# Patient Record
Sex: MALE | Race: WHITE | NOT HISPANIC OR LATINO | Employment: OTHER | URBAN - NONMETROPOLITAN AREA
[De-identification: names, ages, dates, MRNs, and addresses within clinical notes are randomized per-mention and may not be internally consistent; named-entity substitution may affect disease eponyms.]

---

## 2017-01-04 ENCOUNTER — TRANSCRIBE ORDERS (OUTPATIENT)
Dept: ADMINISTRATIVE | Facility: HOSPITAL | Age: 72
End: 2017-01-04

## 2017-01-04 DIAGNOSIS — R74.8 ELEVATED LIVER ENZYMES: Primary | ICD-10-CM

## 2017-01-06 ENCOUNTER — HOSPITAL ENCOUNTER (OUTPATIENT)
Dept: ULTRASOUND IMAGING | Facility: HOSPITAL | Age: 72
Discharge: HOME | End: 2017-01-06
Attending: INTERNAL MEDICINE | Admitting: INTERNAL MEDICINE

## 2017-01-06 DIAGNOSIS — R74.8 ELEVATED LIVER ENZYMES: ICD-10-CM

## 2017-01-06 PROCEDURE — 76705 ECHO EXAM OF ABDOMEN: CPT

## 2017-01-23 RX ORDER — ATORVASTATIN CALCIUM 80 MG/1
TABLET, FILM COATED ORAL
Qty: 30 TABLET | Refills: 5 | Status: SHIPPED | OUTPATIENT
Start: 2017-01-23 | End: 2017-08-11 | Stop reason: SDUPTHER

## 2017-01-30 ENCOUNTER — HOSPITAL ENCOUNTER (OUTPATIENT)
Dept: HOSPITAL 58 - REHAB | Age: 72
LOS: 1 days | End: 2017-01-31
Attending: PHYSICIAN ASSISTANT

## 2017-01-30 VITALS — BODY MASS INDEX: 38 KG/M2

## 2017-01-30 DIAGNOSIS — M54.5: Primary | ICD-10-CM

## 2017-02-02 NOTE — RS.OPPTDN
Subjective


Date of Note: 02/02/17


Date of Evaluation: 01/30/17


Payer Source: MEDICARE


Treatment Diagnosis: Low back pain


*Precautions: PACEMAKER





Pain Assessment





- Pain Description


Pain Location: LBP and right lateral thigh.


Pain Description: Radiating


Pain Description: Increased LBP over the past couple days associated with 

increased demands





Interventions





- Exercise/Activities/Manual Therapy


Exercises/Activities: Patient performed alternating KTC and LTR X 5-10 reps.  

He was instructed in posterior pelvic tilts and hamstring stretches bilaterally 

to be performed with the HEP he has already began.  He demonstrated good 

understanding but further education will be needed.


Total minutes of Exercise: 15


Manual Therapy: Myofasical stretching in the area of the right piriformis and 

medial to the trochanteric area as well as the right mid-lower lumbar 

paraspinals.  Tender point releases also performed in these areas and deep 

tissue mobilization.


Total minutes of Manual Therapy: 30


HOME EXERCISE PROGRAM: Alt KTC, LTR, posterior pelvic tilts and hamstring 

stretches.





- Charges


Total Direct Minutes: 45


Total Treatment Time: 45


Procedures billed for this date of service:: ther ex & manual therapy X 2


Assessment: Patient tolerated tx well and had relief of pain and tightness 

following today's intervention.


Patient Education: Body/Joint mechanics, Home Safety, Activity Modification


Patient demonstrates compliance with HEP?: Yes





Plan


PLAN OF CARE EXPIRES ON:: 03/19/17


ORDER # VISITS AND/OR THROUGH DATE: 03/19/2017


PLAN: Continue Plan of Care

## 2017-02-02 NOTE — RS.OPPTEV2
Date of Note: 01/30/17


Visit #: 1


Date of Evaluation: 01/30/17


Payer Source: MEDICARE


Treatment Diagnosis: Low back pain


History of Condition/Mechanism of Injury:: Patient reports increased low back 

and LE pain since approximately October 2016.  Reports no known injury.  

Reports a history of problems with the low back.  He had surgery to the low 

back in 2011 which involved hardware at L2-3 .


Prior Level of Function.....Patient was independent with: ADL's, Self Care, 

Caregiving, Ambulation/Mobility, Community Integration/Access


Functional Limitations: Sleep, Self Care, ADL's, Reaching, Pushing, Pulling, 

Lifting, Carrying, Sitting, Standing, Bending, Ambulation, Community Access/

Integration


Current Subjective/complaints:: Patient reports low back pain and burning pain 

into the hips. States right side is worse than the left.  States his pain is 

worse at night. Reports waking up many times due to pain and has to frequently 

change positions.  Riding in the car is also difficult. States he also has 

times that his legs feel like "rubber".  States this varies from day to day, 

and he can usually tell when he gets up in the morning how his legs are going 

to feel the rest of the day.


*Precautions: PACEMAKER





Medical History


Medical History: Hypertension, Arthritis


Surgical History Comments:: Lumbar surgery 2011, PACEMAKER, Cervical spine 

surgery


Smoking Status: Former smoker


Hx Home Medications: Omeprazole,Valsartan, Aricept, Metoprolol, Xanax, Lexapro


Patient's Goals: His goal is to get relief of back and leg pain, and put off or 

avoid back surgery.





Pain Assessment





- Pain Description


Pain Location: back and hips


Current Pain Intensity: 4/10


Worst Pain Intensity: 7/10





Functional Outcome Measure


Oswestry LBP: 64





- G Codes & Severity Modifier


G Codes & Modifier: Mobility current CL.  Mobility goal CJ


Source of G Code score: Oswestry LBP scale





Observation





- Observation


Posture: Forward Head, Rounded Shoulders, Decreased Lumbar Lordosis, Posterior 

Pelvic Tilt





Gait





- Gait Pattern


General Gait Pattern Observation: No Deviations/Normal





- ROM


Comments: Lumbar AROM is approximately 50-75% of normal range.  LE AROM is WFL'

s. Right hip is tighter into  IR and ER rotation and the right SLR is also less.





- Strength


Trunk Lateral Flexion: 4    Good


Trunk Rotation: 4    Good


Comments: Bilateral LE strength is 4+ to 5/5 throughout.





- Special Tests


DEBBIE Test: Negative Left, Positive Right


SLR Test: Negative Left, Negative Right


Seated Dural Stretch Test: Negative Left, Negative Right


SI Joint Compression: Negative


SI Joint Distraction: Negative





Palpation


Comments:: Patient reports tenderness at right superior gluteal musculature.  

Patient reports no significant tenderness with palpation throughout the lumbar 

spine and paraspinals, or either SI joint.  Minimal muscle guarding noted along 

lumbar paraspinals.





Sensation





- Sensation


Comments: 


Reports hyposensitivity along bilateral upper lateral thighs, worse on the 

right than the left.





Additional Comments:


Additional Comments: Right SLR to 30-35 degrees. Left SLR to 45 degrees.





- Treatment


Modality: Electrical Stim Unattended


Parameters/Method Applied: 4 large pads, uncrossed to lumbar paraspinals X 15 

mins @ 120 peak volts.


Patient Position: Supine





- Heat/Cryotherapy


Treatment: Hot Pack (with Estim)





Interventions





- Exercise/Activities/Manual Therapy


Exercises/Activities: Patient instructed in stretching SKTC and lower trunk 

rotation.


Manual Therapy: NA


HOME EXERCISE PROGRAM: stretching SKTC and lower trunk rotation.





- Charges


Total Direct Minutes: 55 mins


Total Treatment Time: 55 mins


Procedures billed for this date of service:: EVAL, Estim, HP





Assessment


Assessment: Patient presents to therapy with a diagnosis of low back pain.  He 

reports difficulty sleeping, difficulty riding in a car, and hyposensitivity 

and weakness in the legs with prolonged standing and walking.  He exhibits less 

flexibilty in the hip compared to the left.  He exhibits good potential to 

benefit from modalities and exercises to reduce his pain and address muscle 

imbalances in the hips.


Patient Education: Education of diagnosis, Body/Joint mechanics, Home Exercise 

Program, Home Safety, Activity Modification, Education of Plan of Care


Rehab Potential: Good





Short Term Goals


Goal #1: Patient independent and compliant with basic HEP.


Goal to be met by: 02/16/17


Goal #2: Right SLR to 40 degrees.


Goal to be met by: 02/16/17


Goal #3: Radiating symptoms localized to the low back.


Goal to be met by: 02/16/17





Long Term Goals


Goal #1: Pt knows HEP and to continue exercises to maintain level at discharge.


Goal to be met by: 03/19/17


Goal #2: Score on Oswestry LBP scale improved to 39% or better.


Goal to be met by: 03/19/17


Goal #3: Pt able to tolerate riding in car with minimal low back pain.


Goal to be met by: 03/19/17


Goal #4: Patient able to sleep 6 hours w/o interruption from back pain.


Goal to be met by: 03/19/17





Plan





- Treatment to be Provided


Procedures: Therapeutic Exercises, Therapeutic Activity, Patient Education


Modalities: Electrical Stimulation, Cryotherapy, Hot Packs





- Treatment Plan


Frequency: 2 X week


Duration: 6 weeks


ORDER # VISITS AND/OR THROUGH DATE: 3/19/17





- Treatment Code


(1) Low back pain


Qualifiers: 


     Chronicity: unspecified     Back pain laterality: unspecified     Sciatica 

presence: unspecified whether sciatica present     Qualified Description: Low 

back pain, unspecified back pain laterality, unspecified chronicity, with 

sciatica presence unspecified       Qualifier Code(s): (M54.5) Low back pain

## 2017-02-06 RX ORDER — ESCITALOPRAM OXALATE 20 MG/1
TABLET ORAL
Qty: 30 TABLET | Refills: 0 | Status: SHIPPED | OUTPATIENT
Start: 2017-02-06 | End: 2017-03-08 | Stop reason: SDUPTHER

## 2017-02-07 NOTE — RS.CXNS
Date of scheduled appointment: 02/07/17


Type: Cancel (Wife calls to cancel appointment, patient is sick.)

## 2017-02-09 RX ORDER — FUROSEMIDE 40 MG/1
40 TABLET ORAL DAILY
Qty: 90 TABLET | Refills: 0 | Status: SHIPPED | OUTPATIENT
Start: 2017-02-09 | End: 2017-05-07 | Stop reason: SDUPTHER

## 2017-02-09 NOTE — RS.OPPTDN
Subjective


Date of Note: 02/09/17


Visit #: 3


Date of Evaluation: 01/30/17


Payer Source: MEDICARE


Treatment Diagnosis: Low back pain


Current Subjective/complaints:: Patient reports feeling better following 

treatment today than he has been in several days. States he is working on HEP 

and will increase hold time with stretching.


*Precautions: PACEMAKER





Pain Assessment





- Pain Description


Pain Location: LBP and right lateral thigh.


Pain Description: Radiating


Pain Description: Increased LBP over the past couple days associated with 

increased demands





- Treatment


Modality: Electrical Stim Unattended


Parameters/Method Applied: [id number] HVGC to 180-190p.v. with 4 large pads to the 

lower lumbar paraspinals and right upper gluteal region with CP. Patient in 

supine.


Patient Position: Supine





- Heat/Cryotherapy


Treatment: Cryotherapy





Interventions





- Exercise/Activities/Manual Therapy


Exercises/Activities: [id number]. Patient assisted with HS, piriformis, SKTC, 

trunk rotation, and ITB stretching. Long stretching and progressive hold times.


Total minutes of Exercise: 14mins 


Manual Therapy: NA


HOME EXERCISE PROGRAM: Alt KTC, LTR, posterior pelvic tilts and hamstring 

stretches. Piriformis stretching.





- Charges


Total Direct Minutes: 14mins 


Total Treatment Time: 40mins 


Procedures billed for this date of service:: HP, Estim unattended, EX


Assessment: Patient responded well to treatment today and reports significant 

reduction in pain.


Patient Education: Body/Joint mechanics, Home Exercise Program, Activity 

Modification


Patient demonstrates compliance with HEP?: Yes





Short Term Goals


Goal #1: Patient independent and compliant with basic HEP.


Goal to be met by: 02/16/17


Progress towards Goal:: Progressing


Goal #2: Right SLR to 40 degrees.


Goal to be met by: 02/16/17


Progress towards Goal:: Progressing


Goal #3: Radiating symptoms localized to the low back.


Goal to be met by: 02/16/17





Long Term Goals


Goal #1: Pt knows HEP and to continue exercises to maintain level at discharge.


Goal to be met by: 03/19/17


Goal #2: Score on Oswestry LBP scale improved to 39% or better.


Goal to be met by: 03/19/17


Goal #3: Pt able to tolerate riding in car with minimal low back pain.


Goal to be met by: 03/19/17


Goal #4: Patient able to sleep 6 hours w/o interruption from back pain.


Goal to be met by: 03/19/17





Plan


PLAN OF CARE EXPIRES ON:: 03/19/17


ORDER # VISITS AND/OR THROUGH DATE: 03/19/2017


PLAN: Continue Plan of Care (Continue with modalities and exercise to reduce 

pain and increase functional activity level.)

## 2017-02-14 NOTE — RS.OPPTDN
Subjective


Date of Note: 02/14/17


Visit #: 4


Date of Evaluation: 01/30/17


Payer Source: MEDICARE


Treatment Diagnosis: Low back pain


Current Subjective/complaints:: Patient reports having a fall yesterday. States 

he missed a step and fell on concrete. Reports being very sore but does not 

feel he has any injuries that need to be seen by physician. Reports feeling 

better after treatment and exercise.


*Precautions: PACEMAKER





Pain Assessment





- Pain Description


Pain Location: LBP and right lateral thigh.


Pain Description: Radiating


Pain Description: Increased LBP over the past couple days associated with 

increased demands





- Treatment


Modality: Electrical Stim Unattended


Parameters/Method Applied: [id number] HVGC to 210p.v. with 4 large pads, cross 

current, to bilateral lumbar paraspinals with HP.


Patient Position: Supine





- Heat/Cryotherapy


Treatment: Hot Pack ([id number] with Estim )





Interventions





- Exercise/Activities/Manual Therapy


Exercises/Activities: [id number]. Patient assisted with HS, piriformis, SKTC, 

trunk rotation, and ITB stretching. Long stretching and progressive hold times 

again today. Discussed continued stretching at home.


Total minutes of Exercise: 15mins 


Manual Therapy: NA


HOME EXERCISE PROGRAM: Alt KTC, LTR, posterior pelvic tilts and hamstring 

stretches. Piriformis stretching.





- Charges


Total Direct Minutes: 15mins 


Total Treatment Time: 40mins 


Procedures billed for this date of service:: HP, Estim unattended, EX


Assessment: Continued with long stretches again today due to patients report of 

increased soreness after a fall yesterday. Will attempt to progress trunk 

stability next session if tolerated.


Patient Education: Body/Joint mechanics, Home Exercise Program, Activity 

Modification


Patient demonstrates compliance with HEP?: Yes





Short Term Goals


Goal #1: Patient independent and compliant with basic HEP.


Goal to be met by: 02/16/17


Progress towards Goal:: Progressing


Goal #2: Right SLR to 40 degrees.


Goal to be met by: 02/16/17


Progress towards Goal:: Progressing


Goal #3: Radiating symptoms localized to the low back.


Goal to be met by: 02/16/17





Long Term Goals


Goal #1: Pt knows HEP and to continue exercises to maintain level at discharge.


Goal to be met by: 03/19/17


Goal #2: Score on Oswestry LBP scale improved to 39% or better.


Goal to be met by: 03/19/17


Goal #3: Pt able to tolerate riding in car with minimal low back pain.


Goal to be met by: 03/19/17


Goal #4: Patient able to sleep 6 hours w/o interruption from back pain.


Goal to be met by: 03/19/17





Plan


PLAN OF CARE EXPIRES ON:: 03/19/17


ORDER # VISITS AND/OR THROUGH DATE: 03/19/2017


PLAN: Continue Plan of Care

## 2017-02-16 NOTE — RS.OPPTDN
Subjective


Date of Note: 02/16/17


Visit #: 5


Date of Evaluation: 01/30/17


Payer Source: MEDICARE


Treatment Diagnosis: Low back pain


Current Subjective/complaints:: Patient reports his back is about the same, but 

the burning in the hips is going away. States left LE symptoms are just about 

gone.  Right hip continues to burn.


*Precautions: PACEMAKER





Pain Assessment





- Pain Description


Pain Location: LBP and right lateral thigh.


Pain Description: Radiating


Pain Description: Increased LBP over the past couple days associated with 

increased demands





- Treatment


Modality: Electrical Stim Unattended


Parameters/Method Applied: 4 pads running horizontally to lumb/sacral region 

and upper lumbar X 20 mins to HVGS up to 155 peak volts.


Patient Position: Supine





- Heat/Cryotherapy


Treatment: Hot Pack





Interventions





- Exercise/Activities/Manual Therapy


Exercises/Activities: x18 mins. Patient assisted with HS, piriformis, SKTC, 

trunk rotation, and ITB stretching.


Manual Therapy: NA


HOME EXERCISE PROGRAM: Alt KTC, LTR, posterior pelvic tilts and hamstring 

stretches. Piriformis stretching.





- Charges


Total Direct Minutes: 18 mins


Total Treatment Time: 38 mins


Procedures billed for this date of service:: hp, Estim, Ex


Assessment: Patient performing his exercises at home, states he is not always 

consistent.  States burning symptoms in LE's are getting better.  Low back pain 

is unchanged. He feels the Estim and stretching helps quite a bit.


Patient demonstrates compliance with HEP?: Yes





Short Term Goals


Goal #1: Patient independent and compliant with basic HEP.


Goal to be met by: 02/16/17


Progress towards Goal:: Progressing


Goal #2: Right SLR to 40 degrees.


Goal to be met by: 02/16/17


Progress towards Goal:: Progressing


Goal #3: Radiating symptoms localized to the low back.


Goal to be met by: 02/16/17


Progress towards Goal:: Progressing


Comments:: Left LE symptoms nearly gone per patient.





Long Term Goals


Goal #1: Pt knows HEP and to continue exercises to maintain level at discharge.


Goal to be met by: 03/19/17


Goal #2: Score on Oswestry LBP scale improved to 39% or better.


Goal to be met by: 03/19/17


Goal #3: Pt able to tolerate riding in car with minimal low back pain.


Goal to be met by: 03/19/17


Goal #4: Patient able to sleep 6 hours w/o interruption from back pain.


Goal to be met by: 03/19/17





Plan


PLAN OF CARE EXPIRES ON:: 03/19/17


ORDER # VISITS AND/OR THROUGH DATE: 03/19/2017


PLAN: Continue Plan of Care

## 2017-02-21 ENCOUNTER — OFFICE VISIT (OUTPATIENT)
Dept: GASTROENTEROLOGY | Facility: CLINIC | Age: 72
End: 2017-02-21

## 2017-02-21 VITALS
HEART RATE: 81 BPM | TEMPERATURE: 95.5 F | WEIGHT: 248 LBS | OXYGEN SATURATION: 97 % | BODY MASS INDEX: 37.59 KG/M2 | SYSTOLIC BLOOD PRESSURE: 110 MMHG | HEIGHT: 68 IN | DIASTOLIC BLOOD PRESSURE: 78 MMHG

## 2017-02-21 DIAGNOSIS — R13.19 ESOPHAGEAL DYSPHAGIA: ICD-10-CM

## 2017-02-21 DIAGNOSIS — I10 ESSENTIAL HYPERTENSION: ICD-10-CM

## 2017-02-21 DIAGNOSIS — K62.5 BRBPR (BRIGHT RED BLOOD PER RECTUM): Primary | ICD-10-CM

## 2017-02-21 DIAGNOSIS — Z86.010 HX OF COLONIC POLYP: ICD-10-CM

## 2017-02-21 DIAGNOSIS — D68.9 COAGULOPATHY (HCC): ICD-10-CM

## 2017-02-21 PROBLEM — Z86.0100 HX OF COLONIC POLYP: Status: ACTIVE | Noted: 2017-02-21

## 2017-02-21 PROCEDURE — 99214 OFFICE O/P EST MOD 30 MIN: CPT | Performed by: NURSE PRACTITIONER

## 2017-02-21 RX ORDER — CLOPIDOGREL BISULFATE 75 MG/1
TABLET ORAL
COMMUNITY
End: 2017-02-21 | Stop reason: ALTCHOICE

## 2017-02-21 RX ORDER — SIMVASTATIN 40 MG
TABLET ORAL
COMMUNITY
End: 2017-02-21 | Stop reason: ALTCHOICE

## 2017-02-21 RX ORDER — OXYCODONE HYDROCHLORIDE AND ACETAMINOPHEN 5; 325 MG/1; MG/1
TABLET ORAL EVERY 4 HOURS
COMMUNITY
End: 2017-02-21 | Stop reason: ALTCHOICE

## 2017-02-21 RX ORDER — VITAMIN B COMPLEX
CAPSULE ORAL
COMMUNITY
End: 2017-02-21

## 2017-02-21 RX ORDER — GABAPENTIN 300 MG/1
CAPSULE ORAL
Refills: 5 | COMMUNITY
Start: 2017-01-27 | End: 2019-04-11

## 2017-02-21 NOTE — PROGRESS NOTES
[facility] GASTROENTEROLOGY - OFFICE NOTE    2/21/2017    Carlo Velasco   1945    Chief Complaint   Patient presents with   • Colonoscopy         HISTORY OF PRESENT ILLNESS    Carlo Velasco is a 71 y.o. male presents  with brbpr.  He has had intermittent bright blood per rectum the last 3-4 years.  Last episode was about 3 days ago.  He states that the blood was a small amount.  He has BM daily.  He does have history of hemorrhoids as well.  Last labs were maybe a couple of months ago.  I did review a CBC from December 2016 and hemoglobin was 12.9 at that time.  Does have occasional dizziness which is chronic.  He denies any lightheadedness .  Denies shortness of breath.  He denies abdominal pain.  Denies nausea or vomiting.  Denies fevers or chills.  Denies unintentional weight loss.  Denies rectal pain.  Last colonoscopy was September 2007 for bright red blood per rectum and the impression was small internal hemorrhoids.  Recall colonoscopy recommended 5 years.  He does have history of colon polyps.  No family history of colon cancer or colon polyps.  He is on Brilinta, last cardiac stent was October 2016.  He did have some dark stool recently but was on iron for at least a year.  His stools have been formed.  He has been off iron for the last 3 weeks.  His stools are no longer dark.  He does take Prilosec on a daily basis.  He has taken Prilosec daily for the last year.    He does have complaints of dysphagia.  This is to solids such as meat or bread.  This is been going on for the last year.  This is intermittent.  He points to the upper chest area where he feels like food is hanging.  He denies heartburn or regurgitation.  Denies hematemesis.  Denies fevers or chills.  Denies unintentional weight loss.  Denies abdominal pain.  Upper endoscopy November 2010 for intermittent dysphasia impression was 1 cm hiatal hernia and an incomplete Schatzki's ring dilated.    Past Medical History   Diagnosis Date    • Anxiety    • Arthritis    • Cancer      melanoma   • CHF (congestive heart failure)    • Claustrophobia    • Coronary artery disease 2012, 2015     2 vessel CABG (2012), SHAHRZAD LAD (2015).    • Depression    • Disease of thyroid gland    • Dysphagia    • GERD (gastroesophageal reflux disease)    • Hearing loss    • Heart attack    • Heart attack    • Hyperlipidemia    • Hypertension    • Kidney stone      history of    • Tobacco abuse, in remission    • Uses hearing aid      BILATERAL       Past Surgical History   Procedure Laterality Date   • Thyroid surgery     • Hemorrhoidectomy     • Thyroidectomy     • Coronary angioplasty with stent placement  07/2015     SHAHRZAD mid Dr. Malu LO    • Coronary artery bypass graft  2012 AND 2014     2 vessel    • Cardiac catheterization  07/2015     SHAHRZAD mid LADDr. Toribio   • Cardiac catheterization N/A 10/21/2016     Procedure: Left Heart Cath;  Surgeon: Darian Toribio MD;  Location:  [facility] CATH INVASIVE LOCATION;  Service:    • Back surgery       CERVICAL FUSION AND LUMBAR DISC REPLACEMENT   • Head/neck lesion/cyst excision Left 12/20/2016     Procedure: EXCISION MALIGNANT MELANOMA WITH SENTINEL NODE BIOPSY, INJECTION AND SCAN PRE-OP, LEFT EAR;  Surgeon: Guanaco Zepeda MD;  Location:  [facility] OR;  Service:    • [place] node biopsy Left 12/20/2016     Procedure: SENTINEL NODE BIOPSY;  Surgeon: Guanaco Zepeda MD;  Location:  [facility] OR;  Service:    • Endoscopy  11/08/2010   • Colonoscopy  09/05/2007     colon polyps       Outpatient Prescriptions Marked as Taking for the 2/21/17 encounter (Office Visit) with ANALI Eaton   Medication Sig Dispense Refill   • ALPRAZolam (XANAX) 0.25 MG tablet Take 1 tablet by mouth 3 (Three) Times a Day As Needed for anxiety. 90 tablet 0   • aspirin 81 MG EC tablet Take 81 mg by mouth daily.       • atorvastatin (LIPITOR) 80 MG tablet TAKE 1 TABLET BY MOUTH EVERY NIGHT 30 tablet 5   • donepezil (ARICEPT) 5 MG tablet TAKE 1 TABLET BY MOUTH EVERY  DAY 30 tablet 5   • escitalopram (LEXAPRO) 20 MG tablet TAKE 1 TABLET BY MOUTH EVERY DAY 30 tablet 0   • furosemide (LASIX) 40 MG tablet Take 1 tablet by mouth Daily. 90 tablet 0   • gabapentin (NEURONTIN) 300 MG capsule TK 1 C PO QHS  5   • HYDROcodone-acetaminophen (NORCO) 7.5-325 MG per tablet Take 1-2 tablets by mouth Every 4 (Four) Hours As Needed for moderate pain (4-6) (Pain). (Patient taking differently: Take 1-2 tablets by mouth As Needed for moderate pain (4-6) (Pain).) 40 tablet 0   • metoprolol tartrate (LOPRESSOR) 50 MG tablet Take 1 tablet by mouth Daily. 90 tablet 1   • NITROSTAT 0.4 MG SL tablet PLACE1 TABLET UNDER THE TONGUE AND ALLOW TO DISSOLVE AS NEEDED FOR CHEST PAIN  2   • omega-3 acid ethyl esters (LOVAZA) 1 G capsule Take 1 capsule by mouth Daily. 90 capsule 4   • omeprazole (PriLOSEC) 20 MG capsule TAKE ONE CAPSULE BY MOUTH EVERY DAY  3   • potassium gluconate 595 MG tablet tablet Take 595 mg by mouth daily.     • Probiotic Product (PROBIOTIC DAILY PO) Take  by mouth.     • ticagrelor (BRILINTA) 90 MG tablet tablet Take 1 tablet by mouth Every 12 (Twelve) Hours. 60 tablet 11   • valsartan (DIOVAN) 80 MG tablet TAKE 1 TABLET BY MOUTH EVERY DAY 90 tablet 3       Allergies   Allergen Reactions   • Meperidine Other (See Comments)     HEART STOPS         Social History     Social History   • Marital status:      Spouse name: N/A   • Number of children: N/A   • Years of education: N/A     Occupational History   • Not on file.     Social History Main Topics   • Smoking status: Former Smoker     Types: Cigarettes     Quit date: 1977   • Smokeless tobacco: Former User     Quit date: 1977   • Alcohol use No   • Drug use: No   • Sexual activity: Yes     Partners: Female     Other Topics Concern   • Not on file     Social History Narrative       Family History   Problem Relation Age of Onset   • Heart failure Mother    • Stroke Mother    • Dementia Mother    • Coronary artery disease Father    •  "COPD Brother        Review of Systems   Constitutional: Negative for appetite change, chills, fatigue, fever and unexpected weight change.   HENT: Positive for trouble swallowing. Negative for sore throat.    Eyes: Negative for pain and visual disturbance.   Respiratory: Positive for wheezing (occasional , chronic ). Negative for cough, chest tightness and shortness of breath.    Cardiovascular: Negative for chest pain and palpitations.   Gastrointestinal: Positive for anal bleeding and blood in stool. Negative for abdominal distention, abdominal pain, constipation, diarrhea, nausea, rectal pain and vomiting.        As mentioned in hpi   Endocrine: Negative for cold intolerance and heat intolerance.   Genitourinary: Negative for difficulty urinating, dysuria and hematuria.   Musculoskeletal: Negative for arthralgias and back pain.   Skin: Negative for color change and rash.   Neurological: Positive for dizziness (occasional , chronic ). Negative for tremors, seizures, syncope, light-headedness and headaches.   Hematological: Negative for adenopathy. Does not bruise/bleed easily.   Psychiatric/Behavioral: Negative for confusion. The patient is not nervous/anxious.        Vitals:    02/21/17 1003   BP: 110/78   BP Location: Left arm   Patient Position: Sitting   Cuff Size: Adult   Pulse: 81   Temp: 95.5 °F (35.3 °C)   SpO2: 97%   Weight: 248 lb (112 kg)   Height: 68\" (172.7 cm)      Body mass index is 37.71 kg/(m^2).    Physical Exam   Constitutional: He is oriented to person, place, and time. He appears well-developed and well-nourished. No distress.   HENT:   Head: Normocephalic and atraumatic.   Mouth/Throat: Oropharynx is clear and moist.   Eyes: Pupils are equal, round, and reactive to light. No scleral icterus.   Neck: Normal range of motion. Neck supple. No JVD present. No tracheal deviation present.   Cardiovascular: Normal rate, regular rhythm, normal heart sounds and intact distal pulses.  Exam reveals no " gallop and no friction rub.    No murmur heard.  Pulmonary/Chest: Effort normal and breath sounds normal. No stridor. No respiratory distress. He has no wheezes. He has no rales.   Abdominal: Soft. Bowel sounds are normal. He exhibits no distension and no mass. There is no tenderness. There is no rebound and no guarding.   Musculoskeletal: Normal range of motion. He exhibits no edema or deformity.   Neurological: He is alert and oriented to person, place, and time. No cranial nerve deficit.   Skin: Skin is warm and dry. No rash noted.   Psychiatric: He has a normal mood and affect. His behavior is normal.   Vitals reviewed.              ASSESSMENT AND PLAN    Carlo was seen today for colonoscopy.    Diagnoses and all orders for this visit:    BRBPR (bright red blood per rectum)    Hx of colonic polyp    Esophageal dysphagia    Coagulopathy    Essential hypertension      Bright red blood per rectum is chronic.  He does have history of hemorrhoids.  Recommend if increased bleeding recommend emergency room.  He is on Brilinta for h/o coronary stent placed 10/2016 by dr burks.  Will send coag letter to Dr. Burks to see if patient can hold Brilinta 5 days prior to a colonoscopy and upper endoscopy.  If not able to hold Brilinta can consider esophagram as well as barium enema.  This was discussed with the patient.  Will contact him after received coag sheet from Dr. Burks.    The patient was advised on the risks of stopping blood thinners for a procedure.  The risks discussed included the risk of developing myocardial infarction, CVA, embolus, clogging of the arteries or stents, etc.  We discussed the potential consequences of the risks discussed.  The benefits of stopping as well as the alternatives were discussed as well.   Patient is to hold their anticoagulation medication per the direction of their prescribing physician, Dr Burks .    A letter will be sent to prescribing This is to prevent any risk or complication from  bleeding intra and post procedure. If they develop bleeding post procedure they are to go the emergency department for further evaluation and treatment immediately.         * Surgery not found *    Body mass index is 37.71 kg/(m^2).       ANALI Welch Dragon/transcription disclaimer:  Much of this encounter note is electronic transcription/translation of spoken language to printed text.  The electronic translation of spoken language may be erroneous, or at times, nonsensical words or phrases may be inadvertently transcribed.  Although I have reviewed the note for such errors, some may still exist.

## 2017-02-21 NOTE — RS.OPPTDN
Subjective


Date of Note: 02/21/17


Visit #: 6


Date of Evaluation: 01/30/17


Payer Source: MEDICARE


Treatment Diagnosis: Low back pain


Current Subjective/complaints:: Reports the pain is moderate today,radiates 

into the posterior thigh,but not below the knee today.


*Precautions: PACEMAKER





Pain Assessment





- Pain Description


Pain Location: LBP and right lateral thigh.


Pain Description: Radiating


Pain Description: Increased LBP over the past couple days associated with 

increased demands


Current Pain Intensity: 4/10





- Treatment


Modality: Electrical Stim Unattended


Parameters/Method Applied: 20 mins. to lumbar area,channel 1 @ 175pv,channel 2 

@ 155pv.





- Heat/Cryotherapy


Treatment: Hot Pack (concurrent with e-stim)





Interventions





- Exercise/Activities/Manual Therapy


Exercises/Activities: x20 mins. Patient assisted with 90/90 hamstring stretches 

,using contract-relax, piriformis, SKTC, trunk rotation, and isometric hip abd /

add.


Total minutes of Exercise: 20


Manual Therapy: NA


Total minutes of Manual Therapy: 0


HOME EXERCISE PROGRAM: Alt KTC, LTR, posterior pelvic tilts and hamstring 

stretches. Piriformis stretching.





- Charges


Total Direct Minutes: 20


Total Treatment Time: 40


Procedures billed for this date of service:: hp,e-stim,ex 1


Assessment: Patient has moderate hamstring tightness present,greater on the R,

as opposed to the L today.He does have improved hamstring extensibility 

bilaterally after contract-relax today.His lower trunk rotation is WFL,with 

stretch dsicomfort only,no increase in sciatica.


Patient Education: Education of diagnosis, Body/Joint mechanics, Home Exercise 

Program, Home Safety, Activity Modification, Education of Plan of Care


Patient demonstrates compliance with HEP?: Yes





Short Term Goals


Goal #1: Patient independent and compliant with basic HEP.


Goal to be met by: 02/16/17


Progress towards Goal:: Progressing


Goal #2: Right SLR to 40 degrees.


Goal to be met by: 02/16/17


Progress towards Goal:: Progressing


Goal #3: Radiating symptoms localized to the low back.


Goal to be met by: 02/16/17


Progress towards Goal:: Progressing





Long Term Goals


Goal #1: Pt knows HEP and to continue exercises to maintain level at discharge.


Goal to be met by: 03/19/17


Progress towards goal: Progressing


Goal #2: Score on Oswestry LBP scale improved to 39% or better.


Goal to be met by: 03/19/17


Goal #3: Pt able to tolerate riding in car with minimal low back pain.


Goal to be met by: 03/19/17


Goal #4: Patient able to sleep 6 hours w/o interruption from back pain.


Goal to be met by: 03/19/17





Plan


PLAN OF CARE EXPIRES ON:: 03/19/17


ORDER # VISITS AND/OR THROUGH DATE: 03/19/2017


PLAN: Continue Plan of Care

## 2017-02-24 ENCOUNTER — HOSPITAL ENCOUNTER (OUTPATIENT)
Dept: HOSPITAL 58 - REHAB | Age: 72
LOS: 4 days | End: 2017-02-28
Attending: PHYSICIAN ASSISTANT

## 2017-02-24 VITALS — BODY MASS INDEX: 38 KG/M2

## 2017-02-24 DIAGNOSIS — M54.5: Primary | ICD-10-CM

## 2017-02-24 NOTE — RS.OPPTDN
Subjective


Date of Note: 02/07/17


Date of Evaluation: 01/30/17


Payer Source: MEDICARE


Treatment Diagnosis: Low back pain


Current Subjective/complaints:: Reports the back feels better today,but the R 

leg into the back of his thigh hurts today.


*Precautions: PACEMAKER





Pain Assessment





- Pain Description


Pain Location: LBP and right lateral thigh.


Pain Description: Radiating, Dull, Aching


Current Pain Intensity: 5





- Treatment


Modality: Electrical Stim Unattended


Parameters/Method Applied: 20 mins. high volt,channel 1 @ 185 pv,channel 2 @ 

240 pv to lumbar.





- Heat/Cryotherapy


Treatment: Hot Pack (concurrent with e-stim)





Interventions





- Exercise/Activities/Manual Therapy


Exercises/Activities: x20 mins. Patient assisted with 90/90 hamstring stretches 

,using contract-relax, piriformis, SKTC, trunk rotation, and isometric hip abd /

add.


Total minutes of Exercise: 20


Manual Therapy: NA


Total minutes of Manual Therapy: 0


HOME EXERCISE PROGRAM: Alt KTC, LTR, posterior pelvic tilts and hamstring 

stretches. Piriformis stretching.





- Charges


Total Direct Minutes: 20


Total Treatment Time: 40


Procedures billed for this date of service:: hp,e-stim,ex 1


Assessment: Patient has decreased tightness in the lumbar area,improved 

hamstring extensibility.He reports stretch discomfort,but no increase in LBP or 

radiculopathy.





Short Term Goals


Goal #1: Patient independent and compliant with basic HEP.


Goal to be met by: 02/16/17


Progress towards Goal:: Progressing


Goal #2: Right SLR to 40 degrees.


Goal to be met by: 02/16/17


Progress towards Goal:: Progressing


Goal #3: Radiating symptoms localized to the low back.


Goal to be met by: 02/16/17


Progress towards Goal:: Progressing





Long Term Goals


Goal #1: Pt knows HEP and to continue exercises to maintain level at discharge.


Goal to be met by: 03/19/17


Progress towards goal: Progressing


Goal #2: Score on Oswestry LBP scale improved to 39% or better.


Goal to be met by: 03/19/17


Goal #3: Pt able to tolerate riding in car with minimal low back pain.


Goal to be met by: 03/19/17


Goal #4: Patient able to sleep 6 hours w/o interruption from back pain.


Goal to be met by: 03/19/17





Plan


PLAN OF CARE EXPIRES ON:: 03/19/17


ORDER # VISITS AND/OR THROUGH DATE: 03/19/2017


PLAN: Continue Plan of Care

## 2017-02-25 ENCOUNTER — HOSPITAL ENCOUNTER (EMERGENCY)
Dept: HOSPITAL 58 - ED | Age: 72
Discharge: HOME | End: 2017-02-25

## 2017-02-25 VITALS — BODY MASS INDEX: 35.7 KG/M2

## 2017-02-25 VITALS — SYSTOLIC BLOOD PRESSURE: 140 MMHG | DIASTOLIC BLOOD PRESSURE: 84 MMHG | TEMPERATURE: 99.7 F

## 2017-02-25 DIAGNOSIS — N41.0: ICD-10-CM

## 2017-02-25 DIAGNOSIS — Z79.899: ICD-10-CM

## 2017-02-25 DIAGNOSIS — N30.90: Primary | ICD-10-CM

## 2017-02-25 LAB
ADD URINE MICROSCOPIC: YES
ALBUMIN SERPL-MCNC: 3.6 G/DL (ref 3.4–5)
ALBUMIN/GLOB SERPL: 1.13 {RATIO}
ALP SERPL-CCNC: 61 U/L (ref 56–119)
ALT SERPL-CCNC: 26 U/L (ref 12–78)
ANION GAP SERPL CALC-SCNC: 14.7 MMOL/L
AST SERPL-CCNC: 22 U/L (ref 15–37)
BASOPHILS # BLD AUTO: 0.1 K/UL (ref 0–0.2)
BASOPHILS NFR BLD AUTO: 0.3 % (ref 0–3)
BILIRUB SERPL-MCNC: 1.15 MG/DL (ref 0–1.2)
BILIRUB UR QL STRIP.AUTO: (no result)
BUN SERPL-MCNC: 19 MG/DL (ref 7–18)
BUN/CREAT SERPL: 21.83
CALCIUM SERPL-MCNC: 9.3 MG/DL (ref 8.2–10.2)
CHLORIDE SERPL-SCNC: 106 MMOL/L (ref 98–107)
CO2 BLD-SCNC: 25 MMOL/L (ref 23–31)
CREAT SERPL-MCNC: 0.87 MG/DL (ref 0.6–1.1)
EOSINOPHIL # BLD AUTO: 0.1 K/UL (ref 0–0.7)
EOSINOPHIL NFR BLD AUTO: 0.5 % (ref 0–7)
GFR SERPLBLD BASED ON 1.73 SQ M-ARVRAT: 87 ML/MIN
GLOBULIN SER CALC-MCNC: 3.2 G/L
GLUCOSE SERPL-MCNC: 102 MG/DL (ref 82–115)
HCT VFR BLD AUTO: 38.9 % (ref 42–52)
HGB BLD-MCNC: 12.9 G/DL (ref 14–18)
IMM GRANULOCYTES NFR BLD AUTO: 0.5 % (ref 0–5)
KETONES UR QL: (no result)
LEUKOCYTE ESTERASE UR QL STRIP.AUTO: (no result)
LYMPHOCYTES # SPEC AUTO: 1.4 K/UL (ref 0.6–3.4)
LYMPHOCYTES NFR BLD AUTO: 9.2 % (ref 10–50)
MCH RBC QN: 28.9 PG (ref 27–31)
MCHC RBC AUTO-ENTMCNC: 33.2 G/DL (ref 31.8–35.4)
MCV RBC: 87.2 FL (ref 80–94)
MONOCYTES # BLD AUTO: 1.1 K/UL (ref 0.4–2)
MONOCYTES NFR BLD AUTO: 7.1 % (ref 0–10)
NEUTROPHILS # BLD AUTO: 12.6 K/UL (ref 2–6.9)
NEUTROPHILS NFR BLD AUTO: 82.4 %
PH UR: 8.5 [PH] (ref 5–9)
PLATELET # BLD AUTO: 153 10^3/UL (ref 140–440)
POTASSIUM SERPL-SCNC: 3.7 MMOL/L (ref 3.5–5.1)
PROT ?TM UR QN: (no result) G
PROT SERPL-MCNC: 6.8 G/DL (ref 5.8–8.1)
RBC # BLD AUTO: 4.46 10^6/UL (ref 4.7–6.1)
RBC UR QL AUTO: (no result)
SODIUM SERPL-SCNC: 142 MMOL/L (ref 136–145)
SP GR UR: 1.01 (ref 1–1.03)
WBC # BLD AUTO: 15.28 K/UL (ref 4.2–10.2)

## 2017-02-25 PROCEDURE — 80053 COMPREHEN METABOLIC PANEL: CPT

## 2017-02-25 PROCEDURE — 74176 CT ABD & PELVIS W/O CONTRAST: CPT

## 2017-02-25 PROCEDURE — 87186 SC STD MICRODIL/AGAR DIL: CPT

## 2017-02-25 PROCEDURE — 36415 COLL VENOUS BLD VENIPUNCTURE: CPT

## 2017-02-25 PROCEDURE — 96365 THER/PROPH/DIAG IV INF INIT: CPT

## 2017-02-25 PROCEDURE — 99283 EMERGENCY DEPT VISIT LOW MDM: CPT

## 2017-02-25 PROCEDURE — 96368 THER/DIAG CONCURRENT INF: CPT

## 2017-02-25 PROCEDURE — 87086 URINE CULTURE/COLONY COUNT: CPT

## 2017-02-25 PROCEDURE — 81001 URINALYSIS AUTO W/SCOPE: CPT

## 2017-02-25 PROCEDURE — 85025 COMPLETE CBC W/AUTO DIFF WBC: CPT

## 2017-02-25 NOTE — ED.PDOC
General


ED Provider: 


Dr. RENETTA LÓPEZ





Chief Complaint: Urinary Problem


Stated Complaint: Patient complains of one day history of painful urination.


Time Seen by Physician: 13:42


Mode of Arrival: Walk-In


Information Source: Patient


Primary Care Provider: 


DEBORA HAQUE





Nursing and Triage Documentation Reviewed and Agree: Yes





Review of Systems





- Review Of Systems


Constitutional: Reports: No symptoms


Eyes: Reports: No symptoms


Ears, Nose, Mouth, Throat: Reports: No symptoms


Respiratory: Reports: No symptoms


Cardiac: Reports: No symptoms


GI: Reports: No symptoms


: Reports: No symptoms


Musculoskeletal: Reports: No symptoms


Skin: Reports: No symptoms


Neurological: Reports: No symptoms


Endocrine: Reports: No symptoms


Hematologic/Lymphatic: Reports: No symptoms


All Other Systems: Reviewed and Negative





Past Medical History





- Past Medical History


Previously Healthy: Yes


Endocrine: Reports: Hypothyroid, Hyperthyroid


Cardiovascular: Reports: CAD, MI, CHF


Respiratory: Reports: None


Hematological: Reports: None


Gastrointestinal: Reports: None


Genitourinary: Reports: Kidney stones


Neuro/Psych: Reports: None


Musculoskeletal: Reports: None


Cancer: Reports: Unknown (thyroid )





- Surgical History


General Surgical History: Reports: None, CABG (204, 2012), Pacemaker, Other (

Thyroid surgery )





- Family History


Family History: Reports: None





- Social History


Smoking Status: Former smoker


Hx Substance Use: No


Alcohol Screening: None





- Immunizations


Tetanus Shot up to Date: Yes





Physical Exam





- Physical Exam


Appearance: Ill-appearing


Ill-appearing: Mild


Pain Distress: Mild


Eyes: AMPARO, EOMI, Conjunctiva clear


Respiratory: Airway patent, Breath sounds clear, Breath sounds equal, 

Respirations nonlabored


Cardiovascular: RRR, Pulses normal, No rub, No murmur


GI/: Tender (suprapubit carea )


Musculoskeletal: Limited ROM


Skin: Warm, Dry, Normal color


Neurological: Sensation intact, Motor intact, Reflexes intact, Cranial nerves 

intact, Alert, Oriented


Psychiatric: Anxious





Critical Care Note





- Critical Care Note


Total Time (mins): 0





Course





- Course


Hematology/Chemistry: 


 02/25/17 13:50





 02/25/17 13:50


Orders, Labs, Meds: 


Lab Review











  02/25/17 02/25/17





  13:50 14:06


 


WBC  15.28 H 


 


RBC  4.46 L 


 


Hgb  12.9 L 


 


Hct  38.9 L 


 


MCV  87.2 


 


MCH  28.9 


 


MCHC  33.2 


 


RDW Coeff of Megan  14.3 


 


Plt Count  153 


 


Immature Gran % (Auto)  0.5 


 


Neut % (Auto)  82.4 


 


Lymph % (Auto)  9.2 L 


 


Mono % (Auto)  7.1 


 


Eos % (Auto)  0.5 


 


Baso % (Auto)  0.3 


 


Immature Gran # (Auto)  0.1 


 


Neut #  12.6 H 


 


Lymph #  1.4 


 


Mono #  1.1 


 


Eos #  0.1 


 


Baso #  0.1 


 


Sodium  142 


 


Potassium  3.7 


 


Chloride  106 


 


Carbon Dioxide  25 


 


Anion Gap  14.7 


 


BUN  19 H 


 


Creatinine  0.87 


 


Estimated GFR (MDRD)  87.00 


 


BUN/Creatinine Ratio  21.83 


 


Glucose  102 


 


Calcium  9.3 


 


Total Bilirubin  1.15 


 


AST  22 


 


ALT  26 


 


Alkaline Phosphatase  61 


 


Total Protein  6.8 


 


Albumin  3.6 


 


Globulin  3.2 


 


Albumin/Globulin Ratio  1.13 


 


Urine Color   Dark


 


Urine Clarity   Cloudy


 


Urine pH   8.5


 


Ur Specific Gravity   1.015


 


Urine Protein   2+


 


Urine Glucose (UA)   Negative


 


Urine Ketones   1+


 


Urine Blood   3+


 


Urine Nitrite   Negative


 


Urine Bilirubin   1+


 


Urine Urobilinogen   1.0


 


Ur Leukocyte Esterase   2+


 


Urine Microscopic RBC   Tntc


 


Urine Microscopic WBC   Tntc


 


Ur Squamous Epith Cells   Not present








Orders











 Category Date Time Status


 


 ED IV/MEDIPORT/POWERPORT .ONCE EMERGENCY  02/25/17 13:43 Active


 


 CBC W/ AUTO DIFF Stat LAB  02/25/17 13:50 Completed


 


 COMPREHENSIVE METABOLIC PANEL Stat LAB  02/25/17 13:50 Completed


 


 URINALYSIS C & S IF INDICATED Stat LAB  02/25/17 14:06 Completed


 


 URINE CULTURE Stat LAB  02/25/17 14:20 Received


 


 0.9 % Sodium Chloride [Saline Flush] MEDS  02/25/17 13:43 Ordered





 1 syr IVF PRN PRN   


 


 Levofloxacin/D5w [Levaquin] 500 mg MEDS  02/25/17 14:21 Ordered





 Premix 100 ml D5w 1 bag   





 IV ONCE   


 


 Phenazopyridine HCl [Pyridium] MEDS  02/25/17 13:43 Discontinued





 100 mg PO ONCE STA   


 


 Sodium Chloride 0.9% [Sodium Chloride] 1,000 ml MEDS  02/25/17 13:43 Active





 IV BOLUS   


 


 CT ABD/PEL WO RENAL STONE PROT Stat RADS  02/25/17 13:42 Taken








Medications











Generic Name Dose Route Start Last Admin





  Trade Name Freq  PRN Reason Stop Dose Admin


 


Sodium Chloride  1,000 mls @ 1,000 mls/hr  02/25/17 13:43  02/25/17 14:13





  Sodium Chloride  IV  02/25/17 14:42  1,000 mls/hr





  BOLUS STA   Administration


 


Levofloxacin/Dextrose 500 mg/  100 mls @ 100 mls/hr  02/25/17 14:21  





  Dextrose  IV  02/25/17 15:20  





  ONCE STA   


 


Sodium Chloride  1 syr  02/25/17 13:43  02/25/17 14:13





  Saline Flush  IVF   1 syr





  PRN PRN   Administration





  To flush IV   














Discontinued Medications














Generic Name Dose Route Start Last Admin





  Trade Name Freq  PRN Reason Stop Dose Admin


 


Phenazopyridine HCl  100 mg  02/25/17 13:43  02/25/17 14:06





  Pyridium  PO  02/25/17 13:44  100 mg





  ONCE STA   Administration











Vital Signs: 


 











  Temp Pulse Resp BP Pulse Ox


 


 02/25/17 13:21  99.7 F H  110 H  16  140/84  94 L














Departure





- Departure


Time of Disposition: 14:22


Disposition: HOME SELF-CARE


Discharge Problem: 


 Cystitis





Prostatitis


Qualifiers:


 Prostatitis type: acute Qualifier Code: (N41.0) Acute prostatitis





Instructions:  Prostatitis (ED), Dysuria (ED), Urinary Tract Infection in Men (

ED)


Condition: Fair


Pt referred to PMD for follow-up: Yes


Additional Instructions: 


push fluids


Take medications as prescribed 


Follow up with PCP in 3 days 


Prescriptions: 


Hydrocodone/Acetaminophen [Norco 5-325 Tablet] 1 tab PO Q6HR PRN #12 tablet


 PRN Reason: PAIN


Levofloxacin [Levaquin] 500 mg PO DAILY #14 tablet


Phenazopyridine HCl [Pyridium] 100 mg PO TID PRN #10 tablet


 PRN Reason: Urinary Burning. 


Allergies/Adverse Reactions: 


Allergies





meperidine HCl [From Demerol] Adverse Reaction (Verified 10/22/15 14:48)


 








Home Medications: 


Ambulatory Orders





Alprazolam 0.25 mg PO TID PRN 10/22/15 


Aspirin [Aspirin EC] 81 mg PO DAILYWM 10/22/15 


Escitalopram Oxalate [Lexapro] 20 mg PO DAILY 10/22/15 


Ferrous Sulfate [Iron] 325 mg PO DAILY 10/22/15 


Metoprolol Tartrate [Lopressor] 50 mg PO DAILY 10/22/15 


Nitroglycerin [Nitrostat] 0.4 mg SL AS DIRECTED PRN 10/22/15 


Omeprazole [Prilosec] 20 mg PO QDAC 10/22/15 


Valsartan [Diovan] 80 mg PO DAILY 10/22/15 


Atorvastatin Calcium [Lipitor] 80 mg PO DAILY 02/25/17 


Calcium Carbonate/Vitamin D3 [Calcium 600 + Vit D 400 Tablet] 1 each PO DAILY 02 /25/17 


Donepezil HCl [Aricept] 5 mg PO DAILY 02/25/17 


Furosemide [Lasix Tab] 40 mg PO DAILY 02/25/17 


Hydrocodone/Acetaminophen [Norco 5-325 Tablet] 1 tab PO Q6HR PRN #12 tablet 02/ 25/17 


Levofloxacin [Levaquin] 500 mg PO DAILY #14 tablet 02/25/17 


Omega-3 Fatty Acids [Fish Oil Concentrate] 1,000 mg PO DAILY 02/25/17 


Phenazopyridine HCl [Pyridium] 100 mg PO TID PRN #10 tablet 02/25/17 


Potassium Chloride 20 meq PO DAILY 02/25/17 


Ticagrelor [Brilinta] 90 mg PO BID 02/25/17 








Disposition Discussed With: Family

## 2017-02-25 NOTE — CT
EXAM:  CT of the abdomen and pelvis without contrast. 

  

HISTORY: Suprapubic pain 

  

COMPARISON: None. 

  

TECHNIQUE:  Contiguous axial images at 3 mm intervals were obtained from lung bases through the pelv
is without contrast. Coronal and sagittal reformats were performed. 

  

FINDINGS: 

  

CHEST:  The lung bases show no lobar consolidation or effusion.  Pacer wires are identified. The hea
rt size is within normal limits.There is no hiatal hernia. 

  

ABDOMEN:  No acute abnormality. There is a posterior lateral abdominal wall hernia on the left conta
ining fat. 

LIVER:  There is no solid mass lesion or intrahepatic ductal dilatation. 

BILIARY:  The gallbladder is normally distended.  No gallstones are noted.  No pericholecystic fluid
 or inflammation.  The common bile duct is normal. 

SPLEEN:  The spleen is unremarkable.  There is old granulomatous disease. 

PANCREAS:  The pancreas shows no mass lesion or peripancreatic inflammation. 

ADRENAL GLANDS:  There may be a small adenoma in the right adrenal. 

RENAL:  The kidneys show no hydronephrosis.  There are some tiny punctate calyceal stones seen on th
e right. There are no obstructing ureteral stones.  No solid mass lesions are identified. There are 
no large cysts. 

  

RETROPERITONEUM:  No aortic aneurysm is identified.  Atherosclerotic calcifications are seen.  There
 is no retroperitoneal or mesenteric adenopathy. 

  

BOWEL: There is no obstruction or ileus. There is no bowel wall thickening, edema or mesenteric fat 
stranding. There is no free fluid or free air.    The appendix is identified and is normal. 

  

PELVIS: There is no free fluid. 

BLADDER: There is extensive inflammation and thickening of the bladder wall. 

GENITOURINARY STRUCTURES:  The prostate is enlarged and edematous with fat stranding along the pelvi
c side wall.  The prostate measures 6.3 cm across. 

  

OSSEOUS STRUCTURES:  No acute osseous abnormalities.  There has been prior lumbar surgery. 

  

  

  

IMPRESSION: 

1.  Extensive inflammation and edema involving the prostate extending to the bladder.  This probably
 represents an acute prostatitis and cystitis. 

2.  Other miscellaneous findings as detailed above. 

  

Report called to

## 2017-03-01 NOTE — RS.OPPTDN
Subjective


Date of Note: 03/01/17


Visit #: 8


Date of Evaluation: 01/30/17


Payer Source: MEDICARE


Treatment Diagnosis: Low back pain


Current Subjective/complaints:: Patient reports intense pain with attempted 

urination this past Saturday,2-25-17,came to ER for treatment.He requests e-stim

,only today,does not feel he can tolerate the exercises or heat.


*Precautions: PACEMAKER





Pain Assessment





- Pain Description


Pain Location: LBP and right lateral thigh.


Current Pain Intensity: 5


Other Comments regarding Pain:: extreme over the weekend ,but feels this was 

more due to urological issues





- Treatment


Modality: Electrical Stim Unattended


Parameters/Method Applied: 20 mins. high volt,channel 1 @ 190 pv,channel 2  @

140 pv to lumbar.


Patient Position: Supine





Interventions





- Exercise/Activities/Manual Therapy


Exercises/Activities: x20 mins. Patient assisted with 90/90 hamstring stretches 

,using contract-relax, piriformis, SKTC, trunk rotation, and isometric hip abd /

add.


Manual Therapy: NA


HOME EXERCISE PROGRAM: Alt KTC, LTR, posterior pelvic tilts and hamstring 

stretches. Piriformis stretching.





- Charges


Total Direct Minutes: 0


Total Treatment Time: 20


Procedures billed for this date of service:: e-stim


Assessment: Abbreviated treatment today per patient's request due to having the 

urological issues and pain over the weekend,feels the exercises would cause 

pressure in the groin region.We will resume usual treatment josé miguel improves.


Patient Education: Education of Plan of Care


Patient demonstrates compliance with HEP?: Yes





Short Term Goals


Goal #1: Patient independent and compliant with basic HEP.


Goal to be met by: 02/16/17


Progress towards Goal:: Progressing


Goal #2: Right SLR to 40 degrees.


Goal to be met by: 02/16/17 (N/A)


Goal #3: Radiating symptoms localized to the low back.


Goal to be met by: 02/16/17


Progress towards Goal:: No Change





Long Term Goals


Goal #1: Pt knows HEP and to continue exercises to maintain level at discharge.


Goal to be met by: 03/19/17


Progress towards goal: Progressing


Goal #2: Score on Oswestry LBP scale improved to 39% or better.


Goal to be met by: 03/19/17


Goal #3: Pt able to tolerate riding in car with minimal low back pain.


Goal to be met by: 03/19/17


Goal #4: Patient able to sleep 6 hours w/o interruption from back pain.


Goal to be met by: 03/19/17





Plan


PLAN OF CARE EXPIRES ON:: 03/19/17


ORDER # VISITS AND/OR THROUGH DATE: 03/19/2017


PLAN: Continue Plan of Care

## 2017-03-02 ENCOUNTER — TELEPHONE (OUTPATIENT)
Dept: CARDIOLOGY | Facility: CLINIC | Age: 72
End: 2017-03-02

## 2017-03-02 NOTE — TELEPHONE ENCOUNTER
[facility] REQUESTING CLEARANCE FOR COLONOSCOPY. PT ON BRILLINTA / ASA    THEY WANT TO HOLD 5 DAYS PRIOR    PLEASE ADVISE.

## 2017-03-03 ENCOUNTER — OFFICE VISIT (OUTPATIENT)
Dept: FAMILY MEDICINE CLINIC | Facility: CLINIC | Age: 72
End: 2017-03-03

## 2017-03-03 VITALS
RESPIRATION RATE: 16 BRPM | BODY MASS INDEX: 37.25 KG/M2 | SYSTOLIC BLOOD PRESSURE: 108 MMHG | WEIGHT: 245 LBS | OXYGEN SATURATION: 98 % | DIASTOLIC BLOOD PRESSURE: 78 MMHG | HEART RATE: 89 BPM

## 2017-03-03 DIAGNOSIS — N40.0 PROSTATISM: Primary | ICD-10-CM

## 2017-03-03 DIAGNOSIS — R06.09 DOE (DYSPNEA ON EXERTION): Primary | ICD-10-CM

## 2017-03-03 PROCEDURE — 99213 OFFICE O/P EST LOW 20 MIN: CPT | Performed by: FAMILY MEDICINE

## 2017-03-03 NOTE — PROGRESS NOTES
"Subjective   Carlo Velasco is a 71 y.o. male.     Chief Complaint   Patient presents with   • Follow-up     hosp    \" i had a prostate infection\"    History of Present Illness     was recently seen in the er for bacterial prostatis...doing much better ..no fever or chills..voiding well..no hematuria or flank pain      Current Outpatient Prescriptions:   •  ALPRAZolam (XANAX) 0.25 MG tablet, Take 1 tablet by mouth 3 (Three) Times a Day As Needed for anxiety., Disp: 90 tablet, Rfl: 0  •  aspirin 81 MG EC tablet, Take 81 mg by mouth daily.  , Disp: , Rfl:   •  atorvastatin (LIPITOR) 80 MG tablet, TAKE 1 TABLET BY MOUTH EVERY NIGHT, Disp: 30 tablet, Rfl: 5  •  donepezil (ARICEPT) 5 MG tablet, TAKE 1 TABLET BY MOUTH EVERY DAY, Disp: 30 tablet, Rfl: 5  •  escitalopram (LEXAPRO) 20 MG tablet, TAKE 1 TABLET BY MOUTH EVERY DAY, Disp: 30 tablet, Rfl: 0  •  furosemide (LASIX) 40 MG tablet, Take 1 tablet by mouth Daily., Disp: 90 tablet, Rfl: 0  •  gabapentin (NEURONTIN) 300 MG capsule, TK 1 C PO QHS, Disp: , Rfl: 5  •  HYDROcodone-acetaminophen (NORCO) 7.5-325 MG per tablet, Take 1-2 tablets by mouth Every 4 (Four) Hours As Needed for moderate pain (4-6) (Pain). (Patient taking differently: Take 1-2 tablets by mouth As Needed for moderate pain (4-6) (Pain).), Disp: 40 tablet, Rfl: 0  •  metoprolol tartrate (LOPRESSOR) 50 MG tablet, Take 1 tablet by mouth Daily., Disp: 90 tablet, Rfl: 1  •  NITROSTAT 0.4 MG SL tablet, PLACE1 TABLET UNDER THE TONGUE AND ALLOW TO DISSOLVE AS NEEDED FOR CHEST PAIN, Disp: , Rfl: 2  •  omega-3 acid ethyl esters (LOVAZA) 1 G capsule, Take 1 capsule by mouth Daily., Disp: 90 capsule, Rfl: 4  •  omeprazole (PriLOSEC) 20 MG capsule, TAKE ONE CAPSULE BY MOUTH EVERY DAY, Disp: , Rfl: 3  •  potassium gluconate 595 MG tablet tablet, Take 595 mg by mouth daily., Disp: , Rfl:   •  Probiotic Product (PROBIOTIC DAILY PO), Take  by mouth., Disp: , Rfl:   •  ticagrelor (BRILINTA) 90 MG tablet tablet, Take 1 " tablet by mouth Every 12 (Twelve) Hours., Disp: 60 tablet, Rfl: 11  •  valsartan (DIOVAN) 80 MG tablet, TAKE 1 TABLET BY MOUTH EVERY DAY, Disp: 90 tablet, Rfl: 3  Allergies   Allergen Reactions   • Meperidine Other (See Comments)     HEART STOPS         Past Medical History   Diagnosis Date   • Anxiety    • Arthritis    • Cancer      melanoma   • CHF (congestive heart failure)    • Claustrophobia    • Coronary artery disease 2012, 2015     2 vessel CABG (2012), SHAHRZAD LAD (2015).    • Depression    • Disease of thyroid gland    • Dysphagia    • GERD (gastroesophageal reflux disease)    • Hearing loss    • Heart attack    • Heart attack    • Hyperlipidemia    • Hypertension    • Kidney stone      history of    • Tobacco abuse, in remission    • Uses hearing aid      BILATERAL     Past Surgical History   Procedure Laterality Date   • Thyroid surgery     • Hemorrhoidectomy     • Thyroidectomy     • Coronary angioplasty with stent placement  07/2015     SHAHRZAD mid LADDr. Toribio    • Coronary artery bypass graft  2012 AND 2014     2 vessel    • Cardiac catheterization  07/2015     SHAHRZAD mid LADDr. Toribio   • Cardiac catheterization N/A 10/21/2016     Procedure: Left Heart Cath;  Surgeon: Darian Toribio MD;  Location:  [facility] CATH INVASIVE LOCATION;  Service:    • Back surgery       CERVICAL FUSION AND LUMBAR DISC REPLACEMENT   • Head/neck lesion/cyst excision Left 12/20/2016     Procedure: EXCISION MALIGNANT MELANOMA WITH SENTINEL NODE BIOPSY, INJECTION AND SCAN PRE-OP, LEFT EAR;  Surgeon: Guanaco Zepeda MD;  Location: [facility] OR;  Service:    • [place] node biopsy Left 12/20/2016     Procedure: SENTINEL NODE BIOPSY;  Surgeon: Guanaco Zepeda MD;  Location: [facility] OR;  Service:    • Endoscopy  11/08/2010   • Colonoscopy  09/05/2007     colon polyps       Review of Systems   Constitutional: Negative.    HENT: Negative.    Eyes: Negative.    Respiratory: Negative.    Gastrointestinal: Negative.    Endocrine: Negative.     Genitourinary: Positive for decreased urine volume and difficulty urinating.   Musculoskeletal: Negative.    Skin: Negative.    Allergic/Immunologic: Negative.    Neurological: Negative.    Hematological: Negative.    Psychiatric/Behavioral: Negative.        Objective    Visit Vitals   • /78   • Pulse 89   • Resp 16   • Wt 245 lb (111 kg)   • SpO2 98%   • BMI 37.25 kg/m2     Physical Exam   Constitutional: He is oriented to person, place, and time. He appears well-developed and well-nourished.   HENT:   Head: Normocephalic and atraumatic.   Right Ear: External ear normal.   Left Ear: External ear normal.   Nose: Nose normal.   Mouth/Throat: Oropharynx is clear and moist.   Eyes: Conjunctivae and EOM are normal. Pupils are equal, round, and reactive to light.   Neck: Normal range of motion. Neck supple.   Cardiovascular: Normal rate, regular rhythm, normal heart sounds and intact distal pulses.    Pulmonary/Chest: Effort normal and breath sounds normal.   Abdominal: Soft. Bowel sounds are normal.   Musculoskeletal: Normal range of motion.   Neurological: He is alert and oriented to person, place, and time. He has normal reflexes.   Skin: Skin is warm and dry.   Psychiatric: He has a normal mood and affect. His behavior is normal. Judgment and thought content normal.   Nursing note and vitals reviewed.      Assessment/Plan   Carlo was seen today for follow-up.    Diagnoses and all orders for this visit:    Prostatism        im gl;ad he is feeling better...keep me informed         No orders of the defined types were placed in this encounter.      Follow up: 3 month(s)

## 2017-03-03 NOTE — RS.OPPTDN
Subjective


Date of Note: 03/03/17


Visit #: 9


Date of Evaluation: 01/30/17


Payer Source: MEDICARE


Treatment Diagnosis: Low back pain


Current Subjective/complaints:: Patient feels the therapy is helping.


*Precautions: PACEMAKER





Pain Assessment





- Pain Description


Pain Location: LBP and right lateral thigh.


Pain Description: Dull, Aching


Current Pain Intensity: not rated





- Treatment


Modality: Electrical Stim Unattended


Parameters/Method Applied: 20 mins. high volt to lumbar,channel 1 @ 180 pv,

channel 2 @ 225 pv. in supine.


Patient Position: Supine





Interventions





- Exercise/Activities/Manual Therapy


Exercises/Activities: x20 mins. Patient assisted with 90/90 hamstring stretches 

,using contract-relax, piriformis, SKTC, trunk rotation, and isometric hip abd /

add.


Total minutes of Exercise: 20


Manual Therapy: NA


Total minutes of Manual Therapy: 0


HOME EXERCISE PROGRAM: Alt KTC, LTR, posterior pelvic tilts and hamstring 

stretches. Piriformis stretching.





- Charges


Total Direct Minutes: 20


Total Treatment Time: 40


Procedures billed for this date of service:: e-stim,ex 


Assessment: Patient reports today the pain is mostly in the back,as opposed to 

the legs.He tolerates riding in a vehicle better,slight improvement in sleeping 

with less pain.He has good knowledge of HEP.


Patient Education: Education of diagnosis, Body/Joint mechanics, Home Exercise 

Program, Home Safety, Activity Modification, Education of Plan of Care


Patient demonstrates compliance with HEP?: Yes





Short Term Goals


Goal #1: Patient independent and compliant with basic HEP.


Goal to be met by: 02/16/17


Progress towards Goal:: Partially Met


Goal #2: Right SLR to 40 degrees.


Goal to be met by: 02/16/17 (occasionally causes increasd back pain)


Progress towards Goal:: Partially Met


Goal #3: Radiating symptoms localized to the low back.


Goal to be met by: 02/16/17


Progress towards Goal:: Progressing





Long Term Goals


Goal #1: Pt knows HEP and to continue exercises to maintain level at discharge.


Goal to be met by: 03/19/17


Progress towards goal: Met


Goal #2: Score on Oswestry LBP scale improved to 39% or better.


Goal to be met by: 03/19/17


Progress towards goal: Progressing


Goal #3: Pt able to tolerate riding in car with minimal low back pain.


Goal to be met by: 03/19/17


Progress towards goal: Progressing


Goal #4: Patient able to sleep 6 hours w/o interruption from back pain.


Goal to be met by: 03/19/17


Progress towards goal: Progressing





Plan


PLAN OF CARE EXPIRES ON:: 03/19/17


ORDER # VISITS AND/OR THROUGH DATE: 03/19/2017


PLAN: Continue Plan of Care

## 2017-03-08 RX ORDER — ESCITALOPRAM OXALATE 20 MG/1
TABLET ORAL
Qty: 30 TABLET | Refills: 0 | Status: SHIPPED | OUTPATIENT
Start: 2017-03-08 | End: 2017-04-19 | Stop reason: SDUPTHER

## 2017-03-09 NOTE — TELEPHONE ENCOUNTER
PER DR PINA - PATIENT CAN NEVER STOP ASA. HE HAS A HISTORY OF STENT THROMBOSIS. HE IS TO NEVER STOP BRILLINTA AS WELL. IF A COLONOSCOPY IS A MUST, PT MUST HAVE LOVENOX BRIDGING.    FOR STATING THIS HAS BEEN FAXED

## 2017-03-10 ENCOUNTER — TELEPHONE (OUTPATIENT)
Dept: FAMILY MEDICINE CLINIC | Facility: CLINIC | Age: 72
End: 2017-03-10

## 2017-03-10 NOTE — TELEPHONE ENCOUNTER
Says he has been coughing a lot.Running a low grade temp. Wants to know what he can take that won't interfere with his heart meds? He is taking Levofloxin right now for a Prostrate infection from the ER 1 week ago. Still has a few of those to take. Jose Polo

## 2017-03-13 ENCOUNTER — HOSPITAL ENCOUNTER (OUTPATIENT)
Dept: HOSPITAL 58 - REHAB | Age: 72
LOS: 18 days | End: 2017-03-31
Attending: PHYSICIAN ASSISTANT

## 2017-03-13 VITALS — BODY MASS INDEX: 35.7 KG/M2

## 2017-03-13 DIAGNOSIS — M54.5: Primary | ICD-10-CM

## 2017-03-16 ENCOUNTER — TELEPHONE (OUTPATIENT)
Dept: GASTROENTEROLOGY | Facility: CLINIC | Age: 72
End: 2017-03-16

## 2017-03-16 ENCOUNTER — TELEPHONE (OUTPATIENT)
Dept: CARDIOLOGY | Facility: CLINIC | Age: 72
End: 2017-03-16

## 2017-03-16 NOTE — TELEPHONE ENCOUNTER
[facility] RECENTLY REQUESTED CLEARANCE FOR COLONOSCOPY - YOU STATED THAT IF IT WAS A MUST THAT HE HAVE A COLONOSCOPY, THAT HE WOULD NEED TO BE BRIDGED.  PT HAS A HISTORY OF STENT THROMBOSIS & MAY NEVER STOP BRILLINTA OR ASA. RECENTLY CATHED 10/2016 ALSO.    JUAN MANUEL BERNARDO NP AT [facility] STATES THAT PATIENT NEEDS THIS PROCEDURE. THEY WANT US TO BRIDGE HIM    PLEASE ADVISE

## 2017-03-16 NOTE — TELEPHONE ENCOUNTER
i spoke with marina at dr burks office , she will discuss with dr burks and call me back ( whether he can be off brilinta and bridge with lovenox). She will call me in the next few days , dr burks is out of the office.

## 2017-03-20 ENCOUNTER — TELEPHONE (OUTPATIENT)
Dept: CARDIOLOGY | Facility: CLINIC | Age: 72
End: 2017-03-20

## 2017-03-21 ENCOUNTER — HOSPITAL ENCOUNTER (OUTPATIENT)
Dept: CARDIOLOGY | Facility: HOSPITAL | Age: 72
Discharge: HOME | End: 2017-03-21
Attending: INTERNAL MEDICINE | Admitting: INTERNAL MEDICINE

## 2017-03-21 ENCOUNTER — HOSPITAL ENCOUNTER (OUTPATIENT)
Dept: CARDIOLOGY | Facility: HOSPITAL | Age: 72
Discharge: HOME | End: 2017-03-21
Attending: INTERNAL MEDICINE

## 2017-03-21 VITALS — DIASTOLIC BLOOD PRESSURE: 72 MMHG | SYSTOLIC BLOOD PRESSURE: 100 MMHG | HEART RATE: 56 BPM

## 2017-03-21 DIAGNOSIS — R06.09 DOE (DYSPNEA ON EXERTION): ICD-10-CM

## 2017-03-21 LAB
BH CV STRESS COMMENTS STAGE 1: NORMAL
BH CV STRESS DOSE REGADENOSON STAGE 1: 0.4
BH CV STRESS DURATION MIN STAGE 1: 0
BH CV STRESS DURATION SEC STAGE 1: 10
BH CV STRESS HR STAGE 1: 57
BH CV STRESS PROTOCOL 1: NORMAL
BH CV STRESS RECOVERY BP: NORMAL MMHG
BH CV STRESS RECOVERY HR: 77 BPM
BH CV STRESS STAGE 1: 1
LV EF NUC BP: 62 %
MAXIMAL PREDICTED HEART RATE: 151 BPM
PERCENT MAX PREDICTED HR: 58.94 %
STRESS BASELINE BP: NORMAL MMHG
STRESS BASELINE HR: 56 BPM
STRESS PERCENT HR: 69 %
STRESS POST EXERCISE DUR SEC: 10 SEC
STRESS POST PEAK BP: NORMAL MMHG
STRESS POST PEAK HR: 89 BPM
STRESS TARGET HR: 128 BPM

## 2017-03-21 PROCEDURE — 93018 CV STRESS TEST I&R ONLY: CPT | Performed by: INTERNAL MEDICINE

## 2017-03-21 PROCEDURE — 78452 HT MUSCLE IMAGE SPECT MULT: CPT

## 2017-03-21 PROCEDURE — 25010000002 REGADENOSON 0.4 MG/5ML SOLUTION: Performed by: INTERNAL MEDICINE

## 2017-03-21 PROCEDURE — 93017 CV STRESS TEST TRACING ONLY: CPT

## 2017-03-21 PROCEDURE — 0 TECHNETIUM SESTAMIBI: Performed by: INTERNAL MEDICINE

## 2017-03-21 PROCEDURE — A9500 TC99M SESTAMIBI: HCPCS | Performed by: INTERNAL MEDICINE

## 2017-03-21 PROCEDURE — 78452 HT MUSCLE IMAGE SPECT MULT: CPT | Performed by: INTERNAL MEDICINE

## 2017-03-21 RX ADMIN — Medication 1 DOSE: at 08:40

## 2017-03-21 RX ADMIN — REGADENOSON 0.4 MG: 0.08 INJECTION, SOLUTION INTRAVENOUS at 09:35

## 2017-03-21 RX ADMIN — Medication 1 DOSE: at 10:00

## 2017-03-22 ENCOUNTER — OFFICE VISIT (OUTPATIENT)
Dept: FAMILY MEDICINE CLINIC | Facility: CLINIC | Age: 72
End: 2017-03-22

## 2017-03-22 VITALS
HEART RATE: 58 BPM | SYSTOLIC BLOOD PRESSURE: 98 MMHG | RESPIRATION RATE: 16 BRPM | BODY MASS INDEX: 35.16 KG/M2 | DIASTOLIC BLOOD PRESSURE: 64 MMHG | HEIGHT: 68 IN | WEIGHT: 232 LBS | OXYGEN SATURATION: 97 %

## 2017-03-22 DIAGNOSIS — K63.5 COLON POLYPS: ICD-10-CM

## 2017-03-22 DIAGNOSIS — R73.09 OTHER ABNORMAL GLUCOSE: ICD-10-CM

## 2017-03-22 DIAGNOSIS — E78.2 MIXED HYPERLIPIDEMIA: ICD-10-CM

## 2017-03-22 DIAGNOSIS — R35.1 NOCTURIA: ICD-10-CM

## 2017-03-22 DIAGNOSIS — I10 ESSENTIAL HYPERTENSION: ICD-10-CM

## 2017-03-22 DIAGNOSIS — I25.729 CORONARY ARTERY DISEASE INVOLVING AUTOLOGOUS ARTERY CORONARY BYPASS GRAFT WITH ANGINA PECTORIS (HCC): Primary | ICD-10-CM

## 2017-03-22 PROCEDURE — 99213 OFFICE O/P EST LOW 20 MIN: CPT | Performed by: FAMILY MEDICINE

## 2017-03-22 NOTE — TELEPHONE ENCOUNTER
Rhoda,    If you can get me the patient's procedure date then I will contact him to f/u r/t his Lovenox bridge.    HV

## 2017-03-22 NOTE — PROGRESS NOTES
Subjective   Carlo Velasco is a 71 y.o. male.     Chief Complaint   Patient presents with   • Follow-up        History of Present Illness     here today with his wife--notes having recentl flu symptoms but his symtoms have rsolved...he said his stress test yesterday was ok..he notes bp has been stable..      Current Outpatient Prescriptions:   •  ALPRAZolam (XANAX) 0.25 MG tablet, Take 1 tablet by mouth 3 (Three) Times a Day As Needed for anxiety., Disp: 90 tablet, Rfl: 0  •  aspirin 81 MG EC tablet, Take 81 mg by mouth daily.  , Disp: , Rfl:   •  atorvastatin (LIPITOR) 80 MG tablet, TAKE 1 TABLET BY MOUTH EVERY NIGHT, Disp: 30 tablet, Rfl: 5  •  donepezil (ARICEPT) 5 MG tablet, TAKE 1 TABLET BY MOUTH EVERY DAY, Disp: 30 tablet, Rfl: 5  •  escitalopram (LEXAPRO) 20 MG tablet, TAKE 1 TABLET BY MOUTH EVERY DAY, Disp: 30 tablet, Rfl: 0  •  furosemide (LASIX) 40 MG tablet, Take 1 tablet by mouth Daily., Disp: 90 tablet, Rfl: 0  •  gabapentin (NEURONTIN) 300 MG capsule, TK 1 C PO QHS, Disp: , Rfl: 5  •  HYDROcodone-acetaminophen (NORCO) 7.5-325 MG per tablet, Take 1-2 tablets by mouth Every 4 (Four) Hours As Needed for moderate pain (4-6) (Pain). (Patient taking differently: Take 1-2 tablets by mouth As Needed for moderate pain (4-6) (Pain).), Disp: 40 tablet, Rfl: 0  •  metoprolol tartrate (LOPRESSOR) 50 MG tablet, Take 1 tablet by mouth Daily., Disp: 90 tablet, Rfl: 1  •  NITROSTAT 0.4 MG SL tablet, PLACE1 TABLET UNDER THE TONGUE AND ALLOW TO DISSOLVE AS NEEDED FOR CHEST PAIN, Disp: , Rfl: 2  •  omega-3 acid ethyl esters (LOVAZA) 1 G capsule, Take 1 capsule by mouth Daily., Disp: 90 capsule, Rfl: 4  •  omeprazole (PriLOSEC) 20 MG capsule, TAKE ONE CAPSULE BY MOUTH EVERY DAY, Disp: , Rfl: 3  •  potassium gluconate 595 MG tablet tablet, Take 595 mg by mouth daily., Disp: , Rfl:   •  Probiotic Product (PROBIOTIC DAILY PO), Take  by mouth., Disp: , Rfl:   •  ticagrelor (BRILINTA) 90 MG tablet tablet, Take 1 tablet by  mouth Every 12 (Twelve) Hours., Disp: 60 tablet, Rfl: 11  •  valsartan (DIOVAN) 80 MG tablet, TAKE 1 TABLET BY MOUTH EVERY DAY, Disp: 90 tablet, Rfl: 3  Allergies   Allergen Reactions   • Meperidine Other (See Comments)     HEART STOPS         Past Medical History:   Diagnosis Date   • Anxiety    • Arthritis    • Cancer     melanoma   • CHF (congestive heart failure)    • Claustrophobia    • Coronary artery disease 2012, 2015    2 vessel CABG (2012), SHAHRZAD LAD (2015).    • Depression    • Disease of thyroid gland    • Dysphagia    • GERD (gastroesophageal reflux disease)    • Hearing loss    • Heart attack    • Heart attack    • Hyperlipidemia    • Hypertension    • Kidney stone     history of    • Tobacco abuse, in remission    • Uses hearing aid     BILATERAL     Past Surgical History:   Procedure Laterality Date   • BACK SURGERY      CERVICAL FUSION AND LUMBAR DISC REPLACEMENT   • CARDIAC CATHETERIZATION  07/2015    SHAHRZAD mid Dr. Malu LO   • CARDIAC CATHETERIZATION N/A 10/21/2016    Procedure: Left Heart Cath;  Surgeon: Darian Toribio MD;  Location: [facility] CATH INVASIVE LOCATION;  Service:    • COLONOSCOPY  09/05/2007    colon polyps   • CORONARY ANGIOPLASTY WITH STENT PLACEMENT  07/2015    SHAHRZAD mid Dr. Malu LO    • CORONARY ARTERY BYPASS GRAFT  2012 AND 2014    2 vessel    • ENDOSCOPY  11/08/2010   • HEAD/NECK LESION/CYST EXCISION Left 12/20/2016    Procedure: EXCISION MALIGNANT MELANOMA WITH SENTINEL NODE BIOPSY, INJECTION AND SCAN PRE-OP, LEFT EAR;  Surgeon: Guanaco Zepeda MD;  Location: [facility] OR;  Service:    • HEMORRHOIDECTOMY     • SENTINEL NODE BIOPSY Left 12/20/2016    Procedure: SENTINEL NODE BIOPSY;  Surgeon: Guanaco Zepeda MD;  Location: [facility] OR;  Service:    • THYROID SURGERY     • THYROIDECTOMY         Review of Systems   Constitutional: Negative.    HENT: Negative.    Eyes: Negative.    Respiratory: Negative.    Cardiovascular: Negative.    Gastrointestinal: Negative.    Endocrine: Negative.   "  Genitourinary: Negative.    Musculoskeletal: Negative.    Skin: Negative.    Allergic/Immunologic: Negative.    Neurological: Negative.    Hematological: Negative.    Psychiatric/Behavioral: Negative.        Objective  BP 98/64  Pulse 58  Resp 16  Ht 68\" (172.7 cm)  Wt 232 lb (105 kg)  SpO2 97%  BMI 35.28 kg/m2  Physical Exam   Constitutional: He is oriented to person, place, and time. He appears well-developed and well-nourished.   HENT:   Head: Normocephalic and atraumatic.   Right Ear: External ear normal.   Left Ear: External ear normal.   Nose: Nose normal.   Mouth/Throat: Oropharynx is clear and moist.   Eyes: Conjunctivae and EOM are normal. Pupils are equal, round, and reactive to light.   Neck: Normal range of motion. Neck supple.   Cardiovascular: Normal rate, regular rhythm, normal heart sounds and intact distal pulses.    Pulmonary/Chest: Effort normal and breath sounds normal.   Abdominal: Soft. Bowel sounds are normal.   Musculoskeletal: Normal range of motion.   Neurological: He is alert and oriented to person, place, and time. He has normal reflexes.   Skin: Skin is warm and dry.   Psychiatric: He has a normal mood and affect. His behavior is normal. Judgment and thought content normal.   Nursing note and vitals reviewed.      Assessment/Plan   Carlo was seen today for follow-up.    Diagnoses and all orders for this visit:    Coronary artery disease involving autologous artery coronary bypass graft with angina pectoris  -     CBC w AUTO Differential  -     Comprehensive Metabolic Panel  -     Lipid Panel    Essential hypertension    Mixed hyperlipidemia    Colon polyps    Nocturia  -     Hemoglobin A1c    Other abnormal glucose   -     Hemoglobin A1c                 Orders Placed This Encounter   Procedures   • Comprehensive Metabolic Panel   • Lipid Panel   • Hemoglobin A1c       Follow up: 6 month(s)  "

## 2017-03-22 NOTE — TELEPHONE ENCOUNTER
Yes Lovenox 1mg/kg q 12 hourly  Stop Brilinta 5 days prior to procedure  Do not Stop Aspirin  Stop lovenox 24 hours prior to procedure  Resume Brilinta ASAP after procedure

## 2017-03-23 ENCOUNTER — TELEPHONE (OUTPATIENT)
Dept: GASTROENTEROLOGY | Facility: CLINIC | Age: 72
End: 2017-03-23

## 2017-03-23 LAB
ALBUMIN SERPL-MCNC: 4 G/DL (ref 3.5–5)
ALBUMIN/GLOB SERPL: 1.4 G/DL (ref 1.1–2.5)
ALP SERPL-CCNC: 67 U/L (ref 24–120)
ALT SERPL-CCNC: 46 U/L (ref 0–54)
AST SERPL-CCNC: 31 U/L (ref 7–45)
BASOPHILS # BLD AUTO: 0.02 10*3/MM3 (ref 0–0.2)
BASOPHILS NFR BLD AUTO: 0.2 % (ref 0–2)
BILIRUB SERPL-MCNC: 0.7 MG/DL (ref 0.1–1)
BUN SERPL-MCNC: 17 MG/DL (ref 5–21)
BUN/CREAT SERPL: 20 (ref 7–25)
CALCIUM SERPL-MCNC: 9.3 MG/DL (ref 8.4–10.4)
CHLORIDE SERPL-SCNC: 100 MMOL/L (ref 98–110)
CHOLEST SERPL-MCNC: 164 MG/DL (ref 130–200)
CO2 SERPL-SCNC: 31 MMOL/L (ref 24–31)
CREAT SERPL-MCNC: 0.85 MG/DL (ref 0.5–1.4)
EOSINOPHIL # BLD AUTO: 0.31 10*3/MM3 (ref 0–0.7)
EOSINOPHIL NFR BLD AUTO: 3.5 % (ref 0–4)
ERYTHROCYTE [DISTWIDTH] IN BLOOD BY AUTOMATED COUNT: 14.9 % (ref 12–15)
GLOBULIN SER CALC-MCNC: 2.9 GM/DL
GLUCOSE SERPL-MCNC: 80 MG/DL (ref 70–100)
HBA1C MFR BLD: 5.9 %
HCT VFR BLD AUTO: 44.6 % (ref 40–52)
HDLC SERPL-MCNC: 42 MG/DL
HGB BLD-MCNC: 14 G/DL (ref 14–18)
IMM GRANULOCYTES # BLD: 0.18 10*3/MM3 (ref 0–0.03)
IMM GRANULOCYTES NFR BLD: 2 % (ref 0–5)
LDLC SERPL CALC-MCNC: 84 MG/DL (ref 0–99)
LYMPHOCYTES # BLD AUTO: 2.36 10*3/MM3 (ref 0.72–4.86)
LYMPHOCYTES NFR BLD AUTO: 26.6 % (ref 15–45)
MCH RBC QN AUTO: 28.2 PG (ref 28–32)
MCHC RBC AUTO-ENTMCNC: 31.4 G/DL (ref 33–36)
MCV RBC AUTO: 89.7 FL (ref 82–95)
MONOCYTES # BLD AUTO: 0.78 10*3/MM3 (ref 0.19–1.3)
MONOCYTES NFR BLD AUTO: 8.8 % (ref 4–12)
NEUTROPHILS # BLD AUTO: 5.23 10*3/MM3 (ref 1.87–8.4)
NEUTROPHILS NFR BLD AUTO: 58.9 % (ref 39–78)
PLATELET # BLD AUTO: 254 10*3/MM3 (ref 130–400)
POTASSIUM SERPL-SCNC: 4.3 MMOL/L (ref 3.5–5.3)
PROT SERPL-MCNC: 6.9 G/DL (ref 6.3–8.7)
RBC # BLD AUTO: 4.97 10*6/MM3 (ref 4.8–5.9)
SODIUM SERPL-SCNC: 142 MMOL/L (ref 135–145)
TRIGL SERPL-MCNC: 191 MG/DL (ref 0–149)
VLDLC SERPL CALC-MCNC: 38.2 MG/DL
WBC # BLD AUTO: 8.88 10*3/MM3 (ref 4.8–10.8)

## 2017-03-23 NOTE — TELEPHONE ENCOUNTER
Ok, go ahead and schedule patient for egd as well as cscope. , then please call dr burks office, the last dose of lovenox would be the am the day prior to the procedure,  willhold brilinta 5 d prior to procedure. Dr burks office is to contact patient with instructions  regarding lovenox, thanks .  i have spoke with the patient as well.

## 2017-03-23 NOTE — TELEPHONE ENCOUNTER
Cecily phoned regarding PT.  Wanted to know when procedure was scheduled. Told her that it hadn't been scheduled yet.  Flora is to be contacted when the procedure is scheduled so they tell him when to stop his Brilinta/Lovenox.

## 2017-03-24 DIAGNOSIS — R13.19 ESOPHAGEAL DYSPHAGIA: Primary | ICD-10-CM

## 2017-03-24 DIAGNOSIS — Z86.010 HX OF COLONIC POLYP: ICD-10-CM

## 2017-03-25 RX ORDER — POLYETHYLENE GLYCOL 3350, SODIUM CHLORIDE, SODIUM BICARBONATE, POTASSIUM CHLORIDE 420; 11.2; 5.72; 1.48 G/4L; G/4L; G/4L; G/4L
4000 POWDER, FOR SOLUTION ORAL SEE ADMIN INSTRUCTIONS
Qty: 4000 ML | Refills: 0 | Status: SHIPPED | OUTPATIENT
Start: 2017-03-25 | End: 2017-03-31

## 2017-03-27 NOTE — RS.OPPTDC
Date of Discharge: 03/15/17


Date of Evaluation: 01/30/17


Number of Visits: 9


Treatment Diagnosis: Low back pain


Current Complaints/Gains: Patient feels theray has helped. He performs HEP 

consistently.  He is confident he can continue exercises on his own.  Reports 

he is able to tolerate riding or driving with minimal pain for short distances.

  He is sleeping for approximately 4 hours at a time.





Functional Outcome Measure


Oswestry LBP: 48





- G Codes & Severity Modifier


G Codes & Modifier: Mobility goal CJ.  Mobility d/C CK


Source of G Code score: Oswestry LBP score





Interventions





- Exercise/Activities/Manual Therapy


Exercises/Activities: NA


Manual Therapy: NA


HOME EXERCISE PROGRAM: Alt KTC, LTR, posterior pelvic tilts and hamstring 

stretches. Piriformis stretching.





- Objective Findings


Observations,measurements,etc.: Independent HEP demo.  Right SLR to 45 degrees.





- Charges


Total Direct Minutes: NA


Total Treatment Time: NA


Procedures billed for this date of service:: NA





Assessment


Assessment: Patient reports improved flexibility and less pain with driving/

riding in car short distances.  He feels he can continue exercises on his own.





Short Term Goals


Goal #1: Patient independent and compliant with basic HEP.


Goal to be met by: 02/16/17


Progress towards Goal:: Partially Met


Goal #2: Right SLR to 40 degrees.


Goal to be met by: 02/16/17 (occasionally causes increasd back pain)


Progress towards Goal:: Partially Met


Goal #3: Radiating symptoms localized to the low back.


Goal to be met by: 02/16/17


Progress towards Goal:: Progressing





Long Term Goals


Goal #1: Pt knows HEP and to continue exercises to maintain level at discharge.


Goal to be met by: 03/19/17


Progress towards goal: Met


Goal #2: Score on Oswestry LBP scale improved to 39% or better.


Goal to be met by: 03/19/17


Progress towards goal: Not Met


Goal #3: Pt able to tolerate riding in car with minimal low back pain.


Goal to be met by: 03/19/17


Progress towards goal: Partially Met


Goal #4: Patient able to sleep 6 hours w/o interruption from back pain.


Goal to be met by: 03/19/17


Progress towards goal: Not Met





Plan


Reason for Discharge:: No Further Skilled Therapy Indicated

## 2017-03-30 ENCOUNTER — DOCUMENTATION (OUTPATIENT)
Dept: CARDIOLOGY | Facility: CLINIC | Age: 72
End: 2017-03-30

## 2017-03-30 NOTE — PROGRESS NOTES
PT CAME INTO OFFICE COMPLAINING OF EPISODES OF CP/TIGHTNESS SOB & JAW PAIN SINCE HIS CHEMICAL STRESS. SAYS HIS LAST EPISODE WAS YESTERDAY.  I ADVISED PT TO GO TO THE ER - HE DECLINED. I MADE HIM AN APPT FOR TOMORROW WITH SILVANA & YET AGAIN ADVISED PT TO GO TO ER.

## 2017-03-31 ENCOUNTER — OFFICE VISIT (OUTPATIENT)
Dept: CARDIOLOGY | Facility: CLINIC | Age: 72
End: 2017-03-31

## 2017-03-31 ENCOUNTER — TELEPHONE (OUTPATIENT)
Dept: GASTROENTEROLOGY | Facility: CLINIC | Age: 72
End: 2017-03-31

## 2017-03-31 VITALS
SYSTOLIC BLOOD PRESSURE: 110 MMHG | DIASTOLIC BLOOD PRESSURE: 64 MMHG | HEART RATE: 71 BPM | HEIGHT: 68 IN | BODY MASS INDEX: 38.8 KG/M2 | WEIGHT: 256 LBS

## 2017-03-31 DIAGNOSIS — I25.118 CORONARY ARTERY DISEASE INVOLVING NATIVE CORONARY ARTERY OF NATIVE HEART WITH OTHER FORM OF ANGINA PECTORIS (HCC): Primary | ICD-10-CM

## 2017-03-31 PROCEDURE — 99214 OFFICE O/P EST MOD 30 MIN: CPT | Performed by: INTERNAL MEDICINE

## 2017-03-31 PROCEDURE — 93000 ELECTROCARDIOGRAM COMPLETE: CPT | Performed by: INTERNAL MEDICINE

## 2017-03-31 RX ORDER — SODIUM CHLORIDE 9 MG/ML
3 INJECTION, SOLUTION INTRAVENOUS ONCE
Status: CANCELLED | OUTPATIENT
Start: 2017-03-31 | End: 2017-03-31

## 2017-03-31 RX ORDER — SODIUM CHLORIDE 0.9 % (FLUSH) 0.9 %
1-10 SYRINGE (ML) INJECTION AS NEEDED
Status: CANCELLED | OUTPATIENT
Start: 2017-03-31

## 2017-03-31 NOTE — TELEPHONE ENCOUNTER
Wife phoned and cancelled his procedures. Pt was having chest pain and went to the Dr. Toribio.   PT is having a heart cath on Wednesday and will most likely have a stent.

## 2017-03-31 NOTE — PROGRESS NOTES
Carlo Velasco  [id number]  1945  71 y.o.  male    Referring Provider: Rhys Rosen MD    Reason for Follow-up Visit: CAD    Subjective .   Chest pain with exertion  No diaphoresis  No nausea  No radiation  Precipitated with exertion relieved with rest  Abnormal stress test with lateral ischemia    History of present illness:  Carlo Velasco is a 71 y.o. yo male with history of CAD who presents today for   Chief Complaint   Patient presents with   • Coronary Artery Disease     6 mo f/u   • Chest Pain     tightness across chest / jaw pain   • Shortness of Breath     increasing   .    History  Past Medical History:   Diagnosis Date   • Anxiety    • Arthritis    • Cancer     melanoma   • Chest pain    • CHF (congestive heart failure)    • Claustrophobia    • Coronary artery disease 2012, 2015    2 vessel CABG (2012), SHAHRZAD LAD (2015).    • Depression    • Disease of thyroid gland    • Dysphagia    • GERD (gastroesophageal reflux disease)    • Hearing loss    • Heart attack    • Heart attack    • Hyperlipidemia    • Hypertension    • Kidney stone     history of    • Tobacco abuse, in remission    • Uses hearing aid     BILATERAL   ,   Past Surgical History:   Procedure Laterality Date   • BACK SURGERY      CERVICAL FUSION AND LUMBAR DISC REPLACEMENT   • CARDIAC CATHETERIZATION  07/2015    Dr. Malu Koch   • CARDIAC CATHETERIZATION N/A 10/21/2016    Procedure: Left Heart Cath;  Surgeon: Darian Toribio MD;  Location:  [facility] CATH INVASIVE LOCATION;  Service:    • COLONOSCOPY  09/05/2007    colon polyps   • CORONARY ANGIOPLASTY WITH STENT PLACEMENT  07/2015    Dr. Malu Koch    • CORONARY ARTERY BYPASS GRAFT  2012 AND 2014    2 vessel    • ENDOSCOPY  11/08/2010   • HEAD/NECK LESION/CYST EXCISION Left 12/20/2016    Procedure: EXCISION MALIGNANT MELANOMA WITH SENTINEL NODE BIOPSY, INJECTION AND SCAN PRE-OP, LEFT EAR;  Surgeon: Guanaco Zepeda MD;  Location: [facility] OR;  Service:    • HEMORRHOIDECTOMY      • INSERT / REPLACE / REMOVE PACEMAKER     • SENTINEL NODE BIOPSY Left 12/20/2016    Procedure: SENTINEL NODE BIOPSY;  Surgeon: Guanaco Zepeda MD;  Location: [facility] OR;  Service:    • THYROID SURGERY     • THYROIDECTOMY     ,   Family History   Problem Relation Age of Onset   • Heart failure Mother    • Stroke Mother    • Dementia Mother    • Coronary artery disease Father    • COPD Brother    ,   Social History   Substance Use Topics   • Smoking status: Former Smoker     Types: Cigarettes     Quit date: 1977   • Smokeless tobacco: Former User     Quit date: 1977   • Alcohol use No   ,     Medications  Current Outpatient Prescriptions   Medication Sig Dispense Refill   • ALPRAZolam (XANAX) 0.25 MG tablet Take 1 tablet by mouth 3 (Three) Times a Day As Needed for anxiety. 90 tablet 0   • aspirin 81 MG EC tablet Take 81 mg by mouth daily.       • atorvastatin (LIPITOR) 80 MG tablet TAKE 1 TABLET BY MOUTH EVERY NIGHT 30 tablet 5   • donepezil (ARICEPT) 5 MG tablet TAKE 1 TABLET BY MOUTH EVERY DAY 30 tablet 5   • escitalopram (LEXAPRO) 20 MG tablet TAKE 1 TABLET BY MOUTH EVERY DAY 30 tablet 0   • furosemide (LASIX) 40 MG tablet Take 1 tablet by mouth Daily. 90 tablet 0   • gabapentin (NEURONTIN) 300 MG capsule TK 1 C PO QHS  5   • HYDROcodone-acetaminophen (NORCO) 7.5-325 MG per tablet Take 1-2 tablets by mouth Every 4 (Four) Hours As Needed for moderate pain (4-6) (Pain). (Patient taking differently: Take 1-2 tablets by mouth As Needed for moderate pain (4-6) (Pain).) 40 tablet 0   • metoprolol tartrate (LOPRESSOR) 50 MG tablet Take 1 tablet by mouth Daily. 90 tablet 1   • NITROSTAT 0.4 MG SL tablet PLACE1 TABLET UNDER THE TONGUE AND ALLOW TO DISSOLVE AS NEEDED FOR CHEST PAIN  2   • omega-3 acid ethyl esters (LOVAZA) 1 G capsule Take 1 capsule by mouth Daily. 90 capsule 4   • omeprazole (PriLOSEC) 20 MG capsule TAKE ONE CAPSULE BY MOUTH EVERY DAY  3   • potassium gluconate 595 MG tablet tablet Take 595 mg by mouth  "daily.     • Probiotic Product (PROBIOTIC DAILY PO) Take  by mouth.     • ticagrelor (BRILINTA) 90 MG tablet tablet Take 1 tablet by mouth Every 12 (Twelve) Hours. 60 tablet 11   • valsartan (DIOVAN) 80 MG tablet TAKE 1 TABLET BY MOUTH EVERY DAY 90 tablet 3     No current facility-administered medications for this visit.        Allergies:  Meperidine    Review of Systems  Review of Systems   Constitution: Negative.   HENT: Negative.    Eyes: Negative.    Cardiovascular: Positive for chest pain and dyspnea on exertion. Negative for claudication, cyanosis, irregular heartbeat, leg swelling, near-syncope, orthopnea, palpitations, paroxysmal nocturnal dyspnea and syncope.   Respiratory: Negative.    Endocrine: Negative.    Hematologic/Lymphatic: Negative.    Skin: Negative.    Gastrointestinal: Negative for anorexia.   Genitourinary: Negative.    Neurological: Negative.    Psychiatric/Behavioral: Negative.        Objective     Physical Exam:  /64  Pulse 71  Ht 68\" (172.7 cm)  Wt 256 lb (116 kg)  BMI 38.92 kg/m2  Physical Exam   Constitutional: He appears well-developed.   HENT:   Head: Normocephalic.   Neck: Normal carotid pulses and no JVD present. No tracheal tenderness present. Carotid bruit is not present. No tracheal deviation and no edema present.   Cardiovascular: Regular rhythm, normal heart sounds and normal pulses.    Pulmonary/Chest: Effort normal. No stridor.   Abdominal: Soft.   Neurological: He is alert. He has normal strength. No cranial nerve deficit or sensory deficit.   Skin: Skin is warm.   Psychiatric: He has a normal mood and affect. His speech is normal and behavior is normal.       Results Review:       ECG 12 Lead  Date/Time: 3/31/2017 10:27 AM  Performed by: ANTONIO PINA  Authorized by: ANTONIO PINA   Comparison: compared with previous ECG from 11/28/2016  Comparison to previous ECG: A paced replaced sinus rhytm  Rhythm: paced  Rate: normal  QRS axis: normal  Clinical impression: " abnormal ECG            Assessment/Plan   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   • Essential hypertension   • Anxiety   • Colon polyps   • Mixed hyperlipidemia   • Incisional hernia, without obstruction or gangrene   • Precordial pain   • Coronary artery disease involving native coronary artery of native heart without angina pectoris   • Generalized abdominal pain   • BRBPR (bright red blood per rectum)   • Esophageal dysphagia   • Hx of colonic polyp   • Prostatism   • Nocturia   • Coronary artery disease   • Hypertension   • Chest pain       Stable doing well. No chest pain now or excessive dyspnea. No palpitations. No significant pedal edema. Compliant with medications and diet. Latest labs and medications reviewed.    Plan:    Cardiac cath as possible restenosis of the OM stent  Consider SHAHRZAD   Risks, benefits and alternatives explained. The patient understands and wishes to proceed.  Will have to defer his colonoscopy  Cannot stop DAP lifelong as stent thrombosis in past  Keep follow up with me in 3 months  Close follow up with you as scheduled.  Intensive factor modifications.  See order list.   Monitor pacemaker.    Return in about 3 months (around 6/30/2017).

## 2017-03-31 NOTE — TELEPHONE ENCOUNTER
Ok, can you call cardiology and let them now to contact us after cath  to let us know if can proceed with gi workup at some point  ( let them know he had been having symptoms and that is why the scopes were scheduled). Thanks

## 2017-04-04 ENCOUNTER — TELEPHONE (OUTPATIENT)
Dept: CARDIOLOGY | Facility: CLINIC | Age: 72
End: 2017-04-04

## 2017-04-04 NOTE — TELEPHONE ENCOUNTER
TAMAR DAMICO -     LINDA IS TO BE CATHED TOMORROW. I WILL LET YOU KNOW THE CARDIAC STATUS OF MR BERNARDO AS SOON AS I GET THE REPORT FROM DR PINA.    THANKS,     YADIRA DAWSON MA

## 2017-04-05 ENCOUNTER — HOSPITAL ENCOUNTER (OUTPATIENT)
Facility: HOSPITAL | Age: 72
Setting detail: HOSPITAL OUTPATIENT SURGERY
Discharge: HOME | End: 2017-04-05
Attending: INTERNAL MEDICINE | Admitting: INTERNAL MEDICINE

## 2017-04-05 VITALS
HEART RATE: 55 BPM | OXYGEN SATURATION: 97 % | WEIGHT: 248 LBS | HEIGHT: 68 IN | RESPIRATION RATE: 12 BRPM | TEMPERATURE: 98.4 F | DIASTOLIC BLOOD PRESSURE: 66 MMHG | SYSTOLIC BLOOD PRESSURE: 104 MMHG | BODY MASS INDEX: 37.59 KG/M2

## 2017-04-05 DIAGNOSIS — I25.118 CORONARY ARTERY DISEASE INVOLVING NATIVE CORONARY ARTERY OF NATIVE HEART WITH OTHER FORM OF ANGINA PECTORIS (HCC): ICD-10-CM

## 2017-04-05 PROCEDURE — 93459 L HRT ART/GRFT ANGIO: CPT | Performed by: INTERNAL MEDICINE

## 2017-04-05 PROCEDURE — 25010000002 DIPHENHYDRAMINE PER 50 MG: Performed by: INTERNAL MEDICINE

## 2017-04-05 PROCEDURE — C1769 GUIDE WIRE: HCPCS | Performed by: INTERNAL MEDICINE

## 2017-04-05 PROCEDURE — 25010000002 FENTANYL CITRATE (PF) 100 MCG/2ML SOLUTION: Performed by: INTERNAL MEDICINE

## 2017-04-05 PROCEDURE — C1760 CLOSURE DEV, VASC: HCPCS | Performed by: INTERNAL MEDICINE

## 2017-04-05 PROCEDURE — 25010000002 MIDAZOLAM PER 1 MG: Performed by: INTERNAL MEDICINE

## 2017-04-05 PROCEDURE — 0 IOPAMIDOL PER 1 ML: Performed by: INTERNAL MEDICINE

## 2017-04-05 PROCEDURE — C1894 INTRO/SHEATH, NON-LASER: HCPCS | Performed by: INTERNAL MEDICINE

## 2017-04-05 PROCEDURE — S0260 H&P FOR SURGERY: HCPCS | Performed by: INTERNAL MEDICINE

## 2017-04-05 RX ORDER — MIDAZOLAM HYDROCHLORIDE 1 MG/ML
INJECTION INTRAMUSCULAR; INTRAVENOUS AS NEEDED
Status: DISCONTINUED | OUTPATIENT
Start: 2017-04-05 | End: 2017-04-05 | Stop reason: HOSPADM

## 2017-04-05 RX ORDER — SODIUM CHLORIDE 9 MG/ML
3 INJECTION, SOLUTION INTRAVENOUS ONCE
Status: COMPLETED | OUTPATIENT
Start: 2017-04-05 | End: 2017-04-05

## 2017-04-05 RX ORDER — SODIUM CHLORIDE 9 MG/ML
100 INJECTION, SOLUTION INTRAVENOUS CONTINUOUS
Status: DISCONTINUED | OUTPATIENT
Start: 2017-04-05 | End: 2017-04-05 | Stop reason: HOSPADM

## 2017-04-05 RX ORDER — FENTANYL CITRATE 50 UG/ML
INJECTION, SOLUTION INTRAMUSCULAR; INTRAVENOUS AS NEEDED
Status: DISCONTINUED | OUTPATIENT
Start: 2017-04-05 | End: 2017-04-05 | Stop reason: HOSPADM

## 2017-04-05 RX ORDER — SODIUM CHLORIDE 0.9 % (FLUSH) 0.9 %
1-10 SYRINGE (ML) INJECTION AS NEEDED
Status: DISCONTINUED | OUTPATIENT
Start: 2017-04-05 | End: 2017-04-05 | Stop reason: HOSPADM

## 2017-04-05 RX ORDER — DIPHENHYDRAMINE HYDROCHLORIDE 50 MG/ML
INJECTION INTRAMUSCULAR; INTRAVENOUS AS NEEDED
Status: DISCONTINUED | OUTPATIENT
Start: 2017-04-05 | End: 2017-04-05 | Stop reason: HOSPADM

## 2017-04-05 RX ORDER — LIDOCAINE HYDROCHLORIDE 20 MG/ML
INJECTION, SOLUTION INFILTRATION; PERINEURAL AS NEEDED
Status: DISCONTINUED | OUTPATIENT
Start: 2017-04-05 | End: 2017-04-05 | Stop reason: HOSPADM

## 2017-04-05 RX ADMIN — SODIUM CHLORIDE 3 ML/KG/HR: 9 INJECTION, SOLUTION INTRAVENOUS at 12:01

## 2017-04-05 NOTE — TELEPHONE ENCOUNTER
MR TOVA HAS COMPLETED HIS CATH:    Plan  Intensify medical therapy  No targets for intervention  Can be discharged later today if stable  Hydration   Observation    FULL REPORT IS IN HIS CHART

## 2017-04-05 NOTE — H&P
Carlo Velasco  [id number]  1945  71 y.o.  male     Referring Provider: Rhys Rosen MD     Reason for Follow-up Visit: CAD        Subjective   .   Chest pain with exertion  No diaphoresis  No nausea  No radiation  Precipitated with exertion relieved with rest  Abnormal stress test with lateral ischemia     History of present illness: Carlo Velasco is a 71 y.o. yo male with history of CAD who presents today for        Chief Complaint   Patient presents with   • Coronary Artery Disease       6 mo f/u   • Chest Pain       tightness across chest / jaw pain   • Shortness of Breath       increasing   .     History   Medical History         Past Medical History:   Diagnosis Date   • Anxiety     • Arthritis     • Cancer       melanoma   • Chest pain     • CHF (congestive heart failure)     • Claustrophobia     • Coronary artery disease 2012, 2015     2 vessel CABG (2012), SHAHRZAD LAD (2015).    • Depression     • Disease of thyroid gland     • Dysphagia     • GERD (gastroesophageal reflux disease)     • Hearing loss     • Heart attack     • Heart attack     • Hyperlipidemia     • Hypertension     • Kidney stone       history of    • Tobacco abuse, in remission     • Uses hearing aid       BILATERAL      ,    Surgical History          Past Surgical History:   Procedure Laterality Date   • BACK SURGERY         CERVICAL FUSION AND LUMBAR DISC REPLACEMENT   • CARDIAC CATHETERIZATION   07/2015     Dr. Malu Koch   • CARDIAC CATHETERIZATION N/A 10/21/2016     Procedure: Left Heart Cath; Surgeon: Darian Toribio MD; Location: [facility] CATH INVASIVE LOCATION; Service:    • COLONOSCOPY   09/05/2007     colon polyps   • CORONARY ANGIOPLASTY WITH STENT PLACEMENT   07/2015     Dr. Malu Koch    • CORONARY ARTERY BYPASS GRAFT   2012 AND 2014     2 vessel    • ENDOSCOPY   11/08/2010   • HEAD/NECK LESION/CYST EXCISION Left 12/20/2016     Procedure: EXCISION MALIGNANT MELANOMA WITH SENTINEL NODE BIOPSY, INJECTION AND  SCAN PRE-OP, LEFT EAR; Surgeon: Guanaco Zepeda MD; Location: [facility] OR; Service:    • HEMORRHOIDECTOMY       • INSERT / REPLACE / REMOVE PACEMAKER       • SENTINEL NODE BIOPSY Left 12/20/2016     Procedure: SENTINEL NODE BIOPSY; Surgeon: Guanaco Zepeda MD; Location:  [facility] OR; Service:    • THYROID SURGERY       • THYROIDECTOMY          ,         Family History   Problem Relation Age of Onset   • Heart failure Mother     • Stroke Mother     • Dementia Mother     • Coronary artery disease Father     • COPD Brother     ,         Social History   Substance Use Topics   • Smoking status: Former Smoker       Types: Cigarettes       Quit date: 1977   • Smokeless tobacco: Former User       Quit date: 1977   • Alcohol use No   ,      Medications   Current Medications           Current Outpatient Prescriptions   Medication Sig Dispense Refill   • ALPRAZolam (XANAX) 0.25 MG tablet Take 1 tablet by mouth 3 (Three) Times a Day As Needed for anxiety. 90 tablet 0   • aspirin 81 MG EC tablet Take 81 mg by mouth daily.        • atorvastatin (LIPITOR) 80 MG tablet TAKE 1 TABLET BY MOUTH EVERY NIGHT 30 tablet 5   • donepezil (ARICEPT) 5 MG tablet TAKE 1 TABLET BY MOUTH EVERY DAY 30 tablet 5   • escitalopram (LEXAPRO) 20 MG tablet TAKE 1 TABLET BY MOUTH EVERY DAY 30 tablet 0   • furosemide (LASIX) 40 MG tablet Take 1 tablet by mouth Daily. 90 tablet 0   • gabapentin (NEURONTIN) 300 MG capsule TK 1 C PO QHS   5   • HYDROcodone-acetaminophen (NORCO) 7.5-325 MG per tablet Take 1-2 tablets by mouth Every 4 (Four) Hours As Needed for moderate pain (4-6) (Pain). (Patient taking differently: Take 1-2 tablets by mouth As Needed for moderate pain (4-6) (Pain).) 40 tablet 0   • metoprolol tartrate (LOPRESSOR) 50 MG tablet Take 1 tablet by mouth Daily. 90 tablet 1   • NITROSTAT 0.4 MG SL tablet PLACE1 TABLET UNDER THE TONGUE AND ALLOW TO DISSOLVE AS NEEDED FOR CHEST PAIN   2   • omega-3 acid ethyl esters (LOVAZA) 1 G capsule Take 1 capsule by  mouth Daily. 90 capsule 4   • omeprazole (PriLOSEC) 20 MG capsule TAKE ONE CAPSULE BY MOUTH EVERY DAY   3   • potassium gluconate 595 MG tablet tablet Take 595 mg by mouth daily.       • Probiotic Product (PROBIOTIC DAILY PO) Take by mouth.       • ticagrelor (BRILINTA) 90 MG tablet tablet Take 1 tablet by mouth Every 12 (Twelve) Hours. 60 tablet 11   • valsartan (DIOVAN) 80 MG tablet TAKE 1 TABLET BY MOUTH EVERY DAY 90 tablet 3      No current facility-administered medications for this visit.             Allergies: Meperidine     Review of Systems  Review of Systems   Constitution: Negative.   HENT: Negative.   Eyes: Negative.   Cardiovascular: Positive for chest pain and dyspnea on exertion. Negative for claudication, cyanosis, irregular heartbeat, leg swelling, near-syncope, orthopnea, palpitations, paroxysmal nocturnal dyspnea and syncope.   Respiratory: Negative.   Endocrine: Negative.   Hematologic/Lymphatic: Negative.   Skin: Negative.   Gastrointestinal: Negative for anorexia.   Genitourinary: Negative.   Neurological: Negative.   Psychiatric/Behavioral: Negative.            Objective       Physical Exam:  See vitals in log  Physical Exam   Constitutional: He appears well-developed.   HENT:   Head: Normocephalic.   Neck: Normal carotid pulses and no JVD present. No tracheal tenderness present. Carotid bruit is not present. No tracheal deviation and no edema present.   Cardiovascular: Regular rhythm, normal heart sounds and normal pulses.   Pulmonary/Chest: Effort normal. No stridor.   Abdominal: Soft.   Neurological: He is alert. He has normal strength. No cranial nerve deficit or sensory deficit.   Skin: Skin is warm.   Psychiatric: He has a normal mood and affect. His speech is normal and behavior is normal.         Results Review:        ECG 12 Lead  Date/Time: 3/31/2017 10:27 AM  Performed by: ANTONIO PINA  Authorized by: ANTONIO PINA   Comparison: compared with previous ECG from 11/28/2016  Comparison  to previous ECG: A paced replaced sinus rhytm  Rhythm: paced  Rate: normal  QRS axis: normal  Clinical impression: abnormal ECG               Assessment/Plan         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   • Essential hypertension   • Anxiety   • Colon polyps   • Mixed hyperlipidemia   • Incisional hernia, without obstruction or gangrene   • Precordial pain   • Coronary artery disease involving native coronary artery of native heart without angina pectoris   • Generalized abdominal pain   • BRBPR (bright red blood per rectum)   • Esophageal dysphagia   • Hx of colonic polyp   • Prostatism   • Nocturia   • Coronary artery disease   • Hypertension   • Chest pain         Stable doing well. No chest pain now or excessive dyspnea. No palpitations. No significant pedal edema. Compliant with medications and diet. Latest labs and medications reviewed.     Plan:     Cardiac cath as possible restenosis of the OM stent  Consider SHAHRZAD   Risks, benefits and alternatives explained. The patient understands and wishes to proceed.  Will have to defer his colonoscopy  Cannot stop DAP lifelong as stent thrombosis in past

## 2017-04-11 RX ORDER — ALPRAZOLAM 0.25 MG/1
TABLET ORAL
Qty: 90 TABLET | Refills: 0 | Status: SHIPPED | OUTPATIENT
Start: 2017-04-11 | End: 2017-08-11 | Stop reason: SDUPTHER

## 2017-04-13 ENCOUNTER — TELEPHONE (OUTPATIENT)
Dept: GASTROENTEROLOGY | Facility: CLINIC | Age: 72
End: 2017-04-13

## 2017-04-13 NOTE — TELEPHONE ENCOUNTER
Wife phoned wanting to make sure his scope was cancelled.  PT didn't have a stent put in.   Wife said if he needed to have colon he would.  She would like to wait awhile if possible.

## 2017-04-13 NOTE — TELEPHONE ENCOUNTER
Ok,  Just let us know when ready , but will have to go through the same process again of contacting dr burks office to see if can come off blood thinner again ( and ? If need to use lovenox ) .  Just let usknow , thanks

## 2017-04-19 RX ORDER — ESCITALOPRAM OXALATE 20 MG/1
20 TABLET ORAL DAILY
Qty: 30 TABLET | Refills: 2 | Status: SHIPPED | OUTPATIENT
Start: 2017-04-19 | End: 2017-05-21 | Stop reason: SDUPTHER

## 2017-05-08 RX ORDER — FUROSEMIDE 40 MG/1
TABLET ORAL
Qty: 90 TABLET | Refills: 0 | Status: SHIPPED | OUTPATIENT
Start: 2017-05-08 | End: 2017-08-05 | Stop reason: SDUPTHER

## 2017-05-16 RX ORDER — METOPROLOL TARTRATE 50 MG/1
TABLET, FILM COATED ORAL
Qty: 90 TABLET | Refills: 0 | Status: SHIPPED | OUTPATIENT
Start: 2017-05-16 | End: 2017-09-20

## 2017-05-17 RX ORDER — DONEPEZIL HYDROCHLORIDE 5 MG/1
TABLET, FILM COATED ORAL
Qty: 90 TABLET | Refills: 0 | Status: SHIPPED | OUTPATIENT
Start: 2017-05-17 | End: 2017-08-23 | Stop reason: SDUPTHER

## 2017-05-22 ENCOUNTER — OFFICE VISIT (OUTPATIENT)
Dept: FAMILY MEDICINE CLINIC | Facility: CLINIC | Age: 72
End: 2017-05-22

## 2017-05-22 VITALS
HEART RATE: 58 BPM | RESPIRATION RATE: 18 BRPM | OXYGEN SATURATION: 97 % | SYSTOLIC BLOOD PRESSURE: 100 MMHG | HEIGHT: 68 IN | WEIGHT: 252 LBS | DIASTOLIC BLOOD PRESSURE: 64 MMHG | BODY MASS INDEX: 38.19 KG/M2

## 2017-05-22 DIAGNOSIS — I95.1 ORTHOSTASIS: Primary | ICD-10-CM

## 2017-05-22 PROCEDURE — 99213 OFFICE O/P EST LOW 20 MIN: CPT | Performed by: FAMILY MEDICINE

## 2017-05-22 RX ORDER — ESCITALOPRAM OXALATE 20 MG/1
TABLET ORAL
Qty: 90 TABLET | Refills: 2 | Status: SHIPPED | OUTPATIENT
Start: 2017-05-22 | End: 2018-10-11

## 2017-05-24 ENCOUNTER — OFFICE VISIT (OUTPATIENT)
Dept: FAMILY MEDICINE CLINIC | Facility: CLINIC | Age: 72
End: 2017-05-24

## 2017-05-24 VITALS
HEART RATE: 65 BPM | RESPIRATION RATE: 16 BRPM | SYSTOLIC BLOOD PRESSURE: 122 MMHG | WEIGHT: 252 LBS | HEIGHT: 68 IN | BODY MASS INDEX: 38.19 KG/M2 | OXYGEN SATURATION: 98 % | DIASTOLIC BLOOD PRESSURE: 70 MMHG

## 2017-05-24 DIAGNOSIS — I95.2 HYPOTENSION DUE TO DRUGS: Primary | ICD-10-CM

## 2017-05-24 PROCEDURE — 99213 OFFICE O/P EST LOW 20 MIN: CPT | Performed by: FAMILY MEDICINE

## 2017-06-21 ENCOUNTER — TELEPHONE (OUTPATIENT)
Dept: FAMILY MEDICINE CLINIC | Facility: CLINIC | Age: 72
End: 2017-06-21

## 2017-06-21 ENCOUNTER — OFFICE VISIT (OUTPATIENT)
Dept: FAMILY MEDICINE CLINIC | Facility: CLINIC | Age: 72
End: 2017-06-21

## 2017-06-21 ENCOUNTER — HOSPITAL ENCOUNTER (OUTPATIENT)
Dept: HOSPITAL 58 - RAD | Age: 72
Discharge: HOME | End: 2017-06-21
Attending: FAMILY MEDICINE

## 2017-06-21 VITALS
SYSTOLIC BLOOD PRESSURE: 110 MMHG | OXYGEN SATURATION: 97 % | RESPIRATION RATE: 16 BRPM | HEIGHT: 68 IN | DIASTOLIC BLOOD PRESSURE: 72 MMHG | HEART RATE: 62 BPM | BODY MASS INDEX: 38.19 KG/M2 | WEIGHT: 252 LBS

## 2017-06-21 VITALS — BODY MASS INDEX: 35.7 KG/M2

## 2017-06-21 DIAGNOSIS — M25.551: ICD-10-CM

## 2017-06-21 DIAGNOSIS — I10 ESSENTIAL HYPERTENSION: Primary | ICD-10-CM

## 2017-06-21 DIAGNOSIS — M25.529 ELBOW PAIN, UNSPECIFIED LATERALITY: ICD-10-CM

## 2017-06-21 DIAGNOSIS — M25.551 HIP PAIN, ACUTE, RIGHT: ICD-10-CM

## 2017-06-21 DIAGNOSIS — L98.9 FACE LESION: ICD-10-CM

## 2017-06-21 DIAGNOSIS — M25.521: Primary | ICD-10-CM

## 2017-06-21 PROBLEM — M25.559 HIP PAIN, ACUTE: Status: ACTIVE | Noted: 2017-06-21

## 2017-06-21 PROCEDURE — 99213 OFFICE O/P EST LOW 20 MIN: CPT | Performed by: FAMILY MEDICINE

## 2017-06-21 NOTE — DI
EXAM:  Two views of the right hip 

  

HISTORY: Right hip pain. 

  

COMPARISON:  CT abdomen pelvis 02/25/2017 

  

FINDINGS: There is no displaced fracture or dislocation of the right hip.  Joint spaces maintained. 
 The adjacent osseous structures of the pelvis are normal.  The soft tissues are unremarkable. 

  

IMPRESSION:  No acute abnormality or displaced fracture of the of the right hip.

## 2017-06-21 NOTE — DI
EXAM:  Three views of the right elbow 

  

HISTORY: Right elbow injury with pain. 

  

COMPARISON:  None 

  

FINDINGS: There is no lytic or blastic lesion of the right elbow.  There is soft tissue swelling ove
r the olecranon with a small osteophyte present.  Radial head articulates normally with the capitell
um.  There is no displaced fracture or dislocation identified.  There are no displaced fat pads. 

  

IMPRESSION:  Soft tissue swelling over the olecranon may represent traumatic swelling versus bursiti
s.  There is no displaced fracture identified.

## 2017-06-21 NOTE — PROGRESS NOTES
Subjective   Carlo Velasco is a 71 y.o. male.     Chief Complaint   Patient presents with   • Follow-up     4 wk   hypotension        History of Present Illness     since cutting the dose of his bp meds he feels better--no syncope or dizziness..about 3-4 weeks ago he fell and his right elbow has been painful and swollen too. and also noting right hip pain.      Current Outpatient Prescriptions:   •  ALPRAZolam (XANAX) 0.25 MG tablet, TAKE 1 TABLET BY MOUTH THREE TIMES DAILY AS NEEDED FOR ANXIETY, Disp: 90 tablet, Rfl: 0  •  aspirin 81 MG EC tablet, Take 81 mg by mouth daily.  , Disp: , Rfl:   •  atorvastatin (LIPITOR) 80 MG tablet, TAKE 1 TABLET BY MOUTH EVERY NIGHT, Disp: 30 tablet, Rfl: 5  •  donepezil (ARICEPT) 5 MG tablet, TAKE 1 TABLET BY MOUTH AT BEDTIME, Disp: 90 tablet, Rfl: 0  •  escitalopram (LEXAPRO) 20 MG tablet, TAKE 1 TABLET BY MOUTH DAILY, Disp: 90 tablet, Rfl: 2  •  furosemide (LASIX) 40 MG tablet, TAKE 1 TABLET BY MOUTH DAILY, Disp: 90 tablet, Rfl: 0  •  gabapentin (NEURONTIN) 300 MG capsule, TK 1 C PO QHS, Disp: , Rfl: 5  •  HYDROcodone-acetaminophen (NORCO) 7.5-325 MG per tablet, Take 1-2 tablets by mouth Every 4 (Four) Hours As Needed for moderate pain (4-6) (Pain). (Patient taking differently: Take 1-2 tablets by mouth As Needed for moderate pain (4-6) (Pain).), Disp: 40 tablet, Rfl: 0  •  metoprolol tartrate (LOPRESSOR) 50 MG tablet, TAKE 1 TABLET BY MOUTH DAILY, Disp: 90 tablet, Rfl: 0  •  NITROSTAT 0.4 MG SL tablet, PLACE1 TABLET UNDER THE TONGUE AND ALLOW TO DISSOLVE AS NEEDED FOR CHEST PAIN, Disp: , Rfl: 2  •  omega-3 acid ethyl esters (LOVAZA) 1 G capsule, Take 1 capsule by mouth Daily., Disp: 90 capsule, Rfl: 4  •  omeprazole (PriLOSEC) 20 MG capsule, TAKE ONE CAPSULE BY MOUTH EVERY DAY, Disp: , Rfl: 3  •  potassium gluconate 595 MG tablet tablet, Take 595 mg by mouth daily., Disp: , Rfl:   •  Probiotic Product (PROBIOTIC DAILY PO), Take  by mouth., Disp: , Rfl:   •  ticagrelor  (BRILINTA) 90 MG tablet tablet, Take 1 tablet by mouth Every 12 (Twelve) Hours., Disp: 60 tablet, Rfl: 11  Allergies   Allergen Reactions   • Meperidine Other (See Comments)     HEART STOPS         Past Medical History:   Diagnosis Date   • Anxiety    • Arthritis    • Cancer     melanoma   • Chest pain    • CHF (congestive heart failure)    • Claustrophobia    • Coronary artery disease 2012, 2015    2 vessel CABG (2012), SHAHRZAD LAD (2015).    • Depression    • Disease of thyroid gland     partial thyroidectomy on right   • Dysphagia    • GERD (gastroesophageal reflux disease)    • Hearing loss    • Heart attack    • Heart attack    • Hyperlipidemia    • Hypertension    • Kidney stone     history of    • Tobacco abuse, in remission    • Uses hearing aid     BILATERAL     Past Surgical History:   Procedure Laterality Date   • BACK SURGERY      CERVICAL FUSION AND LUMBAR DISC REPLACEMENT   • CARDIAC CATHETERIZATION  07/2015    SHAHRZAD mid LAD, Dr. Toribio   • CARDIAC CATHETERIZATION N/A 10/21/2016    Procedure: Left Heart Cath;  Surgeon: Darian Toribio MD;  Location:  [facility] CATH INVASIVE LOCATION;  Service:    • CARDIAC CATHETERIZATION Left 4/5/2017    Procedure: Cardiac Catheterization/Vascular Study;  Surgeon: Darian Toribio MD;  Location:  [facility] CATH INVASIVE LOCATION;  Service:    • CARDIAC CATHETERIZATION N/A 4/5/2017    Procedure: Left Heart Cath;  Surgeon: Darian Toribio MD;  Location:  [facility] CATH INVASIVE LOCATION;  Service:    • CARDIAC CATHETERIZATION N/A 4/5/2017    Procedure: Coronary angiography;  Surgeon: Darian Toribio MD;  Location:  [facility] CATH INVASIVE LOCATION;  Service:    • CARDIAC CATHETERIZATION N/A 4/5/2017    Procedure: Left ventriculography;  Surgeon: Darian Toribio MD;  Location:  [facility] CATH INVASIVE LOCATION;  Service:    • CARDIAC CATHETERIZATION  4/5/2017    Procedure: Saphenous Vein Graft;  Surgeon: Darian Toribio MD;  Location:  [facility] CATH INVASIVE LOCATION;  Service:    • COLONOSCOPY  09/05/2007    colon  "polyps   • CORONARY ANGIOPLASTY WITH STENT PLACEMENT  07/2015    SHAHRZAD mid LAD, Dr. Toribio    • CORONARY ARTERY BYPASS GRAFT  2012 AND 2014    2 vessel    • ENDOSCOPY  11/08/2010   • HEAD/NECK LESION/CYST EXCISION Left 12/20/2016    Procedure: EXCISION MALIGNANT MELANOMA WITH SENTINEL NODE BIOPSY, INJECTION AND SCAN PRE-OP, LEFT EAR;  Surgeon: Guanaco Zepeda MD;  Location: [facility] OR;  Service:    • HEMORRHOIDECTOMY     • INSERT / REPLACE / REMOVE PACEMAKER     • SENTINEL NODE BIOPSY Left 12/20/2016    Procedure: SENTINEL NODE BIOPSY;  Surgeon: Guanaco Zepeda MD;  Location:  [facility] OR;  Service:    • THYROID SURGERY     • THYROIDECTOMY         Review of Systems   Constitutional: Negative.    HENT: Negative.    Eyes: Negative.    Respiratory: Negative.    Cardiovascular: Negative.    Gastrointestinal: Negative.    Endocrine: Negative.    Genitourinary: Negative.    Musculoskeletal: Positive for arthralgias.   Skin: Negative.    Allergic/Immunologic: Negative.    Neurological: Negative.    Hematological: Negative.    Psychiatric/Behavioral: Negative.        Objective  /72  Pulse 62  Resp 16  Ht 68\" (172.7 cm)  Wt 252 lb (114 kg)  SpO2 97%  BMI 38.32 kg/m2  Physical Exam   Constitutional: He is oriented to person, place, and time. He appears well-developed and well-nourished.   HENT:   Head: Normocephalic and atraumatic.   Right Ear: External ear normal.   Left Ear: External ear normal.   Nose: Nose normal.   Mouth/Throat: Oropharynx is clear and moist.   Eyes: Conjunctivae and EOM are normal. Pupils are equal, round, and reactive to light.   Neck: Normal range of motion. Neck supple.   Cardiovascular: Normal rate, regular rhythm, normal heart sounds and intact distal pulses.    Pulmonary/Chest: Effort normal.   Abdominal: Soft. Bowel sounds are normal.   Musculoskeletal: He exhibits edema, tenderness and deformity.   Neurological: He is alert and oriented to person, place, and time. He has normal reflexes. "   Skin: Skin is warm and dry.   Psychiatric: He has a normal mood and affect. His behavior is normal. Judgment and thought content normal.   Nursing note and vitals reviewed.      Assessment/Plan   Carlo was seen today for follow-up.    Diagnoses and all orders for this visit:    Essential hypertension    Elbow pain, unspecified laterality  -     XR elbow 3+ vw right    Hip pain, acute, right  -     XR Hip With or Without Pelvis 2 - 3 View Right    Face lesion  -     Ambulatory Referral to Dermatology                 Orders Placed This Encounter   Procedures   • XR elbow 3+ vw right     Order Specific Question:   Reason for Exam:     Answer:   fall   • XR Hip With or Without Pelvis 2 - 3 View Right     Order Specific Question:   Reason for Exam:     Answer:   fall   • Ambulatory Referral to Dermatology     Referral Priority:   Routine     Referral Type:   Consultation     Referral Reason:   Specialty Services Required     Referred to Provider:   Cruz Mishra MD     Requested Specialty:   Dermatology     Number of Visits Requested:   1     Follow up:next appt

## 2017-06-30 ENCOUNTER — TELEPHONE (OUTPATIENT)
Dept: FAMILY MEDICINE CLINIC | Facility: CLINIC | Age: 72
End: 2017-06-30

## 2017-06-30 ENCOUNTER — OFFICE VISIT (OUTPATIENT)
Dept: CARDIOLOGY | Facility: CLINIC | Age: 72
End: 2017-06-30

## 2017-06-30 ENCOUNTER — CLINICAL SUPPORT (OUTPATIENT)
Dept: CARDIOLOGY | Facility: CLINIC | Age: 72
End: 2017-06-30

## 2017-06-30 VITALS
BODY MASS INDEX: 39.04 KG/M2 | HEART RATE: 60 BPM | HEIGHT: 68 IN | DIASTOLIC BLOOD PRESSURE: 62 MMHG | WEIGHT: 257.6 LBS | SYSTOLIC BLOOD PRESSURE: 120 MMHG

## 2017-06-30 DIAGNOSIS — Z95.0 CARDIAC PACEMAKER IN SITU: Primary | ICD-10-CM

## 2017-06-30 DIAGNOSIS — I25.10 CORONARY ARTERY DISEASE INVOLVING NATIVE CORONARY ARTERY OF NATIVE HEART WITHOUT ANGINA PECTORIS: ICD-10-CM

## 2017-06-30 DIAGNOSIS — R06.09 DOE (DYSPNEA ON EXERTION): ICD-10-CM

## 2017-06-30 DIAGNOSIS — I25.729 CORONARY ARTERY DISEASE INVOLVING AUTOLOGOUS ARTERY CORONARY BYPASS GRAFT WITH ANGINA PECTORIS (HCC): Primary | ICD-10-CM

## 2017-06-30 DIAGNOSIS — I49.5 SICK SINUS SYNDROME (HCC): ICD-10-CM

## 2017-06-30 DIAGNOSIS — F41.9 ANXIETY: ICD-10-CM

## 2017-06-30 DIAGNOSIS — K63.5 COLON POLYPS: ICD-10-CM

## 2017-06-30 DIAGNOSIS — E78.2 MIXED HYPERLIPIDEMIA: ICD-10-CM

## 2017-06-30 DIAGNOSIS — I10 ESSENTIAL HYPERTENSION: ICD-10-CM

## 2017-06-30 PROCEDURE — 93288 INTERROG EVL PM/LDLS PM IP: CPT | Performed by: INTERNAL MEDICINE

## 2017-06-30 PROCEDURE — 99214 OFFICE O/P EST MOD 30 MIN: CPT | Performed by: INTERNAL MEDICINE

## 2017-06-30 PROCEDURE — 93000 ELECTROCARDIOGRAM COMPLETE: CPT | Performed by: INTERNAL MEDICINE

## 2017-06-30 RX ORDER — ISOSORBIDE MONONITRATE 30 MG/1
30 TABLET, EXTENDED RELEASE ORAL EVERY MORNING
Qty: 90 TABLET | Refills: 3 | Status: SHIPPED | OUTPATIENT
Start: 2017-06-30 | End: 2018-02-05 | Stop reason: ALTCHOICE

## 2017-06-30 NOTE — PROGRESS NOTES
Carlo Velasco  [id number]  1945  71 y.o.  male    Referring Provider: Rhys Rosen MD    Reason for Follow-up Visit: CAD    Subjective .   Chest pain with exertion  No diaphoresis  No nausea  No radiation  Precipitated with exertion relieved with rest  Abnormal stress test with lateral ischemia    History of present illness:  Carlo Velasco is a 71 y.o. yo male with history of CAD who presents today for   Chief Complaint   Patient presents with   • Coronary Artery Disease     3 month f/u    .    History  Past Medical History:   Diagnosis Date   • Anxiety    • Arthritis    • Cancer     melanoma   • Chest pain    • CHF (congestive heart failure)    • Claustrophobia    • Coronary artery disease 2012, 2015    2 vessel CABG (2012), SHAHRZAD LAD (2015).    • Depression    • Disease of thyroid gland     partial thyroidectomy on right   • Dysphagia    • GERD (gastroesophageal reflux disease)    • Hearing loss    • Heart attack    • Heart attack    • Hyperlipidemia    • Hypertension    • Kidney stone     history of    • Tobacco abuse, in remission    • Uses hearing aid     BILATERAL   ,   Past Surgical History:   Procedure Laterality Date   • BACK SURGERY      CERVICAL FUSION AND LUMBAR DISC REPLACEMENT   • CARDIAC CATHETERIZATION  07/2015    SHAHRZAD mid LADDr. Toribio   • CARDIAC CATHETERIZATION N/A 10/21/2016    Procedure: Left Heart Cath;  Surgeon: Darian Toribio MD;  Location:  [facility];  Service:    • CARDIAC CATHETERIZATION Left 4/5/2017    Procedure: Cardiac Catheterization/Vascular Study;  Surgeon: Darian Toribio MD;  Location:  [facility];  Service:    • CARDIAC CATHETERIZATION N/A 4/5/2017    Procedure: Left Heart Cath;  Surgeon: Darian Toribio MD;  Location:  [facility];  Service:    • CARDIAC CATHETERIZATION N/A 4/5/2017    Procedure: Coronary angiography;  Surgeon: Darian Toribio MD;  Location:  [facility];  Service:    • CARDIAC  CATHETERIZATION N/A 4/5/2017    Procedure: Left ventriculography;  Surgeon: Darian Toribio MD;  Location:  [facility];  Service:    • CARDIAC CATHETERIZATION  4/5/2017    Procedure: Saphenous Vein Graft;  Surgeon: Darian Toribio MD;  Location:  [facility];  Service:    • COLONOSCOPY  09/05/2007    colon polyps   • CORONARY ANGIOPLASTY WITH STENT PLACEMENT  07/2015    SHAHRZAD mid LAD, Dr. Toribio    • CORONARY ARTERY BYPASS GRAFT  2012 AND 2014    2 vessel    • ENDOSCOPY  11/08/2010   • HEAD/NECK LESION/CYST EXCISION Left 12/20/2016    Procedure: EXCISION MALIGNANT MELANOMA WITH SENTINEL NODE BIOPSY, INJECTION AND SCAN PRE-OP, LEFT EAR;  Surgeon: Guanaco Zepeda MD;  Location:  [facility];  Service:    • HEMORRHOIDECTOMY     • INSERT / REPLACE / REMOVE PACEMAKER     • SENTINEL NODE BIOPSY Left 12/20/2016    Procedure: SENTINEL NODE BIOPSY;  Surgeon: Guanaco Zepeda MD;  Location:  [facility];  Service:    • THYROID SURGERY     • THYROIDECTOMY     ,   Family History   Problem Relation Age of Onset   • Heart failure Mother    • Stroke Mother    • Dementia Mother    • Coronary artery disease Father    • COPD Brother    ,   Social History   Substance Use Topics   • Smoking status: Former Smoker     Types: Cigarettes     Quit date: 1977   • Smokeless tobacco: Former User     Quit date: 1977   • Alcohol use No   ,     Medications  Current Outpatient Prescriptions   Medication Sig Dispense Refill   • ALPRAZolam (XANAX) 0.25 MG tablet TAKE 1 TABLET BY MOUTH THREE TIMES DAILY AS NEEDED FOR ANXIETY 90 tablet 0   • aspirin 81 MG EC tablet Take 81 mg by mouth daily.       • atorvastatin (LIPITOR) 80 MG tablet TAKE 1 TABLET BY MOUTH EVERY NIGHT 30 tablet 5   • donepezil (ARICEPT) 5 MG tablet TAKE 1 TABLET BY MOUTH AT BEDTIME 90 tablet 0   • escitalopram (LEXAPRO) 20 MG tablet TAKE 1 TABLET BY MOUTH DAILY 90 tablet 2   • furosemide (LASIX) 40 MG tablet TAKE 1 TABLET BY MOUTH DAILY 90 tablet 0   • gabapentin  "(NEURONTIN) 300 MG capsule TK 1 C PO QHS  5   • HYDROcodone-acetaminophen (NORCO) 7.5-325 MG per tablet Take 1-2 tablets by mouth Every 4 (Four) Hours As Needed for moderate pain (4-6) (Pain). (Patient taking differently: Take 1-2 tablets by mouth As Needed for moderate pain (4-6) (Pain).) 40 tablet 0   • metoprolol tartrate (LOPRESSOR) 50 MG tablet TAKE 1 TABLET BY MOUTH DAILY (Patient taking differently: TAKE 1 TABLET BY MOUTH DAILY 25mg) 90 tablet 0   • NITROSTAT 0.4 MG SL tablet PLACE1 TABLET UNDER THE TONGUE AND ALLOW TO DISSOLVE AS NEEDED FOR CHEST PAIN  2   • omega-3 acid ethyl esters (LOVAZA) 1 G capsule Take 1 capsule by mouth Daily. 90 capsule 4   • omeprazole (PriLOSEC) 20 MG capsule TAKE ONE CAPSULE BY MOUTH EVERY DAY  3   • potassium gluconate 595 MG tablet tablet Take 595 mg by mouth daily.     • Probiotic Product (PROBIOTIC DAILY PO) Take  by mouth.     • ticagrelor (BRILINTA) 90 MG tablet tablet Take 1 tablet by mouth Every 12 (Twelve) Hours. 60 tablet 11   • isosorbide mononitrate (IMDUR) 30 MG 24 hr tablet Take 1 tablet by mouth Every Morning. 90 tablet 3     No current facility-administered medications for this visit.        Allergies:  Meperidine    Review of Systems  Review of Systems   Constitution: Negative.   HENT: Negative.    Eyes: Negative.    Cardiovascular: Positive for chest pain and dyspnea on exertion. Negative for claudication, cyanosis, irregular heartbeat, leg swelling, near-syncope, orthopnea, palpitations, paroxysmal nocturnal dyspnea and syncope.   Respiratory: Negative.    Endocrine: Negative.    Hematologic/Lymphatic: Negative.    Skin: Negative.    Gastrointestinal: Negative for anorexia.   Genitourinary: Negative.    Neurological: Negative.    Psychiatric/Behavioral: Negative.        Objective     Physical Exam:  /62 (BP Location: Left arm, Patient Position: Sitting, Cuff Size: Adult)  Pulse 60  Ht 68\" (172.7 cm)  Wt 257 lb 9.6 oz (117 kg)  BMI 39.17 " kg/m2  Physical Exam   Constitutional: He appears well-developed.   HENT:   Head: Normocephalic.   Neck: Normal carotid pulses and no JVD present. No tracheal tenderness present. Carotid bruit is not present. No tracheal deviation and no edema present.   Cardiovascular: Regular rhythm, normal heart sounds and normal pulses.    Pulmonary/Chest: Effort normal. No stridor.   Abdominal: Soft.   Neurological: He is alert. He has normal strength. No cranial nerve deficit or sensory deficit.   Skin: Skin is warm.   Psychiatric: He has a normal mood and affect. His speech is normal and behavior is normal.       Results Review:       ECG 12 Lead  Date/Time: 6/30/2017 10:35 AM  Performed by: ANTONIO PINA  Authorized by: ANTONIO PINA   Comparison: compared with previous ECG from 3/31/2017  Rhythm: paced  Rate: normal  Conduction: incomplete RBBB  QRS axis: normal  Other findings: LVH  Clinical impression: abnormal ECG            Assessment/Plan   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   • Essential hypertension   • Anxiety   • Colon polyps   • Mixed hyperlipidemia   • Incisional hernia, without obstruction or gangrene   • Precordial pain   • Coronary artery disease involving native coronary artery of native heart without angina pectoris   • Generalized abdominal pain   • BRBPR (bright red blood per rectum)   • Esophageal dysphagia   • Hx of colonic polyp   • Prostatism   • Nocturia   • Coronary artery disease   • Hypertension   • Chest pain   • Orthostasis   • Hypotension due to drugs   • Elbow pain   • Hip pain, acute       Stable doing well. No chest pain now or excessive dyspnea. No palpitations. No significant pedal edema. Compliant with medications and diet. Latest labs and medications reviewed.    Plan:    No targets for intervention  Treat for small vessel disease  Add Imdur 30 mg daily  Flexible diuretic dosing    PFT  Monitor for any signs of bleeding including red or dark stools. Fall precautions.   Patient is asked to monitor BP at home or work, several times per month and return with written values at next office visit.   Cannot stop DAP lifelong as stent thrombosis in past  Keep follow up with me in 6 months  Keep LDL below 70 mg/dl. Monitor liver and renal functions.    Close follow up with you as scheduled.  Intensive factor modifications.  See order list.   Monitor pacemaker.    Return in about 6 months (around 12/30/2017).

## 2017-06-30 NOTE — TELEPHONE ENCOUNTER
Wife informed no fractures. Will talk to him and see if he needs a referral to Ortho. Will check back later if he does.

## 2017-06-30 NOTE — TELEPHONE ENCOUNTER
----- Message from Rhys Rosen MD sent at 6/30/2017 10:26 AM CDT -----  See resutl  note  ----- Message -----     From: Jessica Sánchez Rep     Sent: 6/30/2017  10:04 AM       To: Rhys Rosen MD

## 2017-06-30 NOTE — TELEPHONE ENCOUNTER
Wife is calling wanting results of his elbow xrays      Pat--let her know no frcture--consider ortho referral for withdraw of the fluid if he is still having problems but the blood thinner might be a problem

## 2017-07-04 NOTE — PROGRESS NOTES
I have reviewed the notes, assessments, and/or procedures performed by Aggie Paulson , I concur with her  documentation of  Carlo Velasco.

## 2017-07-13 ENCOUNTER — HOSPITAL ENCOUNTER (OUTPATIENT)
Dept: PULMONOLOGY | Facility: HOSPITAL | Age: 72
Discharge: HOME | End: 2017-07-13
Attending: INTERNAL MEDICINE | Admitting: INTERNAL MEDICINE

## 2017-07-13 VITALS — BODY MASS INDEX: 38.8 KG/M2 | WEIGHT: 256 LBS | HEIGHT: 68 IN

## 2017-07-13 DIAGNOSIS — R06.09 DOE (DYSPNEA ON EXERTION): ICD-10-CM

## 2017-07-13 PROCEDURE — 94729 DIFFUSING CAPACITY: CPT

## 2017-07-13 PROCEDURE — 94726 PLETHYSMOGRAPHY LUNG VOLUMES: CPT

## 2017-07-13 PROCEDURE — 63710000001 ALBUTEROL PER 1 MG: Performed by: INTERNAL MEDICINE

## 2017-07-13 PROCEDURE — 94060 EVALUATION OF WHEEZING: CPT

## 2017-07-13 PROCEDURE — A9270 NON-COVERED ITEM OR SERVICE: HCPCS | Performed by: INTERNAL MEDICINE

## 2017-07-13 RX ORDER — ALBUTEROL SULFATE 2.5 MG/3ML
2.5 SOLUTION RESPIRATORY (INHALATION) ONCE
Status: COMPLETED | OUTPATIENT
Start: 2017-07-13 | End: 2017-07-13

## 2017-07-13 RX ADMIN — ALBUTEROL SULFATE 2.5 MG: 2.5 SOLUTION RESPIRATORY (INHALATION) at 14:00

## 2017-08-07 RX ORDER — FUROSEMIDE 40 MG/1
TABLET ORAL
Qty: 90 TABLET | Refills: 3 | Status: SHIPPED | OUTPATIENT
Start: 2017-08-07 | End: 2018-08-29 | Stop reason: SDUPTHER

## 2017-08-11 RX ORDER — ATORVASTATIN CALCIUM 80 MG/1
TABLET, FILM COATED ORAL
Qty: 30 TABLET | Refills: 5 | Status: SHIPPED | OUTPATIENT
Start: 2017-08-11 | End: 2018-03-05 | Stop reason: SDUPTHER

## 2017-08-14 RX ORDER — ALPRAZOLAM 0.25 MG/1
TABLET ORAL
Qty: 90 TABLET | Refills: 0 | OUTPATIENT
Start: 2017-08-14 | End: 2018-02-21 | Stop reason: SDUPTHER

## 2017-08-23 RX ORDER — DONEPEZIL HYDROCHLORIDE 5 MG/1
TABLET, FILM COATED ORAL
Qty: 90 TABLET | Refills: 0 | Status: SHIPPED | OUTPATIENT
Start: 2017-08-23 | End: 2017-12-04 | Stop reason: SDUPTHER

## 2017-09-13 RX ORDER — ESCITALOPRAM OXALATE 20 MG/1
TABLET ORAL
Qty: 30 TABLET | Refills: 5 | Status: SHIPPED | OUTPATIENT
Start: 2017-09-13 | End: 2017-09-20 | Stop reason: SDUPTHER

## 2017-09-20 ENCOUNTER — OFFICE VISIT (OUTPATIENT)
Dept: FAMILY MEDICINE CLINIC | Facility: CLINIC | Age: 72
End: 2017-09-20

## 2017-09-20 VITALS
DIASTOLIC BLOOD PRESSURE: 90 MMHG | RESPIRATION RATE: 16 BRPM | HEIGHT: 68 IN | BODY MASS INDEX: 39.1 KG/M2 | SYSTOLIC BLOOD PRESSURE: 132 MMHG | OXYGEN SATURATION: 97 % | HEART RATE: 73 BPM | WEIGHT: 258 LBS

## 2017-09-20 DIAGNOSIS — F41.9 ANXIETY: ICD-10-CM

## 2017-09-20 DIAGNOSIS — E78.2 MIXED HYPERLIPIDEMIA: ICD-10-CM

## 2017-09-20 DIAGNOSIS — I10 ESSENTIAL HYPERTENSION: ICD-10-CM

## 2017-09-20 DIAGNOSIS — I25.729 CORONARY ARTERY DISEASE INVOLVING AUTOLOGOUS ARTERY CORONARY BYPASS GRAFT WITH ANGINA PECTORIS (HCC): Primary | ICD-10-CM

## 2017-09-20 DIAGNOSIS — R35.1 NOCTURIA: ICD-10-CM

## 2017-09-20 LAB
ALBUMIN SERPL-MCNC: 3.6 G/DL (ref 3.5–5)
ALBUMIN/GLOB SERPL: 1.6 G/DL (ref 1.1–2.5)
ALP SERPL-CCNC: 74 U/L (ref 24–120)
ALT SERPL-CCNC: 38 U/L (ref 0–54)
AST SERPL-CCNC: 28 U/L (ref 7–45)
BASOPHILS # BLD AUTO: 0.05 10*3/MM3 (ref 0–0.2)
BASOPHILS NFR BLD AUTO: 0.6 % (ref 0–2)
BILIRUB SERPL-MCNC: 0.4 MG/DL (ref 0.1–1)
BUN SERPL-MCNC: 14 MG/DL (ref 5–21)
BUN/CREAT SERPL: 21.9 (ref 7–25)
CALCIUM SERPL-MCNC: 8.7 MG/DL (ref 8.4–10.4)
CHLORIDE SERPL-SCNC: 102 MMOL/L (ref 98–110)
CHOLEST SERPL-MCNC: 136 MG/DL (ref 130–200)
CO2 SERPL-SCNC: 29 MMOL/L (ref 24–31)
CREAT SERPL-MCNC: 0.64 MG/DL (ref 0.5–1.4)
EOSINOPHIL # BLD AUTO: 0.36 10*3/MM3 (ref 0–0.7)
EOSINOPHIL NFR BLD AUTO: 4 % (ref 0–4)
ERYTHROCYTE [DISTWIDTH] IN BLOOD BY AUTOMATED COUNT: 15.5 % (ref 12–15)
GLOBULIN SER CALC-MCNC: 2.3 GM/DL
GLUCOSE SERPL-MCNC: 77 MG/DL (ref 70–100)
HCT VFR BLD AUTO: 41.2 % (ref 40–52)
HDLC SERPL-MCNC: 38 MG/DL
HGB BLD-MCNC: 13 G/DL (ref 14–18)
IMM GRANULOCYTES # BLD: 0.33 10*3/MM3 (ref 0–0.03)
IMM GRANULOCYTES NFR BLD: 3.7 % (ref 0–5)
LDLC SERPL CALC-MCNC: 53 MG/DL (ref 0–99)
LYMPHOCYTES # BLD AUTO: 2.44 10*3/MM3 (ref 0.72–4.86)
LYMPHOCYTES NFR BLD AUTO: 27 % (ref 15–45)
MCH RBC QN AUTO: 28.4 PG (ref 28–32)
MCHC RBC AUTO-ENTMCNC: 31.6 G/DL (ref 33–36)
MCV RBC AUTO: 90.2 FL (ref 82–95)
MONOCYTES # BLD AUTO: 0.88 10*3/MM3 (ref 0.19–1.3)
MONOCYTES NFR BLD AUTO: 9.7 % (ref 4–12)
NEUTROPHILS # BLD AUTO: 4.98 10*3/MM3 (ref 1.87–8.4)
NEUTROPHILS NFR BLD AUTO: 55 % (ref 39–78)
NRBC BLD AUTO-RTO: 0 /100 WBC (ref 0–0)
PLATELET # BLD AUTO: 202 10*3/MM3 (ref 130–400)
POTASSIUM SERPL-SCNC: 3.5 MMOL/L (ref 3.5–5.3)
PROT SERPL-MCNC: 5.9 G/DL (ref 6.3–8.7)
PSA SERPL-MCNC: 1.04 NG/ML (ref 0–4)
RBC # BLD AUTO: 4.57 10*6/MM3 (ref 4.8–5.9)
SODIUM SERPL-SCNC: 139 MMOL/L (ref 135–145)
TRIGL SERPL-MCNC: 226 MG/DL (ref 0–149)
VLDLC SERPL CALC-MCNC: 45.2 MG/DL
WBC # BLD AUTO: 9.04 10*3/MM3 (ref 4.8–10.8)

## 2017-09-20 PROCEDURE — 99214 OFFICE O/P EST MOD 30 MIN: CPT | Performed by: FAMILY MEDICINE

## 2017-09-20 RX ORDER — NITROGLYCERIN 0.4 MG/1
0.4 TABLET SUBLINGUAL
Qty: 30 TABLET | Refills: 2 | Status: SHIPPED | OUTPATIENT
Start: 2017-09-20 | End: 2019-07-08 | Stop reason: SDUPTHER

## 2017-09-20 NOTE — PROGRESS NOTES
Subjective   Carlo Velasco is a 72 y.o. male.     Chief Complaint   Patient presents with   • Follow-up     6 mo        History of Present Illness     The following portions of the patient's history were reviewed and updated as appropriate: Carlo is here today with his wife--his angina is stable using occ ntg for pain reilife---his bp is controlled withiotui cp or ha--also he is tolerating lipitor withotu myalgais his anixety is stable with meds....      Current Outpatient Prescriptions:   •  ALPRAZolam (XANAX) 0.25 MG tablet, TAKE 1 TABLET BY MOUTH THREE TIMES DAILY AS NEEDED FOR ANXIETY, Disp: 90 tablet, Rfl: 0  •  aspirin 81 MG EC tablet, Take 81 mg by mouth daily.  , Disp: , Rfl:   •  atorvastatin (LIPITOR) 80 MG tablet, TAKE 1 TABLET BY MOUTH EVERY NIGHT, Disp: 30 tablet, Rfl: 5  •  donepezil (ARICEPT) 5 MG tablet, TAKE 1 TABLET BY MOUTH AT BEDTIME, Disp: 90 tablet, Rfl: 0  •  escitalopram (LEXAPRO) 20 MG tablet, TAKE 1 TABLET BY MOUTH DAILY, Disp: 90 tablet, Rfl: 2  •  furosemide (LASIX) 40 MG tablet, TAKE 1 TABLET BY MOUTH DAILY, Disp: 90 tablet, Rfl: 3  •  gabapentin (NEURONTIN) 300 MG capsule, TK 1 C PO QHS, Disp: , Rfl: 5  •  isosorbide mononitrate (IMDUR) 30 MG 24 hr tablet, Take 1 tablet by mouth Every Morning., Disp: 90 tablet, Rfl: 3  •  NITROSTAT 0.4 MG SL tablet, PLACE1 TABLET UNDER THE TONGUE AND ALLOW TO DISSOLVE AS NEEDED FOR CHEST PAIN, Disp: , Rfl: 2  •  omega-3 acid ethyl esters (LOVAZA) 1 G capsule, Take 1 capsule by mouth Daily., Disp: 90 capsule, Rfl: 4  •  omeprazole (PriLOSEC) 20 MG capsule, TAKE ONE CAPSULE BY MOUTH EVERY DAY, Disp: , Rfl: 3  •  potassium gluconate 595 MG tablet tablet, Take 595 mg by mouth daily., Disp: , Rfl:   •  Probiotic Product (PROBIOTIC DAILY PO), Take  by mouth., Disp: , Rfl:   •  ticagrelor (BRILINTA) 90 MG tablet tablet, Take 1 tablet by mouth Every 12 (Twelve) Hours., Disp: 60 tablet, Rfl: 11  Allergies   Allergen Reactions   • Meperidine Other (See  Comments)     HEART STOPS         Past Medical History:   Diagnosis Date   • Anxiety    • Arthritis    • Cancer     melanoma   • Chest pain    • CHF (congestive heart failure)    • Claustrophobia    • Coronary artery disease 2012, 2015    2 vessel CABG (2012), SHAHRZAD LAD (2015).    • Depression    • Disease of thyroid gland     partial thyroidectomy on right   • Dysphagia    • GERD (gastroesophageal reflux disease)    • Hearing loss    • Heart attack    • Heart attack    • Hyperlipidemia    • Hypertension    • Kidney stone     history of    • Tobacco abuse, in remission    • Uses hearing aid     BILATERAL     Past Surgical History:   Procedure Laterality Date   • BACK SURGERY      CERVICAL FUSION AND LUMBAR DISC REPLACEMENT   • CARDIAC CATHETERIZATION  07/2015    Dr. Malu Koch   • CARDIAC CATHETERIZATION N/A 10/21/2016    Procedure: Left Heart Cath;  Surgeon: Darian Toribio MD;  Location:  [facility];  Service:    • CARDIAC CATHETERIZATION Left 4/5/2017    Procedure: Cardiac Catheterization/Vascular Study;  Surgeon: Darian Toribio MD;  Location:  [facility];  Service:    • CARDIAC CATHETERIZATION N/A 4/5/2017    Procedure: Left Heart Cath;  Surgeon: Darian Toribio MD;  Location:  [facility];  Service:    • CARDIAC CATHETERIZATION N/A 4/5/2017    Procedure: Coronary angiography;  Surgeon: Darian Toribio MD;  Location:  [facility];  Service:    • CARDIAC CATHETERIZATION N/A 4/5/2017    Procedure: Left ventriculography;  Surgeon: Darian Toribio MD;  Location:  [facility];  Service:    • CARDIAC CATHETERIZATION  4/5/2017    Procedure: Saphenous Vein Graft;  Surgeon: Darian Toribio MD;  Location:  [facility];  Service:    • COLONOSCOPY  09/05/2007    colon polyps   • CORONARY ANGIOPLASTY WITH STENT PLACEMENT  07/2015    Dr. Malu Koch    • CORONARY ARTERY BYPASS GRAFT  2012 AND 2014    2 vessel    • ENDOSCOPY   "11/08/2010   • HEAD/NECK LESION/CYST EXCISION Left 12/20/2016    Procedure: EXCISION MALIGNANT MELANOMA WITH SENTINEL NODE BIOPSY, INJECTION AND SCAN PRE-OP, LEFT EAR;  Surgeon: Guanaco Zepeda MD;  Location:  [facility];  Service:    • HEMORRHOIDECTOMY     • INSERT / REPLACE / REMOVE PACEMAKER     • SENTINEL NODE BIOPSY Left 12/20/2016    Procedure: SENTINEL NODE BIOPSY;  Surgeon: Guanaco Zepeda MD;  Location:  [facility];  Service:    • THYROID SURGERY     • THYROIDECTOMY         Review of Systems   Constitutional: Negative.    HENT: Negative.    Eyes: Negative.    Respiratory: Negative.    Cardiovascular: Positive for chest pain.   Gastrointestinal: Negative.    Endocrine: Negative.    Genitourinary: Negative.         Noted occ nocturia   Musculoskeletal: Negative.    Skin: Negative.    Allergic/Immunologic: Negative.    Neurological: Negative.    Hematological: Negative.    Psychiatric/Behavioral: Negative for self-injury, sleep disturbance and suicidal ideas. The patient is nervous/anxious.        Objective  /90  Pulse 73  Resp 16  Ht 68\" (172.7 cm)  Wt 258 lb (117 kg)  SpO2 97%  BMI 39.23 kg/m23  Physical Exam   Constitutional: He is oriented to person, place, and time. He appears well-developed and well-nourished.   HENT:   Head: Normocephalic and atraumatic.   Right Ear: External ear normal.   Left Ear: External ear normal.   Nose: Nose normal.   Mouth/Throat: Oropharynx is clear and moist.   Eyes: Conjunctivae and EOM are normal. Pupils are equal, round, and reactive to light.   Neck: Normal range of motion. Neck supple.   Cardiovascular: Normal rate, regular rhythm, normal heart sounds and intact distal pulses.    Pulmonary/Chest: Effort normal and breath sounds normal.   Abdominal: Soft. Bowel sounds are normal.   Musculoskeletal: Normal range of motion.   Neurological: He is alert and oriented to person, place, and time. He has normal reflexes.   Skin: Skin is warm and dry.   Psychiatric: He has " a normal mood and affect. His behavior is normal. Judgment and thought content normal.   Nursing note and vitals reviewed.      Assessment/Plan   Carlo was seen today for follow-up.    Diagnoses and all orders for this visit:    Coronary artery disease involving autologous artery coronary bypass graft with angina pectoris  -     PSA  -     CBC w AUTO Differential  -     Comprehensive Metabolic Panel    Essential hypertension    Mixed hyperlipidemia  -     Lipid panel    Anxiety    Nocturia   -     PSA                 Orders Placed This Encounter   Procedures   • PSA   • Comprehensive Metabolic Panel   • Lipid panel   • CBC w AUTO Differential     Order Specific Question:   Manual Differential     Answer:   No       Follow up: 6 month(s)

## 2017-11-09 RX ORDER — OMEGA-3-ACID ETHYL ESTERS 1 G/1
CAPSULE, LIQUID FILLED ORAL
Qty: 90 CAPSULE | Refills: 0 | Status: SHIPPED | OUTPATIENT
Start: 2017-11-09 | End: 2018-03-14 | Stop reason: SDUPTHER

## 2017-12-05 RX ORDER — DONEPEZIL HYDROCHLORIDE 5 MG/1
TABLET, FILM COATED ORAL
Qty: 90 TABLET | Refills: 0 | Status: SHIPPED | OUTPATIENT
Start: 2017-12-05 | End: 2017-12-06 | Stop reason: SDUPTHER

## 2017-12-05 RX ORDER — FUROSEMIDE 80 MG
TABLET ORAL
Qty: 45 TABLET | Refills: 0 | Status: SHIPPED | OUTPATIENT
Start: 2017-12-05 | End: 2018-02-05 | Stop reason: SDUPTHER

## 2017-12-06 RX ORDER — OMEPRAZOLE 20 MG/1
20 CAPSULE, DELAYED RELEASE ORAL DAILY
Qty: 30 CAPSULE | Refills: 5 | Status: SHIPPED | OUTPATIENT
Start: 2017-12-06 | End: 2018-07-24 | Stop reason: SDUPTHER

## 2017-12-06 RX ORDER — DONEPEZIL HYDROCHLORIDE 5 MG/1
TABLET, FILM COATED ORAL
Qty: 90 TABLET | Refills: 0 | Status: SHIPPED | OUTPATIENT
Start: 2017-12-06 | End: 2018-10-01 | Stop reason: SDUPTHER

## 2017-12-27 ENCOUNTER — CLINICAL SUPPORT (OUTPATIENT)
Dept: CARDIOLOGY | Facility: CLINIC | Age: 72
End: 2017-12-27

## 2017-12-27 DIAGNOSIS — Z95.0 PACEMAKER: ICD-10-CM

## 2017-12-27 PROCEDURE — 93288 INTERROG EVL PM/LDLS PM IP: CPT | Performed by: PHYSICIAN ASSISTANT

## 2017-12-28 NOTE — PROGRESS NOTES
Single Chamber Pacemaker Evaluation Report  Clinic Interrogation    December 27, 2017    Primary Cardiologist: Malu  : Medtronic Model: Adapta  Implant date: 4/16/08    Reason for evaluation: routine  Indication for pacemaker: sick sinus syndrome    Measurements  Ventricular sensing - R wave: n/r  Ventricular threshold: n/r  Ventricular lead impedance:   1144 ohms     Diagnostic Data  Ventricular paced: <0.1 %  Other: 1 high V rate lasting 6 sec  Battery status: GENEVIEVE      Final Parameters  Mode:  VVI  Lower rate: 65 bpm     Ventricular - Amplitude: 2.0 V  Pulse width: 0.4 ms  Sensitivity: 5.6 mV    Changes made: none  Conclusions: normal pacemaker function    Follow up: gen change

## 2017-12-28 NOTE — PROGRESS NOTES
I have reviewed the notes, assessments, and/or procedures performed by Estrellita Holcomb , I concur with her  documentation of  Carlo Velasco.

## 2017-12-28 NOTE — PROGRESS NOTES
I have reviewed the notes, assessments, and/or procedures performed by  Lauren Rendon PA-C  , I concur with her  documentation of Carlo Velasco.

## 2017-12-29 ENCOUNTER — TELEPHONE (OUTPATIENT)
Dept: CARDIOLOGY | Facility: CLINIC | Age: 72
End: 2017-12-29

## 2018-01-03 DIAGNOSIS — Z45.010 PACEMAKER BATTERY DEPLETION: Primary | ICD-10-CM

## 2018-01-05 ENCOUNTER — HOSPITAL ENCOUNTER (OUTPATIENT)
Dept: CARDIOLOGY | Facility: HOSPITAL | Age: 73
Discharge: HOME | End: 2018-01-05
Admitting: INTERNAL MEDICINE

## 2018-01-05 DIAGNOSIS — Z45.010 PACEMAKER BATTERY DEPLETION: Primary | ICD-10-CM

## 2018-01-05 DIAGNOSIS — I25.729 CORONARY ARTERY DISEASE INVOLVING AUTOLOGOUS ARTERY CORONARY BYPASS GRAFT WITH ANGINA PECTORIS (HCC): ICD-10-CM

## 2018-01-05 LAB
ANION GAP SERPL CALCULATED.3IONS-SCNC: 10 MMOL/L (ref 4–13)
BUN BLD-MCNC: 18 MG/DL (ref 5–21)
BUN/CREAT SERPL: 21.2 (ref 7–25)
CALCIUM SPEC-SCNC: 9.1 MG/DL (ref 8.4–10.4)
CHLORIDE SERPL-SCNC: 104 MMOL/L (ref 98–110)
CO2 SERPL-SCNC: 30 MMOL/L (ref 24–31)
CREAT BLD-MCNC: 0.85 MG/DL (ref 0.5–1.4)
DEPRECATED RDW RBC AUTO: 46.6 FL (ref 40–54)
ERYTHROCYTE [DISTWIDTH] IN BLOOD BY AUTOMATED COUNT: 14.5 % (ref 12–15)
GFR SERPL CREATININE-BSD FRML MDRD: 89 ML/MIN/1.73
GLUCOSE BLD-MCNC: 76 MG/DL (ref 70–100)
HCT VFR BLD AUTO: 43.4 % (ref 40–52)
HGB BLD-MCNC: 13.9 G/DL (ref 14–18)
INR PPP: 0.93 (ref 0.91–1.09)
MCH RBC QN AUTO: 28.1 PG (ref 28–32)
MCHC RBC AUTO-ENTMCNC: 32 G/DL (ref 33–36)
MCV RBC AUTO: 87.7 FL (ref 82–95)
PLATELET # BLD AUTO: 198 10*3/MM3 (ref 130–400)
PMV BLD AUTO: 9.5 FL (ref 6–12)
POTASSIUM BLD-SCNC: 3.6 MMOL/L (ref 3.5–5.3)
PROTHROMBIN TIME: 12.7 SECONDS (ref 11.9–14.6)
RBC # BLD AUTO: 4.95 10*6/MM3 (ref 4.8–5.9)
SODIUM BLD-SCNC: 144 MMOL/L (ref 135–145)
WBC NRBC COR # BLD: 7.81 10*3/MM3 (ref 4.8–10.8)

## 2018-01-05 PROCEDURE — 36415 COLL VENOUS BLD VENIPUNCTURE: CPT

## 2018-01-05 PROCEDURE — 80048 BASIC METABOLIC PNL TOTAL CA: CPT | Performed by: INTERNAL MEDICINE

## 2018-01-05 PROCEDURE — 85027 COMPLETE CBC AUTOMATED: CPT | Performed by: INTERNAL MEDICINE

## 2018-01-05 PROCEDURE — 85610 PROTHROMBIN TIME: CPT | Performed by: INTERNAL MEDICINE

## 2018-01-11 ENCOUNTER — HOSPITAL ENCOUNTER (OUTPATIENT)
Facility: HOSPITAL | Age: 73
Discharge: HOME | End: 2018-01-11
Attending: INTERNAL MEDICINE | Admitting: INTERNAL MEDICINE

## 2018-01-11 VITALS
HEART RATE: 61 BPM | WEIGHT: 253.4 LBS | DIASTOLIC BLOOD PRESSURE: 66 MMHG | OXYGEN SATURATION: 95 % | TEMPERATURE: 98.3 F | SYSTOLIC BLOOD PRESSURE: 103 MMHG | HEIGHT: 68 IN | RESPIRATION RATE: 8 BRPM | BODY MASS INDEX: 38.4 KG/M2

## 2018-01-11 DIAGNOSIS — Z45.010 PACEMAKER BATTERY DEPLETION: ICD-10-CM

## 2018-01-11 PROCEDURE — 25010000002 FENTANYL CITRATE (PF) 100 MCG/2ML SOLUTION: Performed by: INTERNAL MEDICINE

## 2018-01-11 PROCEDURE — 25010000002 MIDAZOLAM PER 1 MG: Performed by: INTERNAL MEDICINE

## 2018-01-11 PROCEDURE — S0260 H&P FOR SURGERY: HCPCS | Performed by: INTERNAL MEDICINE

## 2018-01-11 PROCEDURE — 33228 REMV&REPLC PM GEN DUAL LEAD: CPT | Performed by: INTERNAL MEDICINE

## 2018-01-11 PROCEDURE — C1785 PMKR, DUAL, RATE-RESP: HCPCS | Performed by: INTERNAL MEDICINE

## 2018-01-11 PROCEDURE — 33208 INSRT HEART PM ATRIAL & VENT: CPT | Performed by: INTERNAL MEDICINE

## 2018-01-11 DEVICE — GEN PM ASSURITY DR RF PM2240 MERLIN: Type: IMPLANTABLE DEVICE | Status: FUNCTIONAL

## 2018-01-11 RX ORDER — SODIUM CHLORIDE 0.9 % (FLUSH) 0.9 %
1-10 SYRINGE (ML) INJECTION AS NEEDED
Status: DISCONTINUED | OUTPATIENT
Start: 2018-01-11 | End: 2018-01-11 | Stop reason: HOSPADM

## 2018-01-11 RX ORDER — SODIUM CHLORIDE 9 MG/ML
60 INJECTION, SOLUTION INTRAVENOUS CONTINUOUS
Status: DISCONTINUED | OUTPATIENT
Start: 2018-01-11 | End: 2018-01-11 | Stop reason: HOSPADM

## 2018-01-11 RX ORDER — FENTANYL CITRATE 50 UG/ML
INJECTION, SOLUTION INTRAMUSCULAR; INTRAVENOUS AS NEEDED
Status: DISCONTINUED | OUTPATIENT
Start: 2018-01-11 | End: 2018-01-11 | Stop reason: HOSPADM

## 2018-01-11 RX ORDER — SODIUM CHLORIDE 9 MG/ML
50 INJECTION, SOLUTION INTRAVENOUS CONTINUOUS
Status: DISCONTINUED | OUTPATIENT
Start: 2018-01-11 | End: 2018-01-11 | Stop reason: HOSPADM

## 2018-01-11 RX ORDER — MIDAZOLAM HYDROCHLORIDE 1 MG/ML
INJECTION INTRAMUSCULAR; INTRAVENOUS AS NEEDED
Status: DISCONTINUED | OUTPATIENT
Start: 2018-01-11 | End: 2018-01-11 | Stop reason: HOSPADM

## 2018-01-11 RX ORDER — LIDOCAINE HYDROCHLORIDE 20 MG/ML
INJECTION, SOLUTION INFILTRATION; PERINEURAL AS NEEDED
Status: DISCONTINUED | OUTPATIENT
Start: 2018-01-11 | End: 2018-01-11 | Stop reason: HOSPADM

## 2018-01-11 RX ADMIN — SODIUM CHLORIDE 50 ML/HR: 9 INJECTION, SOLUTION INTRAVENOUS at 12:24

## 2018-01-11 RX ADMIN — Medication 1 G: at 13:45

## 2018-01-12 NOTE — H&P
LOS: 0 days   Patient Care Team:  Rhys Rosen MD as PCP - General (Family Medicine)  Rhys Rosen MD as PCP - Claims Attributed  Darian Toribio MD as Cardiologist (Cardiology)    Chief Complaint: Principal Problem:    Pacemaker battery depletion    Shortness of breath  Here for generator replacement  Mild exertional shortness of breath on exertion relieved with rest  No significant cough or wheezing  Going on for several months  No palpitations  No associated chest pain  No significant pedal edema  No fever or chills  No significant expectoration  No hemoptysis  No presyncope or syncope   Denies abdominal pain, nausea vomiting or diarrhoea.    Telemetry: no malignant arrhythmia. No significant pauses.    Review of Systems   Constitutional: Negative for chills, fatigue and fever.   HENT: Negative.    Eyes: Negative.    Respiratory: Negative for cough,   No chest wall soreness,   Mild shortness of breath,   no wheezing, no stridor.    Cardiovascular: Negative for chest pain,   No palpitations   No significant  leg swelling.   Gastrointestinal: Negative for abdominal distention,  No abdominal pain, No blood in stool, constipation, diarrhea, nausea and vomiting.   Endocrine: Negative.    Genitourinary: Negative for difficulty urinating, dysuria, flank pain and hematuria.   Musculoskeletal: Negative.    Skin: Negative for rash and wound.   Allergic/Immunologic: Negative.    Neurological: Negative for dizziness, syncope, weakness, light-headedness and headaches.   Hematological: Does not bruise/bleed easily.   Psychiatric/Behavioral: Negative for agitation, behavioral problems, confusion, the patient shepherd not appear to be nervous/anxious.       History:   Past Medical History:   Diagnosis Date   • Anxiety    • Arthritis    • Cancer     melanoma   • Chest pain    • CHF (congestive heart failure)    • Claustrophobia    • Coronary artery disease 2012, 2015    2 vessel CABG (2012), SHAHRZAD LAD (2015).    •  Depression    • Disease of thyroid gland     partial thyroidectomy on right   • Dysphagia    • GERD (gastroesophageal reflux disease)    • Hearing loss    • Heart attack    • Heart attack    • Hyperlipidemia    • Hypertension    • Kidney stone     history of    • Tobacco abuse, in remission    • Uses hearing aid     BILATERAL     Past Surgical History:   Procedure Laterality Date   • BACK SURGERY      CERVICAL FUSION AND LUMBAR DISC REPLACEMENT   • CARDIAC CATHETERIZATION  07/2015    SHAHRZAD mid LADDr. Toribio   • CARDIAC CATHETERIZATION N/A 10/21/2016    Procedure: Left Heart Cath;  Surgeon: Darian Toribio MD;  Location:  [facility];  Service:    • CARDIAC CATHETERIZATION Left 4/5/2017    Procedure: Cardiac Catheterization/Vascular Study;  Surgeon: Darian Toribio MD;  Location:  [facility];  Service:    • CARDIAC CATHETERIZATION N/A 4/5/2017    Procedure: Left Heart Cath;  Surgeon: Darian Toribio MD;  Location:  [facility];  Service:    • CARDIAC CATHETERIZATION N/A 4/5/2017    Procedure: Coronary angiography;  Surgeon: Darian Toribio MD;  Location:  [facility];  Service:    • CARDIAC CATHETERIZATION N/A 4/5/2017    Procedure: Left ventriculography;  Surgeon: Darian Toribio MD;  Location:  [facility];  Service:    • CARDIAC CATHETERIZATION  4/5/2017    Procedure: Saphenous Vein Graft;  Surgeon: Darian Toribio MD;  Location:  [facility];  Service:    • COLONOSCOPY  09/05/2007    colon polyps   • CORONARY ANGIOPLASTY WITH STENT PLACEMENT  07/2015    SHAHRZAD mid Dr. Malu LO    • CORONARY ARTERY BYPASS GRAFT  2012 AND 2014    2 vessel    • ENDOSCOPY  11/08/2010   • HEAD/NECK LESION/CYST EXCISION Left 12/20/2016    Procedure: EXCISION MALIGNANT MELANOMA WITH SENTINEL NODE BIOPSY, INJECTION AND SCAN PRE-OP, LEFT EAR;  Surgeon: Guanaco Zepeda MD;  Location: [facility] OR;  Service:    • HEMORRHOIDECTOMY     • INSERT / REPLACE / REMOVE  PACEMAKER     • SENTINEL NODE BIOPSY Left 12/20/2016    Procedure: SENTINEL NODE BIOPSY;  Surgeon: Guanaco Zepeda MD;  Location: [facility] OR;  Service:    • THYROID SURGERY     • THYROIDECTOMY       Social History     Social History   • Marital status:      Spouse name: N/A   • Number of children: N/A   • Years of education: N/A     Occupational History   • Not on file.     Social History Main Topics   • Smoking status: Former Smoker     Types: Cigarettes     Quit date: 1977   • Smokeless tobacco: Former User     Quit date: 1977   • Alcohol use No   • Drug use: No   • Sexual activity: Defer     Other Topics Concern   • Not on file     Social History Narrative     Family History   Problem Relation Age of Onset   • Heart failure Mother    • Stroke Mother    • Dementia Mother    • Coronary artery disease Father    • COPD Brother        Labs:  WBC No results found for: WBC   HGB No results found for: HGB   HCT No results found for: HCT   Platlets No results found for: PLT   MCV No results found for: MCV     Results from last 7 days  Lab Units 01/05/18  0925   SODIUM mmol/L 144   POTASSIUM mmol/L 3.6   CHLORIDE mmol/L 104   CO2 mmol/L 30.0   BUN mg/dL 18   CREATININE mg/dL 0.85   CALCIUM mg/dL 9.1   GLUCOSE mg/dL 76     Lab Results   Component Value Date    CKTOTAL 44 10/21/2016    CKMB 1.16 10/21/2016    TROPONINI 15.900 (C) 10/22/2016     PT/INR:  No results found for: PROTIME/No results found for: INR    Imaging Results (last 72 hours)     ** No results found for the last 72 hours. **          Objective     Allergies   Allergen Reactions   • Meperidine Other (See Comments)     HEART STOPS         Medication Review: Performed  No current facility-administered medications for this encounter.      Current Outpatient Prescriptions   Medication Sig Dispense Refill   • aspirin 81 MG EC tablet Take 81 mg by mouth daily.       • atorvastatin (LIPITOR) 80 MG tablet TAKE 1 TABLET BY MOUTH EVERY NIGHT 30 tablet 5   •  "donepezil (ARICEPT) 5 MG tablet TAKE 1 TABLET BY MOUTH AT BEDTIME 90 tablet 0   • escitalopram (LEXAPRO) 20 MG tablet TAKE 1 TABLET BY MOUTH DAILY 90 tablet 2   • furosemide (LASIX) 40 MG tablet TAKE 1 TABLET BY MOUTH DAILY 90 tablet 3   • furosemide (LASIX) 80 MG tablet TAKE 1/2 TABLET BY MOUTH EVERY DAY 45 tablet 0   • gabapentin (NEURONTIN) 300 MG capsule TK 1 C PO QHS  5   • isosorbide mononitrate (IMDUR) 30 MG 24 hr tablet Take 1 tablet by mouth Every Morning. 90 tablet 3   • omega-3 acid ethyl esters (LOVAZA) 1 g capsule TAKE 1 CAPSULE BY MOUTH DAILY 90 capsule 0   • omeprazole (priLOSEC) 20 MG capsule Take 1 capsule by mouth Daily. 30 capsule 5   • potassium gluconate 595 MG tablet tablet Take 595 mg by mouth daily.     • Probiotic Product (PROBIOTIC DAILY PO) Take 1 tablet by mouth Daily.     • ALPRAZolam (XANAX) 0.25 MG tablet TAKE 1 TABLET BY MOUTH THREE TIMES DAILY AS NEEDED FOR ANXIETY 90 tablet 0   • NITROSTAT 0.4 MG SL tablet Place 1 tablet under the tongue Every 5 (Five) Minutes As Needed for Chest Pain. Take no more than 3 doses in 15 minutes. 30 tablet 2   • ticagrelor (BRILINTA) 90 MG tablet tablet Take 1 tablet by mouth Every 12 (Twelve) Hours. 60 tablet 11       Vital Sign Min/Max for last 24 hours  Temp  Min: 98.3 °F (36.8 °C)  Max: 98.3 °F (36.8 °C)   BP  Min: 99/81  Max: 116/73   Pulse  Min: 61  Max: 72   Resp  Min: 8  Max: 8   SpO2  Min: 91 %  Max: 95 %   Flow (L/min)  Min: 3  Max: 3   Weight  Min: 115 kg (253 lb 6.4 oz)  Max: 115 kg (253 lb 6.4 oz)     Flowsheet Rows         First Filed Value    Admission Height  172.7 cm (68\") Documented at 01/11/2018 1100    Admission Weight  115 kg (253 lb 6.4 oz) Documented at 01/11/2018 1100          Results for orders placed during the hospital encounter of 04/20/16   SCANNED - ECHOCARDIOGRAM       Physical Exam:  Eyes: Pupils equal and reactive    Ears: Appear intact with no abnormalities noted  Nose: Nares normal, no drainage  Neck: supple, trachea " midline, no carotid bruit and no JVD  Back: no kyphosis present,    Lungs: respirations regular, respirations even and respirations unlabored  Heart: normal S1, S2,  2/6 pansystolic murmur in the left sternal border,  no rub and no click  Abdomen: soft  Skin: no bleeding, bruising or rash  Extremities no cyanosis     Results Review:   I reviewed the patient's new clinical results.  I reviewed the patient's new imaging results and agree with the interpretation.  I reviewed the patient's other test results and agree with the interpretation  I personally viewed and interpreted the patient's EKG/Telemetry data  Discussed with patient, and present family member(s)     Assessment/Plan     Principal Problem:    Pacemaker battery depletion  coronary artery disease    stented coronary artery   Hyperlipidemia   gastroesophageal reflux disease     Plan    Replace generator  Risks, benefits and alternatives explained. The patient understands and wishes to proceed.   Continue same medications   Brilinta on hold x 5 days    Darian Toribio MD  01/12/18  12:04 AM

## 2018-01-31 RX ORDER — METOPROLOL TARTRATE 50 MG/1
TABLET, FILM COATED ORAL
Qty: 90 TABLET | Refills: 0 | Status: ON HOLD | OUTPATIENT
Start: 2018-01-31 | End: 2018-02-27

## 2018-02-05 ENCOUNTER — CLINICAL SUPPORT NO REQUIREMENTS (OUTPATIENT)
Dept: CARDIOLOGY | Facility: CLINIC | Age: 73
End: 2018-02-05

## 2018-02-05 ENCOUNTER — OFFICE VISIT (OUTPATIENT)
Dept: CARDIOLOGY | Facility: CLINIC | Age: 73
End: 2018-02-05

## 2018-02-05 VITALS
BODY MASS INDEX: 39.56 KG/M2 | DIASTOLIC BLOOD PRESSURE: 84 MMHG | WEIGHT: 261 LBS | SYSTOLIC BLOOD PRESSURE: 118 MMHG | HEART RATE: 69 BPM | HEIGHT: 68 IN

## 2018-02-05 DIAGNOSIS — E78.2 MIXED HYPERLIPIDEMIA: ICD-10-CM

## 2018-02-05 DIAGNOSIS — G89.29 CHRONIC MIDLINE LOW BACK PAIN WITHOUT SCIATICA: ICD-10-CM

## 2018-02-05 DIAGNOSIS — Z95.0 PACEMAKER: Primary | ICD-10-CM

## 2018-02-05 DIAGNOSIS — R07.2 PRECORDIAL CHEST PAIN: ICD-10-CM

## 2018-02-05 DIAGNOSIS — I10 ESSENTIAL HYPERTENSION: ICD-10-CM

## 2018-02-05 DIAGNOSIS — R13.19 ESOPHAGEAL DYSPHAGIA: ICD-10-CM

## 2018-02-05 DIAGNOSIS — I25.729 CORONARY ARTERY DISEASE INVOLVING AUTOLOGOUS ARTERY CORONARY BYPASS GRAFT WITH ANGINA PECTORIS (HCC): Primary | ICD-10-CM

## 2018-02-05 DIAGNOSIS — K62.5 BRBPR (BRIGHT RED BLOOD PER RECTUM): ICD-10-CM

## 2018-02-05 DIAGNOSIS — I49.5 SSS (SICK SINUS SYNDROME) (HCC): ICD-10-CM

## 2018-02-05 DIAGNOSIS — M54.50 CHRONIC MIDLINE LOW BACK PAIN WITHOUT SCIATICA: ICD-10-CM

## 2018-02-05 PROBLEM — Z45.010 PACEMAKER BATTERY DEPLETION: Status: RESOLVED | Noted: 2018-01-03 | Resolved: 2018-02-05

## 2018-02-05 PROBLEM — M25.559 HIP PAIN, ACUTE: Status: RESOLVED | Noted: 2017-06-21 | Resolved: 2018-02-05

## 2018-02-05 PROBLEM — M25.529 ELBOW PAIN: Status: RESOLVED | Noted: 2017-06-21 | Resolved: 2018-02-05

## 2018-02-05 PROCEDURE — 93280 PM DEVICE PROGR EVAL DUAL: CPT | Performed by: INTERNAL MEDICINE

## 2018-02-05 PROCEDURE — 93000 ELECTROCARDIOGRAM COMPLETE: CPT | Performed by: INTERNAL MEDICINE

## 2018-02-05 PROCEDURE — 99024 POSTOP FOLLOW-UP VISIT: CPT | Performed by: INTERNAL MEDICINE

## 2018-02-05 NOTE — PROGRESS NOTES
Dual Chamber Pacemaker Evaluation Report  IN OFFICE    February 5, 2018    Primary Cardiologist: Malu  : [employer] Model: Shantel DR [id number]  Implant date: 01/11/2018    Reason for evaluation: provider requested; check post gen change  Indication for pacemaker: sick sinus syndrome    Measurements  Atrial sensing - P wave: 5.0 mV  Atrial threshold: 1.25V@ 0.5ms  Atrial lead impedance: 1375 ohms  Ventricular sensing - R wave: >12 mV  Ventricular threshold: 0.5 V @ 0.5 ms  Ventricular lead impedance:   1100 ohms     Diagnostic Data  Atrial paced: 31 %  Ventricular paced: 4.2 %  Other: No episodes recorded since gen change.  No reported issues with incision, reports that Dr. Toribio checked it in clinic.  Battery status: satisfactory   3.04V -- est 10.7-11.7 years      Final Parameters  Mode:  DDDR  Lower rate: 60 bpm   Upper rate: 130 bpm  AV Delay: paced- 225 ms  Sensed-200 ms  Atrial - Amplitude: 2.5 V   Pulse width: 0.5 ms   Sensitivity: 0.5 mV     Ventricular - Amplitude: 2.5 V  Pulse width: 0.5 ms  Sensitivity: 2.0 mV    Changes made: Atrial and ventricular lead impedence upper limits increased from 2000 ohms to 2500 ohms.  Per [employer] rep, impedence will be higher because patient has [employer] can with [employer] leads.  Conclusions: normal pacemaker function    Follow up: 6 months     Interrogation performed in office by [employer] rep

## 2018-02-05 NOTE — PROGRESS NOTES
Carlo Velasco  [id number]  1945  72 y.o.  male    Referring Provider: Rhys Rosen MD    Reason for Follow-up Visit:  Routine follow up.  coronary artery disease Coronary artery bypass grafting , stented coronary artery     Subjective .   Chest pain at rest, moderate substernal, pressure like. Lasts less than 5 minutes  No diaphoresis  No nausea  No radiation  Precipitated with exertion and also at rest  Relieved with S/L NTG  Mild associated dyspnea    Mild exertional shortness of breath on exertion relieved with rest  No significant cough or wheezing  Going on for several months  No palpitations  No significant pedal edema  No fever or chills  No significant expectoration  No hemoptysis  No presyncope or syncope     History of present illness:  Carlo Velasco is a 72 y.o. yo male with history of coronary artery disease Coronary artery bypass grafting , stented coronary artery sick sinus syndrome s/p pacemaker who presents today for   Chief Complaint   Patient presents with   • Coronary Artery Disease     1 mo f/u - s/p gen change   • Chest Pain   .    History  Past Medical History:   Diagnosis Date   • Anxiety    • Arthritis    • Cancer     melanoma   • Chest pain    • CHF (congestive heart failure)    • Claustrophobia    • Coronary artery disease 2012, 2015    2 vessel CABG (2012), SHAHRZAD LAD (2015).    • Depression    • Disease of thyroid gland     partial thyroidectomy on right   • Dysphagia    • GERD (gastroesophageal reflux disease)    • Hearing loss    • Heart attack    • Heart attack    • Hyperlipidemia    • Hypertension    • Kidney stone     history of    • Tobacco abuse, in remission    • Uses hearing aid     BILATERAL   ,   Past Surgical History:   Procedure Laterality Date   • BACK SURGERY      CERVICAL FUSION AND LUMBAR DISC REPLACEMENT   • CARDIAC CATHETERIZATION  07/2015    SHAHRZAD mid TERESITA, Dr. Toribio   • CARDIAC CATHETERIZATION N/A 10/21/2016    Procedure: Left Heart Cath;  Surgeon: Darian  MD Malu;  Location:  [facility];  Service:    • CARDIAC CATHETERIZATION Left 4/5/2017    Procedure: Cardiac Catheterization/Vascular Study;  Surgeon: Darian Toribio MD;  Location:  [facility];  Service:    • CARDIAC CATHETERIZATION N/A 4/5/2017    Procedure: Left Heart Cath;  Surgeon: Darian Toribio MD;  Location:  [facility];  Service:    • CARDIAC CATHETERIZATION N/A 4/5/2017    Procedure: Coronary angiography;  Surgeon: Darian Toribio MD;  Location:  [facility];  Service:    • CARDIAC CATHETERIZATION N/A 4/5/2017    Procedure: Left ventriculography;  Surgeon: Darian Toribio MD;  Location:  [facility];  Service:    • CARDIAC CATHETERIZATION  4/5/2017    Procedure: Saphenous Vein Graft;  Surgeon: Darian Toribio MD;  Location:  [facility];  Service:    • CARDIAC ELECTROPHYSIOLOGY PROCEDURE N/A 1/11/2018    Procedure: PPM generator change - dual;  Surgeon: Darian Toribio MD;  Location: [facility] CATH INVASIVE LOCATION;  Service:    • COLONOSCOPY  09/05/2007    colon polyps   • CORONARY ANGIOPLASTY WITH STENT PLACEMENT  07/2015    SHAHRZAD mid LAD, Dr. Toribio    • CORONARY ARTERY BYPASS GRAFT  2012 AND 2014    2 vessel    • ENDOSCOPY  11/08/2010   • HEAD/NECK LESION/CYST EXCISION Left 12/20/2016    Procedure: EXCISION MALIGNANT MELANOMA WITH SENTINEL NODE BIOPSY, INJECTION AND SCAN PRE-OP, LEFT EAR;  Surgeon: Guanaco Zepeda MD;  Location: [facility] OR;  Service:    • HEMORRHOIDECTOMY     • INSERT / REPLACE / REMOVE PACEMAKER     • SENTINEL NODE BIOPSY Left 12/20/2016    Procedure: SENTINEL NODE BIOPSY;  Surgeon: Guanaco Zepeda MD;  Location: [facility] OR;  Service:    • THYROID SURGERY     • THYROIDECTOMY     ,   Family History   Problem Relation Age of Onset   • Heart failure Mother    • Stroke Mother    • Dementia Mother    • Coronary artery disease Father    • COPD Brother    ,   Social History   Substance Use Topics   • Smoking  status: Former Smoker     Types: Cigarettes     Quit date: 1977   • Smokeless tobacco: Former User     Quit date: 1977   • Alcohol use No   ,     Medications  Current Outpatient Prescriptions   Medication Sig Dispense Refill   • ALPRAZolam (XANAX) 0.25 MG tablet TAKE 1 TABLET BY MOUTH THREE TIMES DAILY AS NEEDED FOR ANXIETY 90 tablet 0   • aspirin 81 MG EC tablet Take 81 mg by mouth daily.       • atorvastatin (LIPITOR) 80 MG tablet TAKE 1 TABLET BY MOUTH EVERY NIGHT 30 tablet 5   • BIOTIN PO Take  by mouth.     • donepezil (ARICEPT) 5 MG tablet TAKE 1 TABLET BY MOUTH AT BEDTIME 90 tablet 0   • escitalopram (LEXAPRO) 20 MG tablet TAKE 1 TABLET BY MOUTH DAILY 90 tablet 2   • furosemide (LASIX) 40 MG tablet TAKE 1 TABLET BY MOUTH DAILY 90 tablet 3   • gabapentin (NEURONTIN) 300 MG capsule TK 1 C PO QHS  5   • metoprolol tartrate (LOPRESSOR) 50 MG tablet TAKE 1 TABLET BY MOUTH DAILY (Patient taking differently: 25mg daily) 90 tablet 0   • NITROSTAT 0.4 MG SL tablet Place 1 tablet under the tongue Every 5 (Five) Minutes As Needed for Chest Pain. Take no more than 3 doses in 15 minutes. 30 tablet 2   • omega-3 acid ethyl esters (LOVAZA) 1 g capsule TAKE 1 CAPSULE BY MOUTH DAILY 90 capsule 0   • omeprazole (priLOSEC) 20 MG capsule Take 1 capsule by mouth Daily. 30 capsule 5   • potassium gluconate 595 MG tablet tablet Take 595 mg by mouth daily.     • Probiotic Product (PROBIOTIC DAILY PO) Take 1 tablet by mouth Daily.     • ticagrelor (BRILINTA) 90 MG tablet tablet Take 1 tablet by mouth Every 12 (Twelve) Hours. 60 tablet 11     No current facility-administered medications for this visit.        Allergies:  Meperidine    Review of Systems  Review of Systems   Constitution: Negative.   HENT: Negative.    Eyes: Negative.    Cardiovascular: Positive for chest pain and dyspnea on exertion. Negative for claudication, cyanosis, irregular heartbeat, leg swelling, near-syncope, orthopnea, palpitations, paroxysmal nocturnal  "dyspnea and syncope.   Respiratory: Negative.    Endocrine: Negative.    Hematologic/Lymphatic: Negative.    Skin: Negative.    Gastrointestinal: Negative for anorexia.   Genitourinary: Negative.    Neurological: Negative.    Psychiatric/Behavioral: Negative.        Objective     Physical Exam:  /84  Pulse 69  Ht 172.7 cm (68\")  Wt 118 kg (261 lb)  BMI 39.68 kg/m2  Physical Exam   Constitutional: He appears well-developed.   HENT:   Head: Normocephalic.   Neck: Normal carotid pulses and no JVD present. No tracheal tenderness present. Carotid bruit is not present. No tracheal deviation and no edema present.   Cardiovascular: Regular rhythm, normal heart sounds and normal pulses.    Pulmonary/Chest: Effort normal. No stridor.   Abdominal: Soft.   Neurological: He is alert. He has normal strength. No cranial nerve deficit or sensory deficit.   Skin: Skin is warm.   Psychiatric: He has a normal mood and affect. His speech is normal and behavior is normal.       Results Review:  Results for orders placed during the hospital encounter of 04/20/16   SCANNED - ECHOCARDIOGRAM          ECG 12 Lead  Date/Time: 2/5/2018 8:57 AM  Performed by: ANTONIO PINA  Authorized by: ANTONIO PINA   Comparison: compared with previous ECG from 1/30/2017  Comparison to previous ECG: Incomplete right  bundle branch block not seen  Rhythm: sinus rhythm  Rate: normal  ST Segments: ST segments normal  T Waves: T waves normal  QRS axis: normal  Other: no other findings  Clinical impression: normal ECG          Cardiac cath  4/17  LVEF of 50%.   occluded the mid right coronary artery  Patent saphenous venous graft to the distal right coronary artery  Atretic left intramammary artery graft.  Patent stent in the mid left anterior descending coronary artery  Patent stent to the obtuse marginal branch  Patent stent in diagonal branch.       Assessment/Plan   Patient Active Problem List   Diagnosis   • Coronary artery disease involving " "autologous artery coronary bypass graft with angina pectoris   • Essential hypertension   • Anxiety   • Colon polyps   • Mixed hyperlipidemia   • Incisional hernia, without obstruction or gangrene   • Precordial chest pain   • BRBPR (bright red blood per rectum)   • Esophageal dysphagia   • Hx of colonic polyp   • Prostatism   • Nocturia   • Coronary artery disease   • Hypertension   • Orthostasis   • Hypotension due to drugs   • Anxiety   • Chronic midline low back pain without sciatica       Stable doing well. No chest pain now or excessive dyspnea. No palpitations. No significant pedal edema. Compliant with medications and diet. Latest labs and medications reviewed.  Intolerant to Imdur    Plan:    Low salt/ HTN/ Heart healthy carbohydrate restricted cardiac diet as applicable to this patient's current diagnoses.   This handout has relevant information regarding shopping for food, preparing meals, what to eat at restaurants, tracking of food intake, information regarding sodium intake and salt content, how to read food labels, knowing what to eat, tips reagarding physical activity, calorie count and calorie expenditure. What foods to avoid. Information regarding alcoholic drinks along with \"good\" and \"bad\" fats.  Relevant printed educational materials given pertinent to above diagnoses    Keep LDL below 70 mg/dl. Monitor liver and renal functions.   Monitor CBC, CMP and Lipid Panel by primary  Monitor for any signs of bleeding including red or dark stools. Fall precautions.   Patient is asked to monitor BP at home or work, several times per month and return with written values at next office visit.   Cannot stop Continue dual antiplatelet therapy  lifelong as stent thrombosis in past  Close follow up with you as scheduled.  Intensive factor modifications.  See order list.   Monitor pacemaker.  Orders Placed This Encounter   Procedures   • ECG 12 Lead     This order was created via procedure documentation   • " Adult Stress Echo W/ Cont or Stress Agent if Necessary Per Protocol     Standing Status:   Future     Standing Expiration Date:   2/5/2019     Order Specific Question:   What stress agent will be used?     Answer:   Dobutamine     Order Specific Question:   Reason for Dobutamine?     Answer:   Unable to Exercise     Order Specific Question:   Reason for exam?     Answer:   Chest Pain   Gave a copy of my notes and relevant tests/ prior ECG etc for the patient to review and follow specific advise and relevant findings if any, prognosis, along with my current and future plans.       Return in about 2 weeks (around 2/19/2018).

## 2018-02-09 ENCOUNTER — HOSPITAL ENCOUNTER (OUTPATIENT)
Dept: CARDIOLOGY | Facility: HOSPITAL | Age: 73
Discharge: HOME | End: 2018-02-09
Attending: INTERNAL MEDICINE | Admitting: INTERNAL MEDICINE

## 2018-02-09 VITALS — SYSTOLIC BLOOD PRESSURE: 120 MMHG | HEART RATE: 69 BPM | DIASTOLIC BLOOD PRESSURE: 78 MMHG

## 2018-02-09 DIAGNOSIS — R07.2 PRECORDIAL CHEST PAIN: ICD-10-CM

## 2018-02-09 PROCEDURE — 93350 STRESS TTE ONLY: CPT | Performed by: INTERNAL MEDICINE

## 2018-02-09 PROCEDURE — 93018 CV STRESS TEST I&R ONLY: CPT | Performed by: INTERNAL MEDICINE

## 2018-02-09 PROCEDURE — 25010000002 DOBUTAMINE PER 250 MG: Performed by: INTERNAL MEDICINE

## 2018-02-09 PROCEDURE — 93017 CV STRESS TEST TRACING ONLY: CPT

## 2018-02-09 PROCEDURE — 93352 ADMIN ECG CONTRAST AGENT: CPT | Performed by: INTERNAL MEDICINE

## 2018-02-09 PROCEDURE — 93350 STRESS TTE ONLY: CPT

## 2018-02-09 PROCEDURE — 25010000002 PERFLUTREN 6.52 MG/ML SUSPENSION: Performed by: INTERNAL MEDICINE

## 2018-02-09 RX ORDER — DOBUTAMINE HYDROCHLORIDE 100 MG/100ML
10-50 INJECTION INTRAVENOUS
Status: DISCONTINUED | OUTPATIENT
Start: 2018-02-09 | End: 2018-02-10 | Stop reason: HOSPADM

## 2018-02-09 RX ADMIN — ATROPINE SULFATE 0.3 MG: 0.1 INJECTION, SOLUTION ENDOTRACHEAL; INTRAMUSCULAR; INTRAVENOUS; SUBCUTANEOUS at 08:51

## 2018-02-09 RX ADMIN — DOBUTAMINE HYDROCHLORIDE 10 MCG/KG/MIN: 100 INJECTION INTRAVENOUS at 08:22

## 2018-02-09 RX ADMIN — PERFLUTREN 8.48 MG: 6.52 INJECTION, SUSPENSION INTRAVENOUS at 08:21

## 2018-02-10 LAB
BH CV STRESS BP STAGE 1: NORMAL
BH CV STRESS BP STAGE 2: NORMAL
BH CV STRESS BP STAGE 3: NORMAL
BH CV STRESS BP STAGE 4: NORMAL
BH CV STRESS DOSE DOBUTAMINE STAGE 1: 10
BH CV STRESS DOSE DOBUTAMINE STAGE 2: 20
BH CV STRESS DOSE DOBUTAMINE STAGE 3: 30
BH CV STRESS DOSE DOBUTAMINE STAGE 4: 40
BH CV STRESS DURATION MIN STAGE 1: 2
BH CV STRESS DURATION MIN STAGE 2: 3
BH CV STRESS DURATION MIN STAGE 3: 3
BH CV STRESS DURATION MIN STAGE 4: 4
BH CV STRESS DURATION SEC STAGE 1: 0
BH CV STRESS DURATION SEC STAGE 2: 0
BH CV STRESS DURATION SEC STAGE 3: 0
BH CV STRESS DURATION SEC STAGE 4: 5
BH CV STRESS ECHO POST STRESS EJECTION FRACTION EF: 65 %
BH CV STRESS HR STAGE 1: 74
BH CV STRESS HR STAGE 2: 97
BH CV STRESS HR STAGE 3: 120
BH CV STRESS HR STAGE 4: 136
BH CV STRESS PROTOCOL 1: NORMAL
BH CV STRESS RECOVERY BP: NORMAL MMHG
BH CV STRESS RECOVERY HR: 92 BPM
BH CV STRESS STAGE 1: 1
BH CV STRESS STAGE 2: 2
BH CV STRESS STAGE 3: 3
BH CV STRESS STAGE 4: 4
LV EF 2D ECHO EST: 55 %
MAXIMAL PREDICTED HEART RATE: 150 BPM
PERCENT MAX PREDICTED HR: 90.67 %
STRESS BASELINE BP: NORMAL MMHG
STRESS BASELINE HR: 63 BPM
STRESS PERCENT HR: 107 %
STRESS POST PEAK BP: NORMAL MMHG
STRESS POST PEAK HR: 136 BPM
STRESS TARGET HR: 128 BPM

## 2018-02-12 NOTE — PROGRESS NOTES
I have reviewed the notes, assessments, and/or procedures performed by  Alondra Vicente RN, I concur with her  documentation of     Carlo Velasco.

## 2018-02-19 ENCOUNTER — OFFICE VISIT (OUTPATIENT)
Dept: CARDIOLOGY | Facility: CLINIC | Age: 73
End: 2018-02-19

## 2018-02-19 VITALS
HEART RATE: 80 BPM | WEIGHT: 258 LBS | OXYGEN SATURATION: 99 % | HEIGHT: 68 IN | DIASTOLIC BLOOD PRESSURE: 82 MMHG | SYSTOLIC BLOOD PRESSURE: 110 MMHG | BODY MASS INDEX: 39.1 KG/M2

## 2018-02-19 DIAGNOSIS — I95.2 HYPOTENSION DUE TO DRUGS: ICD-10-CM

## 2018-02-19 DIAGNOSIS — F41.9 ANXIETY: ICD-10-CM

## 2018-02-19 DIAGNOSIS — E78.2 MIXED HYPERLIPIDEMIA: ICD-10-CM

## 2018-02-19 DIAGNOSIS — I25.729 CORONARY ARTERY DISEASE INVOLVING AUTOLOGOUS ARTERY CORONARY BYPASS GRAFT WITH ANGINA PECTORIS (HCC): Primary | ICD-10-CM

## 2018-02-19 DIAGNOSIS — M54.50 CHRONIC MIDLINE LOW BACK PAIN WITHOUT SCIATICA: ICD-10-CM

## 2018-02-19 DIAGNOSIS — I25.10 CORONARY ARTERY DISEASE INVOLVING NATIVE CORONARY ARTERY, ANGINA PRESENCE UNSPECIFIED, UNSPECIFIED WHETHER NATIVE OR TRANSPLANTED HEART: ICD-10-CM

## 2018-02-19 DIAGNOSIS — R13.19 ESOPHAGEAL DYSPHAGIA: ICD-10-CM

## 2018-02-19 DIAGNOSIS — I10 ESSENTIAL HYPERTENSION: ICD-10-CM

## 2018-02-19 DIAGNOSIS — R07.2 PRECORDIAL CHEST PAIN: ICD-10-CM

## 2018-02-19 DIAGNOSIS — I95.1 ORTHOSTASIS: ICD-10-CM

## 2018-02-19 DIAGNOSIS — G89.29 CHRONIC MIDLINE LOW BACK PAIN WITHOUT SCIATICA: ICD-10-CM

## 2018-02-19 PROBLEM — Z86.0100 HX OF COLONIC POLYP: Status: RESOLVED | Noted: 2017-02-21 | Resolved: 2018-02-19

## 2018-02-19 PROBLEM — Z86.010 HX OF COLONIC POLYP: Status: RESOLVED | Noted: 2017-02-21 | Resolved: 2018-02-19

## 2018-02-19 PROCEDURE — 99024 POSTOP FOLLOW-UP VISIT: CPT | Performed by: INTERNAL MEDICINE

## 2018-02-19 RX ORDER — AMLODIPINE BESYLATE 10 MG/1
10 TABLET ORAL DAILY
Qty: 90 TABLET | Refills: 3 | Status: SHIPPED | OUTPATIENT
Start: 2018-02-19 | End: 2018-02-19 | Stop reason: SDUPTHER

## 2018-02-19 RX ORDER — AMLODIPINE BESYLATE 5 MG/1
5 TABLET ORAL DAILY
Qty: 90 TABLET | Refills: 3 | Status: SHIPPED | OUTPATIENT
Start: 2018-02-19 | End: 2019-04-11

## 2018-02-19 RX ORDER — SODIUM CHLORIDE 9 MG/ML
3 INJECTION, SOLUTION INTRAVENOUS ONCE
Status: CANCELLED | OUTPATIENT
Start: 2018-02-19 | End: 2018-02-19

## 2018-02-19 NOTE — PROGRESS NOTES
Carlo Velasco  [id number]  1945  72 y.o.  male    Referring Provider: Rhys Rosen MD    Reason for Follow-up Visit:  Here for follow up after cardiac testing.   coronary artery disease Coronary artery bypass grafting , stented coronary artery     Subjective .   Chest pain at rest, moderate substernal, pressure like. Lasts less than 5 minutes  Recurrent chest pain and taking S/L NTG repeatedly  Increasing severity and frequency of sub sternal chest pain  Radiation to left arm   No diaphoresis  No nausea  Precipitated with exertion    Worsening exertional shortness of breath on exertion relieved with rest  No significant cough or wheezing  Going on for several months  No palpitations  No significant pedal edema  No fever or chills  No significant expectoration  No hemoptysis  No presyncope or syncope     History of present illness:  Carlo Velasco is a 72 y.o. yo male with history of coronary artery disease Coronary artery bypass grafting , stented coronary artery sick sinus syndrome s/p pacemaker who presents today for   Chief Complaint   Patient presents with   • Coronary Artery Disease     2 WK FU- TEST RESUTLS   • Fatigue   • Shortness of Breath   .    History  Past Medical History:   Diagnosis Date   • Anxiety    • Arthritis    • Cancer     melanoma   • Chest pain    • CHF (congestive heart failure)    • Claustrophobia    • Coronary artery disease 2012, 2015    2 vessel CABG (2012), SHAHRZAD LAD (2015).    • Depression    • Disease of thyroid gland     partial thyroidectomy on right   • Dysphagia    • GERD (gastroesophageal reflux disease)    • Hearing loss    • Heart attack    • Heart attack    • Hyperlipidemia    • Hypertension    • Kidney stone     history of    • Tobacco abuse, in remission    • Uses hearing aid     BILATERAL   ,   Past Surgical History:   Procedure Laterality Date   • BACK SURGERY      CERVICAL FUSION AND LUMBAR DISC REPLACEMENT   • CARDIAC CATHETERIZATION  07/2015    SHAHRZAD mid LAD,  Dr. Toribio   • CARDIAC CATHETERIZATION N/A 10/21/2016    Procedure: Left Heart Cath;  Surgeon: Darian Toribio MD;  Location:  [facility];  Service:    • CARDIAC CATHETERIZATION Left 4/5/2017    Procedure: Cardiac Catheterization/Vascular Study;  Surgeon: Darian Toribio MD;  Location:  [facility];  Service:    • CARDIAC CATHETERIZATION N/A 4/5/2017    Procedure: Left Heart Cath;  Surgeon: Darian Toribio MD;  Location:  [facility];  Service:    • CARDIAC CATHETERIZATION N/A 4/5/2017    Procedure: Coronary angiography;  Surgeon: Darian Toribio MD;  Location:  [facility];  Service:    • CARDIAC CATHETERIZATION N/A 4/5/2017    Procedure: Left ventriculography;  Surgeon: Darian Toribio MD;  Location:  [facility];  Service:    • CARDIAC CATHETERIZATION  4/5/2017    Procedure: Saphenous Vein Graft;  Surgeon: Darian Toribio MD;  Location:  [facility];  Service:    • CARDIAC ELECTROPHYSIOLOGY PROCEDURE N/A 1/11/2018    Procedure: PPM generator change - dual;  Surgeon: Darian Toribio MD;  Location:  [facility];  Service:    • COLONOSCOPY  09/05/2007    colon polyps   • CORONARY ANGIOPLASTY WITH STENT PLACEMENT  07/2015    SHAHRZAD mid LAD, Dr. Toribio    • CORONARY ARTERY BYPASS GRAFT  2012 AND 2014    2 vessel    • ENDOSCOPY  11/08/2010   • HEAD/NECK LESION/CYST EXCISION Left 12/20/2016    Procedure: EXCISION MALIGNANT MELANOMA WITH SENTINEL NODE BIOPSY, INJECTION AND SCAN PRE-OP, LEFT EAR;  Surgeon: Guanaco Zepeda MD;  Location: [facility] OR;  Service:    • HEMORRHOIDECTOMY     • INSERT / REPLACE / REMOVE PACEMAKER     • SENTINEL NODE BIOPSY Left 12/20/2016    Procedure: SENTINEL NODE BIOPSY;  Surgeon: Guanaco Zepeda MD;  Location: [facility] OR;  Service:    • THYROID SURGERY     • THYROIDECTOMY     ,   Family History   Problem Relation Age of Onset   • Heart failure Mother    • Stroke Mother    • Dementia Mother    • Coronary  artery disease Father    • COPD Brother    ,   Social History   Substance Use Topics   • Smoking status: Former Smoker     Years: 10.00     Types: Cigarettes     Quit date: 1977   • Smokeless tobacco: Former User     Quit date: 1977   • Alcohol use No   ,     Medications  Current Outpatient Prescriptions   Medication Sig Dispense Refill   • ALPRAZolam (XANAX) 0.25 MG tablet TAKE 1 TABLET BY MOUTH THREE TIMES DAILY AS NEEDED FOR ANXIETY 90 tablet 0   • aspirin 81 MG EC tablet Take 81 mg by mouth daily.       • atorvastatin (LIPITOR) 80 MG tablet TAKE 1 TABLET BY MOUTH EVERY NIGHT 30 tablet 5   • BIOTIN PO Take  by mouth.     • donepezil (ARICEPT) 5 MG tablet TAKE 1 TABLET BY MOUTH AT BEDTIME 90 tablet 0   • escitalopram (LEXAPRO) 20 MG tablet TAKE 1 TABLET BY MOUTH DAILY 90 tablet 2   • furosemide (LASIX) 40 MG tablet TAKE 1 TABLET BY MOUTH DAILY 90 tablet 3   • gabapentin (NEURONTIN) 300 MG capsule TK 1 C PO QHS  5   • metoprolol tartrate (LOPRESSOR) 50 MG tablet TAKE 1 TABLET BY MOUTH DAILY (Patient taking differently: 25mg daily) 90 tablet 0   • NITROSTAT 0.4 MG SL tablet Place 1 tablet under the tongue Every 5 (Five) Minutes As Needed for Chest Pain. Take no more than 3 doses in 15 minutes. 30 tablet 2   • omega-3 acid ethyl esters (LOVAZA) 1 g capsule TAKE 1 CAPSULE BY MOUTH DAILY 90 capsule 0   • omeprazole (priLOSEC) 20 MG capsule Take 1 capsule by mouth Daily. 30 capsule 5   • potassium gluconate 595 MG tablet tablet Take 595 mg by mouth daily.     • Probiotic Product (PROBIOTIC DAILY PO) Take 1 tablet by mouth Daily.     • ticagrelor (BRILINTA) 90 MG tablet tablet Take 1 tablet by mouth Every 12 (Twelve) Hours. 60 tablet 11   • amLODIPine (NORVASC) 5 MG tablet Take 1 tablet by mouth Daily. 90 tablet 3     No current facility-administered medications for this visit.        Allergies:  Meperidine    Review of Systems  Review of Systems   Constitution: Negative.   HENT: Negative.    Eyes: Negative.   "  Cardiovascular: Positive for chest pain and dyspnea on exertion. Negative for claudication, cyanosis, irregular heartbeat, leg swelling, near-syncope, orthopnea, palpitations, paroxysmal nocturnal dyspnea and syncope.   Respiratory: Negative.    Endocrine: Negative.    Hematologic/Lymphatic: Negative.    Skin: Negative.    Gastrointestinal: Negative for anorexia.   Genitourinary: Negative.    Neurological: Negative.    Psychiatric/Behavioral: Negative.        Objective     Physical Exam:  /82  Pulse 80  Ht 172.7 cm (67.99\")  Wt 117 kg (258 lb)  SpO2 99%  BMI 39.24 kg/m2  Physical Exam   Constitutional: He appears well-developed.   HENT:   Head: Normocephalic.   Neck: Normal carotid pulses and no JVD present. No tracheal tenderness present. Carotid bruit is not present. No tracheal deviation and no edema present.   Cardiovascular: Regular rhythm, normal heart sounds and normal pulses.    Pulmonary/Chest: Effort normal. No stridor.   Abdominal: Soft.   Neurological: He is alert. He has normal strength. No cranial nerve deficit or sensory deficit.   Skin: Skin is warm.   Psychiatric: He has a normal mood and affect. His speech is normal and behavior is normal.       Results Review:  Results for orders placed during the hospital encounter of 02/09/18   Adult Stress Echo W/ Cont or Stress Agent if Necessary Per Protocol    Narrative · Left ventricular systolic function is normal. Estimated EF = 55%.  · Normal stress echo with no significant echocardiographic evidence for   myocardial ischemia           Procedures  Cardiac cath  4/17  LVEF of 50%.   occluded the mid right coronary artery  Patent saphenous venous graft to the distal right coronary artery  Atretic left intramammary artery graft.  Patent stent in the mid left anterior descending coronary artery  Patent stent to the obtuse marginal branch  Patent stent in diagonal branch.       Assessment/Plan   Patient Active Problem List   Diagnosis   • " "Coronary artery disease involving autologous artery coronary bypass graft with angina pectoris   • Essential hypertension   • Anxiety   • Colon polyps   • Mixed hyperlipidemia   • Incisional hernia, without obstruction or gangrene   • Precordial chest pain   • BRBPR (bright red blood per rectum)   • Esophageal dysphagia   • Prostatism   • Nocturia   • Coronary artery disease   • Orthostasis   • Hypotension due to drugs   • Anxiety   • Chronic midline low back pain without sciatica   Intolerant to Imdur    Plan:      Despite low risk stress test getting recurrent chest pain reminiscent of prior angina.   Increasing chest pain   Using S/L NTG repeatedly  Low salt/ HTN/ Heart healthy carbohydrate restricted cardiac diet as applicable to this patient's current diagnoses.   This handout has relevant information regarding shopping for food, preparing meals, what to eat at restaurants, tracking of food intake, information regarding sodium intake and salt content, how to read food labels, knowing what to eat, tips reagarding physical activity, calorie count and calorie expenditure. What foods to avoid. Information regarding alcoholic drinks along with \"good\" and \"bad\" fats.  Reiterated prior recommendations   Weigh yourself frequently, at least weekly, preferably daily, call me if more than 2 pounds a day or 5 pounds a week weight gain  Flexible diuretic dosing   Keep LDL below 70 mg/dl. Monitor liver and renal functions.   Monitor CBC, CMP and Lipid Panel by primary  Monitor for any signs of bleeding including red or dark stools. Fall precautions.   Patient is asked to monitor BP at home or work, several times per month and return with written values at next office visit.   Cannot stop Continue dual antiplatelet therapy  lifelong as stent thrombosis in past  Close follow up with you as scheduled.  Intensive factor modifications.  See order list.   Monitor pacemaker.  Gave a copy of my notes and relevant tests/ prior ECG " etc for the patient to review and follow specific advise and relevant findings if any, prognosis, along with my current and future plans.   Recommend cardiac catheterization, selective coronary angiography, left ventriculography and percutaneous coronary intervention with application of arteriotomy hemostatic closure device.  I discussed cardiac catheterization, the procedure, risks (including bleeding, infection, vascular damage [including minor oozing, bruising, bleeding, and up to and including but not limited to the need for vascular surgery, emergency cardiothoracic surgery, contrast reaction, renal failure, respiratory failure, heart attack, stroke, arrhythmia and even death), benefits, and alternatives and the patient has voiced understanding and is willing to proceed.  No contraindication to drug eluting stent placement if required  Further recommendations pending results of cardiac catheterization    Orders Placed This Encounter   Procedures   • XR chest pa and lateral     Standing Status:   Future     Standing Expiration Date:   2/19/2019     Order Specific Question:   Reason for Exam:     Answer:   cp   • Comprehensive metabolic panel     Standing Status:   Future     Standing Expiration Date:   2/19/2019   • CBC and Differential     Standing Status:   Future     Standing Expiration Date:   2/19/2019     Order Specific Question:   Manual Differential     Answer:   No     Requested Prescriptions     Signed Prescriptions Disp Refills   • amLODIPine (NORVASC) 5 MG tablet 90 tablet 3     Sig: Take 1 tablet by mouth Daily.    Gave a copy of my notes and relevant tests/ prior ECG etc for the patient to review and follow specific advise and relevant findings if any, prognosis, along with my current and future plans.     Return in about 3 months (around 5/19/2018).

## 2018-02-21 RX ORDER — ALPRAZOLAM 0.25 MG/1
TABLET ORAL
Qty: 90 TABLET | Refills: 0 | OUTPATIENT
Start: 2018-02-21 | End: 2018-05-24 | Stop reason: SDUPTHER

## 2018-02-27 ENCOUNTER — HOSPITAL ENCOUNTER (OUTPATIENT)
Facility: HOSPITAL | Age: 73
Setting detail: HOSPITAL OUTPATIENT SURGERY
Discharge: HOME | End: 2018-02-27
Attending: INTERNAL MEDICINE | Admitting: INTERNAL MEDICINE

## 2018-02-27 VITALS
TEMPERATURE: 98 F | HEIGHT: 68 IN | OXYGEN SATURATION: 98 % | DIASTOLIC BLOOD PRESSURE: 71 MMHG | BODY MASS INDEX: 39.46 KG/M2 | WEIGHT: 260.38 LBS | RESPIRATION RATE: 20 BRPM | SYSTOLIC BLOOD PRESSURE: 130 MMHG | HEART RATE: 64 BPM

## 2018-02-27 DIAGNOSIS — I25.729 CORONARY ARTERY DISEASE INVOLVING AUTOLOGOUS ARTERY CORONARY BYPASS GRAFT WITH ANGINA PECTORIS (HCC): ICD-10-CM

## 2018-02-27 LAB
ALBUMIN SERPL-MCNC: 3.7 G/DL (ref 3.5–5)
ALBUMIN/GLOB SERPL: 1.4 G/DL (ref 1.1–2.5)
ALP SERPL-CCNC: 66 U/L (ref 24–120)
ALT SERPL W P-5'-P-CCNC: 37 U/L (ref 0–54)
ANION GAP SERPL CALCULATED.3IONS-SCNC: 10 MMOL/L (ref 4–13)
AST SERPL-CCNC: 32 U/L (ref 7–45)
BASOPHILS # BLD AUTO: 0.06 10*3/MM3 (ref 0–0.2)
BASOPHILS NFR BLD AUTO: 0.8 % (ref 0–2)
BILIRUB SERPL-MCNC: 0.7 MG/DL (ref 0.1–1)
BUN BLD-MCNC: 17 MG/DL (ref 5–21)
BUN/CREAT SERPL: 22.1 (ref 7–25)
CALCIUM SPEC-SCNC: 8.8 MG/DL (ref 8.4–10.4)
CHLORIDE SERPL-SCNC: 103 MMOL/L (ref 98–110)
CO2 SERPL-SCNC: 30 MMOL/L (ref 24–31)
CREAT BLD-MCNC: 0.77 MG/DL (ref 0.5–1.4)
DEPRECATED RDW RBC AUTO: 44.8 FL (ref 40–54)
EOSINOPHIL # BLD AUTO: 0.28 10*3/MM3 (ref 0–0.7)
EOSINOPHIL NFR BLD AUTO: 3.9 % (ref 0–4)
ERYTHROCYTE [DISTWIDTH] IN BLOOD BY AUTOMATED COUNT: 14.6 % (ref 12–15)
GFR SERPL CREATININE-BSD FRML MDRD: 100 ML/MIN/1.73
GLOBULIN UR ELPH-MCNC: 2.7 GM/DL
GLUCOSE BLD-MCNC: 90 MG/DL (ref 70–100)
HCT VFR BLD AUTO: 39.3 % (ref 40–52)
HGB BLD-MCNC: 12.9 G/DL (ref 14–18)
IMM GRANULOCYTES # BLD: 0.07 10*3/MM3 (ref 0–0.03)
IMM GRANULOCYTES NFR BLD: 1 % (ref 0–5)
LYMPHOCYTES # BLD AUTO: 1.8 10*3/MM3 (ref 0.72–4.86)
LYMPHOCYTES NFR BLD AUTO: 24.9 % (ref 15–45)
MCH RBC QN AUTO: 27.7 PG (ref 28–32)
MCHC RBC AUTO-ENTMCNC: 32.8 G/DL (ref 33–36)
MCV RBC AUTO: 84.3 FL (ref 82–95)
MONOCYTES # BLD AUTO: 0.57 10*3/MM3 (ref 0.19–1.3)
MONOCYTES NFR BLD AUTO: 7.9 % (ref 4–12)
NEUTROPHILS # BLD AUTO: 4.45 10*3/MM3 (ref 1.87–8.4)
NEUTROPHILS NFR BLD AUTO: 61.5 % (ref 39–78)
NRBC BLD MANUAL-RTO: 0 /100 WBC (ref 0–0)
PLATELET # BLD AUTO: 187 10*3/MM3 (ref 130–400)
PMV BLD AUTO: 9.1 FL (ref 6–12)
POTASSIUM BLD-SCNC: 3.6 MMOL/L (ref 3.5–5.3)
PROT SERPL-MCNC: 6.4 G/DL (ref 6.3–8.7)
RBC # BLD AUTO: 4.66 10*6/MM3 (ref 4.8–5.9)
SODIUM BLD-SCNC: 143 MMOL/L (ref 135–145)
WBC NRBC COR # BLD: 7.23 10*3/MM3 (ref 4.8–10.8)

## 2018-02-27 PROCEDURE — 99152 MOD SED SAME PHYS/QHP 5/>YRS: CPT | Performed by: INTERNAL MEDICINE

## 2018-02-27 PROCEDURE — 93571 IV DOP VEL&/PRESS C FLO 1ST: CPT | Performed by: INTERNAL MEDICINE

## 2018-02-27 PROCEDURE — 25010000002 ADENOSINE (DIAGNOSTIC) PER 30 MG: Performed by: INTERNAL MEDICINE

## 2018-02-27 PROCEDURE — C1894 INTRO/SHEATH, NON-LASER: HCPCS | Performed by: INTERNAL MEDICINE

## 2018-02-27 PROCEDURE — C1769 GUIDE WIRE: HCPCS | Performed by: INTERNAL MEDICINE

## 2018-02-27 PROCEDURE — C1887 CATHETER, GUIDING: HCPCS | Performed by: INTERNAL MEDICINE

## 2018-02-27 PROCEDURE — 0 IOPAMIDOL PER 1 ML: Performed by: INTERNAL MEDICINE

## 2018-02-27 PROCEDURE — 25010000002 DIPHENHYDRAMINE PER 50 MG: Performed by: INTERNAL MEDICINE

## 2018-02-27 PROCEDURE — 85025 COMPLETE CBC W/AUTO DIFF WBC: CPT | Performed by: INTERNAL MEDICINE

## 2018-02-27 PROCEDURE — C1760 CLOSURE DEV, VASC: HCPCS | Performed by: INTERNAL MEDICINE

## 2018-02-27 PROCEDURE — 25010000002 MIDAZOLAM PER 1 MG: Performed by: INTERNAL MEDICINE

## 2018-02-27 PROCEDURE — 93459 L HRT ART/GRFT ANGIO: CPT | Performed by: INTERNAL MEDICINE

## 2018-02-27 PROCEDURE — 80053 COMPREHEN METABOLIC PANEL: CPT | Performed by: INTERNAL MEDICINE

## 2018-02-27 RX ORDER — DIPHENHYDRAMINE HYDROCHLORIDE 50 MG/ML
INJECTION INTRAMUSCULAR; INTRAVENOUS AS NEEDED
Status: DISCONTINUED | OUTPATIENT
Start: 2018-02-27 | End: 2018-02-27 | Stop reason: HOSPADM

## 2018-02-27 RX ORDER — SODIUM CHLORIDE 9 MG/ML
3 INJECTION, SOLUTION INTRAVENOUS ONCE
Status: COMPLETED | OUTPATIENT
Start: 2018-02-27 | End: 2018-02-27

## 2018-02-27 RX ORDER — SODIUM CHLORIDE 0.9 % (FLUSH) 0.9 %
1-10 SYRINGE (ML) INJECTION AS NEEDED
Status: DISCONTINUED | OUTPATIENT
Start: 2018-02-27 | End: 2018-02-27 | Stop reason: HOSPADM

## 2018-02-27 RX ORDER — MIDAZOLAM HYDROCHLORIDE 1 MG/ML
INJECTION INTRAMUSCULAR; INTRAVENOUS AS NEEDED
Status: DISCONTINUED | OUTPATIENT
Start: 2018-02-27 | End: 2018-02-27 | Stop reason: HOSPADM

## 2018-02-27 RX ORDER — SODIUM CHLORIDE 9 MG/ML
100 INJECTION, SOLUTION INTRAVENOUS CONTINUOUS
Status: DISCONTINUED | OUTPATIENT
Start: 2018-02-27 | End: 2018-02-27 | Stop reason: HOSPADM

## 2018-02-27 RX ORDER — LIDOCAINE HYDROCHLORIDE 20 MG/ML
INJECTION, SOLUTION INFILTRATION; PERINEURAL AS NEEDED
Status: DISCONTINUED | OUTPATIENT
Start: 2018-02-27 | End: 2018-02-27 | Stop reason: HOSPADM

## 2018-02-27 RX ADMIN — SODIUM CHLORIDE 3 ML/KG/HR: 9 INJECTION, SOLUTION INTRAVENOUS at 12:29

## 2018-03-06 RX ORDER — ATORVASTATIN CALCIUM 80 MG/1
TABLET, FILM COATED ORAL
Qty: 30 TABLET | Refills: 0 | Status: SHIPPED | OUTPATIENT
Start: 2018-03-06 | End: 2018-04-07 | Stop reason: SDUPTHER

## 2018-03-13 RX ORDER — DONEPEZIL HYDROCHLORIDE 5 MG/1
TABLET, FILM COATED ORAL
Qty: 90 TABLET | Refills: 0 | Status: SHIPPED | OUTPATIENT
Start: 2018-03-13 | End: 2018-03-19 | Stop reason: SDUPTHER

## 2018-03-14 RX ORDER — OMEGA-3-ACID ETHYL ESTERS 1 G/1
CAPSULE, LIQUID FILLED ORAL
Qty: 90 CAPSULE | Refills: 0 | Status: SHIPPED | OUTPATIENT
Start: 2018-03-14 | End: 2018-06-23 | Stop reason: SDUPTHER

## 2018-03-19 ENCOUNTER — OFFICE VISIT (OUTPATIENT)
Dept: FAMILY MEDICINE CLINIC | Facility: CLINIC | Age: 73
End: 2018-03-19

## 2018-03-19 VITALS
WEIGHT: 260 LBS | DIASTOLIC BLOOD PRESSURE: 86 MMHG | RESPIRATION RATE: 16 BRPM | OXYGEN SATURATION: 96 % | BODY MASS INDEX: 39.4 KG/M2 | HEIGHT: 68 IN | HEART RATE: 63 BPM | SYSTOLIC BLOOD PRESSURE: 136 MMHG

## 2018-03-19 DIAGNOSIS — K62.5 RECTAL BLEEDING: ICD-10-CM

## 2018-03-19 DIAGNOSIS — I25.729 CORONARY ARTERY DISEASE INVOLVING AUTOLOGOUS ARTERY CORONARY BYPASS GRAFT WITH ANGINA PECTORIS (HCC): ICD-10-CM

## 2018-03-19 DIAGNOSIS — E78.2 MIXED HYPERLIPIDEMIA: Primary | ICD-10-CM

## 2018-03-19 DIAGNOSIS — I10 ESSENTIAL HYPERTENSION: ICD-10-CM

## 2018-03-19 PROCEDURE — 99214 OFFICE O/P EST MOD 30 MIN: CPT | Performed by: FAMILY MEDICINE

## 2018-03-19 NOTE — PROGRESS NOTES
Subjective   Carlo Velasco is a 72 y.o. male.     Chief Complaint   Patient presents with   • Follow-up     6 mo        History of Present Illness     here today with his wife--he says he had cardiac cath 2 weeks ago and told ok--he is having blood in his stool off and on for 2mos--he thinks his bp is stable witout cp or ha--he is tolerating lipitor without myalgias..      Current Outpatient Prescriptions:   •  ALPRAZolam (XANAX) 0.25 MG tablet, TAKE 1 TABLET BY MOUTH THREE TIMES DAILY AS NEEDED FOR ANXIETY, Disp: 90 tablet, Rfl: 0  •  amLODIPine (NORVASC) 5 MG tablet, Take 1 tablet by mouth Daily., Disp: 90 tablet, Rfl: 3  •  aspirin 81 MG EC tablet, Take 81 mg by mouth daily.  , Disp: , Rfl:   •  atorvastatin (LIPITOR) 80 MG tablet, TAKE 1 TABLET BY MOUTH EVERY NIGHT, Disp: 30 tablet, Rfl: 0  •  escitalopram (LEXAPRO) 20 MG tablet, TAKE 1 TABLET BY MOUTH DAILY, Disp: 90 tablet, Rfl: 2  •  furosemide (LASIX) 40 MG tablet, TAKE 1 TABLET BY MOUTH DAILY, Disp: 90 tablet, Rfl: 3  •  gabapentin (NEURONTIN) 300 MG capsule, TK 1 C PO QHS, Disp: , Rfl: 5  •  metoprolol tartrate (LOPRESSOR) 25 MG tablet, Take one tablet by mouth twice a day., Disp: 180 tablet, Rfl: 4  •  NITROSTAT 0.4 MG SL tablet, Place 1 tablet under the tongue Every 5 (Five) Minutes As Needed for Chest Pain. Take no more than 3 doses in 15 minutes., Disp: 30 tablet, Rfl: 2  •  omega-3 acid ethyl esters (LOVAZA) 1 g capsule, TAKE 1 CAPSULE BY MOUTH DAILY, Disp: 90 capsule, Rfl: 0  •  omeprazole (priLOSEC) 20 MG capsule, Take 1 capsule by mouth Daily., Disp: 30 capsule, Rfl: 5  •  Potassium 99 MG tablet, Take 1 tablet by mouth Daily., Disp: , Rfl:   •  Probiotic Product (PROBIOTIC DAILY PO), Take 1 tablet by mouth Daily., Disp: , Rfl:   •  ticagrelor (BRILINTA) 90 MG tablet tablet, Take 1 tablet by mouth Every 12 (Twelve) Hours., Disp: 60 tablet, Rfl: 11  •  donepezil (ARICEPT) 5 MG tablet, TAKE 1 TABLET BY MOUTH AT BEDTIME, Disp: 90 tablet, Rfl:  0  Allergies   Allergen Reactions   • Meperidine Other (See Comments)     HEART STOPS         Past Medical History:   Diagnosis Date   • Anxiety    • Arthritis    • Cancer     melanoma   • Chest pain    • CHF (congestive heart failure)    • Claustrophobia    • Coronary artery disease 2012, 2015    2 vessel CABG (2012), SHAHRZAD LAD (2015).    • Depression    • Disease of thyroid gland     partial thyroidectomy on right   • Dysphagia    • GERD (gastroesophageal reflux disease)    • Hearing loss    • Heart attack    • Heart attack    • Hyperlipidemia    • Hypertension    • Kidney stone     history of    • Tobacco abuse, in remission    • Uses hearing aid     BILATERAL     Past Surgical History:   Procedure Laterality Date   • BACK SURGERY      CERVICAL FUSION AND LUMBAR DISC REPLACEMENT   • CARDIAC CATHETERIZATION  07/2015    SHAHRZAD mid LAD, Dr. Toribio   • CARDIAC CATHETERIZATION N/A 10/21/2016    Procedure: Left Heart Cath;  Surgeon: Darian Toribio MD;  Location:  [facility];  Service:    • CARDIAC CATHETERIZATION Left 4/5/2017    Procedure: Cardiac Catheterization/Vascular Study;  Surgeon: Darian Toribio MD;  Location:  [facility];  Service:    • CARDIAC CATHETERIZATION N/A 4/5/2017    Procedure: Left Heart Cath;  Surgeon: Darian Toribio MD;  Location:  [facility];  Service:    • CARDIAC CATHETERIZATION N/A 4/5/2017    Procedure: Coronary angiography;  Surgeon: Darian Toribio MD;  Location:  [facility];  Service:    • CARDIAC CATHETERIZATION N/A 4/5/2017    Procedure: Left ventriculography;  Surgeon: Darian Toribio MD;  Location:  [facility];  Service:    • CARDIAC CATHETERIZATION  4/5/2017    Procedure: Saphenous Vein Graft;  Surgeon: Darian Toribio MD;  Location:  [facility];  Service:    • CARDIAC CATHETERIZATION Left 2/27/2018    Procedure: Cardiac Catheterization/Vascular Study SHAHRZAD OK PRN;  Surgeon: Darian Toribio MD;  Location:  " [facility];  Service:    • CARDIAC CATHETERIZATION  2/27/2018    Procedure: Functional Flow Faxon;  Surgeon: Darian Toribio MD;  Location:  [facility];  Service:    • CARDIAC CATHETERIZATION N/A 2/27/2018    Procedure: Left ventriculography;  Surgeon: Darian Toribio MD;  Location: [facility] CATH INVASIVE LOCATION;  Service:    • CARDIAC ELECTROPHYSIOLOGY PROCEDURE N/A 1/11/2018    Procedure: PPM generator change - dual;  Surgeon: Darian Toribio MD;  Location: [facility] CATH INVASIVE LOCATION;  Service:    • COLONOSCOPY  09/05/2007    colon polyps   • CORONARY ANGIOPLASTY WITH STENT PLACEMENT  07/2015    SHAHRZAD mid LAD, Dr. Toribio    • CORONARY ARTERY BYPASS GRAFT  2012 AND 2014    2 vessel    • ENDOSCOPY  11/08/2010   • HEAD/NECK LESION/CYST EXCISION Left 12/20/2016    Procedure: EXCISION MALIGNANT MELANOMA WITH SENTINEL NODE BIOPSY, INJECTION AND SCAN PRE-OP, LEFT EAR;  Surgeon: Guanaco Zepeda MD;  Location: [facility] OR;  Service:    • HEMORRHOIDECTOMY     • INSERT / REPLACE / REMOVE PACEMAKER     • SENTINEL NODE BIOPSY Left 12/20/2016    Procedure: SENTINEL NODE BIOPSY;  Surgeon: Guanaco Zepeda MD;  Location: [facility] OR;  Service:    • THYROID SURGERY     • THYROIDECTOMY         Review of Systems   Constitutional: Negative.    HENT: Negative.    Eyes: Negative.    Respiratory: Positive for shortness of breath.    Cardiovascular: Negative.    Gastrointestinal: Positive for blood in stool.   Endocrine: Negative.    Genitourinary: Negative.    Musculoskeletal: Negative.    Skin: Negative.    Allergic/Immunologic: Negative.    Neurological: Negative.    Hematological: Negative.    Psychiatric/Behavioral: The patient is nervous/anxious.        Objective  /86   Pulse 63   Resp 16   Ht 172.7 cm (68\")   Wt 118 kg (260 lb)   SpO2 96%   BMI 39.53 kg/m²   Physical Exam   Constitutional: He is oriented to person, place, and time. He appears well-developed and well-nourished.   HENT:   Head: " Normocephalic and atraumatic.   Right Ear: External ear normal.   Left Ear: External ear normal.   Nose: Nose normal.   Mouth/Throat: Oropharynx is clear and moist.   Eyes: Conjunctivae and EOM are normal. Pupils are equal, round, and reactive to light.   Neck: Normal range of motion. Neck supple.   Cardiovascular: Normal rate, regular rhythm, normal heart sounds and intact distal pulses.    Pulmonary/Chest: Effort normal and breath sounds normal.   Abdominal: Soft. Bowel sounds are normal.   Musculoskeletal: Normal range of motion.   Neurological: He is alert and oriented to person, place, and time. He has normal reflexes.   Skin: Skin is warm and dry.   Psychiatric: He has a normal mood and affect. His behavior is normal. Judgment and thought content normal.   Nursing note and vitals reviewed.      Assessment/Plan   Carlo was seen today for follow-up.    Diagnoses and all orders for this visit:    Mixed hyperlipidemia    Essential hypertension    Coronary artery disease involving autologous artery coronary bypass graft with angina pectoris    Rectal bleeding  -     Ambulatory Referral to Gastroenterology    Discussed the patient's BMI with him. BMI is above normal parameters. Follow-up plan includes:  no follow-up required.             Orders Placed This Encounter   Procedures   • Ambulatory Referral to Gastroenterology     Referral Priority:   Routine     Referral Type:   Consultation     Referral Reason:   Specialty Services Required     Referred to Provider:   Juan F Tapia MD     Requested Specialty:   Gastroenterology     Number of Visits Requested:   1   Current outpatient and discharge medications have been reconciled for the patient.  Rhys Rosen MD    Follow up: 6 month(s)

## 2018-04-06 ENCOUNTER — DOCUMENTATION (OUTPATIENT)
Dept: CARDIOLOGY | Facility: CLINIC | Age: 73
End: 2018-04-06

## 2018-04-06 ENCOUNTER — HOSPITAL ENCOUNTER (OUTPATIENT)
Dept: GENERAL RADIOLOGY | Age: 73
Discharge: HOME | End: 2018-04-06
Payer: MEDICARE

## 2018-04-06 RX ORDER — ESCITALOPRAM OXALATE 10 MG/1
TABLET ORAL DAILY
COMMUNITY

## 2018-04-06 RX ORDER — ATORVASTATIN CALCIUM 10 MG/1
TABLET, FILM COATED ORAL
COMMUNITY

## 2018-04-06 RX ORDER — OMEPRAZOLE 10 MG/1
CAPSULE, DELAYED RELEASE ORAL
COMMUNITY

## 2018-04-06 RX ORDER — DONEPEZIL HYDROCHLORIDE 10 MG/1
TABLET, FILM COATED ORAL NIGHTLY
COMMUNITY

## 2018-04-06 RX ORDER — GABAPENTIN 100 MG/1
CAPSULE ORAL
COMMUNITY

## 2018-04-06 NOTE — PROGRESS NOTES
RN received remote pacemaker transmission that indicated atrial capture threshold of 2.875V @ 0.5ms, elevated compared to in office threshold obtained on 2/5/2018 of 1.25V @ 0.5ms.  Patient's device is set with atrial output of 2.5V @ 0.5ms, less than the measured capture threshold.  RN called Xavi Darby, [employer] device rep to discuss if patient should be brought into clinic for adjustment.  Xavi stated that most likely, because patient has old leads, we will need to turn the monitor ACap Confirm off, as it has shown artificial elevated thresholds in other systems with older leads.  Next pacemaker appointment is 8/7/2018 and [employer] rep believes it is safe to wait until then to turn AutoCapture off at that time as long as patient is not symptomatic.  RN called patient to see how he has been feeling.  Patient denies shortness of breath, palpitations, lightheadedness, edema.  Patient instructed to call clinic if he becomes symptomatic; patient verbalized understanding.

## 2018-04-09 RX ORDER — ATORVASTATIN CALCIUM 80 MG/1
TABLET, FILM COATED ORAL
Qty: 30 TABLET | Refills: 5 | Status: SHIPPED | OUTPATIENT
Start: 2018-04-09 | End: 2018-05-08 | Stop reason: SINTOL

## 2018-04-24 ENCOUNTER — TELEPHONE (OUTPATIENT)
Dept: CARDIOLOGY | Facility: CLINIC | Age: 73
End: 2018-04-24

## 2018-04-24 NOTE — TELEPHONE ENCOUNTER
is seeking medical clearance for the patient to stop his  blood thinner for 7 days to have a CT MYELOGRAM ( LUMBAR ).    PLEASE ADVISE CATH DONE 02-

## 2018-04-26 NOTE — TELEPHONE ENCOUNTER
OK to hold Brilinta x 5 days prior to procedure and resume when possible when no bleeding risks after procedure. Do not  stop Aspirin.

## 2018-04-27 ENCOUNTER — TRANSCRIBE ORDERS (OUTPATIENT)
Dept: ADMINISTRATIVE | Facility: HOSPITAL | Age: 73
End: 2018-04-27

## 2018-04-27 DIAGNOSIS — M48.061 NARROWING OF LUMBAR SPINE: Primary | ICD-10-CM

## 2018-05-08 ENCOUNTER — OFFICE VISIT (OUTPATIENT)
Dept: GASTROENTEROLOGY | Facility: CLINIC | Age: 73
End: 2018-05-08

## 2018-05-08 VITALS
DIASTOLIC BLOOD PRESSURE: 72 MMHG | HEART RATE: 58 BPM | SYSTOLIC BLOOD PRESSURE: 104 MMHG | BODY MASS INDEX: 40.01 KG/M2 | WEIGHT: 264 LBS | TEMPERATURE: 96 F | OXYGEN SATURATION: 100 % | HEIGHT: 68 IN

## 2018-05-08 DIAGNOSIS — Z86.010 HX OF COLONIC POLYP: ICD-10-CM

## 2018-05-08 DIAGNOSIS — R13.19 ESOPHAGEAL DYSPHAGIA: ICD-10-CM

## 2018-05-08 DIAGNOSIS — D68.9 COAGULOPATHY (HCC): ICD-10-CM

## 2018-05-08 DIAGNOSIS — I10 ESSENTIAL HYPERTENSION: ICD-10-CM

## 2018-05-08 DIAGNOSIS — Z83.71 FAMILY HX COLONIC POLYPS: ICD-10-CM

## 2018-05-08 DIAGNOSIS — K92.1 MELENA: ICD-10-CM

## 2018-05-08 DIAGNOSIS — R10.13 EPIGASTRIC PAIN: ICD-10-CM

## 2018-05-08 DIAGNOSIS — K62.5 BRBPR (BRIGHT RED BLOOD PER RECTUM): Primary | ICD-10-CM

## 2018-05-08 PROCEDURE — 99214 OFFICE O/P EST MOD 30 MIN: CPT | Performed by: NURSE PRACTITIONER

## 2018-05-08 RX ORDER — ATORVASTATIN CALCIUM 80 MG/1
80 TABLET, FILM COATED ORAL DAILY
COMMUNITY
End: 2018-11-30 | Stop reason: SDUPTHER

## 2018-05-08 RX ORDER — POLYETHYLENE GLYCOL 3350, SODIUM CHLORIDE, SODIUM BICARBONATE, POTASSIUM CHLORIDE 420; 11.2; 5.72; 1.48 G/4L; G/4L; G/4L; G/4L
4000 POWDER, FOR SOLUTION ORAL SEE ADMIN INSTRUCTIONS
Qty: 4000 ML | Refills: 0 | Status: SHIPPED | OUTPATIENT
Start: 2018-05-08 | End: 2018-05-11

## 2018-05-08 NOTE — PROGRESS NOTES
"[facility] GASTROENTEROLOGY - OFFICE NOTE    5/8/2018    Carlo Velasco   1945    Primary Physician: Rhys Rosen MD    Chief Complaint   Patient presents with   • GI Bleeding   brbpr  Difficulty swallowing  Abdominal pain     HISTORY OF PRESENT ILLNESS    Carlo Velasco is a 72 y.o. male presents  with rectal bleeding intermittent x  2 years.  Occurs about once or twice per month.   The blood is never more than 1/2 cup.  Has bm daily. He has h/o hemorrhoids.  No straining with bm.  No rectal pain.  No weight loss. No fever. No n/v.         Has also noted black stool x 2 years.  Last was 2 weeks ago.  Has h/o pud several years ago. On asa daily.  No nsaids.   Uses pepto bismol occasionally.   On prilosec daily x 5 yrs.         Dysphagia x 2 -3 yrs ago.  Occur with solid foods,  Points to upper chest area.  Occasional problems with liquids.     Has occasional abdominal pain,  x 1 yr,   mostly abrahan pain, described as a sharp pain. No triggers. Not associated with eating. pepto helps. \"i have had belly trouble all my life\".        Last colonoscopy was September 2007 noting internal hemorrhoid, recommended recall was 5 years based on personal history of colon polyps.  Last EGD November 2010 noting a hiatal hernia and an incomplete Schatzki's ring.    He is on brilinta for h/o cardiac stent. brilinta is currently on hold for lumbar  myelogram.     Past Medical History:   Diagnosis Date   • Anxiety    • Arthritis    • Cancer     melanoma   • CHF (congestive heart failure)    • Claustrophobia    • Colon polyp    • Coronary artery disease 2012, 2015    2 vessel CABG (2012), SHAHRZAD LAD (2015).    • Depression    • Disease of thyroid gland     partial thyroidectomy on right   • GERD (gastroesophageal reflux disease)    • Hearing loss    • Heart attack    • Heart attack    • Hyperlipidemia    • Hypertension    • Kidney stone     history of    • Tobacco abuse, in remission    • Uses hearing aid     BILATERAL "       Past Surgical History:   Procedure Laterality Date   • BACK SURGERY      CERVICAL FUSION AND LUMBAR DISC REPLACEMENT   • CARDIAC CATHETERIZATION  07/2015    SHAHRZAD mid LAD, Dr. Toribio   • CARDIAC CATHETERIZATION N/A 10/21/2016    Procedure: Left Heart Cath;  Surgeon: Darian Toribio MD;  Location:  [facility] CATH [facility]IVE [facility];  Service:    • CARDIAC CATHETERIZATION Left 4/5/2017    Procedure: Cardiac Catheterization/Vascular Study;  Surgeon: Darian Toribio MD;  Location:  [facility] CATH INV[facility];  Service:    • CARDIAC CATHETERIZATION N/A 4/5/2017    Procedure: Left Heart Cath;  Surgeon: Darian Toribio MD;  Location:  [facility] CATH INVASIVE [facility];  Service:    • CARDIAC CATHETERIZATION N/A 4/5/2017    Procedure: Coronary angiography;  Surgeon: Darian Toribio MD;  Location:  [facility] CATH INV[facility];  Service:    • CARDIAC CATHETERIZATION N/A 4/5/2017    Procedure: Left ventriculography;  Surgeon: Darian Toribio MD;  Location:  [facility] CATH INVASIVE [facility];  Service:    • CARDIAC CATHETERIZATION  4/5/2017    Procedure: Saphenous Vein Graft;  Surgeon: Darian Toribio MD;  Location:  [facility] CATH INVASIVE [facility];  Service:    • CARDIAC CATHETERIZATION Left 2/27/2018    Procedure: Cardiac Catheterization/Vascular Study SHAHRZAD [place] PRN;  Surgeon: Darian Toribio MD;  Location:  [facility] CATH INVASIVE [facility];  Service:    • CARDIAC CATHETERIZATION  2/27/2018    Procedure: Functional Flow [place];  Surgeon: Darian Toribio MD;  Location:  [facility] CATH INVAS[facility];  Service:    • CARDIAC CATHETERIZATION N/A 2/27/2018    Procedure: Left ventriculography;  Surgeon: Darian Toribio MD;  Location:  [facility] CATH INVASIVE [facility];  Service:    • CARDIAC ELECTROPHYSIOLOGY PROCEDURE N/A 1/11/2018    Procedure: PPM generator change - dual;  Surgeon: Darian Toribio MD;  Location:  [facility] CATH INVASIVE [facility];  Service:    • COLONOSCOPY  09/05/2007    colon polyps   • CORONARY ANGIOPLASTY WITH STENT PLACEMENT  07/2015    SHAHRZAD mid LADDr. Toribio    •  CORONARY ARTERY BYPASS GRAFT  2012 AND 2014    2 vessel    • ENDOSCOPY  11/08/2010   • HEAD/NECK LESION/CYST EXCISION Left 12/20/2016    Procedure: EXCISION MALIGNANT MELANOMA WITH SENTINEL NODE BIOPSY, INJECTION AND SCAN PRE-OP, LEFT EAR;  Surgeon: Guanaco Zepeda MD;  Location:  [facility] OR;  Service:    • HEMORRHOIDECTOMY     • INSERT / REPLACE / REMOVE PACEMAKER     • SENTINEL NODE BIOPSY Left 12/20/2016    Procedure: SENTINEL NODE BIOPSY;  Surgeon: Guanaco Zepeda MD;  Location:  [facility] OR;  Service:    • SKIN CANCER EXCISION     • THYROID SURGERY     • THYROIDECTOMY         Outpatient Prescriptions Marked as Taking for the 5/8/18 encounter (Office Visit) with ANALI Eaton   Medication Sig Dispense Refill   • ALPRAZolam (XANAX) 0.25 MG tablet TAKE 1 TABLET BY MOUTH THREE TIMES DAILY AS NEEDED FOR ANXIETY 90 tablet 0   • amLODIPine (NORVASC) 5 MG tablet Take 1 tablet by mouth Daily. 90 tablet 3   • aspirin 81 MG EC tablet Take 81 mg by mouth daily.       • atorvastatin (LIPITOR) 80 MG tablet Take 80 mg by mouth Daily.     • donepezil (ARICEPT) 5 MG tablet TAKE 1 TABLET BY MOUTH AT BEDTIME 90 tablet 0   • escitalopram (LEXAPRO) 20 MG tablet TAKE 1 TABLET BY MOUTH DAILY 90 tablet 2   • furosemide (LASIX) 40 MG tablet TAKE 1 TABLET BY MOUTH DAILY 90 tablet 3   • gabapentin (NEURONTIN) 300 MG capsule TK 1 C PO QHS  5   • metoprolol tartrate (LOPRESSOR) 25 MG tablet Take one tablet by mouth twice a day. 180 tablet 4   • NITROSTAT 0.4 MG SL tablet Place 1 tablet under the tongue Every 5 (Five) Minutes As Needed for Chest Pain. Take no more than 3 doses in 15 minutes. 30 tablet 2   • omega-3 acid ethyl esters (LOVAZA) 1 g capsule TAKE 1 CAPSULE BY MOUTH DAILY 90 capsule 0   • omeprazole (priLOSEC) 20 MG capsule Take 1 capsule by mouth Daily. 30 capsule 5   • Potassium 99 MG tablet Take 1 tablet by mouth Daily.     • ticagrelor (BRILINTA) 90 MG tablet tablet Take 1 tablet by mouth Every 12 (Twelve) Hours. 60 tablet  11   • [DISCONTINUED] atorvastatin (LIPITOR) 80 MG tablet TAKE 1 TABLET BY MOUTH EVERY NIGHT 30 tablet 5       Allergies   Allergen Reactions   • Meperidine Other (See Comments)     HEART STOPS         Social History     Social History   • Marital status:      Spouse name: N/A   • Number of children: N/A   • Years of education: N/A     Occupational History   • Not on file.     Social History Main Topics   • Smoking status: Former Smoker     Years: 10.00     Types: Cigarettes     Quit date: 1977   • Smokeless tobacco: Former User     Quit date: 1977   • Alcohol use No   • Drug use: No   • Sexual activity: Defer     Other Topics Concern   • Not on file     Social History Narrative   • No narrative on file       Family History   Problem Relation Age of Onset   • Heart failure Mother    • Stroke Mother    • Dementia Mother    • Coronary artery disease Father    • Colon polyps Father    • COPD Brother    • Colon cancer Neg Hx        Review of Systems   Constitutional: Negative for appetite change, chills, fatigue, fever and unexpected weight change.   HENT: Positive for trouble swallowing. Negative for sore throat.    Eyes: Negative for visual disturbance.   Respiratory: Negative for cough, chest tightness, shortness of breath and wheezing.    Cardiovascular: Negative for chest pain and palpitations.   Gastrointestinal: Positive for abdominal pain and anal bleeding. Negative for abdominal distention, blood in stool, constipation, diarrhea, nausea, rectal pain and vomiting.        As mentioned in hpi   Genitourinary: Negative for difficulty urinating and hematuria.   Musculoskeletal: Positive for back pain (chronic). Negative for arthralgias.   Skin: Negative for color change and rash.   Neurological: Negative for dizziness, seizures, syncope, light-headedness and headaches.   Hematological: Negative for adenopathy.   Psychiatric/Behavioral: Negative for confusion. The patient is not nervous/anxious.      "    Vitals:    05/08/18 0938   BP: 104/72   BP Location: Left arm   Patient Position: Sitting   Cuff Size: Large Adult   Pulse: 58   Temp: 96 °F (35.6 °C)   SpO2: 100%   Weight: 120 kg (264 lb)   Height: 172.7 cm (68\")      Body mass index is 40.14 kg/m².    Physical Exam   Constitutional: He appears well-developed and well-nourished. No distress.   HENT:   Head: Normocephalic and atraumatic.   Eyes: EOM are normal. No scleral icterus.   Neck: Neck supple. No JVD present.   Cardiovascular: Normal rate, regular rhythm and normal heart sounds.    Pulmonary/Chest: Effort normal and breath sounds normal. No stridor.   Abdominal: Soft. Bowel sounds are normal. He exhibits no distension and no mass. There is no tenderness. There is no rebound and no guarding.   Musculoskeletal: Normal range of motion. He exhibits no deformity.   Neurological: He is alert.   Skin: Skin is warm and dry. No rash noted.   Psychiatric: He has a normal mood and affect. His behavior is normal.   Vitals reviewed.              ASSESSMENT AND PLAN    Carlo was seen today for gi bleeding.    Diagnoses and all orders for this visit:    BRBPR (bright red blood per rectum)  -     Case Request; Standing  -     Case Request    Hx of colonic polyp  -     Case Request; Standing  -     Case Request    Family hx colonic polyps    Melena  -     Case Request; Standing  -     Case Request    Esophageal dysphagia  -     Case Request; Standing  -     Case Request    Coagulopathy  Comments:  dr kimmie treviño, h/o cardiac stent    Epigastric pain    Essential hypertension    Other orders  -     Implement Anesthesia Orders Day of Procedure; Standing  -     Obtain Informed Consent; Standing  -     polyethylene glycol-electrolytes (NULYTELY WITH FLAVOR PACKS) 420 g solution; Take 4,000 mL by mouth See Admin Instructions.      In regards to brbpr,  differential diagnoses discussed.  Recommend colonoscopy and he is agreeable. Recommend er if worsening symptoms. "     In regards to abrahan pain and h/o black stool, recommend egd. Differential diagnoses discussed.  He is on prilosec and I have recommended continue.  Er if worsening symptoms, discussed.     The patient was advised to take any blood pressure or heart  medications the morning of  procedure if that is when he/she normally takes.                         ESOPHAGOGASTRODUODENOSCOPY WITH ANESTHESIA (N/A), COLONOSCOPY WITH ANESTHESIA (N/A)   All risks, benefits, alternatives, and indications of colonoscopy procedure have been discussed with the patient. Risks to include perforation of the colon requiring possible surgery or colostomy, risk of bleeding from biopsies or removal of colon tissue, possibility of missing a colon polyp or cancer, or adverse drug reaction.  Benefits to include the diagnosis and management of disease of the colon and rectum. Alternatives to include barium enema, radiographic evaluation, lab testing or no intervention. Pt verbalizes understanding and agrees.     Risk, benefits, and alternatives of endoscopy were explained in full.  They understand that there is a risk of bleeding, perforation, and infection.  The risk of perforation goes up with esophageal dilation.  Other options to evaluate UGI complaints could involve barium swallow or UGI series, but these would be diagnostic tests only.  Patient was given time to ask questions.  I answered them to their satisfaction and they are agreeable to proceeding    Body mass index is 40.14 kg/m².         ANALI Welch    EMR Dragon/transcription disclaimer:  Much of this encounter note is electronic transcription/translation of spoken language to printed text.  The electronic translation of spoken language may be erroneous, or at times, nonsensical words or phrases may be inadvertently transcribed.  Although I have reviewed the note for such errors, some may still exist.        The patient was advised on the risks of stopping blood thinners for a  procedure.  The risks discussed included the risk of developing myocardial infarction, CVA, embolus, clogging of the arteries or stents, etc.  We discussed the potential consequences of the risks discussed.  The benefits of stopping as well as the alternatives were discussed as well.   Patient is to hold their anticoagulation medication per the direction of their prescribing physician, Dr Toribio.    A letter will be sent to prescribing This is to prevent any risk or complication from bleeding intra and post procedure. If they develop bleeding post procedure they are to go the emergency department for further evaluation and treatment immediately.     Body mass index is 40.14 kg/m².    Patient's Body mass index is 40.14 kg/m². BMI is above normal parameters. Recommendations include: no follow-up required.

## 2018-05-09 PROBLEM — Z86.0100 HX OF COLONIC POLYP: Status: ACTIVE | Noted: 2018-05-09

## 2018-05-09 PROBLEM — Z86.010 HX OF COLONIC POLYP: Status: ACTIVE | Noted: 2018-05-09

## 2018-05-09 PROBLEM — K92.1 MELENA: Status: ACTIVE | Noted: 2018-05-09

## 2018-05-11 ENCOUNTER — HOSPITAL ENCOUNTER (OUTPATIENT)
Dept: CT IMAGING | Facility: HOSPITAL | Age: 73
Discharge: HOME | End: 2018-05-11

## 2018-05-11 ENCOUNTER — HOSPITAL ENCOUNTER (OUTPATIENT)
Dept: GENERAL RADIOLOGY | Facility: HOSPITAL | Age: 73
Discharge: HOME | End: 2018-05-11
Admitting: PHYSICIAN ASSISTANT

## 2018-05-11 VITALS
DIASTOLIC BLOOD PRESSURE: 86 MMHG | WEIGHT: 263.2 LBS | HEART RATE: 59 BPM | SYSTOLIC BLOOD PRESSURE: 136 MMHG | BODY MASS INDEX: 39.89 KG/M2 | RESPIRATION RATE: 22 BRPM | TEMPERATURE: 97.2 F | HEIGHT: 68 IN | OXYGEN SATURATION: 98 %

## 2018-05-11 DIAGNOSIS — M48.061 NARROWING OF LUMBAR SPINE: ICD-10-CM

## 2018-05-11 LAB
APTT PPP: 32.1 SECONDS (ref 24.1–34.8)
INR PPP: 0.98 (ref 0.91–1.09)
PLATELET # BLD AUTO: 185 10*3/MM3 (ref 130–400)
PROTHROMBIN TIME: 13.3 SECONDS (ref 11.9–14.6)

## 2018-05-11 PROCEDURE — 85610 PROTHROMBIN TIME: CPT | Performed by: RADIOLOGY

## 2018-05-11 PROCEDURE — 72132 CT LUMBAR SPINE W/DYE: CPT

## 2018-05-11 PROCEDURE — 0 IOPAMIDOL 41 % SOLUTION: Performed by: PHYSICIAN ASSISTANT

## 2018-05-11 PROCEDURE — 85049 AUTOMATED PLATELET COUNT: CPT | Performed by: RADIOLOGY

## 2018-05-11 PROCEDURE — 72265 MYELOGRAPHY L-S SPINE: CPT

## 2018-05-11 PROCEDURE — 85730 THROMBOPLASTIN TIME PARTIAL: CPT | Performed by: RADIOLOGY

## 2018-05-11 RX ORDER — LIDOCAINE HYDROCHLORIDE 10 MG/ML
5 INJECTION, SOLUTION INFILTRATION; PERINEURAL ONCE
Status: COMPLETED | OUTPATIENT
Start: 2018-05-11 | End: 2018-05-11

## 2018-05-11 RX ADMIN — IOPAMIDOL 20 ML: 408 INJECTION, SOLUTION INTRATHECAL at 10:35

## 2018-05-11 RX ADMIN — LIDOCAINE HYDROCHLORIDE 5 ML: 10 INJECTION, SOLUTION INFILTRATION; PERINEURAL at 10:35

## 2018-05-14 NOTE — PROGRESS NOTES
Ok, ana can you please let edgardo at dr burks office know that last dose of lovenox would be the am the day prior to the procedure. Make

## 2018-05-15 ENCOUNTER — TELEPHONE (OUTPATIENT)
Dept: GASTROENTEROLOGY | Facility: CLINIC | Age: 73
End: 2018-05-15

## 2018-05-15 NOTE — TELEPHONE ENCOUNTER
Pts wife states she received a call from Cecily at Dr Toribio office asking pt to stop brilinta on the 24th and start lovenox bid. Last does of lovenox will be early on the Monday prior to the procedure on Tuesday.

## 2018-05-15 NOTE — TELEPHONE ENCOUNTER
Call placed to patient r/t upcoming procedure per GI.  Request received to hold Brilinta x 5 days.  Ok per Dr. Toribio with instructions to continue ASA and begin Lovenox 1mg/kg subcut every 12 hours x 5 days with last injection to be given the morning of the 28th (day prior to procedure) and hold.  Per wife, patients current weight is 261.5#.  Orders submitted to [facility] in [place].  Patient to begin holding Brilinta on 5/24/18 and start Lovenox with last injection the morning of 5/28/17.  Patient to resume Brilinta s/p procedure on 5/29/18.  Verbalized understanding.

## 2018-05-21 ENCOUNTER — OFFICE VISIT (OUTPATIENT)
Dept: CARDIOLOGY | Facility: CLINIC | Age: 73
End: 2018-05-21

## 2018-05-21 VITALS
DIASTOLIC BLOOD PRESSURE: 66 MMHG | WEIGHT: 261 LBS | HEART RATE: 66 BPM | BODY MASS INDEX: 39.56 KG/M2 | HEIGHT: 68 IN | SYSTOLIC BLOOD PRESSURE: 106 MMHG

## 2018-05-21 DIAGNOSIS — I95.1 ORTHOSTASIS: ICD-10-CM

## 2018-05-21 DIAGNOSIS — K62.5 BRBPR (BRIGHT RED BLOOD PER RECTUM): ICD-10-CM

## 2018-05-21 DIAGNOSIS — I95.2 HYPOTENSION DUE TO DRUGS: ICD-10-CM

## 2018-05-21 DIAGNOSIS — R07.2 PRECORDIAL CHEST PAIN: ICD-10-CM

## 2018-05-21 DIAGNOSIS — I25.729 CORONARY ARTERY DISEASE INVOLVING AUTOLOGOUS ARTERY CORONARY BYPASS GRAFT WITH ANGINA PECTORIS (HCC): Primary | ICD-10-CM

## 2018-05-21 DIAGNOSIS — E78.2 MIXED HYPERLIPIDEMIA: ICD-10-CM

## 2018-05-21 DIAGNOSIS — K62.5 RECTAL BLEEDING: ICD-10-CM

## 2018-05-21 DIAGNOSIS — I10 ESSENTIAL HYPERTENSION: ICD-10-CM

## 2018-05-21 PROCEDURE — 99214 OFFICE O/P EST MOD 30 MIN: CPT | Performed by: INTERNAL MEDICINE

## 2018-05-21 PROCEDURE — 93000 ELECTROCARDIOGRAM COMPLETE: CPT | Performed by: INTERNAL MEDICINE

## 2018-05-24 RX ORDER — ALPRAZOLAM 0.25 MG/1
TABLET ORAL
Qty: 90 TABLET | Refills: 0 | Status: SHIPPED | OUTPATIENT
Start: 2018-05-24 | End: 2018-07-30 | Stop reason: SDUPTHER

## 2018-05-25 ENCOUNTER — APPOINTMENT (OUTPATIENT)
Dept: CT IMAGING | Facility: HOSPITAL | Age: 73
End: 2018-05-25

## 2018-05-25 ENCOUNTER — TELEPHONE (OUTPATIENT)
Dept: FAMILY MEDICINE CLINIC | Facility: CLINIC | Age: 73
End: 2018-05-25

## 2018-05-25 ENCOUNTER — HOSPITAL ENCOUNTER (EMERGENCY)
Facility: HOSPITAL | Age: 73
Discharge: HOME | End: 2018-05-25
Admitting: EMERGENCY MEDICINE

## 2018-05-25 VITALS
HEIGHT: 68 IN | OXYGEN SATURATION: 96 % | SYSTOLIC BLOOD PRESSURE: 132 MMHG | HEART RATE: 65 BPM | RESPIRATION RATE: 18 BRPM | DIASTOLIC BLOOD PRESSURE: 58 MMHG | BODY MASS INDEX: 39.4 KG/M2 | WEIGHT: 260 LBS | TEMPERATURE: 97.8 F

## 2018-05-25 DIAGNOSIS — S09.90XA INJURY OF HEAD, INITIAL ENCOUNTER: Primary | ICD-10-CM

## 2018-05-25 DIAGNOSIS — S01.81XA FACIAL LACERATION, INITIAL ENCOUNTER: ICD-10-CM

## 2018-05-25 DIAGNOSIS — T14.8XXD DELAYED WOUND HEALING: ICD-10-CM

## 2018-05-25 DIAGNOSIS — R58 BLEEDING: ICD-10-CM

## 2018-05-25 LAB
BASOPHILS # BLD AUTO: 0.05 10*3/MM3 (ref 0–0.2)
BASOPHILS NFR BLD AUTO: 0.7 % (ref 0–2)
DEPRECATED RDW RBC AUTO: 45.3 FL (ref 40–54)
EOSINOPHIL # BLD AUTO: 0.34 10*3/MM3 (ref 0–0.7)
EOSINOPHIL NFR BLD AUTO: 4.7 % (ref 0–4)
ERYTHROCYTE [DISTWIDTH] IN BLOOD BY AUTOMATED COUNT: 14.4 % (ref 12–15)
HCT VFR BLD AUTO: 38.6 % (ref 40–52)
HGB BLD-MCNC: 12.6 G/DL (ref 14–18)
IMM GRANULOCYTES # BLD: 0.08 10*3/MM3 (ref 0–0.03)
IMM GRANULOCYTES NFR BLD: 1.1 % (ref 0–5)
LYMPHOCYTES # BLD AUTO: 1.93 10*3/MM3 (ref 0.72–4.86)
LYMPHOCYTES NFR BLD AUTO: 26.9 % (ref 15–45)
MCH RBC QN AUTO: 27.9 PG (ref 28–32)
MCHC RBC AUTO-ENTMCNC: 32.6 G/DL (ref 33–36)
MCV RBC AUTO: 85.6 FL (ref 82–95)
MONOCYTES # BLD AUTO: 0.68 10*3/MM3 (ref 0.19–1.3)
MONOCYTES NFR BLD AUTO: 9.5 % (ref 4–12)
NEUTROPHILS # BLD AUTO: 4.09 10*3/MM3 (ref 1.87–8.4)
NEUTROPHILS NFR BLD AUTO: 57.1 % (ref 39–78)
NRBC BLD MANUAL-RTO: 0 /100 WBC (ref 0–0)
PLATELET # BLD AUTO: 175 10*3/MM3 (ref 130–400)
PMV BLD AUTO: 9.3 FL (ref 6–12)
RBC # BLD AUTO: 4.51 10*6/MM3 (ref 4.8–5.9)
WBC NRBC COR # BLD: 7.17 10*3/MM3 (ref 4.8–10.8)

## 2018-05-25 PROCEDURE — 99283 EMERGENCY DEPT VISIT LOW MDM: CPT

## 2018-05-25 PROCEDURE — 85025 COMPLETE CBC W/AUTO DIFF WBC: CPT | Performed by: PHYSICIAN ASSISTANT

## 2018-05-25 PROCEDURE — 70450 CT HEAD/BRAIN W/O DYE: CPT

## 2018-05-25 PROCEDURE — 70486 CT MAXILLOFACIAL W/O DYE: CPT

## 2018-05-25 RX ORDER — LIDOCAINE HYDROCHLORIDE AND EPINEPHRINE BITARTRATE 20; .01 MG/ML; MG/ML
10 INJECTION, SOLUTION SUBCUTANEOUS ONCE
Status: DISCONTINUED | OUTPATIENT
Start: 2018-05-25 | End: 2018-05-25 | Stop reason: HOSPADM

## 2018-05-25 NOTE — TELEPHONE ENCOUNTER
Pt wife called he fell and hit his head yesterday and punched a small hole in his head and it is still bleeding I instructed them to go to the er

## 2018-05-29 ENCOUNTER — HOSPITAL ENCOUNTER (OUTPATIENT)
Facility: HOSPITAL | Age: 73
Setting detail: HOSPITAL OUTPATIENT SURGERY
Discharge: HOME | End: 2018-05-29
Attending: INTERNAL MEDICINE | Admitting: INTERNAL MEDICINE

## 2018-05-29 ENCOUNTER — ANESTHESIA (OUTPATIENT)
Dept: GASTROENTEROLOGY | Facility: HOSPITAL | Age: 73
End: 2018-05-29

## 2018-05-29 ENCOUNTER — HOSPITAL ENCOUNTER (EMERGENCY)
Facility: HOSPITAL | Age: 73
Discharge: HOME | End: 2018-05-29
Attending: FAMILY MEDICINE | Admitting: FAMILY MEDICINE

## 2018-05-29 ENCOUNTER — ANESTHESIA EVENT (OUTPATIENT)
Dept: GASTROENTEROLOGY | Facility: HOSPITAL | Age: 73
End: 2018-05-29

## 2018-05-29 ENCOUNTER — TELEPHONE (OUTPATIENT)
Dept: FAMILY MEDICINE CLINIC | Facility: CLINIC | Age: 73
End: 2018-05-29

## 2018-05-29 ENCOUNTER — APPOINTMENT (OUTPATIENT)
Dept: GENERAL RADIOLOGY | Facility: HOSPITAL | Age: 73
End: 2018-05-29

## 2018-05-29 VITALS
TEMPERATURE: 98 F | OXYGEN SATURATION: 99 % | SYSTOLIC BLOOD PRESSURE: 116 MMHG | DIASTOLIC BLOOD PRESSURE: 65 MMHG | BODY MASS INDEX: 39.25 KG/M2 | WEIGHT: 259 LBS | HEART RATE: 66 BPM | HEIGHT: 68 IN | RESPIRATION RATE: 14 BRPM

## 2018-05-29 VITALS
SYSTOLIC BLOOD PRESSURE: 148 MMHG | BODY MASS INDEX: 39.25 KG/M2 | DIASTOLIC BLOOD PRESSURE: 71 MMHG | TEMPERATURE: 97.4 F | HEIGHT: 68 IN | HEART RATE: 50 BPM | RESPIRATION RATE: 14 BRPM | OXYGEN SATURATION: 97 % | WEIGHT: 259 LBS

## 2018-05-29 DIAGNOSIS — M25.561 ACUTE PAIN OF RIGHT KNEE: Primary | ICD-10-CM

## 2018-05-29 DIAGNOSIS — Z86.010 HX OF COLONIC POLYP: ICD-10-CM

## 2018-05-29 DIAGNOSIS — R13.19 ESOPHAGEAL DYSPHAGIA: ICD-10-CM

## 2018-05-29 DIAGNOSIS — K92.1 MELENA: ICD-10-CM

## 2018-05-29 DIAGNOSIS — K62.5 BRBPR (BRIGHT RED BLOOD PER RECTUM): ICD-10-CM

## 2018-05-29 PROCEDURE — 25010000002 PROPOFOL 10 MG/ML EMULSION: Performed by: NURSE ANESTHETIST, CERTIFIED REGISTERED

## 2018-05-29 PROCEDURE — 43248 EGD GUIDE WIRE INSERTION: CPT | Performed by: INTERNAL MEDICINE

## 2018-05-29 PROCEDURE — 73562 X-RAY EXAM OF KNEE 3: CPT

## 2018-05-29 PROCEDURE — 88305 TISSUE EXAM BY PATHOLOGIST: CPT | Performed by: INTERNAL MEDICINE

## 2018-05-29 PROCEDURE — 43239 EGD BIOPSY SINGLE/MULTIPLE: CPT | Performed by: INTERNAL MEDICINE

## 2018-05-29 PROCEDURE — 45385 COLONOSCOPY W/LESION REMOVAL: CPT | Performed by: INTERNAL MEDICINE

## 2018-05-29 PROCEDURE — 99283 EMERGENCY DEPT VISIT LOW MDM: CPT

## 2018-05-29 RX ORDER — METOPROLOL TARTRATE 5 MG/5ML
2 INJECTION INTRAVENOUS ONCE AS NEEDED
Status: COMPLETED | OUTPATIENT
Start: 2018-05-29 | End: 2018-05-29

## 2018-05-29 RX ORDER — PROPOFOL 10 MG/ML
VIAL (ML) INTRAVENOUS AS NEEDED
Status: DISCONTINUED | OUTPATIENT
Start: 2018-05-29 | End: 2018-05-29 | Stop reason: SURG

## 2018-05-29 RX ORDER — SODIUM CHLORIDE 0.9 % (FLUSH) 0.9 %
3 SYRINGE (ML) INJECTION AS NEEDED
Status: DISCONTINUED | OUTPATIENT
Start: 2018-05-29 | End: 2018-05-29 | Stop reason: HOSPADM

## 2018-05-29 RX ORDER — SODIUM CHLORIDE 9 MG/ML
500 INJECTION, SOLUTION INTRAVENOUS CONTINUOUS PRN
Status: DISCONTINUED | OUTPATIENT
Start: 2018-05-29 | End: 2018-05-29 | Stop reason: HOSPADM

## 2018-05-29 RX ORDER — SODIUM CHLORIDE 0.9 % (FLUSH) 0.9 %
1-10 SYRINGE (ML) INJECTION AS NEEDED
Status: CANCELLED | OUTPATIENT
Start: 2018-05-29

## 2018-05-29 RX ORDER — ONDANSETRON 2 MG/ML
4 INJECTION INTRAMUSCULAR; INTRAVENOUS ONCE AS NEEDED
Status: DISCONTINUED | OUTPATIENT
Start: 2018-05-29 | End: 2018-05-29 | Stop reason: HOSPADM

## 2018-05-29 RX ORDER — SODIUM CHLORIDE 9 MG/ML
100 INJECTION, SOLUTION INTRAVENOUS CONTINUOUS
Status: CANCELLED | OUTPATIENT
Start: 2018-05-29

## 2018-05-29 RX ORDER — HYDROCODONE BITARTRATE AND ACETAMINOPHEN 7.5; 325 MG/1; MG/1
1 TABLET ORAL EVERY 4 HOURS PRN
Qty: 12 TABLET | Refills: 0 | Status: SHIPPED | OUTPATIENT
Start: 2018-05-29 | End: 2019-04-11

## 2018-05-29 RX ORDER — OXYCODONE AND ACETAMINOPHEN 7.5; 325 MG/1; MG/1
1 TABLET ORAL ONCE
Status: COMPLETED | OUTPATIENT
Start: 2018-05-29 | End: 2018-05-29

## 2018-05-29 RX ADMIN — PROPOFOL 100 MG: 10 INJECTION, EMULSION INTRAVENOUS at 09:02

## 2018-05-29 RX ADMIN — PROPOFOL 50 MG: 10 INJECTION, EMULSION INTRAVENOUS at 09:12

## 2018-05-29 RX ADMIN — PROPOFOL 100 MG: 10 INJECTION, EMULSION INTRAVENOUS at 09:16

## 2018-05-29 RX ADMIN — SODIUM CHLORIDE 500 ML: 9 INJECTION, SOLUTION INTRAVENOUS at 07:25

## 2018-05-29 RX ADMIN — METOROPROLOL TARTRATE 2 MG: 5 INJECTION, SOLUTION INTRAVENOUS at 07:56

## 2018-05-29 RX ADMIN — PROPOFOL 50 MG: 10 INJECTION, EMULSION INTRAVENOUS at 09:06

## 2018-05-29 RX ADMIN — OXYCODONE AND ACETAMINOPHEN 1 TABLET: 7.5; 325 TABLET ORAL at 12:40

## 2018-05-29 NOTE — ANESTHESIA PREPROCEDURE EVALUATION
Anesthesia Evaluation     Patient summary reviewed   no history of anesthetic complications:  NPO Solid Status: > 8 hours             Airway   Mallampati: II  TM distance: >3 FB  Neck ROM: full  Dental    (+) upper dentures    Pulmonary    (-) asthma, sleep apnea, not a smoker  Cardiovascular   Exercise tolerance: good (4-7 METS)    Patient on routine beta blocker and Beta blocker given within 24 hours of surgery    (+) pacemaker ([employer].  23%AP) pacemaker interrogated 1-3 months ago, hypertension, CAD, CABG (2016), cardiac stents (last 9/2016) hyperlipidemia,   (-) angina      Neuro/Psych  (-) seizures, TIA, CVA  GI/Hepatic/Renal/Endo    (+) obesity,  GERD,    (-) liver disease, no renal disease, diabetes    Musculoskeletal     Abdominal    Substance History      OB/GYN          Other                        Anesthesia Plan    ASA 3     general     intravenous induction   Anesthetic plan and risks discussed with patient.

## 2018-05-29 NOTE — ANESTHESIA POSTPROCEDURE EVALUATION
Patient: Carlo Velasco    Procedure Summary     Date:  05/29/18 Room / Location:  [facility] ENDOSCOPY 2 / [facility]    Anesthesia Start:  0900 Anesthesia Stop:  0929    Procedures:       ESOPHAGOGASTRODUODENOSCOPY WITH ANESTHESIA (N/A Esophagus)      COLONOSCOPY WITH ANESTHESIA (N/A ) Diagnosis:       Melena      Esophageal dysphagia      BRBPR (bright red blood per rectum)      Hx of colonic polyp      (Melena [K92.1])      (Esophageal dysphagia [R13.10])      (BRBPR (bright red blood per rectum) [K62.5])      (Hx of colonic polyp [Z86.010])    Surgeon:  Juan F Tapia MD Provider:  Dion Howard CRNA    Anesthesia Type:  general ASA Status:  3          Anesthesia Type: general  Last vitals  BP   106/69 (05/29/18 0935)   Temp   98 °F (36.7 °C) (05/29/18 0703)   Pulse   65 (05/29/18 0935)   Resp   14 (05/29/18 0935)     SpO2   97 % (05/29/18 0935)     Post Anesthesia Care and Evaluation    Patient location during evaluation: PHASE II  Level of consciousness: awake and alert  Pain management: adequate  Airway patency: patent  Anesthetic complications: No anesthetic complications    Cardiovascular status: acceptable  Respiratory status: acceptable  Hydration status: acceptable

## 2018-05-29 NOTE — TELEPHONE ENCOUNTER
Pt wife called said that he went to the er last week regarding a accident he had to his head and now they noticed he injurie his knee which is hurting and black and blue she watns to know when u can see him in follow up?he is at  now getting ugi? [phone]

## 2018-05-30 LAB
CYTO UR: NORMAL
LAB AP CASE REPORT: NORMAL
LAB AP CLINICAL INFORMATION: NORMAL
Lab: NORMAL
PATH REPORT.FINAL DX SPEC: NORMAL
PATH REPORT.GROSS SPEC: NORMAL

## 2018-05-31 ENCOUNTER — OFFICE VISIT (OUTPATIENT)
Dept: FAMILY MEDICINE CLINIC | Facility: CLINIC | Age: 73
End: 2018-05-31

## 2018-05-31 VITALS
HEIGHT: 68 IN | HEART RATE: 67 BPM | RESPIRATION RATE: 16 BRPM | DIASTOLIC BLOOD PRESSURE: 76 MMHG | BODY MASS INDEX: 39.25 KG/M2 | WEIGHT: 259 LBS | OXYGEN SATURATION: 97 % | SYSTOLIC BLOOD PRESSURE: 130 MMHG

## 2018-05-31 DIAGNOSIS — M25.561 ACUTE PAIN OF RIGHT KNEE: Primary | ICD-10-CM

## 2018-05-31 PROCEDURE — 99213 OFFICE O/P EST LOW 20 MIN: CPT | Performed by: FAMILY MEDICINE

## 2018-05-31 PROCEDURE — G0439 PPPS, SUBSEQ VISIT: HCPCS | Performed by: FAMILY MEDICINE

## 2018-05-31 NOTE — PROGRESS NOTES
Subjective   Carlo Velasco is a 72 y.o. male.     Chief Complaint   Patient presents with   • Follow-up     ER   knee & head injury        History of Present Illness     here today with his wife--he noted having accident on bulldozer----  He is still hving right knee pain    Current Outpatient Prescriptions:   •  ALPRAZolam (XANAX) 0.25 MG tablet, TAKE 1 TABLET BY MOUTH THREE TIMES DAILY, Disp: 90 tablet, Rfl: 0  •  amLODIPine (NORVASC) 5 MG tablet, Take 1 tablet by mouth Daily., Disp: 90 tablet, Rfl: 3  •  aspirin 81 MG EC tablet, Take 81 mg by mouth daily.  , Disp: , Rfl:   •  atorvastatin (LIPITOR) 80 MG tablet, Take 80 mg by mouth Daily., Disp: , Rfl:   •  donepezil (ARICEPT) 5 MG tablet, TAKE 1 TABLET BY MOUTH AT BEDTIME, Disp: 90 tablet, Rfl: 0  •  enoxaparin (LOVENOX) 120 MG/0.8ML solution syringe, Inject 0.79 mL under the skin Every 12 (Twelve) Hours., Disp: 9 syringe, Rfl: 0  •  escitalopram (LEXAPRO) 20 MG tablet, TAKE 1 TABLET BY MOUTH DAILY, Disp: 90 tablet, Rfl: 2  •  furosemide (LASIX) 40 MG tablet, TAKE 1 TABLET BY MOUTH DAILY, Disp: 90 tablet, Rfl: 3  •  gabapentin (NEURONTIN) 300 MG capsule, TK 1 C PO QHS, Disp: , Rfl: 5  •  HYDROcodone-acetaminophen (NORCO) 7.5-325 MG per tablet, Take 1 tablet by mouth Every 4 (Four) Hours As Needed for Severe Pain ., Disp: 12 tablet, Rfl: 0  •  metoprolol tartrate (LOPRESSOR) 25 MG tablet, Take one tablet by mouth twice a day., Disp: 180 tablet, Rfl: 4  •  NITROSTAT 0.4 MG SL tablet, Place 1 tablet under the tongue Every 5 (Five) Minutes As Needed for Chest Pain. Take no more than 3 doses in 15 minutes., Disp: 30 tablet, Rfl: 2  •  omega-3 acid ethyl esters (LOVAZA) 1 g capsule, TAKE 1 CAPSULE BY MOUTH DAILY, Disp: 90 capsule, Rfl: 0  •  omeprazole (priLOSEC) 20 MG capsule, Take 1 capsule by mouth Daily., Disp: 30 capsule, Rfl: 5  •  Potassium 99 MG tablet, Take 1 tablet by mouth Daily., Disp: , Rfl:   •  ticagrelor (BRILINTA) 90 MG tablet tablet, Take 1 tablet  by mouth Every 12 (Twelve) Hours., Disp: 60 tablet, Rfl: 11  Allergies   Allergen Reactions   • Meperidine Other (See Comments)     HEART STOPS         Past Medical History:   Diagnosis Date   • Anxiety    • Arthritis    • Cancer     melanoma   • CHF (congestive heart failure)    • Claustrophobia    • Colon polyp    • Coronary artery disease 2012, 2015    2 vessel CABG (2012), SHAHRZAD LAD (2015).    • Depression    • Disease of thyroid gland     partial thyroidectomy on right   • GERD (gastroesophageal reflux disease)    • Hearing loss    • Heart attack    • Heart attack    • History of transfusion    • Hyperlipidemia    • Hypertension    • Kidney stone     history of    • Tobacco abuse, in remission    • Uses hearing aid     BILATERAL     Past Surgical History:   Procedure Laterality Date   • BACK SURGERY      CERVICAL FUSION AND LUMBAR DISC REPLACEMENT   • CARDIAC CATHETERIZATION  07/2015    SHAHRZAD mid LAD, Dr. Toribio   • CARDIAC CATHETERIZATION N/A 10/21/2016    Procedure: Left Heart Cath;  Surgeon: Darian Toribio MD;  Location:  [facility] CATH INVASIVE LOC[facility];  Service:    • CARDIAC CATHETERIZATION Left 4/5/2017    Procedure: Cardiac Catheterization/Vascular Study;  Surgeon: Darain Toribio MD;  Location:  [facility] CATH [facility]VAS[facility];  Service:    • CARDIAC CATHETERIZATION N/A 4/5/2017    Procedure: Left Heart Cath;  Surgeon: Darian Toribio MD;  Location:  [facility] CATH INVASIVE LOC[facility];  Service:    • CARDIAC CATHETERIZATION N/A 4/5/2017    Procedure: Coronary angiography;  Surgeon: Darian Toribio MD;  Location:  [facility] CATH INVASIVE LOC[facility];  Service:    • CARDIAC CATHETERIZATION N/A 4/5/2017    Procedure: Left ventriculography;  Surgeon: Darian Toribio MD;  Location:  [facility] CATH INVASIVE LOC[facility];  Service:    • CARDIAC CATHETERIZATION  4/5/2017    Procedure: Saphenous Vein Graft;  Surgeon: Darian Toribio MD;  Location:  [facility] CATH INVASIVE LOC[facility];  Service:    • CARDIAC CATHETERIZATION Left 2/27/2018    Procedure: Cardiac  Catheterization/Vascular Study SHAHRZAD [place] PRN;  Surgeon: Darian Toribio MD;  Location: [facility] CATH INVASIVE LOCATION;  Service:    • CARDIAC CATHETERIZATION  2/27/2018    Procedure: Functional Flow [place];  Surgeon: Darian Toribio MD;  Location: [facility] CATH INVASIVE LOCATION;  Service:    • CARDIAC CATHETERIZATION N/A 2/27/2018    Procedure: Left ventriculography;  Surgeon: Darian Toribio MD;  Location: [facility] CATH INVASIVE LOCATION;  Service:    • CARDIAC ELECTROPHYSIOLOGY PROCEDURE N/A 1/11/2018    Procedure: PPM generator change - dual;  Surgeon: Darian Toribio MD;  Location: [facility] CATH INVASIVE LOCATION;  Service:    • COLONOSCOPY  09/05/2007    colon polyps   • COLONOSCOPY N/A 5/29/2018    Procedure: COLONOSCOPY WITH ANESTHESIA;  Surgeon: Juan F Tapia MD;  Location: [facility] ENDOSCOPY;  Service: Gastroenterology   • CORONARY ANGIOPLASTY WITH STENT PLACEMENT  07/2015    SHAHRZAD mid LAD, Dr. Toribio    • CORONARY ARTERY BYPASS GRAFT  2012 AND 2014    2 vessel    • ENDOSCOPY  11/08/2010   • ENDOSCOPY N/A 5/29/2018    Procedure: ESOPHAGOGASTRODUODENOSCOPY WITH ANESTHESIA;  Surgeon: Juan F Tapia MD;  Location: [facility] ENDOSCOPY;  Service: Gastroenterology   • HEAD/NECK LESION/CYST EXCISION Left 12/20/2016    Procedure: EXCISION MALIGNANT MELANOMA WITH SENTINEL NODE BIOPSY, INJECTION AND SCAN PRE-OP, LEFT EAR;  Surgeon: Guanaco Zepeda MD;  Location: [facility] OR;  Service:    • HEMORRHOIDECTOMY     • INSERT / REPLACE / REMOVE PACEMAKER     • SENTINEL NODE BIOPSY Left 12/20/2016    Procedure: SENTINEL NODE BIOPSY;  Surgeon: Guanaco Zepeda MD;  Location: [facility] OR;  Service:    • SKIN CANCER EXCISION     • THYROID SURGERY     • THYROIDECTOMY         Review of Systems   Constitutional: Negative.    HENT: Negative.    Eyes: Negative.    Respiratory: Negative.    Cardiovascular: Negative.    Gastrointestinal: Negative.    Endocrine: Negative.    Genitourinary: Negative.    Musculoskeletal: Positive for joint swelling.   Skin:  "Negative.    Allergic/Immunologic: Negative.    Neurological: Negative.    Hematological: Negative.    Psychiatric/Behavioral: Negative.        Objective  /76   Pulse 67   Resp 16   Ht 172.7 cm (68\")   Wt 117 kg (259 lb)   SpO2 97%   BMI 39.38 kg/m²   Physical Exam   Constitutional: He is oriented to person, place, and time. He appears well-developed and well-nourished.   HENT:   Head: Normocephalic and atraumatic.   Eyes: EOM are normal. Pupils are equal, round, and reactive to light.   Neck: Normal range of motion. Neck supple.   Cardiovascular: Normal rate, regular rhythm, normal heart sounds and intact distal pulses.    Pulmonary/Chest: Effort normal and breath sounds normal.   Abdominal: Soft. Bowel sounds are normal.   Musculoskeletal: He exhibits tenderness.   Neurological: He is alert and oriented to person, place, and time.   Skin: Skin is warm. Capillary refill takes less than 2 seconds.   Psychiatric: He has a normal mood and affect. His behavior is normal. Judgment and thought content normal.   Vitals reviewed.      Assessment/Plan   Carlo was seen today for follow-up.    Diagnoses and all orders for this visit:    Acute pain of right knee                 No orders of the defined types were placed in this encounter.      Follow up: 3 month(s)  "

## 2018-05-31 NOTE — PROGRESS NOTES
QUICK REFERENCE INFORMATION:  The ABCs of the Annual Wellness Visit    Subsequent Medicare Wellness Visit    HEALTH RISK ASSESSMENT    1945    Recent Hospitalizations:  No hospitalization(s) within the last year..        Current Medical Providers:  Patient Care Team:  Rhys Rosen MD as PCP - General (Family Medicine)  Rhys Rosen MD as PCP - Claims Attributed  Darian Toribio MD as Cardiologist (Cardiology)  Juan F Tapia MD as Consulting Physician (Gastroenterology)        Smoking Status:  History   Smoking Status   • Former Smoker   • Years: 10.00   • Types: Cigarettes   • Quit date: 1977   Smokeless Tobacco   • Former User   • Quit date: 1977       Alcohol Consumption:  History   Alcohol Use No       Depression Screen:   PHQ-2/PHQ-9 Depression Screening 5/31/2018   Little interest or pleasure in doing things 0   Feeling down, depressed, or hopeless 0   Trouble falling or staying asleep, or sleeping too much 0   Feeling tired or having little energy 0   Poor appetite or overeating 1   Feeling bad about yourself - or that you are a failure or have let yourself or your family down -   Trouble concentrating on things, such as reading the newspaper or watching television 0   Moving or speaking so slowly that other people could have noticed. Or the opposite - being so fidgety or restless that you have been moving around a lot more than usual 0   Thoughts that you would be better off dead, or of hurting yourself in some way 0   Total Score 1   If you checked off any problems, how difficult have these problems made it for you to do your work, take care of things at home, or get along with other people? Not difficult at all       Health Habits and Functional and Cognitive Screening:  Functional & Cognitive Status 5/31/2018   Do you have difficulty preparing food and eating? No   Do you have difficulty bathing yourself, getting dressed or grooming yourself? No   Do you have difficulty using the  toilet? No   Do you have difficulty moving around from place to place? No   Do you have trouble with steps or getting out of a bed or a chair? No   In the past year have you fallen or experienced a near fall? Yes   Current Diet Well Balanced Diet   Dental Exam Up to date   Eye Exam Up to date   Exercise (times per week) 5 times per week   Current Exercise Activities Include Yard Work   Do you need help using the phone?  No   Are you deaf or do you have serious difficulty hearing?  No   Do you need help with transportation? No   Do you need help shopping? No   Do you need help preparing meals?  No   Do you need help with housework?  No   Do you need help with laundry? No   Do you need help taking your medications? No   Do you need help managing money? No   Do you ever drive or ride in a car without wearing a seat belt? No   Have you felt unusual stress, anger or loneliness in the last month? No   Who do you live with? Spouse   If you need help, do you have trouble finding someone available to you? No   Have you been bothered in the last four weeks by sexual problems? No   Do you have difficulty concentrating, remembering or making decisions? No           Does the patient have evidence of cognitive impairment? No    Aspirin use counseling: Taking ASA appropriately as indicated      Recent Lab Results:  CMP:  Lab Results   Component Value Date    GLU 77 09/20/2017    BUN 17 02/27/2018    CREATININE 0.77 02/27/2018    EGFRIFNONA 100 02/27/2018    EGFRIFAFRI 150 09/20/2017    BCR 22.1 02/27/2018     02/27/2018    K 3.6 02/27/2018    CO2 30.0 02/27/2018    CALCIUM 8.8 02/27/2018    PROTENTOTREF 5.9 (L) 09/20/2017    ALBUMIN 3.70 02/27/2018    LABGLOBREF 2.3 09/20/2017    LABIL2 1.4 02/27/2018    BILITOT 0.7 02/27/2018    ALKPHOS 66 02/27/2018    AST 32 02/27/2018    ALT 37 02/27/2018     Lipid Panel:  Lab Results   Component Value Date    CHOL 185 10/22/2016    TRIG 226 (H) 09/20/2017    HDL 38 (L) 09/20/2017     VLDL 45.2 09/20/2017    LDLHDL 3.49 10/22/2016     HbA1c:  Lab Results   Component Value Date    HGBA1C 5.90 03/22/2017       Visual Acuity:   Visual Acuity Screening    Right eye Left eye Both eyes   Without correction:      With correction: 20/30 20/40 20/30       Age-appropriate Screening Schedule:  Refer to the list below for future screening recommendations based on patient's age, sex and/or medical conditions. Orders for these recommended tests are listed in the plan section. The patient has been provided with a written plan.    Health Maintenance   Topic Date Due   • TDAP/TD VACCINES (1 - Tdap) 08/02/1966   • ZOSTER VACCINE (1 of 2) 08/02/1997   • PNEUMOCOCCAL VACCINES (65+ LOW/MEDIUM RISK) (1 of 2 - PCV13) 08/02/2012   • INFLUENZA VACCINE  08/01/2018   • LIPID PANEL  09/20/2018   • COLONOSCOPY  05/29/2028        Subjective   History of Present Illness    Carlo Velasco is a 72 y.o. male who presents for an Subsequent Wellness Visit.    The following portions of the patient's history were reviewed and updated as appropriate: allergies, current medications, past family history, past medical history, past social history, past surgical history and problem list.    Outpatient Medications Prior to Visit   Medication Sig Dispense Refill   • ALPRAZolam (XANAX) 0.25 MG tablet TAKE 1 TABLET BY MOUTH THREE TIMES DAILY 90 tablet 0   • amLODIPine (NORVASC) 5 MG tablet Take 1 tablet by mouth Daily. 90 tablet 3   • aspirin 81 MG EC tablet Take 81 mg by mouth daily.       • atorvastatin (LIPITOR) 80 MG tablet Take 80 mg by mouth Daily.     • donepezil (ARICEPT) 5 MG tablet TAKE 1 TABLET BY MOUTH AT BEDTIME 90 tablet 0   • enoxaparin (LOVENOX) 120 MG/0.8ML solution syringe Inject 0.79 mL under the skin Every 12 (Twelve) Hours. 9 syringe 0   • escitalopram (LEXAPRO) 20 MG tablet TAKE 1 TABLET BY MOUTH DAILY 90 tablet 2   • furosemide (LASIX) 40 MG tablet TAKE 1 TABLET BY MOUTH DAILY 90 tablet 3   • gabapentin (NEURONTIN) 300  "MG capsule TK 1 C PO QHS  5   • HYDROcodone-acetaminophen (NORCO) 7.5-325 MG per tablet Take 1 tablet by mouth Every 4 (Four) Hours As Needed for Severe Pain . 12 tablet 0   • metoprolol tartrate (LOPRESSOR) 25 MG tablet Take one tablet by mouth twice a day. 180 tablet 4   • NITROSTAT 0.4 MG SL tablet Place 1 tablet under the tongue Every 5 (Five) Minutes As Needed for Chest Pain. Take no more than 3 doses in 15 minutes. 30 tablet 2   • omega-3 acid ethyl esters (LOVAZA) 1 g capsule TAKE 1 CAPSULE BY MOUTH DAILY 90 capsule 0   • omeprazole (priLOSEC) 20 MG capsule Take 1 capsule by mouth Daily. 30 capsule 5   • Potassium 99 MG tablet Take 1 tablet by mouth Daily.     • ticagrelor (BRILINTA) 90 MG tablet tablet Take 1 tablet by mouth Every 12 (Twelve) Hours. 60 tablet 11     No facility-administered medications prior to visit.        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   • Essential hypertension   • Anxiety   • Colon polyps   • Mixed hyperlipidemia   • Incisional hernia, without obstruction or gangrene   • Precordial chest pain   • BRBPR (bright red blood per rectum)   • Esophageal dysphagia   • Prostatism   • Nocturia   • Orthostasis   • Hypotension due to drugs   • Anxiety   • Chronic midline low back pain without sciatica   • Rectal bleeding   • Melena   • Hx of colonic polyp   • Acute pain of right knee       Advance Care Planning:  has an advance directive - a copy has been provided and is in file    Identification of Risk Factors:  Risk factors include: cardiovascular risk.    Review of Systems    Compared to one year ago, the patient feels his physical health is better.  Compared to one year ago, the patient feels his mental health is better.    Objective     Physical Exam    Vitals:    05/31/18 1050   BP: 130/76   Pulse: 67   Resp: 16   SpO2: 97%   Weight: 117 kg (259 lb)   Height: 172.7 cm (68\") "       Patient's Body mass index is 39.38 kg/m². BMI is above normal parameters. Recommendations include: exercise counseling and nutrition counseling.      Assessment/Plan   Patient Self-Management and Personalized Health Advice  The patient has been provided with information about: fall prevention and preventive services including:   · Fall Risk assessment done.    Visit Diagnoses:    ICD-10-CM ICD-9-CM   1. Acute pain of right knee M25.561 719.46       Orders Placed This Encounter   Procedures   • Ambulatory Referral to Orthopedic Surgery     Referral Priority:   Routine     Referral Type:   Consultation     Referral Reason:   Specialty Services Required     Requested Specialty:   Orthopedic Surgery     Number of Visits Requested:   1       Outpatient Encounter Prescriptions as of 5/31/2018   Medication Sig Dispense Refill   • ALPRAZolam (XANAX) 0.25 MG tablet TAKE 1 TABLET BY MOUTH THREE TIMES DAILY 90 tablet 0   • amLODIPine (NORVASC) 5 MG tablet Take 1 tablet by mouth Daily. 90 tablet 3   • aspirin 81 MG EC tablet Take 81 mg by mouth daily.       • atorvastatin (LIPITOR) 80 MG tablet Take 80 mg by mouth Daily.     • donepezil (ARICEPT) 5 MG tablet TAKE 1 TABLET BY MOUTH AT BEDTIME 90 tablet 0   • enoxaparin (LOVENOX) 120 MG/0.8ML solution syringe Inject 0.79 mL under the skin Every 12 (Twelve) Hours. 9 syringe 0   • escitalopram (LEXAPRO) 20 MG tablet TAKE 1 TABLET BY MOUTH DAILY 90 tablet 2   • furosemide (LASIX) 40 MG tablet TAKE 1 TABLET BY MOUTH DAILY 90 tablet 3   • gabapentin (NEURONTIN) 300 MG capsule TK 1 C PO QHS  5   • HYDROcodone-acetaminophen (NORCO) 7.5-325 MG per tablet Take 1 tablet by mouth Every 4 (Four) Hours As Needed for Severe Pain . 12 tablet 0   • metoprolol tartrate (LOPRESSOR) 25 MG tablet Take one tablet by mouth twice a day. 180 tablet 4   • NITROSTAT 0.4 MG SL tablet Place 1 tablet under the tongue Every 5 (Five) Minutes As Needed for Chest Pain. Take no more than 3 doses in 15  minutes. 30 tablet 2   • omega-3 acid ethyl esters (LOVAZA) 1 g capsule TAKE 1 CAPSULE BY MOUTH DAILY 90 capsule 0   • omeprazole (priLOSEC) 20 MG capsule Take 1 capsule by mouth Daily. 30 capsule 5   • Potassium 99 MG tablet Take 1 tablet by mouth Daily.     • ticagrelor (BRILINTA) 90 MG tablet tablet Take 1 tablet by mouth Every 12 (Twelve) Hours. 60 tablet 11     No facility-administered encounter medications on file as of 5/31/2018.        Reviewed use of high risk medication in the elderly: yes  Reviewed for potential of harmful drug interactions in the elderly: no    Follow Up:  No Follow-up on file.     An After Visit Summary and PPPS with all of these plans were given to the patient.

## 2018-06-04 ENCOUNTER — HOSPITAL ENCOUNTER (OUTPATIENT)
Dept: ULTRASOUND IMAGING | Facility: HOSPITAL | Age: 73
Discharge: HOME | End: 2018-06-04
Admitting: PHYSICIAN ASSISTANT

## 2018-06-04 ENCOUNTER — TRANSCRIBE ORDERS (OUTPATIENT)
Dept: ADMINISTRATIVE | Facility: HOSPITAL | Age: 73
End: 2018-06-04

## 2018-06-04 DIAGNOSIS — M25.561 RIGHT KNEE PAIN, UNSPECIFIED CHRONICITY: ICD-10-CM

## 2018-06-04 DIAGNOSIS — M79.661 PAIN IN RIGHT LOWER LEG: ICD-10-CM

## 2018-06-04 DIAGNOSIS — M25.561 RIGHT KNEE PAIN, UNSPECIFIED CHRONICITY: Primary | ICD-10-CM

## 2018-06-04 PROCEDURE — 93971 EXTREMITY STUDY: CPT

## 2018-06-25 RX ORDER — OMEGA-3-ACID ETHYL ESTERS 1 G/1
CAPSULE, LIQUID FILLED ORAL
Qty: 90 CAPSULE | Refills: 3 | Status: SHIPPED | OUTPATIENT
Start: 2018-06-25 | End: 2019-05-01 | Stop reason: SDUPTHER

## 2018-07-09 RX ORDER — ESCITALOPRAM OXALATE 20 MG/1
20 TABLET ORAL DAILY
Qty: 30 TABLET | Refills: 0 | Status: SHIPPED | OUTPATIENT
Start: 2018-07-09 | End: 2018-10-11

## 2018-07-25 RX ORDER — OMEPRAZOLE 20 MG/1
20 CAPSULE, DELAYED RELEASE ORAL DAILY
Qty: 30 CAPSULE | Refills: 5 | Status: SHIPPED | OUTPATIENT
Start: 2018-07-25 | End: 2019-03-04 | Stop reason: SDUPTHER

## 2018-07-25 RX ORDER — OMEPRAZOLE 20 MG/1
20 CAPSULE, DELAYED RELEASE ORAL DAILY
Qty: 30 CAPSULE | Refills: 5 | Status: SHIPPED | OUTPATIENT
Start: 2018-07-25 | End: 2018-10-11

## 2018-07-30 RX ORDER — ALPRAZOLAM 0.25 MG/1
TABLET ORAL
Qty: 90 TABLET | Refills: 0 | Status: ON HOLD | OUTPATIENT
Start: 2018-07-30 | End: 2021-05-01

## 2018-08-07 ENCOUNTER — CLINICAL SUPPORT NO REQUIREMENTS (OUTPATIENT)
Dept: CARDIOLOGY | Facility: CLINIC | Age: 73
End: 2018-08-07

## 2018-08-07 DIAGNOSIS — Z95.0 PACEMAKER: Primary | ICD-10-CM

## 2018-08-07 DIAGNOSIS — I49.5 SSS (SICK SINUS SYNDROME) (HCC): ICD-10-CM

## 2018-08-07 PROCEDURE — 93280 PM DEVICE PROGR EVAL DUAL: CPT | Performed by: INTERNAL MEDICINE

## 2018-08-07 NOTE — PROGRESS NOTES
I have reviewed the notes, assessments, and/or procedures performed by  Alondra Vicente RN, I concur with her  documentation of  aCrlo Velasco.

## 2018-08-07 NOTE — PROGRESS NOTES
Dual Chamber Pacemaker Evaluation Report  IN OFFICE INTERROGATION    August 7, 2018    Primary Cardiologist: Malu  : 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    Battery status: satisfactory, 3.01V, estimated 10.3-11 years remaining      Final Parameters  Mode:  DDDR  Lower rate: 60 bpm   Upper rate: 130 bpm  AV Delay: paced- 225 ms  Sensed-200 ms  Atrial - Amplitude: 2.5 V   Pulse width: 0.5 ms   Sensitivity: 1 mV     Ventricular - Amplitude: 2 V  Pulse width: 0.5 ms  Sensitivity: 2 mV    Changes made: atrial sensitivity changed to 1mV; ACap confirm turned off; noise reversion episode trigger turned from OFF to LOW; ventricular pulse amplitude changed to 2V based on threshold testing.  Conclusions: normal pacemaker function, stable pacing and sensing thresholds and adequate battery reserve    Follow up: 6 months via [facility], annually in office

## 2018-08-14 RX ORDER — ESCITALOPRAM OXALATE 20 MG/1
20 TABLET ORAL DAILY
Qty: 30 TABLET | Refills: 0 | Status: SHIPPED | OUTPATIENT
Start: 2018-08-14 | End: 2018-10-11

## 2018-08-29 RX ORDER — FUROSEMIDE 40 MG/1
TABLET ORAL
Qty: 90 TABLET | Refills: 0 | Status: SHIPPED | OUTPATIENT
Start: 2018-08-29 | End: 2019-04-25 | Stop reason: SDUPTHER

## 2018-09-11 RX ORDER — ESCITALOPRAM OXALATE 20 MG/1
20 TABLET ORAL DAILY
Qty: 30 TABLET | Refills: 0 | Status: SHIPPED | OUTPATIENT
Start: 2018-09-11 | End: 2018-10-11

## 2018-10-01 DIAGNOSIS — M54.40 ACUTE LOW BACK PAIN WITH SCIATICA, SCIATICA LATERALITY UNSPECIFIED, UNSPECIFIED BACK PAIN LATERALITY: Primary | ICD-10-CM

## 2018-10-02 RX ORDER — DONEPEZIL HYDROCHLORIDE 5 MG/1
TABLET, FILM COATED ORAL
Qty: 90 TABLET | Refills: 0 | Status: SHIPPED | OUTPATIENT
Start: 2018-10-02 | End: 2019-01-22 | Stop reason: SDUPTHER

## 2018-10-08 RX ORDER — ESCITALOPRAM OXALATE 20 MG/1
20 TABLET ORAL DAILY
Qty: 30 TABLET | Refills: 0 | Status: SHIPPED | OUTPATIENT
Start: 2018-10-08 | End: 2020-05-07

## 2018-10-11 ENCOUNTER — OFFICE VISIT (OUTPATIENT)
Dept: FAMILY MEDICINE CLINIC | Facility: CLINIC | Age: 73
End: 2018-10-11

## 2018-10-11 VITALS
HEIGHT: 68 IN | DIASTOLIC BLOOD PRESSURE: 74 MMHG | BODY MASS INDEX: 38.34 KG/M2 | WEIGHT: 253 LBS | HEART RATE: 70 BPM | TEMPERATURE: 98.8 F | SYSTOLIC BLOOD PRESSURE: 126 MMHG | RESPIRATION RATE: 16 BRPM

## 2018-10-11 DIAGNOSIS — Z23 NEED FOR VACCINATION: ICD-10-CM

## 2018-10-11 DIAGNOSIS — E78.2 MIXED HYPERLIPIDEMIA: ICD-10-CM

## 2018-10-11 DIAGNOSIS — I10 ESSENTIAL HYPERTENSION: ICD-10-CM

## 2018-10-11 DIAGNOSIS — M54.50 CHRONIC MIDLINE LOW BACK PAIN WITHOUT SCIATICA: ICD-10-CM

## 2018-10-11 DIAGNOSIS — G89.29 CHRONIC MIDLINE LOW BACK PAIN WITHOUT SCIATICA: ICD-10-CM

## 2018-10-11 DIAGNOSIS — I25.729 CORONARY ARTERY DISEASE INVOLVING AUTOLOGOUS ARTERY CORONARY BYPASS GRAFT WITH ANGINA PECTORIS (HCC): Primary | ICD-10-CM

## 2018-10-11 DIAGNOSIS — Z12.5 ENCOUNTER FOR SCREENING FOR MALIGNANT NEOPLASM OF PROSTATE: ICD-10-CM

## 2018-10-11 PROBLEM — K62.5 RECTAL BLEEDING: Status: RESOLVED | Noted: 2018-03-19 | Resolved: 2018-10-11

## 2018-10-11 PROBLEM — K92.1 MELENA: Status: RESOLVED | Noted: 2018-05-09 | Resolved: 2018-10-11

## 2018-10-11 LAB
ALBUMIN SERPL-MCNC: 3.8 G/DL (ref 3.5–5)
ALBUMIN/GLOB SERPL: 1.5 G/DL (ref 1.1–2.5)
ALP SERPL-CCNC: 70 U/L (ref 24–120)
ALT SERPL-CCNC: 38 U/L (ref 0–54)
AST SERPL-CCNC: 23 U/L (ref 7–45)
BASOPHILS # BLD AUTO: 0.09 10*3/MM3 (ref 0–0.2)
BASOPHILS NFR BLD AUTO: 0.8 % (ref 0–2)
BILIRUB SERPL-MCNC: 0.8 MG/DL (ref 0.1–1)
BUN SERPL-MCNC: 18 MG/DL (ref 5–21)
BUN/CREAT SERPL: 26.9 (ref 7–25)
CALCIUM SERPL-MCNC: 9.3 MG/DL (ref 8.4–10.4)
CHLORIDE SERPL-SCNC: 103 MMOL/L (ref 98–110)
CHOLEST SERPL-MCNC: 132 MG/DL (ref 130–200)
CHOLEST/HDLC SERPL: 2.4 {RATIO}
CO2 SERPL-SCNC: 27 MMOL/L (ref 24–31)
CREAT SERPL-MCNC: 0.67 MG/DL (ref 0.5–1.4)
EOSINOPHIL # BLD AUTO: 0.42 10*3/MM3 (ref 0–0.7)
EOSINOPHIL NFR BLD AUTO: 3.5 % (ref 0–4)
ERYTHROCYTE [DISTWIDTH] IN BLOOD BY AUTOMATED COUNT: 15.5 % (ref 12–15)
GLOBULIN SER CALC-MCNC: 2.6 GM/DL
GLUCOSE SERPL-MCNC: 98 MG/DL (ref 70–100)
HCT VFR BLD AUTO: 44 % (ref 40–52)
HDLC SERPL-MCNC: 55 MG/DL
HGB BLD-MCNC: 13.8 G/DL (ref 14–18)
IMM GRANULOCYTES # BLD: 0.42 10*3/MM3 (ref 0–0.03)
IMM GRANULOCYTES NFR BLD: 3.5 % (ref 0–5)
LDLC SERPL CALC-MCNC: 49 MG/DL (ref 0–99)
LYMPHOCYTES # BLD AUTO: 2.16 10*3/MM3 (ref 0.72–4.86)
LYMPHOCYTES NFR BLD AUTO: 18.2 % (ref 15–45)
MCH RBC QN AUTO: 27.9 PG (ref 28–32)
MCHC RBC AUTO-ENTMCNC: 31.4 G/DL (ref 33–36)
MCV RBC AUTO: 88.9 FL (ref 82–95)
MONOCYTES # BLD AUTO: 0.82 10*3/MM3 (ref 0.19–1.3)
MONOCYTES NFR BLD AUTO: 6.9 % (ref 4–12)
NEUTROPHILS # BLD AUTO: 7.94 10*3/MM3 (ref 1.87–8.4)
NEUTROPHILS NFR BLD AUTO: 67.1 % (ref 39–78)
NRBC BLD AUTO-RTO: 0 /100 WBC (ref 0–0)
PLATELET # BLD AUTO: 251 10*3/MM3 (ref 130–400)
POTASSIUM SERPL-SCNC: 4 MMOL/L (ref 3.5–5.3)
PROT SERPL-MCNC: 6.4 G/DL (ref 6.3–8.7)
PSA SERPL-MCNC: 1.72 NG/ML (ref 0–4)
RBC # BLD AUTO: 4.95 10*6/MM3 (ref 4.8–5.9)
SODIUM SERPL-SCNC: 141 MMOL/L (ref 135–145)
TRIGL SERPL-MCNC: 140 MG/DL (ref 0–149)
VLDLC SERPL CALC-MCNC: 28 MG/DL
WBC # BLD AUTO: 11.85 10*3/MM3 (ref 4.8–10.8)

## 2018-10-11 PROCEDURE — 90662 IIV NO PRSV INCREASED AG IM: CPT | Performed by: FAMILY MEDICINE

## 2018-10-11 PROCEDURE — 99214 OFFICE O/P EST MOD 30 MIN: CPT | Performed by: FAMILY MEDICINE

## 2018-10-11 PROCEDURE — G0008 ADMIN INFLUENZA VIRUS VAC: HCPCS | Performed by: FAMILY MEDICINE

## 2018-10-11 NOTE — PROGRESS NOTES
Subjective   Carlo Velasco is a 73 y.o. male.     Chief Complaint   Patient presents with   • Follow-up     6mo        History of Present Illness     Carlo is seeing dr gonzalez for his chronic low back pain--he notes no angina symptoms --he nots good bp control without headaches...he nots statin tx is well tolerated...      Current Outpatient Prescriptions:   •  ALPRAZolam (XANAX) 0.25 MG tablet, TAKE 1 TABLET BY MOUTH THREE TIMES DAILY, Disp: 90 tablet, Rfl: 0  •  amLODIPine (NORVASC) 5 MG tablet, Take 1 tablet by mouth Daily., Disp: 90 tablet, Rfl: 3  •  aspirin 81 MG EC tablet, Take 81 mg by mouth daily.  , Disp: , Rfl:   •  atorvastatin (LIPITOR) 80 MG tablet, Take 80 mg by mouth Daily., Disp: , Rfl:   •  donepezil (ARICEPT) 5 MG tablet, TAKE 1 TABLET BY MOUTH AT BEDTIME, Disp: 90 tablet, Rfl: 0  •  escitalopram (LEXAPRO) 20 MG tablet, TAKE 1 TABLET BY MOUTH DAILY, Disp: 30 tablet, Rfl: 0  •  furosemide (LASIX) 40 MG tablet, TAKE 1 TABLET BY MOUTH DAILY, Disp: 90 tablet, Rfl: 0  •  gabapentin (NEURONTIN) 300 MG capsule, TK 1 C PO QHS, Disp: , Rfl: 5  •  HYDROcodone-acetaminophen (NORCO) 7.5-325 MG per tablet, Take 1 tablet by mouth Every 4 (Four) Hours As Needed for Severe Pain ., Disp: 12 tablet, Rfl: 0  •  metoprolol tartrate (LOPRESSOR) 25 MG tablet, Take one tablet by mouth twice a day., Disp: 180 tablet, Rfl: 4  •  NITROSTAT 0.4 MG SL tablet, Place 1 tablet under the tongue Every 5 (Five) Minutes As Needed for Chest Pain. Take no more than 3 doses in 15 minutes., Disp: 30 tablet, Rfl: 2  •  omega-3 acid ethyl esters (LOVAZA) 1 g capsule, TAKE 1 CAPSULE BY MOUTH DAILY, Disp: 90 capsule, Rfl: 3  •  omeprazole (priLOSEC) 20 MG capsule, TAKE 1 CAPSULE BY MOUTH DAILY, Disp: 30 capsule, Rfl: 5  •  Potassium 99 MG tablet, Take 1 tablet by mouth Daily., Disp: , Rfl:   •  ticagrelor (BRILINTA) 90 MG tablet tablet, Take 1 tablet by mouth Every 12 (Twelve) Hours. (Patient taking differently: Take 90 mg by mouth 1 (One)  Time.), Disp: 60 tablet, Rfl: 11  Allergies   Allergen Reactions   • Meperidine Other (See Comments)     HEART STOPS         Past Medical History:   Diagnosis Date   • Anxiety    • Arthritis    • Cancer (CMS/HCC)     melanoma   • CHF (congestive heart failure) (CMS/HCC)    • Claustrophobia    • Colon polyp    • Coronary artery disease 2012, 2015    2 vessel CABG (2012), SHAHRZAD LAD (2015).    • Depression    • Disease of thyroid gland     partial thyroidectomy on right   • GERD (gastroesophageal reflux disease)    • Hearing loss    • Heart attack    • Heart attack    • History of transfusion    • Hyperlipidemia    • Hypertension    • Kidney stone     history of    • Tobacco abuse, in remission    • Uses hearing aid     BILATERAL     Past Surgical History:   Procedure Laterality Date   • BACK SURGERY      CERVICAL FUSION AND LUMBAR DISC REPLACEMENT   • CARDIAC CATHETERIZATION  07/2015    SHAHRZAD mid LAD, Dr. Toribio   • CARDIAC CATHETERIZATION N/A 10/21/2016    Procedure: Left Heart Cath;  Surgeon: Darian Toribio MD;  Location:  [facility] CATH INVASIVE LOC[facility];  Service:    • CARDIAC CATHETERIZATION Left 4/5/2017    Procedure: Cardiac Catheterization/Vascular Study;  Surgeon: Darian Toribio MD;  Location:  [facility];  Service:    • CARDIAC CATHETERIZATION N/A 4/5/2017    Procedure: Left Heart Cath;  Surgeon: Darian Toribio MD;  Location:  [facility];  Service:    • CARDIAC CATHETERIZATION N/A 4/5/2017    Procedure: Coronary angiography;  Surgeon: Darian Toribio MD;  Location:  [facility];  Service:    • CARDIAC CATHETERIZATION N/A 4/5/2017    Procedure: Left ventriculography;  Surgeon: Darian Toribio MD;  Location:  [facility];  Service:    • CARDIAC CATHETERIZATION  4/5/2017    Procedure: Saphenous Vein Graft;  Surgeon: Darian Toribio MD;  Location:  [facility];  Service:    • CARDIAC CATHETERIZATION Left 2/27/2018    Procedure: Cardiac  Catheterization/Vascular Study SHAHRZAD OK PRN;  Surgeon: Darian Toribio MD;  Location: [facility] CATH INVASIVE LOCATION;  Service:    • CARDIAC CATHETERIZATION  2/27/2018    Procedure: Functional Flow [place];  Surgeon: Darian Toribio MD;  Location: [facility] CATH INVASIVE LOCATION;  Service:    • CARDIAC CATHETERIZATION N/A 2/27/2018    Procedure: Left ventriculography;  Surgeon: Darian Toribio MD;  Location: [facility] CATH INVASIVE LOCATION;  Service:    • CARDIAC ELECTROPHYSIOLOGY PROCEDURE N/A 1/11/2018    Procedure: PPM generator change - dual;  Surgeon: Darian Toribio MD;  Location: [facility] CATH INVASIVE LOCATION;  Service:    • COLONOSCOPY  09/05/2007    colon polyps   • COLONOSCOPY N/A 5/29/2018    Procedure: COLONOSCOPY WITH ANESTHESIA;  Surgeon: Juan F Tapia MD;  Location: [facility] ENDOSCOPY;  Service: Gastroenterology   • CORONARY ANGIOPLASTY WITH STENT PLACEMENT  07/2015    SHAHRZAD mid LAD, Dr. Toribio    • CORONARY ARTERY BYPASS GRAFT  2012 AND 2014    2 vessel    • ENDOSCOPY  11/08/2010   • ENDOSCOPY N/A 5/29/2018    Procedure: ESOPHAGOGASTRODUODENOSCOPY WITH ANESTHESIA;  Surgeon: Juan F Tapia MD;  Location: [facility] ENDOSCOPY;  Service: Gastroenterology   • HEAD/NECK LESION/CYST EXCISION Left 12/20/2016    Procedure: EXCISION MALIGNANT MELANOMA WITH SENTINEL NODE BIOPSY, INJECTION AND SCAN PRE-OP, LEFT EAR;  Surgeon: Guanaco Zepeda MD;  Location: [facility] OR;  Service:    • HEMORRHOIDECTOMY     • INSERT / REPLACE / REMOVE PACEMAKER     • SENTINEL NODE BIOPSY Left 12/20/2016    Procedure: SENTINEL NODE BIOPSY;  Surgeon: Guanaco Zepeda MD;  Location: [facility] OR;  Service:    • SKIN CANCER EXCISION     • THYROID SURGERY     • THYROIDECTOMY         Review of Systems   Constitutional: Negative.    HENT: Negative.    Eyes: Negative.    Respiratory: Negative.    Cardiovascular: Negative.    Gastrointestinal: Negative.    Endocrine: Negative.    Genitourinary: Negative.    Musculoskeletal: Positive for back pain.   Skin: Negative.   "  Allergic/Immunologic: Negative.    Neurological: Negative.    Hematological: Negative.    Psychiatric/Behavioral: Negative.        Objective  /74   Pulse 70   Temp 98.8 °F (37.1 °C)   Resp 16   Ht 172.7 cm (67.99\")   Wt 115 kg (253 lb)   BMI 38.48 kg/m²   Physical Exam   Constitutional: He is oriented to person, place, and time. He appears well-developed and well-nourished.   HENT:   Head: Normocephalic and atraumatic.   Right Ear: External ear normal.   Left Ear: External ear normal.   Nose: Nose normal.   Mouth/Throat: Oropharynx is clear and moist.   Eyes: Pupils are equal, round, and reactive to light. Conjunctivae and EOM are normal.   Neck: Normal range of motion. Neck supple.   Cardiovascular: Normal rate, regular rhythm, normal heart sounds and intact distal pulses.    Pulmonary/Chest: Effort normal and breath sounds normal.   Abdominal: Soft. Bowel sounds are normal.   Musculoskeletal: Normal range of motion. He exhibits tenderness.   Neurological: He is alert and oriented to person, place, and time.   Skin: Skin is warm. Capillary refill takes less than 2 seconds.   Psychiatric: He has a normal mood and affect. His behavior is normal. Judgment and thought content normal.   Nursing note and vitals reviewed.      Assessment/Plan   Carlo was seen today for follow-up.    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 BMI is above normal parameters. Recommendations include: no follow-up required.  Flu shot         Orders Placed This Encounter "   Procedures   • Comprehensive metabolic panel   • Lipid Panel w/ Chol/HDL Ratio   • PSA SCREENING   • CBC w AUTO Differential     Order Specific Question:   Manual Differential     Answer:   No     Current outpatient and discharge medications have been reconciled for the patient.  Reviewed by: Rhys Rosen MD  Follow up: 6 month(s)

## 2018-11-05 RX ORDER — ATORVASTATIN CALCIUM 80 MG/1
TABLET, FILM COATED ORAL
Qty: 30 TABLET | Refills: 5 | Status: SHIPPED | OUTPATIENT
Start: 2018-11-05 | End: 2019-09-09 | Stop reason: SDUPTHER

## 2018-11-07 RX ORDER — ESCITALOPRAM OXALATE 20 MG/1
20 TABLET ORAL DAILY
Qty: 30 TABLET | Refills: 5 | Status: SHIPPED | OUTPATIENT
Start: 2018-11-07 | End: 2018-11-30 | Stop reason: SDUPTHER

## 2018-11-14 ENCOUNTER — TELEPHONE (OUTPATIENT)
Dept: CARDIOLOGY | Facility: CLINIC | Age: 73
End: 2018-11-14

## 2018-11-14 NOTE — TELEPHONE ENCOUNTER
PATIENT IS HAVING THE FOLLOWING SURGICAL PROCEDURE DONE LUMBAR DISCOGRAM/POSSIBLE LOWER BACK SURGERY HE WILL NEED TO BE OFF ASA 10 DAY AND BRILINTA 5 DAYS PRIOR TO THE SURGERY. PATIENT HAS A PACEMAKER     RECENT CATH ON 02-   LOV- 08-     PLEASE ADVISE.

## 2018-11-16 ENCOUNTER — TELEPHONE (OUTPATIENT)
Dept: CARDIOLOGY | Facility: CLINIC | Age: 73
End: 2018-11-16

## 2018-11-16 NOTE — TELEPHONE ENCOUNTER
OK to hold Plavix x 5 days prior to procedure and resume when possible when no bleeding risks after procedure. Do not  stop Aspirin.    IF ASPIRIN HAS TO BE HELD THEN NEEDS LOVENOX BRIDGING

## 2018-11-16 NOTE — TELEPHONE ENCOUNTER
[facility]  IS REQUESTING CLEARANCE FOR LUMBAR DISCOGRAM WITH DR LOO. THIS IS NOT YET  SCHEDULED.     PT HAS CAD  & WAS LAST STENTED 2017.     ON BRILINTA, ASA 81MG, FISH OIL - LOV WITH YOU 05/21/18    PLEASE ADVISE

## 2018-11-19 NOTE — TELEPHONE ENCOUNTER
OK to hold Brilinta x 5 days prior to procedure and resume when possible when no bleeding risks after procedure. Do not  stop Aspirin.    OK to stop Fish oil for any duration

## 2018-11-26 RX ORDER — TICAGRELOR 90 MG/1
TABLET ORAL
Qty: 60 TABLET | Refills: 6 | Status: SHIPPED | OUTPATIENT
Start: 2018-11-26 | End: 2019-07-08

## 2018-11-30 ENCOUNTER — OFFICE VISIT (OUTPATIENT)
Dept: FAMILY MEDICINE CLINIC | Facility: CLINIC | Age: 73
End: 2018-11-30

## 2018-11-30 VITALS
HEART RATE: 74 BPM | BODY MASS INDEX: 39.4 KG/M2 | WEIGHT: 260 LBS | OXYGEN SATURATION: 94 % | DIASTOLIC BLOOD PRESSURE: 72 MMHG | HEIGHT: 68 IN | SYSTOLIC BLOOD PRESSURE: 128 MMHG | RESPIRATION RATE: 16 BRPM

## 2018-11-30 DIAGNOSIS — E66.01 MORBIDLY OBESE (HCC): ICD-10-CM

## 2018-11-30 DIAGNOSIS — M79.671 PAIN OF RIGHT HEEL: Primary | ICD-10-CM

## 2018-11-30 PROCEDURE — 99213 OFFICE O/P EST LOW 20 MIN: CPT | Performed by: FAMILY MEDICINE

## 2018-11-30 RX ORDER — OMEGA-3-ACID ETHYL ESTERS 1 G/1
CAPSULE, LIQUID FILLED ORAL
COMMUNITY
Start: 2018-06-25 | End: 2019-04-11 | Stop reason: SDUPTHER

## 2018-11-30 RX ORDER — CETIRIZINE HYDROCHLORIDE 10 MG/1
10 TABLET ORAL
COMMUNITY
End: 2019-04-11

## 2018-11-30 NOTE — PROGRESS NOTES
Subjective   Carlo Velasco is a 73 y.o. male.     Chief Complaint   Patient presents with   • Foot Pain     right heel pain that radiate to arch and up into the right calf   x 2 weeks  no known injury         History of Present Illness     noted heel pain for 2weeks--denies any injury      Current Outpatient Medications:   •  omega-3 acid ethyl esters (LOVAZA) 1 g capsule, , Disp: , Rfl:   •  ALPRAZolam (XANAX) 0.25 MG tablet, TAKE 1 TABLET BY MOUTH THREE TIMES DAILY, Disp: 90 tablet, Rfl: 0  •  amLODIPine (NORVASC) 5 MG tablet, Take 1 tablet by mouth Daily., Disp: 90 tablet, Rfl: 3  •  aspirin 81 MG EC tablet, Take 81 mg by mouth daily.  , Disp: , Rfl:   •  atorvastatin (LIPITOR) 80 MG tablet, TAKE 1 TABLET BY MOUTH EVERY NIGHT, Disp: 30 tablet, Rfl: 5  •  BRILINTA 90 MG tablet tablet, TAKE 1 TABLET BY MOUTH EVERY 12 HOURS, Disp: 60 tablet, Rfl: 6  •  cetirizine (zyrTEC) 10 MG tablet, Take 10 mg by mouth., Disp: , Rfl:   •  donepezil (ARICEPT) 5 MG tablet, TAKE 1 TABLET BY MOUTH AT BEDTIME, Disp: 90 tablet, Rfl: 0  •  escitalopram (LEXAPRO) 20 MG tablet, TAKE 1 TABLET BY MOUTH DAILY, Disp: 30 tablet, Rfl: 0  •  furosemide (LASIX) 40 MG tablet, TAKE 1 TABLET BY MOUTH DAILY, Disp: 90 tablet, Rfl: 0  •  gabapentin (NEURONTIN) 300 MG capsule, TK 1 C PO QHS, Disp: , Rfl: 5  •  HYDROcodone-acetaminophen (NORCO) 7.5-325 MG per tablet, Take 1 tablet by mouth Every 4 (Four) Hours As Needed for Severe Pain ., Disp: 12 tablet, Rfl: 0  •  metoprolol tartrate (LOPRESSOR) 25 MG tablet, Take one tablet by mouth twice a day., Disp: 180 tablet, Rfl: 4  •  NITROSTAT 0.4 MG SL tablet, Place 1 tablet under the tongue Every 5 (Five) Minutes As Needed for Chest Pain. Take no more than 3 doses in 15 minutes., Disp: 30 tablet, Rfl: 2  •  omega-3 acid ethyl esters (LOVAZA) 1 g capsule, TAKE 1 CAPSULE BY MOUTH DAILY, Disp: 90 capsule, Rfl: 3  •  omeprazole (priLOSEC) 20 MG capsule, TAKE 1 CAPSULE BY MOUTH DAILY, Disp: 30 capsule, Rfl: 5  •  " Potassium 99 MG tablet, Take 1 tablet by mouth Daily., Disp: , Rfl:   Allergies   Allergen Reactions   • Meperidine Other (See Comments)     HEART STOPS         Past Medical History:   Diagnosis Date   • Anxiety    • Arthritis    • Cancer (CMS/HCC)     melanoma   • CHF (congestive heart failure) (CMS/HCC)    • Claustrophobia    • Colon polyp    • Coronary artery disease 2012, 2015    2 vessel CABG (2012), SHAHRZAD LAD (2015).    • Depression    • Disease of thyroid gland     partial thyroidectomy on right   • GERD (gastroesophageal reflux disease)    • Hearing loss    • Heart attack    • Heart attack    • History of transfusion    • Hyperlipidemia    • Hypertension    • Kidney stone     history of    • Tobacco abuse, in remission    • Uses hearing aid     BILATERAL     Past Surgical History:   Procedure Laterality Date   • BACK SURGERY      CERVICAL FUSION AND LUMBAR DISC REPLACEMENT   • CARDIAC CATHETERIZATION  07/2015    SHAHRZAD mid LADDr. Toribio   • COLONOSCOPY  09/05/2007    colon polyps   • CORONARY ANGIOPLASTY WITH STENT PLACEMENT  07/2015    SHAHRZAD mid Dr. Malu LO    • CORONARY ARTERY BYPASS GRAFT  2012 AND 2014    2 vessel    • ENDOSCOPY  11/08/2010   • HEMORRHOIDECTOMY     • INSERT / REPLACE / REMOVE PACEMAKER     • SKIN CANCER EXCISION     • THYROID SURGERY     • THYROIDECTOMY         Review of Systems   Constitutional: Negative.    HENT: Negative.    Eyes: Negative.    Respiratory: Negative.    Cardiovascular: Negative.    Gastrointestinal: Negative.    Endocrine: Negative.    Genitourinary: Negative.    Musculoskeletal: Positive for arthralgias.   Skin: Negative.    Allergic/Immunologic: Negative.    Neurological: Negative.    Hematological: Negative.    Psychiatric/Behavioral: Negative.        Objective  /72   Pulse 74   Resp 16   Ht 172.7 cm (68\")   Wt 118 kg (260 lb)   SpO2 94%   BMI 39.53 kg/m²   Physical Exam   Constitutional: He is oriented to person, place, and time. He appears well-developed " and well-nourished.   HENT:   Head: Normocephalic and atraumatic.   Right Ear: External ear normal.   Left Ear: External ear normal.   Nose: Nose normal.   Mouth/Throat: Oropharynx is clear and moist.   Eyes: Conjunctivae and EOM are normal. Pupils are equal, round, and reactive to light.   Neck: Normal range of motion. Neck supple.   Cardiovascular: Normal rate, regular rhythm, normal heart sounds and intact distal pulses.   Pulmonary/Chest: Effort normal and breath sounds normal.   Abdominal: Soft. Bowel sounds are normal.   Musculoskeletal: He exhibits tenderness.   Neurological: He is alert and oriented to person, place, and time.   Skin: Skin is warm. Capillary refill takes less than 2 seconds.   Psychiatric: He has a normal mood and affect. His behavior is normal. Judgment and thought content normal.   Nursing note and vitals reviewed.      Assessment/Plan   Carlo was seen today for foot pain.    Diagnoses and all orders for this visit:    Pain of right heel  -     XR Calcaneus 2+ View Right (In Office); Future    Morbidly obese (CMS/HCC)        Heel cup---keep me informed         No orders of the defined types were placed in this encounter.      Follow up: 2 month(s)

## 2018-12-04 ENCOUNTER — TELEPHONE (OUTPATIENT)
Dept: FAMILY MEDICINE CLINIC | Facility: CLINIC | Age: 73
End: 2018-12-04

## 2018-12-04 DIAGNOSIS — M79.671 PAIN OF RIGHT HEEL: ICD-10-CM

## 2018-12-04 DIAGNOSIS — S92.009D CLOSED NONDISPLACED FRACTURE OF CALCANEUS WITH ROUTINE HEALING, UNSPECIFIED LATERALITY, UNSPECIFIED PORTION OF CALCANEUS, SUBSEQUENT ENCOUNTER: Primary | ICD-10-CM

## 2018-12-04 NOTE — TELEPHONE ENCOUNTER
They were wanting results of his heel xray. Results were not here. I called [facility] and they are supposed to be sending. He is still having problems with his heel. I told her we would call when we find out.

## 2018-12-06 ENCOUNTER — CLINICAL SUPPORT (OUTPATIENT)
Dept: CARDIOLOGY | Facility: CLINIC | Age: 73
End: 2018-12-06

## 2018-12-06 DIAGNOSIS — I49.5 SICK SINUS SYNDROME (HCC): ICD-10-CM

## 2018-12-06 PROCEDURE — 93294 REM INTERROG EVL PM/LDLS PM: CPT | Performed by: PHYSICIAN ASSISTANT

## 2018-12-06 PROCEDURE — 93296 REM INTERROG EVL PM/IDS: CPT | Performed by: PHYSICIAN ASSISTANT

## 2018-12-08 NOTE — PROGRESS NOTES
Dual Chamber Pacemaker Evaluation Report  Merlin    December 8, 2018    Primary Cardiologist: Malu  : [employer] Model: Assurity  Implant date: 1/11/18    Reason for evaluation: routine  Indication for pacemaker: sick sinus syndrome    Measurements  Atrial sensing - P wave: 4.2 mV  Atrial threshold: n/r  Atrial lead impedance: 1125 ohms  Ventricular sensing - R wave: >12 mV  Ventricular threshold: n/r  Ventricular lead impedance:   890 ohms     Diagnostic Data  Atrial paced: 3.4 %  Ventricular paced: 8.5 %  Other: no new episodes noted  Battery status: satisfactory         Final Parameters  Mode:  DDD  Lower rate: 60 bpm   Upper rate: 130 bpm  AV Delay: paced- 225 ms  Sensed-200 ms  Atrial - Amplitude: 2.5 V   Pulse width: 0.5 ms   Sensitivity: 1.0 mV     Ventricular - Amplitude: 2.0 V  Pulse width: 0.5 ms  Sensitivity: 2.0 mV    Changes made: n/a  Conclusions: normal pacemaker function    Follow up: 3 months

## 2018-12-11 ENCOUNTER — HOSPITAL ENCOUNTER (OUTPATIENT)
Dept: HOSPITAL 58 - REHAB | Age: 73
LOS: 20 days | End: 2018-12-31
Attending: ORTHOPAEDIC SURGERY

## 2018-12-11 VITALS — BODY MASS INDEX: 35.7 KG/M2

## 2018-12-11 DIAGNOSIS — M79.671: ICD-10-CM

## 2018-12-11 DIAGNOSIS — M72.2: Primary | ICD-10-CM

## 2018-12-11 DIAGNOSIS — M62.89: ICD-10-CM

## 2019-01-04 ENCOUNTER — OFFICE VISIT (OUTPATIENT)
Dept: CARDIOLOGY | Facility: CLINIC | Age: 74
End: 2019-01-04

## 2019-01-04 VITALS
HEIGHT: 68 IN | SYSTOLIC BLOOD PRESSURE: 102 MMHG | HEART RATE: 77 BPM | OXYGEN SATURATION: 98 % | BODY MASS INDEX: 39.25 KG/M2 | WEIGHT: 259 LBS | DIASTOLIC BLOOD PRESSURE: 60 MMHG

## 2019-01-04 DIAGNOSIS — M79.671 PAIN OF RIGHT HEEL: ICD-10-CM

## 2019-01-04 DIAGNOSIS — Z95.5 STENTED CORONARY ARTERY: ICD-10-CM

## 2019-01-04 DIAGNOSIS — G89.29 CHRONIC MIDLINE LOW BACK PAIN WITHOUT SCIATICA: ICD-10-CM

## 2019-01-04 DIAGNOSIS — E78.2 MIXED HYPERLIPIDEMIA: Primary | ICD-10-CM

## 2019-01-04 DIAGNOSIS — Z95.1 HX OF CABG: ICD-10-CM

## 2019-01-04 DIAGNOSIS — R13.19 ESOPHAGEAL DYSPHAGIA: ICD-10-CM

## 2019-01-04 DIAGNOSIS — M54.50 CHRONIC MIDLINE LOW BACK PAIN WITHOUT SCIATICA: ICD-10-CM

## 2019-01-04 DIAGNOSIS — I25.729 CORONARY ARTERY DISEASE INVOLVING AUTOLOGOUS ARTERY CORONARY BYPASS GRAFT WITH ANGINA PECTORIS (HCC): ICD-10-CM

## 2019-01-04 DIAGNOSIS — I10 ESSENTIAL HYPERTENSION: ICD-10-CM

## 2019-01-04 DIAGNOSIS — F41.9 ANXIETY: ICD-10-CM

## 2019-01-04 PROBLEM — I95.2 HYPOTENSION DUE TO DRUGS: Status: RESOLVED | Noted: 2017-05-24 | Resolved: 2019-01-04

## 2019-01-04 PROCEDURE — 99214 OFFICE O/P EST MOD 30 MIN: CPT | Performed by: INTERNAL MEDICINE

## 2019-01-04 PROCEDURE — 93000 ELECTROCARDIOGRAM COMPLETE: CPT | Performed by: INTERNAL MEDICINE

## 2019-01-04 NOTE — PROGRESS NOTES
Carlo Velasco  [id number]  1945  73 y.o.  male    Referring Provider: Rhys Rosen MD    Reason for Follow-up Visit:  Here for routine follow up   coronary artery disease Coronary artery bypass grafting , stented coronary artery        Subjective    Mild chronic exertional shortness of breath on exertion relieved with rest  No significant cough or wheezing  Going on for several months  No palpitations    No associated chest pain  No significant pedal edema  No fever or chills    No significant expectoration  No hemoptysis  No presyncope or syncope     Joint pain in small, medium and large joints  Effort tolerance limited more by orthopedic rather than cardiac related issues, therefore difficult to assess functional capacity.      History of present illness:  Carlo Velasco is a 73 y.o. yo male with history of coronary artery disease Coronary artery bypass grafting , stented coronary artery sick sinus syndrome s/p pacemaker who presents today for   Chief Complaint   Patient presents with   • Coronary Artery Disease     6 MON FU CABG X  2 2014 STENT X 1 2015   .    History  Past Medical History:   Diagnosis Date   • Anxiety    • Arthritis    • Cancer (CMS/HCC)     melanoma   • CHF (congestive heart failure) (CMS/HCC)    • Claustrophobia    • Colon polyp    • Coronary artery disease 2012, 2015    2 vessel CABG (2012), SHAHRZAD LAD (2015).    • Depression    • Disease of thyroid gland     partial thyroidectomy on right   • GERD (gastroesophageal reflux disease)    • Hearing loss    • Heart attack (CMS/HCC)    • Heart attack (CMS/HCC)    • History of transfusion    • Hyperlipidemia    • Hypertension    • Kidney stone     history of    • Tobacco abuse, in remission    • Uses hearing aid     BILATERAL   ,   Past Surgical History:   Procedure Laterality Date   • BACK SURGERY      CERVICAL FUSION AND LUMBAR DISC REPLACEMENT   • CARDIAC CATHETERIZATION  07/2015    SHAHRZAD mid [facility], Dr. Toribio   • CARDIAC CATHETERIZATION  N/A 10/21/2016    Procedure: Left Heart Cath;  Surgeon: Darian Toribio MD;  Location:  [facility]ASIVE [facility];  Service:    • CARDIAC CATHETERIZATION Left 4/5/2017    Procedure: Cardiac Catheterization/Vascular Study;  Surgeon: Darian Toribio MD;  Location:  [facility];  Service:    • CARDIAC CATHETERIZATION N/A 4/5/2017    Procedure: Left Heart Cath;  Surgeon: Darian Toribio MD;  Location:  [facility];  Service:    • CARDIAC CATHETERIZATION N/A 4/5/2017    Procedure: Coronary angiography;  Surgeon: Darian Toribio MD;  Location:  [facility];  Service:    • CARDIAC CATHETERIZATION N/A 4/5/2017    Procedure: Left ventriculography;  Surgeon: Darian Toribio MD;  Location:  [facility];  Service:    • CARDIAC CATHETERIZATION  4/5/2017    Procedure: Saphenous Vein Graft;  Surgeon: Darian Toribio MD;  Location:  [facility];  Service:    • CARDIAC CATHETERIZATION Left 2/27/2018    Procedure: Cardiac Catheterization/Vascular Study SHAHRZAD OK PRN;  Surgeon: Darian Toribio MD;  Location:  [facility];  Service:    • CARDIAC CATHETERIZATION  2/27/2018    Procedure: Functional Flow [place];  Surgeon: Darian Toribio MD;  Location:  [facility];  Service:    • CARDIAC CATHETERIZATION N/A 2/27/2018    Procedure: Left ventriculography;  Surgeon: Darian Toribio MD;  Location:  [facility];  Service:    • CARDIAC ELECTROPHYSIOLOGY PROCEDURE N/A 1/11/2018    Procedure: PPM generator change - dual;  Surgeon: Darian Toribio MD;  Location:  [facility];  Service:    • COLONOSCOPY  09/05/2007    colon polyps   • COLONOSCOPY N/A 5/29/2018    Procedure: COLONOSCOPY WITH ANESTHESIA;  Surgeon: Juan F Tapia MD;  Location: [facility] ENDOSCOPY;  Service: Gastroenterology   • CORONARY ANGIOPLASTY WITH STENT PLACEMENT  07/2015    Dr. Malu Koch    • CORONARY ARTERY BYPASS GRAFT  2012 AND 2014    2  vessel    • ENDOSCOPY  2010   • ENDOSCOPY N/A 2018    Procedure: ESOPHAGOGASTRODUODENOSCOPY WITH ANESTHESIA;  Surgeon: Juan F Tapia MD;  Location: [facility] ENDOSCOPY;  Service: Gastroenterology   • HEAD/NECK LESION/CYST EXCISION Left 2016    Procedure: EXCISION MALIGNANT MELANOMA WITH SENTINEL NODE BIOPSY, INJECTION AND SCAN PRE-OP, LEFT EAR;  Surgeon: Guanaco Zepeda MD;  Location: [facility] OR;  Service:    • HEMORRHOIDECTOMY     • INSERT / REPLACE / REMOVE PACEMAKER     • SENTINEL NODE BIOPSY Left 2016    Procedure: SENTINEL NODE BIOPSY;  Surgeon: Guanaco Zepeda MD;  Location:  [facility] OR;  Service:    • SKIN CANCER EXCISION     • THYROID SURGERY     • THYROIDECTOMY     ,   Family History   Problem Relation Age of Onset   • Heart failure Mother    • Stroke Mother    • Dementia Mother    • Coronary artery disease Father    • Colon polyps Father    • COPD Brother    • Colon cancer Neg Hx    ,   Social History     Tobacco Use   • Smoking status: Former Smoker     Years: 10.00     Types: Cigarettes     Last attempt to quit:      Years since quittin.0   • Smokeless tobacco: Former User     Quit date:    Substance Use Topics   • Alcohol use: No   • Drug use: No   ,     Medications  Current Outpatient Medications   Medication Sig Dispense Refill   • ALPRAZolam (XANAX) 0.25 MG tablet TAKE 1 TABLET BY MOUTH THREE TIMES DAILY 90 tablet 0   • amLODIPine (NORVASC) 5 MG tablet Take 1 tablet by mouth Daily. 90 tablet 3   • aspirin 81 MG EC tablet Take 81 mg by mouth daily.       • atorvastatin (LIPITOR) 80 MG tablet TAKE 1 TABLET BY MOUTH EVERY NIGHT 30 tablet 5   • BRILINTA 90 MG tablet tablet TAKE 1 TABLET BY MOUTH EVERY 12 HOURS 60 tablet 6   • cetirizine (zyrTEC) 10 MG tablet Take 10 mg by mouth.     • donepezil (ARICEPT) 5 MG tablet TAKE 1 TABLET BY MOUTH AT BEDTIME 90 tablet 0   • escitalopram (LEXAPRO) 20 MG tablet TAKE 1 TABLET BY MOUTH DAILY 30 tablet 0   • furosemide (LASIX) 40 MG  "tablet TAKE 1 TABLET BY MOUTH DAILY 90 tablet 0   • gabapentin (NEURONTIN) 300 MG capsule TK 1 C PO QHS  5   • HYDROcodone-acetaminophen (NORCO) 7.5-325 MG per tablet Take 1 tablet by mouth Every 4 (Four) Hours As Needed for Severe Pain . 12 tablet 0   • metoprolol tartrate (LOPRESSOR) 25 MG tablet Take one tablet by mouth twice a day. 180 tablet 4   • NITROSTAT 0.4 MG SL tablet Place 1 tablet under the tongue Every 5 (Five) Minutes As Needed for Chest Pain. Take no more than 3 doses in 15 minutes. 30 tablet 2   • omega-3 acid ethyl esters (LOVAZA) 1 g capsule TAKE 1 CAPSULE BY MOUTH DAILY 90 capsule 3   • omeprazole (priLOSEC) 20 MG capsule TAKE 1 CAPSULE BY MOUTH DAILY 30 capsule 5   • Potassium 99 MG tablet Take 1 tablet by mouth Daily.     • omega-3 acid ethyl esters (LOVAZA) 1 g capsule        No current facility-administered medications for this visit.        Allergies:  Meperidine    Review of Systems  Review of Systems   Constitution: Positive for malaise/fatigue. Negative for weakness.   HENT: Negative.    Eyes: Negative.    Cardiovascular: Positive for dyspnea on exertion. Negative for chest pain, claudication, cyanosis, irregular heartbeat, leg swelling, near-syncope, orthopnea, palpitations, paroxysmal nocturnal dyspnea and syncope.   Respiratory: Negative.    Endocrine: Negative.    Hematologic/Lymphatic: Negative.    Skin: Negative.    Musculoskeletal: Positive for arthritis and back pain.   Gastrointestinal: Negative for anorexia.   Genitourinary: Negative.    Psychiatric/Behavioral: Negative.        Objective     Physical Exam:  /60   Pulse 77   Ht 172.7 cm (67.99\")   Wt 117 kg (259 lb)   SpO2 98%   BMI 39.39 kg/m²   Physical Exam   Constitutional: He appears well-developed.   HENT:   Head: Normocephalic.   Neck: Normal carotid pulses and no JVD present. No tracheal tenderness present. Carotid bruit is not present. No tracheal deviation and no edema present.   Cardiovascular: Regular " rhythm, normal heart sounds and normal pulses.   Pulmonary/Chest: Effort normal. No stridor.   Abdominal: Soft.   Neurological: He is alert. He has normal strength. No cranial nerve deficit or sensory deficit.   Skin: Skin is warm.   Psychiatric: He has a normal mood and affect. His speech is normal and behavior is normal.       Results Review:  Results for orders placed during the hospital encounter of 02/09/18   Adult Stress Echo W/ Cont or Stress Agent if Necessary Per Protocol    Narrative · Left ventricular systolic function is normal. Estimated EF = 55%.  · Normal stress echo with no significant echocardiographic evidence for   myocardial ischemia             ECG 12 Lead  Date/Time: 1/4/2019 10:31 AM  Performed by: Darian Toribio MD  Authorized by: Darian Toribio MD   Comparison: compared with previous ECG from 5/21/2018  Comparison to previous ECG: premature ventricular contractions not seen  Rhythm: sinus rhythm  Rate: normal  Conduction: conduction normal  ST Segments: ST segments normal  T Waves: T waves normal  QRS axis: normal  Other: no other findings  Q waves: aVF, III and II  Clinical impression: abnormal ECG          Cardiac cath  4/17  LVEF of 50%.   occluded the mid right coronary artery  Patent saphenous venous graft to the distal right coronary artery  Atretic left intramammary artery graft.  Patent stent in the mid left anterior descending coronary artery  Patent stent to the obtuse marginal branch  Patent stent in diagonal branch.   2/18    Conclusion     Patent stents noted in the obtuse marginal branch and the left anterior descending coronary artery.  Atretic left internal mammary artery graft.  Patent saphenous venous graft to the right coronary artery  Significant right coronary artery stenosis proximal to the touchdown site of the saphenous vein graft to the right coronary artery  60% stenosis of diagonal branch with a normal fractional flow reserve of 0.85  Left ventricle ejection fraction  of 50%.     LVEDP    16   mm Hg           Plan     Intensive risk factor modifications for both primary and secondary prevention if applicable  Home today if stable  Hydration   Observation  Keep follow-up appointment    Assessment/Plan   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 (CMS/HCC)   • Essential hypertension   • Anxiety   • Colon polyps   • Mixed hyperlipidemia   • Incisional hernia, without obstruction or gangrene   • BRBPR (bright red blood per rectum)   • Esophageal dysphagia   • Prostatism   • Nocturia   • Orthostasis   • Chronic midline low back pain without sciatica   • Hx of colonic polyp   • Acute pain of right knee   • Morbidly obese (CMS/HCC)   • Pain of right heel   • Hx of CABG      • Stented coronary artery   Intolerant to Imdur          Plan        Keep LDL below 70 mg/dl. Monitor liver and renal functions.   Monitor CBC, CMP, TSH (as indicated) and Lipid Panel by primary     Monitor cardiac rhythm device function regularly per established schedule or PRN       Will see the patient soon per appointment or sooner if required  Patient advised in advance that there may be a longer wait time next follow up with  The patient given the option to see another provider that the patient declined    xxxxxxxxxxxxxxxxxxxxxxxxxxxxxxxxxxxxxxxxxxxxxxxxxxxxxxxxxxxxxxxxxxxxxxxxxxxxxxxxxxxxxxxxxxxxxxxxxxxxxxxxxxxxxxxxxxxxxxxxxxxxxxxxxxxxxxxxxxxxxxxxxxxxxxxxxxxxxxxxxxxxxxxxxxxxxxxxxxxxxxxxxxxxxxxxxxxxxxxxxxxxxxxxxxxxxxxxxxxxxxxxxxxxxxxxxxxxxxxxxxxxxxxx  Health maintenance    Low salt/ HTN/ Heart healthy carbohydrate restricted cardiac diet as applicable to this patient's current diagnoses.   This handout has relevant information regarding shopping for food, preparing meals, what to eat at restaurants, tracking of food intake, information regarding sodium intake and salt content, how to read food labels, knowing what to  "eat, tips reagarding physical activity, calorie count and calorie expenditure. What foods to avoid. Information regarding alcoholic drinks along with \"good\" and \"bad\" fats.  Reiterated prior recommendations     The patient is advised to reduce or avoid caffeine or other cardiac stimulants.     The patient was advised that NSAID-type medications have three  very important potential side effects: cardiovascular complications, gastrointestinal irritation including hemorrhage and renal injuries.  Do not use anti-inflammatories such as Motrin/ibuprofen, Alleve/naprosyn, Mobic and like medications Use Tylenol instead.The patient expresses understanding of these issues and questions were answered.   Monitor for any signs of bleeding including red or dark stools. Fall precautions.       Monitor for any signs of bleeding including red or dark stools. Fall precautions.   Patient is asked to monitor BP at home or work, several times per month and return with written values at next office visit.     Advised staying uptodate with immunizations per established standard guidelines.      Offered to give patient  a copy of my notes and relevant tests/ prior ECG etc for the patient to review and follow specific advise and relevant findings if any, prognosis, along with my current and future plans.      Questions were encouraged, asked and answered to the patient's  understanding and satisfaction. Questions if any regarding current medications and side effects, need for refills and importance of compliance to medications stressed.    Reviewed available prior notes, consults, prior visits, laboratory findings, radiology and cardiology relevant reports. Updated chart as applicable. I have reviewed the patient's medical history in detail and updated the computerized patient record as relevant.      Updated patient regarding any new or relevant abnormalities on review of records or any new findings on physical exam. Mentioned to patient " about purpose of visit and desirable health short and long term goals and objectives.        xxxxxxxxxxxxxxxxxxxxxxxxxxxxxxxxxxxxxxxxxxxxxxxxxxxxxxxxxxxxxxxxxxxxxxxxxxxxxxxxxxxxxxxxxxxxxxxxxxxxxxxxxxxxxxxxxxxxxxxxxxxxxxxxxxxxxxxxxxxxxxxxxxxxxxxxxxxxxxxxxxxxxxxxxxxxxxxxxxxxxxxxxxxxxxxxxxxxxxxxxxxxxxxxxxxxxxxxxxxxxxxxxxxxxxxxxxxxxxxxxxxxxxxx        Return in about 6 months (around 7/4/2019).

## 2019-01-09 NOTE — RS.OPPTDN
Subjective


Date of Note: 01/09/19


Visit #: 2


Number of visits approved by Insurance: NA


Date of Evaluation: 01/07/19


Payer Source: MEDICARE


Treatment Diagnosis: R plantar fasciatis


Current Subjective/complaints:: Patient reports he is working on stretching as 

instructed. States he continues to walk with a cane, but feels he is able to 

put more weight on the right foot.


*Precautions: PACEMAKER





Pain Assessment





- Pain Description


Pain Location: right heel and plantar surface


Current Pain Intensity: mod





- Treatment


Modality: Ultrasound


Parameters/Method Applied: [id number] at 1.5w/cm2 to the right heel and along the 

plantar surface.


Patient Position: Supine





- Heat/Cryotherapy


Treatment: Hot Pack ([id number] to the right foot platar surface prior to US and 

EX. Patient in supine. ), Cryotherapy (Ended with 5mins ice massage to the 

right foot plantar surface. )





Interventions





- Exercise/Activities/Manual Therapy


Exercises/Activities: pt performed standing heel cord stretches, Received 

passive heel cord stretching, resisted DF/PF.  Instructed in self stretching 

with band or belt, and standing heel cord stretch at wall.


Total minutes of Exercise: [id number] 


Manual Therapy: NA


HOME EXERCISE PROGRAM: pt still has written HEP from one time visit. Self 

stretching of heel cords and plantar fascia. Ice several times per day.





- Charges


Timed Code Treatment Minutes: 35mins 


Total Treatment Time: 50mins 


Procedures billed for this date of service:: HP, US, EX


Assessment: Patient attentive to instruction and appears motivated to work on 

HEP.


Patient Education: Education of diagnosis, Body/Joint mechanics, Home Exercise 

Program, Activity Modification


Comments: Patient education on mechanics and dx.


Patient demonstrates compliance with HEP?: Yes





Short Term Goals


Goal #1: pt independent with initial HEP 


Goal to be met by: 01/21/19


Progress towards Goal:: Progressing


Goal #2: Improve R ankle ROM WFL with decreased pain


Goal to be met by: 01/21/19


Goal #3: pt with improved RLE strength 4 to 4+/5


Goal to be met by: 01/21/19


Goal #4: Improve dyn stand balance as noted by shelia 21/28


Goal to be met by: 01/21/19





Long Term Goals


Goal #1: pt rate pain <4/10 with activity


Goal to be met by: 02/04/19


Goal #2: pt report increased ability to perform daily activities with less pain


Goal to be met by: 02/04/19


Goal #3: Improved dyn stand balance as noted by tinetti score of 23/28


Goal to be met by: 02/04/19


Goal #4: .





Plan


Dates of Long Term Goals: 2/4/19


Expiration date of current Insurance Approval:: 02/04/19


PLAN: Continue modalities, manual therapy, exercise to reduce pain and increase 

functional ambulation.

## 2019-01-09 NOTE — RS.OPPTEV2
Date of Note: 01/07/19


Visit #: 1


Number of visits approved by Insurance: n/a


Date of Evaluation: 01/07/19


Payer Source: MEDICARE


Date of Onset/Injury/Change in Status: 11/16/18


Surgery Performed?: No


Treatment Diagnosis: R plantar fasciatis


History of Condition/Mechanism of Injury:: pt reports began having foot pain in 

11/18. No definite injury noted, pt had visit for HEP, however no improvement.


Prior Level of Function.....Patient was independent with: ADL's, Self Care, 

Caregiving, Ambulation/Mobility, Community Integration/Access


Functional Limitations: Standing, Ambulation


Current Subjective/complaints:: pt states that he has not consistently been 

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 States pain is getting 

worse.


Treatment Side (optional): Right


*Precautions: PACEMAKER





Medical History


Medical History: Hypertension, Arthritis, Cancer


Surgical History: Cervical Spine, Lumbar Spine


Surgical History Comments:: PACEMAKER,


Smoking Status: Never smoker


Hx Home Medications: pt did not bring list of medications


Patient's Goals: decrease pain.





Pain Assessment





- Pain Description


Pain Location: R heel


Pain Description: Burning, Sharp


Current Pain Intensity: 4-5/10


Worst Pain Intensity: 9





Functional Outcome Measure


LE Functional Scale: 24





- G Codes & Severity Modifier


G Codes & Modifier: n/a


Source of G Code score: n/a





Observation





- Observation


Posture: Forward Head, Rounded Shoulders, Decreased Lumbar Lordosis


Handedness: Right


Comments: pt also has long history of LBP with radiating pain into BLE L worse 

than R.





Gait





- Gait Pattern


General Gait Pattern Observation: Antalgic Gait


Gait Comments: pt amb with antalgic gait due to pain in R foot with decreased 

heel strike/toe off on R foot.





General


Range of Motion: BUE and LE WFL's


Muscle Strength: BUE 5/5.  BLE hip flex 4+/5, knee flex/ext 5/5, L ankle DF/PF 5

/5


Ankle ROM: Left WFL's


Ankle Muscle Strength: Left WFL's





- Right Ankle ROM


Right DF with Knee extension: 6


Right Plantarflexion: 29


Right Eversion: 20


Right Inversion: 28


Right Ankle/Foot ROM Limitations: Soft Tissue Tightness, Pain





- Right Ankle Strength


Right Dorsiflexion: 3+   Fair+


Right Plantarflexion: 4-   Good-


Right Eversion: 3+   Fair+


Right Inversion: 3+   Fair+





Palpation


Palpation Findings: Tenderness


Comments:: tenderness to palpation R medial heel





Sensation





- Sensation


Right Upper Extremity: Intact/Normal


Left Upper Extremity: Intact/Normal


Right Lower Extremity: Intact/Normal


Left Lower Extremity: Intact/Normal





Balance





- Sitting Balance


Static Sitting Balance: Normal


Dynamic Sitting Balance: Normal





- Standing Balance


Static Standing Balance: Good


Dynamic Standing Balance: Fair





- Comments


Balance Assessment Comments: Tinetti test: 19/28 which is consistent with 

moderate risk of falls. (pt states that he has always been clumsy and falls 

pretty much every day. States his mother said he had polio as a child.)





- Treatment


Modality: Ultrasound


Parameters/Method Applied: 1.5w/cm2


Treatment Area: R plantar surface heel


Patient Position: Supine


Comments: sup with RLE elevated





Interventions





- Exercise/Activities/Manual Therapy


Exercises/Activities: pt performed standing heel cord stretches, Received 

passive heel cord stretching, resisted DF/PF


Manual Therapy: NA


HOME EXERCISE PROGRAM: pt still has written HEP from one time visit. Reinforced 

importance of atleast performing stretches and ice.





- Charges


Timed Code Treatment Minutes: 49


Total Treatment Time: 52


Procedures billed for this date of service:: eval med, ultrasound





EVALUATION COMPLEXITY LEVEL


EVALUATION COMPLEXITY LEVEL: HISTORY: Medium (HTN, OA, CA, pacemaker, pain), 

EXAM OF BODY SYSTEMS: Medium (pain, ROM, strength, balance, gait), CLINICAL 

PRESENTATION: Medium, CLINICAL DECISION MAKING: Medium





Assessment


Assessment: pt presents with pain in R foot which is limiting gait ability as 

well as pt ability to stand for any length of time. pt also presents with 

decreased balance due to alterered gait pattern.


Patient Education: Home Exercise Program, Education of Plan of Care


Rehab Potential: Good





Short Term Goals


Goal #1: pt independent with initial HEP 


Goal to be met by: 01/21/19


Goal #2: Improve R ankle ROM WFL with decreased pain


Goal to be met by: 01/21/19


Goal #3: pt with improved RLE strength 4 to 4+/5


Goal to be met by: 01/21/19


Goal #4: Improve dyn stand balance as noted by tinetti 21/28


Goal to be met by: 01/21/19





Long Term Goals


Goal #1: pt rate pain <4/10 with activity


Goal to be met by: 02/04/19


Goal #2: pt report increased ability to perform daily activities with less pain


Goal to be met by: 02/04/19


Goal #3: Improved dyn stand balance as noted by tinetti score of 23/28


Goal to be met by: 02/04/19


Goal #4: .





Plan





- Treatment to be Provided


Procedures: Therapeutic Exercises, Gait Training, Manual Therapy, Massage, 

Patient Education


Modalities: Ultrasound/Phonophoresis, Class IV Laser, Cryotherapy





- Treatment Plan


Frequency: 2-3x a week 


Duration: 4 weeks


Dates of Long Term Goals: 2/4/19


Expiration date of current Insurance Approval:: n/a





- Treatment Code


(1) Muscle tightness


Code(s): M62.89 - OTHER SPECIFIED DISORDERS OF MUSCLE   





(2) Plantar fasciitis


Code(s): M72.2 - PLANTAR FASCIAL FIBROMATOSIS   





(3) Right foot pain


Code(s): M79.671 - PAIN IN RIGHT FOOT

## 2019-01-11 ENCOUNTER — OFFICE VISIT (OUTPATIENT)
Dept: FAMILY MEDICINE CLINIC | Facility: CLINIC | Age: 74
End: 2019-01-11

## 2019-01-11 ENCOUNTER — HOSPITAL ENCOUNTER (OUTPATIENT)
Dept: HOSPITAL 58 - RAD | Age: 74
Discharge: HOME | End: 2019-01-11
Attending: FAMILY MEDICINE
Payer: COMMERCIAL

## 2019-01-11 VITALS
HEIGHT: 68 IN | DIASTOLIC BLOOD PRESSURE: 80 MMHG | BODY MASS INDEX: 40.01 KG/M2 | WEIGHT: 264 LBS | OXYGEN SATURATION: 95 % | HEART RATE: 65 BPM | RESPIRATION RATE: 16 BRPM | SYSTOLIC BLOOD PRESSURE: 128 MMHG

## 2019-01-11 VITALS — BODY MASS INDEX: 35.7 KG/M2

## 2019-01-11 DIAGNOSIS — R10.9: Primary | ICD-10-CM

## 2019-01-11 DIAGNOSIS — R10.9 FLANK PAIN: Primary | ICD-10-CM

## 2019-01-11 DIAGNOSIS — R10.9 FLANK PAIN: ICD-10-CM

## 2019-01-11 DIAGNOSIS — M72.2: ICD-10-CM

## 2019-01-11 PROCEDURE — 74176 CT ABD & PELVIS W/O CONTRAST: CPT

## 2019-01-11 PROCEDURE — 99213 OFFICE O/P EST LOW 20 MIN: CPT | Performed by: FAMILY MEDICINE

## 2019-01-11 RX ORDER — HYDROCODONE BITARTRATE AND ACETAMINOPHEN 7.5; 325 MG/1; MG/1
1 TABLET ORAL EVERY 6 HOURS PRN
Qty: 15 TABLET | Refills: 0 | Status: SHIPPED | OUTPATIENT
Start: 2019-01-11 | End: 2019-04-11 | Stop reason: SDUPTHER

## 2019-01-11 NOTE — CT
EXAM:  CT of the abdomen pelvis without contrast 

  

History:  Left flank pain. 

  

Comparison:  CT abdomen pelvis 02/25/2017 

  

Technique:  Multiplanar CT images through the abdomen pelvis were obtained without the administration
 of IV contrast 

  

Findings:  Coronary calcifications.  Lung bases are clear.  No acute osseous abnormalities.  Postsurg
ical changes of the lumbar spine.  No change in the fat containing left posterior lateral abdominal w
all hernia measuring 5.4 cm.  No gallstones identified by CT.  Calcified granulomas within the spleen
.  No focal liver lesions. 

  

No peripancreatic inflammation.  Adrenal glands are within normal limits.  No change in the small andrzej
ign left renal cyst.  A few punctate 1 mm right renal calculi.  No left renal calculi.  No hydronephr
osis and no perinephric stranding.  No ureteral calculi.  The appendix is normal.  No bowel obstructi
on.  No free air and no ascites.  Prostate is enlarged.  No perirectal inflammation.  Colonic diverti
culosis.  No bladder wall thickening. 

  

Impression: 

1.  1 mm nonobstructing right nephrolithiasis. 

2.  Colonic diverticulosis. 

3.  Enlarged prostate. 

4.  No change in the fat containing left posterior lateral abdominal wall hernia

## 2019-01-11 NOTE — RS.OPPTDN
Subjective


Date of Note: 01/11/19


Visit #: 3


Number of visits approved by Insurance: NA


Date of Evaluation: 01/07/19


Payer Source: MEDICARE


Treatment Diagnosis: R plantar fasciatis


Current Subjective/complaints:: Patient report his right foot/heel pain has 

improved and he is walking a little better.  Patient states he continues to 

need his cane.


*Precautions: PACEMAKER





Pain Assessment





- Pain Description


Pain Location: right heel and plantar surface


Pain Description: Aching


Current Pain Intensity: mod





- Treatment


Modality: Ultrasound


Parameters/Method Applied: [id number] at 1.5w/cm2 to the right foot plantar 

surface prior to MT.


Patient Position: Supine





- Heat/Cryotherapy


Treatment: Hot Pack ([id number] to the right foot plantar surface prior to US. 

patient in supine. ), Cryotherapy (Ended treatment session with 5mins ice 

massage to the right foot plantar surface. )





Interventions





- Exercise/Activities/Manual Therapy


Exercises/Activities: NA


Manual Therapy: [id number] Passive stretching of the right foot plantar surface 

and heelcords. Deep tissue and myofasical release to the plantar fascia.


Total minutes of Manual Therapy: 15mins 


HOME EXERCISE PROGRAM: pt still has written HEP from one time visit. Self 

stretching of heel cords and plantar fascia. Ice several times per day.





- Charges


Timed Code Treatment Minutes: 30mins


Total Treatment Time: 45mins 


Procedures billed for this date of service:: HP, US, MT


Assessment: Patient reporting good response to treatment with reports of 

decrease pain and improved gait.


Patient Education: Home Exercise Program


Patient demonstrates compliance with HEP?: Yes





Short Term Goals


Goal #1: pt independent with initial HEP 


Goal to be met by: 01/21/19


Progress towards Goal:: Progressing


Goal #2: Improve R ankle ROM WFL with decreased pain


Goal to be met by: 01/21/19


Progress towards Goal:: Progressing


Goal #3: pt with improved RLE strength 4 to 4+/5


Goal to be met by: 01/21/19


Goal #4: Improve dyn stand balance as noted by shelia 21/28


Goal to be met by: 01/21/19





Long Term Goals


Goal #1: pt rate pain <4/10 with activity


Goal to be met by: 02/04/19


Progress towards goal: Progressing


Goal #2: pt report increased ability to perform daily activities with less pain


Goal to be met by: 02/04/19


Goal #3: Improved dyn stand balance as noted by tinetti score of 23/28


Goal to be met by: 02/04/19


Goal #4: .





Plan


Dates of Long Term Goals: 2/4/19


Expiration date of current Insurance Approval:: 2/4/19


PLAN: Continue modalities, manual therapy, and exercise to reduce pain and 

increase functional ambulation.

## 2019-01-11 NOTE — PROGRESS NOTES
Subjective   Carlo Velasco is a 73 y.o. male.     Chief Complaint   Patient presents with   • Flank Pain     left flank pain   x 3 days       History of Present Illness     noted flank pain witujout hematuria without fever, chills or vomitinhg      Current Outpatient Medications:   •  ALPRAZolam (XANAX) 0.25 MG tablet, TAKE 1 TABLET BY MOUTH THREE TIMES DAILY, Disp: 90 tablet, Rfl: 0  •  amLODIPine (NORVASC) 5 MG tablet, Take 1 tablet by mouth Daily., Disp: 90 tablet, Rfl: 3  •  aspirin 81 MG EC tablet, Take 81 mg by mouth daily.  , Disp: , Rfl:   •  atorvastatin (LIPITOR) 80 MG tablet, TAKE 1 TABLET BY MOUTH EVERY NIGHT, Disp: 30 tablet, Rfl: 5  •  BRILINTA 90 MG tablet tablet, TAKE 1 TABLET BY MOUTH EVERY 12 HOURS, Disp: 60 tablet, Rfl: 6  •  cetirizine (zyrTEC) 10 MG tablet, Take 10 mg by mouth., Disp: , Rfl:   •  donepezil (ARICEPT) 5 MG tablet, TAKE 1 TABLET BY MOUTH AT BEDTIME, Disp: 90 tablet, Rfl: 0  •  escitalopram (LEXAPRO) 20 MG tablet, TAKE 1 TABLET BY MOUTH DAILY, Disp: 30 tablet, Rfl: 0  •  furosemide (LASIX) 40 MG tablet, TAKE 1 TABLET BY MOUTH DAILY, Disp: 90 tablet, Rfl: 0  •  gabapentin (NEURONTIN) 300 MG capsule, TK 1 C PO QHS, Disp: , Rfl: 5  •  HYDROcodone-acetaminophen (NORCO) 7.5-325 MG per tablet, Take 1 tablet by mouth Every 4 (Four) Hours As Needed for Severe Pain ., Disp: 12 tablet, Rfl: 0  •  metoprolol tartrate (LOPRESSOR) 25 MG tablet, Take one tablet by mouth twice a day., Disp: 180 tablet, Rfl: 4  •  NITROSTAT 0.4 MG SL tablet, Place 1 tablet under the tongue Every 5 (Five) Minutes As Needed for Chest Pain. Take no more than 3 doses in 15 minutes., Disp: 30 tablet, Rfl: 2  •  omega-3 acid ethyl esters (LOVAZA) 1 g capsule, TAKE 1 CAPSULE BY MOUTH DAILY, Disp: 90 capsule, Rfl: 3  •  omega-3 acid ethyl esters (LOVAZA) 1 g capsule, , Disp: , Rfl:   •  omeprazole (priLOSEC) 20 MG capsule, TAKE 1 CAPSULE BY MOUTH DAILY, Disp: 30 capsule, Rfl: 5  •  Potassium 99 MG tablet, Take 1 tablet by  mouth Daily., Disp: , Rfl:   Allergies   Allergen Reactions   • Meperidine Other (See Comments)     HEART STOPS         Past Medical History:   Diagnosis Date   • Anxiety    • Arthritis    • Cancer (CMS/HCC)     melanoma   • CHF (congestive heart failure) (CMS/HCC)    • Claustrophobia    • Colon polyp    • Coronary artery disease 2012, 2015    2 vessel CABG (2012), SHAHRZAD LAD (2015).    • Depression    • Disease of thyroid gland     partial thyroidectomy on right   • GERD (gastroesophageal reflux disease)    • Hearing loss    • Heart attack (CMS/HCC)    • Heart attack (CMS/HCC)    • History of transfusion    • Hyperlipidemia    • Hypertension    • Kidney stone     history of    • Tobacco abuse, in remission    • Uses hearing aid     BILATERAL     Past Surgical History:   Procedure Laterality Date   • BACK SURGERY      CERVICAL FUSION AND LUMBAR DISC REPLACEMENT   • CARDIAC CATHETERIZATION  07/2015    SHAHRZAD mid LAD, Dr. Toribio   • CARDIAC CATHETERIZATION N/A 10/21/2016    Procedure: Left Heart Cath;  Surgeon: Darian Toribio MD;  Location:  [facility] CATH INVASIVE LOCATION;  Service:    • CARDIAC CATHETERIZATION Left 4/5/2017    Procedure: Cardiac Catheterization/Vascular Study;  Surgeon: Darian Toribio MD;  Location:  [facility] CATH INVASIVE [facility];  Service:    • CARDIAC CATHETERIZATION N/A 4/5/2017    Procedure: Left Heart Cath;  Surgeon: Darian Toribio MD;  Location:  [facility] CATH [facility]IVE [facility];  Service:    • CARDIAC CATHETERIZATION N/A 4/5/2017    Procedure: Coronary angiography;  Surgeon: Darian Toribio MD;  Location:  [facility] CATH [facility]VASIVE [facility];  Service:    • CARDIAC CATHETERIZATION N/A 4/5/2017    Procedure: Left ventriculography;  Surgeon: Darian Toribio MD;  Location:  [facility]ATH [facility]VASIVE [facility];  Service:    • CARDIAC CATHETERIZATION  4/5/2017    Procedure: Saphenous Vein Graft;  Surgeon: Darian Toribio MD;  Location:  [facility] CATH [facility]VASIVE [facility];  Service:    • CARDIAC CATHETERIZATION Left 2/27/2018    Procedure: Cardiac  Catheterization/Vascular Study SHAHRZAD [place] PRN;  Surgeon: Darian Toribio MD;  Location: [facility] CATH INVASIVE LOCATION;  Service:    • CARDIAC CATHETERIZATION  2/27/2018    Procedure: Functional Flow [place];  Surgeon: Darian Toribio MD;  Location: [facility] CATH INVASIVE LOCATION;  Service:    • CARDIAC CATHETERIZATION N/A 2/27/2018    Procedure: Left ventriculography;  Surgeon: Darian Toribio MD;  Location: [facility] CATH INVASIVE LOCATION;  Service:    • CARDIAC ELECTROPHYSIOLOGY PROCEDURE N/A 1/11/2018    Procedure: PPM generator change - dual;  Surgeon: Darian Toribio MD;  Location: [facility] CATH INVASIVE LOCATION;  Service:    • COLONOSCOPY  09/05/2007    colon polyps   • COLONOSCOPY N/A 5/29/2018    Procedure: COLONOSCOPY WITH ANESTHESIA;  Surgeon: Juan F Tapia MD;  Location: [facility] ENDOSCOPY;  Service: Gastroenterology   • CORONARY ANGIOPLASTY WITH STENT PLACEMENT  07/2015    SHAHRZAD mid LAD, Dr. Toribio    • CORONARY ARTERY BYPASS GRAFT  2012 AND 2014    2 vessel    • ENDOSCOPY  11/08/2010   • ENDOSCOPY N/A 5/29/2018    Procedure: ESOPHAGOGASTRODUODENOSCOPY WITH ANESTHESIA;  Surgeon: Juan F Tapia MD;  Location: [facility] ENDOSCOPY;  Service: Gastroenterology   • HEAD/NECK LESION/CYST EXCISION Left 12/20/2016    Procedure: EXCISION MALIGNANT MELANOMA WITH SENTINEL NODE BIOPSY, INJECTION AND SCAN PRE-OP, LEFT EAR;  Surgeon: Guanaco Zepeda MD;  Location: [facility] OR;  Service:    • HEMORRHOIDECTOMY     • INSERT / REPLACE / REMOVE PACEMAKER     • SENTINEL NODE BIOPSY Left 12/20/2016    Procedure: SENTINEL NODE BIOPSY;  Surgeon: Guanaco Zepeda MD;  Location: [facility] OR;  Service:    • SKIN CANCER EXCISION     • THYROID SURGERY     • THYROIDECTOMY         Review of Systems   Constitutional: Negative.    HENT: Negative.    Eyes: Negative.    Respiratory: Negative.    Cardiovascular: Negative.    Gastrointestinal: Positive for abdominal pain.   Endocrine: Negative.    Genitourinary: Negative.    Musculoskeletal: Positive for back pain.  "  Skin: Negative.    Allergic/Immunologic: Negative.    Neurological: Negative.    Hematological: Negative.    Psychiatric/Behavioral: Negative.        Objective  /80   Pulse 65   Resp 16   Ht 172.7 cm (68\")   Wt 120 kg (264 lb)   SpO2 95%   BMI 40.14 kg/m²   Physical Exam   Constitutional: He is oriented to person, place, and time. He appears well-developed and well-nourished.   HENT:   Head: Normocephalic and atraumatic.   Right Ear: External ear normal.   Left Ear: External ear normal.   Nose: Nose normal.   Mouth/Throat: Oropharynx is clear and moist.   Eyes: Conjunctivae and EOM are normal. Pupils are equal, round, and reactive to light.   Neck: Normal range of motion. Neck supple.   Cardiovascular: Normal rate, regular rhythm, normal heart sounds and intact distal pulses.   Pulmonary/Chest: Effort normal and breath sounds normal.   Abdominal: Soft. Bowel sounds are normal.   Musculoskeletal: Normal range of motion.   Neurological: He is alert and oriented to person, place, and time.   Skin: Skin is warm and dry. Capillary refill takes less than 2 seconds.   Psychiatric: He has a normal mood and affect. His behavior is normal. Judgment and thought content normal.   Vitals reviewed.      Assessment/Plan   Carlo was seen today for flank pain.    Diagnoses and all orders for this visit:    Flank pain  -     Urinalysis With Microscopic - Urine, Clean Catch  -     CT Abdomen Pelvis Stone Protocol    Other orders  -     HYDROcodone-acetaminophen (NORCO) 7.5-325 MG per tablet; Take 1 tablet by mouth Every 6 (Six) Hours As Needed for Moderate Pain .    Patient's Body mass index is 40.14 kg/m². BMI is above normal parameters. Recommendations include: no follow-up required.             No orders of the defined types were placed in this encounter.  Current outpatient and discharge medications have been reconciled for the patient.  Reviewed by: Rhys Rosen MD    Follow up: 3 month(s)  "

## 2019-01-14 ENCOUNTER — TELEPHONE (OUTPATIENT)
Dept: FAMILY MEDICINE CLINIC | Facility: CLINIC | Age: 74
End: 2019-01-14

## 2019-01-14 NOTE — TELEPHONE ENCOUNTER
abhinav called & said that he was told last week to call back if his back was not any better.  He said that his back is still hurting severely.  [phone]

## 2019-01-14 NOTE — RS.OPPTDN
Subjective


Date of Note: 01/14/19


Visit #: 4


Number of visits approved by Insurance: NA


Date of Evaluation: 01/07/19


Payer Source: MEDICARE


Treatment Diagnosis: R plantar fasciatis


Current Subjective/complaints:: Patient reports continued improvement in right 

heel/foot pain. States he continues to use cane when out of his home.


*Precautions: PACEMAKER





Pain Assessment





- Pain Description


Pain Location: right heel, plantar surface


Current Pain Intensity: mild 





- Treatment


Modality: Ultrasound


Parameters/Method Applied: [id number] at 1.5w/cm2 to the right heel and along 

plantar surface prior to MT.


Patient Position: Supine





- Heat/Cryotherapy


Treatment: Hot Pack ([id number] to the right heel and plantar surface of the foot. 

Patient in supine. ), Cryotherapy (Ended with 5mins ice massage to right foot 

plantar surface. )





Interventions





- Exercise/Activities/Manual Therapy


Exercises/Activities: Discussed self stretching in sitting and standing.


Manual Therapy: [id number] Passive stretching of the right foot plantar surface 

and heelcords. Deep tissue and myofasical release to the plantar fascia. 

Passive stretch of the great toe flexor.


Total minutes of Manual Therapy: 18mins 


HOME EXERCISE PROGRAM: pt still has written HEP from one time visit. Self 

stretching of heel cords and plantar fascia. Ice several times per day.





- Charges


Timed Code Treatment Minutes: 33mins 


Total Treatment Time: 48mins 


Procedures billed for this date of service:: HP, US, MT


Assessment: Patient walking better and going without cane at home.


Patient Education: Home Exercise Program


Patient demonstrates compliance with HEP?: Yes





Short Term Goals


Goal #1: pt independent with initial HEP 


Goal to be met by: 01/21/19


Progress towards Goal:: Progressing


Goal #2: Improve R ankle ROM WFL with decreased pain


Goal to be met by: 01/21/19


Progress towards Goal:: Progressing


Goal #3: pt with improved RLE strength 4 to 4+/5


Goal to be met by: 01/21/19


Goal #4: Improve dyn stand balance as noted by shelia 21/28


Goal to be met by: 01/21/19





Long Term Goals


Goal #1: pt rate pain <4/10 with activity


Goal to be met by: 02/04/19


Progress towards goal: Partially Met


Goal #2: pt report increased ability to perform daily activities with less pain


Goal to be met by: 02/04/19


Goal #3: Improved dyn stand balance as noted by tinetti score of 23/28


Goal to be met by: 02/04/19


Goal #4: .





Plan


Dates of Long Term Goals: 2/4/19


Expiration date of current Insurance Approval:: 2/4/19


PLAN: Continue modalities and progress exercise to redcue pain and increase 

functional ambulation.

## 2019-01-15 ENCOUNTER — TELEPHONE (OUTPATIENT)
Dept: FAMILY MEDICINE CLINIC | Facility: CLINIC | Age: 74
End: 2019-01-15

## 2019-01-15 NOTE — TELEPHONE ENCOUNTER
QUITA CALLED @ 8:00 AND SAID HE IS SCHEDULED FOR INJECTIONS IN HIS BACK TODAY.  IF THAT DOESN'T HELP HE WILL CALL LATER.  CANCELLING HIS APPT FOR THIS MORNING.

## 2019-01-16 NOTE — RS.OPPTDN
Subjective


Date of Note: 01/16/19


Visit #: 5


Number of visits approved by Insurance: NA


Date of Evaluation: 01/07/19


Payer Source: MEDICARE


Treatment Diagnosis: R plantar fasciatis


Current Subjective/complaints:: Patient reports the ultrasound sems to really 

help relieve the pain.He is doing the exercises /stretches at home.


*Precautions: PACEMAKER





Pain Assessment





- Pain Description


Pain Location: R heel/plantar surface





- Treatment


Modality: Ultrasound


Parameters/Method Applied: 10 mins. continuous mode @ 1.5 w/cm2 to R plantar 

surface


Patient Position: Supine





- Heat/Cryotherapy


Treatment: Hot Pack (20 mins. prior to US )





Interventions





- Exercise/Activities/Manual Therapy


Exercises/Activities: Reviewed the stretches and use of ice bottle to the 

plantar surface at home.


Total minutes of Exercise: 0


Manual Therapy: Passive stretching of the right foot plantar surface and 

heelcords. Deep tissue and myofasical release to the plantar fascia.


Total minutes of Manual Therapy: 20


HOME EXERCISE PROGRAM: pt still has written HEP from one time visit. Self 

stretching of heel cords and plantar fascia. Ice several times per day.





- Charges


Timed Code Treatment Minutes: 30


Total Treatment Time: 50


Procedures billed for this date of service:: hp,US,manual therapy


Assessment: Patient has mild tenderness with moderate pressure to the anterior 

aspect of the heel ,along with the mid-arch area.He tolerates the stretches well

,no pain at rest in the arch.He also enters clinic today with new running shoes 

,to assist with reducng foot pain.He is motivated to improve and attentive to 

the recommendations of the therapy staff.


Patient Education: Education of diagnosis, Body/Joint mechanics, Home Exercise 

Program, Home Safety, Activity Modification, Education of Plan of Care


Patient demonstrates compliance with HEP?: Yes





Short Term Goals


Goal #1: pt independent with initial HEP 


Goal to be met by: 01/21/19


Progress towards Goal:: Progressing


Goal #2: Improve R ankle ROM WFL with decreased pain


Goal to be met by: 01/21/19


Progress towards Goal:: Progressing


Goal #3: pt with improved RLE strength 4 to 4+/5


Goal to be met by: 01/21/19


Goal #4: Improve dyn stand balance as noted by shelia 21/28


Goal to be met by: 01/21/19





Long Term Goals


Goal #1: pt rate pain <4/10 with activity


Goal to be met by: 02/04/19


Progress towards goal: Partially Met (pain intensity varies ,dependent upon how 

long he is on his feet)


Goal #2: pt report increased ability to perform daily activities with less pain


Goal to be met by: 02/04/19


Progress towards goal: Progressing


Goal #3: Improved dyn stand balance as noted by tinetti score of 23/28


Goal to be met by: 02/04/19


Goal #4: .





Plan


Dates of Long Term Goals: 2/4/19


Expiration date of current Insurance Approval:: NA


PLAN: Cont. skilled PT to reduce /eliminate R foot pain.

## 2019-01-18 NOTE — RS.OPPTDN
Subjective


Date of Note: 01/18/19


Visit #: 6


Number of visits approved by Insurance: NA


Date of Evaluation: 01/07/19


Payer Source: MEDICARE


Treatment Diagnosis: R plantar fasciatis


Current Subjective/complaints:: Patient reports continued improvement in right 

foot pain. States he has some increased soreness today as he did more standing 

yesterday. Reports a flair-up of right hip and S-I region pain. States 

physician has told him this is due to change in gait and walking with cane.


*Precautions: PACEMAKER





Pain Assessment





- Pain Description


Pain Location: Right heel and plantar surface, left hip


Current Pain Intensity: mild 





- Treatment


Modality: Ultrasound


Parameters/Method Applied: [id number] at 1.5w/cm2 to the right foot plantar 

surface prior to MT.


Patient Position: Supine





- Heat/Cryotherapy


Treatment: Hot Pack ([id number] to the right foot prior to US and MT. Patient in 

supine. ), Cryotherapy (x5mins ice massage to the right foot plantar surface to 

end session. Patient supine. )





Interventions





- Exercise/Activities/Manual Therapy


Exercises/Activities: Reviewed standing gastroc and soleus stretching. Assisted 

with piriformis stretch to left hip. Began green theraband for resistive right 

ankle df.  Assisted hamstring and heelcord stretching on right.


Total minutes of Exercise: 12mins 


Manual Therapy: Passive stretching of the right foot plantar surface and 

heelcords. Deep tissue and myofasical release to the plantar fascia.


Total minutes of Manual Therapy: [id number] 


HOME EXERCISE PROGRAM: pt still has written HEP from one time visit. Self 

stretching of heel cords and plantar fascia. Ice several times per day.





- Charges


Timed Code Treatment Minutes: 44mins 


Total Treatment Time: 59mins 


Procedures billed for this date of service:: HP, US, MT, EX


Assessment: Patient responding well to treatment with reports of pain reduction 

and improved gait. He continues to wear new supportive athletic shoes. Patient 

motivated to progress exercise.


Patient Education: Body/Joint mechanics, Home Exercise Program, Home Safety, 

Activity Modification


Comments: Reviewed joint mechanics and home management techniques.


Patient demonstrates compliance with HEP?: Yes





Short Term Goals


Goal #1: pt independent with initial HEP 


Goal to be met by: 01/21/19


Progress towards Goal:: Progressing


Goal #2: Improve R ankle ROM WFL with decreased pain


Goal to be met by: 01/21/19


Progress towards Goal:: Progressing


Goal #3: pt with improved RLE strength 4 to 4+/5


Goal to be met by: 01/21/19


Progress towards Goal:: Progressing


Goal #4: Improve dyn stand balance as noted by tinetti 21/28


Goal to be met by: 01/21/19





Long Term Goals


Goal #1: pt rate pain <4/10 with activity


Goal to be met by: 02/04/19


Progress towards goal: Partially Met (pain intensity varies ,dependent upon how 

long he is on his feet)


Goal #2: pt report increased ability to perform daily activities with less pain


Goal to be met by: 02/04/19


Progress towards goal: Progressing


Goal #3: Improved dyn stand balance as noted by tinetti score of 23/28


Goal to be met by: 02/04/19


Goal #4: .





Plan


Dates of Long Term Goals: 2/4/19


Expiration date of current Insurance Approval:: 2/4/19


PLAN: Continue modalities, manual therapy, and exercise to reduce pain and 

increase patients balance and functional activity level.

## 2019-01-21 NOTE — RS.OPPTDN
Subjective


Date of Note: 01/21/19


Visit #: 7


Number of visits approved by Insurance: n/a


Date of Evaluation: 01/07/19


Payer Source: MEDICARE


Treatment Diagnosis: R plantar fasciatis


Current Subjective/complaints:: pt reports that his foot has improved since 

beginning PT. pt amb without cane this date. States his back has been giving 

him problems, however is going to have injections tomorrow.


*Precautions: PACEMAKER





Pain Assessment





- Pain Description


Pain Location: R heel plantar surface.


Pain Description: Burning


Current Pain Intensity: 1-2





- Treatment


Modality: Ultrasound


Parameters/Method Applied: 1.5w/cm2 x 9 mins to plantar surface of R foot 

concentrating on R lat heel, prior to ex.


Patient Position: Supine





- Heat/Cryotherapy


Treatment: Hot Pack (x 15 mins prior to ultrasound and ex.), Cryotherapy (ice 

massage x 5 mins after treatment)





Interventions





- Exercise/Activities/Manual Therapy


Exercises/Activities: pt received heel cord stretching RLE. pt also performed 2 

sets of 10 reps of DF, PF, inversion, eversion with green band. Reviewed HEP 

with patient.


Manual Therapy: pt received myofascial release to plantar surface of foot with 

deep tissue massage and stretching


HOME EXERCISE PROGRAM: pt still has written HEP from one time visit. Self 

stretching of heel cords and plantar fascia. Ice several times per day.





- Charges


Timed Code Treatment Minutes: 46


Total Treatment Time: 61


Procedures billed for this date of service:: HP, US, Ex


Assessment: pt progressing with decreased pain R foot as well as decreased 

antalgic gait pattern. pt has progressed to amb without cane. pt also improved 

with decreased tightness in R heel cord.


Patient Education: Home Exercise Program, Education of Plan of Care


Patient demonstrates compliance with HEP?: Yes





Short Term Goals


Goal #1: pt independent with initial HEP 


Goal to be met by: 01/21/19


Progress towards Goal:: Met


Goal #2: Improve R ankle ROM WFL with decreased pain


Goal to be met by: 01/21/19


Progress towards Goal:: Progressing


Goal #3: pt with improved RLE strength 4 to 4+/5


Goal to be met by: 01/21/19


Progress towards Goal:: Progressing


Goal #4: Improve dyn stand balance as noted by shelia 21/28


Goal to be met by: 01/21/19


Progress towards Goal:: Progressing


Comments:: will recheck tinetti on next visit.





Long Term Goals


Goal #1: pt rate pain <4/10 with activity


Goal to be met by: 02/04/19


Progress towards goal: Partially Met (pain intensity varies ,dependent upon how 

long he is on his feet)


Goal #2: pt report increased ability to perform daily activities with less pain


Goal to be met by: 02/04/19


Progress towards goal: Progressing


Goal #3: Improved dyn stand balance as noted by tinetti score of 23/28


Goal to be met by: 02/04/19


Progress towards goal: Progressing


Goal #4: .





Plan


Dates of Long Term Goals: 2/4/19


Expiration date of current Insurance Approval:: n/a


PLAN: continue to progress with ex, manual therapy and modalities to decrease 

pain and improve mobility.

## 2019-01-22 ENCOUNTER — HOSPITAL ENCOUNTER (OUTPATIENT)
Dept: GENERAL RADIOLOGY | Age: 74
Discharge: HOME | End: 2019-01-22
Payer: MEDICARE

## 2019-01-22 ENCOUNTER — HOSPITAL ENCOUNTER (OUTPATIENT)
Age: 74
Setting detail: OUTPATIENT SURGERY
Discharge: HOME | End: 2019-01-22
Attending: PHYSICAL MEDICINE & REHABILITATION | Admitting: PHYSICAL MEDICINE & REHABILITATION

## 2019-01-22 VITALS
DIASTOLIC BLOOD PRESSURE: 65 MMHG | SYSTOLIC BLOOD PRESSURE: 119 MMHG | HEART RATE: 75 BPM | OXYGEN SATURATION: 94 % | RESPIRATION RATE: 18 BRPM

## 2019-01-22 DIAGNOSIS — R52 PAIN: ICD-10-CM

## 2019-01-22 PROCEDURE — G0260 INJ FOR SACROILIAC JT ANESTH: HCPCS

## 2019-01-22 PROCEDURE — 3209999900 FLUORO FOR SURGICAL PROCEDURES

## 2019-01-22 PROCEDURE — G8918 PT W/O PREOP ORDER IV AB PRO: HCPCS

## 2019-01-22 PROCEDURE — G8907 PT DOC NO EVENTS ON DISCHARG: HCPCS

## 2019-01-22 RX ORDER — LIDOCAINE HYDROCHLORIDE 10 MG/ML
INJECTION, SOLUTION INFILTRATION; PERINEURAL PRN
Status: DISCONTINUED | OUTPATIENT
Start: 2019-01-22 | End: 2019-01-22 | Stop reason: HOSPADM

## 2019-01-22 RX ORDER — 0.9 % SODIUM CHLORIDE 0.9 %
VIAL (ML) INJECTION PRN
Status: DISCONTINUED | OUTPATIENT
Start: 2019-01-22 | End: 2019-01-22 | Stop reason: HOSPADM

## 2019-01-22 RX ORDER — DONEPEZIL HYDROCHLORIDE 5 MG/1
TABLET, FILM COATED ORAL
Qty: 90 TABLET | Refills: 0 | Status: SHIPPED | OUTPATIENT
Start: 2019-01-22 | End: 2019-04-25 | Stop reason: SDUPTHER

## 2019-01-23 NOTE — RS.OPPTDN
Subjective


Date of Note: 19


Visit #: 8


Number of visits approved by Insurance: NA


Date of Evaluation: 19


Payer Source: MEDICARE


Treatment Diagnosis: R plantar fasciatis


Current Subjective/complaints:: Patient reports an increase in right foot pain 

due to standing for  services yesterday. States overall he has made good 

improvement and has discontinued use of cane.


*Precautions: PACEMAKER





Pain Assessment





- Pain Description


Pain Location: Right heel, plantar surface


Pain Description: Tightness, Aching


Current Pain Intensity: mild 





- Treatment


Modality: Ultrasound


Parameters/Method Applied: [id number] @ 1.5w/cm2 to the right foot plantar surface 

prior to MT.


Patient Position: Supine





- Heat/Cryotherapy


Treatment: Hot Pack ([id number] to the right foot plantar surface prior to US. 

Patient in supine. ), Cryotherapy (Ended with 5mins ice massage to the right 

heel and along plantar fasica. Patient in supine. )





Interventions





- Exercise/Activities/Manual Therapy


Exercises/Activities: Increased to blue theraband for resisted right ankle df, 

3s/10reps.  Reviewed HEP and home care.


Total minutes of Exercise: 4mins 


Manual Therapy: pt received myofascial release to plantar surface of foot with 

deep tissue massage and stretching


Total minutes of Manual Therapy: 20mins 


HOME EXERCISE PROGRAM: pt still has written HEP from one time visit. Self 

stretching of heel cords and plantar fascia. Ice several times per day.





- Charges


Timed Code Treatment Minutes: 39mins 


Total Treatment Time: 54mins


Procedures billed for this date of service:: HP, US, MT2


Assessment: Patient progressing with functional activity level and has been 

able to discontinue use of cane.


Patient Education: Home Exercise Program, Activity Modification


Patient demonstrates compliance with HEP?: Yes





Short Term Goals


Goal #1: pt independent with initial HEP 


Goal to be met by: 19


Progress towards Goal:: Met


Goal #2: Improve R ankle ROM WFL with decreased pain


Goal to be met by: 19


Progress towards Goal:: Progressing


Goal #3: pt with improved RLE strength 4 to 4+/5


Goal to be met by: 19


Progress towards Goal:: Progressing


Goal #4: Improve dyn stand balance as noted by shelia 


Goal to be met by: 19


Progress towards Goal:: Progressing





Long Term Goals


Goal #1: pt rate pain <4/10 with activity


Goal to be met by: 19


Progress towards goal: Partially Met (pain intensity varies ,dependent upon how 

long he is on his feet)


Goal #2: pt report increased ability to perform daily activities with less pain


Goal to be met by: 19


Progress towards goal: Met


Goal #3: Improved dyn stand balance as noted by tinetti score of 23/28


Goal to be met by: 19


Progress towards goal: Progressing


Goal #4: .





Plan


Dates of Long Term Goals: 19


Expiration date of current Insurance Approval:: 19


PLAN: Continue modalities, MT, and progressive EX to reduce pain and increase 

patients functional activity level.

## 2019-01-25 NOTE — RS.OPPTDN
Subjective


Date of Note: 01/25/19


Visit #: 9


Number of visits approved by Insurance: NA


Date of Evaluation: 01/07/19


Payer Source: MEDICARE


Treatment Diagnosis: R plantar fasciatis


Current Subjective/complaints:: Patient reports improvement in right foot pain 

today. States he has not been on his feet as much and is working on HEP.


*Precautions: PACEMAKER





Pain Assessment





- Pain Description


Pain Location: right heel and plantar surface


Pain Description: Tightness


Pain Description: soreness


Current Pain Intensity: mild 





- Treatment


Modality: Ultrasound


Parameters/Method Applied: [id number] at 1.5w/cm2 to the right heel and along the 

plantar surface prior to MT.


Patient Position: Supine





- Heat/Cryotherapy


Treatment: Hot Pack ([id number] to the right foot prior to US and MT. Patient in 

supine. )





Interventions





- Exercise/Activities/Manual Therapy


Exercises/Activities: Blue theraband for resisted right ankle df, 4s/10reps.  

Discussion of HEP.


Total minutes of Exercise: 5mins 


Manual Therapy: pt received myofascial release to plantar surface of foot with 

deep tissue massage and stretching. Assisted stretching of hamstrings, gastroc, 

soleus, and plantar fascia.


Total minutes of Manual Therapy: 24mins 


HOME EXERCISE PROGRAM: pt still has written HEP from one time visit. Self 

stretching of heel cords and plantar fascia. Ice several times per day.





- Objective Findings


Observations,measurements,etc.: Reduction in gait deviation and increase in 

hamstring and heel cord length.





- Charges


Timed Code Treatment Minutes: 39mins 


Total Treatment Time: 54mins 


Procedures billed for this date of service:: HP, US, MT2


Assessment: Patient demos improvement in flexibility and ambulation, and 

reports improvement in functional activity level.


Patient Education: Home Exercise Program, Activity Modification


Patient demonstrates compliance with HEP?: Yes





Short Term Goals


Goal #1: pt independent with initial HEP 


Goal to be met by: 01/21/19


Progress towards Goal:: Met


Goal #2: Improve R ankle ROM WFL with decreased pain


Goal to be met by: 01/21/19


Progress towards Goal:: Progressing


Goal #3: pt with improved RLE strength 4 to 4+/5


Goal to be met by: 01/21/19


Progress towards Goal:: Progressing


Goal #4: Improve dyn stand balance as noted by shelia 21/28


Goal to be met by: 01/21/19


Progress towards Goal:: Progressing





Long Term Goals


Goal #1: pt rate pain <4/10 with activity


Goal to be met by: 02/04/19


Progress towards goal: Partially Met (pain intensity varies ,dependent upon how 

long he is on his feet)


Goal #2: pt report increased ability to perform daily activities with less pain


Goal to be met by: 02/04/19


Progress towards goal: Met


Goal #3: Improved dyn stand balance as noted by tinetti score of 23/28


Goal to be met by: 02/04/19


Progress towards goal: Progressing


Goal #4: .





Plan


Dates of Long Term Goals: 2/4/19


Expiration date of current Insurance Approval:: 2/4/19


PLAN: Continue modalities and progress exercise to reduce pain and increase 

functional mobility and activity level.

## 2019-01-30 ENCOUNTER — HOSPITAL ENCOUNTER (OUTPATIENT)
Dept: HOSPITAL 58 - REHAB | Age: 74
LOS: 1 days | End: 2019-01-31
Attending: ORTHOPAEDIC SURGERY

## 2019-01-30 VITALS — BODY MASS INDEX: 35.7 KG/M2

## 2019-01-30 DIAGNOSIS — M72.2: Primary | ICD-10-CM

## 2019-01-30 NOTE — RS.PTSUM
Progress Note/Summary


Date of Note: 01/30/19


Date of Evaluation: 01/07/19


Number of Visits: 10


Number of visits approved by Insurance: n/a


Reporting Period for this Progress Note: 1/7/19-1/30/19


Current Complaints/Gains: pt reports he is doing much better with walking. pt 

has minor gait deviations due to R heel pain, but is able to amb without cane.


Objective Measurements/Presentation: R ankle ROM WFL's without pain, pain 3/10 

or less.  RLE 4/5. pt is benefitting from skilled manual therapy/massage to 

decrease plantar fascia pain and tightness.  LE functional scale improved from 

70% to 44%


G Codes: n/a


Source of G Code Score: n/a





- Short Term Goals


Goal #1: pt independent with initial HEP 


Goal to be met by: 01/21/19


Progress towards Goal:: Met


Goal #2: Improve R ankle ROM WFL with decreased pain


Goal to be met by: 01/21/19


Progress towards Goal:: Met


Goal to be met by: 01/21/19


Progress towards Goal:: Progressing


Goal #4: Improve dyn stand balance as noted by tinetti 21/28


Goal to be met by: 01/21/19


Progress towards Goal:: Progressing





- Long Term Goals


Goal #1: pt rate pain <4/10 with activity


Goal to be met by: 02/04/19


Progress towards goal: Met


Goal #2: pt report increased ability to perform daily activities with less pain


Goal to be met by: 02/04/19


Progress towards goal: Met


Goal #3: Improved dyn stand balance as noted by tinetti score of 23/28


Goal to be met by: 02/04/19


Progress towards goal: Progressing


Goal #4: .





- Assessment


Assessment of Improvement/Progress: pt has met STG 1, 2, and LTG 1, 2. pt is 

progressing toward remaining goals. pt continues with decreased strength, 

balance. Feel pt would continue to benefit from continued PT next week to 

reduce pain and improve functional ambulation and activity level.


Summary: Patient has made progress towards goals., Patient demonstrates 

potential to gain increased function with therapy





- Plan


Plan: Continue Plan of Care


Frequency: 2 X week


Duration: 1 week


Dates of Long Term Goals: 2/4/19


Expiration date of current Insurance Approval:: n/a

## 2019-01-30 NOTE — RS.OPPTDN
Subjective


Date of Note: 01/30/19


Visit #: 10


Number of visits approved by Insurance: NA


Date of Evaluation: 01/07/19


Payer Source: MEDICARE


Treatment Diagnosis: R plantar fasciatis


Current Subjective/complaints:: Patient reports he has no balance problems. 

States right foot pain continues to improve. States right heel pain continues 

to reduce his WB with walking and he has mild gait deviation. States he is 

working on HEP and feels another week of treatment should improve his ability 

to WB on right foot and improve his walking.


*Precautions: PACEMAKER





Pain Assessment





- Pain Description


Pain Location: right heel, plantar fascia


Pain Description: Tightness, Dull, Aching


Current Pain Intensity: 2/10





- Treatment


Modality: Ultrasound


Parameters/Method Applied: [id number] at 1.5w/cm2 to the right foot plantar 

surface.


Patient Position: Supine





- Heat/Cryotherapy


Treatment: Hot Pack (y60rana to the right heel and plantar surface prior to US 

and MT. Patient supine. )





Interventions





- Exercise/Activities/Manual Therapy


Exercises/Activities: Blue theraband for resisted right ankle df, 4s/10reps. 

Blue theraband for ankle inversion and eversion, 3s/10reps each. Isometrics for 

inversion, eversion, and df.


Total minutes of Exercise: 8mins 


Manual Therapy: pt received myofascial release to plantar surface of foot with 

deep tissue massage and stretching. Assisted stretching of hamstrings, gastroc, 

soleus, and plantar fascia.


Total minutes of Manual Therapy: 20mins 


HOME EXERCISE PROGRAM: pt still has written HEP from one time visit. Self 

stretching of heel cords and plantar fascia. Ice several times per day.





- Objective Findings


Observations,measurements,etc.: Patient increased LE Functional scale score to 

45/80 or 44% (24/80 or 70%)





- Charges


Timed Code Treatment Minutes: 38mins 


Total Treatment Time: 56mins 


Procedures billed for this date of service:: HP, US, MT, EX


Assessment: Patient progressing well with ROM, strength, reduction of pain, and 

improvement of gait and functional activity level.


Patient Education: Education of diagnosis, Home Exercise Program


Patient demonstrates compliance with HEP?: Yes





Short Term Goals


Goal #1: pt independent with initial HEP 


Goal to be met by: 01/21/19


Progress towards Goal:: Met


Goal #2: Improve R ankle ROM WFL with decreased pain


Goal to be met by: 01/21/19


Progress towards Goal:: Met


Goal #3: pt with improved RLE strength 4 to 4+/5


Goal to be met by: 01/21/19


Progress towards Goal:: Partially Met (R LE 4/5 MMT)


Goal #4: Improve dyn stand balance as noted by tinetti 21/28


Goal to be met by: 01/21/19


Progress towards Goal:: Progressing





Long Term Goals


Goal #1: pt rate pain <4/10 with activity


Goal to be met by: 02/04/19


Progress towards goal: Met


Goal #2: pt report increased ability to perform daily activities with less pain


Goal to be met by: 02/04/19


Progress towards goal: Met


Goal #3: Improved dyn stand balance as noted by tinetti score of 23/28


Goal to be met by: 02/04/19


Progress towards goal: Progressing


Comments: No assessed





Plan


Dates of Long Term Goals: 2/4/19


Expiration date of current Insurance Approval:: 2/4/19


PLAN: Continue next week to increase patients functional gait and activity 

level.

## 2019-02-04 ENCOUNTER — HOSPITAL ENCOUNTER (OUTPATIENT)
Dept: HOSPITAL 58 - REHAB | Age: 74
LOS: 24 days | End: 2019-02-28
Attending: ORTHOPAEDIC SURGERY

## 2019-02-04 VITALS — BODY MASS INDEX: 35.7 KG/M2

## 2019-02-04 DIAGNOSIS — M72.2: Primary | ICD-10-CM

## 2019-02-04 NOTE — RS.OPPTDN
Subjective


Date of Note: 02/04/19


Visit #: 11


Number of visits approved by Insurance: NA


Date of Evaluation: 01/07/19


Payer Source: MEDICARE


Treatment Diagnosis: R plantar fasciatis


Current Subjective/complaints:: Patient states he was unable to attend last 

session last week due to moving. States he know today was he last scheduled 

appointment per POC. States he is pleased with his progress and states he is 

about "85% better".  States he will continue HEP.


*Precautions: PACEMAKER





Pain Assessment





- Pain Description


Pain Location: right heel and foot pain


Other Comments regarding Pain:: Mild soreness with weight-bearing.





- Treatment


Modality: Ultrasound


Parameters/Method Applied: [id number] at 1.5w/cm2 to the right foot plantar 

surface prior to EX. Patient in supine.





- Heat/Cryotherapy


Treatment: Hot Pack ([id number] to the right foot prior US and MT. Patient in 

supine. )





Interventions





- Exercise/Activities/Manual Therapy


Exercises/Activities: Discussion of HEP. Patient given tennis ball and 

instruction for rolling on plantar fascia.


Total minutes of Exercise: 5mins 


Manual Therapy: pt received myofascial release to plantar surface of foot with 

deep tissue massage and stretching. Assisted stretching of hamstrings, gastroc, 

soleus, and plantar fascia.


Total minutes of Manual Therapy: 24mins 


HOME EXERCISE PROGRAM: pt still has written HEP from one time visit. Self 

stretching of heel cords and plantar fascia. Ice several times per day.





- Objective Findings


Observations,measurements,etc.: Patient demos no gait deviations or LOB.  Demos 

right ankle ROM within limits of the left. Also, 4 to 4+/5 MMT right LE.





- Charges


Timed Code Treatment Minutes: 39mins 


Total Treatment Time: 54mins 


Procedures billed for this date of service:: HP, US, MT2


Assessment: Patient has progressed well. He has met all 7 treatment goals, has 

discontinued use of cane, and will continue HEP.


Patient Education: Home Exercise Program


Comments: Finalized all HEP and hame care.


Patient demonstrates compliance with HEP?: Yes





Short Term Goals


Goal #1: pt independent with initial HEP 


Goal to be met by: 01/21/19


Progress towards Goal:: Met


Goal #2: Improve R ankle ROM WFL with decreased pain


Goal to be met by: 01/21/19


Progress towards Goal:: Met


Goal #3: pt with improved RLE strength 4 to 4+/5


Goal to be met by: 01/21/19


Progress towards Goal:: Met


Goal #4: Improve dyn stand balance as noted by tinetti 21/28


Goal to be met by: 01/21/19


Progress towards Goal:: Met





Long Term Goals


Goal #1: pt rate pain <4/10 with activity


Goal to be met by: 02/04/19


Progress towards goal: Met


Goal #2: pt report increased ability to perform daily activities with less pain


Goal to be met by: 02/04/19


Progress towards goal: Met


Goal #3: Improved dyn stand balance as noted by tinetti score of 23/28


Goal to be met by: 02/04/19


Progress towards goal: Met


Comments: No balance, gait deviations


Goal #4: .





Plan


Dates of Long Term Goals: 2/4/19


Expiration date of current Insurance Approval:: 2/4/19


PLAN: Discharge with HEP.

## 2019-02-05 ENCOUNTER — HOSPITAL ENCOUNTER (OUTPATIENT)
Dept: GENERAL RADIOLOGY | Age: 74
Discharge: HOME | End: 2019-02-05
Payer: MEDICARE

## 2019-02-05 ENCOUNTER — HOSPITAL ENCOUNTER (OUTPATIENT)
Age: 74
Setting detail: OUTPATIENT SURGERY
Discharge: HOME | End: 2019-02-05
Attending: PHYSICAL MEDICINE & REHABILITATION | Admitting: PHYSICAL MEDICINE & REHABILITATION

## 2019-02-05 VITALS
HEART RATE: 60 BPM | OXYGEN SATURATION: 96 % | SYSTOLIC BLOOD PRESSURE: 118 MMHG | DIASTOLIC BLOOD PRESSURE: 63 MMHG | RESPIRATION RATE: 18 BRPM

## 2019-02-05 DIAGNOSIS — R52 SCAR PAINFUL: ICD-10-CM

## 2019-02-05 DIAGNOSIS — L90.5 SCAR PAINFUL: ICD-10-CM

## 2019-02-05 PROCEDURE — 3209999900 FLUORO FOR SURGICAL PROCEDURES

## 2019-02-05 PROCEDURE — 62323 NJX INTERLAMINAR LMBR/SAC: CPT

## 2019-02-05 RX ORDER — 0.9 % SODIUM CHLORIDE 0.9 %
VIAL (ML) INJECTION PRN
Status: DISCONTINUED | OUTPATIENT
Start: 2019-02-05 | End: 2019-02-05 | Stop reason: HOSPADM

## 2019-02-05 RX ORDER — METHYLPREDNISOLONE ACETATE 80 MG/ML
INJECTION, SUSPENSION INTRA-ARTICULAR; INTRALESIONAL; INTRAMUSCULAR; SOFT TISSUE PRN
Status: DISCONTINUED | OUTPATIENT
Start: 2019-02-05 | End: 2019-02-05 | Stop reason: HOSPADM

## 2019-02-05 RX ORDER — LIDOCAINE HYDROCHLORIDE 10 MG/ML
INJECTION, SOLUTION EPIDURAL; INFILTRATION; INTRACAUDAL; PERINEURAL PRN
Status: DISCONTINUED | OUTPATIENT
Start: 2019-02-05 | End: 2019-02-05 | Stop reason: HOSPADM

## 2019-02-07 NOTE — RS.OPPTDC
Date of Discharge: 02/04/19


Date of Evaluation: 01/07/19


Number of Visits: 11


Treatment Diagnosis: R plantar fasciatis


Current Level of Function: pt demonstrates good gait pattern with no LOB, no 

deviations. pt is no longer using cane. pt is independent with HEP. R ankle WFL'

s. MMT 4 to 4+/5 RLE, R ankle 4+/5. Pain only 1/10 which pt describes as 

soreness. pt has met all goals.


Current Complaints/Gains: pt states he is "85% better". Reports performing most 

ADL's. Was able to help with moving which is why he missed a previous appt. 

States he will continue with HEP as instructed.





Functional Outcome Measure


Other: 





LE functional scale 44%





- G Codes & Severity Modifier


G Codes & Modifier: n/a


Source of G Code score: n/a





Observation





- Observation


Posture: Forward Head, Rounded Shoulders





Gait





- Gait Pattern


General Gait Pattern Observation: No Deviations/Normal





Interventions





- Exercise/Activities/Manual Therapy


Exercises/Activities: n/a


Manual Therapy: n/a


HOME EXERCISE PROGRAM: pt still has written HEP from one time visit. Self 

stretching of heel cords and plantar fascia. Ice several times per day.





- Charges


Timed Code Treatment Minutes: n/a


Total Treatment Time: n/a


Procedures billed for this date of service:: n/a





Assessment


Assessment: pt has met all goals.


Patient Education: Home Exercise Program, Education of Plan of Care


Rehab Potential: Good





Short Term Goals


Goal #1: pt independent with initial HEP 


Goal to be met by: 01/21/19


Progress towards Goal:: Met


Goal #2: Improve R ankle ROM WFL with decreased pain


Goal to be met by: 01/21/19


Progress towards Goal:: Met


Goal #3: pt with improved RLE strength 4 to 4+/5


Goal to be met by: 01/21/19


Progress towards Goal:: Met


Goal #4: Improve dyn stand balance as noted by shelia 21/28


Goal to be met by: 01/21/19


Progress towards Goal:: Met





Long Term Goals


Goal #1: pt rate pain <4/10 with activity


Goal to be met by: 02/04/19


Progress towards goal: Met


Goal #2: pt report increased ability to perform daily activities with less pain


Goal to be met by: 02/04/19


Progress towards goal: Met


Goal #3: Improved dyn stand balance as noted by tinetti score of 23/28


Goal to be met by: 02/04/19


Progress towards goal: Met


Goal #4: .





Plan


Reason for Discharge:: All Goals Met

## 2019-02-19 ENCOUNTER — HOSPITAL ENCOUNTER (OUTPATIENT)
Dept: GENERAL RADIOLOGY | Age: 74
Discharge: HOME | End: 2019-02-19
Payer: MEDICARE

## 2019-02-19 ENCOUNTER — HOSPITAL ENCOUNTER (OUTPATIENT)
Age: 74
Setting detail: OUTPATIENT SURGERY
Discharge: HOME | End: 2019-02-19
Attending: PHYSICAL MEDICINE & REHABILITATION | Admitting: PHYSICAL MEDICINE & REHABILITATION

## 2019-02-19 VITALS
OXYGEN SATURATION: 96 % | DIASTOLIC BLOOD PRESSURE: 71 MMHG | HEART RATE: 69 BPM | SYSTOLIC BLOOD PRESSURE: 132 MMHG | RESPIRATION RATE: 18 BRPM

## 2019-02-19 DIAGNOSIS — R52 SCAR PAINFUL: ICD-10-CM

## 2019-02-19 DIAGNOSIS — L90.5 SCAR PAINFUL: ICD-10-CM

## 2019-02-19 PROCEDURE — G8907 PT DOC NO EVENTS ON DISCHARG: HCPCS | Performed by: NURSE PRACTITIONER

## 2019-02-19 PROCEDURE — 64493 INJ PARAVERT F JNT L/S 1 LEV: CPT

## 2019-02-19 PROCEDURE — 3209999900 FLUORO FOR SURGICAL PROCEDURES

## 2019-02-19 PROCEDURE — G8918 PT W/O PREOP ORDER IV AB PRO: HCPCS | Performed by: NURSE PRACTITIONER

## 2019-02-19 RX ORDER — LIDOCAINE HYDROCHLORIDE 10 MG/ML
INJECTION, SOLUTION INFILTRATION; PERINEURAL PRN
Status: DISCONTINUED | OUTPATIENT
Start: 2019-02-19 | End: 2019-02-19 | Stop reason: ALTCHOICE

## 2019-03-04 ENCOUNTER — TELEPHONE (OUTPATIENT)
Dept: FAMILY MEDICINE CLINIC | Facility: CLINIC | Age: 74
End: 2019-03-04

## 2019-03-04 RX ORDER — OMEPRAZOLE 20 MG/1
20 CAPSULE, DELAYED RELEASE ORAL DAILY
Qty: 30 CAPSULE | Refills: 5 | Status: SHIPPED | OUTPATIENT
Start: 2019-03-04 | End: 2019-10-02 | Stop reason: SDUPTHER

## 2019-03-04 NOTE — TELEPHONE ENCOUNTER
QUITA NEEDS A REFILL OF HIS OMEPRAZOLE PLEASE.  THEY HAVE MOVED TO [place], [place] SO PLEASE SEND PRESCRIPTION TO [place] IN [place].

## 2019-03-07 ENCOUNTER — CLINICAL SUPPORT (OUTPATIENT)
Dept: CARDIOLOGY | Facility: CLINIC | Age: 74
End: 2019-03-07

## 2019-03-07 DIAGNOSIS — I49.5 SICK SINUS SYNDROME (HCC): ICD-10-CM

## 2019-03-07 PROCEDURE — 93296 REM INTERROG EVL PM/IDS: CPT | Performed by: PHYSICIAN ASSISTANT

## 2019-03-07 PROCEDURE — 93294 REM INTERROG EVL PM/LDLS PM: CPT | Performed by: PHYSICIAN ASSISTANT

## 2019-04-11 ENCOUNTER — OFFICE VISIT (OUTPATIENT)
Dept: FAMILY MEDICINE CLINIC | Facility: CLINIC | Age: 74
End: 2019-04-11

## 2019-04-11 VITALS
SYSTOLIC BLOOD PRESSURE: 132 MMHG | HEART RATE: 70 BPM | RESPIRATION RATE: 16 BRPM | WEIGHT: 266 LBS | BODY MASS INDEX: 40.32 KG/M2 | OXYGEN SATURATION: 96 % | TEMPERATURE: 98.8 F | DIASTOLIC BLOOD PRESSURE: 74 MMHG | HEIGHT: 68 IN

## 2019-04-11 DIAGNOSIS — E78.2 MIXED HYPERLIPIDEMIA: ICD-10-CM

## 2019-04-11 DIAGNOSIS — R13.19 ESOPHAGEAL DYSPHAGIA: ICD-10-CM

## 2019-04-11 DIAGNOSIS — I25.729 CORONARY ARTERY DISEASE INVOLVING AUTOLOGOUS ARTERY CORONARY BYPASS GRAFT WITH ANGINA PECTORIS (HCC): Primary | ICD-10-CM

## 2019-04-11 DIAGNOSIS — I10 ESSENTIAL HYPERTENSION: ICD-10-CM

## 2019-04-11 PROCEDURE — 99214 OFFICE O/P EST MOD 30 MIN: CPT | Performed by: FAMILY MEDICINE

## 2019-04-11 RX ORDER — PREGABALIN 75 MG/1
75 CAPSULE ORAL 2 TIMES DAILY
COMMUNITY
End: 2019-06-26

## 2019-04-11 RX ORDER — CLOPIDOGREL BISULFATE 75 MG/1
75 TABLET ORAL
COMMUNITY
End: 2019-04-11

## 2019-04-11 RX ORDER — OXYCODONE HYDROCHLORIDE AND ACETAMINOPHEN 5; 325 MG/1; MG/1
1 TABLET ORAL
COMMUNITY
End: 2019-04-11

## 2019-04-11 NOTE — PROGRESS NOTES
Subjective   Carlo Velasco is a 73 y.o. male.     Chief Complaint   Patient presents with   • Follow-up     6mo        History of Present Illness     he is here today with his wife--he denies any onging chest pain  --denies angina symtpoms...he denies any dysphagia...he is toleraing statin tx witout myalgais..      Current Outpatient Medications:   •  ALPRAZolam (XANAX) 0.25 MG tablet, TAKE 1 TABLET BY MOUTH THREE TIMES DAILY, Disp: 90 tablet, Rfl: 0  •  aspirin 81 MG EC tablet, Take 81 mg by mouth daily.  , Disp: , Rfl:   •  atorvastatin (LIPITOR) 80 MG tablet, TAKE 1 TABLET BY MOUTH EVERY NIGHT, Disp: 30 tablet, Rfl: 5  •  Bacillus Coagulans-Inulin (PROBIOTIC FORMULA PO), Take  by mouth., Disp: , Rfl:   •  BIOTIN PO, Take  by mouth., Disp: , Rfl:   •  BRILINTA 90 MG tablet tablet, TAKE 1 TABLET BY MOUTH EVERY 12 HOURS, Disp: 60 tablet, Rfl: 6  •  donepezil (ARICEPT) 5 MG tablet, TAKE 1 TABLET BY MOUTH AT BEDTIME, Disp: 90 tablet, Rfl: 0  •  escitalopram (LEXAPRO) 20 MG tablet, TAKE 1 TABLET BY MOUTH DAILY, Disp: 30 tablet, Rfl: 0  •  furosemide (LASIX) 40 MG tablet, TAKE 1 TABLET BY MOUTH DAILY, Disp: 90 tablet, Rfl: 0  •  metoprolol tartrate (LOPRESSOR) 25 MG tablet, Take one tablet by mouth twice a day., Disp: 180 tablet, Rfl: 4  •  NITROSTAT 0.4 MG SL tablet, Place 1 tablet under the tongue Every 5 (Five) Minutes As Needed for Chest Pain. Take no more than 3 doses in 15 minutes., Disp: 30 tablet, Rfl: 2  •  omega-3 acid ethyl esters (LOVAZA) 1 g capsule, TAKE 1 CAPSULE BY MOUTH DAILY, Disp: 90 capsule, Rfl: 3  •  omeprazole (priLOSEC) 20 MG capsule, Take 1 capsule by mouth Daily., Disp: 30 capsule, Rfl: 5  •  Potassium 99 MG tablet, Take 1 tablet by mouth Daily., Disp: , Rfl:   •  pregabalin (LYRICA) 75 MG capsule, Take 75 mg by mouth 2 (Two) Times a Day., Disp: , Rfl:   Allergies   Allergen Reactions   • Meperidine Other (See Comments)     HEART STOPS         Past Medical History:   Diagnosis Date   • Anxiety     • Arthritis    • Cancer (CMS/HCC)     melanoma   • CHF (congestive heart failure) (CMS/HCC)    • Claustrophobia    • Colon polyp    • Coronary artery disease 2012, 2015    2 vessel CABG (2012), SHAHRZAD LAD (2015).    • Depression    • Disease of thyroid gland     partial thyroidectomy on right   • GERD (gastroesophageal reflux disease)    • Hearing loss    • Heart attack (CMS/HCC)    • Heart attack (CMS/HCC)    • History of transfusion    • Hyperlipidemia    • Hypertension    • Kidney stone     history of    • Tobacco abuse, in remission    • Uses hearing aid     BILATERAL     Past Surgical History:   Procedure Laterality Date   • BACK SURGERY      CERVICAL FUSION AND LUMBAR DISC REPLACEMENT   • CARDIAC CATHETERIZATION  07/2015    SHAHRZAD mid LAD, Dr. Toribio   • CARDIAC CATHETERIZATION N/A 10/21/2016    Procedure: Left Heart Cath;  Surgeon: Darian Toribio MD;  Location:  [facility] CATH [facility];  Service:    • CARDIAC CATHETERIZATION Left 4/5/2017    Procedure: Cardiac Catheterization/Vascular Study;  Surgeon: Darian Toribio MD;  Location:  [facility];  Service:    • CARDIAC CATHETERIZATION N/A 4/5/2017    Procedure: Left Heart Cath;  Surgeon: Darian Toribio MD;  Location:  [facility];  Service:    • CARDIAC CATHETERIZATION N/A 4/5/2017    Procedure: Coronary angiography;  Surgeon: Darian Toribio MD;  Location:  [facility];  Service:    • CARDIAC CATHETERIZATION N/A 4/5/2017    Procedure: Left ventriculography;  Surgeon: Darian Toribio MD;  Location:  [facility];  Service:    • CARDIAC CATHETERIZATION  4/5/2017    Procedure: Saphenous Vein Graft;  Surgeon: Darian Toribio MD;  Location:  [facility];  Service:    • CARDIAC CATHETERIZATION Left 2/27/2018    Procedure: Cardiac Catheterization/Vascular Study SHAHRZAD OK PRN;  Surgeon: Darian Toribio MD;  Location:  [facility];  Service:    • CARDIAC CATHETERIZATION  2/27/2018     Procedure: Functional Flow Vincent;  Surgeon: Darian Toribio MD;  Location: [facility] CATH INVASIVE LOCATION;  Service:    • CARDIAC CATHETERIZATION N/A 2/27/2018    Procedure: Left ventriculography;  Surgeon: Darian Toribio MD;  Location: [facility] CATH INVASIVE LOCATION;  Service:    • CARDIAC ELECTROPHYSIOLOGY PROCEDURE N/A 1/11/2018    Procedure: PPM generator change - dual;  Surgeon: Darian Toribio MD;  Location: [facility] CATH INVASIVE LOCATION;  Service:    • COLONOSCOPY  09/05/2007    colon polyps   • COLONOSCOPY N/A 5/29/2018    Procedure: COLONOSCOPY WITH ANESTHESIA;  Surgeon: Juan F Tapia MD;  Location: [facility] ENDOSCOPY;  Service: Gastroenterology   • CORONARY ANGIOPLASTY WITH STENT PLACEMENT  07/2015    SHAHRZAD mid LAD, Dr. Toribio    • CORONARY ARTERY BYPASS GRAFT  2012 AND 2014    2 vessel    • ENDOSCOPY  11/08/2010   • ENDOSCOPY N/A 5/29/2018    Procedure: ESOPHAGOGASTRODUODENOSCOPY WITH ANESTHESIA;  Surgeon: Juan F Tapia MD;  Location: [facility] ENDOSCOPY;  Service: Gastroenterology   • HEAD/NECK LESION/CYST EXCISION Left 12/20/2016    Procedure: EXCISION MALIGNANT MELANOMA WITH SENTINEL NODE BIOPSY, INJECTION AND SCAN PRE-OP, LEFT EAR;  Surgeon: Guanaco Zepeda MD;  Location: [facility] OR;  Service:    • HEMORRHOIDECTOMY     • INSERT / REPLACE / REMOVE PACEMAKER     • SENTINEL NODE BIOPSY Left 12/20/2016    Procedure: SENTINEL NODE BIOPSY;  Surgeon: Guanaco Zepeda MD;  Location: [facility] OR;  Service:    • SKIN CANCER EXCISION     • THYROID SURGERY     • THYROIDECTOMY         Review of Systems   Constitutional: Negative.    HENT: Negative.    Eyes: Negative.    Respiratory: Negative.    Cardiovascular: Negative.    Gastrointestinal: Negative.    Endocrine: Negative.    Genitourinary: Negative.    Musculoskeletal: Negative.    Skin: Negative.    Allergic/Immunologic: Negative.    Neurological: Negative.    Hematological: Negative.    Psychiatric/Behavioral: Negative.        Objective  /74   Pulse 70   Temp 98.8  "°F (37.1 °C)   Resp 16   Ht 172.7 cm (67.99\")   Wt 121 kg (266 lb)   SpO2 96%   BMI 40.45 kg/m²   Physical Exam   Constitutional: He appears well-developed and well-nourished.   HENT:   Head: Normocephalic and atraumatic.   Right Ear: External ear normal.   Left Ear: External ear normal.   Nose: Nose normal.   Mouth/Throat: Oropharynx is clear and moist.   Eyes: Conjunctivae and EOM are normal. Pupils are equal, round, and reactive to light.   Neck: Normal range of motion. Neck supple.   Cardiovascular: Normal rate, regular rhythm, normal heart sounds and intact distal pulses.   Pulmonary/Chest: Effort normal and breath sounds normal.   Abdominal: Soft. Bowel sounds are normal.   Musculoskeletal: Normal range of motion.   Neurological: He is alert.   Skin: Skin is warm and dry. Capillary refill takes less than 2 seconds.   Psychiatric: He has a normal mood and affect. His behavior is normal. Judgment and thought content normal.   Vitals reviewed.      Assessment/Plan   Carlo was seen today for follow-up.    Diagnoses and all orders for this visit:    Coronary artery disease involving autologous artery coronary bypass graft with angina pectoris (CMS/[facility])  -     CBC w AUTO Differential  -     Comprehensive metabolic panel  -     Lipid Panel With / Chol / HDL Ratio    Essential hypertension    Mixed hyperlipidemia    Esophageal dysphagia                 Orders Placed This Encounter   Procedures   • Comprehensive metabolic panel   • Lipid Panel With / Chol / HDL Ratio   • CBC w AUTO Differential     Order Specific Question:   Manual Differential     Answer:   No       Follow up: 6 month(s)  "

## 2019-04-12 LAB
ALBUMIN SERPL-MCNC: 4.1 G/DL (ref 3.5–5.2)
ALBUMIN/GLOB SERPL: 1.8 G/DL
ALP SERPL-CCNC: 64 U/L (ref 39–117)
ALT SERPL-CCNC: 19 U/L (ref 1–41)
AST SERPL-CCNC: 20 U/L (ref 1–40)
BASOPHILS # BLD AUTO: 0.06 10*3/MM3 (ref 0–0.2)
BASOPHILS NFR BLD AUTO: 0.7 % (ref 0–1.5)
BILIRUB SERPL-MCNC: 0.6 MG/DL (ref 0.2–1.2)
BUN SERPL-MCNC: 14 MG/DL (ref 8–23)
BUN/CREAT SERPL: 18.2 (ref 7–25)
CALCIUM SERPL-MCNC: 9.4 MG/DL (ref 8.6–10.5)
CHLORIDE SERPL-SCNC: 104 MMOL/L (ref 98–107)
CHOLEST SERPL-MCNC: 131 MG/DL (ref 0–200)
CHOLEST/HDLC SERPL: 2.52 {RATIO}
CO2 SERPL-SCNC: 24 MMOL/L (ref 22–29)
CREAT SERPL-MCNC: 0.77 MG/DL (ref 0.76–1.27)
EOSINOPHIL # BLD AUTO: 0.39 10*3/MM3 (ref 0–0.4)
EOSINOPHIL NFR BLD AUTO: 4.9 % (ref 0.3–6.2)
ERYTHROCYTE [DISTWIDTH] IN BLOOD BY AUTOMATED COUNT: 16.4 % (ref 12.3–15.4)
GLOBULIN SER CALC-MCNC: 2.3 GM/DL
GLUCOSE SERPL-MCNC: 92 MG/DL (ref 65–99)
HCT VFR BLD AUTO: 44.1 % (ref 37.5–51)
HDLC SERPL-MCNC: 52 MG/DL (ref 40–60)
HGB BLD-MCNC: 13 G/DL (ref 13–17.7)
IMM GRANULOCYTES # BLD AUTO: 0.07 10*3/MM3 (ref 0–0.05)
IMM GRANULOCYTES NFR BLD AUTO: 0.9 % (ref 0–0.5)
LDLC SERPL CALC-MCNC: 58 MG/DL (ref 0–100)
LYMPHOCYTES # BLD AUTO: 1.7 10*3/MM3 (ref 0.7–3.1)
LYMPHOCYTES NFR BLD AUTO: 21.2 % (ref 19.6–45.3)
MCH RBC QN AUTO: 27.5 PG (ref 26.6–33)
MCHC RBC AUTO-ENTMCNC: 29.5 G/DL (ref 31.5–35.7)
MCV RBC AUTO: 93.4 FL (ref 79–97)
MONOCYTES # BLD AUTO: 0.67 10*3/MM3 (ref 0.1–0.9)
MONOCYTES NFR BLD AUTO: 8.3 % (ref 5–12)
NEUTROPHILS # BLD AUTO: 5.14 10*3/MM3 (ref 1.4–7)
NEUTROPHILS NFR BLD AUTO: 64 % (ref 42.7–76)
NRBC BLD AUTO-RTO: 0 /100 WBC (ref 0–0)
PLATELET # BLD AUTO: 200 10*3/MM3 (ref 140–450)
POTASSIUM SERPL-SCNC: 4.2 MMOL/L (ref 3.5–5.2)
PROT SERPL-MCNC: 6.4 G/DL (ref 6–8.5)
RBC # BLD AUTO: 4.72 10*6/MM3 (ref 4.14–5.8)
SODIUM SERPL-SCNC: 142 MMOL/L (ref 136–145)
TRIGL SERPL-MCNC: 103 MG/DL (ref 0–150)
VLDLC SERPL CALC-MCNC: 20.6 MG/DL (ref 5–40)
WBC # BLD AUTO: 8.03 10*3/MM3 (ref 3.4–10.8)

## 2019-04-25 RX ORDER — DONEPEZIL HYDROCHLORIDE 5 MG/1
5 TABLET, FILM COATED ORAL
Qty: 90 TABLET | Refills: 1 | Status: SHIPPED | OUTPATIENT
Start: 2019-04-25 | End: 2019-10-10 | Stop reason: SDUPTHER

## 2019-04-25 RX ORDER — FUROSEMIDE 40 MG/1
40 TABLET ORAL DAILY
Qty: 90 TABLET | Refills: 1 | Status: SHIPPED | OUTPATIENT
Start: 2019-04-25 | End: 2019-10-10 | Stop reason: SDUPTHER

## 2019-04-30 ENCOUNTER — HOSPITAL ENCOUNTER (OUTPATIENT)
Dept: GENERAL RADIOLOGY | Age: 74
Discharge: HOME | End: 2019-04-30
Payer: MEDICARE

## 2019-04-30 ENCOUNTER — HOSPITAL ENCOUNTER (OUTPATIENT)
Age: 74
Setting detail: OUTPATIENT SURGERY
Discharge: HOME | End: 2019-04-30
Attending: PHYSICAL MEDICINE & REHABILITATION | Admitting: PHYSICAL MEDICINE & REHABILITATION

## 2019-04-30 VITALS
HEART RATE: 72 BPM | SYSTOLIC BLOOD PRESSURE: 109 MMHG | OXYGEN SATURATION: 95 % | DIASTOLIC BLOOD PRESSURE: 50 MMHG | RESPIRATION RATE: 18 BRPM

## 2019-04-30 DIAGNOSIS — R52 PAIN: ICD-10-CM

## 2019-04-30 PROCEDURE — G8907 PT DOC NO EVENTS ON DISCHARG: HCPCS

## 2019-04-30 PROCEDURE — 3209999900 FLUORO FOR SURGICAL PROCEDURES

## 2019-04-30 PROCEDURE — G0260 INJ FOR SACROILIAC JT ANESTH: HCPCS

## 2019-04-30 PROCEDURE — G8918 PT W/O PREOP ORDER IV AB PRO: HCPCS

## 2019-04-30 RX ORDER — 0.9 % SODIUM CHLORIDE 0.9 %
VIAL (ML) INJECTION PRN
Status: DISCONTINUED | OUTPATIENT
Start: 2019-04-30 | End: 2019-04-30 | Stop reason: ALTCHOICE

## 2019-04-30 RX ORDER — LIDOCAINE HYDROCHLORIDE 10 MG/ML
INJECTION, SOLUTION INFILTRATION; PERINEURAL PRN
Status: DISCONTINUED | OUTPATIENT
Start: 2019-04-30 | End: 2019-04-30 | Stop reason: ALTCHOICE

## 2019-05-01 RX ORDER — OMEGA-3-ACID ETHYL ESTERS 1 G/1
1 CAPSULE, LIQUID FILLED ORAL DAILY
Qty: 90 CAPSULE | Refills: 3 | Status: SHIPPED | OUTPATIENT
Start: 2019-05-01 | End: 2020-08-26 | Stop reason: SDUPTHER

## 2019-05-07 NOTE — PROGRESS NOTES
Dual Chamber Pacemaker Evaluation Report  Merlin    May 6, 2019    Primary Cardiologist: Malu  : [employer] Model: Assurity  Implant date: 1/11/18    Reason for evaluation: routine  Indication for pacemaker: sick sinus syndrome    Measurements  Atrial sensing - P wave: 4.7 mV  Atrial threshold: n/r  Atrial lead impedance: 1200 ohms  Ventricular sensing - R wave: >12 mV  Ventricular threshold: n/r  Ventricular lead impedance:   890 ohms     Diagnostic Data  Atrial paced: 3.8 %  Ventricular paced: 7.6 %  Other: no new episodes noted  Battery status: satisfactory         Final Parameters  Mode:  DDDR  Lower rate: 60 bpm   Upper rate: 130 bpm  AV Delay: paced- 225 ms  Sensed-200 ms  Atrial - Amplitude: 2.5 V   Pulse width: 0.5 ms   Sensitivity: 1.0 mV     Ventricular - Amplitude: 2.0 V  Pulse width: 0.5 ms  Sensitivity: 2.0 mV    Changes made: n/a  Conclusions: normal pacemaker function    Follow up: 3 months

## 2019-06-26 ENCOUNTER — OFFICE VISIT (OUTPATIENT)
Dept: FAMILY MEDICINE CLINIC | Facility: CLINIC | Age: 74
End: 2019-06-26

## 2019-06-26 VITALS
SYSTOLIC BLOOD PRESSURE: 124 MMHG | HEIGHT: 68 IN | RESPIRATION RATE: 16 BRPM | OXYGEN SATURATION: 96 % | BODY MASS INDEX: 40.32 KG/M2 | HEART RATE: 83 BPM | WEIGHT: 266 LBS | DIASTOLIC BLOOD PRESSURE: 76 MMHG

## 2019-06-26 DIAGNOSIS — D22.62 MELANOCYTIC NEVUS OF LEFT UPPER EXTREMITY: ICD-10-CM

## 2019-06-26 DIAGNOSIS — L98.9 SKIN LESION: Primary | ICD-10-CM

## 2019-06-26 PROCEDURE — 99213 OFFICE O/P EST LOW 20 MIN: CPT | Performed by: FAMILY MEDICINE

## 2019-06-26 RX ORDER — PREGABALIN 150 MG/1
CAPSULE ORAL
COMMUNITY
Start: 2019-04-24 | End: 2019-10-10 | Stop reason: SDUPTHER

## 2019-06-26 NOTE — PROGRESS NOTES
Subjective   Carlo Velasco is a 73 y.o. male.     Chief Complaint   Patient presents with   • Suspicious Skin Lesion     top of left hand       History of Present Illness     noted darkened lesion on the dorsum of the left hand---he hx of melanoma      Current Outpatient Medications:   •  pregabalin (LYRICA) 150 MG capsule, TK ONE C PO TID, Disp: , Rfl:   •  ALPRAZolam (XANAX) 0.25 MG tablet, TAKE 1 TABLET BY MOUTH THREE TIMES DAILY, Disp: 90 tablet, Rfl: 0  •  aspirin 81 MG EC tablet, Take 81 mg by mouth daily.  , Disp: , Rfl:   •  atorvastatin (LIPITOR) 80 MG tablet, TAKE 1 TABLET BY MOUTH EVERY NIGHT, Disp: 30 tablet, Rfl: 5  •  Bacillus Coagulans-Inulin (PROBIOTIC FORMULA PO), Take  by mouth., Disp: , Rfl:   •  BIOTIN PO, Take  by mouth., Disp: , Rfl:   •  BRILINTA 90 MG tablet tablet, TAKE 1 TABLET BY MOUTH EVERY 12 HOURS, Disp: 60 tablet, Rfl: 6  •  donepezil (ARICEPT) 5 MG tablet, Take 1 tablet by mouth every night at bedtime., Disp: 90 tablet, Rfl: 1  •  escitalopram (LEXAPRO) 20 MG tablet, TAKE 1 TABLET BY MOUTH DAILY, Disp: 30 tablet, Rfl: 0  •  furosemide (LASIX) 40 MG tablet, Take 1 tablet by mouth Daily., Disp: 90 tablet, Rfl: 1  •  metoprolol tartrate (LOPRESSOR) 25 MG tablet, Take one tablet by mouth twice a day., Disp: 180 tablet, Rfl: 4  •  NITROSTAT 0.4 MG SL tablet, Place 1 tablet under the tongue Every 5 (Five) Minutes As Needed for Chest Pain. Take no more than 3 doses in 15 minutes., Disp: 30 tablet, Rfl: 2  •  omega-3 acid ethyl esters (LOVAZA) 1 g capsule, Take 1 capsule by mouth Daily., Disp: 90 capsule, Rfl: 3  •  omeprazole (priLOSEC) 20 MG capsule, Take 1 capsule by mouth Daily., Disp: 30 capsule, Rfl: 5  •  Potassium 99 MG tablet, Take 1 tablet by mouth Daily., Disp: , Rfl:   Allergies   Allergen Reactions   • Meperidine Other (See Comments)     HEART STOPS     • Statins Myalgia     Causes leg cramps       Past Medical History:   Diagnosis Date   • Anxiety    • Arthritis    • Cancer  (CMS/HCC)     melanoma   • CHF (congestive heart failure) (CMS/HCC)    • Claustrophobia    • Colon polyp    • Coronary artery disease 2012, 2015    2 vessel CABG (2012), SHAHRZAD LAD (2015).    • Depression    • Disease of thyroid gland     partial thyroidectomy on right   • GERD (gastroesophageal reflux disease)    • Hearing loss    • Heart attack (CMS/HCC)    • Heart attack (CMS/HCC)    • History of transfusion    • Hyperlipidemia    • Hypertension    • Kidney stone     history of    • Tobacco abuse, in remission    • Uses hearing aid     BILATERAL     Past Surgical History:   Procedure Laterality Date   • BACK SURGERY      CERVICAL FUSION AND LUMBAR DISC REPLACEMENT   • CARDIAC CATHETERIZATION  07/2015    SHAHRZAD mid LADDr. Toribio   • CARDIAC CATHETERIZATION N/A 10/21/2016    Procedure: Left Heart Cath;  Surgeon: Darian Toribio MD;  Location:  [facility];  Service:    • CARDIAC CATHETERIZATION Left 4/5/2017    Procedure: Cardiac Catheterization/Vascular Study;  Surgeon: Darian Toribio MD;  Location:  [facility];  Service:    • CARDIAC CATHETERIZATION N/A 4/5/2017    Procedure: Left Heart Cath;  Surgeon: Darian Toribio MD;  Location:  [facility];  Service:    • CARDIAC CATHETERIZATION N/A 4/5/2017    Procedure: Coronary angiography;  Surgeon: Darian Toribio MD;  Location:  [facility];  Service:    • CARDIAC CATHETERIZATION N/A 4/5/2017    Procedure: Left ventriculography;  Surgeon: Darian Toribio MD;  Location:  [facility];  Service:    • CARDIAC CATHETERIZATION  4/5/2017    Procedure: Saphenous Vein Graft;  Surgeon: Darian Toribio MD;  Location:  [facility];  Service:    • CARDIAC CATHETERIZATION Left 2/27/2018    Procedure: Cardiac Catheterization/Vascular Study SHAHRZAD OK PRN;  Surgeon: Darian Toribio MD;  Location:  [facility];  Service:    • CARDIAC CATHETERIZATION  2/27/2018    Procedure: Functional Flow  Reserve;  Surgeon: Darian Toribio MD;  Location: [facility] CATH INVASIVE LOCATION;  Service:    • CARDIAC CATHETERIZATION N/A 2/27/2018    Procedure: Left ventriculography;  Surgeon: Darian Toribio MD;  Location: [facility] CATH INVASIVE LOCATION;  Service:    • CARDIAC ELECTROPHYSIOLOGY PROCEDURE N/A 1/11/2018    Procedure: PPM generator change - dual;  Surgeon: Darian Toribio MD;  Location: [facility] CATH INVASIVE LOCATION;  Service:    • COLONOSCOPY  09/05/2007    colon polyps   • COLONOSCOPY N/A 5/29/2018    Procedure: COLONOSCOPY WITH ANESTHESIA;  Surgeon: Juan F Tapia MD;  Location: [facility] ENDOSCOPY;  Service: Gastroenterology   • CORONARY ANGIOPLASTY WITH STENT PLACEMENT  07/2015    SHAHRZAD mid LAD, Dr. Toribio    • CORONARY ARTERY BYPASS GRAFT  2012 AND 2014    2 vessel    • ENDOSCOPY  11/08/2010   • ENDOSCOPY N/A 5/29/2018    Procedure: ESOPHAGOGASTRODUODENOSCOPY WITH ANESTHESIA;  Surgeon: Juan F Tapia MD;  Location: [facility] ENDOSCOPY;  Service: Gastroenterology   • HEAD/NECK LESION/CYST EXCISION Left 12/20/2016    Procedure: EXCISION MALIGNANT MELANOMA WITH SENTINEL NODE BIOPSY, INJECTION AND SCAN PRE-OP, LEFT EAR;  Surgeon: Guanaco Zepeda MD;  Location: [facility] OR;  Service:    • HEMORRHOIDECTOMY     • INSERT / REPLACE / REMOVE PACEMAKER     • SENTINEL NODE BIOPSY Left 12/20/2016    Procedure: SENTINEL NODE BIOPSY;  Surgeon: Guanaco Zepeda MD;  Location: [facility] OR;  Service:    • SKIN CANCER EXCISION     • THYROID SURGERY     • THYROIDECTOMY         Review of Systems   Constitutional: Negative.    HENT: Negative.    Eyes: Negative.    Respiratory: Negative.    Cardiovascular: Negative.    Gastrointestinal: Negative.    Endocrine: Negative.    Genitourinary: Negative.    Musculoskeletal: Negative.    Skin: Positive for color change and wound.   Allergic/Immunologic: Negative.    Neurological: Negative.    Hematological: Negative.    Psychiatric/Behavioral: Negative.        Objective  /76   Pulse 83   Resp 16    "Ht 172.7 cm (68\")   Wt 121 kg (266 lb)   SpO2 96%   BMI 40.45 kg/m²   Physical Exam   Constitutional: He is oriented to person, place, and time. He appears well-developed and well-nourished.   HENT:   Head: Normocephalic and atraumatic.   Right Ear: External ear normal.   Left Ear: External ear normal.   Nose: Nose normal.   Mouth/Throat: Oropharynx is clear and moist.   Eyes: Conjunctivae and EOM are normal. Pupils are equal, round, and reactive to light.   Neck: Normal range of motion. Neck supple.   Cardiovascular: Normal rate, regular rhythm, normal heart sounds and intact distal pulses.   Pulmonary/Chest: Effort normal and breath sounds normal.   Abdominal: Soft. Bowel sounds are normal.   Musculoskeletal: Normal range of motion.   Neurological: He is alert and oriented to person, place, and time.   Skin: Skin is warm and dry. Capillary refill takes less than 2 seconds.   Psychiatric: He has a normal mood and affect. His behavior is normal. Judgment and thought content normal.   Nursing note and vitals reviewed.  exam does confirm pigmented lesion on left hand    Assessment/Plan   Carlo was seen today for suspicious skin lesion.    Diagnoses and all orders for this visit:    Skin lesion  -     Ambulatory Referral to Dermatology    Melanocytic nevus of left upper extremity  -     Ambulatory Referral to Dermatology                 Orders Placed This Encounter   Procedures   • Ambulatory Referral to Dermatology     Referral Priority:   Routine     Referral Type:   Consultation     Referral Reason:   Specialty Services Required     Referred to Provider:   Cruz Mishra MD     Requested Specialty:   Dermatology     Number of Visits Requested:   1       Follow up: 4 month(s)  "

## 2019-07-08 ENCOUNTER — OFFICE VISIT (OUTPATIENT)
Dept: CARDIOLOGY | Facility: CLINIC | Age: 74
End: 2019-07-08

## 2019-07-08 ENCOUNTER — CLINICAL SUPPORT NO REQUIREMENTS (OUTPATIENT)
Dept: CARDIOLOGY | Facility: CLINIC | Age: 74
End: 2019-07-08

## 2019-07-08 VITALS
WEIGHT: 266 LBS | HEIGHT: 68 IN | HEART RATE: 61 BPM | SYSTOLIC BLOOD PRESSURE: 122 MMHG | BODY MASS INDEX: 40.32 KG/M2 | OXYGEN SATURATION: 98 % | DIASTOLIC BLOOD PRESSURE: 68 MMHG

## 2019-07-08 DIAGNOSIS — E66.01 MORBIDLY OBESE (HCC): ICD-10-CM

## 2019-07-08 DIAGNOSIS — I10 ESSENTIAL HYPERTENSION: ICD-10-CM

## 2019-07-08 DIAGNOSIS — F41.9 ANXIETY: ICD-10-CM

## 2019-07-08 DIAGNOSIS — Z95.5 STENTED CORONARY ARTERY: ICD-10-CM

## 2019-07-08 DIAGNOSIS — Z95.1 HX OF CABG: Primary | ICD-10-CM

## 2019-07-08 DIAGNOSIS — I49.5 SSS (SICK SINUS SYNDROME) (HCC): ICD-10-CM

## 2019-07-08 DIAGNOSIS — Z95.0 PACEMAKER: Primary | ICD-10-CM

## 2019-07-08 DIAGNOSIS — I25.729 CORONARY ARTERY DISEASE INVOLVING AUTOLOGOUS ARTERY CORONARY BYPASS GRAFT WITH ANGINA PECTORIS (HCC): ICD-10-CM

## 2019-07-08 PROCEDURE — 99214 OFFICE O/P EST MOD 30 MIN: CPT | Performed by: INTERNAL MEDICINE

## 2019-07-08 PROCEDURE — 93000 ELECTROCARDIOGRAM COMPLETE: CPT | Performed by: INTERNAL MEDICINE

## 2019-07-08 PROCEDURE — 93288 INTERROG EVL PM/LDLS PM IP: CPT | Performed by: INTERNAL MEDICINE

## 2019-07-08 RX ORDER — NITROGLYCERIN 0.4 MG/1
0.4 TABLET SUBLINGUAL
Qty: 30 TABLET | Refills: 2 | Status: SHIPPED | OUTPATIENT
Start: 2019-07-08 | End: 2020-06-10 | Stop reason: SDUPTHER

## 2019-07-08 RX ORDER — CLOPIDOGREL BISULFATE 75 MG/1
75 TABLET ORAL DAILY
Qty: 90 TABLET | Refills: 3 | Status: SHIPPED | OUTPATIENT
Start: 2019-07-08 | End: 2020-09-10

## 2019-07-08 NOTE — PROGRESS NOTES
Carlo Velasco  [id number]  1945  73 y.o.  male    Referring Provider: Rhys Rosen MD    Reason for Follow-up Visit:  Here for routine follow up   coronary artery disease Coronary artery bypass grafting , stented coronary artery   sick sinus syndrome s/p pacemaker      Subjective        Feels much better   No new events or complaints since last visit   Cannot afford Brilinta    Mild chronic exertional shortness of breath on exertion relieved with rest  No significant cough or wheezing  Going on for several months  No palpitations    No associated chest pain  No significant pedal edema  No fever or chills    No significant expectoration  No hemoptysis  No presyncope or syncope     Joint pain in small, medium and large joints  Effort tolerance limited more by orthopedic rather than cardiac related issues, therefore difficult to assess functional capacity.      History of present illness:  Carlo Velasco is a 73 y.o. yo male with history of coronary artery disease Coronary artery bypass grafting , stented coronary artery sick sinus syndrome s/p pacemaker who presents today for   Chief Complaint   Patient presents with   • Coronary Artery Disease     6 mo f/u   .    History  Past Medical History:   Diagnosis Date   • Anxiety    • Arthritis    • Cancer (CMS/HCC)     melanoma   • CHF (congestive heart failure) (CMS/HCC)    • Claustrophobia    • Colon polyp    • Coronary artery disease 2012, 2015    2 vessel CABG (2012), SHAHRZAD LAD (2015).    • Depression    • Disease of thyroid gland     partial thyroidectomy on right   • GERD (gastroesophageal reflux disease)    • Hearing loss    • Heart attack (CMS/HCC)    • Heart attack (CMS/HCC)    • History of transfusion    • Hyperlipidemia    • Hypertension    • Kidney stone     history of    • Tobacco abuse, in remission    • Uses hearing aid     BILATERAL   ,   Past Surgical History:   Procedure Laterality Date   • BACK SURGERY      CERVICAL FUSION AND LUMBAR DISC  REPLACEMENT   • CARDIAC CATHETERIZATION  07/2015    SHAHRZAD mid LAD, Dr. Toribio   • CARDIAC CATHETERIZATION N/A 10/21/2016    Procedure: Left Heart Cath;  Surgeon: Darian Toribio MD;  Location:  [facility] CATH INVASIVE LOCATION;  Service:    • CARDIAC CATHETERIZATION Left 4/5/2017    Procedure: Cardiac Catheterization/Vascular Study;  Surgeon: Darian Toribio MD;  Location:  [facility];  Service:    • CARDIAC CATHETERIZATION N/A 4/5/2017    Procedure: Left Heart Cath;  Surgeon: Darian Toribio MD;  Location:  [facility];  Service:    • CARDIAC CATHETERIZATION N/A 4/5/2017    Procedure: Coronary angiography;  Surgeon: Darian Toribio MD;  Location:  [facility];  Service:    • CARDIAC CATHETERIZATION N/A 4/5/2017    Procedure: Left ventriculography;  Surgeon: Darian Toribio MD;  Location:  [facility];  Service:    • CARDIAC CATHETERIZATION  4/5/2017    Procedure: Saphenous Vein Graft;  Surgeon: Darian Toribio MD;  Location:  [facility];  Service:    • CARDIAC CATHETERIZATION Left 2/27/2018    Procedure: Cardiac Catheterization/Vascular Study SHAHRZAD OK PRN;  Surgeon: Darian Toribio MD;  Location:  [facility];  Service:    • CARDIAC CATHETERIZATION  2/27/2018    Procedure: Functional Flow [place];  Surgeon: Darian Toribio MD;  Location:  [facility];  Service:    • CARDIAC CATHETERIZATION N/A 2/27/2018    Procedure: Left ventriculography;  Surgeon: Darian Toribio MD;  Location: [facility] CATH INVASIVE LOCATION;  Service:    • CARDIAC ELECTROPHYSIOLOGY PROCEDURE N/A 1/11/2018    Procedure: PPM generator change - dual;  Surgeon: Darian Toribio MD;  Location:  [facility] CATH INVASIVE [facility];  Service:    • COLONOSCOPY  09/05/2007    colon polyps   • COLONOSCOPY N/A 5/29/2018    Procedure: COLONOSCOPY WITH ANESTHESIA;  Surgeon: Juan F Tapia MD;  Location: [facility] ENDOSCOPY;  Service: Gastroenterology   • CORONARY ANGIOPLASTY WITH STENT  PLACEMENT  2015    SHAHRZAD mid LAD, Dr. Toribio    • CORONARY ARTERY BYPASS GRAFT   AND     2 vessel    • ENDOSCOPY  2010   • ENDOSCOPY N/A 2018    Procedure: ESOPHAGOGASTRODUODENOSCOPY WITH ANESTHESIA;  Surgeon: Juan F Tapia MD;  Location: [facility] ENDOSCOPY;  Service: Gastroenterology   • HEAD/NECK LESION/CYST EXCISION Left 2016    Procedure: EXCISION MALIGNANT MELANOMA WITH SENTINEL NODE BIOPSY, INJECTION AND SCAN PRE-OP, LEFT EAR;  Surgeon: Guanaco Zepeda MD;  Location: [facility] OR;  Service:    • HEMORRHOIDECTOMY     • INSERT / REPLACE / REMOVE PACEMAKER     • SENTINEL NODE BIOPSY Left 2016    Procedure: SENTINEL NODE BIOPSY;  Surgeon: Guanaco Zepeda MD;  Location: [facility] OR;  Service:    • SKIN CANCER EXCISION     • THYROID SURGERY     • THYROIDECTOMY     ,   Family History   Problem Relation Age of Onset   • Heart failure Mother    • Stroke Mother    • Dementia Mother    • Coronary artery disease Father    • Colon polyps Father    • COPD Brother    • Colon cancer Neg Hx    ,   Social History     Tobacco Use   • Smoking status: Former Smoker     Years: 10.00     Types: Cigarettes     Last attempt to quit:      Years since quittin.5   • Smokeless tobacco: Former User     Quit date:    Substance Use Topics   • Alcohol use: No   • Drug use: No   ,     Medications  Current Outpatient Medications   Medication Sig Dispense Refill   • ALPRAZolam (XANAX) 0.25 MG tablet TAKE 1 TABLET BY MOUTH THREE TIMES DAILY 90 tablet 0   • aspirin 81 MG EC tablet Take 81 mg by mouth daily.       • atorvastatin (LIPITOR) 80 MG tablet TAKE 1 TABLET BY MOUTH EVERY NIGHT 30 tablet 5   • Bacillus Coagulans-Inulin (PROBIOTIC FORMULA PO) Take  by mouth.     • escitalopram (LEXAPRO) 20 MG tablet TAKE 1 TABLET BY MOUTH DAILY 30 tablet 0   • furosemide (LASIX) 40 MG tablet Take 1 tablet by mouth Daily. 90 tablet 1   • metoprolol tartrate (LOPRESSOR) 25 MG tablet Take one tablet by mouth twice a day.  "180 tablet 4   • NITROSTAT 0.4 MG SL tablet Place 1 tablet under the tongue Every 5 (Five) Minutes As Needed for Chest Pain. Take no more than 3 doses in 15 minutes. 30 tablet 2   • omega-3 acid ethyl esters (LOVAZA) 1 g capsule Take 1 capsule by mouth Daily. 90 capsule 3   • omeprazole (priLOSEC) 20 MG capsule Take 1 capsule by mouth Daily. 30 capsule 5   • Potassium 99 MG tablet Take 1 tablet by mouth Daily.     • pregabalin (LYRICA) 150 MG capsule TK ONE C PO TID     • BIOTIN PO Take  by mouth.     • clopidogrel (PLAVIX) 75 MG tablet Take 1 tablet by mouth Daily. 90 tablet 3   • donepezil (ARICEPT) 5 MG tablet Take 1 tablet by mouth every night at bedtime. 90 tablet 1     No current facility-administered medications for this visit.        Allergies:  Meperidine and Statins    Review of Systems  Review of Systems   Constitution: Positive for malaise/fatigue. Negative for weakness.   HENT: Negative.    Eyes: Negative.    Cardiovascular: Positive for dyspnea on exertion. Negative for chest pain, claudication, cyanosis, irregular heartbeat, leg swelling, near-syncope, orthopnea, palpitations, paroxysmal nocturnal dyspnea and syncope.   Respiratory: Negative.    Endocrine: Negative.    Hematologic/Lymphatic: Negative.    Skin: Negative.    Musculoskeletal: Positive for arthritis and back pain.   Gastrointestinal: Negative for anorexia.   Genitourinary: Negative.    Psychiatric/Behavioral: Negative.        Objective     Physical Exam:  /68   Pulse 61   Ht 172.7 cm (68\")   Wt 121 kg (266 lb)   SpO2 98%   BMI 40.45 kg/m²   Physical Exam   Constitutional: He appears well-developed.   HENT:   Head: Normocephalic.   Neck: Normal carotid pulses and no JVD present. No tracheal tenderness present. Carotid bruit is not present. No tracheal deviation and no edema present.   Cardiovascular: Regular rhythm, normal heart sounds and normal pulses.   Pulmonary/Chest: Effort normal. No stridor.   Abdominal: Soft. "   Neurological: He is alert. He has normal strength. No cranial nerve deficit or sensory deficit.   Skin: Skin is warm.   Psychiatric: He has a normal mood and affect. His speech is normal and behavior is normal.       Results Review:  Results for orders placed during the hospital encounter of 02/09/18   Adult Stress Echo W/ Cont or Stress Agent if Necessary Per Protocol    Narrative · Left ventricular systolic function is normal. Estimated EF = 55%.  · Normal stress echo with no significant echocardiographic evidence for   myocardial ischemia             ECG 12 Lead  Date/Time: 7/8/2019 8:43 AM  Performed by: Darian Toribio MD  Authorized by: Darian Toribio MD   Comparison: compared with previous ECG from 1/4/2019  Similar to previous ECG  Rhythm: sinus rhythm  Rate: normal  Conduction: conduction normal  ST Segments: ST segments normal  T Waves: T waves normal  QRS axis: normal  Other: no other findings    Clinical impression: normal ECG          Cardiac cath  4/17  LVEF of 50%.   occluded the mid right coronary artery  Patent saphenous venous graft to the distal right coronary artery  Atretic left intramammary artery graft.  Patent stent in the mid left anterior descending coronary artery  Patent stent to the obtuse marginal branch  Patent stent in diagonal branch.   2/18    Conclusion     Patent stents noted in the obtuse marginal branch and the left anterior descending coronary artery.  Atretic left internal mammary artery graft.  Patent saphenous venous graft to the right coronary artery  Significant right coronary artery stenosis proximal to the touchdown site of the saphenous vein graft to the right coronary artery  60% stenosis of diagonal branch with a normal fractional flow reserve of 0.85  Left ventricle ejection fraction of 50%.     LVEDP    16   mm Hg           Plan     Intensive risk factor modifications for both primary and secondary prevention if applicable  Home today if stable  Hydration    Observation  Keep follow-up appointment    Assessment/Plan   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 (CMS/HCC)   • Essential hypertension   • Anxiety   • Colon polyps   • Mixed hyperlipidemia   • Incisional hernia, without obstruction or gangrene   • BRBPR (bright red blood per rectum)   • Esophageal dysphagia   • Prostatism   • Nocturia   • Coronary artery disease   • Orthostasis   • Chronic midline low back pain without sciatica   • Hx of colonic polyp   • Acute pain of right knee   • Morbidly obese (CMS/HCC)   • Pain of right heel   • Hx of CABG      • Stented coronary artery   • Flank pain   • Skin lesion   Intolerant to Imdur           Plan    Keep LDL below 70 mg/dl. Monitor liver and renal functions.   Monitor CBC, CMP, TSH (as indicated) and Lipid Panel by primary     Monitor cardiac rhythm device function regularly per established schedule or PRN      S/L NTG PRN for chest pain, call me or go to ER if has to use S/L nitroglycerin     Requested Prescriptions     Signed Prescriptions Disp Refills   • NITROSTAT 0.4 MG SL tablet 30 tablet 2     Sig: Place 1 tablet under the tongue Every 5 (Five) Minutes As Needed for Chest Pain. Take no more than 3 doses in 15 minutes.   • clopidogrel (PLAVIX) 75 MG tablet 90 tablet 3     Sig: Take 1 tablet by mouth Daily.      Cannot afford Brilinta      ____________________________________________________________________________________________________________________________________________  Health maintenance and recommendations      Low salt/ HTN/ Heart healthy carbohydrate restricted cardiac diet   The patient is advised to reduce or avoid caffeine or other cardiac stimulants.     The patient was advised to avoid long term NSAIDS , use Tylenol PRN instead  Avoid cardiac stimulants including common drugs like Pseudoephedrine or excessive amounts of caffeine    Monitor for  any signs of bleeding including red or dark stools. Fall precautions.        Advised staying uptodate with immunizations per established standard guidelines.      Offered to give patient  a copy      Questions were encouraged, asked and answered to the patient's  understanding and satisfaction. Questions if any regarding current medications and side effects, need for refills and importance of compliance to medications stressed.    Reviewed available prior notes, consults, prior visits, laboratory findings, radiology and cardiology relevant reports. Updated chart as applicable. I have reviewed the patient's medical history in detail and updated the computerized patient record as relevant.      Updated patient regarding any new or relevant abnormalities on review of records or any new findings on physical exam. Mentioned to patient about purpose of visit and desirable health short and long term goals and objectives.    Primary to monitor CBC CMP Lipid panel and TSH as applicable    ___________________________________________________________________________________________________________________________________________              Return in about 6 months (around 1/8/2020).

## 2019-07-08 NOTE — PROGRESS NOTES
Dual Chamber Pacemaker Evaluation Report  IN OFFICE INTERROGATION    July 8, 2019    Primary Cardiologist: Malu  : [facility] [employer] Model: Shantel DR 2240 Pacemaker   Implant date: 1/11/2018    Reason for evaluation: routine  Indication for pacemaker: sick sinus syndrome    Measurements  Atrial sensing - P wave: 4.2 mV  Atrial threshold: 1.25V@ 0.5ms  Atrial lead impedance: 1050 ohms  Ventricular sensing - R wave: >12 mV  Ventricular threshold: 0.5 V @ 0.5 ms  Ventricular lead impedance:   1000 ohms     Diagnostic Data  Atrial paced: 4.4 %  Ventricular paced: 3.4 %    Episodes recorded since 3/7/2019:  NS-VT x 1, duration 7 seconds, rate varies from 165-240 bpm    Battery status: satisfactory   Estimated 10.7-11.7 years remaining      Final Parameters  Mode:  DDDR  Lower rate: 60 bpm   Upper rate: 130 bpm  AV Delay: paced- 225 ms  Sensed-200 ms  Atrial - Amplitude: 2.5 V   Pulse width: 0.5 ms   Sensitivity: 1 mV     Ventricular - Amplitude: 2 V  Pulse width: 0.5 ms  Sensitivity: 2 mV    Changes made: No changes made.  Conclusions: normal pacemaker function, stable pacing and sensing thresholds and adequate battery reserve    Follow up: Every 3 months via [facility], annually in office      Note:  Patient's monitor is showing up as disconnected from network.  Patient provided with [employer]'s number to call for assistance with gettting monitor back online.

## 2019-08-22 ENCOUNTER — TELEPHONE (OUTPATIENT)
Dept: FAMILY MEDICINE CLINIC | Facility: CLINIC | Age: 74
End: 2019-08-22

## 2019-08-22 NOTE — TELEPHONE ENCOUNTER
Pt came in office stating that he was having pains in the stomach and some nausea for 4 days per dr couch go to [facility] er now for testing

## 2019-08-22 NOTE — US
EXAM:  Ultrasound abdomen limited right upper quadrant 

  

HISTORY:  Pain, worse after food 

  

COMPARISON:  None 

  

TECHNIQUE:  Limited ultrasound abdomen right upper quadrant was performed 

  

FINDINGS:  Visualized portion pancreas pancreas obscured secondary to bowel gas shadowing.  Liver dif
fusely increased in echogenicity.  Liver normal in size.  Main portal vein patent with normal directi
on of flow.  No shadowing gallstones.  Thickening.  No pericholecystic fluid.  No biliary duct dilati
on with common bile duct measuring 0.4 cm.  Right kidney measures 11.7 cm in length without hydroneph
rosis. 

  

IMPRESSION: 

1.  No cholelithiasis.  No gallbladder wall thickening. 

2.  Hepatic steatosis.

## 2019-08-22 NOTE — DI
EXAM:  Frontal chest 

  

  

HISTORY:  Epigastric pain 

  

FINDINGS:  No comparison was available.  Heart size appears within normal limits.  Sternotomy wires a
re noted.  Left-sided pacemaker unit is in place.  No acute infiltrates are seen.  No vascular conges
tion.  There is no consolidation, visible pleural fluid or pneumothorax.  Bones reveal no acute fract
ure. 

  

IMPRESSION:    No acute cardiopulmonary process.

## 2019-08-22 NOTE — ED.PDOC
General


ED Provider: 


Dr. CAROLE GUZMAN





Chief Complaint: Abdominal Pain


Stated Complaint: Epigastric pain; worse after eating.  Started a couple of 

days ago; has had occasionally in past but not as intense.  Eating makes worse; 

movements also bother him.  Laying down - sleeping makes worse.  Had chills 

last night - otherwise no fever or soaking sweats.


Time Seen by Physician: 08:44


Mode of Arrival: Walk-In


Information Source: Patient


Primary Care Provider: 


SYLVIA LIU





Referred to ED by: PCP (Saw Dr. Liu; told to come to ER for Ultrasound GB)


Nursing and Triage Documentation Reviewed and Agree: Yes


Does patient meet sepsis criteria?: No


System Inflammatory Response Syndrome: Not Applicable


Sepsis Protocol: 


For patient's 13 years and over:





Temp is 96.8 and below  and greater


Pulse >90 BPM


Resp >20/minute


Acutely Altered Mental Status





Are patient's symptoms suggestive of a new infection, such as:


   -Pneumonia


   -Skin, Soft Tissue


   -Endocarditis


   -UTI


   -Bone, Joint Infection


   -Implantable Device


   -Acute Abdominal Infection


   -Wound Infection


   -Meningitis


   -Blood Stream Catheter Infection


   -Unknown








Review of Systems





- Review Of Systems


Constitutional: Reports: Malaise


Respiratory: Reports: No symptoms


Cardiac: Reports: No symptoms


GI: Reports: Other (Epigastric pain)


: Reports: No symptoms


Neurological: Reports: No symptoms


All Other Systems: Reviewed and Negative





Past Medical History





- Past Medical History


Previously Healthy: Yes


Endocrine: Reports: Hypothyroid, Hyperthyroid


Cardiovascular: Reports: CAD, MI, CHF


Respiratory: Reports: None


Hematological: Reports: None


Gastrointestinal: Reports: None


Genitourinary: Reports: Kidney stones


Neuro/Psych: Reports: None


Musculoskeletal: Reports: None


Cancer: Reports: Unknown (thyroid )





- Surgical History


General Surgical History: Reports: None, CABG (204, 2012), Pacemaker, Other (

Thyroid surgery )





- Family History


Family History: Reports: None





- Social History


Smoking Status: Former smoker


Hx Substance Use: No


Alcohol Screening: None





Physical Exam





- Physical Exam


Appearance: Well-appearing


Ill-appearing: None


Pain Distress: Mild


Eyes: AMPARO, EOMI


Neck: Supple


Respiratory: Airway patent, Breath sounds clear, Breath sounds equal


Cardiovascular: RRR, Pulses normal


GI/: Soft, Nontender, No masses, Bowel sounds normal, Tender (slightly tender 

to gentle palpation)


Musculoskeletal: Normal strength


Skin: Warm, Dry, Normal color


Neurological: Sensation intact, Motor intact, Alert, Oriented


Psychiatric: Affect appropriate, Mood appropriate





Interpretation





- Radiology Interpretation


Radiology Interpretation By: Radiologist


Radiology Results: No acute changes





Physician Notification





- Case Discussed


Physician Notified: Dr Liu


Time of Notification: 10:45 (Add two RX; will see Monday)





Critical Care Note





- Critical Care Note


Total Time (mins): 20





Course





- Course


Hematology/Chemistry: 


 08/22/19 09:00





 08/22/19 09:00


Orders, Labs, Meds: 


Lab Review











  08/22/19 08/22/19





  09:00 09:00


 


WBC  8.42 


 


RBC  4.83 


 


Hgb  13.4 L 


 


Hct  41.7 L 


 


MCV  86.3 


 


MCH  27.7 


 


MCHC  32.1 


 


RDW Coeff of Megan  15.0 H 


 


Plt Count  162 


 


Immature Gran % (Auto)  0.8 


 


Neut % (Auto)  77.1 


 


Lymph % (Auto)  12.0 


 


Mono % (Auto)  7.1 


 


Eos % (Auto)  2.6 


 


Baso % (Auto)  0.4 


 


Immature Gran # (Auto)  0.1 


 


Neut # (Auto)  6.5 


 


Lymph # (Auto)  1.0 


 


Mono # (Auto)  0.6 


 


Eos # (Auto)  0.2 


 


Baso # (Auto)  0.0 


 


Sodium   137.4


 


Potassium   3.73


 


Chloride   103.3


 


Carbon Dioxide   28.2


 


Anion Gap   9.63


 


BUN   13.5


 


Creatinine   0.81


 


Estimated GFR (MDRD)   94.00


 


BUN/Creatinine Ratio   16.66


 


Glucose   99.2


 


Calcium   8.85


 


Total Bilirubin   1.01


 


AST   23.6


 


ALT   19.3


 


Alkaline Phosphatase   74.4


 


Troponin I   < 0.012


 


Total Protein   6.91


 


Albumin   3.95


 


Globulin   2.96


 


Albumin/Globulin Ratio   1.33


 


Lipase   20.7 L








Orders











 Category Date Time Status


 


 NPO REMINDER: IMAGING ONCE CARE  08/22/19 09:38 Completed


 


 CBC W/ AUTO DIFF Stat LAB  08/22/19 09:00 Completed


 


 COMPREHENSIVE METABOLIC PANEL Stat LAB  08/22/19 09:00 Completed


 


 LIPASE Stat LAB  08/22/19 09:00 Completed


 


 TROPONIN I Stat LAB  08/22/19 09:00 Completed


 


 CHEST, 1V AP ONLY Stat RADS  08/22/19 08:50 Completed


 


 GALLBLADDER ULTRASOUND [U/S ABDOMEN RT UPPER QUAD] Stat RADS  08/22/19 09:37 

Completed











Vital Signs: 


 











  Temp Pulse Resp BP Pulse Ox


 


 08/22/19 08:29  97.8 F  82  20  122/76  94 L














Departure





- Departure


Time of Disposition: 10:56


Disposition: HOME SELF-CARE


Discharge Problem: 


 Epigastric abdominal pain





Instructions:  Epigastric Pain (ED)


Condition: Good


Pt referred to PMD for follow-up: Yes (Call for appointment)


IPMP verified?: No


Additional Instructions: 


Make appointment for monday with Dr Liu, let them know the ER doctor talked 

with him.  Take the aditional prescriptions ( Carafate and Librax.  He is 

planning on doing a further gall bladder study probably next week.


Prescriptions: 


Chlordiazepoxide/Clidinium Br [Librax 5/2.5 mg] 1 cap PO ACHS #30 capsule


Sucralfate [Carafate] 1 gm PO ACHS #30 tablet


Allergies/Adverse Reactions: 


Allergies





meperidine HCl [From Demerol] Adverse Reaction (Verified 08/22/19 08:33)


 








Home Medications: 


Ambulatory Orders





Alprazolam 0.25 mg PO TID PRN 10/22/15 


Aspirin [Aspirin EC] 81 mg PO DAILYWM 10/22/15 


Escitalopram Oxalate [Lexapro] 20 mg PO DAILY 10/22/15 


Metoprolol Tartrate [Lopressor] 50 mg PO DAILY 10/22/15 


Nitroglycerin [Nitrostat] 0.4 mg SL AS DIRECTED PRN 10/22/15 


Omeprazole [Prilosec] 20 mg PO QDAC 10/22/15 


Valsartan [Diovan] 80 mg PO DAILY 10/22/15 


Atorvastatin Calcium [Lipitor] 80 mg PO DAILY 02/25/17 


Furosemide [Lasix Tab] 40 mg PO DAILY 02/25/17 


Omega-3 Fatty Acids [Fish Oil Concentrate] 1,000 mg PO DAILY 02/25/17 


Potassium Chloride 20 meq PO DAILY 02/25/17 


Ticagrelor [Brilinta] 90 mg PO DAILY 02/25/17 


Chlordiazepoxide/Clidinium Br [Librax 5/2.5 mg] 1 cap PO ACHS #30 capsule 08/22/ 19 


Sucralfate [Carafate] 1 gm PO ACHS #30 tablet 08/22/19 








Disposition Discussed With: Patient (and spouse)

## 2019-08-26 ENCOUNTER — OFFICE VISIT (OUTPATIENT)
Dept: FAMILY MEDICINE CLINIC | Facility: CLINIC | Age: 74
End: 2019-08-26

## 2019-08-26 VITALS
HEART RATE: 66 BPM | WEIGHT: 258 LBS | RESPIRATION RATE: 16 BRPM | OXYGEN SATURATION: 98 % | DIASTOLIC BLOOD PRESSURE: 76 MMHG | BODY MASS INDEX: 39.1 KG/M2 | SYSTOLIC BLOOD PRESSURE: 124 MMHG | HEIGHT: 68 IN

## 2019-08-26 DIAGNOSIS — I25.729 CORONARY ARTERY DISEASE INVOLVING AUTOLOGOUS ARTERY CORONARY BYPASS GRAFT WITH ANGINA PECTORIS (HCC): Primary | ICD-10-CM

## 2019-08-26 PROCEDURE — G0439 PPPS, SUBSEQ VISIT: HCPCS | Performed by: FAMILY MEDICINE

## 2019-08-26 PROCEDURE — 99213 OFFICE O/P EST LOW 20 MIN: CPT | Performed by: FAMILY MEDICINE

## 2019-08-26 RX ORDER — SUCRALFATE 1 G/1
TABLET ORAL
Refills: 0 | Status: ON HOLD | COMMUNITY
Start: 2019-08-22 | End: 2021-05-01

## 2019-08-26 NOTE — PROGRESS NOTES
Subjective   Carlo Velasco is a 74 y.o. male.     Chief Complaint   Patient presents with   • Follow-up     hosp f/u        History of Present Illness     overall feels good--no further abd pain or chest pain      Current Outpatient Medications:   •  ALPRAZolam (XANAX) 0.25 MG tablet, TAKE 1 TABLET BY MOUTH THREE TIMES DAILY, Disp: 90 tablet, Rfl: 0  •  aspirin 81 MG EC tablet, Take 81 mg by mouth daily.  , Disp: , Rfl:   •  atorvastatin (LIPITOR) 80 MG tablet, TAKE 1 TABLET BY MOUTH EVERY NIGHT, Disp: 30 tablet, Rfl: 5  •  Bacillus Coagulans-Inulin (PROBIOTIC FORMULA PO), Take  by mouth., Disp: , Rfl:   •  BIOTIN PO, Take  by mouth., Disp: , Rfl:   •  clopidogrel (PLAVIX) 75 MG tablet, Take 1 tablet by mouth Daily., Disp: 90 tablet, Rfl: 3  •  donepezil (ARICEPT) 5 MG tablet, Take 1 tablet by mouth every night at bedtime., Disp: 90 tablet, Rfl: 1  •  escitalopram (LEXAPRO) 20 MG tablet, TAKE 1 TABLET BY MOUTH DAILY, Disp: 30 tablet, Rfl: 0  •  furosemide (LASIX) 40 MG tablet, Take 1 tablet by mouth Daily., Disp: 90 tablet, Rfl: 1  •  metoprolol tartrate (LOPRESSOR) 25 MG tablet, Take one tablet by mouth twice a day., Disp: 180 tablet, Rfl: 4  •  NITROSTAT 0.4 MG SL tablet, Place 1 tablet under the tongue Every 5 (Five) Minutes As Needed for Chest Pain. Take no more than 3 doses in 15 minutes., Disp: 30 tablet, Rfl: 2  •  omega-3 acid ethyl esters (LOVAZA) 1 g capsule, Take 1 capsule by mouth Daily., Disp: 90 capsule, Rfl: 3  •  omeprazole (priLOSEC) 20 MG capsule, Take 1 capsule by mouth Daily., Disp: 30 capsule, Rfl: 5  •  Potassium 99 MG tablet, Take 1 tablet by mouth Daily., Disp: , Rfl:   •  pregabalin (LYRICA) 150 MG capsule, TK ONE C PO TID, Disp: , Rfl:   •  sucralfate (CARAFATE) 1 g tablet, TK 1 T PO  AC AND HS, Disp: , Rfl: 0  Allergies   Allergen Reactions   • Meperidine Other (See Comments)     HEART STOPS     • Statins Myalgia     Causes leg cramps       Past Medical History:   Diagnosis Date   •  Anxiety    • Arthritis    • Cancer (CMS/HCC)     melanoma   • CHF (congestive heart failure) (CMS/HCC)    • Claustrophobia    • Colon polyp    • Coronary artery disease 2012, 2015    2 vessel CABG (2012), SHAHRZAD LAD (2015).    • Depression    • Disease of thyroid gland     partial thyroidectomy on right   • GERD (gastroesophageal reflux disease)    • Hearing loss    • Heart attack (CMS/HCC)    • Heart attack (CMS/HCC)    • History of transfusion    • Hyperlipidemia    • Hypertension    • Kidney stone     history of    • Tobacco abuse, in remission    • Uses hearing aid     BILATERAL     Past Surgical History:   Procedure Laterality Date   • BACK SURGERY      CERVICAL FUSION AND LUMBAR DISC REPLACEMENT   • CARDIAC CATHETERIZATION  07/2015    SHAHRZAD mid LAD, Dr. Toribio   • CARDIAC CATHETERIZATION N/A 10/21/2016    Procedure: Left Heart Cath;  Surgeon: Darian Toribio MD;  Location:  [facility] CATH [facility]IVE [facility];  Service:    • CARDIAC CATHETERIZATION Left 4/5/2017    Procedure: Cardiac Catheterization/Vascular Study;  Surgeon: Darian Toribio MD;  Location:  [facility];  Service:    • CARDIAC CATHETERIZATION N/A 4/5/2017    Procedure: Left Heart Cath;  Surgeon: Darian Toribio MD;  Location:  [facility];  Service:    • CARDIAC CATHETERIZATION N/A 4/5/2017    Procedure: Coronary angiography;  Surgeon: Darian Toribio MD;  Location:  [facility];  Service:    • CARDIAC CATHETERIZATION N/A 4/5/2017    Procedure: Left ventriculography;  Surgeon: Darian Toribio MD;  Location:  [facility];  Service:    • CARDIAC CATHETERIZATION  4/5/2017    Procedure: Saphenous Vein Graft;  Surgeon: Darian Toribio MD;  Location:  [facility];  Service:    • CARDIAC CATHETERIZATION Left 2/27/2018    Procedure: Cardiac Catheterization/Vascular Study SHAHRZAD OK PRN;  Surgeon: Darian Toribio MD;  Location:  [facility];  Service:    • CARDIAC CATHETERIZATION  2/27/2018     Procedure: Functional Flow [place];  Surgeon: Darian Toribio MD;  Location: [facility] CATH INVASIVE LOCATION;  Service:    • CARDIAC CATHETERIZATION N/A 2/27/2018    Procedure: Left ventriculography;  Surgeon: Darian Toribio MD;  Location: [facility] CATH INVASIVE LOCATION;  Service:    • CARDIAC ELECTROPHYSIOLOGY PROCEDURE N/A 1/11/2018    Procedure: PPM generator change - dual;  Surgeon: Darian Toribio MD;  Location: [facility] CATH INVASIVE LOCATION;  Service:    • COLONOSCOPY  09/05/2007    colon polyps   • COLONOSCOPY N/A 5/29/2018    Procedure: COLONOSCOPY WITH ANESTHESIA;  Surgeon: Juan F Tapia MD;  Location: [facility] ENDOSCOPY;  Service: Gastroenterology   • CORONARY ANGIOPLASTY WITH STENT PLACEMENT  07/2015    SHAHRZAD mid LAD, Dr. Toribio    • CORONARY ARTERY BYPASS GRAFT  2012 AND 2014    2 vessel    • ENDOSCOPY  11/08/2010   • ENDOSCOPY N/A 5/29/2018    Procedure: ESOPHAGOGASTRODUODENOSCOPY WITH ANESTHESIA;  Surgeon: Juan F Tapia MD;  Location: [facility] ENDOSCOPY;  Service: Gastroenterology   • HEAD/NECK LESION/CYST EXCISION Left 12/20/2016    Procedure: EXCISION MALIGNANT MELANOMA WITH SENTINEL NODE BIOPSY, INJECTION AND SCAN PRE-OP, LEFT EAR;  Surgeon: Guanaco Zepeda MD;  Location: [facility] OR;  Service:    • HEMORRHOIDECTOMY     • INSERT / REPLACE / REMOVE PACEMAKER     • SENTINEL NODE BIOPSY Left 12/20/2016    Procedure: SENTINEL NODE BIOPSY;  Surgeon: Guanaco Zepeda MD;  Location: [facility] OR;  Service:    • SKIN CANCER EXCISION     • THYROID SURGERY     • THYROIDECTOMY         Review of Systems   Constitutional: Negative.    HENT: Negative.    Eyes: Negative.    Respiratory: Negative.    Cardiovascular: Negative.    Gastrointestinal: Negative.    Endocrine: Negative.    Genitourinary: Negative.    Musculoskeletal: Negative.    Skin: Negative.    Allergic/Immunologic: Negative.    Neurological: Negative.    Hematological: Negative.    Psychiatric/Behavioral: Negative.        Objective  /76   Pulse 66   Resp  "16   Ht 172.7 cm (68\")   Wt 117 kg (258 lb)   SpO2 98%   BMI 39.23 kg/m²   Physical Exam   Constitutional: He is oriented to person, place, and time. He appears well-developed and well-nourished.   HENT:   Head: Normocephalic and atraumatic.   Right Ear: External ear normal.   Left Ear: External ear normal.   Nose: Nose normal.   Mouth/Throat: Oropharynx is clear and moist.   Eyes: Conjunctivae and EOM are normal. Pupils are equal, round, and reactive to light.   Neck: Normal range of motion. Neck supple.   Cardiovascular: Normal rate, regular rhythm, normal heart sounds and intact distal pulses.   Pulmonary/Chest: Effort normal and breath sounds normal.   Abdominal: Soft. Bowel sounds are normal.   Musculoskeletal: Normal range of motion.   Neurological: He is alert and oriented to person, place, and time.   Skin: Skin is warm. Capillary refill takes less than 2 seconds.   Psychiatric: He has a normal mood and affect. His behavior is normal. Judgment and thought content normal.   Nursing note and vitals reviewed.      Assessment/Plan   Carlo was seen today for follow-up.    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      Follow up: 4 month(s)  "

## 2019-08-26 NOTE — PROGRESS NOTES
The ABCs of the Annual Wellness Visit  Subsequent Medicare Wellness Visit    Chief Complaint   Patient presents with   • Follow-up     hosp f/u       Subjective   History of Present Illness:  Carlo Velasco is a 74 y.o. male who presents for a Subsequent Medicare Wellness Visit.    HEALTH RISK ASSESSMENT    Recent Hospitalizations:  Recently treated at the following:  [facility]    Current Medical Providers:  Patient Care Team:  Rhys Rosen MD as PCP - General (Family Medicine)  Rhys Rosen MD as PCP - Claims Attributed  Darian Toribio MD as Cardiologist (Cardiology)  Juan F Tapia MD as Consulting Physician (Gastroenterology)    Smoking Status:  Social History     Tobacco Use   Smoking Status Former Smoker   • Years: 10.00   • Types: Cigarettes   • Last attempt to quit:    • Years since quittin.6   Smokeless Tobacco Former User   • Quit date:        Alcohol Consumption:  Social History     Substance and Sexual Activity   Alcohol Use No       Depression Screen:   PHQ-2/PHQ-9 Depression Screening 2019   Little interest or pleasure in doing things 0   Feeling down, depressed, or hopeless 1   Trouble falling or staying asleep, or sleeping too much 1   Feeling tired or having little energy 1   Poor appetite or overeating 0   Feeling bad about yourself - or that you are a failure or have let yourself or your family down 1   Trouble concentrating on things, such as reading the newspaper or watching television 1   Moving or speaking so slowly that other people could have noticed. Or the opposite - being so fidgety or restless that you have been moving around a lot more than usual 1   Thoughts that you would be better off dead, or of hurting yourself in some way 1   Total Score 7   If you checked off any problems, how difficult have these problems made it for you to do your work, take care of things at home, or get along with other people? Somewhat difficult       Fall  Risk Screen:  STEADI Fall Risk Assessment was completed, and patient is at MODERATE risk for falls. Assessment completed on:8/26/2019    Health Habits and Functional and Cognitive Screening:  Functional & Cognitive Status 8/26/2019   Do you have difficulty preparing food and eating? No   Do you have difficulty bathing yourself, getting dressed or grooming yourself? No   Do you have difficulty using the toilet? No   Do you have difficulty moving around from place to place? No   Do you have trouble with steps or getting out of a bed or a chair? No   Current Diet Well Balanced Diet   Dental Exam Up to date   Eye Exam Up to date   Exercise (times per week) 4 times per week   Current Exercise Activities Include Yard Work   Do you need help using the phone?  No   Are you deaf or do you have serious difficulty hearing?  No   Do you need help with transportation? No   Do you need help shopping? No   Do you need help preparing meals?  No   Do you need help with housework?  No   Do you need help with laundry? No   Do you need help taking your medications? No   Do you need help managing money? No   Do you ever drive or ride in a car without wearing a seat belt? No   Have you felt unusual stress, anger or loneliness in the last month? Yes   Who do you live with? Spouse   If you need help, do you have trouble finding someone available to you? No   Have you been bothered in the last four weeks by sexual problems? No   Do you have difficulty concentrating, remembering or making decisions? Yes         Does the patient have evidence of cognitive impairment? No    Asprin use counseling:Taking ASA appropriately as indicated    Age-appropriate Screening Schedule:  Refer to the list below for future screening recommendations based on patient's age, sex and/or medical conditions. Orders for these recommended tests are listed in the plan section. The patient has been provided with a written plan.    Health Maintenance   Topic Date Due   •  ZOSTER VACCINE (1 of 2) 08/02/1997   • PNEUMOCOCCAL VACCINES (65+ LOW/MEDIUM RISK) (1 of 2 - PCV13) 08/02/2012   • INFLUENZA VACCINE  08/01/2019   • LIPID PANEL  04/11/2020   • COLONOSCOPY  05/29/2021   • TDAP/TD VACCINES (3 - Td) 08/17/2028          The following portions of the patient's history were reviewed and updated as appropriate: allergies, current medications, past family history, past medical history, past social history, past surgical history and problem list.    Outpatient Medications Prior to Visit   Medication Sig Dispense Refill   • ALPRAZolam (XANAX) 0.25 MG tablet TAKE 1 TABLET BY MOUTH THREE TIMES DAILY 90 tablet 0   • aspirin 81 MG EC tablet Take 81 mg by mouth daily.       • atorvastatin (LIPITOR) 80 MG tablet TAKE 1 TABLET BY MOUTH EVERY NIGHT 30 tablet 5   • Bacillus Coagulans-Inulin (PROBIOTIC FORMULA PO) Take  by mouth.     • BIOTIN PO Take  by mouth.     • clopidogrel (PLAVIX) 75 MG tablet Take 1 tablet by mouth Daily. 90 tablet 3   • donepezil (ARICEPT) 5 MG tablet Take 1 tablet by mouth every night at bedtime. 90 tablet 1   • escitalopram (LEXAPRO) 20 MG tablet TAKE 1 TABLET BY MOUTH DAILY 30 tablet 0   • furosemide (LASIX) 40 MG tablet Take 1 tablet by mouth Daily. 90 tablet 1   • metoprolol tartrate (LOPRESSOR) 25 MG tablet Take one tablet by mouth twice a day. 180 tablet 4   • NITROSTAT 0.4 MG SL tablet Place 1 tablet under the tongue Every 5 (Five) Minutes As Needed for Chest Pain. Take no more than 3 doses in 15 minutes. 30 tablet 2   • omega-3 acid ethyl esters (LOVAZA) 1 g capsule Take 1 capsule by mouth Daily. 90 capsule 3   • omeprazole (priLOSEC) 20 MG capsule Take 1 capsule by mouth Daily. 30 capsule 5   • Potassium 99 MG tablet Take 1 tablet by mouth Daily.     • pregabalin (LYRICA) 150 MG capsule TK ONE C PO TID     • sucralfate (CARAFATE) 1 g tablet TK 1 T PO  AC AND HS  0     No facility-administered medications prior to visit.        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 (CMS/HCC)   • Essential hypertension   • Anxiety   • Colon polyps   • Mixed hyperlipidemia   • Incisional hernia, without obstruction or gangrene   • BRBPR (bright red blood per rectum)   • Esophageal dysphagia   • Prostatism   • Nocturia   • Coronary artery disease   • Orthostasis   • Chronic midline low back pain without sciatica   • Hx of colonic polyp   • Acute pain of right knee   • Morbidly obese (CMS/HCC)   • Pain of right heel   • Hx of CABG      • Stented coronary artery   • Flank pain   • Skin lesion       Advanced Care Planning:  Patient has an advance directive - a copy has been provided and is visible in patient header    Review of Systems    Compared to one year ago, the patient feels his physical health is the same.  Compared to one year ago, the patient feels his mental health is the same.    Reviewed chart for potential of high risk medication in the elderly: yes  Reviewed chart for potential of harmful drug interactions in the elderly:yes    Objective         Vitals:    08/26/19 0858   BP: 124/76   Pulse: 66   Resp: 16   SpO2: 98%   Weight: 117 kg (258 lb)   Height: 172.7 cm (68\")       Body mass index is 39.23 kg/m².  Discussed the patient's BMI with him. The BMI is above average; BMI management plan is completed.    Physical Exam          Assessment/Plan   Medicare Risks and Personalized Health Plan  CMS Preventative Services Quick Reference  Cardiovascular risk    The above risks/problems have been discussed with the patient.  Pertinent information has been shared with the patient in the After Visit Summary.  Follow up plans and orders are seen below in the Assessment/Plan Section.    Diagnoses and all orders for this visit:    1. 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     An After " Visit Summary and PPPS were given to the patient.

## 2019-09-09 RX ORDER — ATORVASTATIN CALCIUM 80 MG/1
TABLET, FILM COATED ORAL
Qty: 30 TABLET | Refills: 5 | Status: SHIPPED | OUTPATIENT
Start: 2019-09-09 | End: 2020-03-16

## 2019-10-02 RX ORDER — OMEPRAZOLE 20 MG/1
20 CAPSULE, DELAYED RELEASE ORAL DAILY
Qty: 30 CAPSULE | Refills: 0 | Status: SHIPPED | OUTPATIENT
Start: 2019-10-02 | End: 2019-10-10 | Stop reason: SDUPTHER

## 2019-10-09 ENCOUNTER — CLINICAL SUPPORT (OUTPATIENT)
Dept: CARDIOLOGY | Facility: CLINIC | Age: 74
End: 2019-10-09

## 2019-10-09 DIAGNOSIS — I49.5 SICK SINUS SYNDROME (HCC): ICD-10-CM

## 2019-10-09 PROCEDURE — 93294 REM INTERROG EVL PM/LDLS PM: CPT | Performed by: PHYSICIAN ASSISTANT

## 2019-10-09 PROCEDURE — 93296 REM INTERROG EVL PM/IDS: CPT | Performed by: PHYSICIAN ASSISTANT

## 2019-10-10 ENCOUNTER — OFFICE VISIT (OUTPATIENT)
Dept: FAMILY MEDICINE CLINIC | Facility: CLINIC | Age: 74
End: 2019-10-10

## 2019-10-10 VITALS
HEART RATE: 65 BPM | DIASTOLIC BLOOD PRESSURE: 82 MMHG | WEIGHT: 257 LBS | HEIGHT: 68 IN | SYSTOLIC BLOOD PRESSURE: 134 MMHG | BODY MASS INDEX: 38.95 KG/M2 | RESPIRATION RATE: 16 BRPM | OXYGEN SATURATION: 95 %

## 2019-10-10 DIAGNOSIS — I10 ESSENTIAL HYPERTENSION: ICD-10-CM

## 2019-10-10 DIAGNOSIS — I25.729 CORONARY ARTERY DISEASE INVOLVING AUTOLOGOUS ARTERY CORONARY BYPASS GRAFT WITH ANGINA PECTORIS (HCC): Primary | ICD-10-CM

## 2019-10-10 DIAGNOSIS — Z12.5 ENCOUNTER FOR SCREENING FOR MALIGNANT NEOPLASM OF PROSTATE: ICD-10-CM

## 2019-10-10 DIAGNOSIS — F41.9 ANXIETY: ICD-10-CM

## 2019-10-10 DIAGNOSIS — Z23 NEED FOR IMMUNIZATION AGAINST INFLUENZA: ICD-10-CM

## 2019-10-10 DIAGNOSIS — E66.01 MORBIDLY OBESE (HCC): ICD-10-CM

## 2019-10-10 PROCEDURE — 99214 OFFICE O/P EST MOD 30 MIN: CPT | Performed by: FAMILY MEDICINE

## 2019-10-10 PROCEDURE — G0008 ADMIN INFLUENZA VIRUS VAC: HCPCS | Performed by: FAMILY MEDICINE

## 2019-10-10 PROCEDURE — 90653 IIV ADJUVANT VACCINE IM: CPT | Performed by: FAMILY MEDICINE

## 2019-10-10 RX ORDER — OMEPRAZOLE 20 MG/1
20 CAPSULE, DELAYED RELEASE ORAL DAILY
Qty: 90 CAPSULE | Refills: 3 | Status: SHIPPED | OUTPATIENT
Start: 2019-10-10 | End: 2020-12-03 | Stop reason: SDUPTHER

## 2019-10-10 RX ORDER — FUROSEMIDE 40 MG/1
40 TABLET ORAL DAILY
Qty: 90 TABLET | Refills: 3 | Status: SHIPPED | OUTPATIENT
Start: 2019-10-10 | End: 2020-12-07

## 2019-10-10 RX ORDER — DONEPEZIL HYDROCHLORIDE 5 MG/1
5 TABLET, FILM COATED ORAL
Qty: 90 TABLET | Refills: 3 | Status: SHIPPED | OUTPATIENT
Start: 2019-10-10 | End: 2020-11-24 | Stop reason: SDUPTHER

## 2019-10-10 RX ORDER — PREGABALIN 150 MG/1
150 CAPSULE ORAL 3 TIMES DAILY
Qty: 270 CAPSULE | Refills: 1 | Status: SHIPPED | OUTPATIENT
Start: 2019-10-10 | End: 2020-05-07

## 2019-10-10 NOTE — PROGRESS NOTES
Subjective   Carlo Velasco is a 74 y.o. male.     Chief Complaint   Patient presents with   • Follow-up     6 mo  CAD        History of Present Illness     here today with his wife --he nots anxiwty controlled with meds--denies any angina symtpoms=--tolerating statin tx iwout mjaygolias ...      Current Outpatient Medications:   •  ALPRAZolam (XANAX) 0.25 MG tablet, TAKE 1 TABLET BY MOUTH THREE TIMES DAILY, Disp: 90 tablet, Rfl: 0  •  aspirin 81 MG EC tablet, Take 81 mg by mouth daily.  , Disp: , Rfl:   •  atorvastatin (LIPITOR) 80 MG tablet, TAKE 1 TABLET BY MOUTH EVERY NIGHT, Disp: 30 tablet, Rfl: 5  •  Bacillus Coagulans-Inulin (PROBIOTIC FORMULA PO), Take  by mouth., Disp: , Rfl:   •  BIOTIN PO, Take  by mouth., Disp: , Rfl:   •  clopidogrel (PLAVIX) 75 MG tablet, Take 1 tablet by mouth Daily., Disp: 90 tablet, Rfl: 3  •  donepezil (ARICEPT) 5 MG tablet, Take 1 tablet by mouth every night at bedtime., Disp: 90 tablet, Rfl: 1  •  escitalopram (LEXAPRO) 20 MG tablet, TAKE 1 TABLET BY MOUTH DAILY, Disp: 30 tablet, Rfl: 0  •  furosemide (LASIX) 40 MG tablet, Take 1 tablet by mouth Daily., Disp: 90 tablet, Rfl: 1  •  metoprolol tartrate (LOPRESSOR) 25 MG tablet, Take one tablet by mouth twice a day., Disp: 180 tablet, Rfl: 4  •  NITROSTAT 0.4 MG SL tablet, Place 1 tablet under the tongue Every 5 (Five) Minutes As Needed for Chest Pain. Take no more than 3 doses in 15 minutes., Disp: 30 tablet, Rfl: 2  •  omega-3 acid ethyl esters (LOVAZA) 1 g capsule, Take 1 capsule by mouth Daily., Disp: 90 capsule, Rfl: 3  •  omeprazole (priLOSEC) 20 MG capsule, TAKE 1 CAPSULE BY MOUTH DAILY, Disp: 30 capsule, Rfl: 0  •  Potassium 99 MG tablet, Take 1 tablet by mouth Daily., Disp: , Rfl:   •  pregabalin (LYRICA) 150 MG capsule, TK ONE C PO TID, Disp: , Rfl:   •  sucralfate (CARAFATE) 1 g tablet, TK 1 T PO  AC AND HS, Disp: , Rfl: 0  Allergies   Allergen Reactions   • Meperidine Other (See Comments)     HEART STOPS     • Statins  Myalgia     Causes leg cramps       Past Medical History:   Diagnosis Date   • Anxiety    • Arthritis    • Cancer (CMS/HCC)     melanoma   • CHF (congestive heart failure) (CMS/HCC)    • Claustrophobia    • Colon polyp    • Coronary artery disease 2012, 2015    2 vessel CABG (2012), SHAHRZAD LAD (2015).    • Depression    • Disease of thyroid gland     partial thyroidectomy on right   • GERD (gastroesophageal reflux disease)    • Hearing loss    • Heart attack (CMS/HCC)    • Heart attack (CMS/HCC)    • History of transfusion    • Hyperlipidemia    • Hypertension    • Kidney stone     history of    • Tobacco abuse, in remission    • Uses hearing aid     BILATERAL     Past Surgical History:   Procedure Laterality Date   • BACK SURGERY      CERVICAL FUSION AND LUMBAR DISC REPLACEMENT   • CARDIAC CATHETERIZATION  07/2015    SHAHRZAD mid LAD, Dr. Toribio   • CARDIAC CATHETERIZATION N/A 10/21/2016    Procedure: Left Heart Cath;  Surgeon: Darian Toribio MD;  Location:  [facility] CATH INVASIVE LOC[facility];  Service:    • CARDIAC CATHETERIZATION Left 4/5/2017    Procedure: Cardiac Catheterization/Vascular Study;  Surgeon: Darian Toribio MD;  Location:  [facility] CATH INVAS[facility] LOC[facility];  Service:    • CARDIAC CATHETERIZATION N/A 4/5/2017    Procedure: Left Heart Cath;  Surgeon: Darian Toribio MD;  Location:  [facility] CATH INVASIVE [facility];  Service:    • CARDIAC CATHETERIZATION N/A 4/5/2017    Procedure: Coronary angiography;  Surgeon: Darain Toribio MD;  Location:  [facility] CATH INVAS[facility];  Service:    • CARDIAC CATHETERIZATION N/A 4/5/2017    Procedure: Left ventriculography;  Surgeon: Darian Toribio MD;  Location:  [facility] CATH INVAS[facility];  Service:    • CARDIAC CATHETERIZATION  4/5/2017    Procedure: Saphenous Vein Graft;  Surgeon: Darian Toribio MD;  Location:  [facility]VAS[facility];  Service:    • CARDIAC CATHETERIZATION Left 2/27/2018    Procedure: Cardiac Catheterization/Vascular Study SHAHRZAD OK PRN;  Surgeon: Darian Toribio MD;  Location:   [facility] CATH INVAS[facility];  Service:    • CARDIAC CATHETERIZATION  2/27/2018    Procedure: Functional Flow [place];  Surgeon: Darian Toribio MD;  Location: [facility] CATH INVASIVE LOCATION;  Service:    • CARDIAC CATHETERIZATION N/A 2/27/2018    Procedure: Left ventriculography;  Surgeon: Darian Toribio MD;  Location: [facility] CATH INVASIVE LOCATION;  Service:    • CARDIAC ELECTROPHYSIOLOGY PROCEDURE N/A 1/11/2018    Procedure: PPM generator change - dual;  Surgeon: Darian Toribio MD;  Location: [facility] CATH INVASIVE LOCATION;  Service:    • COLONOSCOPY  09/05/2007    colon polyps   • COLONOSCOPY N/A 5/29/2018    Procedure: COLONOSCOPY WITH ANESTHESIA;  Surgeon: Juan F Tapia MD;  Location: [facility] ENDOSCOPY;  Service: Gastroenterology   • CORONARY ANGIOPLASTY WITH STENT PLACEMENT  07/2015    SHAHRZAD mid LAD, Dr. Toribio    • CORONARY ARTERY BYPASS GRAFT  2012 AND 2014    2 vessel    • ENDOSCOPY  11/08/2010   • ENDOSCOPY N/A 5/29/2018    Procedure: ESOPHAGOGASTRODUODENOSCOPY WITH ANESTHESIA;  Surgeon: Juan F Tapia MD;  Location: [facility] ENDOSCOPY;  Service: Gastroenterology   • HEAD/NECK LESION/CYST EXCISION Left 12/20/2016    Procedure: EXCISION MALIGNANT MELANOMA WITH SENTINEL NODE BIOPSY, INJECTION AND SCAN PRE-OP, LEFT EAR;  Surgeon: Guanaco Zepeda MD;  Location: [facility] OR;  Service:    • HEMORRHOIDECTOMY     • INSERT / REPLACE / REMOVE PACEMAKER     • SENTINEL NODE BIOPSY Left 12/20/2016    Procedure: SENTINEL NODE BIOPSY;  Surgeon: Guanaco Zepeda MD;  Location: [facility] OR;  Service:    • SKIN CANCER EXCISION     • THYROID SURGERY     • THYROIDECTOMY         Review of Systems   Constitutional: Negative.    HENT: Negative.    Eyes: Negative.    Respiratory: Negative.    Cardiovascular: Negative.    Gastrointestinal: Negative.    Endocrine: Negative.    Genitourinary: Negative.    Musculoskeletal: Negative.    Skin: Negative.    Allergic/Immunologic: Negative.    Neurological: Negative.    Hematological: Negative.   "  Psychiatric/Behavioral: The patient is nervous/anxious.        Objective  /82   Pulse 65   Resp 16   Ht 172.7 cm (68\")   Wt 117 kg (257 lb)   SpO2 95%   BMI 39.08 kg/m²   Physical Exam   Constitutional: He is oriented to person, place, and time. He appears well-developed and well-nourished.   HENT:   Head: Normocephalic and atraumatic.   Right Ear: External ear normal.   Left Ear: External ear normal.   Nose: Nose normal.   Mouth/Throat: Oropharynx is clear and moist.   Eyes: Conjunctivae and EOM are normal. Pupils are equal, round, and reactive to light.   Neck: Normal range of motion. Neck supple.   Cardiovascular: Normal rate, regular rhythm, normal heart sounds and intact distal pulses.   Pulmonary/Chest: Effort normal and breath sounds normal.   Abdominal: Soft. Bowel sounds are normal.   Musculoskeletal: Normal range of motion.   Neurological: He is alert and oriented to person, place, and time.   Skin: Skin is warm. Capillary refill takes less than 2 seconds.   Psychiatric: He has a normal mood and affect. His behavior is normal. Judgment and thought content normal.   Nursing note and vitals reviewed.      Assessment/Plan   Carlo was seen today for follow-up.    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 BMI is above normal parameters. Recommendations include: exercise counseling.           Orders Placed This Encounter   Procedures   • PSA Screen   • Comprehensive metabolic panel   • Lipid Panel With / Chol / HDL Ratio   Current outpatient and discharge medications have been reconciled for the " patient.  Reviewed by: Rhys Rosen MD    Follow up: 6 month(s)

## 2019-10-11 LAB
ALBUMIN SERPL-MCNC: 4.3 G/DL (ref 3.5–5.2)
ALBUMIN/GLOB SERPL: 1.9 G/DL
ALP SERPL-CCNC: 69 U/L (ref 39–117)
ALT SERPL-CCNC: 16 U/L (ref 1–41)
AST SERPL-CCNC: 17 U/L (ref 1–40)
BILIRUB SERPL-MCNC: 0.6 MG/DL (ref 0.2–1.2)
BUN SERPL-MCNC: 13 MG/DL (ref 8–23)
BUN/CREAT SERPL: 16.3 (ref 7–25)
CALCIUM SERPL-MCNC: 9 MG/DL (ref 8.6–10.5)
CHLORIDE SERPL-SCNC: 105 MMOL/L (ref 98–107)
CHOLEST SERPL-MCNC: 138 MG/DL (ref 0–200)
CHOLEST/HDLC SERPL: 2.65 {RATIO}
CO2 SERPL-SCNC: 28.1 MMOL/L (ref 22–29)
CREAT SERPL-MCNC: 0.8 MG/DL (ref 0.76–1.27)
GLOBULIN SER CALC-MCNC: 2.3 GM/DL
GLUCOSE SERPL-MCNC: 87 MG/DL (ref 65–99)
HDLC SERPL-MCNC: 52 MG/DL (ref 40–60)
LDLC SERPL CALC-MCNC: 68 MG/DL (ref 0–100)
POTASSIUM SERPL-SCNC: 4.3 MMOL/L (ref 3.5–5.2)
PROT SERPL-MCNC: 6.6 G/DL (ref 6–8.5)
PSA SERPL-MCNC: 1.4 NG/ML (ref 0–4)
SODIUM SERPL-SCNC: 144 MMOL/L (ref 136–145)
TRIGL SERPL-MCNC: 88 MG/DL (ref 0–150)
VLDLC SERPL CALC-MCNC: 17.6 MG/DL

## 2019-10-13 NOTE — PROGRESS NOTES
Dual Chamber Pacemaker Evaluation Report  [facility]lin    October 13, 2019    Primary Cardiologist: Malu  : [employer] Medical Model: Shantel   Implant date: 1/11/18    Reason for evaluation: routine  Indication for pacemaker: sick sinus syndrome    Measurements  Atrial sensing - P wave: 4.2 mV  Atrial threshold: n/r  Atrial lead impedance: 1300 ohms  Ventricular sensing - R wave: >12 mV  Ventricular threshold: n/r  Ventricular lead impedance:   1025 ohms     Diagnostic Data  Atrial paced: 5.5 %  Ventricular paced: 5.4 %  Other: no new events noted  Battery status: satisfactory         Final Parameters  Mode:  DDDR  Lower rate: 60 bpm   Upper rate: 130 bpm  AV Delay: paced- 225 ms  Sensed-200 ms  Atrial - Amplitude: 2.5 V   Pulse width: 0.5 ms   Sensitivity: 1.0 mV     Ventricular - Amplitude: 2.0 V  Pulse width: 0.5 ms  Sensitivity: 2.0 mV    Changes made: n/a  Conclusions: normal pacemaker function    Follow up: 3 months

## 2019-11-27 RX ORDER — DONEPEZIL HYDROCHLORIDE 5 MG/1
5 TABLET, FILM COATED ORAL
Qty: 90 TABLET | Refills: 0 | Status: ON HOLD | OUTPATIENT
Start: 2019-11-27 | End: 2020-04-27

## 2019-12-10 ENCOUNTER — HOSPITAL ENCOUNTER (OUTPATIENT)
Dept: GENERAL RADIOLOGY | Age: 74
Discharge: HOME | End: 2019-12-10
Payer: MEDICARE

## 2019-12-10 ENCOUNTER — HOSPITAL ENCOUNTER (OUTPATIENT)
Age: 74
Setting detail: OUTPATIENT SURGERY
Discharge: HOME | End: 2019-12-10
Attending: PHYSICAL MEDICINE & REHABILITATION | Admitting: PHYSICAL MEDICINE & REHABILITATION

## 2019-12-10 VITALS
HEIGHT: 68 IN | RESPIRATION RATE: 18 BRPM | WEIGHT: 255 LBS | SYSTOLIC BLOOD PRESSURE: 120 MMHG | BODY MASS INDEX: 38.65 KG/M2 | DIASTOLIC BLOOD PRESSURE: 75 MMHG | HEART RATE: 84 BPM | OXYGEN SATURATION: 94 %

## 2019-12-10 DIAGNOSIS — L90.5 SCAR PAINFUL: ICD-10-CM

## 2019-12-10 DIAGNOSIS — R52 SCAR PAINFUL: ICD-10-CM

## 2019-12-10 PROCEDURE — 3209999900 FLUORO FOR SURGICAL PROCEDURES

## 2019-12-10 PROCEDURE — G0260 INJ FOR SACROILIAC JT ANESTH: HCPCS

## 2019-12-10 RX ORDER — SODIUM CHLORIDE 9 MG/ML
INJECTION INTRAVENOUS PRN
Status: DISCONTINUED | OUTPATIENT
Start: 2019-12-10 | End: 2019-12-10 | Stop reason: ALTCHOICE

## 2019-12-10 RX ORDER — LIDOCAINE HYDROCHLORIDE 10 MG/ML
INJECTION, SOLUTION INFILTRATION; PERINEURAL PRN
Status: DISCONTINUED | OUTPATIENT
Start: 2019-12-10 | End: 2019-12-10 | Stop reason: ALTCHOICE

## 2020-01-08 ENCOUNTER — CLINICAL SUPPORT (OUTPATIENT)
Dept: CARDIOLOGY | Facility: CLINIC | Age: 75
End: 2020-01-08

## 2020-01-08 DIAGNOSIS — I49.5 SSS (SICK SINUS SYNDROME) (HCC): ICD-10-CM

## 2020-01-08 DIAGNOSIS — Z95.0 PACEMAKER: Primary | ICD-10-CM

## 2020-01-08 PROCEDURE — 93294 REM INTERROG EVL PM/LDLS PM: CPT | Performed by: INTERNAL MEDICINE

## 2020-01-08 PROCEDURE — 93296 REM INTERROG EVL PM/IDS: CPT | Performed by: INTERNAL MEDICINE

## 2020-01-08 NOTE — PROGRESS NOTES
Dual Chamber Pacemaker Evaluation Report  [facility]    January 8, 2020    Primary Cardiologist: Malu  : [employer] Medical Model: Shantel   Implant date: 1/11/18     Reason for evaluation: routine  Indication for pacemaker: sick sinus syndrome    Measurements  Atrial sensing - P wave: 4.2 mV  Atrial threshold: N/R  Atrial lead impedance: 1275 ohms  Ventricular sensing - R wave: >12 mV  Ventricular threshold: N/R  Ventricular lead impedance:   990 ohms     Diagnostic Data  Atrial paced: 9.7 %  Ventricular paced: 1.3 %  Other: Report from 10/9/2019 - 1/7/2020 no new episodes noted   Battery status: satisfactory   10.8-12 years      Final Parameters  Mode:  DDDR  Lower rate: 60 bpm   Upper rate: 130 bpm  AV Delay: paced- 225 ms  Sensed-200 ms  Atrial - Amplitude: 2.5 V   Pulse width: 0.5 ms        Ventricular - Amplitude: 2 V  Pulse width: 0.5 ms     Changes made: None  Conclusions: normal pacemaker function, stable pacing and sensing thresholds and adequate battery reserve    Follow up: 6 months

## 2020-02-19 NOTE — PROGRESS NOTES
Carlo Velasco  [id number]  1945  72 y.o.  male    Referring Provider: Rhys Rosen MD    Reason for Follow-up Visit:  Here for follow up after cardiac testing.   coronary artery disease Coronary artery bypass grafting , stented coronary artery     Subjective .     Similar symptoms as during last visit   Chest pain at rest, moderate substernal, pressure like. Lasts less than 5 minutes  Recurrent chest pain and taking S/L NTG repeatedly  Chest pain worse when not taking diuretics  Cardiac cath no targets   No diaphoresis  No nausea  Precipitated with exertion    Moderate exertional shortness of breath on exertion relieved with rest  No significant cough or wheezing  Going on for several months  No palpitations  No significant pedal edema  No fever or chills  No significant expectoration  No hemoptysis  No presyncope or syncope     History of present illness:  Carlo Velasco is a 72 y.o. yo male with history of coronary artery disease Coronary artery bypass grafting , stented coronary artery sick sinus syndrome s/p pacemaker who presents today for   Chief Complaint   Patient presents with   • Coronary Artery Disease     3 MO F/U - S/P CATH   .    History  Past Medical History:   Diagnosis Date   • Anxiety    • Arthritis    • Cancer     melanoma   • CHF (congestive heart failure)    • Claustrophobia    • Colon polyp    • Coronary artery disease 2012, 2015    2 vessel CABG (2012), SAHHRZAD LAD (2015).    • Depression    • Disease of thyroid gland     partial thyroidectomy on right   • GERD (gastroesophageal reflux disease)    • Hearing loss    • Heart attack    • Heart attack    • Hyperlipidemia    • Hypertension    • Kidney stone     history of    • Tobacco abuse, in remission    • Uses hearing aid     BILATERAL   ,   Past Surgical History:   Procedure Laterality Date   • BACK SURGERY      CERVICAL FUSION AND LUMBAR DISC REPLACEMENT   • CARDIAC CATHETERIZATION  07/2015    SHAHRZAD mid Dr. Malu LO   • CARDIAC  CATHETERIZATION N/A 10/21/2016    Procedure: Left Heart Cath;  Surgeon: Darian Toribio MD;  Location:  [facility] CATH [facility];  Service:    • CARDIAC CATHETERIZATION Left 4/5/2017    Procedure: Cardiac Catheterization/Vascular Study;  Surgeon: Darian Toribio MD;  Location:  [facility];  Service:    • CARDIAC CATHETERIZATION N/A 4/5/2017    Procedure: Left Heart Cath;  Surgeon: Darian Toribio MD;  Location:  [facility];  Service:    • CARDIAC CATHETERIZATION N/A 4/5/2017    Procedure: Coronary angiography;  Surgeon: Darian Toribio MD;  Location:  [facility] C[facility];  Service:    • CARDIAC CATHETERIZATION N/A 4/5/2017    Procedure: Left ventriculography;  Surgeon: Darian Toribio MD;  Location:  [facility];  Service:    • CARDIAC CATHETERIZATION  4/5/2017    Procedure: Saphenous Vein Graft;  Surgeon: Darian Toribio MD;  Location:  [facility];  Service:    • CARDIAC CATHETERIZATION Left 2/27/2018    Procedure: Cardiac Catheterization/Vascular Study SHAHRZAD OK PRN;  Surgeon: Darian Toribio MD;  Location:  [facility];  Service:    • CARDIAC CATHETERIZATION  2/27/2018    Procedure: Functional Flow [place];  Surgeon: Darian Toribio MD;  Location:  [facility];  Service:    • CARDIAC CATHETERIZATION N/A 2/27/2018    Procedure: Left ventriculography;  Surgeon: Darian Troibio MD;  Location:  [facility];  Service:    • CARDIAC ELECTROPHYSIOLOGY PROCEDURE N/A 1/11/2018    Procedure: PPM generator change - dual;  Surgeon: Darian Toribio MD;  Location:  [facility];  Service:    • COLONOSCOPY  09/05/2007    colon polyps   • CORONARY ANGIOPLASTY WITH STENT PLACEMENT  07/2015    SHAHRZAD mid LAD, Dr. Toribio    • CORONARY ARTERY BYPASS GRAFT  2012 AND 2014    2 vessel    • ENDOSCOPY  11/08/2010   • HEAD/NECK LESION/CYST EXCISION Left 12/20/2016    Procedure: EXCISION MALIGNANT MELANOMA WITH SENTINEL NODE  BIOPSY, INJECTION AND SCAN PRE-OP, LEFT EAR;  Surgeon: Guanaco Zepeda MD;  Location:  [facility] OR;  Service:    • HEMORRHOIDECTOMY     • INSERT / REPLACE / REMOVE PACEMAKER     • SENTINEL NODE BIOPSY Left 12/20/2016    Procedure: SENTINEL NODE BIOPSY;  Surgeon: Guanaco Zepeda MD;  Location:  [facility] OR;  Service:    • SKIN CANCER EXCISION     • THYROID SURGERY     • THYROIDECTOMY     ,   Family History   Problem Relation Age of Onset   • Heart failure Mother    • Stroke Mother    • Dementia Mother    • Coronary artery disease Father    • Colon polyps Father    • COPD Brother    • Colon cancer Neg Hx    ,   Social History   Substance Use Topics   • Smoking status: Former Smoker     Years: 10.00     Types: Cigarettes     Quit date: 1977   • Smokeless tobacco: Former User     Quit date: 1977   • Alcohol use No   ,     Medications  Current Outpatient Prescriptions   Medication Sig Dispense Refill   • ALPRAZolam (XANAX) 0.25 MG tablet TAKE 1 TABLET BY MOUTH THREE TIMES DAILY AS NEEDED FOR ANXIETY 90 tablet 0   • amLODIPine (NORVASC) 5 MG tablet Take 1 tablet by mouth Daily. 90 tablet 3   • aspirin 81 MG EC tablet Take 81 mg by mouth daily.       • atorvastatin (LIPITOR) 80 MG tablet Take 80 mg by mouth Daily.     • donepezil (ARICEPT) 5 MG tablet TAKE 1 TABLET BY MOUTH AT BEDTIME 90 tablet 0   • enoxaparin (LOVENOX) 120 MG/0.8ML solution syringe Inject 0.79 mL under the skin Every 12 (Twelve) Hours. 9 syringe 0   • escitalopram (LEXAPRO) 20 MG tablet TAKE 1 TABLET BY MOUTH DAILY 90 tablet 2   • furosemide (LASIX) 40 MG tablet TAKE 1 TABLET BY MOUTH DAILY 90 tablet 3   • gabapentin (NEURONTIN) 300 MG capsule TK 1 C PO QHS  5   • metoprolol tartrate (LOPRESSOR) 25 MG tablet Take one tablet by mouth twice a day. 180 tablet 4   • NITROSTAT 0.4 MG SL tablet Place 1 tablet under the tongue Every 5 (Five) Minutes As Needed for Chest Pain. Take no more than 3 doses in 15 minutes. 30 tablet 2   • omega-3 acid ethyl esters (LOVAZA) 1  "g capsule TAKE 1 CAPSULE BY MOUTH DAILY 90 capsule 0   • omeprazole (priLOSEC) 20 MG capsule Take 1 capsule by mouth Daily. 30 capsule 5   • Potassium 99 MG tablet Take 1 tablet by mouth Daily.     • ticagrelor (BRILINTA) 90 MG tablet tablet Take 1 tablet by mouth Every 12 (Twelve) Hours. 60 tablet 11     No current facility-administered medications for this visit.        Allergies:  Meperidine    Review of Systems  Review of Systems   Constitution: Positive for weakness and malaise/fatigue.   HENT: Negative.    Eyes: Negative.    Cardiovascular: Positive for chest pain and dyspnea on exertion. Negative for claudication, cyanosis, irregular heartbeat, leg swelling, near-syncope, orthopnea, palpitations, paroxysmal nocturnal dyspnea and syncope.   Respiratory: Negative.    Endocrine: Negative.    Hematologic/Lymphatic: Negative.    Skin: Negative.    Musculoskeletal: Positive for arthritis and back pain.   Gastrointestinal: Negative for anorexia.   Genitourinary: Negative.    Psychiatric/Behavioral: Negative.        Objective     Physical Exam:  /66   Pulse 66   Ht 172.7 cm (68\")   Wt 118 kg (261 lb)   BMI 39.68 kg/m²   Physical Exam   Constitutional: He appears well-developed.   HENT:   Head: Normocephalic.   Neck: Normal carotid pulses and no JVD present. No tracheal tenderness present. Carotid bruit is not present. No tracheal deviation and no edema present.   Cardiovascular: Regular rhythm, normal heart sounds and normal pulses.    Pulmonary/Chest: Effort normal. No stridor.   Abdominal: Soft.   Neurological: He is alert. He has normal strength. No cranial nerve deficit or sensory deficit.   Skin: Skin is warm.   Psychiatric: He has a normal mood and affect. His speech is normal and behavior is normal.       Results Review:  Results for orders placed during the hospital encounter of 02/09/18   Adult Stress Echo W/ Cont or Stress Agent if Necessary Per Protocol    Narrative · Left ventricular systolic " function is normal. Estimated EF = 55%.  · Normal stress echo with no significant echocardiographic evidence for   myocardial ischemia             ECG 12 Lead  Date/Time: 5/21/2018 9:15 AM  Performed by: ANTONIO PINA  Authorized by: ANTONIO PINA   Comparison: compared with previous ECG from 12/5/2018  Comparison to previous ECG: New premature ventricular contractions   Rhythm: paced  Ectopy: PVCs  Conduction: conduction normal  ST Segments: ST segments normal  T Waves: T waves normal  Other: no other findings  Clinical impression: abnormal ECG          Cardiac cath  4/17  LVEF of 50%.   occluded the mid right coronary artery  Patent saphenous venous graft to the distal right coronary artery  Atretic left intramammary artery graft.  Patent stent in the mid left anterior descending coronary artery  Patent stent to the obtuse marginal branch  Patent stent in diagonal branch.   2/18    Conclusion     Patent stents noted in the obtuse marginal branch and the left anterior descending coronary artery.  Atretic left internal mammary artery graft.  Patent saphenous venous graft to the right coronary artery  Significant right coronary artery stenosis proximal to the touchdown site of the saphenous vein graft to the right coronary artery  60% stenosis of diagonal branch with a normal fractional flow reserve of 0.85  Left ventricle ejection fraction of 50%.     LVEDP    16   mm Hg           Plan     Intensive risk factor modifications for both primary and secondary prevention if applicable  Home today if stable  Hydration   Observation  Keep follow-up appointment    Assessment/Plan   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   • Essential hypertension   • Anxiety   • Colon polyps   • Mixed hyperlipidemia   • Incisional hernia, without obstruction or gangrene   • Precordial chest pain   • BRBPR (bright red blood per rectum)   •  "Esophageal dysphagia   • Prostatism   • Nocturia   • Orthostasis   • Hypotension due to drugs   • Anxiety   • Chronic midline low back pain without sciatica   • Rectal bleeding   • Melena   • Hx of colonic polyp   Intolerant to Imdur    Plan:      No additional cardiac testing required at this point in time.     Low salt/ HTN/ Heart healthy carbohydrate restricted cardiac diet as applicable to this patient's current diagnoses.   This handout has relevant information regarding shopping for food, preparing meals, what to eat at restaurants, tracking of food intake, information regarding sodium intake and salt content, how to read food labels, knowing what to eat, tips reagarding physical activity, calorie count and calorie expenditure. What foods to avoid. Information regarding alcoholic drinks along with \"good\" and \"bad\" fats.  Reiterated prior recommendations     The patient is advised to reduce or avoid caffeine or other cardiac stimulants.     The patient was advised that NSAID-type medications have three  very important potential side effects: cardiovascular complications, gastrointestinal irritation including hemorrhage and renal injuries.  Do not use anti-inflammatories such as Motrin/ibuprofen, Alleve/naprosyn, Mobic and like medications Use Tylenol instead.The patient expresses understanding of these issues and questions were answered.   Monitor for any signs of bleeding including red or dark stools. Fall precautions.       Monitor for any signs of bleeding including red or dark stools. Fall precautions.   Patient is asked to monitor BP at home or work, several times per month and return with written values at next office visit.     Advised staying uptodate with immunizations per established standard guidelines.      Continue  beta blocker therapy     Monitor cardiac rhythm device function regularly per established schedule or PRN         Return in about 6 months (around 11/21/2018).                " 02/2019 on eliquis, duplex from 12/2019 showed no DVT  -continue eliquis for now and outpt f/u regarding when to stop

## 2020-03-03 ENCOUNTER — HOSPITAL ENCOUNTER (OUTPATIENT)
Dept: GENERAL RADIOLOGY | Age: 75
Discharge: HOME | End: 2020-03-03
Payer: MEDICARE

## 2020-03-03 ENCOUNTER — HOSPITAL ENCOUNTER (OUTPATIENT)
Age: 75
Setting detail: OUTPATIENT SURGERY
Discharge: HOME | End: 2020-03-03
Attending: PHYSICAL MEDICINE & REHABILITATION | Admitting: PHYSICAL MEDICINE & REHABILITATION

## 2020-03-03 VITALS
HEART RATE: 63 BPM | SYSTOLIC BLOOD PRESSURE: 118 MMHG | DIASTOLIC BLOOD PRESSURE: 62 MMHG | RESPIRATION RATE: 16 BRPM | OXYGEN SATURATION: 94 %

## 2020-03-03 PROCEDURE — G0260 INJ FOR SACROILIAC JT ANESTH: HCPCS

## 2020-03-03 PROCEDURE — 3209999900 FLUORO FOR SURGICAL PROCEDURES

## 2020-03-03 RX ORDER — LIDOCAINE HYDROCHLORIDE 10 MG/ML
INJECTION, SOLUTION INFILTRATION; PERINEURAL PRN
Status: DISCONTINUED | OUTPATIENT
Start: 2020-03-03 | End: 2020-03-03 | Stop reason: ALTCHOICE

## 2020-03-03 RX ORDER — SODIUM CHLORIDE 9 MG/ML
INJECTION INTRAVENOUS PRN
Status: DISCONTINUED | OUTPATIENT
Start: 2020-03-03 | End: 2020-03-03 | Stop reason: ALTCHOICE

## 2020-03-03 ASSESSMENT — PAIN DESCRIPTION - PAIN TYPE: TYPE: CHRONIC PAIN

## 2020-03-03 ASSESSMENT — PAIN DESCRIPTION - LOCATION: LOCATION: HIP

## 2020-03-03 ASSESSMENT — PAIN SCALES - GENERAL: PAINLEVEL_OUTOF10: 0

## 2020-03-03 ASSESSMENT — PAIN DESCRIPTION - DESCRIPTORS: DESCRIPTORS: SORE

## 2020-03-03 ASSESSMENT — PAIN DESCRIPTION - ORIENTATION: ORIENTATION: RIGHT

## 2020-03-16 RX ORDER — ATORVASTATIN CALCIUM 80 MG/1
TABLET, FILM COATED ORAL
Qty: 30 TABLET | Refills: 5 | Status: SHIPPED | OUTPATIENT
Start: 2020-03-16 | End: 2020-11-03 | Stop reason: SDUPTHER

## 2020-04-08 ENCOUNTER — CLINICAL SUPPORT (OUTPATIENT)
Dept: CARDIOLOGY | Facility: CLINIC | Age: 75
End: 2020-04-08

## 2020-04-08 DIAGNOSIS — I49.5 SICK SINUS SYNDROME (HCC): ICD-10-CM

## 2020-04-08 PROCEDURE — 93296 REM INTERROG EVL PM/IDS: CPT | Performed by: PHYSICIAN ASSISTANT

## 2020-04-08 PROCEDURE — 93294 REM INTERROG EVL PM/LDLS PM: CPT | Performed by: PHYSICIAN ASSISTANT

## 2020-04-09 NOTE — PROGRESS NOTES
Dual Chamber Pacemaker Evaluation Report  Merlin    April 9, 2020    Primary Cardiologist: Malu  : [employer] Medical Model: Assurity  Implant date: 1/11/18    Reason for evaluation: routine  Indication for pacemaker: sick sinus syndrome    Measurements  Atrial sensing - P wave: 3.5 mV  Atrial threshold: n/r  Atrial lead impedance: 1050 ohms  Ventricular sensing - R wave: >12 mV  Ventricular threshold: n/r  Ventricular lead impedance:   940 ohms     Diagnostic Data  Atrial paced: 17 %  Ventricular paced: 2.8 %  Other: No new episodes noted.  Battery status: satisfactory         Final Parameters  Mode:  DDDR  Lower rate: 60 bpm   Upper rate: 130 bpm  AV Delay: paced- 225 ms  Sensed-200 ms  Atrial - Amplitude: 2.5 V   Pulse width: 0.5 ms   Sensitivity: 1.0 mV     Ventricular - Amplitude: 2.0 V  Pulse width: 0.5 ms  Sensitivity: 2.0 mV    Changes made: n/a  Conclusions: normal pacemaker function    Follow up: 3 months

## 2020-04-27 ENCOUNTER — HOSPITAL ENCOUNTER (OUTPATIENT)
Facility: HOSPITAL | Age: 75
Discharge: HOME | End: 2020-04-29
Attending: FAMILY MEDICINE | Admitting: INTERNAL MEDICINE

## 2020-04-27 ENCOUNTER — APPOINTMENT (OUTPATIENT)
Dept: GENERAL RADIOLOGY | Facility: HOSPITAL | Age: 75
End: 2020-04-27

## 2020-04-27 DIAGNOSIS — R07.9 CHEST PAIN, UNSPECIFIED TYPE: Primary | ICD-10-CM

## 2020-04-27 DIAGNOSIS — I20.0 UNSTABLE ANGINA (HCC): ICD-10-CM

## 2020-04-27 LAB
ALBUMIN SERPL-MCNC: 3.7 G/DL (ref 3.5–5.2)
ALBUMIN/GLOB SERPL: 1.4 G/DL
ALP SERPL-CCNC: 65 U/L (ref 39–117)
ALT SERPL W P-5'-P-CCNC: 20 U/L (ref 1–41)
ANION GAP SERPL CALCULATED.3IONS-SCNC: 12 MMOL/L (ref 5–15)
AST SERPL-CCNC: 19 U/L (ref 1–40)
BASOPHILS # BLD AUTO: 0.05 10*3/MM3 (ref 0–0.2)
BASOPHILS NFR BLD AUTO: 0.6 % (ref 0–1.5)
BILIRUB SERPL-MCNC: 0.5 MG/DL (ref 0.2–1.2)
BUN BLD-MCNC: 19 MG/DL (ref 8–23)
BUN/CREAT SERPL: 30.2 (ref 7–25)
CALCIUM SPEC-SCNC: 8.8 MG/DL (ref 8.6–10.5)
CHLORIDE SERPL-SCNC: 103 MMOL/L (ref 98–107)
CO2 SERPL-SCNC: 25 MMOL/L (ref 22–29)
CREAT BLD-MCNC: 0.63 MG/DL (ref 0.76–1.27)
DEPRECATED RDW RBC AUTO: 48.1 FL (ref 37–54)
EOSINOPHIL # BLD AUTO: 0.28 10*3/MM3 (ref 0–0.4)
EOSINOPHIL NFR BLD AUTO: 3.4 % (ref 0.3–6.2)
ERYTHROCYTE [DISTWIDTH] IN BLOOD BY AUTOMATED COUNT: 15.1 % (ref 12.3–15.4)
GFR SERPL CREATININE-BSD FRML MDRD: 125 ML/MIN/1.73
GLOBULIN UR ELPH-MCNC: 2.7 GM/DL
GLUCOSE BLD-MCNC: 153 MG/DL (ref 65–99)
HCT VFR BLD AUTO: 41 % (ref 37.5–51)
HGB BLD-MCNC: 13.3 G/DL (ref 13–17.7)
HOLD SPECIMEN: NORMAL
HOLD SPECIMEN: NORMAL
IMM GRANULOCYTES # BLD AUTO: 0.11 10*3/MM3 (ref 0–0.05)
IMM GRANULOCYTES NFR BLD AUTO: 1.3 % (ref 0–0.5)
LYMPHOCYTES # BLD AUTO: 2.17 10*3/MM3 (ref 0.7–3.1)
LYMPHOCYTES NFR BLD AUTO: 26.3 % (ref 19.6–45.3)
MCH RBC QN AUTO: 28.2 PG (ref 26.6–33)
MCHC RBC AUTO-ENTMCNC: 32.4 G/DL (ref 31.5–35.7)
MCV RBC AUTO: 86.9 FL (ref 79–97)
MONOCYTES # BLD AUTO: 0.6 10*3/MM3 (ref 0.1–0.9)
MONOCYTES NFR BLD AUTO: 7.3 % (ref 5–12)
NEUTROPHILS # BLD AUTO: 5.03 10*3/MM3 (ref 1.7–7)
NEUTROPHILS NFR BLD AUTO: 61.1 % (ref 42.7–76)
NRBC BLD AUTO-RTO: 0 /100 WBC (ref 0–0.2)
PLATELET # BLD AUTO: 167 10*3/MM3 (ref 140–450)
PMV BLD AUTO: 9.2 FL (ref 6–12)
POTASSIUM BLD-SCNC: 3.9 MMOL/L (ref 3.5–5.2)
PROT SERPL-MCNC: 6.4 G/DL (ref 6–8.5)
RBC # BLD AUTO: 4.72 10*6/MM3 (ref 4.14–5.8)
SODIUM BLD-SCNC: 140 MMOL/L (ref 136–145)
TROPONIN T SERPL-MCNC: <0.01 NG/ML (ref 0–0.03)
TROPONIN T SERPL-MCNC: <0.01 NG/ML (ref 0–0.03)
WBC NRBC COR # BLD: 8.24 10*3/MM3 (ref 3.4–10.8)
WHOLE BLOOD HOLD SPECIMEN: NORMAL
WHOLE BLOOD HOLD SPECIMEN: NORMAL

## 2020-04-27 PROCEDURE — 36415 COLL VENOUS BLD VENIPUNCTURE: CPT | Performed by: FAMILY MEDICINE

## 2020-04-27 PROCEDURE — 25010000002 ENOXAPARIN PER 10 MG: Performed by: INTERNAL MEDICINE

## 2020-04-27 PROCEDURE — 96366 THER/PROPH/DIAG IV INF ADDON: CPT

## 2020-04-27 PROCEDURE — 96372 THER/PROPH/DIAG INJ SC/IM: CPT

## 2020-04-27 PROCEDURE — 85025 COMPLETE CBC W/AUTO DIFF WBC: CPT | Performed by: FAMILY MEDICINE

## 2020-04-27 PROCEDURE — 71045 X-RAY EXAM CHEST 1 VIEW: CPT

## 2020-04-27 PROCEDURE — 96365 THER/PROPH/DIAG IV INF INIT: CPT

## 2020-04-27 PROCEDURE — G0378 HOSPITAL OBSERVATION PER HR: HCPCS

## 2020-04-27 PROCEDURE — 93005 ELECTROCARDIOGRAM TRACING: CPT | Performed by: FAMILY MEDICINE

## 2020-04-27 PROCEDURE — 99284 EMERGENCY DEPT VISIT MOD MDM: CPT

## 2020-04-27 PROCEDURE — 80053 COMPREHEN METABOLIC PANEL: CPT | Performed by: FAMILY MEDICINE

## 2020-04-27 PROCEDURE — 93010 ELECTROCARDIOGRAM REPORT: CPT | Performed by: INTERNAL MEDICINE

## 2020-04-27 PROCEDURE — 84484 ASSAY OF TROPONIN QUANT: CPT | Performed by: FAMILY MEDICINE

## 2020-04-27 RX ORDER — ASPIRIN 81 MG/1
324 TABLET, CHEWABLE ORAL ONCE
Status: COMPLETED | OUTPATIENT
Start: 2020-04-27 | End: 2020-04-27

## 2020-04-27 RX ORDER — ATORVASTATIN CALCIUM 40 MG/1
80 TABLET, FILM COATED ORAL NIGHTLY
Status: DISCONTINUED | OUTPATIENT
Start: 2020-04-27 | End: 2020-04-29 | Stop reason: HOSPADM

## 2020-04-27 RX ORDER — CLOPIDOGREL BISULFATE 75 MG/1
75 TABLET ORAL DAILY
Status: DISCONTINUED | OUTPATIENT
Start: 2020-04-28 | End: 2020-04-29 | Stop reason: HOSPADM

## 2020-04-27 RX ORDER — DONEPEZIL HYDROCHLORIDE 5 MG/1
5 TABLET, FILM COATED ORAL NIGHTLY
Status: DISCONTINUED | OUTPATIENT
Start: 2020-04-27 | End: 2020-04-29 | Stop reason: HOSPADM

## 2020-04-27 RX ORDER — SODIUM CHLORIDE 0.9 % (FLUSH) 0.9 %
10 SYRINGE (ML) INJECTION EVERY 12 HOURS SCHEDULED
Status: DISCONTINUED | OUTPATIENT
Start: 2020-04-27 | End: 2020-04-29 | Stop reason: HOSPADM

## 2020-04-27 RX ORDER — ACETAMINOPHEN 325 MG/1
650 TABLET ORAL EVERY 6 HOURS PRN
Status: DISCONTINUED | OUTPATIENT
Start: 2020-04-27 | End: 2020-04-28 | Stop reason: SDUPTHER

## 2020-04-27 RX ORDER — ALPRAZOLAM 0.25 MG/1
0.25 TABLET ORAL 3 TIMES DAILY
Status: DISCONTINUED | OUTPATIENT
Start: 2020-04-27 | End: 2020-04-29 | Stop reason: HOSPADM

## 2020-04-27 RX ORDER — NITROGLYCERIN 0.4 MG/1
0.4 TABLET SUBLINGUAL
Status: DISCONTINUED | OUTPATIENT
Start: 2020-04-27 | End: 2020-04-29 | Stop reason: HOSPADM

## 2020-04-27 RX ORDER — SODIUM CHLORIDE 0.9 % (FLUSH) 0.9 %
10 SYRINGE (ML) INJECTION AS NEEDED
Status: DISCONTINUED | OUTPATIENT
Start: 2020-04-27 | End: 2020-04-29 | Stop reason: HOSPADM

## 2020-04-27 RX ORDER — PANTOPRAZOLE SODIUM 40 MG/1
40 TABLET, DELAYED RELEASE ORAL
Status: DISCONTINUED | OUTPATIENT
Start: 2020-04-28 | End: 2020-04-29 | Stop reason: HOSPADM

## 2020-04-27 RX ORDER — SODIUM CHLORIDE 9 MG/ML
50 INJECTION, SOLUTION INTRAVENOUS CONTINUOUS
Status: DISCONTINUED | OUTPATIENT
Start: 2020-04-27 | End: 2020-04-29 | Stop reason: HOSPADM

## 2020-04-27 RX ORDER — SUCRALFATE 1 G/1
1 TABLET ORAL
Status: DISCONTINUED | OUTPATIENT
Start: 2020-04-27 | End: 2020-04-29 | Stop reason: HOSPADM

## 2020-04-27 RX ORDER — POTASSIUM CHLORIDE 750 MG/1
10 CAPSULE, EXTENDED RELEASE ORAL DAILY
Status: DISCONTINUED | OUTPATIENT
Start: 2020-04-28 | End: 2020-04-29 | Stop reason: HOSPADM

## 2020-04-27 RX ORDER — ESCITALOPRAM OXALATE 10 MG/1
20 TABLET ORAL DAILY
Status: DISCONTINUED | OUTPATIENT
Start: 2020-04-28 | End: 2020-04-29 | Stop reason: HOSPADM

## 2020-04-27 RX ORDER — ASPIRIN 81 MG/1
81 TABLET ORAL DAILY
Status: DISCONTINUED | OUTPATIENT
Start: 2020-04-28 | End: 2020-04-29 | Stop reason: HOSPADM

## 2020-04-27 RX ORDER — NITROGLYCERIN 20 MG/100ML
5-200 INJECTION INTRAVENOUS
Status: DISCONTINUED | OUTPATIENT
Start: 2020-04-27 | End: 2020-04-29 | Stop reason: HOSPADM

## 2020-04-27 RX ORDER — FUROSEMIDE 40 MG/1
40 TABLET ORAL DAILY
Status: DISCONTINUED | OUTPATIENT
Start: 2020-04-28 | End: 2020-04-29 | Stop reason: HOSPADM

## 2020-04-27 RX ADMIN — ALPRAZOLAM 0.25 MG: 0.25 TABLET ORAL at 20:56

## 2020-04-27 RX ADMIN — ACETAMINOPHEN 650 MG: 325 TABLET, FILM COATED ORAL at 21:05

## 2020-04-27 RX ADMIN — NITROGLYCERIN 5 MCG/MIN: 20 INJECTION INTRAVENOUS at 15:48

## 2020-04-27 RX ADMIN — METOPROLOL TARTRATE 25 MG: 25 TABLET, FILM COATED ORAL at 21:05

## 2020-04-27 RX ADMIN — ATORVASTATIN CALCIUM 80 MG: 40 TABLET, FILM COATED ORAL at 21:04

## 2020-04-27 RX ADMIN — SODIUM CHLORIDE 50 ML/HR: 9 INJECTION, SOLUTION INTRAVENOUS at 20:56

## 2020-04-27 RX ADMIN — SUCRALFATE 1 G: 1 TABLET ORAL at 21:04

## 2020-04-27 RX ADMIN — DONEPEZIL HYDROCHLORIDE 5 MG: 5 TABLET, FILM COATED ORAL at 21:05

## 2020-04-27 RX ADMIN — ENOXAPARIN SODIUM 40 MG: 40 INJECTION SUBCUTANEOUS at 20:56

## 2020-04-27 RX ADMIN — ASPIRIN 81 MG 324 MG: 81 TABLET ORAL at 15:46

## 2020-04-28 ENCOUNTER — APPOINTMENT (OUTPATIENT)
Dept: CARDIOLOGY | Facility: HOSPITAL | Age: 75
End: 2020-04-28

## 2020-04-28 LAB
BH CV ECHO MEAS - AO MAX PG (FULL): 2.2 MMHG
BH CV ECHO MEAS - AO MAX PG: 4.8 MMHG
BH CV ECHO MEAS - AO MEAN PG (FULL): 1 MMHG
BH CV ECHO MEAS - AO MEAN PG: 3 MMHG
BH CV ECHO MEAS - AO ROOT AREA (BSA CORRECTED): 1.6
BH CV ECHO MEAS - AO ROOT AREA: 10.8 CM^2
BH CV ECHO MEAS - AO ROOT DIAM: 3.7 CM
BH CV ECHO MEAS - AO V2 MAX: 110 CM/SEC
BH CV ECHO MEAS - AO V2 MEAN: 84.7 CM/SEC
BH CV ECHO MEAS - AO V2 VTI: 28.2 CM
BH CV ECHO MEAS - AVA(I,A): 3.3 CM^2
BH CV ECHO MEAS - AVA(I,D): 3.3 CM^2
BH CV ECHO MEAS - AVA(V,A): 3.3 CM^2
BH CV ECHO MEAS - AVA(V,D): 3.3 CM^2
BH CV ECHO MEAS - BSA(HAYCOCK): 2.4 M^2
BH CV ECHO MEAS - BSA: 2.3 M^2
BH CV ECHO MEAS - BZI_BMI: 41.3 KILOGRAMS/M^2
BH CV ECHO MEAS - BZI_METRIC_HEIGHT: 170.2 CM
BH CV ECHO MEAS - BZI_METRIC_WEIGHT: 119.8 KG
BH CV ECHO MEAS - EDV(CUBED): 96.7 ML
BH CV ECHO MEAS - EDV(MOD-SP4): 167 ML
BH CV ECHO MEAS - EDV(TEICH): 96.8 ML
BH CV ECHO MEAS - EF(CUBED): 51.8 %
BH CV ECHO MEAS - EF(MOD-SP4): 54.1 %
BH CV ECHO MEAS - EF(TEICH): 43.8 %
BH CV ECHO MEAS - ESV(CUBED): 46.7 ML
BH CV ECHO MEAS - ESV(MOD-SP4): 76.7 ML
BH CV ECHO MEAS - ESV(TEICH): 54.4 ML
BH CV ECHO MEAS - FS: 21.6 %
BH CV ECHO MEAS - IVS/LVPW: 0.78
BH CV ECHO MEAS - IVSD: 1.3 CM
BH CV ECHO MEAS - LA DIMENSION: 4.2 CM
BH CV ECHO MEAS - LA/AO: 1.1
BH CV ECHO MEAS - LAT PEAK E' VEL: 7.1 CM/SEC
BH CV ECHO MEAS - LV DIASTOLIC VOL/BSA (35-75): 73.4 ML/M^2
BH CV ECHO MEAS - LV MASS(C)D: 285.4 GRAMS
BH CV ECHO MEAS - LV MASS(C)DI: 125.4 GRAMS/M^2
BH CV ECHO MEAS - LV MAX PG: 2.6 MMHG
BH CV ECHO MEAS - LV MEAN PG: 2 MMHG
BH CV ECHO MEAS - LV SYSTOLIC VOL/BSA (12-30): 33.7 ML/M^2
BH CV ECHO MEAS - LV V1 MAX: 80.5 CM/SEC
BH CV ECHO MEAS - LV V1 MEAN: 63 CM/SEC
BH CV ECHO MEAS - LV V1 VTI: 20.8 CM
BH CV ECHO MEAS - LVIDD: 4.6 CM
BH CV ECHO MEAS - LVIDS: 3.6 CM
BH CV ECHO MEAS - LVLD AP4: 8.4 CM
BH CV ECHO MEAS - LVLS AP4: 7.2 CM
BH CV ECHO MEAS - LVOT AREA (M): 4.5 CM^2
BH CV ECHO MEAS - LVOT AREA: 4.5 CM^2
BH CV ECHO MEAS - LVOT DIAM: 2.4 CM
BH CV ECHO MEAS - LVPWD: 1.7 CM
BH CV ECHO MEAS - MED PEAK E' VEL: 4.96 CM/SEC
BH CV ECHO MEAS - MV A MAX VEL: 73.5 CM/SEC
BH CV ECHO MEAS - MV DEC SLOPE: 315 CM/SEC^2
BH CV ECHO MEAS - MV DEC TIME: 0.25 SEC
BH CV ECHO MEAS - MV E MAX VEL: 79.7 CM/SEC
BH CV ECHO MEAS - MV E/A: 1.1
BH CV ECHO MEAS - SI(AO): 133.3 ML/M^2
BH CV ECHO MEAS - SI(CUBED): 22 ML/M^2
BH CV ECHO MEAS - SI(LVOT): 41.4 ML/M^2
BH CV ECHO MEAS - SI(MOD-SP4): 39.7 ML/M^2
BH CV ECHO MEAS - SI(TEICH): 18.6 ML/M^2
BH CV ECHO MEAS - SV(AO): 303.2 ML
BH CV ECHO MEAS - SV(CUBED): 50 ML
BH CV ECHO MEAS - SV(LVOT): 94.1 ML
BH CV ECHO MEAS - SV(MOD-SP4): 90.3 ML
BH CV ECHO MEAS - SV(TEICH): 42.4 ML
BH CV ECHO MEAS - TR MAX VEL: 125 CM/SEC
BH CV ECHO MEASUREMENTS AVERAGE E/E' RATIO: 13.22
LEFT ATRIUM VOLUME INDEX: 26.7 ML/M2
LEFT ATRIUM VOLUME: 60.9 CM3
LV EF 2D ECHO EST: 55 %
MAXIMAL PREDICTED HEART RATE: 148 BPM
STRESS TARGET HR: 126 BPM

## 2020-04-28 PROCEDURE — 99220 PR INITIAL OBSERVATION CARE/DAY 70 MINUTES: CPT | Performed by: INTERNAL MEDICINE

## 2020-04-28 PROCEDURE — 25010000002 BIVALIRUDIN 5 MG/ML: Performed by: INTERNAL MEDICINE

## 2020-04-28 PROCEDURE — 93459 L HRT ART/GRFT ANGIO: CPT | Performed by: INTERNAL MEDICINE

## 2020-04-28 PROCEDURE — 63710000001 LISINOPRIL 5 MG TABLET: Performed by: INTERNAL MEDICINE

## 2020-04-28 PROCEDURE — 25010000002 PERFLUTREN 6.52 MG/ML SUSPENSION: Performed by: INTERNAL MEDICINE

## 2020-04-28 PROCEDURE — A9270 NON-COVERED ITEM OR SERVICE: HCPCS | Performed by: INTERNAL MEDICINE

## 2020-04-28 PROCEDURE — 63710000001 METOPROLOL TARTRATE 25 MG TABLET: Performed by: INTERNAL MEDICINE

## 2020-04-28 PROCEDURE — 99153 MOD SED SAME PHYS/QHP EA: CPT | Performed by: INTERNAL MEDICINE

## 2020-04-28 PROCEDURE — G0378 HOSPITAL OBSERVATION PER HR: HCPCS

## 2020-04-28 PROCEDURE — 99152 MOD SED SAME PHYS/QHP 5/>YRS: CPT | Performed by: INTERNAL MEDICINE

## 2020-04-28 PROCEDURE — C1760 CLOSURE DEV, VASC: HCPCS | Performed by: INTERNAL MEDICINE

## 2020-04-28 PROCEDURE — C1887 CATHETER, GUIDING: HCPCS | Performed by: INTERNAL MEDICINE

## 2020-04-28 PROCEDURE — 0 IOPAMIDOL PER 1 ML: Performed by: INTERNAL MEDICINE

## 2020-04-28 PROCEDURE — 25010000002 DIPHENHYDRAMINE PER 50 MG: Performed by: INTERNAL MEDICINE

## 2020-04-28 PROCEDURE — 92928 PRQ TCAT PLMT NTRAC ST 1 LES: CPT | Performed by: INTERNAL MEDICINE

## 2020-04-28 PROCEDURE — C1769 GUIDE WIRE: HCPCS | Performed by: INTERNAL MEDICINE

## 2020-04-28 PROCEDURE — 93306 TTE W/DOPPLER COMPLETE: CPT

## 2020-04-28 PROCEDURE — 25010000002 ENOXAPARIN PER 10 MG: Performed by: INTERNAL MEDICINE

## 2020-04-28 PROCEDURE — 63710000001 ATORVASTATIN 40 MG TABLET: Performed by: INTERNAL MEDICINE

## 2020-04-28 PROCEDURE — 63710000001 SUCRALFATE 1 G TABLET: Performed by: INTERNAL MEDICINE

## 2020-04-28 PROCEDURE — 63710000001 ALPRAZOLAM 0.25 MG TABLET: Performed by: INTERNAL MEDICINE

## 2020-04-28 PROCEDURE — C9600 PERC DRUG-EL COR STENT SING: HCPCS | Performed by: INTERNAL MEDICINE

## 2020-04-28 PROCEDURE — C1725 CATH, TRANSLUMIN NON-LASER: HCPCS | Performed by: INTERNAL MEDICINE

## 2020-04-28 PROCEDURE — 25010000002 MIDAZOLAM PER 1 MG: Performed by: INTERNAL MEDICINE

## 2020-04-28 PROCEDURE — 63710000001 DONEPEZIL 5 MG TABLET: Performed by: INTERNAL MEDICINE

## 2020-04-28 PROCEDURE — 94799 UNLISTED PULMONARY SVC/PX: CPT

## 2020-04-28 PROCEDURE — C1874 STENT, COATED/COV W/DEL SYS: HCPCS | Performed by: INTERNAL MEDICINE

## 2020-04-28 PROCEDURE — 63710000001 CLOPIDOGREL 75 MG TABLET: Performed by: INTERNAL MEDICINE

## 2020-04-28 PROCEDURE — 25010000002 HEPARIN (PORCINE): Performed by: INTERNAL MEDICINE

## 2020-04-28 PROCEDURE — C1894 INTRO/SHEATH, NON-LASER: HCPCS | Performed by: INTERNAL MEDICINE

## 2020-04-28 PROCEDURE — 93306 TTE W/DOPPLER COMPLETE: CPT | Performed by: INTERNAL MEDICINE

## 2020-04-28 PROCEDURE — 25010000002 FENTANYL CITRATE (PF) 100 MCG/2ML SOLUTION: Performed by: INTERNAL MEDICINE

## 2020-04-28 DEVICE — EVEROLIMUS-ELUTING PLATINUM CHROMIUM CORONARY STENT SYSTEM
Type: IMPLANTABLE DEVICE | Status: FUNCTIONAL
Brand: SYNERGY™

## 2020-04-28 RX ORDER — SODIUM CHLORIDE 9 MG/ML
100 INJECTION, SOLUTION INTRAVENOUS CONTINUOUS
Status: DISCONTINUED | OUTPATIENT
Start: 2020-04-28 | End: 2020-04-29 | Stop reason: HOSPADM

## 2020-04-28 RX ORDER — DIPHENHYDRAMINE HYDROCHLORIDE 50 MG/ML
INJECTION INTRAMUSCULAR; INTRAVENOUS AS NEEDED
Status: DISCONTINUED | OUTPATIENT
Start: 2020-04-28 | End: 2020-04-28 | Stop reason: HOSPADM

## 2020-04-28 RX ORDER — LISINOPRIL 5 MG/1
5 TABLET ORAL
Status: DISCONTINUED | OUTPATIENT
Start: 2020-04-28 | End: 2020-04-29 | Stop reason: HOSPADM

## 2020-04-28 RX ORDER — MIDAZOLAM HYDROCHLORIDE 1 MG/ML
INJECTION INTRAMUSCULAR; INTRAVENOUS AS NEEDED
Status: DISCONTINUED | OUTPATIENT
Start: 2020-04-28 | End: 2020-04-28 | Stop reason: HOSPADM

## 2020-04-28 RX ORDER — CLOPIDOGREL BISULFATE 75 MG/1
TABLET ORAL AS NEEDED
Status: DISCONTINUED | OUTPATIENT
Start: 2020-04-28 | End: 2020-04-28 | Stop reason: HOSPADM

## 2020-04-28 RX ORDER — FENTANYL CITRATE 50 UG/ML
INJECTION, SOLUTION INTRAMUSCULAR; INTRAVENOUS AS NEEDED
Status: DISCONTINUED | OUTPATIENT
Start: 2020-04-28 | End: 2020-04-28 | Stop reason: HOSPADM

## 2020-04-28 RX ORDER — ACETAMINOPHEN 325 MG/1
650 TABLET ORAL EVERY 4 HOURS PRN
Status: DISCONTINUED | OUTPATIENT
Start: 2020-04-28 | End: 2020-04-29 | Stop reason: HOSPADM

## 2020-04-28 RX ADMIN — ESCITALOPRAM 20 MG: 10 TABLET, FILM COATED ORAL at 08:09

## 2020-04-28 RX ADMIN — ASPIRIN 81 MG: 81 TABLET ORAL at 08:09

## 2020-04-28 RX ADMIN — SUCRALFATE 1 G: 1 TABLET ORAL at 22:06

## 2020-04-28 RX ADMIN — SUCRALFATE 1 G: 1 TABLET ORAL at 16:53

## 2020-04-28 RX ADMIN — ATORVASTATIN CALCIUM 80 MG: 40 TABLET, FILM COATED ORAL at 22:05

## 2020-04-28 RX ADMIN — ALPRAZOLAM 0.25 MG: 0.25 TABLET ORAL at 16:53

## 2020-04-28 RX ADMIN — SODIUM CHLORIDE 100 ML/HR: 9 INJECTION, SOLUTION INTRAVENOUS at 22:06

## 2020-04-28 RX ADMIN — ENOXAPARIN SODIUM 40 MG: 40 INJECTION SUBCUTANEOUS at 19:05

## 2020-04-28 RX ADMIN — POTASSIUM CHLORIDE 10 MEQ: 750 CAPSULE, EXTENDED RELEASE ORAL at 08:09

## 2020-04-28 RX ADMIN — Medication 10 ML: at 08:10

## 2020-04-28 RX ADMIN — ALPRAZOLAM 0.25 MG: 0.25 TABLET ORAL at 08:09

## 2020-04-28 RX ADMIN — SUCRALFATE 1 G: 1 TABLET ORAL at 08:09

## 2020-04-28 RX ADMIN — METOPROLOL TARTRATE 25 MG: 25 TABLET, FILM COATED ORAL at 08:09

## 2020-04-28 RX ADMIN — FUROSEMIDE 40 MG: 40 TABLET ORAL at 08:09

## 2020-04-28 RX ADMIN — PERFLUTREN 8.48 MG: 6.52 INJECTION, SUSPENSION INTRAVENOUS at 15:21

## 2020-04-28 RX ADMIN — LISINOPRIL 5 MG: 5 TABLET ORAL at 16:53

## 2020-04-28 RX ADMIN — DONEPEZIL HYDROCHLORIDE 5 MG: 5 TABLET, FILM COATED ORAL at 22:06

## 2020-04-28 RX ADMIN — METOPROLOL TARTRATE 25 MG: 25 TABLET, FILM COATED ORAL at 22:06

## 2020-04-28 RX ADMIN — ALPRAZOLAM 0.25 MG: 0.25 TABLET ORAL at 22:05

## 2020-04-28 RX ADMIN — CLOPIDOGREL 75 MG: 75 TABLET, FILM COATED ORAL at 08:09

## 2020-04-29 VITALS
SYSTOLIC BLOOD PRESSURE: 126 MMHG | HEIGHT: 68 IN | RESPIRATION RATE: 16 BRPM | DIASTOLIC BLOOD PRESSURE: 74 MMHG | TEMPERATURE: 98.5 F | HEART RATE: 66 BPM | BODY MASS INDEX: 39.68 KG/M2 | WEIGHT: 261.8 LBS | OXYGEN SATURATION: 96 %

## 2020-04-29 PROBLEM — R07.9 CHEST PAIN: Status: RESOLVED | Noted: 2020-04-27 | Resolved: 2020-04-29

## 2020-04-29 LAB
ANION GAP SERPL CALCULATED.3IONS-SCNC: 10 MMOL/L (ref 5–15)
BUN BLD-MCNC: 14 MG/DL (ref 8–23)
BUN/CREAT SERPL: 20.6 (ref 7–25)
CALCIUM SPEC-SCNC: 8.7 MG/DL (ref 8.6–10.5)
CHLORIDE SERPL-SCNC: 105 MMOL/L (ref 98–107)
CHOLEST SERPL-MCNC: 121 MG/DL (ref 0–200)
CO2 SERPL-SCNC: 26 MMOL/L (ref 22–29)
CREAT BLD-MCNC: 0.68 MG/DL (ref 0.76–1.27)
DEPRECATED RDW RBC AUTO: 47.3 FL (ref 37–54)
ERYTHROCYTE [DISTWIDTH] IN BLOOD BY AUTOMATED COUNT: 15 % (ref 12.3–15.4)
GFR SERPL CREATININE-BSD FRML MDRD: 115 ML/MIN/1.73
GLUCOSE BLD-MCNC: 93 MG/DL (ref 65–99)
HBA1C MFR BLD: 6.1 % (ref 4.8–5.6)
HCT VFR BLD AUTO: 40.5 % (ref 37.5–51)
HDLC SERPL-MCNC: 40 MG/DL (ref 40–60)
HGB BLD-MCNC: 13.1 G/DL (ref 13–17.7)
LDLC SERPL CALC-MCNC: 58 MG/DL (ref 0–100)
LDLC/HDLC SERPL: 1.45 {RATIO}
MCH RBC QN AUTO: 28.1 PG (ref 26.6–33)
MCHC RBC AUTO-ENTMCNC: 32.3 G/DL (ref 31.5–35.7)
MCV RBC AUTO: 86.7 FL (ref 79–97)
PLATELET # BLD AUTO: 164 10*3/MM3 (ref 140–450)
PMV BLD AUTO: 9.3 FL (ref 6–12)
POTASSIUM BLD-SCNC: 4.4 MMOL/L (ref 3.5–5.2)
RBC # BLD AUTO: 4.67 10*6/MM3 (ref 4.14–5.8)
SODIUM BLD-SCNC: 141 MMOL/L (ref 136–145)
TRIGL SERPL-MCNC: 116 MG/DL (ref 0–150)
VLDLC SERPL-MCNC: 23.2 MG/DL
WBC NRBC COR # BLD: 8.26 10*3/MM3 (ref 3.4–10.8)

## 2020-04-29 PROCEDURE — 85027 COMPLETE CBC AUTOMATED: CPT | Performed by: INTERNAL MEDICINE

## 2020-04-29 PROCEDURE — 93010 ELECTROCARDIOGRAM REPORT: CPT | Performed by: INTERNAL MEDICINE

## 2020-04-29 PROCEDURE — A9270 NON-COVERED ITEM OR SERVICE: HCPCS | Performed by: INTERNAL MEDICINE

## 2020-04-29 PROCEDURE — 63710000001 LISINOPRIL 5 MG TABLET: Performed by: INTERNAL MEDICINE

## 2020-04-29 PROCEDURE — 63710000001 SUCRALFATE 1 G TABLET: Performed by: INTERNAL MEDICINE

## 2020-04-29 PROCEDURE — 63710000001 ESCITALOPRAM 10 MG TABLET: Performed by: INTERNAL MEDICINE

## 2020-04-29 PROCEDURE — 93005 ELECTROCARDIOGRAM TRACING: CPT | Performed by: INTERNAL MEDICINE

## 2020-04-29 PROCEDURE — 63710000001 POTASSIUM CHLORIDE 10 MEQ CAPSULE CONTROLLED-RELEASE: Performed by: INTERNAL MEDICINE

## 2020-04-29 PROCEDURE — G0378 HOSPITAL OBSERVATION PER HR: HCPCS

## 2020-04-29 PROCEDURE — 63710000001 ASPIRIN 81 MG TABLET DELAYED-RELEASE: Performed by: INTERNAL MEDICINE

## 2020-04-29 PROCEDURE — 63710000001 CLOPIDOGREL 75 MG TABLET: Performed by: INTERNAL MEDICINE

## 2020-04-29 PROCEDURE — 99217 PR OBSERVATION CARE DISCHARGE MANAGEMENT: CPT | Performed by: INTERNAL MEDICINE

## 2020-04-29 PROCEDURE — 63710000001 METOPROLOL TARTRATE 25 MG TABLET: Performed by: INTERNAL MEDICINE

## 2020-04-29 PROCEDURE — 83036 HEMOGLOBIN GLYCOSYLATED A1C: CPT | Performed by: INTERNAL MEDICINE

## 2020-04-29 PROCEDURE — 63710000001 FUROSEMIDE 40 MG TABLET: Performed by: INTERNAL MEDICINE

## 2020-04-29 PROCEDURE — 63710000001 ALPRAZOLAM 0.25 MG TABLET: Performed by: INTERNAL MEDICINE

## 2020-04-29 PROCEDURE — 80048 BASIC METABOLIC PNL TOTAL CA: CPT | Performed by: INTERNAL MEDICINE

## 2020-04-29 PROCEDURE — 63710000001 PANTOPRAZOLE 40 MG TABLET DELAYED-RELEASE: Performed by: INTERNAL MEDICINE

## 2020-04-29 PROCEDURE — 80061 LIPID PANEL: CPT | Performed by: INTERNAL MEDICINE

## 2020-04-29 RX ADMIN — SODIUM CHLORIDE 100 ML/HR: 9 INJECTION, SOLUTION INTRAVENOUS at 08:20

## 2020-04-29 RX ADMIN — POTASSIUM CHLORIDE 10 MEQ: 750 CAPSULE, EXTENDED RELEASE ORAL at 08:11

## 2020-04-29 RX ADMIN — CLOPIDOGREL 75 MG: 75 TABLET, FILM COATED ORAL at 08:11

## 2020-04-29 RX ADMIN — SUCRALFATE 1 G: 1 TABLET ORAL at 06:40

## 2020-04-29 RX ADMIN — ALPRAZOLAM 0.25 MG: 0.25 TABLET ORAL at 08:10

## 2020-04-29 RX ADMIN — ESCITALOPRAM 20 MG: 10 TABLET, FILM COATED ORAL at 08:14

## 2020-04-29 RX ADMIN — Medication 10 ML: at 08:15

## 2020-04-29 RX ADMIN — FUROSEMIDE 40 MG: 40 TABLET ORAL at 08:10

## 2020-04-29 RX ADMIN — ASPIRIN 81 MG: 81 TABLET ORAL at 08:11

## 2020-04-29 RX ADMIN — METOPROLOL TARTRATE 25 MG: 25 TABLET, FILM COATED ORAL at 08:10

## 2020-04-29 RX ADMIN — LISINOPRIL 5 MG: 5 TABLET ORAL at 08:10

## 2020-04-29 RX ADMIN — PANTOPRAZOLE SODIUM 40 MG: 40 TABLET, DELAYED RELEASE ORAL at 05:22

## 2020-05-06 ENCOUNTER — OFFICE VISIT (OUTPATIENT)
Dept: FAMILY MEDICINE CLINIC | Facility: CLINIC | Age: 75
End: 2020-05-06

## 2020-05-06 ENCOUNTER — TELEPHONE (OUTPATIENT)
Dept: CARDIOLOGY | Facility: CLINIC | Age: 75
End: 2020-05-06

## 2020-05-06 ENCOUNTER — TELEPHONE (OUTPATIENT)
Dept: FAMILY MEDICINE CLINIC | Facility: CLINIC | Age: 75
End: 2020-05-06

## 2020-05-06 VITALS
SYSTOLIC BLOOD PRESSURE: 142 MMHG | HEIGHT: 68 IN | BODY MASS INDEX: 39.56 KG/M2 | WEIGHT: 261 LBS | DIASTOLIC BLOOD PRESSURE: 79 MMHG | HEART RATE: 67 BPM

## 2020-05-06 DIAGNOSIS — Z95.5 STENTED CORONARY ARTERY: ICD-10-CM

## 2020-05-06 DIAGNOSIS — I25.10 CORONARY ARTERY DISEASE INVOLVING NATIVE CORONARY ARTERY, ANGINA PRESENCE UNSPECIFIED, UNSPECIFIED WHETHER NATIVE OR TRANSPLANTED HEART: ICD-10-CM

## 2020-05-06 DIAGNOSIS — I25.729 CORONARY ARTERY DISEASE INVOLVING AUTOLOGOUS ARTERY CORONARY BYPASS GRAFT WITH ANGINA PECTORIS (HCC): Primary | ICD-10-CM

## 2020-05-06 PROCEDURE — 99442 PR PHYS/QHP TELEPHONE EVALUATION 11-20 MIN: CPT | Performed by: FAMILY MEDICINE

## 2020-05-06 RX ORDER — CETIRIZINE HYDROCHLORIDE 10 MG/1
10 TABLET ORAL
COMMUNITY
End: 2021-04-21 | Stop reason: ALTCHOICE

## 2020-05-06 RX ORDER — LISINOPRIL 5 MG/1
5 TABLET ORAL DAILY
Qty: 90 TABLET | Refills: 3 | Status: ON HOLD | OUTPATIENT
Start: 2020-05-06 | End: 2021-05-01

## 2020-05-06 NOTE — TELEPHONE ENCOUNTER
Dr. Staton called today in regards to patients discharge summary, it states will start on ace inhibitor but patient has not received this medication. Please place order for ace that you intended for patient to start.

## 2020-05-06 NOTE — PROGRESS NOTES
"Subjective   Carlo Velasco is a 74 y.o. male.     Chief Complaint   Patient presents with   • Follow-up     1 week hosp f/u.    pt states that he continues to have heavy pressure in the chest.  he states that he is having \"a little SOB\" and an aching pain under the left shoulder blade.   he states that he felt better as he got out of the hosp, but the symptoms resumed yesterday.        You have chosen to receive care through a telephone visit. Do you consent to use a telephone visit for your medical care today? Yes  History of Present Illness     recent dx with unstable angina and in [facility] for this--had angioplasty and stent placed---he thinks he was supposed to get another script but didnt receive--      Current Outpatient Medications:   •  ALPRAZolam (XANAX) 0.25 MG tablet, TAKE 1 TABLET BY MOUTH THREE TIMES DAILY, Disp: 90 tablet, Rfl: 0  •  aspirin 81 MG EC tablet, Take 81 mg by mouth daily.  , Disp: , Rfl:   •  atorvastatin (LIPITOR) 80 MG tablet, TAKE 1 TABLET BY MOUTH EVERY NIGHT, Disp: 30 tablet, Rfl: 5  •  Bacillus Coagulans-Inulin (PROBIOTIC FORMULA PO), Take  by mouth., Disp: , Rfl:   •  BIOTIN PO, Take  by mouth., Disp: , Rfl:   •  cetirizine (zyrTEC) 10 MG tablet, Take 10 mg by mouth., Disp: , Rfl:   •  clopidogrel (PLAVIX) 75 MG tablet, Take 1 tablet by mouth Daily., Disp: 90 tablet, Rfl: 3  •  donepezil (ARICEPT) 5 MG tablet, Take 1 tablet by mouth every night at bedtime., Disp: 90 tablet, Rfl: 3  •  escitalopram (LEXAPRO) 20 MG tablet, TAKE 1 TABLET BY MOUTH DAILY, Disp: 30 tablet, Rfl: 0  •  furosemide (LASIX) 40 MG tablet, Take 1 tablet by mouth Daily., Disp: 90 tablet, Rfl: 3  •  metoprolol tartrate (LOPRESSOR) 25 MG tablet, Take one tablet by mouth twice a day., Disp: 180 tablet, Rfl: 3  •  NITROSTAT 0.4 MG SL tablet, Place 1 tablet under the tongue Every 5 (Five) Minutes As Needed for Chest Pain. Take no more than 3 doses in 15 minutes., Disp: 30 tablet, Rfl: 2  •  omega-3 acid ethyl " esters (LOVAZA) 1 g capsule, Take 1 capsule by mouth Daily., Disp: 90 capsule, Rfl: 3  •  omeprazole (priLOSEC) 20 MG capsule, Take 1 capsule by mouth Daily., Disp: 90 capsule, Rfl: 3  •  Potassium 99 MG tablet, Take 1 tablet by mouth Daily., Disp: , Rfl:   •  sucralfate (CARAFATE) 1 g tablet, TK 1 T PO  AC AND HS, Disp: , Rfl: 0  Allergies   Allergen Reactions   • Meperidine Other (See Comments)     HEART STOPS     • Statins Myalgia     Causes leg cramps       Past Medical History:   Diagnosis Date   • Anxiety    • Arthritis    • Cancer (CMS/HCC)     melanoma   • CHF (congestive heart failure) (CMS/HCC)    • Claustrophobia    • Colon polyp    • Coronary artery disease 2012, 2015    2 vessel CABG (2012), SHAHRZAD LAD (2015).    • Depression    • Disease of thyroid gland     partial thyroidectomy on right   • GERD (gastroesophageal reflux disease)    • Hearing loss    • Heart attack (CMS/HCC)    • Heart attack (CMS/HCC)    • History of transfusion    • Hyperlipidemia    • Hypertension    • Kidney stone     history of    • Tobacco abuse, in remission    • Uses hearing aid     BILATERAL     Past Surgical History:   Procedure Laterality Date   • BACK SURGERY      CERVICAL FUSION AND LUMBAR DISC REPLACEMENT   • CARDIAC CATHETERIZATION  07/2015    SHAHRZAD mid LAD, Dr. Toribio   • CARDIAC CATHETERIZATION N/A 10/21/2016    Procedure: Left Heart Cath;  Surgeon: Darian Toribio MD;  Location:  [facility];  Service:    • CARDIAC CATHETERIZATION Left 4/5/2017    Procedure: Cardiac Catheterization/Vascular Study;  Surgeon: Darian Toribio MD;  Location:  [facility];  Service:    • CARDIAC CATHETERIZATION N/A 4/5/2017    Procedure: Left Heart Cath;  Surgeon: Darian Toribio MD;  Location:  [facility];  Service:    • CARDIAC CATHETERIZATION N/A 4/5/2017    Procedure: Coronary angiography;  Surgeon: Darian Toribio MD;  Location:  [facility] CATH [facility]VAS[facility];  Service:    • CARDIAC CATHETERIZATION N/A  4/5/2017    Procedure: Left ventriculography;  Surgeon: Darian Toribio MD;  Location: [facility] CATH INVASIVE LOCATION;  Service:    • CARDIAC CATHETERIZATION  4/5/2017    Procedure: Saphenous Vein Graft;  Surgeon: Darian Toribio MD;  Location:  [facility]ATH INVAS[facility];  Service:    • CARDIAC CATHETERIZATION Left 2/27/2018    Procedure: Cardiac Catheterization/Vascular Study SHAHRZAD OK PRN;  Surgeon: Darian Toribio MD;  Location: [facility] CATH INVASIVE LOCATION;  Service:    • CARDIAC CATHETERIZATION  2/27/2018    Procedure: Functional Flow [place];  Surgeon: Darian Toribio MD;  Location:  [facility]ATH [facility]VAS[facility];  Service:    • CARDIAC CATHETERIZATION N/A 2/27/2018    Procedure: Left ventriculography;  Surgeon: Darian Toribio MD;  Location: [facility] CATH INVASIVE LOCATION;  Service:    • CARDIAC CATHETERIZATION Left 4/28/2020    Procedure: Cardiac Catheterization/Vascular Study SHAHRZAD OK PRN;  Surgeon: Darian Toribio MD;  Location: [facility] CATH INVASIVE LOCATION;  Service: Cardiology;  Laterality: Left;   • CARDIAC ELECTROPHYSIOLOGY PROCEDURE N/A 1/11/2018    Procedure: PPM generator change - dual;  Surgeon: Darian Toribio MD;  Location: [facility] CATH INVASIVE LOCATION;  Service:    • COLONOSCOPY  09/05/2007    colon polyps   • COLONOSCOPY N/A 5/29/2018    Procedure: COLONOSCOPY WITH ANESTHESIA;  Surgeon: Juan F Tapia MD;  Location: [facility] ENDOSCOPY;  Service: Gastroenterology   • CORONARY ANGIOPLASTY WITH STENT PLACEMENT  07/2015    SHAHRZAD mid LAD, Dr. Toribio    • CORONARY ARTERY BYPASS GRAFT  2012 AND 2014    2 vessel    • ENDOSCOPY  11/08/2010   • ENDOSCOPY N/A 5/29/2018    Procedure: ESOPHAGOGASTRODUODENOSCOPY WITH ANESTHESIA;  Surgeon: Juan F Tapia MD;  Location: [facility] ENDOSCOPY;  Service: Gastroenterology   • HEAD/NECK LESION/CYST EXCISION Left 12/20/2016    Procedure: EXCISION MALIGNANT MELANOMA WITH SENTINEL NODE BIOPSY, INJECTION AND SCAN PRE-OP, LEFT EAR;  Surgeon: Guanaco Zepeda MD;  Location: [facility] OR;  Service:   "  • HEMORRHOIDECTOMY     • INSERT / REPLACE / REMOVE PACEMAKER     • SENTINEL NODE BIOPSY Left 12/20/2016    Procedure: SENTINEL NODE BIOPSY;  Surgeon: Guanaco Zepeda MD;  Location: [facility] OR;  Service:    • SKIN CANCER EXCISION     • THYROID SURGERY     • THYROIDECTOMY         Review of Systems   Constitutional: Negative.    HENT: Negative.    Eyes: Negative.    Respiratory: Positive for shortness of breath.    Cardiovascular: Positive for chest pain.   Gastrointestinal: Negative.    Endocrine: Negative.    Genitourinary: Negative.    Musculoskeletal: Negative.    Skin: Negative.    Allergic/Immunologic: Negative.    Neurological: Negative.    Hematological: Negative.    Psychiatric/Behavioral: Negative.        Objective  /79   Pulse 67   Ht 172.7 cm (68\")   Wt 118 kg (261 lb)   BMI 39.68 kg/m²   Physical Exam    Assessment/Plan   Carlo was seen today for follow-up.    Diagnoses and all orders for this visit:    Coronary artery disease involving autologous artery coronary bypass graft with angina pectoris (CMS/HCC)    Stented coronary artery    Coronary artery disease involving native coronary artery, angina presence unspecified, unspecified whether native or transplanted heart      I will touch base with dr burks nurse and see if Carlo needs another script (?ace inhibitor) as indicated in dr burks discharge summary  This visit has been rescheduled as a phone visit to comply with patient safety concerns in accordance with CDC recommendations. Total time of discussion was 16  minutes.             No orders of the defined types were placed in this encounter.      Follow up: 3 month(s)  "

## 2020-05-06 NOTE — TELEPHONE ENCOUNTER
Let Carlo know we reached out to Soniya and explained about the ace inhibitor and she indicated she would contact dr burks and they will contact him

## 2020-05-07 DIAGNOSIS — M54.40 ACUTE LOW BACK PAIN WITH SCIATICA, SCIATICA LATERALITY UNSPECIFIED, UNSPECIFIED BACK PAIN LATERALITY: Primary | ICD-10-CM

## 2020-05-07 RX ORDER — PREGABALIN 150 MG/1
CAPSULE ORAL
Qty: 270 CAPSULE | Refills: 0 | Status: ON HOLD | OUTPATIENT
Start: 2020-05-07 | End: 2021-05-01

## 2020-05-07 RX ORDER — ESCITALOPRAM OXALATE 20 MG/1
20 TABLET ORAL DAILY
Qty: 90 TABLET | Refills: 1 | Status: SHIPPED | OUTPATIENT
Start: 2020-05-07 | End: 2020-11-19 | Stop reason: SDUPTHER

## 2020-06-02 ENCOUNTER — HOSPITAL ENCOUNTER (OUTPATIENT)
Dept: PAIN MANAGEMENT | Age: 75
Discharge: HOME | End: 2020-06-02
Payer: MEDICARE

## 2020-06-02 VITALS
HEART RATE: 66 BPM | DIASTOLIC BLOOD PRESSURE: 81 MMHG | HEIGHT: 68 IN | RESPIRATION RATE: 18 BRPM | TEMPERATURE: 96.8 F | OXYGEN SATURATION: 94 % | WEIGHT: 255 LBS | BODY MASS INDEX: 38.65 KG/M2 | SYSTOLIC BLOOD PRESSURE: 131 MMHG

## 2020-06-02 PROCEDURE — 20611 DRAIN/INJ JOINT/BURSA W/US: CPT

## 2020-06-02 PROCEDURE — 2580000003 HC RX 258

## 2020-06-02 PROCEDURE — 2500000003 HC RX 250 WO HCPCS

## 2020-06-02 PROCEDURE — 6360000002 HC RX W HCPCS

## 2020-06-02 PROCEDURE — 3209999900 FLUORO FOR SURGICAL PROCEDURES

## 2020-06-02 RX ORDER — LIDOCAINE HYDROCHLORIDE 10 MG/ML
INJECTION, SOLUTION EPIDURAL; INFILTRATION; INTRACAUDAL; PERINEURAL
Status: COMPLETED | OUTPATIENT
Start: 2020-06-02 | End: 2020-06-02

## 2020-06-02 RX ORDER — METHYLPREDNISOLONE ACETATE 80 MG/ML
INJECTION, SUSPENSION INTRA-ARTICULAR; INTRALESIONAL; INTRAMUSCULAR; SOFT TISSUE
Status: COMPLETED | OUTPATIENT
Start: 2020-06-02 | End: 2020-06-02

## 2020-06-02 RX ORDER — BUPIVACAINE HYDROCHLORIDE 5 MG/ML
INJECTION, SOLUTION EPIDURAL; INTRACAUDAL
Status: COMPLETED | OUTPATIENT
Start: 2020-06-02 | End: 2020-06-02

## 2020-06-02 RX ORDER — SODIUM CHLORIDE 9 MG/ML
INJECTION INTRAVENOUS
Status: COMPLETED | OUTPATIENT
Start: 2020-06-02 | End: 2020-06-02

## 2020-06-02 RX ADMIN — LIDOCAINE HYDROCHLORIDE 7 ML: 10 INJECTION, SOLUTION EPIDURAL; INFILTRATION; INTRACAUDAL; PERINEURAL at 07:53

## 2020-06-02 RX ADMIN — SODIUM CHLORIDE 3 ML: 9 INJECTION INTRAVENOUS at 07:54

## 2020-06-02 RX ADMIN — METHYLPREDNISOLONE ACETATE 80 MG: 80 INJECTION, SUSPENSION INTRA-ARTICULAR; INTRALESIONAL; INTRAMUSCULAR; SOFT TISSUE at 07:53

## 2020-06-02 RX ADMIN — BUPIVACAINE HYDROCHLORIDE 2 ML: 5 INJECTION, SOLUTION EPIDURAL; INTRACAUDAL at 07:53

## 2020-06-02 ASSESSMENT — PAIN DESCRIPTION - PAIN TYPE: TYPE: CHRONIC PAIN

## 2020-06-02 ASSESSMENT — PAIN DESCRIPTION - FREQUENCY: FREQUENCY: CONTINUOUS

## 2020-06-02 ASSESSMENT — PAIN DESCRIPTION - PROGRESSION: CLINICAL_PROGRESSION: NOT CHANGED

## 2020-06-02 ASSESSMENT — PAIN DESCRIPTION - ORIENTATION: ORIENTATION: MID;LEFT

## 2020-06-02 ASSESSMENT — PAIN - FUNCTIONAL ASSESSMENT
PAIN_FUNCTIONAL_ASSESSMENT: 0-10
PAIN_FUNCTIONAL_ASSESSMENT: PREVENTS OR INTERFERES SOME ACTIVE ACTIVITIES AND ADLS

## 2020-06-02 ASSESSMENT — PAIN DESCRIPTION - LOCATION: LOCATION: BACK;HIP;LEG

## 2020-06-02 ASSESSMENT — PAIN DESCRIPTION - ONSET: ONSET: AWAKENED FROM SLEEP

## 2020-06-02 ASSESSMENT — PAIN DESCRIPTION - DESCRIPTORS: DESCRIPTORS: SORE;BURNING

## 2020-06-02 NOTE — PROGRESS NOTES
Procedure:  Level of Consciousness: [x]Alert [x]Oriented []Disoriented []Lethargic  Anxiety Level: [x]Calm []Anxious []Depressed []Other  Skin: [x]Warm [x]Dry []Cool []Moist []Intact []Other  Cardiovascular: []Palpitations: [x]Never []Occasionally []Frequently  Chest Pain: [x]No []Yes  Respiratory:  [x]Unlabored []Labored []Cough ([] Productive []Unproductive)  HCG Required: [x]No []Yes   Results: []Negative []Positive  Knowledge Level:        []Patient/Other verbalized understanding of pre-procedure instructions. []Assessment of post-op care needs (transportation, responsible caregiver)        []Able to discuss health care problems and how to deal with it.   Factors that Affect Teaching:        Language Barrier: [x]No []Yes - why:        Hearing Loss:        [x]No []Yes            Corrective Device:  []Yes []No        Vision Loss:           [x]No []Yes            Corrective Device:  []Yes []No        Memory Loss:       [x]No []Yes            []Short Term []Long Term  Motivational Level:  []Asks Questions                  []Extremely Anxious       []Seems Interested               []Seems Uninterested                  []Denies need for Education  Risk for Injury:  []Patient oriented to person, place and time  []History of frequent falls/loss of balance  Nutritional:  []Change in appetite   []Weight Gain   []Weight Loss  Functional:  []Requires assistance with ADL's

## 2020-06-10 ENCOUNTER — OFFICE VISIT (OUTPATIENT)
Dept: CARDIOLOGY | Facility: CLINIC | Age: 75
End: 2020-06-10

## 2020-06-10 VITALS
WEIGHT: 263 LBS | HEART RATE: 66 BPM | HEIGHT: 68 IN | BODY MASS INDEX: 39.86 KG/M2 | DIASTOLIC BLOOD PRESSURE: 68 MMHG | SYSTOLIC BLOOD PRESSURE: 120 MMHG | OXYGEN SATURATION: 98 %

## 2020-06-10 DIAGNOSIS — M54.50 CHRONIC MIDLINE LOW BACK PAIN WITHOUT SCIATICA: ICD-10-CM

## 2020-06-10 DIAGNOSIS — I25.10 CORONARY ARTERY DISEASE INVOLVING NATIVE CORONARY ARTERY, ANGINA PRESENCE UNSPECIFIED, UNSPECIFIED WHETHER NATIVE OR TRANSPLANTED HEART: ICD-10-CM

## 2020-06-10 DIAGNOSIS — R35.1 NOCTURIA: ICD-10-CM

## 2020-06-10 DIAGNOSIS — F41.9 ANXIETY: ICD-10-CM

## 2020-06-10 DIAGNOSIS — R07.2 PRECORDIAL CHEST PAIN: ICD-10-CM

## 2020-06-10 DIAGNOSIS — I25.729 CORONARY ARTERY DISEASE INVOLVING AUTOLOGOUS ARTERY CORONARY BYPASS GRAFT WITH ANGINA PECTORIS (HCC): Primary | ICD-10-CM

## 2020-06-10 DIAGNOSIS — N40.0 PROSTATISM: ICD-10-CM

## 2020-06-10 DIAGNOSIS — E78.2 MIXED HYPERLIPIDEMIA: ICD-10-CM

## 2020-06-10 DIAGNOSIS — G89.29 CHRONIC MIDLINE LOW BACK PAIN WITHOUT SCIATICA: ICD-10-CM

## 2020-06-10 DIAGNOSIS — I10 ESSENTIAL HYPERTENSION: ICD-10-CM

## 2020-06-10 DIAGNOSIS — Z95.5 STENTED CORONARY ARTERY: ICD-10-CM

## 2020-06-10 DIAGNOSIS — Z95.1 HX OF CABG: ICD-10-CM

## 2020-06-10 PROCEDURE — 93000 ELECTROCARDIOGRAM COMPLETE: CPT | Performed by: INTERNAL MEDICINE

## 2020-06-10 PROCEDURE — 99214 OFFICE O/P EST MOD 30 MIN: CPT | Performed by: INTERNAL MEDICINE

## 2020-06-10 RX ORDER — NITROGLYCERIN 0.4 MG/1
0.4 TABLET SUBLINGUAL
Qty: 100 TABLET | Refills: 2 | Status: SHIPPED | OUTPATIENT
Start: 2020-06-10

## 2020-06-10 RX ORDER — ISOSORBIDE MONONITRATE 30 MG/1
30 TABLET, EXTENDED RELEASE ORAL EVERY MORNING
Qty: 90 TABLET | Refills: 3 | Status: SHIPPED | OUTPATIENT
Start: 2020-06-10 | End: 2021-06-23

## 2020-06-10 NOTE — PROGRESS NOTES
Carlo Velasco  [id number]  1945  74 y.o.  male    Referring Provider: Rhys Rosen MD    Reason for Follow-up Visit:  Here for routine follow up   coronary artery disease Coronary artery bypass grafting , stented coronary artery   sick sinus syndrome s/p pacemaker      Subjective      Subjective     Chest pain with exertion, moderate substernal, pressure like. Lasts less than 5 minutes  Started 2-3 weeks ago  Occurs once or twice a week  No associated diaphoresis    No associated nausea  No radiation  Precipitated with exertion    Relieved with rest  Not positional    No change with intake of food or antacids  No change with breathing  Moderate associated dyspnea      S/L NTG helps      No significant expectoration  No hemoptysis  No presyncope or syncope     Joint pain in small, medium and large joints  Effort tolerance limited more by orthopedic rather than cardiac related issues, therefore difficult to assess functional capacity.      History of present illness:  Carlo Velasco is a 74 y.o. yo male with history of coronary artery disease Coronary artery bypass grafting , stented coronary artery sick sinus syndrome s/p pacemaker who presents today for   Chief Complaint   Patient presents with   • Chest Pain     2 MON FU S/P CATH (STILL HAVEING PRESSURE)   .    History  Past Medical History:   Diagnosis Date   • Anxiety    • Arthritis    • Cancer (CMS/HCC)     melanoma   • CHF (congestive heart failure) (CMS/HCC)    • Claustrophobia    • Colon polyp    • Coronary artery disease 2012, 2015    2 vessel CABG (2012), SHAHRZAD LAD (2015).    • Depression    • Disease of thyroid gland     partial thyroidectomy on right   • GERD (gastroesophageal reflux disease)    • Hearing loss    • Heart attack (CMS/HCC)    • Heart attack (CMS/HCC)    • History of transfusion    • Hyperlipidemia    • Hypertension    • Kidney stone     history of    • Tobacco abuse, in remission    • Uses hearing aid     BILATERAL   ,   Past  Surgical History:   Procedure Laterality Date   • BACK SURGERY      CERVICAL FUSION AND LUMBAR DISC REPLACEMENT   • CARDIAC CATHETERIZATION  07/2015    SHARHZAD mid LADDr. Toribio   • CARDIAC CATHETERIZATION N/A 10/21/2016    Procedure: Left Heart Cath;  Surgeon: Darian Toribio MD;  Location:  [facility] CATH INVASIVE LOCATION;  Service:    • CARDIAC CATHETERIZATION Left 4/5/2017    Procedure: Cardiac Catheterization/Vascular Study;  Surgeon: Darian Toribio MD;  Location:  [facility];  Service:    • CARDIAC CATHETERIZATION N/A 4/5/2017    Procedure: Left Heart Cath;  Surgeon: Darian Toribio MD;  Location:  [facility];  Service:    • CARDIAC CATHETERIZATION N/A 4/5/2017    Procedure: Coronary angiography;  Surgeon: Darian Toribio MD;  Location:  [facility];  Service:    • CARDIAC CATHETERIZATION N/A 4/5/2017    Procedure: Left ventriculography;  Surgeon: Darian Toribio MD;  Location:  [facility];  Service:    • CARDIAC CATHETERIZATION  4/5/2017    Procedure: Saphenous Vein Graft;  Surgeon: Darian Toribio MD;  Location:  [facility];  Service:    • CARDIAC CATHETERIZATION Left 2/27/2018    Procedure: Cardiac Catheterization/Vascular Study SHAHRZAD [place] PRN;  Surgeon: Darian Toribio MD;  Location:  [facility];  Service:    • CARDIAC CATHETERIZATION  2/27/2018    Procedure: Functional Flow [place];  Surgeon: Darian Toribio MD;  Location:  [facility]ATH [facility];  Service:    • CARDIAC CATHETERIZATION N/A 2/27/2018    Procedure: Left ventriculography;  Surgeon: Darian Toribio MD;  Location:  [facility];  Service:    • CARDIAC CATHETERIZATION Left 4/28/2020    Procedure: Cardiac Catheterization/Vascular Study SHAHRZAD OK PRN;  Surgeon: Darian Toribio MD;  Location:  [facility];  Service: Cardiology;  Laterality: Left;   • CARDIAC ELECTROPHYSIOLOGY PROCEDURE N/A 1/11/2018    Procedure: PPM generator change - dual;   Surgeon: Darian Toribio MD;  Location: [facility] CATH INVASIVE LOCATION;  Service:    • COLONOSCOPY  2007    colon polyps   • COLONOSCOPY N/A 2018    Procedure: COLONOSCOPY WITH ANESTHESIA;  Surgeon: Juan F Tapia MD;  Location: [facility] ENDOSCOPY;  Service: Gastroenterology   • CORONARY ANGIOPLASTY WITH STENT PLACEMENT  2015    SHAHRZAD mid LAD, Dr. Toribio    • CORONARY ARTERY BYPASS GRAFT   AND     2 vessel    • ENDOSCOPY  2010   • ENDOSCOPY N/A 2018    Procedure: ESOPHAGOGASTRODUODENOSCOPY WITH ANESTHESIA;  Surgeon: Juan F Tapia MD;  Location: [facility] ENDOSCOPY;  Service: Gastroenterology   • HEAD/NECK LESION/CYST EXCISION Left 2016    Procedure: EXCISION MALIGNANT MELANOMA WITH SENTINEL NODE BIOPSY, INJECTION AND SCAN PRE-OP, LEFT EAR;  Surgeon: Guanaco Zepeda MD;  Location: [facility] OR;  Service:    • HEMORRHOIDECTOMY     • INSERT / REPLACE / REMOVE PACEMAKER     • SENTINEL NODE BIOPSY Left 2016    Procedure: SENTINEL NODE BIOPSY;  Surgeon: Guanaco Zepeda MD;  Location: [facility] OR;  Service:    • SKIN CANCER EXCISION     • THYROID SURGERY     • THYROIDECTOMY     ,   Family History   Problem Relation Age of Onset   • Heart failure Mother    • Stroke Mother    • Dementia Mother    • Coronary artery disease Father    • Colon polyps Father    • COPD Brother    • Colon cancer Neg Hx    ,   Social History     Tobacco Use   • Smoking status: Former Smoker     Years: 10.00     Types: Cigarettes     Last attempt to quit:      Years since quittin.4   • Smokeless tobacco: Former User     Types: Snuff     Quit date:    Substance Use Topics   • Alcohol use: No   • Drug use: No   ,     Medications  Current Outpatient Medications   Medication Sig Dispense Refill   • ALPRAZolam (XANAX) 0.25 MG tablet TAKE 1 TABLET BY MOUTH THREE TIMES DAILY 90 tablet 0   • aspirin 81 MG EC tablet Take 81 mg by mouth daily.       • atorvastatin (LIPITOR) 80 MG tablet TAKE 1 TABLET BY MOUTH  EVERY NIGHT 30 tablet 5   • Bacillus Coagulans-Inulin (PROBIOTIC FORMULA PO) Take  by mouth.     • BIOTIN PO Take  by mouth.     • cetirizine (zyrTEC) 10 MG tablet Take 10 mg by mouth.     • clopidogrel (PLAVIX) 75 MG tablet Take 1 tablet by mouth Daily. 90 tablet 3   • donepezil (ARICEPT) 5 MG tablet Take 1 tablet by mouth every night at bedtime. 90 tablet 3   • escitalopram (LEXAPRO) 20 MG tablet TAKE 1 TABLET BY MOUTH DAILY 90 tablet 1   • furosemide (LASIX) 40 MG tablet Take 1 tablet by mouth Daily. 90 tablet 3   • lisinopril (PRINIVIL,ZESTRIL) 5 MG tablet Take 1 tablet by mouth Daily. 90 tablet 3   • metoprolol tartrate (LOPRESSOR) 25 MG tablet Take one tablet by mouth twice a day. 180 tablet 3   • NITROSTAT 0.4 MG SL tablet Place 1 tablet under the tongue Every 5 (Five) Minutes As Needed for Chest Pain. Take no more than 3 doses in 15 minutes. 100 tablet 2   • omega-3 acid ethyl esters (LOVAZA) 1 g capsule Take 1 capsule by mouth Daily. 90 capsule 3   • omeprazole (priLOSEC) 20 MG capsule Take 1 capsule by mouth Daily. 90 capsule 3   • Potassium 99 MG tablet Take 1 tablet by mouth Daily.     • pregabalin (LYRICA) 150 MG capsule TAKE 1 CAPSULE BY MOUTH THREE TIMES DAILY 270 capsule 0   • sucralfate (CARAFATE) 1 g tablet TK 1 T PO  AC AND HS  0   • isosorbide mononitrate (IMDUR) 30 MG 24 hr tablet Take 1 tablet by mouth Every Morning. 90 tablet 3     No current facility-administered medications for this visit.        Allergies:  Meperidine and Statins    Review of Systems  Review of Systems   Constitution: Positive for malaise/fatigue.   HENT: Negative.    Eyes: Negative.    Cardiovascular: Positive for chest pain and dyspnea on exertion. Negative for claudication, cyanosis, irregular heartbeat, leg swelling, near-syncope, orthopnea, palpitations, paroxysmal nocturnal dyspnea and syncope.   Respiratory: Negative.    Endocrine: Negative.    Hematologic/Lymphatic: Negative.    Skin: Negative.    Musculoskeletal:  "Positive for arthritis and back pain.   Gastrointestinal: Negative for anorexia.   Genitourinary: Negative.    Neurological: Negative for weakness.   Psychiatric/Behavioral: Negative.        Objective     Physical Exam:  /68   Pulse 66   Ht 172.7 cm (68\")   Wt 119 kg (263 lb)   SpO2 98%   BMI 39.99 kg/m²   Physical Exam   Constitutional: He appears well-developed.   HENT:   Head: Normocephalic.   Neck: Normal carotid pulses and no JVD present. No tracheal tenderness present. Carotid bruit is not present. No tracheal deviation and no edema present.   Cardiovascular: Regular rhythm, normal heart sounds and normal pulses.   Pulmonary/Chest: Effort normal. No stridor.   Abdominal: Soft.   Neurological: He is alert. He has normal strength. No cranial nerve deficit or sensory deficit.   Skin: Skin is warm.   Psychiatric: He has a normal mood and affect. His speech is normal and behavior is normal.       Results Review:  Results for orders placed during the hospital encounter of 04/27/20   STAT Adult Transthoracic Echo Complete W/ Cont if Necessary Per Protocol    Narrative · Estimated EF = 55%.  · Left ventricular systolic function is normal.  · Left ventricular wall thickness is consistent with mild concentric   hypertrophy.  · Left ventricular diastolic dysfunction.  · Mild pulmonary hypertension is present.             ECG 12 Lead  Date/Time: 6/10/2020 8:56 AM  Performed by: Darian Toribio MD  Authorized by: Darian Toribio MD   Comparison: compared with previous ECG from 4/29/2020  Comparison to previous ECG: premature ventricular contractions new  Rhythm: sinus rhythm  Ectopy: infrequent PVCs  Rate: normal  Conduction: conduction normal  ST Segments: ST segments normal  T Waves: T waves normal  Other: no other findings    Clinical impression: abnormal EKG          Cardiac cath  4/17  LVEF of 50%.   occluded the mid right coronary artery  Patent saphenous venous graft to the distal right coronary " artery  Atretic left intramammary artery graft.  Patent stent in the mid left anterior descending coronary artery  Patent stent to the obtuse marginal branch  Patent stent in diagonal branch.   2/18    Conclusion     Patent stents noted in the obtuse marginal branch and the left anterior descending coronary artery.  Atretic left internal mammary artery graft.  Patent saphenous venous graft to the right coronary artery  Significant right coronary artery stenosis proximal to the touchdown site of the saphenous vein graft to the right coronary artery  60% stenosis of diagonal branch with a normal fractional flow reserve of 0.85  Left ventricle ejection fraction of 50%.     LVEDP    16   mm Hg           Plan     Intensive risk factor modifications for both primary and secondary prevention if applicable  Home today if stable  Hydration   Observation  Keep follow-up appointment    Assessment/Plan   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 (CMS/HCC)   • Essential hypertension   • Anxiety   • Colon polyps   • Mixed hyperlipidemia   • Incisional hernia, without obstruction or gangrene   • BRBPR (bright red blood per rectum)   • Esophageal dysphagia   • Prostatism   • Nocturia   • Coronary artery disease   • Orthostasis   • Chronic midline low back pain without sciatica   • Hx of colonic polyp   • Acute pain of right knee   • Morbidly obese (CMS/HCC)   • Pain of right heel   • Hx of CABG      • Stented coronary artery   • Flank pain   • Skin lesion   Intolerant to Imdur    ____________________________________________________________________________________________________________________________________________  Health maintenance and recommendations      Low salt/ HTN/ Heart healthy carbohydrate restricted cardiac diet   The patient is advised to reduce or avoid caffeine or other cardiac stimulants.     The patient was advised  to avoid long term NSAIDS , use Tylenol PRN instead  Avoid cardiac stimulants including common drugs like Pseudoephedrine or excessive amounts of caffeine    Monitor for any signs of bleeding including red or dark stools. Fall precautions.        Advised staying uptodate with immunizations per established standard guidelines.      Offered to give patient  a copy      Questions were encouraged, asked and answered to the patient's  understanding and satisfaction. Questions if any regarding current medications and side effects, need for refills and importance of compliance to medications stressed.    Reviewed available prior notes, consults, prior visits, laboratory findings, radiology and cardiology relevant reports. Updated chart as applicable. I have reviewed the patient's medical history in detail and updated the computerized patient record as relevant.      Updated patient regarding any new or relevant abnormalities on review of records or any new findings on physical exam. Mentioned to patient about purpose of visit and desirable health short and long term goals and objectives.    Primary to monitor CBC CMP Lipid panel and TSH as applicable    ___________________________________________________________________________________________________________________________________________            Plan      Orders Placed This Encounter   Procedures   • Stress Test With Myocardial Perfusion One Day     Standing Status:   Future     Standing Expiration Date:   6/10/2021     Order Specific Question:   What stress agent will be used?     Answer:   Regadenoson (Lexiscan)     Order Specific Question:   Difficulty walking criteria?     Answer:   Musculoskeletal (hips, knees, feet, back, amputee)     Order Specific Question:   Reason for exam?     Answer:   Chest Pain   • ECG 12 Lead     This order was created via procedure documentation        Keep LDL below 70 mg/dl. Monitor liver and renal functions.   Monitor CBC, CMP, TSH  (as indicated) and Lipid Panel by primary     Monitor cardiac rhythm device function regularly per established schedule or PRN      S/L NTG PRN for chest pain, call me or go to ER if has to use S/L nitroglycerin   Requested Prescriptions     Signed Prescriptions Disp Refills   • isosorbide mononitrate (IMDUR) 30 MG 24 hr tablet 90 tablet 3     Sig: Take 1 tablet by mouth Every Morning.   • NITROSTAT 0.4 MG SL tablet 100 tablet 2     Sig: Place 1 tablet under the tongue Every 5 (Five) Minutes As Needed for Chest Pain. Take no more than 3 doses in 15 minutes.             Return in about 4 weeks (around 7/8/2020).

## 2020-06-15 ENCOUNTER — TRANSCRIBE ORDERS (OUTPATIENT)
Dept: ADMINISTRATIVE | Facility: HOSPITAL | Age: 75
End: 2020-06-15

## 2020-06-15 DIAGNOSIS — M54.16 LUMBAR RADICULOPATHY: Primary | ICD-10-CM

## 2020-06-16 ENCOUNTER — HOSPITAL ENCOUNTER (OUTPATIENT)
Dept: CT IMAGING | Facility: HOSPITAL | Age: 75
Discharge: HOME | End: 2020-06-16
Admitting: PHYSICAL MEDICINE & REHABILITATION

## 2020-06-16 PROCEDURE — 72131 CT LUMBAR SPINE W/O DYE: CPT

## 2020-06-23 ENCOUNTER — HOSPITAL ENCOUNTER (OUTPATIENT)
Dept: CARDIOLOGY | Facility: HOSPITAL | Age: 75
Discharge: HOME | End: 2020-06-23

## 2020-06-23 DIAGNOSIS — R07.2 PRECORDIAL CHEST PAIN: ICD-10-CM

## 2020-06-23 LAB
BH CV NUCLEAR PRIOR STUDY: 3
BH CV STRESS BP STAGE 1: NORMAL
BH CV STRESS COMMENTS STAGE 1: NORMAL
BH CV STRESS DOSE REGADENOSON STAGE 1: 0.4
BH CV STRESS DURATION MIN STAGE 1: 0
BH CV STRESS DURATION SEC STAGE 1: 10
BH CV STRESS HR STAGE 1: 79
BH CV STRESS PROTOCOL 1: NORMAL
BH CV STRESS RECOVERY BP: NORMAL MMHG
BH CV STRESS RECOVERY HR: 69 BPM
BH CV STRESS STAGE 1: 1
LV EF NUC BP: 55 %
MAXIMAL PREDICTED HEART RATE: 148 BPM
PERCENT MAX PREDICTED HR: 53.38 %
STRESS BASELINE BP: NORMAL MMHG
STRESS BASELINE HR: 60 BPM
STRESS PERCENT HR: 63 %
STRESS POST EXERCISE DUR MIN: 0 MIN
STRESS POST EXERCISE DUR SEC: 10 SEC
STRESS POST PEAK BP: NORMAL MMHG
STRESS POST PEAK HR: 79 BPM
STRESS TARGET HR: 126 BPM

## 2020-06-23 PROCEDURE — 93018 CV STRESS TEST I&R ONLY: CPT | Performed by: INTERNAL MEDICINE

## 2020-06-23 PROCEDURE — 93017 CV STRESS TEST TRACING ONLY: CPT

## 2020-06-23 PROCEDURE — 78452 HT MUSCLE IMAGE SPECT MULT: CPT | Performed by: INTERNAL MEDICINE

## 2020-06-23 PROCEDURE — 0 TECHNETIUM SESTAMIBI: Performed by: INTERNAL MEDICINE

## 2020-06-23 PROCEDURE — A9500 TC99M SESTAMIBI: HCPCS | Performed by: INTERNAL MEDICINE

## 2020-06-23 PROCEDURE — 25010000002 REGADENOSON 0.4 MG/5ML SOLUTION: Performed by: INTERNAL MEDICINE

## 2020-06-23 PROCEDURE — 78452 HT MUSCLE IMAGE SPECT MULT: CPT

## 2020-06-23 RX ADMIN — TECHNETIUM TC 99M SESTAMIBI 1 DOSE: 1 INJECTION INTRAVENOUS at 09:30

## 2020-06-23 RX ADMIN — CAFFEINE CITRATE 60 MG: 20 INJECTION, SOLUTION INTRAVENOUS at 11:47

## 2020-06-23 RX ADMIN — TECHNETIUM TC 99M SESTAMIBI 1 DOSE: 1 INJECTION INTRAVENOUS at 11:25

## 2020-06-23 RX ADMIN — REGADENOSON 0.4 MG: 0.08 INJECTION, SOLUTION INTRAVENOUS at 11:43

## 2020-07-08 ENCOUNTER — OFFICE VISIT (OUTPATIENT)
Dept: CARDIOLOGY | Facility: CLINIC | Age: 75
End: 2020-07-08

## 2020-07-08 ENCOUNTER — TELEPHONE (OUTPATIENT)
Dept: CARDIOLOGY | Facility: CLINIC | Age: 75
End: 2020-07-08

## 2020-07-08 ENCOUNTER — CLINICAL SUPPORT NO REQUIREMENTS (OUTPATIENT)
Dept: CARDIOLOGY | Facility: CLINIC | Age: 75
End: 2020-07-08

## 2020-07-08 VITALS
OXYGEN SATURATION: 97 % | HEIGHT: 68 IN | HEART RATE: 65 BPM | SYSTOLIC BLOOD PRESSURE: 110 MMHG | WEIGHT: 266 LBS | BODY MASS INDEX: 40.32 KG/M2 | DIASTOLIC BLOOD PRESSURE: 52 MMHG

## 2020-07-08 DIAGNOSIS — I25.729 CORONARY ARTERY DISEASE INVOLVING AUTOLOGOUS ARTERY CORONARY BYPASS GRAFT WITH ANGINA PECTORIS (HCC): ICD-10-CM

## 2020-07-08 DIAGNOSIS — I49.5 SICK SINUS SYNDROME (HCC): ICD-10-CM

## 2020-07-08 DIAGNOSIS — I25.10 CORONARY ARTERY DISEASE INVOLVING NATIVE CORONARY ARTERY, ANGINA PRESENCE UNSPECIFIED, UNSPECIFIED WHETHER NATIVE OR TRANSPLANTED HEART: ICD-10-CM

## 2020-07-08 DIAGNOSIS — I10 ESSENTIAL HYPERTENSION: ICD-10-CM

## 2020-07-08 DIAGNOSIS — Z95.0 PACEMAKER: Primary | ICD-10-CM

## 2020-07-08 DIAGNOSIS — E66.01 MORBIDLY OBESE (HCC): ICD-10-CM

## 2020-07-08 DIAGNOSIS — E78.2 MIXED HYPERLIPIDEMIA: Primary | ICD-10-CM

## 2020-07-08 DIAGNOSIS — Z95.1 HX OF CABG: ICD-10-CM

## 2020-07-08 DIAGNOSIS — F41.9 ANXIETY: ICD-10-CM

## 2020-07-08 PROCEDURE — 93288 INTERROG EVL PM/LDLS PM IP: CPT | Performed by: INTERNAL MEDICINE

## 2020-07-08 PROCEDURE — 99214 OFFICE O/P EST MOD 30 MIN: CPT | Performed by: INTERNAL MEDICINE

## 2020-07-08 NOTE — PROGRESS NOTES
Carlo Velasco  [id number]  1945  74 y.o.  male    Referring Provider: Rhys Rosen MD    Reason for Follow-up Visit:      short term office follow up after recent encounter   coronary artery disease Coronary artery bypass grafting , stented coronary artery   sick sinus syndrome s/p pacemaker   Cardiac workup test results as below      Subjective    Overall feels well    No new events or complaints since last visit   Overall the patient feels no major change from baseline symptoms   Similar symptoms as during last visit     Tolerating current medications well with no untoward side effects   Compliant with prescribed medication regimen. Tries to adhere to cardiac diet.      Chest pain with exertion now much better on Imdur,   Chest pain almost gone and occurs rarely and occurs only with heavy exertion    Mild  substernal, pressure like. Lasts less than 5 minutes  Started 2-3 weeks ago  Occurs once or twice a week  No associated diaphoresis    No associated nausea  No radiation  Precipitated with exertion    Relieved with rest  Not positional    No change with intake of food or antacids  No change with breathing  Moderate associated dyspnea      S/L NTG helps      No significant expectoration  No hemoptysis  No presyncope or syncope     Joint pain in small, medium and large joints  Effort tolerance limited more by orthopedic rather than cardiac related issues, therefore difficult to assess functional capacity.      More active     History of present illness:  Carlo Velasco is a 74 y.o. yo male with history of coronary artery disease Coronary artery bypass grafting , stented coronary artery sick sinus syndrome s/p pacemaker who presents today for   Chief Complaint   Patient presents with   • Coronary Artery Disease     1 month follow up results stress test    .    History  Past Medical History:   Diagnosis Date   • Anxiety    • Arthritis    • Cancer (CMS/HCC)     melanoma   • CHF (congestive heart  failure) (CMS/HCC)    • Claustrophobia    • Colon polyp    • Coronary artery disease 2012, 2015    2 vessel CABG (2012), SHAHRZAD LAD (2015).    • Depression    • Disease of thyroid gland     partial thyroidectomy on right   • GERD (gastroesophageal reflux disease)    • Hearing loss    • Heart attack (CMS/HCC)    • Heart attack (CMS/HCC)    • History of transfusion    • Hyperlipidemia    • Hypertension    • Kidney stone     history of    • Tobacco abuse, in remission    • Uses hearing aid     BILATERAL   ,   Past Surgical History:   Procedure Laterality Date   • BACK SURGERY      CERVICAL FUSION AND LUMBAR DISC REPLACEMENT   • CARDIAC CATHETERIZATION  07/2015    SHAHRZAD mid Dr. Malu LO   • CARDIAC CATHETERIZATION N/A 10/21/2016    Procedure: Left Heart Cath;  Surgeon: Darian Toribio MD;  Location:  [facility] CATH INVASIVE LOC[facility];  Service:    • CARDIAC CATHETERIZATION Left 4/5/2017    Procedure: Cardiac Catheterization/Vascular Study;  Surgeon: Darian Toribio MD;  Location:  [facility] CATH [facility];  Service:    • CARDIAC CATHETERIZATION N/A 4/5/2017    Procedure: Left Heart Cath;  Surgeon: Darian Toribio MD;  Location:  [facility];  Service:    • CARDIAC CATHETERIZATION N/A 4/5/2017    Procedure: Coronary angiography;  Surgeon: Darian Toribio MD;  Location:  [facility]ATH [facility];  Service:    • CARDIAC CATHETERIZATION N/A 4/5/2017    Procedure: Left ventriculography;  Surgeon: Darian Toribio MD;  Location:  [facility]ATH [facility];  Service:    • CARDIAC CATHETERIZATION  4/5/2017    Procedure: Saphenous Vein Graft;  Surgeon: Darian Toribio MD;  Location:  [facility];  Service:    • CARDIAC CATHETERIZATION Left 2/27/2018    Procedure: Cardiac Catheterization/Vascular Study SHAHRZAD OK PRN;  Surgeon: Darian Toribio MD;  Location:  [facility] CATH [facility];  Service:    • CARDIAC CATHETERIZATION  2/27/2018    Procedure: Functional Flow [place];  Surgeon: Darian Toribio MD;  Location: [facility]  CATH INVASIVE LOCATION;  Service:    • CARDIAC CATHETERIZATION N/A 2/27/2018    Procedure: Left ventriculography;  Surgeon: Darian Toribio MD;  Location: [facility] CATH INVASIVE LOCATION;  Service:    • CARDIAC CATHETERIZATION Left 4/28/2020    Procedure: Cardiac Catheterization/Vascular Study SHAHRZAD OK PRN;  Surgeon: Darian Toribio MD;  Location: [facility] CATH INVASIVE LOCATION;  Service: Cardiology;  Laterality: Left;   • CARDIAC ELECTROPHYSIOLOGY PROCEDURE N/A 1/11/2018    Procedure: PPM generator change - dual;  Surgeon: Darian Toribio MD;  Location: [facility] CATH INVASIVE LOCATION;  Service:    • COLONOSCOPY  09/05/2007    colon polyps   • COLONOSCOPY N/A 5/29/2018    Procedure: COLONOSCOPY WITH ANESTHESIA;  Surgeon: Juan F Tapia MD;  Location: [facility] ENDOSCOPY;  Service: Gastroenterology   • CORONARY ANGIOPLASTY WITH STENT PLACEMENT  07/2015    SHAHRZAD mid LAD, Dr. Toribio    • CORONARY ARTERY BYPASS GRAFT  2012 AND 2014    2 vessel    • ENDOSCOPY  11/08/2010   • ENDOSCOPY N/A 5/29/2018    Procedure: ESOPHAGOGASTRODUODENOSCOPY WITH ANESTHESIA;  Surgeon: Juan F Tapia MD;  Location: [facility] ENDOSCOPY;  Service: Gastroenterology   • HEAD/NECK LESION/CYST EXCISION Left 12/20/2016    Procedure: EXCISION MALIGNANT MELANOMA WITH SENTINEL NODE BIOPSY, INJECTION AND SCAN PRE-OP, LEFT EAR;  Surgeon: Guanaco Zepeda MD;  Location: [facility] OR;  Service:    • HEMORRHOIDECTOMY     • INSERT / REPLACE / REMOVE PACEMAKER     • SENTINEL NODE BIOPSY Left 12/20/2016    Procedure: SENTINEL NODE BIOPSY;  Surgeon: Guanaco Zepeda MD;  Location: [facility] OR;  Service:    • SKIN CANCER EXCISION     • THYROID SURGERY     • THYROIDECTOMY     ,   Family History   Problem Relation Age of Onset   • Heart failure Mother    • Stroke Mother    • Dementia Mother    • Coronary artery disease Father    • Colon polyps Father    • COPD Brother    • Colon cancer Neg Hx    ,   Social History     Tobacco Use   • Smoking status: Former Smoker     Years: 10.00      Types: Cigarettes     Last attempt to quit:      Years since quittin.5   • Smokeless tobacco: Former User     Types: Snuff     Quit date:    Substance Use Topics   • Alcohol use: No   • Drug use: No   ,     Medications  Current Outpatient Medications   Medication Sig Dispense Refill   • ALPRAZolam (XANAX) 0.25 MG tablet TAKE 1 TABLET BY MOUTH THREE TIMES DAILY 90 tablet 0   • aspirin 81 MG EC tablet Take 81 mg by mouth daily.       • atorvastatin (LIPITOR) 80 MG tablet TAKE 1 TABLET BY MOUTH EVERY NIGHT 30 tablet 5   • Bacillus Coagulans-Inulin (PROBIOTIC FORMULA PO) Take  by mouth.     • BIOTIN PO Take  by mouth.     • cetirizine (zyrTEC) 10 MG tablet Take 10 mg by mouth.     • clopidogrel (PLAVIX) 75 MG tablet Take 1 tablet by mouth Daily. 90 tablet 3   • donepezil (ARICEPT) 5 MG tablet Take 1 tablet by mouth every night at bedtime. 90 tablet 3   • escitalopram (LEXAPRO) 20 MG tablet TAKE 1 TABLET BY MOUTH DAILY 90 tablet 1   • furosemide (LASIX) 40 MG tablet Take 1 tablet by mouth Daily. 90 tablet 3   • isosorbide mononitrate (IMDUR) 30 MG 24 hr tablet Take 1 tablet by mouth Every Morning. 90 tablet 3   • lisinopril (PRINIVIL,ZESTRIL) 5 MG tablet Take 1 tablet by mouth Daily. 90 tablet 3   • metoprolol tartrate (LOPRESSOR) 25 MG tablet Take one tablet by mouth twice a day. 180 tablet 3   • NITROSTAT 0.4 MG SL tablet Place 1 tablet under the tongue Every 5 (Five) Minutes As Needed for Chest Pain. Take no more than 3 doses in 15 minutes. 100 tablet 2   • omega-3 acid ethyl esters (LOVAZA) 1 g capsule Take 1 capsule by mouth Daily. 90 capsule 3   • omeprazole (priLOSEC) 20 MG capsule Take 1 capsule by mouth Daily. 90 capsule 3   • Potassium 99 MG tablet Take 1 tablet by mouth Daily.     • pregabalin (LYRICA) 150 MG capsule TAKE 1 CAPSULE BY MOUTH THREE TIMES DAILY 270 capsule 0   • sucralfate (CARAFATE) 1 g tablet TK 1 T PO  AC AND HS  0     No current facility-administered medications for this visit.   "      Allergies:  Meperidine and Statins    Review of Systems  Review of Systems   Constitution: Positive for malaise/fatigue.   HENT: Negative.    Eyes: Negative.    Cardiovascular: Positive for chest pain and dyspnea on exertion. Negative for claudication, cyanosis, irregular heartbeat, leg swelling, near-syncope, orthopnea, palpitations, paroxysmal nocturnal dyspnea and syncope.   Respiratory: Negative.    Endocrine: Negative.    Hematologic/Lymphatic: Negative.    Skin: Negative.    Musculoskeletal: Positive for arthritis and back pain.   Gastrointestinal: Negative for anorexia.   Genitourinary: Negative.    Neurological: Negative for weakness.   Psychiatric/Behavioral: Negative.        Objective     Physical Exam:  /52   Pulse 65   Ht 172.7 cm (68\")   Wt 121 kg (266 lb)   SpO2 97%   BMI 40.45 kg/m²   Physical Exam   Constitutional: He appears well-developed.   HENT:   Head: Normocephalic.   Neck: Normal carotid pulses and no JVD present. No tracheal tenderness present. Carotid bruit is not present. No tracheal deviation and no edema present.   Cardiovascular: Regular rhythm, normal heart sounds and normal pulses.   Pulmonary/Chest: Effort normal. No stridor.   Abdominal: Soft.   Neurological: He is alert. He has normal strength. No cranial nerve deficit or sensory deficit.   Skin: Skin is warm.   Psychiatric: He has a normal mood and affect. His speech is normal and behavior is normal.       Results Review:      Carlo Velasco   Regadenoson Stress Test With Myocardial Perfusion SPECT (Multi Study)   Order# [id number]   Reading physician: Darian Toribio MD Ordering physician: Darian Toribio MD Study date: 20   Patient Information     Patient Name  Carlo Velasco MRN  [id number] Sex  Male  (Age)  1945 (74 y.o.)   Interpretation Summary        · Left ventricular ejection fraction is normal (Calculated EF = 55%).  · Myocardial perfusion imaging indicates a normal myocardial perfusion study with " no evidence of ischemia.  · Impressions are consistent with a low risk study.                Results for orders placed during the hospital encounter of 04/27/20   STAT Adult Transthoracic Echo Complete W/ Cont if Necessary Per Protocol    Narrative · Estimated EF = 55%.  · Left ventricular systolic function is normal.  · Left ventricular wall thickness is consistent with mild concentric   hypertrophy.  · Left ventricular diastolic dysfunction.  · Mild pulmonary hypertension is present.           Procedures  Cardiac cath  4/17  LVEF of 50%.   occluded the mid right coronary artery  Patent saphenous venous graft to the distal right coronary artery  Atretic left intramammary artery graft.  Patent stent in the mid left anterior descending coronary artery  Patent stent to the obtuse marginal branch  Patent stent in diagonal branch.   2/18    Conclusion     Patent stents noted in the obtuse marginal branch and the left anterior descending coronary artery.  Atretic left internal mammary artery graft.  Patent saphenous venous graft to the right coronary artery  Significant right coronary artery stenosis proximal to the touchdown site of the saphenous vein graft to the right coronary artery  60% stenosis of diagonal branch with a normal fractional flow reserve of 0.85  Left ventricle ejection fraction of 50%.     LVEDP    16   mm Hg           Plan     Intensive risk factor modifications for both primary and secondary prevention if applicable  Home today if stable  Hydration   Observation  Keep follow-up appointment    Assessment/Plan   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 (CMS/HCC)   • Essential hypertension   • Anxiety   • Colon polyps   • Mixed hyperlipidemia   • Incisional hernia, without obstruction or gangrene   • BRBPR (bright red blood per rectum)   • Esophageal dysphagia   • Prostatism   • Nocturia   • Coronary  artery disease   • Orthostasis   • Chronic midline low back pain without sciatica   • Hx of colonic polyp   • Acute pain of right knee   • Morbidly obese (CMS/HCC)   • Pain of right heel   • Hx of CABG      • Stented coronary artery   • Flank pain   • Skin lesion        ____________________________________________________________________________________________________________________________________________  Health maintenance and recommendations      Low salt/ HTN/ Heart healthy carbohydrate restricted cardiac diet   The patient is advised to reduce or avoid caffeine or other cardiac stimulants.     The patient was advised to avoid long term NSAIDS , use Tylenol PRN instead  Avoid cardiac stimulants including common drugs like Pseudoephedrine or excessive amounts of caffeine    Monitor for any signs of bleeding including red or dark stools. Fall precautions.        Advised staying uptodate with immunizations per established standard guidelines.      Offered to give patient  a copy      Questions were encouraged, asked and answered to the patient's  understanding and satisfaction. Questions if any regarding current medications and side effects, need for refills and importance of compliance to medications stressed.    Reviewed available prior notes, consults, prior visits, laboratory findings, radiology and cardiology relevant reports. Updated chart as applicable. I have reviewed the patient's medical history in detail and updated the computerized patient record as relevant.      Updated patient regarding any new or relevant abnormalities on review of records or any new findings on physical exam. Mentioned to patient about purpose of visit and desirable health short and long term goals and objectives.    Primary to monitor CBC CMP Lipid panel and TSH as applicable    ___________________________________________________________________________________________________________________________________________             Plan      Discussed results of prior testing with patient  Patient expressed understanding  Encouraged and answered all questions   Discussed with the patient and all questioned fully answered. He will call me if any problems arise.      Conservative medical therapy per patient. Risks, benefits and alternatives explained. The patient understands and wishes to proceed with only conservative therapy.  The patient expressively declined any non-invasive/ invasive testing or treatment        Keep LDL below 70 mg/dl. Monitor liver and renal functions.   Monitor CBC, CMP, TSH (as indicated) and Lipid Panel by primary     Monitor cardiac rhythm device function regularly per established schedule or PRN      S/L NTG PRN for chest pain, call me or go to ER if has to use S/L nitroglycerin             Return in about 3 months (around 10/8/2020).

## 2020-07-08 NOTE — TELEPHONE ENCOUNTER
JUST FLORENCIO -     RECEIVED NOTIFICATION THAT PT [place]IT BEFORE COM[place] [facility] [place] COMPLETED 8 SESSIONS TOTAL.

## 2020-07-23 ENCOUNTER — OFFICE VISIT (OUTPATIENT)
Dept: FAMILY MEDICINE CLINIC | Facility: CLINIC | Age: 75
End: 2020-07-23

## 2020-07-23 ENCOUNTER — TELEPHONE (OUTPATIENT)
Dept: UROLOGY | Facility: CLINIC | Age: 75
End: 2020-07-23

## 2020-07-23 VITALS
HEIGHT: 68 IN | DIASTOLIC BLOOD PRESSURE: 68 MMHG | TEMPERATURE: 98.5 F | RESPIRATION RATE: 16 BRPM | WEIGHT: 269 LBS | BODY MASS INDEX: 40.77 KG/M2 | SYSTOLIC BLOOD PRESSURE: 124 MMHG | HEART RATE: 74 BPM | OXYGEN SATURATION: 96 %

## 2020-07-23 DIAGNOSIS — I25.729 CORONARY ARTERY DISEASE INVOLVING AUTOLOGOUS ARTERY CORONARY BYPASS GRAFT WITH ANGINA PECTORIS (HCC): Primary | ICD-10-CM

## 2020-07-23 DIAGNOSIS — I10 ESSENTIAL HYPERTENSION: ICD-10-CM

## 2020-07-23 DIAGNOSIS — D68.9 COAGULOPATHY (HCC): ICD-10-CM

## 2020-07-23 DIAGNOSIS — N20.0 KIDNEY STONE: Primary | ICD-10-CM

## 2020-07-23 DIAGNOSIS — I49.5 SICK SINUS SYNDROME (HCC): ICD-10-CM

## 2020-07-23 DIAGNOSIS — R10.9 FLANK PAIN: ICD-10-CM

## 2020-07-23 DIAGNOSIS — E78.2 MIXED HYPERLIPIDEMIA: ICD-10-CM

## 2020-07-23 PROCEDURE — 99214 OFFICE O/P EST MOD 30 MIN: CPT | Performed by: FAMILY MEDICINE

## 2020-07-23 NOTE — TELEPHONE ENCOUNTER
----- Message from Fern Hanane sent at 7/23/2020  2:48 PM CDT -----  Regarding: order  Dr. Shanks pt    Please put in order for RUDDY.

## 2020-07-23 NOTE — PROGRESS NOTES
Subjective   Carlo Velasco is a 74 y.o. male.     Chief Complaint   Patient presents with   • Follow-up       History of Present Illness     he thinks his bp is ok---denies any angina or dyspnea---tolerain lipitor witjout myagi as ----gerd symptoms are stabwel without dysphagia--his  xanax is contrlling his anxiety wit hthe lexapro--he is struggling with kidney stones and right flank pain  ---dr elise informed him he has a stone in the right kidney and he would like to see urology for it    Current Outpatient Medications:   •  ALPRAZolam (XANAX) 0.25 MG tablet, TAKE 1 TABLET BY MOUTH THREE TIMES DAILY, Disp: 90 tablet, Rfl: 0  •  aspirin 81 MG EC tablet, Take 81 mg by mouth daily.  , Disp: , Rfl:   •  atorvastatin (LIPITOR) 80 MG tablet, TAKE 1 TABLET BY MOUTH EVERY NIGHT, Disp: 30 tablet, Rfl: 5  •  Bacillus Coagulans-Inulin (PROBIOTIC FORMULA PO), Take  by mouth., Disp: , Rfl:   •  BIOTIN PO, Take  by mouth., Disp: , Rfl:   •  cetirizine (zyrTEC) 10 MG tablet, Take 10 mg by mouth., Disp: , Rfl:   •  clopidogrel (PLAVIX) 75 MG tablet, Take 1 tablet by mouth Daily., Disp: 90 tablet, Rfl: 3  •  donepezil (ARICEPT) 5 MG tablet, Take 1 tablet by mouth every night at bedtime., Disp: 90 tablet, Rfl: 3  •  escitalopram (LEXAPRO) 20 MG tablet, TAKE 1 TABLET BY MOUTH DAILY, Disp: 90 tablet, Rfl: 1  •  furosemide (LASIX) 40 MG tablet, Take 1 tablet by mouth Daily., Disp: 90 tablet, Rfl: 3  •  isosorbide mononitrate (IMDUR) 30 MG 24 hr tablet, Take 1 tablet by mouth Every Morning., Disp: 90 tablet, Rfl: 3  •  lisinopril (PRINIVIL,ZESTRIL) 5 MG tablet, Take 1 tablet by mouth Daily., Disp: 90 tablet, Rfl: 3  •  metoprolol tartrate (LOPRESSOR) 25 MG tablet, Take one tablet by mouth twice a day., Disp: 180 tablet, Rfl: 3  •  NITROSTAT 0.4 MG SL tablet, Place 1 tablet under the tongue Every 5 (Five) Minutes As Needed for Chest Pain. Take no more than 3 doses in 15 minutes., Disp: 100 tablet, Rfl: 2  •  omega-3 acid ethyl  esters (LOVAZA) 1 g capsule, Take 1 capsule by mouth Daily., Disp: 90 capsule, Rfl: 3  •  omeprazole (priLOSEC) 20 MG capsule, Take 1 capsule by mouth Daily., Disp: 90 capsule, Rfl: 3  •  Potassium 99 MG tablet, Take 1 tablet by mouth Daily., Disp: , Rfl:   •  pregabalin (LYRICA) 150 MG capsule, TAKE 1 CAPSULE BY MOUTH THREE TIMES DAILY, Disp: 270 capsule, Rfl: 0  •  sucralfate (CARAFATE) 1 g tablet, TK 1 T PO  AC AND HS, Disp: , Rfl: 0  Allergies   Allergen Reactions   • Meperidine Other (See Comments)     HEART STOPS     • Statins Myalgia     Causes leg cramps       Past Medical History:   Diagnosis Date   • Anxiety    • Arthritis    • Cancer (CMS/HCC)     melanoma   • CHF (congestive heart failure) (CMS/HCC)    • Claustrophobia    • Colon polyp    • Coronary artery disease 2012, 2015    2 vessel CABG (2012), SHAHRZAD LAD (2015).    • Depression    • Disease of thyroid gland     partial thyroidectomy on right   • GERD (gastroesophageal reflux disease)    • Hearing loss    • Heart attack (CMS/HCC)    • Heart attack (CMS/HCC)    • History of transfusion    • Hyperlipidemia    • Hypertension    • Kidney stone     history of    • Tobacco abuse, in remission    • Uses hearing aid     BILATERAL     Past Surgical History:   Procedure Laterality Date   • BACK SURGERY      CERVICAL FUSION AND LUMBAR DISC REPLACEMENT   • CARDIAC CATHETERIZATION  07/2015    SHAHRZAD mid Dr. Malu OL   • CARDIAC CATHETERIZATION N/A 10/21/2016    Procedure: Left Heart Cath;  Surgeon: Darian Toribio MD;  Location:  [facility];  Service:    • CARDIAC CATHETERIZATION Left 4/5/2017    Procedure: Cardiac Catheterization/Vascular Study;  Surgeon: Darian Toribio MD;  Location:  [facility];  Service:    • CARDIAC CATHETERIZATION N/A 4/5/2017    Procedure: Left Heart Cath;  Surgeon: Darian Toribio MD;  Location:  [facility];  Service:    • CARDIAC CATHETERIZATION N/A 4/5/2017    Procedure: Coronary angiography;   Surgeon: Darian Toribio MD;  Location:  [facility];  Service:    • CARDIAC CATHETERIZATION N/A 4/5/2017    Procedure: Left ventriculography;  Surgeon: Darian Toribio MD;  Location:  [facility];  Service:    • CARDIAC CATHETERIZATION  4/5/2017    Procedure: Saphenous Vein Graft;  Surgeon: Darian Toribio MD;  Location:  [facility];  Service:    • CARDIAC CATHETERIZATION Left 2/27/2018    Procedure: Cardiac Catheterization/Vascular Study SHAHRZAD OK PRN;  Surgeon: Darian Toribio MD;  Location:  [facility];  Service:    • CARDIAC CATHETERIZATION  2/27/2018    Procedure: Functional Flow Norris;  Surgeon: Darian Toribio MD;  Location:  [facility];  Service:    • CARDIAC CATHETERIZATION N/A 2/27/2018    Procedure: Left ventriculography;  Surgeon: Darian Toribio MD;  Location:  [facility];  Service:    • CARDIAC CATHETERIZATION Left 4/28/2020    Procedure: Cardiac Catheterization/Vascular Study SHAHRZAD OK PRN;  Surgeon: Darian Toribio MD;  Location:  [facility];  Service: Cardiology;  Laterality: Left;   • CARDIAC ELECTROPHYSIOLOGY PROCEDURE N/A 1/11/2018    Procedure: PPM generator change - dual;  Surgeon: Darian Toribio MD;  Location:  [facility];  Service:    • COLONOSCOPY  09/05/2007    colon polyps   • COLONOSCOPY N/A 5/29/2018    Procedure: COLONOSCOPY WITH ANESTHESIA;  Surgeon: Juan F Tapia MD;  Location: [facility] ENDOSCOPY;  Service: Gastroenterology   • CORONARY ANGIOPLASTY WITH STENT PLACEMENT  07/2015    SHAHRZAD mid Dr. Malu LO    • CORONARY ARTERY BYPASS GRAFT  2012 AND 2014    2 vessel    • ENDOSCOPY  11/08/2010   • ENDOSCOPY N/A 5/29/2018    Procedure: ESOPHAGOGASTRODUODENOSCOPY WITH ANESTHESIA;  Surgeon: Juan F Tapia MD;  Location: [facility] ENDOSCOPY;  Service: Gastroenterology   • HEAD/NECK LESION/CYST EXCISION Left 12/20/2016    Procedure: EXCISION MALIGNANT MELANOMA WITH SENTINEL  "NODE BIOPSY, INJECTION AND SCAN PRE-OP, LEFT EAR;  Surgeon: Guanaco Zepeda MD;  Location: [facility] OR;  Service:    • HEMORRHOIDECTOMY     • INSERT / REPLACE / REMOVE PACEMAKER     • SENTINEL NODE BIOPSY Left 12/20/2016    Procedure: SENTINEL NODE BIOPSY;  Surgeon: Guanaco Zepeda MD;  Location: [facility] OR;  Service:    • SKIN CANCER EXCISION     • THYROID SURGERY     • THYROIDECTOMY         Review of Systems   Constitutional: Negative.    HENT: Negative.    Eyes: Negative.    Respiratory: Negative.    Cardiovascular: Negative.    Gastrointestinal: Negative.    Endocrine: Negative.    Genitourinary: Positive for flank pain.   Musculoskeletal: Positive for back pain.   Skin: Negative.    Allergic/Immunologic: Negative.    Neurological: Negative.    Hematological: Negative.    Psychiatric/Behavioral: Negative.        Objective  /68   Pulse 74   Temp 98.5 °F (36.9 °C)   Resp 16   Ht 172.7 cm (67.99\")   Wt 122 kg (269 lb)   SpO2 96%   BMI 40.91 kg/m²   Physical Exam   Constitutional: He is oriented to person, place, and time. He appears well-developed and well-nourished.   HENT:   Head: Normocephalic and atraumatic.   Right Ear: External ear normal.   Left Ear: External ear normal.   Nose: Nose normal.   Mouth/Throat: Oropharynx is clear and moist.   Eyes: Pupils are equal, round, and reactive to light. Conjunctivae and EOM are normal.   Neck: Normal range of motion. Neck supple.   Cardiovascular: Normal rate, regular rhythm, normal heart sounds and intact distal pulses.   Pulmonary/Chest: Effort normal and breath sounds normal.   Abdominal: Soft. Bowel sounds are normal.   Musculoskeletal: Normal range of motion.   Neurological: He is alert and oriented to person, place, and time.   Skin: Skin is warm. Capillary refill takes less than 2 seconds.   Psychiatric: He has a normal mood and affect. His behavior is normal. Judgment and thought content normal.   Nursing note and vitals reviewed.      Assessment/Plan "   Carlo was seen today for follow-up.    Diagnoses and all orders for this visit:    Coronary artery disease involving autologous artery coronary bypass graft with angina pectoris (CMS/HCC)  -     Comprehensive metabolic panel  -     Lipid Panel With / Chol / HDL Ratio  -     Hepatitis C Antibody    Sick sinus syndrome (CMS/HCC)    Coagulopathy (CMS/HCC)    Flank pain  -     Ambulatory Referral to Urology  -     Comprehensive metabolic panel  -     Lipid Panel With / Chol / HDL Ratio  -     Hepatitis C Antibody    Essential hypertension  -     Comprehensive metabolic panel  -     Lipid Panel With / Chol / HDL Ratio  -     Hepatitis C Antibody    Mixed hyperlipidemia  -     Comprehensive metabolic panel  -     Lipid Panel With / Chol / HDL Ratio  -     Hepatitis C Antibody                 Orders Placed This Encounter   Procedures   • Comprehensive metabolic panel   • Lipid Panel With / Chol / HDL Ratio   • Hepatitis C Antibody   • Ambulatory Referral to Urology     Referral Priority:   Routine     Referral Type:   Consultation     Referral Reason:   Specialty Services Required     Requested Specialty:   Urology     Number of Visits Requested:   1       Follow up: 6 month(s)

## 2020-07-24 LAB
ALBUMIN SERPL-MCNC: 3.7 G/DL (ref 3.5–5.2)
ALBUMIN/GLOB SERPL: 1.6 G/DL
ALP SERPL-CCNC: 59 U/L (ref 39–117)
ALT SERPL-CCNC: 16 U/L (ref 1–41)
AST SERPL-CCNC: 16 U/L (ref 1–40)
BILIRUB SERPL-MCNC: 0.6 MG/DL (ref 0–1.2)
BUN SERPL-MCNC: 19 MG/DL (ref 8–23)
BUN/CREAT SERPL: 22.9 (ref 7–25)
CALCIUM SERPL-MCNC: 8.8 MG/DL (ref 8.6–10.5)
CHLORIDE SERPL-SCNC: 104 MMOL/L (ref 98–107)
CHOLEST SERPL-MCNC: 130 MG/DL (ref 0–200)
CHOLEST/HDLC SERPL: 2.71 {RATIO}
CO2 SERPL-SCNC: 28.7 MMOL/L (ref 22–29)
CREAT SERPL-MCNC: 0.83 MG/DL (ref 0.76–1.27)
GLOBULIN SER CALC-MCNC: 2.3 GM/DL
GLUCOSE SERPL-MCNC: 101 MG/DL (ref 65–99)
HCV AB S/CO SERPL IA: 0.1 S/CO RATIO (ref 0–0.9)
HDLC SERPL-MCNC: 48 MG/DL (ref 40–60)
LDLC SERPL CALC-MCNC: 60 MG/DL (ref 0–100)
POTASSIUM SERPL-SCNC: 4 MMOL/L (ref 3.5–5.2)
PROT SERPL-MCNC: 6 G/DL (ref 6–8.5)
SODIUM SERPL-SCNC: 142 MMOL/L (ref 136–145)
TRIGL SERPL-MCNC: 108 MG/DL (ref 0–150)
VLDLC SERPL CALC-MCNC: 21.6 MG/DL

## 2020-07-24 NOTE — PROGRESS NOTES
Subjective    Mr. Velasco is 74 y.o. male    Chief Complaint: Flank Pain    History of Present Illness    Urolithiasis  Patient complains of right flank pain with radiation to the groin. Onset of symptoms was gradual several years ago with stable course since that time. Patient describes the pain as sharp, intermittent and rated as moderate. The patient has had no nausea and no vomiting. There has been no fever or chills. The patient is complaining of dysuria, frequency, or urgency.  Previous management of stones includes spontaneous passage    The following portions of the patient's history were reviewed and updated as appropriate: allergies, current medications, past family history, past medical history, past social history, past surgical history and problem list.    Review of Systems   Constitutional: Negative for appetite change and fever.   HENT: Negative for hearing loss and sore throat.    Eyes: Negative for pain and redness.   Respiratory: Negative for cough and shortness of breath.    Cardiovascular: Negative for chest pain and leg swelling.   Gastrointestinal: Negative for anal bleeding, nausea and vomiting.   Endocrine: Negative for cold intolerance and heat intolerance.   Genitourinary: Positive for flank pain, frequency and urgency. Negative for dysuria and hematuria.   Musculoskeletal: Negative for joint swelling and myalgias.   Skin: Negative for color change and rash.   Allergic/Immunologic: Negative for immunocompromised state.   Neurological: Negative for dizziness and speech difficulty.   Hematological: Negative for adenopathy. Does not bruise/bleed easily.   Psychiatric/Behavioral: Negative for dysphoric mood and suicidal ideas.         Current Outpatient Medications:   •  ALPRAZolam (XANAX) 0.25 MG tablet, TAKE 1 TABLET BY MOUTH THREE TIMES DAILY, Disp: 90 tablet, Rfl: 0  •  aspirin 81 MG EC tablet, Take 81 mg by mouth daily.  , Disp: , Rfl:   •  atorvastatin (LIPITOR) 80 MG tablet, TAKE 1 TABLET  BY MOUTH EVERY NIGHT, Disp: 30 tablet, Rfl: 5  •  Bacillus Coagulans-Inulin (PROBIOTIC FORMULA PO), Take  by mouth., Disp: , Rfl:   •  BIOTIN PO, Take  by mouth., Disp: , Rfl:   •  cetirizine (zyrTEC) 10 MG tablet, Take 10 mg by mouth., Disp: , Rfl:   •  clopidogrel (PLAVIX) 75 MG tablet, Take 1 tablet by mouth Daily., Disp: 90 tablet, Rfl: 3  •  donepezil (ARICEPT) 5 MG tablet, Take 1 tablet by mouth every night at bedtime., Disp: 90 tablet, Rfl: 3  •  escitalopram (LEXAPRO) 20 MG tablet, TAKE 1 TABLET BY MOUTH DAILY, Disp: 90 tablet, Rfl: 1  •  furosemide (LASIX) 40 MG tablet, Take 1 tablet by mouth Daily., Disp: 90 tablet, Rfl: 3  •  isosorbide mononitrate (IMDUR) 30 MG 24 hr tablet, Take 1 tablet by mouth Every Morning., Disp: 90 tablet, Rfl: 3  •  lisinopril (PRINIVIL,ZESTRIL) 5 MG tablet, Take 1 tablet by mouth Daily., Disp: 90 tablet, Rfl: 3  •  metoprolol tartrate (LOPRESSOR) 25 MG tablet, Take one tablet by mouth twice a day., Disp: 180 tablet, Rfl: 3  •  NITROSTAT 0.4 MG SL tablet, Place 1 tablet under the tongue Every 5 (Five) Minutes As Needed for Chest Pain. Take no more than 3 doses in 15 minutes., Disp: 100 tablet, Rfl: 2  •  omega-3 acid ethyl esters (LOVAZA) 1 g capsule, Take 1 capsule by mouth Daily., Disp: 90 capsule, Rfl: 3  •  omeprazole (priLOSEC) 20 MG capsule, Take 1 capsule by mouth Daily., Disp: 90 capsule, Rfl: 3  •  Potassium 99 MG tablet, Take 1 tablet by mouth Daily., Disp: , Rfl:   •  pregabalin (LYRICA) 150 MG capsule, TAKE 1 CAPSULE BY MOUTH THREE TIMES DAILY, Disp: 270 capsule, Rfl: 0  •  sucralfate (CARAFATE) 1 g tablet, TK 1 T PO  AC AND HS, Disp: , Rfl: 0    Past Medical History:   Diagnosis Date   • Anxiety    • Arthritis    • Cancer (CMS/HCC)     melanoma   • CHF (congestive heart failure) (CMS/HCC)    • Claustrophobia    • Colon polyp    • Coronary artery disease 2012, 2015    2 vessel CABG (2012), SHAHRZAD LAD (2015).    • Depression    • Disease of thyroid gland     partial  thyroidectomy on right   • GERD (gastroesophageal reflux disease)    • Hearing loss    • Heart attack (CMS/HCC)    • Heart attack (CMS/HCC)    • History of transfusion    • Hyperlipidemia    • Hypertension    • Kidney stone     history of    • Tobacco abuse, in remission    • Uses hearing aid     BILATERAL       Past Surgical History:   Procedure Laterality Date   • BACK SURGERY      CERVICAL FUSION AND LUMBAR DISC REPLACEMENT   • CARDIAC CATHETERIZATION  07/2015    Dr. Malu Koch   • CARDIAC CATHETERIZATION N/A 10/21/2016    Procedure: Left Heart Cath;  Surgeon: Darian Toribio MD;  Location:  [facility];  Service:    • CARDIAC CATHETERIZATION Left 4/5/2017    Procedure: Cardiac Catheterization/Vascular Study;  Surgeon: Darian Toribio MD;  Location:  [facility];  Service:    • CARDIAC CATHETERIZATION N/A 4/5/2017    Procedure: Left Heart Cath;  Surgeon: Darian Toribio MD;  Location:  [facility];  Service:    • CARDIAC CATHETERIZATION N/A 4/5/2017    Procedure: Coronary angiography;  Surgeon: Darian Toribio MD;  Location:  [facility];  Service:    • CARDIAC CATHETERIZATION N/A 4/5/2017    Procedure: Left ventriculography;  Surgeon: Darian Toribio MD;  Location:  [facility];  Service:    • CARDIAC CATHETERIZATION  4/5/2017    Procedure: Saphenous Vein Graft;  Surgeon: Darian Toribio MD;  Location:  [facility];  Service:    • CARDIAC CATHETERIZATION Left 2/27/2018    Procedure: Cardiac Catheterization/Vascular Study SHAHRZAD OK PRN;  Surgeon: Darian Toribio MD;  Location:  [facility];  Service:    • CARDIAC CATHETERIZATION  2/27/2018    Procedure: Functional Flow Wilkinson;  Surgeon: Darian Toribio MD;  Location:  [facility];  Service:    • CARDIAC CATHETERIZATION N/A 2/27/2018    Procedure: Left ventriculography;  Surgeon: Darian Toribio MD;  Location:  [facility];  Service:     • CARDIAC CATHETERIZATION Left 2020    Procedure: Cardiac Catheterization/Vascular Study SHAHRZAD OK PRN;  Surgeon: Darian Toribio MD;  Location: [facility] CATH INVASIVE LOCATION;  Service: Cardiology;  Laterality: Left;   • CARDIAC ELECTROPHYSIOLOGY PROCEDURE N/A 2018    Procedure: PPM generator change - dual;  Surgeon: Darian Toribio MD;  Location: [facility] CATH INVASIVE LOCATION;  Service:    • COLONOSCOPY  2007    colon polyps   • COLONOSCOPY N/A 2018    Procedure: COLONOSCOPY WITH ANESTHESIA;  Surgeon: Juan F Tapia MD;  Location: [facility] ENDOSCOPY;  Service: Gastroenterology   • CORONARY ANGIOPLASTY WITH STENT PLACEMENT  2015    SHAHRZAD mid LAD, Dr. Toribio    • CORONARY ARTERY BYPASS GRAFT   AND     2 vessel    • ENDOSCOPY  2010   • ENDOSCOPY N/A 2018    Procedure: ESOPHAGOGASTRODUODENOSCOPY WITH ANESTHESIA;  Surgeon: Juan F Tapia MD;  Location: [facility] ENDOSCOPY;  Service: Gastroenterology   • HEAD/NECK LESION/CYST EXCISION Left 2016    Procedure: EXCISION MALIGNANT MELANOMA WITH SENTINEL NODE BIOPSY, INJECTION AND SCAN PRE-OP, LEFT EAR;  Surgeon: Guanaco Zepeda MD;  Location: [facility] OR;  Service:    • HEMORRHOIDECTOMY     • INSERT / REPLACE / REMOVE PACEMAKER     • SENTINEL NODE BIOPSY Left 2016    Procedure: SENTINEL NODE BIOPSY;  Surgeon: Guanaco Zepeda MD;  Location: [facility] OR;  Service:    • SKIN CANCER EXCISION     • THYROID SURGERY     • THYROIDECTOMY         Social History     Socioeconomic History   • Marital status:      Spouse name: Not on file   • Number of children: Not on file   • Years of education: Not on file   • Highest education level: Not on file   Tobacco Use   • Smoking status: Former Smoker     Years: 10.00     Types: Cigarettes     Last attempt to quit:      Years since quittin.5   • Smokeless tobacco: Former User     Types: Snuff     Quit date:    Substance and Sexual Activity   • Alcohol use: No   • Drug use: No   •  "Sexual activity: Defer       Family History   Problem Relation Age of Onset   • Heart failure Mother    • Stroke Mother    • Dementia Mother    • Coronary artery disease Father    • Colon polyps Father    • COPD Brother    • Colon cancer Neg Hx        Objective    Temp 97.4 °F (36.3 °C) (Temporal)   Ht 172.7 cm (68\")   Wt 123 kg (270 lb 9.6 oz)   BMI 41.14 kg/m²     Physical Exam   Constitutional: He is oriented to person, place, and time. He appears well-developed and well-nourished. No distress.   HENT:   Head: Normocephalic and atraumatic.   Right Ear: External ear and ear canal normal.   Left Ear: External ear and ear canal normal.   Nose: No nasal deformity. No epistaxis.   Mouth/Throat: Oropharynx is clear and moist. Mucous membranes are not pale, not dry and not cyanotic. Normal dentition. No oropharyngeal exudate.   Neck: Trachea normal. No tracheal tenderness present. No tracheal deviation present. No thyroid mass and no thyromegaly present.   Pulmonary/Chest: Effort normal. No accessory muscle usage. No respiratory distress. Chest wall is not dull to percussion (No flatness or hyperresonance). He exhibits no mass and no tenderness.   On palpation, no tactile fremitus. All movements are symmetric. No intercostal retraction noted.    Abdominal: Soft. Normal appearance. He exhibits no distension and no mass. There is no hepatosplenomegaly. There is no tenderness. No hernia.   Rectal examination or stool specimen is not indicated.    Musculoskeletal:   Normal gait and station. The spine, ribs, and pelvis are examined. No obvious misalignment or asymmetry. ROM is reasonable for age. No instability. No obvious atrophy, flaccidity or spasticity.    Lymphadenopathy:     He has no cervical adenopathy.        Right: No inguinal adenopathy present.        Left: No inguinal adenopathy present.   Neurological: He is alert and oriented to person, place, and time.   Skin: Skin is warm, dry and intact. No lesion and no " rash noted. He is not diaphoretic. No cyanosis. No pallor. Nails show no clubbing.   On palpation, there were no induration, subcutaneous nodules, or tightening   Psychiatric: His speech is normal and behavior is normal. Judgment and thought content normal. His mood appears not anxious. His affect is not labile. He does not exhibit a depressed mood.   Vitals reviewed.      CT independent review  The CT scan of the abdomen/pelvis done without contrast is available for me to review.  Treatment recommendations require an independent review.  First I scanned the liver, spleen, and bowel pattern.  The retroperitoneum including the major vessels and lymphatic packages are briefly reviewed.  This film as been reviewed by the radiologist to determine any non urologic abnormalities that are present.  The kidneys are closely inspected for size, symmetry, contour, parenchymal thickness, perinephric reaction, presence of calcifications, and intrarenal dilation of the collecting system.  The ureters are inspected for their course, caliber, and any calcifications.  The bladder is inspected for its thickness, size, and presence of any calcifications.  This scan shows:    The right kidney appears non obstructing stone right kidney    The left kidney appears non obstructing stone left kidney    The bladder appears normal on this non-contrasted CT scan.  The bladder appears normal in thickness.  There no masses or stones seen on this exam.    KUB independent review    A KUB is available for me to review today.  The image is inspected for a bowel gas pattern and the general bone structure of the spine and pelvis. The kidneys are then inspected closely.  Renal outline is noted if identifiable. The kidney, collecting system, and anticipated path of the ureter are examined for calcifications including those in the true pelvis.  This film reveals:    On the right there are no calcificaitons seen in the kidney or the expected course of the  ureter. .    On the left there are no calcificaitons seen in the kidney or the expected course of the ureter. .      Results for orders placed or performed in visit on 07/23/20   Comprehensive metabolic panel   Result Value Ref Range    Glucose 101 (H) 65 - 99 mg/dL    BUN 19 8 - 23 mg/dL    Creatinine 0.83 0.76 - 1.27 mg/dL    eGFR Non African Am 91 >60 mL/min/1.73    eGFR African Am 110 >60 mL/min/1.73    BUN/Creatinine Ratio 22.9 7.0 - 25.0    Sodium 142 136 - 145 mmol/L    Potassium 4.0 3.5 - 5.2 mmol/L    Chloride 104 98 - 107 mmol/L    Total CO2 28.7 22.0 - 29.0 mmol/L    Calcium 8.8 8.6 - 10.5 mg/dL    Total Protein 6.0 6.0 - 8.5 g/dL    Albumin 3.70 3.50 - 5.20 g/dL    Globulin 2.3 gm/dL    A/G Ratio 1.6 g/dL    Total Bilirubin 0.6 0.0 - 1.2 mg/dL    Alkaline Phosphatase 59 39 - 117 U/L    AST (SGOT) 16 1 - 40 U/L    ALT (SGPT) 16 1 - 41 U/L   Lipid Panel With / Chol / HDL Ratio   Result Value Ref Range    Total Cholesterol 130 0 - 200 mg/dL    Triglycerides 108 0 - 150 mg/dL    HDL Cholesterol 48 40 - 60 mg/dL    VLDL Cholesterol 21.6 mg/dL    LDL Cholesterol  60 0 - 100 mg/dL    Chol/HDL Ratio 2.71    Hepatitis C Antibody   Result Value Ref Range    Hep C Virus Ab 0.1 0.0 - 0.9 s/co ratio     Assessment and Plan    Diagnoses and all orders for this visit:    Kidney stone  -     Cancel: POC Urinalysis Dipstick, Multipro    Right flank pain    Patient with right-sided flank pain that is worse with movement.  He states that this is been going on for several years.  He has bilateral nonobstructing stones.  I do not feel that these stones are the source of his pain.  I am unable to see these on KUB I would not recommend ESWL.  He will follow-up with me on a as needed basis.

## 2020-07-27 ENCOUNTER — OFFICE VISIT (OUTPATIENT)
Dept: UROLOGY | Facility: CLINIC | Age: 75
End: 2020-07-27

## 2020-07-27 ENCOUNTER — HOSPITAL ENCOUNTER (OUTPATIENT)
Dept: GENERAL RADIOLOGY | Facility: HOSPITAL | Age: 75
Discharge: HOME | End: 2020-07-27
Admitting: UROLOGY

## 2020-07-27 VITALS — TEMPERATURE: 97.4 F | BODY MASS INDEX: 41.01 KG/M2 | HEIGHT: 68 IN | WEIGHT: 270.6 LBS

## 2020-07-27 DIAGNOSIS — R10.9 RIGHT FLANK PAIN: ICD-10-CM

## 2020-07-27 DIAGNOSIS — N20.0 KIDNEY STONE: Primary | ICD-10-CM

## 2020-07-27 PROCEDURE — 74018 RADEX ABDOMEN 1 VIEW: CPT

## 2020-07-27 PROCEDURE — 99203 OFFICE O/P NEW LOW 30 MIN: CPT | Performed by: UROLOGY

## 2020-07-28 ENCOUNTER — HOSPITAL ENCOUNTER (OUTPATIENT)
Dept: PAIN MANAGEMENT | Age: 75
Discharge: HOME | End: 2020-07-28
Payer: MEDICARE

## 2020-07-28 VITALS
TEMPERATURE: 96.3 F | RESPIRATION RATE: 18 BRPM | OXYGEN SATURATION: 95 % | SYSTOLIC BLOOD PRESSURE: 130 MMHG | HEART RATE: 80 BPM | DIASTOLIC BLOOD PRESSURE: 74 MMHG

## 2020-07-28 PROCEDURE — G0260 INJ FOR SACROILIAC JT ANESTH: HCPCS

## 2020-07-28 PROCEDURE — 6360000002 HC RX W HCPCS

## 2020-07-28 PROCEDURE — 3209999900 FLUORO FOR SURGICAL PROCEDURES

## 2020-07-28 PROCEDURE — 2580000003 HC RX 258

## 2020-07-28 PROCEDURE — 2500000003 HC RX 250 WO HCPCS

## 2020-07-28 RX ORDER — METHYLPREDNISOLONE ACETATE 80 MG/ML
INJECTION, SUSPENSION INTRA-ARTICULAR; INTRALESIONAL; INTRAMUSCULAR; SOFT TISSUE
Status: COMPLETED | OUTPATIENT
Start: 2020-07-28 | End: 2020-07-28

## 2020-07-28 RX ORDER — BUPIVACAINE HYDROCHLORIDE 5 MG/ML
INJECTION, SOLUTION EPIDURAL; INTRACAUDAL
Status: COMPLETED | OUTPATIENT
Start: 2020-07-28 | End: 2020-07-28

## 2020-07-28 RX ORDER — LIDOCAINE HYDROCHLORIDE 10 MG/ML
INJECTION, SOLUTION EPIDURAL; INFILTRATION; INTRACAUDAL; PERINEURAL
Status: COMPLETED | OUTPATIENT
Start: 2020-07-28 | End: 2020-07-28

## 2020-07-28 RX ORDER — SODIUM CHLORIDE 9 MG/ML
INJECTION INTRAVENOUS
Status: COMPLETED | OUTPATIENT
Start: 2020-07-28 | End: 2020-07-28

## 2020-07-28 RX ADMIN — METHYLPREDNISOLONE ACETATE 80 MG: 80 INJECTION, SUSPENSION INTRA-ARTICULAR; INTRALESIONAL; INTRAMUSCULAR; SOFT TISSUE at 09:22

## 2020-07-28 RX ADMIN — LIDOCAINE HYDROCHLORIDE 7 ML: 10 INJECTION, SOLUTION EPIDURAL; INFILTRATION; INTRACAUDAL; PERINEURAL at 09:21

## 2020-07-28 RX ADMIN — BUPIVACAINE HYDROCHLORIDE 2 ML: 5 INJECTION, SOLUTION EPIDURAL; INTRACAUDAL at 09:21

## 2020-07-28 RX ADMIN — SODIUM CHLORIDE 3 ML: 9 INJECTION INTRAVENOUS at 09:22

## 2020-07-28 ASSESSMENT — PAIN DESCRIPTION - ORIENTATION: ORIENTATION: LEFT

## 2020-07-28 ASSESSMENT — PAIN DESCRIPTION - LOCATION: LOCATION: BACK;HIP

## 2020-07-28 ASSESSMENT — PAIN DESCRIPTION - FREQUENCY: FREQUENCY: CONTINUOUS

## 2020-07-28 ASSESSMENT — PAIN DESCRIPTION - PROGRESSION: CLINICAL_PROGRESSION: GRADUALLY IMPROVING

## 2020-07-28 ASSESSMENT — PAIN - FUNCTIONAL ASSESSMENT: PAIN_FUNCTIONAL_ASSESSMENT: PREVENTS OR INTERFERES SOME ACTIVE ACTIVITIES AND ADLS

## 2020-07-28 ASSESSMENT — PAIN DESCRIPTION - DESCRIPTORS: DESCRIPTORS: BURNING;NUMBNESS

## 2020-07-28 ASSESSMENT — PAIN DESCRIPTION - PAIN TYPE: TYPE: CHRONIC PAIN

## 2020-07-28 ASSESSMENT — PAIN SCALES - GENERAL: PAINLEVEL_OUTOF10: 5

## 2020-07-28 ASSESSMENT — PAIN DESCRIPTION - ONSET: ONSET: AWAKENED FROM SLEEP

## 2020-07-28 NOTE — INTERVAL H&P NOTE
Update History & Physical    The patient's History and Physical was reviewed with the patient and I examined the patient. There was NO CHANGE:[id number]}. The surgical site was confirmed by the patient and me. Plan: The risks, benefits, expected outcome, and alternative to the recommended procedure have been discussed with the patient. Patient understands and wants to proceed with the procedure.      Electronically signed by Julian Betancourt MD on 7/28/2020 at 10:50 AM

## 2020-08-26 RX ORDER — OMEGA-3-ACID ETHYL ESTERS 1 G/1
1 CAPSULE, LIQUID FILLED ORAL DAILY
Qty: 90 CAPSULE | Refills: 3 | Status: SHIPPED | OUTPATIENT
Start: 2020-08-26 | End: 2021-09-07

## 2020-09-01 ENCOUNTER — HOSPITAL ENCOUNTER (OUTPATIENT)
Dept: PAIN MANAGEMENT | Age: 75
Discharge: HOME | End: 2020-09-01
Payer: MEDICARE

## 2020-09-01 VITALS
RESPIRATION RATE: 18 BRPM | TEMPERATURE: 98.3 F | HEART RATE: 63 BPM | DIASTOLIC BLOOD PRESSURE: 65 MMHG | SYSTOLIC BLOOD PRESSURE: 108 MMHG | OXYGEN SATURATION: 96 %

## 2020-09-01 PROCEDURE — 6360000002 HC RX W HCPCS

## 2020-09-01 PROCEDURE — 2500000003 HC RX 250 WO HCPCS

## 2020-09-01 PROCEDURE — G0260 INJ FOR SACROILIAC JT ANESTH: HCPCS

## 2020-09-01 PROCEDURE — 2580000003 HC RX 258

## 2020-09-01 PROCEDURE — 3209999900 FLUORO FOR SURGICAL PROCEDURES

## 2020-09-01 RX ORDER — SODIUM CHLORIDE 9 MG/ML
INJECTION INTRAVENOUS
Status: COMPLETED | OUTPATIENT
Start: 2020-09-01 | End: 2020-09-01

## 2020-09-01 RX ORDER — METHYLPREDNISOLONE ACETATE 80 MG/ML
INJECTION, SUSPENSION INTRA-ARTICULAR; INTRALESIONAL; INTRAMUSCULAR; SOFT TISSUE
Status: COMPLETED | OUTPATIENT
Start: 2020-09-01 | End: 2020-09-01

## 2020-09-01 RX ORDER — LIDOCAINE HYDROCHLORIDE 10 MG/ML
INJECTION, SOLUTION EPIDURAL; INFILTRATION; INTRACAUDAL; PERINEURAL
Status: COMPLETED | OUTPATIENT
Start: 2020-09-01 | End: 2020-09-01

## 2020-09-01 RX ORDER — BUPIVACAINE HYDROCHLORIDE 5 MG/ML
INJECTION, SOLUTION EPIDURAL; INTRACAUDAL
Status: COMPLETED | OUTPATIENT
Start: 2020-09-01 | End: 2020-09-01

## 2020-09-01 RX ADMIN — METHYLPREDNISOLONE ACETATE 80 MG: 80 INJECTION, SUSPENSION INTRA-ARTICULAR; INTRALESIONAL; INTRAMUSCULAR; SOFT TISSUE at 08:04

## 2020-09-01 RX ADMIN — LIDOCAINE HYDROCHLORIDE 7 ML: 10 INJECTION, SOLUTION EPIDURAL; INFILTRATION; INTRACAUDAL; PERINEURAL at 08:04

## 2020-09-01 RX ADMIN — BUPIVACAINE HYDROCHLORIDE 2 ML: 5 INJECTION, SOLUTION EPIDURAL; INTRACAUDAL at 08:04

## 2020-09-01 RX ADMIN — SODIUM CHLORIDE 3 ML: 9 INJECTION INTRAVENOUS at 08:05

## 2020-09-10 RX ORDER — CLOPIDOGREL BISULFATE 75 MG/1
75 TABLET ORAL DAILY
Qty: 90 TABLET | Refills: 3 | Status: ON HOLD | OUTPATIENT
Start: 2020-09-10 | End: 2021-05-01

## 2020-10-12 ENCOUNTER — APPOINTMENT (OUTPATIENT)
Dept: CT IMAGING | Facility: HOSPITAL | Age: 75
End: 2020-10-12

## 2020-10-12 ENCOUNTER — HOSPITAL ENCOUNTER (EMERGENCY)
Facility: HOSPITAL | Age: 75
Discharge: HOME | End: 2020-10-12
Admitting: FAMILY MEDICINE

## 2020-10-12 VITALS
HEIGHT: 68 IN | OXYGEN SATURATION: 96 % | BODY MASS INDEX: 39.71 KG/M2 | DIASTOLIC BLOOD PRESSURE: 77 MMHG | RESPIRATION RATE: 16 BRPM | SYSTOLIC BLOOD PRESSURE: 120 MMHG | TEMPERATURE: 97.8 F | HEART RATE: 66 BPM | WEIGHT: 262 LBS

## 2020-10-12 DIAGNOSIS — S09.90XA INJURY OF HEAD, INITIAL ENCOUNTER: Primary | ICD-10-CM

## 2020-10-12 PROCEDURE — 72125 CT NECK SPINE W/O DYE: CPT

## 2020-10-12 PROCEDURE — 70450 CT HEAD/BRAIN W/O DYE: CPT

## 2020-10-12 PROCEDURE — 99284 EMERGENCY DEPT VISIT MOD MDM: CPT

## 2020-10-12 PROCEDURE — 99283 EMERGENCY DEPT VISIT LOW MDM: CPT

## 2020-10-12 PROCEDURE — 70486 CT MAXILLOFACIAL W/O DYE: CPT

## 2020-10-12 RX ORDER — SODIUM CHLORIDE 0.9 % (FLUSH) 0.9 %
10 SYRINGE (ML) INJECTION AS NEEDED
Status: DISCONTINUED | OUTPATIENT
Start: 2020-10-12 | End: 2020-10-12 | Stop reason: HOSPADM

## 2020-10-12 RX ORDER — MECLIZINE HYDROCHLORIDE 25 MG/1
25 TABLET ORAL ONCE
Status: COMPLETED | OUTPATIENT
Start: 2020-10-12 | End: 2020-10-12

## 2020-10-12 RX ADMIN — MECLIZINE HYDROCHLORIDE 25 MG: 25 TABLET ORAL at 16:11

## 2020-10-14 ENCOUNTER — OFFICE VISIT (OUTPATIENT)
Dept: CARDIOLOGY | Facility: CLINIC | Age: 75
End: 2020-10-14

## 2020-10-14 VITALS
BODY MASS INDEX: 39.71 KG/M2 | SYSTOLIC BLOOD PRESSURE: 90 MMHG | HEIGHT: 68 IN | DIASTOLIC BLOOD PRESSURE: 58 MMHG | HEART RATE: 62 BPM | OXYGEN SATURATION: 98 % | WEIGHT: 262 LBS

## 2020-10-14 DIAGNOSIS — Z95.5 STENTED CORONARY ARTERY: ICD-10-CM

## 2020-10-14 DIAGNOSIS — Z95.1 HX OF CABG: Primary | ICD-10-CM

## 2020-10-14 DIAGNOSIS — E78.2 MIXED HYPERLIPIDEMIA: ICD-10-CM

## 2020-10-14 DIAGNOSIS — E66.01 MORBIDLY OBESE (HCC): ICD-10-CM

## 2020-10-14 DIAGNOSIS — I10 ESSENTIAL HYPERTENSION: ICD-10-CM

## 2020-10-14 DIAGNOSIS — I49.5 SICK SINUS SYNDROME (HCC): ICD-10-CM

## 2020-10-14 DIAGNOSIS — R13.19 ESOPHAGEAL DYSPHAGIA: ICD-10-CM

## 2020-10-14 PROCEDURE — 99213 OFFICE O/P EST LOW 20 MIN: CPT | Performed by: INTERNAL MEDICINE

## 2020-10-14 NOTE — PROGRESS NOTES
Carlo Velasco  [id number]  1945  75 y.o.  male    Referring Provider: Rhys Rosen MD    Reason for Follow-up Visit:      Here for routine follow up   Last visit in June   Now started having chest pain   coronary artery disease Coronary artery bypass grafting , stented coronary artery   sick sinus syndrome s/p pacemaker   Cardiac workup test results as below      Subjective    Now with chest pains   Says at times pleuritic   Gets several times a week  Lasts for 1-2 hours    He feels chest pain is not particularly with exertion  Feels like a bubble  No nausea  Some diaphoresis    Started for 3-4 months       No associated diaphoresis  He feels pain similar as last visit     Joint pain in small, medium and large joints  Effort tolerance limited more by orthopedic rather than cardiac related issues, therefore difficult to assess functional capacity.      More active     History of present illness:  Carlo Velasco is a 75 y.o. yo male with history of coronary artery disease Coronary artery bypass grafting , stented coronary artery sick sinus syndrome s/p pacemaker who presents today for   Chief Complaint   Patient presents with   • Coronary Artery Disease     3 mo f/u   • Chest Pain   .    History  Past Medical History:   Diagnosis Date   • Anxiety    • Arthritis    • Cancer (CMS/HCC)     melanoma   • CHF (congestive heart failure) (CMS/HCC)    • Claustrophobia    • Colon polyp    • Coronary artery disease 2012, 2015    2 vessel CABG (2012), SHAHRZAD LAD (2015).    • Depression    • Disease of thyroid gland     partial thyroidectomy on right   • GERD (gastroesophageal reflux disease)    • Hearing loss    • Heart attack (CMS/HCC)    • Heart attack (CMS/HCC)    • History of transfusion    • Hyperlipidemia    • Hypertension    • Kidney stone     history of    • Tobacco abuse, in remission    • Uses hearing aid     BILATERAL   ,   Past Surgical History:   Procedure Laterality Date   • BACK SURGERY      CERVICAL  FUSION AND LUMBAR DISC REPLACEMENT   • CARDIAC CATHETERIZATION  07/2015    SHAHRZAD mid LAD, Dr. Toribio   • CARDIAC CATHETERIZATION N/A 10/21/2016    Procedure: Left Heart Cath;  Surgeon: Darian Toribio MD;  Location:  [facility];  Service:    • CARDIAC CATHETERIZATION Left 4/5/2017    Procedure: Cardiac Catheterization/Vascular Study;  Surgeon: Darian Toribio MD;  Location:  [facility];  Service:    • CARDIAC CATHETERIZATION N/A 4/5/2017    Procedure: Left Heart Cath;  Surgeon: Darian Toribio MD;  Location:  [facility];  Service:    • CARDIAC CATHETERIZATION N/A 4/5/2017    Procedure: Coronary angiography;  Surgeon: Darian Toribio MD;  Location:  [facility];  Service:    • CARDIAC CATHETERIZATION N/A 4/5/2017    Procedure: Left ventriculography;  Surgeon: Darian Toribio MD;  Location:  [facility];  Service:    • CARDIAC CATHETERIZATION  4/5/2017    Procedure: Saphenous Vein Graft;  Surgeon: Darian Toribio MD;  Location:  [facility];  Service:    • CARDIAC CATHETERIZATION Left 2/27/2018    Procedure: Cardiac Catheterization/Vascular Study SHAHRZAD OK PRN;  Surgeon: Darian Toribio MD;  Location:  [facility];  Service:    • CARDIAC CATHETERIZATION  2/27/2018    Procedure: Functional Flow Viola;  Surgeon: Darian Toribio MD;  Location:  [facility];  Service:    • CARDIAC CATHETERIZATION N/A 2/27/2018    Procedure: Left ventriculography;  Surgeon: Darian Toribio MD;  Location:  [facility];  Service:    • CARDIAC CATHETERIZATION Left 4/28/2020    Procedure: Cardiac Catheterization/Vascular Study SHAHRZAD OK PRN;  Surgeon: Darian Toribio MD;  Location:  [facility];  Service: Cardiology;  Laterality: Left;   • CARDIAC ELECTROPHYSIOLOGY PROCEDURE N/A 1/11/2018    Procedure: PPM generator change - dual;  Surgeon: Darian Toribio MD;  Location:  [facility];  Service:     • COLONOSCOPY  2007    colon polyps   • COLONOSCOPY N/A 2018    Procedure: COLONOSCOPY WITH ANESTHESIA;  Surgeon: Juan F Tapia MD;  Location: [facility] ENDOSCOPY;  Service: Gastroenterology   • CORONARY ANGIOPLASTY WITH STENT PLACEMENT  2015    SHAHRAZD mid LAD, Dr. Toribio    • CORONARY ARTERY BYPASS GRAFT   AND     2 vessel    • ENDOSCOPY  2010   • ENDOSCOPY N/A 2018    Procedure: ESOPHAGOGASTRODUODENOSCOPY WITH ANESTHESIA;  Surgeon: Juan F Tapia MD;  Location: [facility] ENDOSCOPY;  Service: Gastroenterology   • HEAD/NECK LESION/CYST EXCISION Left 2016    Procedure: EXCISION MALIGNANT MELANOMA WITH SENTINEL NODE BIOPSY, INJECTION AND SCAN PRE-OP, LEFT EAR;  Surgeon: Guanaco Zepeda MD;  Location: [facility] OR;  Service:    • HEMORRHOIDECTOMY     • INSERT / REPLACE / REMOVE PACEMAKER     • SENTINEL NODE BIOPSY Left 2016    Procedure: SENTINEL NODE BIOPSY;  Surgeon: Guanaco Zepeda MD;  Location: [facility] OR;  Service:    • SKIN CANCER EXCISION     • THYROID SURGERY     • THYROIDECTOMY     ,   Family History   Problem Relation Age of Onset   • Heart failure Mother    • Stroke Mother    • Dementia Mother    • Coronary artery disease Father    • Colon polyps Father    • COPD Brother    • Colon cancer Neg Hx    ,   Social History     Tobacco Use   • Smoking status: Former Smoker     Years: 10.00     Types: Cigarettes     Quit date:      Years since quittin.8   • Smokeless tobacco: Former User     Types: Snuff     Quit date:    Substance Use Topics   • Alcohol use: No   • Drug use: No   ,     Medications  Current Outpatient Medications   Medication Sig Dispense Refill   • ALPRAZolam (XANAX) 0.25 MG tablet TAKE 1 TABLET BY MOUTH THREE TIMES DAILY 90 tablet 0   • aspirin 81 MG EC tablet Take 81 mg by mouth daily.       • atorvastatin (LIPITOR) 80 MG tablet TAKE 1 TABLET BY MOUTH EVERY NIGHT 30 tablet 5   • Bacillus Coagulans-Inulin (PROBIOTIC FORMULA PO) Take  by mouth.      • BIOTIN PO Take  by mouth.     • cetirizine (zyrTEC) 10 MG tablet Take 10 mg by mouth.     • clopidogrel (PLAVIX) 75 MG tablet TAKE 1 TABLET BY MOUTH DAILY 90 tablet 3   • donepezil (ARICEPT) 5 MG tablet Take 1 tablet by mouth every night at bedtime. 90 tablet 3   • escitalopram (LEXAPRO) 20 MG tablet TAKE 1 TABLET BY MOUTH DAILY 90 tablet 1   • furosemide (LASIX) 40 MG tablet Take 1 tablet by mouth Daily. 90 tablet 3   • isosorbide mononitrate (IMDUR) 30 MG 24 hr tablet Take 1 tablet by mouth Every Morning. 90 tablet 3   • lisinopril (PRINIVIL,ZESTRIL) 5 MG tablet Take 1 tablet by mouth Daily. 90 tablet 3   • metoprolol tartrate (LOPRESSOR) 25 MG tablet Take one tablet by mouth twice a day. 180 tablet 3   • NITROSTAT 0.4 MG SL tablet Place 1 tablet under the tongue Every 5 (Five) Minutes As Needed for Chest Pain. Take no more than 3 doses in 15 minutes. 100 tablet 2   • omega-3 acid ethyl esters (LOVAZA) 1 g capsule Take 1 capsule by mouth Daily. 90 capsule 3   • omeprazole (priLOSEC) 20 MG capsule Take 1 capsule by mouth Daily. 90 capsule 3   • Potassium 99 MG tablet Take 1 tablet by mouth Daily.     • pregabalin (LYRICA) 150 MG capsule TAKE 1 CAPSULE BY MOUTH THREE TIMES DAILY 270 capsule 0   • sucralfate (CARAFATE) 1 g tablet TK 1 T PO  AC AND HS  0     No current facility-administered medications for this visit.        Allergies:  Meperidine and Statins    Review of Systems  Review of Systems   Constitution: Positive for malaise/fatigue.   HENT: Negative.    Eyes: Negative.    Cardiovascular: Positive for chest pain and dyspnea on exertion. Negative for claudication, cyanosis, irregular heartbeat, leg swelling, near-syncope, orthopnea, palpitations, paroxysmal nocturnal dyspnea and syncope.   Respiratory: Negative.    Endocrine: Negative.    Hematologic/Lymphatic: Negative.    Skin: Negative.    Musculoskeletal: Positive for arthritis and back pain.   Gastrointestinal: Negative for anorexia.   Genitourinary:  "Negative.    Neurological: Negative for weakness.   Psychiatric/Behavioral: Negative.    Same review of system and physical exam as last visit        Objective     Physical Exam:  BP 90/58   Pulse 62   Ht 172.7 cm (67.99\")   Wt 119 kg (262 lb)   SpO2 98%   BMI 39.85 kg/m²   Physical Exam   Constitutional: He appears well-developed.   HENT:   Head: Normocephalic.   Neck: Normal carotid pulses and no JVD present. No tracheal tenderness present. Carotid bruit is not present. No tracheal deviation and no edema present.   Cardiovascular: Regular rhythm, normal heart sounds and normal pulses.   Pulmonary/Chest: Effort normal. No stridor.   Abdominal: Soft. He exhibits no distension. There is no abdominal tenderness.   Neurological: He is alert. No cranial nerve deficit or sensory deficit.   Skin: Skin is warm.   Psychiatric: His speech is normal and behavior is normal.       Results Review:      Conclusion of cardiac catheterization    4/28/2020     Left ventricle ejection fraction 45%  Moderately elevated left ventricular end-diastolic pressure of 19 mmHg  Atretic left intramammary artery graft  Patent stents in the left anterior descending coronary artery  60% stenosis chronic of diagonal branch unchanged from prior cardiac catheterization  95% in-stent restenosis of first obtuse marginal branch treated with PTCA and then implantation of 2.5 x 12 mm Synergy stent with 0% residual stenosis  70% stenosis of distal right coronary artery  Widely patent saphenous venous graft to the distal right coronary artery        ____________________________________________________________________________________________________________________________________________     Plan after cardiac catheterization        Dual antiplatelet therapy minimum of 1 year  Continue on other medication including statin and beta-blockers  Aspirin for the rest of his life  Add ACE inhibitor therapy  Check echocardiogram  Intensive risk factor " modifications for both primary and secondary prevention if applicable  Hydration   Observation         Carlo Velasco   Regadenoson Stress Test With Myocardial Perfusion SPECT (Multi Study)   Order# [id number]   Reading physician: Darian Toribio MD Ordering physician: Darian Toribio MD Study date: 20   Patient Information     Patient Name  Carlo Velasco MRN  [id number] Sex  Male  (Age)  1945 (74 y.o.)   Interpretation Summary        · Left ventricular ejection fraction is normal (Calculated EF = 55%).  · Myocardial perfusion imaging indicates a normal myocardial perfusion study with no evidence of ischemia.  · Impressions are consistent with a low risk study.                Results for orders placed during the hospital encounter of 20   STAT Adult Transthoracic Echo Complete W/ Cont if Necessary Per Protocol    Narrative · Estimated EF = 55%.  · Left ventricular systolic function is normal.  · Left ventricular wall thickness is consistent with mild concentric   hypertrophy.  · Left ventricular diastolic dysfunction.  · Mild pulmonary hypertension is present.           Procedures  Cardiac cath    LVEF of 50%.   occluded the mid right coronary artery  Patent saphenous venous graft to the distal right coronary artery  Atretic left intramammary artery graft.  Patent stent in the mid left anterior descending coronary artery  Patent stent to the obtuse marginal branch  Patent stent in diagonal branch.       Conclusion     Patent stents noted in the obtuse marginal branch and the left anterior descending coronary artery.  Atretic left internal mammary artery graft.  Patent saphenous venous graft to the right coronary artery  Significant right coronary artery stenosis proximal to the touchdown site of the saphenous vein graft to the right coronary artery  60% stenosis of diagonal branch with a normal fractional flow reserve of 0.85  Left ventricle ejection fraction of 50%.     LVEDP    16   mm  Hg           Plan     Intensive risk factor modifications for both primary and secondary prevention if applicable  Home today if stable  Hydration   Observation  Keep follow-up appointment    Assessment/Plan   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 (CMS/HCC)   • Essential hypertension   • Anxiety   • Colon polyps   • Mixed hyperlipidemia   • Incisional hernia, without obstruction or gangrene   • BRBPR (bright red blood per rectum)   • Esophageal dysphagia   • Prostatism   • Nocturia   • Coronary artery disease   • Orthostasis   • Chronic midline low back pain without sciatica   • Hx of colonic polyp   • Acute pain of right knee   • Morbidly obese (CMS/HCC)   • Pain of right heel   • Hx of CABG      • Stented coronary artery   • Flank pain   • Skin lesion   • Sick sinus syndrome (CMS/HCC)   • Coagulopathy (CMS/HCC)        ____________________________________________________________________________________________________________________________________________  Health maintenance and recommendations      Low salt/ HTN/ Heart healthy carbohydrate restricted cardiac diet   The patient is advised to reduce or avoid caffeine or other cardiac stimulants.     The patient was advised to avoid long term NSAIDS , use Tylenol PRN instead  Avoid cardiac stimulants including common drugs like Pseudoephedrine or excessive amounts of caffeine    Monitor for any signs of bleeding including red or dark stools. Fall precautions.        Advised staying uptodate with immunizations per established standard guidelines.      Offered to give patient  a copy      Questions were encouraged, asked and answered to the patient's  understanding and satisfaction. Questions if any regarding current medications and side effects, need for refills and importance of compliance to medications stressed.    Reviewed available prior notes, consults, prior  visits, laboratory findings, radiology and cardiology relevant reports. Updated chart as applicable. I have reviewed the patient's medical history in detail and updated the computerized patient record as relevant.      Updated patient regarding any new or relevant abnormalities on review of records or any new findings on physical exam. Mentioned to patient about purpose of visit and desirable health short and long term goals and objectives.    Primary to monitor CBC CMP Lipid panel and TSH as applicable    ___________________________________________________________________________________________________________________________________________            Plan      Discussed results of prior testing with patient including myocardial perfusion scan    Patient expressed understanding  Encouraged and answered all questions   Discussed with the patient and all questioned fully answered. He will call me if any problems arise.     His symptoms are not consistent with ischemic chest pain  Keep LDL below 70 mg/dl. Monitor liver and renal functions.   Monitor CBC, CMP, TSH (as indicated) and Lipid Panel by primary     Monitor cardiac rhythm device function regularly per established schedule or PRN      S/L NTG PRN for chest pain, call me or go to ER if has to use S/L nitroglycerin    Check BP and heart rates twice daily at least 3x / week at home and bring a recording for me to review next visit  If BP >140/90 or < 100/60 persistently over 3 reading 30 mins apart call sooner          Return in about 6 months (around 4/14/2021).

## 2020-11-03 RX ORDER — ATORVASTATIN CALCIUM 80 MG/1
80 TABLET, FILM COATED ORAL NIGHTLY
Qty: 30 TABLET | Refills: 5 | Status: SHIPPED | OUTPATIENT
Start: 2020-11-03 | End: 2021-04-19

## 2020-11-17 ENCOUNTER — HOSPITAL ENCOUNTER (OUTPATIENT)
Dept: PAIN MANAGEMENT | Age: 75
Discharge: HOME | End: 2020-11-17
Payer: MEDICARE

## 2020-11-17 VITALS
TEMPERATURE: 97 F | HEART RATE: 64 BPM | RESPIRATION RATE: 18 BRPM | DIASTOLIC BLOOD PRESSURE: 74 MMHG | OXYGEN SATURATION: 93 % | SYSTOLIC BLOOD PRESSURE: 127 MMHG

## 2020-11-17 PROCEDURE — 2500000003 HC RX 250 WO HCPCS

## 2020-11-17 PROCEDURE — 6360000002 HC RX W HCPCS

## 2020-11-17 PROCEDURE — 3209999900 FLUORO FOR SURGICAL PROCEDURES

## 2020-11-17 PROCEDURE — G0260 INJ FOR SACROILIAC JT ANESTH: HCPCS

## 2020-11-17 PROCEDURE — 2580000003 HC RX 258

## 2020-11-17 RX ORDER — METHYLPREDNISOLONE ACETATE 80 MG/ML
INJECTION, SUSPENSION INTRA-ARTICULAR; INTRALESIONAL; INTRAMUSCULAR; SOFT TISSUE
Status: COMPLETED | OUTPATIENT
Start: 2020-11-17 | End: 2020-11-17

## 2020-11-17 RX ORDER — LIDOCAINE HYDROCHLORIDE 10 MG/ML
INJECTION, SOLUTION EPIDURAL; INFILTRATION; INTRACAUDAL; PERINEURAL
Status: COMPLETED | OUTPATIENT
Start: 2020-11-17 | End: 2020-11-17

## 2020-11-17 RX ORDER — BUPIVACAINE HYDROCHLORIDE 5 MG/ML
INJECTION, SOLUTION EPIDURAL; INTRACAUDAL
Status: COMPLETED | OUTPATIENT
Start: 2020-11-17 | End: 2020-11-17

## 2020-11-17 RX ORDER — SODIUM CHLORIDE 9 MG/ML
INJECTION INTRAVENOUS
Status: COMPLETED | OUTPATIENT
Start: 2020-11-17 | End: 2020-11-17

## 2020-11-17 RX ADMIN — SODIUM CHLORIDE 3 ML: 9 INJECTION INTRAVENOUS at 08:48

## 2020-11-17 RX ADMIN — METHYLPREDNISOLONE ACETATE 80 MG: 80 INJECTION, SUSPENSION INTRA-ARTICULAR; INTRALESIONAL; INTRAMUSCULAR; SOFT TISSUE at 08:48

## 2020-11-17 RX ADMIN — BUPIVACAINE HYDROCHLORIDE 2 ML: 5 INJECTION, SOLUTION EPIDURAL; INTRACAUDAL at 08:48

## 2020-11-17 RX ADMIN — LIDOCAINE HYDROCHLORIDE 7 ML: 10 INJECTION, SOLUTION EPIDURAL; INFILTRATION; INTRACAUDAL; PERINEURAL at 08:47

## 2020-11-17 ASSESSMENT — PAIN DESCRIPTION - PROGRESSION: CLINICAL_PROGRESSION: GRADUALLY WORSENING

## 2020-11-17 ASSESSMENT — PAIN DESCRIPTION - DESCRIPTORS: DESCRIPTORS: BURNING;SHARP

## 2020-11-17 ASSESSMENT — PAIN DESCRIPTION - ONSET: ONSET: AWAKENED FROM SLEEP

## 2020-11-17 ASSESSMENT — PAIN SCALES - GENERAL: PAINLEVEL_OUTOF10: 4

## 2020-11-17 ASSESSMENT — PAIN DESCRIPTION - LOCATION: LOCATION: HIP;KNEE

## 2020-11-17 ASSESSMENT — PAIN - FUNCTIONAL ASSESSMENT: PAIN_FUNCTIONAL_ASSESSMENT: PREVENTS OR INTERFERES SOME ACTIVE ACTIVITIES AND ADLS

## 2020-11-17 ASSESSMENT — PAIN DESCRIPTION - ORIENTATION: ORIENTATION: RIGHT

## 2020-11-17 ASSESSMENT — PAIN DESCRIPTION - DIRECTION: RADIATING_TOWARDS: RIGHT THIGH

## 2020-11-17 ASSESSMENT — PAIN DESCRIPTION - FREQUENCY: FREQUENCY: CONTINUOUS

## 2020-11-20 RX ORDER — ESCITALOPRAM OXALATE 20 MG/1
20 TABLET ORAL DAILY
Qty: 90 TABLET | Refills: 1 | Status: SHIPPED | OUTPATIENT
Start: 2020-11-20 | End: 2021-05-24

## 2020-11-24 RX ORDER — DONEPEZIL HYDROCHLORIDE 5 MG/1
5 TABLET, FILM COATED ORAL
Qty: 90 TABLET | Refills: 3 | Status: SHIPPED | OUTPATIENT
Start: 2020-11-24 | End: 2022-02-21

## 2020-12-03 RX ORDER — OMEPRAZOLE 20 MG/1
20 CAPSULE, DELAYED RELEASE ORAL DAILY
Qty: 90 CAPSULE | Refills: 3 | Status: SHIPPED | OUTPATIENT
Start: 2020-12-03 | End: 2021-11-22

## 2020-12-07 RX ORDER — FUROSEMIDE 40 MG/1
40 TABLET ORAL DAILY
Qty: 90 TABLET | Refills: 3 | Status: ON HOLD | OUTPATIENT
Start: 2020-12-07 | End: 2021-05-01

## 2020-12-07 NOTE — TELEPHONE ENCOUNTER
Requested Prescriptions     Pending Prescriptions Disp Refills   • furosemide (LASIX) 40 MG tablet [Pharmacy Med Name: FUROSEMIDE 40MG TABLETS] 90 tablet 3     Sig: TAKE 1 TABLET BY MOUTH DAILY

## 2020-12-28 NOTE — PROGRESS NOTES
Dual Chamber Pacemaker Evaluation Report  IN OFFICE INTERROGATION BY COMPANY DEVICE REP AARON ODONNELL    July 8, 2020    Primary Cardiologist: [place]  : [employer] Model: Shantel  Implant date: 1/11/2018    Reason for evaluation: routine  Indication for pacemaker: sick sinus syndrome    Measurements  Atrial sensing - P wave: 4.4 mV  Atrial threshold: 1.25V@ 0.5ms  Atrial lead impedance: 1100 ohms  Ventricular sensing - R wave: >12 mV  Ventricular threshold: 0.25 V @ 0.5 ms  Ventricular lead impedance:   1075 ohms     Diagnostic Data  Atrial paced: 3.3 %  Ventricular paced: 13 %  Other: No episodes.  Battery status: satisfactory       Final Parameters  Mode:  DDDR  Lower rate: 60 bpm   Upper rate: 130 bpm  AV Delay: paced- 225 ms  Sensed-200 ms  Atrial - Amplitude: 2.5 V   Pulse width: 0.5 ms   Sensitivity: 1 mV     Ventricular - Amplitude: 2 V  Pulse width: 0.5 ms  Sensitivity: 2 mV    Changes made: No changes made.  Conclusions: normal pacemaker function, stable pacing and sensing thresholds and adequate battery reserve    Follow up: Every 3 months via merlin, annually in office

## 2021-01-21 ENCOUNTER — OFFICE VISIT (OUTPATIENT)
Dept: FAMILY MEDICINE CLINIC | Facility: CLINIC | Age: 76
End: 2021-01-21

## 2021-01-21 VITALS
OXYGEN SATURATION: 97 % | RESPIRATION RATE: 16 BRPM | HEIGHT: 68 IN | SYSTOLIC BLOOD PRESSURE: 112 MMHG | DIASTOLIC BLOOD PRESSURE: 66 MMHG | BODY MASS INDEX: 39.86 KG/M2 | HEART RATE: 57 BPM | WEIGHT: 263 LBS

## 2021-01-21 DIAGNOSIS — E78.2 MIXED HYPERLIPIDEMIA: Chronic | ICD-10-CM

## 2021-01-21 DIAGNOSIS — I25.729 CORONARY ARTERY DISEASE INVOLVING AUTOLOGOUS ARTERY CORONARY BYPASS GRAFT WITH ANGINA PECTORIS (HCC): Primary | Chronic | ICD-10-CM

## 2021-01-21 DIAGNOSIS — F41.9 ANXIETY: Chronic | ICD-10-CM

## 2021-01-21 DIAGNOSIS — R94.8 ABNORMAL RESULTS OF FUNCTION STUDIES OF OTHER ORGANS AND SYSTEMS: ICD-10-CM

## 2021-01-21 DIAGNOSIS — I10 ESSENTIAL HYPERTENSION: Chronic | ICD-10-CM

## 2021-01-21 PROCEDURE — 99214 OFFICE O/P EST MOD 30 MIN: CPT | Performed by: FAMILY MEDICINE

## 2021-01-21 PROCEDURE — G0439 PPPS, SUBSEQ VISIT: HCPCS | Performed by: FAMILY MEDICINE

## 2021-01-21 NOTE — PROGRESS NOTES
Subjective   Carlo Velasco is a 75 y.o. male.     Chief Complaint   Patient presents with   • Follow-up     6 mo   CAD   • Medicare Wellness-subsequent        History of Present Illness     he denies any cp or ha ---denies any angina symptoms--he is toleraign statin witout myalgia ---his depresion and anxiiety smptoms are stable with meds      Current Outpatient Medications:   •  ALPRAZolam (XANAX) 0.25 MG tablet, TAKE 1 TABLET BY MOUTH THREE TIMES DAILY, Disp: 90 tablet, Rfl: 0  •  aspirin 81 MG EC tablet, Take 81 mg by mouth daily.  , Disp: , Rfl:   •  atorvastatin (LIPITOR) 80 MG tablet, Take 1 tablet by mouth Every Night., Disp: 30 tablet, Rfl: 5  •  Bacillus Coagulans-Inulin (PROBIOTIC FORMULA PO), Take  by mouth., Disp: , Rfl:   •  BIOTIN PO, Take  by mouth., Disp: , Rfl:   •  cetirizine (zyrTEC) 10 MG tablet, Take 10 mg by mouth., Disp: , Rfl:   •  clopidogrel (PLAVIX) 75 MG tablet, TAKE 1 TABLET BY MOUTH DAILY, Disp: 90 tablet, Rfl: 3  •  donepezil (ARICEPT) 5 MG tablet, Take 1 tablet by mouth every night at bedtime., Disp: 90 tablet, Rfl: 3  •  escitalopram (LEXAPRO) 20 MG tablet, Take 1 tablet by mouth Daily., Disp: 90 tablet, Rfl: 1  •  furosemide (LASIX) 40 MG tablet, TAKE 1 TABLET BY MOUTH DAILY, Disp: 90 tablet, Rfl: 3  •  isosorbide mononitrate (IMDUR) 30 MG 24 hr tablet, Take 1 tablet by mouth Every Morning., Disp: 90 tablet, Rfl: 3  •  lisinopril (PRINIVIL,ZESTRIL) 5 MG tablet, Take 1 tablet by mouth Daily., Disp: 90 tablet, Rfl: 3  •  metoprolol tartrate (LOPRESSOR) 25 MG tablet, Take one tablet by mouth twice a day., Disp: 180 tablet, Rfl: 3  •  NITROSTAT 0.4 MG SL tablet, Place 1 tablet under the tongue Every 5 (Five) Minutes As Needed for Chest Pain. Take no more than 3 doses in 15 minutes., Disp: 100 tablet, Rfl: 2  •  omega-3 acid ethyl esters (LOVAZA) 1 g capsule, Take 1 capsule by mouth Daily., Disp: 90 capsule, Rfl: 3  •  omeprazole (priLOSEC) 20 MG capsule, Take 1 capsule by mouth Daily.,  Disp: 90 capsule, Rfl: 3  •  Potassium 99 MG tablet, Take 1 tablet by mouth Daily., Disp: , Rfl:   •  pregabalin (LYRICA) 150 MG capsule, TAKE 1 CAPSULE BY MOUTH THREE TIMES DAILY, Disp: 270 capsule, Rfl: 0  •  sucralfate (CARAFATE) 1 g tablet, TK 1 T PO  AC AND HS, Disp: , Rfl: 0  Allergies   Allergen Reactions   • Meperidine Other (See Comments)     HEART STOPS     • Statins Myalgia     Causes leg cramps       Past Medical History:   Diagnosis Date   • Anxiety    • Arthritis    • Cancer (CMS/HCC)     melanoma   • CHF (congestive heart failure) (CMS/HCC)    • Claustrophobia    • Colon polyp    • Coronary artery disease 2012, 2015    2 vessel CABG (2012), SHAHRZAD LAD (2015).    • Depression    • Disease of thyroid gland     partial thyroidectomy on right   • GERD (gastroesophageal reflux disease)    • Hearing loss    • Heart attack (CMS/HCC)    • Heart attack (CMS/HCC)    • History of transfusion    • Hyperlipidemia    • Hypertension    • Kidney stone     history of    • Tobacco abuse, in remission    • Uses hearing aid     BILATERAL     Past Surgical History:   Procedure Laterality Date   • BACK SURGERY      CERVICAL FUSION AND LUMBAR DISC REPLACEMENT   • CARDIAC CATHETERIZATION  07/2015    SHAHRZAD mid LAD, Dr. Toribio   • CARDIAC CATHETERIZATION N/A 10/21/2016    Procedure: Left Heart Cath;  Surgeon: Darian Toribio MD;  Location:  [facility] CATH INVASIVE LOC[facility];  Service:    • CARDIAC CATHETERIZATION Left 4/5/2017    Procedure: Cardiac Catheterization/Vascular Study;  Surgeon: Darian Toribio MD;  Location:  [facility];  Service:    • CARDIAC CATHETERIZATION N/A 4/5/2017    Procedure: Left Heart Cath;  Surgeon: Darian Toribio MD;  Location:  [facility];  Service:    • CARDIAC CATHETERIZATION N/A 4/5/2017    Procedure: Coronary angiography;  Surgeon: Darian Toribio MD;  Location:  [facility];  Service:    • CARDIAC CATHETERIZATION N/A 4/5/2017    Procedure: Left ventriculography;   Surgeon: Darian Toribio MD;  Location:  [facility];  Service:    • CARDIAC CATHETERIZATION  4/5/2017    Procedure: Saphenous Vein Graft;  Surgeon: Darian Toribio MD;  Location: [facility] CATH INVASIVE LOCATION;  Service:    • CARDIAC CATHETERIZATION Left 2/27/2018    Procedure: Cardiac Catheterization/Vascular Study SHAHRZAD OK PRN;  Surgeon: Darian Toribio MD;  Location:  [facility] CATH INVAS[facility];  Service:    • CARDIAC CATHETERIZATION  2/27/2018    Procedure: Functional Flow [place];  Surgeon: Darian Toribio MD;  Location:  [facility] CATH INVAS[facility];  Service:    • CARDIAC CATHETERIZATION N/A 2/27/2018    Procedure: Left ventriculography;  Surgeon: Darian Toribio MD;  Location:  [facility] CATH [facility];  Service:    • CARDIAC CATHETERIZATION Left 4/28/2020    Procedure: Cardiac Catheterization/Vascular Study SHAHRZAD OK PRN;  Surgeon: Darian Toribio MD;  Location: [facility] CATH INVASIVE LOCATION;  Service: Cardiology;  Laterality: Left;   • CARDIAC ELECTROPHYSIOLOGY PROCEDURE N/A 1/11/2018    Procedure: PPM generator change - dual;  Surgeon: Darian Toribio MD;  Location: [facility] CATH INVASIVE LOCATION;  Service:    • COLONOSCOPY  09/05/2007    colon polyps   • COLONOSCOPY N/A 5/29/2018    Procedure: COLONOSCOPY WITH ANESTHESIA;  Surgeon: Juan F Tapia MD;  Location: [facility] ENDOSCOPY;  Service: Gastroenterology   • CORONARY ANGIOPLASTY WITH STENT PLACEMENT  07/2015    SHAHRZAD mid TERESITA, Dr. Toribio    • CORONARY ARTERY BYPASS GRAFT  2012 AND 2014    2 vessel    • ENDOSCOPY  11/08/2010   • ENDOSCOPY N/A 5/29/2018    Procedure: ESOPHAGOGASTRODUODENOSCOPY WITH ANESTHESIA;  Surgeon: Juan F Tapia MD;  Location: [facility] ENDOSCOPY;  Service: Gastroenterology   • HEAD/NECK LESION/CYST EXCISION Left 12/20/2016    Procedure: EXCISION MALIGNANT MELANOMA WITH SENTINEL NODE BIOPSY, INJECTION AND SCAN PRE-OP, LEFT EAR;  Surgeon: Guanaco Zepeda MD;  Location: [facility] OR;  Service:    • HEMORRHOIDECTOMY     • INSERT / REPLACE /  "REMOVE PACEMAKER     • SENTINEL NODE BIOPSY Left 12/20/2016    Procedure: SENTINEL NODE BIOPSY;  Surgeon: Guanaco Zepeda MD;  Location: [facility] OR;  Service:    • SKIN CANCER EXCISION     • THYROID SURGERY     • THYROIDECTOMY         Review of Systems   Constitutional: Negative.    HENT: Negative.    Eyes: Negative.    Respiratory: Negative.    Cardiovascular: Negative.    Gastrointestinal: Negative.    Endocrine: Negative.    Genitourinary: Negative.    Musculoskeletal: Negative.    Skin: Negative.    Allergic/Immunologic: Negative.    Neurological: Negative.    Hematological: Negative.    Psychiatric/Behavioral: Negative.        Objective  /66   Pulse 57   Resp 16   Ht 172.7 cm (68\")   Wt 119 kg (263 lb)   SpO2 97%   BMI 39.99 kg/m²   Physical Exam  Vitals signs and nursing note reviewed.   Constitutional:       Appearance: Normal appearance.   HENT:      Head: Normocephalic and atraumatic.      Nose: Nose normal.      Mouth/Throat:      Mouth: Mucous membranes are dry.      Pharynx: Oropharynx is clear.   Eyes:      Extraocular Movements: Extraocular movements intact.      Conjunctiva/sclera: Conjunctivae normal.      Pupils: Pupils are equal, round, and reactive to light.   Neck:      Musculoskeletal: Normal range of motion and neck supple.   Cardiovascular:      Rate and Rhythm: Normal rate and regular rhythm.      Pulses: Normal pulses.      Heart sounds: Normal heart sounds.   Pulmonary:      Effort: Pulmonary effort is normal.      Breath sounds: Normal breath sounds.   Abdominal:      General: Abdomen is flat. Bowel sounds are normal.      Palpations: Abdomen is soft.   Musculoskeletal: Normal range of motion.   Skin:     General: Skin is warm and dry.      Capillary Refill: Capillary refill takes less than 2 seconds.   Neurological:      General: No focal deficit present.      Mental Status: He is alert and oriented to person, place, and time. Mental status is at baseline.   Psychiatric:        "  Mood and Affect: Mood normal.         Behavior: Behavior normal.         Thought Content: Thought content normal.         Judgment: Judgment normal.         Assessment/Plan   Diagnoses and all orders for this visit:    1. Coronary artery disease involving autologous artery coronary bypass graft with angina pectoris (CMS/HCC) (Primary)  -     PSA DIAGNOSTIC  -     CBC w AUTO Differential  -     Comprehensive metabolic panel  -     Lipid Panel With / Chol / HDL Ratio    2. Essential hypertension  -     PSA DIAGNOSTIC  -     CBC w AUTO Differential  -     Comprehensive metabolic panel  -     Lipid Panel With / Chol / HDL Ratio    3. Mixed hyperlipidemia  -     PSA DIAGNOSTIC  -     CBC w AUTO Differential  -     Comprehensive metabolic panel  -     Lipid Panel With / Chol / HDL Ratio    4. Anxiety    5. Abnormal results of function studies of other organs and systems   -     PSA DIAGNOSTIC    he will monitor bp at home             Orders Placed This Encounter   Procedures   • PSA DIAGNOSTIC   • Comprehensive metabolic panel   • Lipid Panel With / Chol / HDL Ratio   • CBC w AUTO Differential     Order Specific Question:   Manual Differential     Answer:   No       Follow up: 6 month(s)

## 2021-01-21 NOTE — PROGRESS NOTES
The ABCs of the Annual Wellness Visit  Subsequent Medicare Wellness Visit    Chief Complaint   Patient presents with   • Follow-up     6 mo   CAD   • Medicare Wellness-subsequent       Subjective   History of Present Illness:  Carlo Velasco is a 75 y.o. male who presents for a Subsequent Medicare Wellness Visit.    HEALTH RISK ASSESSMENT    Recent Hospitalizations:  No hospitalization(s) within the last year.    Current Medical Providers:  Patient Care Team:  Rhys Rosen MD as PCP - General (Family Medicine)  Darian Toribio MD as Cardiologist (Cardiology)  Juan F Tapia MD as Consulting Physician (Gastroenterology)    Smoking Status:  Social History     Tobacco Use   Smoking Status Former Smoker   • Years: 10.00   • Types: Cigarettes   • Quit date:    • Years since quittin.0   Smokeless Tobacco Former User   • Types: Snuff   • Quit date:        Alcohol Consumption:  Social History     Substance and Sexual Activity   Alcohol Use No       Depression Screen:   PHQ-2/PHQ-9 Depression Screening 2021   Little interest or pleasure in doing things 0   Feeling down, depressed, or hopeless 0   Trouble falling or staying asleep, or sleeping too much 0   Feeling tired or having little energy 0   Poor appetite or overeating 0   Feeling bad about yourself - or that you are a failure or have let yourself or your family down 0   Trouble concentrating on things, such as reading the newspaper or watching television 0   Moving or speaking so slowly that other people could have noticed. Or the opposite - being so fidgety or restless that you have been moving around a lot more than usual 0   Thoughts that you would be better off dead, or of hurting yourself in some way 0   Total Score 0   If you checked off any problems, how difficult have these problems made it for you to do your work, take care of things at home, or get along with other people? Not difficult at all       Fall Risk Screen:  JUICE Cueto  Risk Assessment was completed, and patient is at MODERATE risk for falls. Assessment completed on:1/21/2021    Health Habits and Functional and Cognitive Screening:  Functional & Cognitive Status 1/21/2021   Do you have difficulty preparing food and eating? No   Do you have difficulty bathing yourself, getting dressed or grooming yourself? No   Do you have difficulty using the toilet? No   Do you have difficulty moving around from place to place? No   Do you have trouble with steps or getting out of a bed or a chair? No   Current Diet Well Balanced Diet   Dental Exam Not up to date   Eye Exam Up to date   Exercise (times per week) 3 times per week   Current Exercises Include Walking   Current Exercise Activities Include -   Do you need help using the phone?  No   Are you deaf or do you have serious difficulty hearing?  No   Do you need help with transportation? No   Do you need help shopping? No   Do you need help preparing meals?  No   Do you need help with housework?  No   Do you need help with laundry? No   Do you need help taking your medications? No   Do you need help managing money? No   Do you ever drive or ride in a car without wearing a seat belt? No   Have you felt unusual stress, anger or loneliness in the last month? No   Who do you live with? Spouse   If you need help, do you have trouble finding someone available to you? No   Have you been bothered in the last four weeks by sexual problems? No   Do you have difficulty concentrating, remembering or making decisions? Yes         Does the patient have evidence of cognitive impairment? No    Asprin use counseling:Taking ASA appropriately as indicated    Age-appropriate Screening Schedule:  Refer to the list below for future screening recommendations based on patient's age, sex and/or medical conditions. Orders for these recommended tests are listed in the plan section. The patient has been provided with a written plan.    Health Maintenance   Topic Date  Due   • ZOSTER VACCINE (1 of 2) 08/02/1997   • INFLUENZA VACCINE  08/01/2020   • COLONOSCOPY  05/29/2021   • LIPID PANEL  07/23/2021   • TDAP/TD VACCINES (3 - Td) 08/17/2028          The following portions of the patient's history were reviewed and updated as appropriate: allergies, current medications, past family history, past medical history, past social history, past surgical history and problem list.    Outpatient Medications Prior to Visit   Medication Sig Dispense Refill   • ALPRAZolam (XANAX) 0.25 MG tablet TAKE 1 TABLET BY MOUTH THREE TIMES DAILY 90 tablet 0   • aspirin 81 MG EC tablet Take 81 mg by mouth daily.       • atorvastatin (LIPITOR) 80 MG tablet Take 1 tablet by mouth Every Night. 30 tablet 5   • Bacillus Coagulans-Inulin (PROBIOTIC FORMULA PO) Take  by mouth.     • BIOTIN PO Take  by mouth.     • cetirizine (zyrTEC) 10 MG tablet Take 10 mg by mouth.     • clopidogrel (PLAVIX) 75 MG tablet TAKE 1 TABLET BY MOUTH DAILY 90 tablet 3   • donepezil (ARICEPT) 5 MG tablet Take 1 tablet by mouth every night at bedtime. 90 tablet 3   • escitalopram (LEXAPRO) 20 MG tablet Take 1 tablet by mouth Daily. 90 tablet 1   • furosemide (LASIX) 40 MG tablet TAKE 1 TABLET BY MOUTH DAILY 90 tablet 3   • isosorbide mononitrate (IMDUR) 30 MG 24 hr tablet Take 1 tablet by mouth Every Morning. 90 tablet 3   • lisinopril (PRINIVIL,ZESTRIL) 5 MG tablet Take 1 tablet by mouth Daily. 90 tablet 3   • metoprolol tartrate (LOPRESSOR) 25 MG tablet Take one tablet by mouth twice a day. 180 tablet 3   • NITROSTAT 0.4 MG SL tablet Place 1 tablet under the tongue Every 5 (Five) Minutes As Needed for Chest Pain. Take no more than 3 doses in 15 minutes. 100 tablet 2   • omega-3 acid ethyl esters (LOVAZA) 1 g capsule Take 1 capsule by mouth Daily. 90 capsule 3   • omeprazole (priLOSEC) 20 MG capsule Take 1 capsule by mouth Daily. 90 capsule 3   • Potassium 99 MG tablet Take 1 tablet by mouth Daily.     • pregabalin (LYRICA) 150 MG  "capsule TAKE 1 CAPSULE BY MOUTH THREE TIMES DAILY 270 capsule 0   • sucralfate (CARAFATE) 1 g tablet TK 1 T PO  AC AND HS  0     No facility-administered medications prior to visit.        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 (CMS/HCC)   • Essential hypertension   • Anxiety   • Colon polyps   • Mixed hyperlipidemia   • Incisional hernia, without obstruction or gangrene   • BRBPR (bright red blood per rectum)   • Esophageal dysphagia   • Prostatism   • Nocturia   • Coronary artery disease   • Orthostasis   • Chronic midline low back pain without sciatica   • Hx of colonic polyp   • Acute pain of right knee   • Morbidly obese (CMS/HCC)   • Pain of right heel   • Hx of CABG      • Stented coronary artery   • Flank pain   • Skin lesion   • Sick sinus syndrome (CMS/HCC)   • Coagulopathy (CMS/HCC)       Advanced Care Planning:  ACP discussion was held with the patient during this visit. Patient does not have an advance directive, information provided.    Review of Systems    Compared to one year ago, the patient feels his physical health is the same.  Compared to one year ago, the patient feels his mental health is the same.    Reviewed chart for potential of high risk medication in the elderly: yes  Reviewed chart for potential of harmful drug interactions in the elderly:yes    Objective         Vitals:    01/21/21 0907   BP: 112/66   Pulse: 57   Resp: 16   SpO2: 97%   Weight: 119 kg (263 lb)   Height: 172.7 cm (68\")       Body mass index is 39.99 kg/m².  Discussed the patient's BMI with him. The BMI is above average; BMI management plan is completed.    Physical Exam          Assessment/Plan   Medicare Risks and Personalized Health Plan  CMS Preventative Services Quick Reference  Advance Directive Discussion    The above risks/problems have been discussed with the patient.  Pertinent information has been shared with the " patient in the After Visit Summary.  Follow up plans and orders are seen below in the Assessment/Plan Section.    Diagnoses and all orders for this visit:    1. Coronary artery disease involving autologous artery coronary bypass graft with angina pectoris (CMS/HCC) (Primary)  -     PSA DIAGNOSTIC  -     CBC w AUTO Differential  -     Comprehensive metabolic panel  -     Lipid Panel With / Chol / HDL Ratio    2. Essential hypertension  -     PSA DIAGNOSTIC  -     CBC w AUTO Differential  -     Comprehensive metabolic panel  -     Lipid Panel With / Chol / HDL Ratio    3. Mixed hyperlipidemia  -     PSA DIAGNOSTIC  -     CBC w AUTO Differential  -     Comprehensive metabolic panel  -     Lipid Panel With / Chol / HDL Ratio    4. Anxiety    5. Abnormal results of function studies of other organs and systems   -     PSA DIAGNOSTIC      Follow Up:  No follow-ups on file.     An After Visit Summary and PPPS were given to the patient.

## 2021-01-22 LAB
ALBUMIN SERPL-MCNC: 3.8 G/DL (ref 3.5–5.2)
ALBUMIN/GLOB SERPL: 1.7 G/DL
ALP SERPL-CCNC: 62 U/L (ref 39–117)
ALT SERPL-CCNC: 16 U/L (ref 1–41)
AST SERPL-CCNC: 18 U/L (ref 1–40)
BASOPHILS # BLD AUTO: 0.05 10*3/MM3 (ref 0–0.2)
BASOPHILS NFR BLD AUTO: 0.7 % (ref 0–1.5)
BILIRUB SERPL-MCNC: 0.4 MG/DL (ref 0–1.2)
BUN SERPL-MCNC: 17 MG/DL (ref 8–23)
BUN/CREAT SERPL: 21.5 (ref 7–25)
CALCIUM SERPL-MCNC: 9.1 MG/DL (ref 8.6–10.5)
CHLORIDE SERPL-SCNC: 105 MMOL/L (ref 98–107)
CHOLEST SERPL-MCNC: 133 MG/DL (ref 0–200)
CHOLEST/HDLC SERPL: 2.51 {RATIO}
CO2 SERPL-SCNC: 29.2 MMOL/L (ref 22–29)
CREAT SERPL-MCNC: 0.79 MG/DL (ref 0.76–1.27)
EOSINOPHIL # BLD AUTO: 0.27 10*3/MM3 (ref 0–0.4)
EOSINOPHIL NFR BLD AUTO: 3.6 % (ref 0.3–6.2)
ERYTHROCYTE [DISTWIDTH] IN BLOOD BY AUTOMATED COUNT: 14.3 % (ref 12.3–15.4)
GLOBULIN SER CALC-MCNC: 2.2 GM/DL
GLUCOSE SERPL-MCNC: 118 MG/DL (ref 65–99)
HCT VFR BLD AUTO: 42.7 % (ref 37.5–51)
HDLC SERPL-MCNC: 53 MG/DL (ref 40–60)
HGB BLD-MCNC: 13.8 G/DL (ref 13–17.7)
IMM GRANULOCYTES # BLD AUTO: 0.06 10*3/MM3 (ref 0–0.05)
IMM GRANULOCYTES NFR BLD AUTO: 0.8 % (ref 0–0.5)
LDLC SERPL CALC-MCNC: 65 MG/DL (ref 0–100)
LYMPHOCYTES # BLD AUTO: 1.78 10*3/MM3 (ref 0.7–3.1)
LYMPHOCYTES NFR BLD AUTO: 23.8 % (ref 19.6–45.3)
MCH RBC QN AUTO: 28.4 PG (ref 26.6–33)
MCHC RBC AUTO-ENTMCNC: 32.3 G/DL (ref 31.5–35.7)
MCV RBC AUTO: 87.9 FL (ref 79–97)
MONOCYTES # BLD AUTO: 0.57 10*3/MM3 (ref 0.1–0.9)
MONOCYTES NFR BLD AUTO: 7.6 % (ref 5–12)
NEUTROPHILS # BLD AUTO: 4.74 10*3/MM3 (ref 1.7–7)
NEUTROPHILS NFR BLD AUTO: 63.5 % (ref 42.7–76)
NRBC BLD AUTO-RTO: 0 /100 WBC (ref 0–0.2)
PLATELET # BLD AUTO: 182 10*3/MM3 (ref 140–450)
POTASSIUM SERPL-SCNC: 4.2 MMOL/L (ref 3.5–5.2)
PROT SERPL-MCNC: 6 G/DL (ref 6–8.5)
PSA SERPL-MCNC: 1.4 NG/ML (ref 0–4)
RBC # BLD AUTO: 4.86 10*6/MM3 (ref 4.14–5.8)
SODIUM SERPL-SCNC: 143 MMOL/L (ref 136–145)
TRIGL SERPL-MCNC: 73 MG/DL (ref 0–150)
VLDLC SERPL CALC-MCNC: 15 MG/DL (ref 5–40)
WBC # BLD AUTO: 7.47 10*3/MM3 (ref 3.4–10.8)

## 2021-02-04 ENCOUNTER — OFFICE VISIT (OUTPATIENT)
Dept: FAMILY MEDICINE CLINIC | Facility: CLINIC | Age: 76
End: 2021-02-04

## 2021-02-04 VITALS
BODY MASS INDEX: 39.86 KG/M2 | HEART RATE: 68 BPM | OXYGEN SATURATION: 98 % | TEMPERATURE: 97.6 F | DIASTOLIC BLOOD PRESSURE: 70 MMHG | SYSTOLIC BLOOD PRESSURE: 132 MMHG | HEIGHT: 68 IN | RESPIRATION RATE: 16 BRPM | WEIGHT: 263 LBS

## 2021-02-04 DIAGNOSIS — M25.512 ACUTE PAIN OF LEFT SHOULDER: Primary | ICD-10-CM

## 2021-02-04 PROCEDURE — 99213 OFFICE O/P EST LOW 20 MIN: CPT | Performed by: NURSE PRACTITIONER

## 2021-02-04 RX ORDER — HYDROCODONE BITARTRATE AND ACETAMINOPHEN 5; 325 MG/1; MG/1
1 TABLET ORAL EVERY 6 HOURS PRN
Qty: 30 TABLET | Refills: 0 | Status: SHIPPED | OUTPATIENT
Start: 2021-02-04 | End: 2021-04-23

## 2021-02-04 NOTE — PROGRESS NOTES
Subjective   Chief Complaint:  Left shoulder pain    History of Present Illness:  This 75 y.o. male was seen in the office today.  He reports he believes he tore something in his shoulder.  He reports he cannot get an MRI because he has a pacemaker.  He reports a pulling injury 1 week ago.  Reports quite a bit of limitation with movement.    Allergies   Allergen Reactions   • Meperidine Other (See Comments)     HEART STOPS     • Statins Myalgia     Causes leg cramps      Current Outpatient Medications on File Prior to Visit   Medication Sig   • ALPRAZolam (XANAX) 0.25 MG tablet TAKE 1 TABLET BY MOUTH THREE TIMES DAILY   • aspirin 81 MG EC tablet Take 81 mg by mouth daily.     • atorvastatin (LIPITOR) 80 MG tablet Take 1 tablet by mouth Every Night.   • Bacillus Coagulans-Inulin (PROBIOTIC FORMULA PO) Take  by mouth.   • BIOTIN PO Take  by mouth.   • cetirizine (zyrTEC) 10 MG tablet Take 10 mg by mouth.   • clopidogrel (PLAVIX) 75 MG tablet TAKE 1 TABLET BY MOUTH DAILY   • donepezil (ARICEPT) 5 MG tablet Take 1 tablet by mouth every night at bedtime.   • escitalopram (LEXAPRO) 20 MG tablet Take 1 tablet by mouth Daily.   • furosemide (LASIX) 40 MG tablet TAKE 1 TABLET BY MOUTH DAILY   • isosorbide mononitrate (IMDUR) 30 MG 24 hr tablet Take 1 tablet by mouth Every Morning.   • lisinopril (PRINIVIL,ZESTRIL) 5 MG tablet Take 1 tablet by mouth Daily.   • metoprolol tartrate (LOPRESSOR) 25 MG tablet Take one tablet by mouth twice a day.   • NITROSTAT 0.4 MG SL tablet Place 1 tablet under the tongue Every 5 (Five) Minutes As Needed for Chest Pain. Take no more than 3 doses in 15 minutes.   • omega-3 acid ethyl esters (LOVAZA) 1 g capsule Take 1 capsule by mouth Daily.   • omeprazole (priLOSEC) 20 MG capsule Take 1 capsule by mouth Daily.   • Potassium 99 MG tablet Take 1 tablet by mouth Daily.   • pregabalin (LYRICA) 150 MG capsule TAKE 1 CAPSULE BY MOUTH THREE TIMES DAILY   • sucralfate (CARAFATE) 1 g tablet TK 1 T PO   AC AND HS     No current facility-administered medications on file prior to visit.       Past Medical, Surgical, Social, and Family History:  Past Medical History:   Diagnosis Date   • Anxiety    • Arthritis    • Cancer (CMS/HCC)     melanoma   • CHF (congestive heart failure) (CMS/HCC)    • Claustrophobia    • Colon polyp    • Coronary artery disease 2012, 2015    2 vessel CABG (2012), SHAHRZAD LAD (2015).    • Depression    • Disease of thyroid gland     partial thyroidectomy on right   • GERD (gastroesophageal reflux disease)    • Hearing loss    • Heart attack (CMS/HCC)    • Heart attack (CMS/HCC)    • History of transfusion    • Hyperlipidemia    • Hypertension    • Kidney stone     history of    • Tobacco abuse, in remission    • Uses hearing aid     BILATERAL     Past Surgical History:   Procedure Laterality Date   • BACK SURGERY      CERVICAL FUSION AND LUMBAR DISC REPLACEMENT   • CARDIAC CATHETERIZATION  07/2015    SHAHRZAD mid LAD, Dr. Toribio   • CARDIAC CATHETERIZATION N/A 10/21/2016    Procedure: Left Heart Cath;  Surgeon: Darian Toribio MD;  Location:  [facility]IVE [facility];  Service:    • CARDIAC CATHETERIZATION Left 4/5/2017    Procedure: Cardiac Catheterization/Vascular Study;  Surgeon: Darian Toribio MD;  Location:  [facility];  Service:    • CARDIAC CATHETERIZATION N/A 4/5/2017    Procedure: Left Heart Cath;  Surgeon: Darian Toribio MD;  Location:  [facility];  Service:    • CARDIAC CATHETERIZATION N/A 4/5/2017    Procedure: Coronary angiography;  Surgeon: Darian Toribio MD;  Location:  [facility];  Service:    • CARDIAC CATHETERIZATION N/A 4/5/2017    Procedure: Left ventriculography;  Surgeon: Darian Toribio MD;  Location:  [facility];  Service:    • CARDIAC CATHETERIZATION  4/5/2017    Procedure: Saphenous Vein Graft;  Surgeon: Darian Toribio MD;  Location:  [facility];  Service:    • CARDIAC CATHETERIZATION Left 2/27/2018     Procedure: Cardiac Catheterization/Vascular Study SHAHRZAD OK PRN;  Surgeon: Darian Toribio MD;  Location:  [facility];  Service:    • CARDIAC CATHETERIZATION  2/27/2018    Procedure: Functional Flow Stanley;  Surgeon: Darian Toribio MD;  Location:  [facility];  Service:    • CARDIAC CATHETERIZATION N/A 2/27/2018    Procedure: Left ventriculography;  Surgeon: Darian Toribio MD;  Location:  [facility];  Service:    • CARDIAC CATHETERIZATION Left 4/28/2020    Procedure: Cardiac Catheterization/Vascular Study SHAHRZAD OK PRN;  Surgeon: Darian Toribio MD;  Location:  [facility];  Service: Cardiology;  Laterality: Left;   • CARDIAC ELECTROPHYSIOLOGY PROCEDURE N/A 1/11/2018    Procedure: PPM generator change - dual;  Surgeon: Darian Toribio MD;  Location: [facility] CATH INVASIVE LOCATION;  Service:    • COLONOSCOPY  09/05/2007    colon polyps   • COLONOSCOPY N/A 5/29/2018    Procedure: COLONOSCOPY WITH ANESTHESIA;  Surgeon: Juan F Tapia MD;  Location: [facility] ENDOSCOPY;  Service: Gastroenterology   • CORONARY ANGIOPLASTY WITH STENT PLACEMENT  07/2015    SHAHRZAD mid LAD, Dr. Toribio    • CORONARY ARTERY BYPASS GRAFT  2012 AND 2014    2 vessel    • ENDOSCOPY  11/08/2010   • ENDOSCOPY N/A 5/29/2018    Procedure: ESOPHAGOGASTRODUODENOSCOPY WITH ANESTHESIA;  Surgeon: Juan F Tapia MD;  Location: [facility] ENDOSCOPY;  Service: Gastroenterology   • HEAD/NECK LESION/CYST EXCISION Left 12/20/2016    Procedure: EXCISION MALIGNANT MELANOMA WITH SENTINEL NODE BIOPSY, INJECTION AND SCAN PRE-OP, LEFT EAR;  Surgeon: Guanaco Zepeda MD;  Location: [facility] OR;  Service:    • HEMORRHOIDECTOMY     • INSERT / REPLACE / REMOVE PACEMAKER     • SENTINEL NODE BIOPSY Left 12/20/2016    Procedure: SENTINEL NODE BIOPSY;  Surgeon: Guanaco Zepeda MD;  Location: [facility] OR;  Service:    • SKIN CANCER EXCISION     • THYROID SURGERY     • THYROIDECTOMY       Social History     Socioeconomic History   •  "Marital status:      Spouse name: Not on file   • Number of children: Not on file   • Years of education: Not on file   • Highest education level: Not on file   Tobacco Use   • Smoking status: Former Smoker     Years: 10.00     Types: Cigarettes     Quit date:      Years since quittin.1   • Smokeless tobacco: Former User     Types: Snuff     Quit date:    Substance and Sexual Activity   • Alcohol use: No   • Drug use: No   • Sexual activity: Defer     Family History   Problem Relation Age of Onset   • Heart failure Mother    • Stroke Mother    • Dementia Mother    • Coronary artery disease Father    • Colon polyps Father    • COPD Brother    • Colon cancer Neg Hx      Objective   Physical Exam  Constitutional:       General: He is not in acute distress.  Cardiovascular:      Rate and Rhythm: Normal rate and regular rhythm.   Pulmonary:      Effort: Pulmonary effort is normal.      Breath sounds: Normal breath sounds.   Musculoskeletal:         General: Tenderness present.      Comments: Limited range of motion the left shoulder.   Neurological:      Mental Status: He is alert.     /70 (BP Location: Right arm)   Pulse 68   Temp 97.6 °F (36.4 °C)   Resp 16   Ht 172.7 cm (68\")   Wt 119 kg (263 lb)   SpO2 98%   BMI 39.99 kg/m²     Assessment/Plan   Diagnoses and all orders for this visit:    1. Acute pain of left shoulder (Primary)  -     Ambulatory Referral to Orthopedic Surgery  -     HYDROcodone-acetaminophen (Norco) 5-325 MG per tablet; Take 1 tablet by mouth Every 6 (Six) Hours As Needed for Moderate Pain .  Dispense: 30 tablet; Refill: 0    Discussion:  Advised and educated plan of care.  Discussed getting advanced imaging other than MRI-CT versus ultrasound.  At this point with the severe pain limitations he really does need to get an opinion from orthopedics and will let them decide what tests are needed.  Since is a possible tear injury I did advise a plain x-ray would be of " little help on the front end of this.  Will give pain medication until he can get in to see orthopedics.    Follow-up:  Return if symptoms worsen or fail to improve.    Note e-Signed by ANALI Box on 02/04/2021 at 13:05 CST

## 2021-02-04 NOTE — PATIENT INSTRUCTIONS
RICE Therapy for Routine Care of Injuries  The routine care of many injuries includes rest, ice, compression, and elevation (RICE therapy). RICE therapy is often recommended for injuries to soft tissues, such as muscle strain, sprains, bruises, and overuse injuries. It can also be used for some bone injuries. Using RICE therapy can help to relieve pain and lessen swelling.  Supplies needed:  · Ice.  · Plastic bag.  · Towel.  · Elastic bandage.  · Pillow or pillows to raise (elevate) the injured body part.  How to care for your injury with RICE therapy    Rest  Rest your injury. This may help with the healing process. Rest usually involves limiting your normal activities and not using the injured part of your body. Generally, you can return to your normal activities when your health care provider says it is okay and you can do them without much discomfort.  If you rest the injury too much, it may not heal as well. Some injuries heal better with early movement instead of resting for too long. Talk with your health care provider about how you should limit your activities and whether you should start range-of-motion exercises for your injury.  Ice  Ice your injury to lessen swelling and pain. Do not apply ice directly to your skin.  · Put ice in a plastic bag.  · Place a towel between your skin and the bag.  · Leave the ice on for 20 minutes, 2-3 times a day. Use ice on as many days as told by your health care provider.  Compression  Put pressure (compression) on your injured area to control swelling, give support, and help with discomfort. Compression may be done with an elastic bandage. If an elastic bandage has been applied, follow these general tips:  · Use the bandage as directed by the maker of the bandage that you are using.  · Do not wrap the bandage too tightly. That may block (cut off) circulation in the arm or leg in the area below the bandage.  ? If part of your body beyond the bandage becomes blue, numb,  cold, swollen, or more painful, your bandage is probably too tight. If this occurs, remove your bandage and reapply it more loosely.  · Remove and reapply the bandage every 3-4 hours or as told by your health care provider.  · See your health care provider if the bandage seems to be making your problems worse rather than better.  Elevation  Elevate your injured area to lessen swelling and pain. If possible, elevate your injured area at or above the level of your heart or the center of your chest.  Contact a health care provider if:  · Your pain and swelling continue.  · Your symptoms are getting worse rather than improving.  Having these problems may mean that you need further evaluation or imaging tests, such as X-rays or an MRI. Sometimes, X-rays may not show a small broken bone (fracture) until days after the injury happened. Make a follow-up appointment with your health care provider. Ask your health care provider, or the department that is doing the imaging test, when your results will be ready.  Get help right away if:  · You have sudden severe pain at or below the area of your injury.  · You have redness or increased swelling around your injury.  · You have tingling or numbness at or below the area of your injury and it does not improve after you remove the elastic bandage.  Summary  · The routine care of many injuries includes rest, ice, compression, and elevation (RICE therapy). Using RICE therapy can help to relieve pain and lessen swelling.  · RICE therapy is often recommended for injuries to soft tissues, such as muscle strain, sprains, bruises, and overuse injuries. It can also be used for some bone injuries.  · Seek medical care if your pain and swelling continue or if your symptoms are getting worse rather than improving.  This information is not intended to replace advice given to you by your health care provider. Make sure you discuss any questions you have with your health care provider.  Document  Revised: 09/07/2018 Document Reviewed: 09/07/2018  [employer]vier Patient Education © 2020 [employer]

## 2021-02-09 ENCOUNTER — TRANSCRIBE ORDERS (OUTPATIENT)
Dept: ADMINISTRATIVE | Facility: HOSPITAL | Age: 76
End: 2021-02-09

## 2021-02-09 DIAGNOSIS — M25.512 ACUTE PAIN OF LEFT SHOULDER: ICD-10-CM

## 2021-02-09 DIAGNOSIS — S42.255A NONDISPLACED FRACTURE OF GREATER TUBEROSITY OF LEFT HUMERUS, INITIAL ENCOUNTER FOR CLOSED FRACTURE: Primary | ICD-10-CM

## 2021-02-12 ENCOUNTER — HOSPITAL ENCOUNTER (OUTPATIENT)
Dept: CT IMAGING | Facility: HOSPITAL | Age: 76
Discharge: HOME | End: 2021-02-12
Admitting: ORTHOPAEDIC SURGERY

## 2021-02-12 DIAGNOSIS — S42.255A NONDISPLACED FRACTURE OF GREATER TUBEROSITY OF LEFT HUMERUS, INITIAL ENCOUNTER FOR CLOSED FRACTURE: ICD-10-CM

## 2021-02-12 DIAGNOSIS — M25.512 ACUTE PAIN OF LEFT SHOULDER: ICD-10-CM

## 2021-02-12 PROCEDURE — 73200 CT UPPER EXTREMITY W/O DYE: CPT

## 2021-02-23 ENCOUNTER — HOSPITAL ENCOUNTER (OUTPATIENT)
Dept: PAIN MANAGEMENT | Age: 76
Discharge: HOME | End: 2021-02-23
Payer: MEDICARE

## 2021-02-23 VITALS
OXYGEN SATURATION: 94 % | SYSTOLIC BLOOD PRESSURE: 149 MMHG | RESPIRATION RATE: 18 BRPM | TEMPERATURE: 96.4 F | HEART RATE: 69 BPM | DIASTOLIC BLOOD PRESSURE: 86 MMHG

## 2021-02-23 DIAGNOSIS — M53.3 SACROILIAC JOINT DISEASE: ICD-10-CM

## 2021-02-23 PROCEDURE — 2500000003 HC RX 250 WO HCPCS

## 2021-02-23 PROCEDURE — 3209999900 FLUORO FOR SURGICAL PROCEDURES

## 2021-02-23 PROCEDURE — 2580000003 HC RX 258

## 2021-02-23 PROCEDURE — 6360000002 HC RX W HCPCS

## 2021-02-23 PROCEDURE — G0260 INJ FOR SACROILIAC JT ANESTH: HCPCS

## 2021-02-23 RX ORDER — METHYLPREDNISOLONE ACETATE 80 MG/ML
INJECTION, SUSPENSION INTRA-ARTICULAR; INTRALESIONAL; INTRAMUSCULAR; SOFT TISSUE
Status: COMPLETED | OUTPATIENT
Start: 2021-02-23 | End: 2021-02-23

## 2021-02-23 RX ORDER — SODIUM CHLORIDE 9 MG/ML
INJECTION INTRAVENOUS
Status: COMPLETED | OUTPATIENT
Start: 2021-02-23 | End: 2021-02-23

## 2021-02-23 RX ORDER — LIDOCAINE HYDROCHLORIDE 10 MG/ML
INJECTION, SOLUTION EPIDURAL; INFILTRATION; INTRACAUDAL; PERINEURAL
Status: COMPLETED | OUTPATIENT
Start: 2021-02-23 | End: 2021-02-23

## 2021-02-23 RX ORDER — BUPIVACAINE HYDROCHLORIDE 5 MG/ML
INJECTION, SOLUTION EPIDURAL; INTRACAUDAL
Status: COMPLETED | OUTPATIENT
Start: 2021-02-23 | End: 2021-02-23

## 2021-02-23 RX ADMIN — METHYLPREDNISOLONE ACETATE 80 MG: 80 INJECTION, SUSPENSION INTRA-ARTICULAR; INTRALESIONAL; INTRAMUSCULAR; SOFT TISSUE at 10:33

## 2021-02-23 RX ADMIN — LIDOCAINE HYDROCHLORIDE 10 ML: 10 INJECTION, SOLUTION EPIDURAL; INFILTRATION; INTRACAUDAL; PERINEURAL at 10:32

## 2021-02-23 RX ADMIN — SODIUM CHLORIDE 5 ML: 9 INJECTION INTRAVENOUS at 10:33

## 2021-02-23 RX ADMIN — BUPIVACAINE HYDROCHLORIDE 5 ML: 5 INJECTION, SOLUTION EPIDURAL; INTRACAUDAL at 10:32

## 2021-02-23 ASSESSMENT — PAIN DESCRIPTION - FREQUENCY: FREQUENCY: CONTINUOUS

## 2021-02-23 ASSESSMENT — PAIN DESCRIPTION - PROGRESSION: CLINICAL_PROGRESSION: GRADUALLY WORSENING

## 2021-02-23 ASSESSMENT — PAIN DESCRIPTION - DESCRIPTORS: DESCRIPTORS: BURNING;ACHING

## 2021-02-23 ASSESSMENT — PAIN DESCRIPTION - ORIENTATION: ORIENTATION: RIGHT

## 2021-02-23 ASSESSMENT — PAIN - FUNCTIONAL ASSESSMENT: PAIN_FUNCTIONAL_ASSESSMENT: 0-10

## 2021-02-23 NOTE — INTERVAL H&P NOTE
Update History & Physical    The patient's History and Physical   was reviewed with the patient and I examined the patient. There was NO CHANGE:[id number]}. The surgical site was confirmed by the patient and me. Plan: The risks, benefits, expected outcome, and alternative to the recommended procedure have been discussed with the patient. Patient understands and wants to proceed with the procedure.      Electronically signed by Wilman Rogers MD on 2/23/2021 at 10:43 AM

## 2021-03-20 ENCOUNTER — APPOINTMENT (OUTPATIENT)
Dept: CT IMAGING | Facility: HOSPITAL | Age: 76
End: 2021-03-20

## 2021-03-20 ENCOUNTER — APPOINTMENT (OUTPATIENT)
Dept: GENERAL RADIOLOGY | Facility: HOSPITAL | Age: 76
End: 2021-03-20

## 2021-03-20 ENCOUNTER — HOSPITAL ENCOUNTER (INPATIENT)
Facility: HOSPITAL | Age: 76
LOS: 4 days | Discharge: HOME | End: 2021-03-24
Attending: FAMILY MEDICINE | Admitting: FAMILY MEDICINE

## 2021-03-20 DIAGNOSIS — R09.02 HYPOXEMIA: ICD-10-CM

## 2021-03-20 DIAGNOSIS — J18.9 PNEUMONIA OF RIGHT LUNG DUE TO INFECTIOUS ORGANISM, UNSPECIFIED PART OF LUNG: Primary | ICD-10-CM

## 2021-03-20 LAB
ALBUMIN SERPL-MCNC: 3.2 G/DL (ref 3.5–5.2)
ALBUMIN/GLOB SERPL: 1.1 G/DL
ALP SERPL-CCNC: 74 U/L (ref 39–117)
ALT SERPL W P-5'-P-CCNC: 16 U/L (ref 1–41)
ANION GAP SERPL CALCULATED.3IONS-SCNC: 9 MMOL/L (ref 5–15)
ARTERIAL PATENCY WRIST A: POSITIVE
AST SERPL-CCNC: 14 U/L (ref 1–40)
ATMOSPHERIC PRESS: 759 MMHG
BASE EXCESS BLDA CALC-SCNC: 4.7 MMOL/L (ref 0–2)
BASOPHILS # BLD AUTO: 0.04 10*3/MM3 (ref 0–0.2)
BASOPHILS NFR BLD AUTO: 0.2 % (ref 0–1.5)
BDY SITE: ABNORMAL
BILIRUB SERPL-MCNC: 0.9 MG/DL (ref 0–1.2)
BILIRUB UR QL STRIP: NEGATIVE
BODY TEMPERATURE: 37 C
BUN SERPL-MCNC: 16 MG/DL (ref 8–23)
BUN/CREAT SERPL: 18.8 (ref 7–25)
CALCIUM SPEC-SCNC: 8.9 MG/DL (ref 8.6–10.5)
CHLORIDE SERPL-SCNC: 99 MMOL/L (ref 98–107)
CLARITY UR: CLEAR
CO2 SERPL-SCNC: 27 MMOL/L (ref 22–29)
COLOR UR: ABNORMAL
CREAT SERPL-MCNC: 0.85 MG/DL (ref 0.76–1.27)
CRP SERPL-MCNC: 26.98 MG/DL (ref 0–0.5)
D DIMER PPP FEU-MCNC: 0.79 MG/L (FEU) (ref 0–0.5)
D-LACTATE SERPL-SCNC: 2.1 MMOL/L (ref 0.5–2)
DEPRECATED RDW RBC AUTO: 45.8 FL (ref 37–54)
EOSINOPHIL # BLD AUTO: 0 10*3/MM3 (ref 0–0.4)
EOSINOPHIL NFR BLD AUTO: 0 % (ref 0.3–6.2)
ERYTHROCYTE [DISTWIDTH] IN BLOOD BY AUTOMATED COUNT: 15 % (ref 12.3–15.4)
GFR SERPL CREATININE-BSD FRML MDRD: 88 ML/MIN/1.73
GLOBULIN UR ELPH-MCNC: 3 GM/DL
GLUCOSE SERPL-MCNC: 188 MG/DL (ref 65–99)
GLUCOSE UR STRIP-MCNC: NEGATIVE MG/DL
HCO3 BLDA-SCNC: 28.8 MMOL/L (ref 20–26)
HCT VFR BLD AUTO: 39.6 % (ref 37.5–51)
HGB BLD-MCNC: 12.9 G/DL (ref 13–17.7)
HGB UR QL STRIP.AUTO: NEGATIVE
IMM GRANULOCYTES # BLD AUTO: 0.26 10*3/MM3 (ref 0–0.05)
IMM GRANULOCYTES NFR BLD AUTO: 1.3 % (ref 0–0.5)
KETONES UR QL STRIP: NEGATIVE
LEUKOCYTE ESTERASE UR QL STRIP.AUTO: NEGATIVE
LYMPHOCYTES # BLD AUTO: 1.07 10*3/MM3 (ref 0.7–3.1)
LYMPHOCYTES NFR BLD AUTO: 5.2 % (ref 19.6–45.3)
Lab: ABNORMAL
MAGNESIUM SERPL-MCNC: 1.8 MG/DL (ref 1.6–2.4)
MCH RBC QN AUTO: 27.4 PG (ref 26.6–33)
MCHC RBC AUTO-ENTMCNC: 32.6 G/DL (ref 31.5–35.7)
MCV RBC AUTO: 84.1 FL (ref 79–97)
MODALITY: ABNORMAL
MONOCYTES # BLD AUTO: 1.38 10*3/MM3 (ref 0.1–0.9)
MONOCYTES NFR BLD AUTO: 6.7 % (ref 5–12)
NEUTROPHILS NFR BLD AUTO: 17.87 10*3/MM3 (ref 1.7–7)
NEUTROPHILS NFR BLD AUTO: 86.6 % (ref 42.7–76)
NITRITE UR QL STRIP: NEGATIVE
NRBC BLD AUTO-RTO: 0 /100 WBC (ref 0–0.2)
NT-PROBNP SERPL-MCNC: 575.8 PG/ML (ref 0–900)
PCO2 BLDA: 39.8 MM HG (ref 35–45)
PCO2 TEMP ADJ BLD: 39.8 MM HG (ref 35–45)
PH BLDA: 7.47 PH UNITS (ref 7.35–7.45)
PH UR STRIP.AUTO: 5.5 [PH] (ref 5–8)
PH, TEMP CORRECTED: 7.47 PH UNITS (ref 7.35–7.45)
PLATELET # BLD AUTO: 158 10*3/MM3 (ref 140–450)
PMV BLD AUTO: 9.1 FL (ref 6–12)
PO2 BLDA: 68.4 MM HG (ref 83–108)
PO2 TEMP ADJ BLD: 68.4 MM HG (ref 83–108)
POTASSIUM SERPL-SCNC: 3.4 MMOL/L (ref 3.5–5.2)
PROT SERPL-MCNC: 6.2 G/DL (ref 6–8.5)
PROT UR QL STRIP: ABNORMAL
RBC # BLD AUTO: 4.71 10*6/MM3 (ref 4.14–5.8)
SAO2 % BLDCOA: 95.7 % (ref 94–99)
SARS-COV-2 RNA PNL SPEC NAA+PROBE: NOT DETECTED
SODIUM SERPL-SCNC: 135 MMOL/L (ref 136–145)
SP GR UR STRIP: 1.03 (ref 1–1.03)
TROPONIN T SERPL-MCNC: <0.01 NG/ML (ref 0–0.03)
TROPONIN T SERPL-MCNC: <0.01 NG/ML (ref 0–0.03)
UROBILINOGEN UR QL STRIP: ABNORMAL
VENTILATOR MODE: ABNORMAL
WBC # BLD AUTO: 20.62 10*3/MM3 (ref 3.4–10.8)

## 2021-03-20 PROCEDURE — 71275 CT ANGIOGRAPHY CHEST: CPT

## 2021-03-20 PROCEDURE — 71045 X-RAY EXAM CHEST 1 VIEW: CPT

## 2021-03-20 PROCEDURE — 83605 ASSAY OF LACTIC ACID: CPT | Performed by: FAMILY MEDICINE

## 2021-03-20 PROCEDURE — 83880 ASSAY OF NATRIURETIC PEPTIDE: CPT | Performed by: FAMILY MEDICINE

## 2021-03-20 PROCEDURE — 25010000002 CEFTRIAXONE PER 250 MG: Performed by: FAMILY MEDICINE

## 2021-03-20 PROCEDURE — 94799 UNLISTED PULMONARY SVC/PX: CPT

## 2021-03-20 PROCEDURE — 86140 C-REACTIVE PROTEIN: CPT | Performed by: FAMILY MEDICINE

## 2021-03-20 PROCEDURE — 25010000002 ENOXAPARIN PER 10 MG: Performed by: FAMILY MEDICINE

## 2021-03-20 PROCEDURE — 81003 URINALYSIS AUTO W/O SCOPE: CPT | Performed by: FAMILY MEDICINE

## 2021-03-20 PROCEDURE — 85025 COMPLETE CBC W/AUTO DIFF WBC: CPT | Performed by: FAMILY MEDICINE

## 2021-03-20 PROCEDURE — 84484 ASSAY OF TROPONIN QUANT: CPT | Performed by: FAMILY MEDICINE

## 2021-03-20 PROCEDURE — 93010 ELECTROCARDIOGRAM REPORT: CPT | Performed by: INTERNAL MEDICINE

## 2021-03-20 PROCEDURE — 82803 BLOOD GASES ANY COMBINATION: CPT

## 2021-03-20 PROCEDURE — 25010000002 LEVOFLOXACIN PER 250 MG: Performed by: FAMILY MEDICINE

## 2021-03-20 PROCEDURE — 25010000002 AZITHROMYCIN PER 500 MG: Performed by: FAMILY MEDICINE

## 2021-03-20 PROCEDURE — 99284 EMERGENCY DEPT VISIT MOD MDM: CPT

## 2021-03-20 PROCEDURE — 83735 ASSAY OF MAGNESIUM: CPT | Performed by: FAMILY MEDICINE

## 2021-03-20 PROCEDURE — 80053 COMPREHEN METABOLIC PANEL: CPT | Performed by: FAMILY MEDICINE

## 2021-03-20 PROCEDURE — 85379 FIBRIN DEGRADATION QUANT: CPT | Performed by: FAMILY MEDICINE

## 2021-03-20 PROCEDURE — 87635 SARS-COV-2 COVID-19 AMP PRB: CPT | Performed by: FAMILY MEDICINE

## 2021-03-20 PROCEDURE — 0 IOPAMIDOL PER 1 ML: Performed by: FAMILY MEDICINE

## 2021-03-20 PROCEDURE — 87040 BLOOD CULTURE FOR BACTERIA: CPT | Performed by: FAMILY MEDICINE

## 2021-03-20 PROCEDURE — 36600 WITHDRAWAL OF ARTERIAL BLOOD: CPT

## 2021-03-20 PROCEDURE — 93005 ELECTROCARDIOGRAM TRACING: CPT | Performed by: FAMILY MEDICINE

## 2021-03-20 RX ORDER — ALBUTEROL SULFATE 2.5 MG/3ML
2.5 SOLUTION RESPIRATORY (INHALATION) EVERY 4 HOURS PRN
Status: DISCONTINUED | OUTPATIENT
Start: 2021-03-20 | End: 2021-03-24 | Stop reason: HOSPADM

## 2021-03-20 RX ORDER — ASPIRIN 81 MG/1
81 TABLET ORAL DAILY
Status: DISCONTINUED | OUTPATIENT
Start: 2021-03-21 | End: 2021-03-21

## 2021-03-20 RX ORDER — LISINOPRIL 5 MG/1
5 TABLET ORAL DAILY
Status: DISCONTINUED | OUTPATIENT
Start: 2021-03-21 | End: 2021-03-24 | Stop reason: HOSPADM

## 2021-03-20 RX ORDER — PREGABALIN 75 MG/1
150 CAPSULE ORAL EVERY 8 HOURS SCHEDULED
Status: DISCONTINUED | OUTPATIENT
Start: 2021-03-20 | End: 2021-03-24 | Stop reason: HOSPADM

## 2021-03-20 RX ORDER — SODIUM CHLORIDE 0.9 % (FLUSH) 0.9 %
10 SYRINGE (ML) INJECTION EVERY 12 HOURS SCHEDULED
Status: DISCONTINUED | OUTPATIENT
Start: 2021-03-20 | End: 2021-03-24 | Stop reason: HOSPADM

## 2021-03-20 RX ORDER — HYDROCODONE BITARTRATE AND ACETAMINOPHEN 5; 325 MG/1; MG/1
1 TABLET ORAL EVERY 6 HOURS PRN
Status: DISCONTINUED | OUTPATIENT
Start: 2021-03-20 | End: 2021-03-24 | Stop reason: HOSPADM

## 2021-03-20 RX ORDER — DONEPEZIL HYDROCHLORIDE 5 MG/1
5 TABLET, FILM COATED ORAL NIGHTLY
Status: DISCONTINUED | OUTPATIENT
Start: 2021-03-20 | End: 2021-03-24 | Stop reason: HOSPADM

## 2021-03-20 RX ORDER — ISOSORBIDE MONONITRATE 30 MG/1
30 TABLET, EXTENDED RELEASE ORAL DAILY
Status: DISCONTINUED | OUTPATIENT
Start: 2021-03-21 | End: 2021-03-24 | Stop reason: HOSPADM

## 2021-03-20 RX ORDER — SUCRALFATE 1 G/1
1 TABLET ORAL
Status: DISCONTINUED | OUTPATIENT
Start: 2021-03-20 | End: 2021-03-24 | Stop reason: HOSPADM

## 2021-03-20 RX ORDER — LEVOFLOXACIN 5 MG/ML
500 INJECTION, SOLUTION INTRAVENOUS ONCE
Status: COMPLETED | OUTPATIENT
Start: 2021-03-20 | End: 2021-03-20

## 2021-03-20 RX ORDER — ALPRAZOLAM 0.25 MG/1
0.25 TABLET ORAL EVERY 8 HOURS SCHEDULED
Status: DISCONTINUED | OUTPATIENT
Start: 2021-03-20 | End: 2021-03-24 | Stop reason: HOSPADM

## 2021-03-20 RX ORDER — CLOPIDOGREL BISULFATE 75 MG/1
75 TABLET ORAL DAILY
Status: DISCONTINUED | OUTPATIENT
Start: 2021-03-21 | End: 2021-03-24 | Stop reason: HOSPADM

## 2021-03-20 RX ORDER — SODIUM CHLORIDE 9 MG/ML
50 INJECTION, SOLUTION INTRAVENOUS CONTINUOUS
Status: DISCONTINUED | OUTPATIENT
Start: 2021-03-20 | End: 2021-03-21

## 2021-03-20 RX ORDER — ATORVASTATIN CALCIUM 40 MG/1
80 TABLET, FILM COATED ORAL NIGHTLY
Status: DISCONTINUED | OUTPATIENT
Start: 2021-03-20 | End: 2021-03-24 | Stop reason: HOSPADM

## 2021-03-20 RX ORDER — PANTOPRAZOLE SODIUM 40 MG/1
40 TABLET, DELAYED RELEASE ORAL
Status: DISCONTINUED | OUTPATIENT
Start: 2021-03-21 | End: 2021-03-24 | Stop reason: HOSPADM

## 2021-03-20 RX ORDER — ACETAMINOPHEN 500 MG
1000 TABLET ORAL ONCE
Status: COMPLETED | OUTPATIENT
Start: 2021-03-20 | End: 2021-03-20

## 2021-03-20 RX ORDER — ACETAMINOPHEN 325 MG/1
650 TABLET ORAL EVERY 4 HOURS PRN
Status: DISCONTINUED | OUTPATIENT
Start: 2021-03-20 | End: 2021-03-24 | Stop reason: HOSPADM

## 2021-03-20 RX ORDER — SODIUM CHLORIDE 0.9 % (FLUSH) 0.9 %
10 SYRINGE (ML) INJECTION AS NEEDED
Status: DISCONTINUED | OUTPATIENT
Start: 2021-03-20 | End: 2021-03-24 | Stop reason: HOSPADM

## 2021-03-20 RX ORDER — NITROGLYCERIN 0.4 MG/1
0.4 TABLET SUBLINGUAL
Status: DISCONTINUED | OUTPATIENT
Start: 2021-03-20 | End: 2021-03-24 | Stop reason: HOSPADM

## 2021-03-20 RX ORDER — ESCITALOPRAM OXALATE 10 MG/1
20 TABLET ORAL DAILY
Status: DISCONTINUED | OUTPATIENT
Start: 2021-03-21 | End: 2021-03-24 | Stop reason: HOSPADM

## 2021-03-20 RX ADMIN — SODIUM CHLORIDE, PRESERVATIVE FREE 10 ML: 5 INJECTION INTRAVENOUS at 22:46

## 2021-03-20 RX ADMIN — CEFTRIAXONE SODIUM 1 G: 1 INJECTION, POWDER, FOR SOLUTION INTRAMUSCULAR; INTRAVENOUS at 22:46

## 2021-03-20 RX ADMIN — AZITHROMYCIN DIHYDRATE 500 MG: 500 INJECTION, POWDER, LYOPHILIZED, FOR SOLUTION INTRAVENOUS at 23:31

## 2021-03-20 RX ADMIN — DONEPEZIL HYDROCHLORIDE 5 MG: 5 TABLET, FILM COATED ORAL at 22:45

## 2021-03-20 RX ADMIN — SUCRALFATE 1 G: 1 TABLET ORAL at 22:46

## 2021-03-20 RX ADMIN — ENOXAPARIN SODIUM 40 MG: 40 INJECTION SUBCUTANEOUS at 22:48

## 2021-03-20 RX ADMIN — ACETAMINOPHEN 1000 MG: 500 TABLET, FILM COATED ORAL at 18:07

## 2021-03-20 RX ADMIN — ATORVASTATIN CALCIUM 80 MG: 40 TABLET, FILM COATED ORAL at 22:46

## 2021-03-20 RX ADMIN — IOPAMIDOL 100 ML: 755 INJECTION, SOLUTION INTRAVENOUS at 20:39

## 2021-03-20 RX ADMIN — LEVOFLOXACIN 500 MG: 500 INJECTION, SOLUTION INTRAVENOUS at 19:47

## 2021-03-20 RX ADMIN — SODIUM CHLORIDE 50 ML/HR: 9 INJECTION, SOLUTION INTRAVENOUS at 22:47

## 2021-03-20 RX ADMIN — ALPRAZOLAM 0.25 MG: 0.25 TABLET ORAL at 22:46

## 2021-03-20 RX ADMIN — PREGABALIN 150 MG: 75 CAPSULE ORAL at 22:46

## 2021-03-20 RX ADMIN — METOPROLOL TARTRATE 25 MG: 25 TABLET, FILM COATED ORAL at 22:46

## 2021-03-21 LAB
ANION GAP SERPL CALCULATED.3IONS-SCNC: 10 MMOL/L (ref 5–15)
BASOPHILS # BLD AUTO: 0.05 10*3/MM3 (ref 0–0.2)
BASOPHILS NFR BLD AUTO: 0.2 % (ref 0–1.5)
BUN SERPL-MCNC: 13 MG/DL (ref 8–23)
BUN/CREAT SERPL: 20.3 (ref 7–25)
CALCIUM SPEC-SCNC: 8.9 MG/DL (ref 8.6–10.5)
CHLORIDE SERPL-SCNC: 101 MMOL/L (ref 98–107)
CO2 SERPL-SCNC: 29 MMOL/L (ref 22–29)
CREAT SERPL-MCNC: 0.64 MG/DL (ref 0.76–1.27)
DEPRECATED RDW RBC AUTO: 46.3 FL (ref 37–54)
EOSINOPHIL # BLD AUTO: 0.04 10*3/MM3 (ref 0–0.4)
EOSINOPHIL NFR BLD AUTO: 0.2 % (ref 0.3–6.2)
ERYTHROCYTE [DISTWIDTH] IN BLOOD BY AUTOMATED COUNT: 15.2 % (ref 12.3–15.4)
GFR SERPL CREATININE-BSD FRML MDRD: 123 ML/MIN/1.73
GLUCOSE SERPL-MCNC: 135 MG/DL (ref 65–99)
HCT VFR BLD AUTO: 37.9 % (ref 37.5–51)
HGB BLD-MCNC: 12.4 G/DL (ref 13–17.7)
IMM GRANULOCYTES # BLD AUTO: 0.41 10*3/MM3 (ref 0–0.05)
IMM GRANULOCYTES NFR BLD AUTO: 2 % (ref 0–0.5)
LYMPHOCYTES # BLD AUTO: 0.97 10*3/MM3 (ref 0.7–3.1)
LYMPHOCYTES NFR BLD AUTO: 4.6 % (ref 19.6–45.3)
MCH RBC QN AUTO: 27.3 PG (ref 26.6–33)
MCHC RBC AUTO-ENTMCNC: 32.7 G/DL (ref 31.5–35.7)
MCV RBC AUTO: 83.5 FL (ref 79–97)
MONOCYTES # BLD AUTO: 1.15 10*3/MM3 (ref 0.1–0.9)
MONOCYTES NFR BLD AUTO: 5.5 % (ref 5–12)
NEUTROPHILS NFR BLD AUTO: 18.36 10*3/MM3 (ref 1.7–7)
NEUTROPHILS NFR BLD AUTO: 87.5 % (ref 42.7–76)
NRBC BLD AUTO-RTO: 0 /100 WBC (ref 0–0.2)
PLATELET # BLD AUTO: 152 10*3/MM3 (ref 140–450)
PMV BLD AUTO: 9.2 FL (ref 6–12)
POTASSIUM SERPL-SCNC: 3.5 MMOL/L (ref 3.5–5.2)
QT INTERVAL: 350 MS
QTC INTERVAL: 435 MS
RBC # BLD AUTO: 4.54 10*6/MM3 (ref 4.14–5.8)
SODIUM SERPL-SCNC: 140 MMOL/L (ref 136–145)
WBC # BLD AUTO: 20.98 10*3/MM3 (ref 3.4–10.8)

## 2021-03-21 PROCEDURE — 25010000002 CEFTRIAXONE PER 250 MG: Performed by: FAMILY MEDICINE

## 2021-03-21 PROCEDURE — 99221 1ST HOSP IP/OBS SF/LOW 40: CPT | Performed by: FAMILY MEDICINE

## 2021-03-21 PROCEDURE — 85025 COMPLETE CBC W/AUTO DIFF WBC: CPT | Performed by: FAMILY MEDICINE

## 2021-03-21 PROCEDURE — 25010000002 AZITHROMYCIN PER 500 MG: Performed by: FAMILY MEDICINE

## 2021-03-21 PROCEDURE — 80048 BASIC METABOLIC PNL TOTAL CA: CPT | Performed by: FAMILY MEDICINE

## 2021-03-21 PROCEDURE — 25010000002 ENOXAPARIN PER 10 MG: Performed by: FAMILY MEDICINE

## 2021-03-21 RX ADMIN — ALPRAZOLAM 0.25 MG: 0.25 TABLET ORAL at 22:10

## 2021-03-21 RX ADMIN — SUCRALFATE 1 G: 1 TABLET ORAL at 11:33

## 2021-03-21 RX ADMIN — PREGABALIN 150 MG: 75 CAPSULE ORAL at 22:10

## 2021-03-21 RX ADMIN — METOPROLOL TARTRATE 25 MG: 25 TABLET, FILM COATED ORAL at 21:02

## 2021-03-21 RX ADMIN — METOPROLOL TARTRATE 25 MG: 25 TABLET, FILM COATED ORAL at 09:08

## 2021-03-21 RX ADMIN — HYDROCODONE BITARTRATE AND ACETAMINOPHEN 1 TABLET: 5; 325 TABLET ORAL at 22:17

## 2021-03-21 RX ADMIN — ESCITALOPRAM 20 MG: 10 TABLET, FILM COATED ORAL at 09:07

## 2021-03-21 RX ADMIN — ALPRAZOLAM 0.25 MG: 0.25 TABLET ORAL at 14:35

## 2021-03-21 RX ADMIN — SUCRALFATE 1 G: 1 TABLET ORAL at 07:55

## 2021-03-21 RX ADMIN — PANTOPRAZOLE SODIUM 40 MG: 40 TABLET, DELAYED RELEASE ORAL at 05:31

## 2021-03-21 RX ADMIN — HYDROCODONE BITARTRATE AND ACETAMINOPHEN 1 TABLET: 5; 325 TABLET ORAL at 05:37

## 2021-03-21 RX ADMIN — DONEPEZIL HYDROCHLORIDE 5 MG: 5 TABLET, FILM COATED ORAL at 21:02

## 2021-03-21 RX ADMIN — SODIUM CHLORIDE, PRESERVATIVE FREE 10 ML: 5 INJECTION INTRAVENOUS at 21:02

## 2021-03-21 RX ADMIN — CLOPIDOGREL 75 MG: 75 TABLET, FILM COATED ORAL at 09:08

## 2021-03-21 RX ADMIN — AZITHROMYCIN DIHYDRATE 500 MG: 500 INJECTION, POWDER, LYOPHILIZED, FOR SOLUTION INTRAVENOUS at 23:24

## 2021-03-21 RX ADMIN — CEFTRIAXONE SODIUM 1 G: 1 INJECTION, POWDER, FOR SOLUTION INTRAMUSCULAR; INTRAVENOUS at 22:10

## 2021-03-21 RX ADMIN — ISOSORBIDE MONONITRATE 30 MG: 30 TABLET, EXTENDED RELEASE ORAL at 09:08

## 2021-03-21 RX ADMIN — PREGABALIN 150 MG: 75 CAPSULE ORAL at 14:35

## 2021-03-21 RX ADMIN — PREGABALIN 150 MG: 75 CAPSULE ORAL at 05:31

## 2021-03-21 RX ADMIN — LISINOPRIL 5 MG: 5 TABLET ORAL at 09:08

## 2021-03-21 RX ADMIN — SODIUM CHLORIDE, PRESERVATIVE FREE 10 ML: 5 INJECTION INTRAVENOUS at 09:09

## 2021-03-21 RX ADMIN — ATORVASTATIN CALCIUM 80 MG: 40 TABLET, FILM COATED ORAL at 21:02

## 2021-03-21 RX ADMIN — ASPIRIN 81 MG: 81 TABLET, COATED ORAL at 09:08

## 2021-03-21 RX ADMIN — SUCRALFATE 1 G: 1 TABLET ORAL at 21:02

## 2021-03-21 RX ADMIN — ENOXAPARIN SODIUM 40 MG: 40 INJECTION SUBCUTANEOUS at 22:09

## 2021-03-21 RX ADMIN — ALPRAZOLAM 0.25 MG: 0.25 TABLET ORAL at 05:31

## 2021-03-21 RX ADMIN — SUCRALFATE 1 G: 1 TABLET ORAL at 16:56

## 2021-03-21 NOTE — H&P
"[facility]  HISTORY AND PHYSICAL    Date of Admission: 3/20/2021  Primary Care Physician: Rhys Rosen MD    Subjective    Chief Complaint: \"navjot been sick\"    This is a 75 yr old male with hx of cad who presented to the ed with 4 days of sob, cough, and fever. Eval in the ed here revealed evidence of pneumonia and the patient is admitted to my services.      Review of Systems   Constitutional: Positive for activity change, fatigue and fever.   HENT: Negative.    Eyes: Negative.    Respiratory: Positive for cough and shortness of breath.    Cardiovascular: Negative.    Gastrointestinal: Negative.    Endocrine: Negative.    Genitourinary: Negative.    Musculoskeletal: Negative.    Skin: Negative.    Allergic/Immunologic: Negative.    Neurological: Negative.    Hematological: Negative.    Psychiatric/Behavioral: Negative.         Otherwise complete ROS reviewed and negative except as mentioned in the HPI.      Past Medical History:   Past Medical History:   Diagnosis Date   • Anxiety    • Arthritis    • Cancer (CMS/HCC)     melanoma   • CHF (congestive heart failure) (CMS/HCC)    • Claustrophobia    • Colon polyp    • Coronary artery disease 2012, 2015    2 vessel CABG (2012), SHAHRZAD LAD (2015).    • Depression    • Disease of thyroid gland     partial thyroidectomy on right   • GERD (gastroesophageal reflux disease)    • Hearing loss    • Heart attack (CMS/HCC)    • Heart attack (CMS/HCC)    • History of transfusion    • Hyperlipidemia    • Hypertension    • Kidney stone     history of    • Tobacco abuse, in remission    • Uses hearing aid     BILATERAL       Past Surgical History:  Past Surgical History:   Procedure Laterality Date   • BACK SURGERY      CERVICAL FUSION AND LUMBAR DISC REPLACEMENT   • CARDIAC CATHETERIZATION  07/2015    SHAHRZAD mid TERESITA, Dr. Toribio   • CARDIAC CATHETERIZATION N/A 10/21/2016    Procedure: Left Heart Cath;  Surgeon: Darian Toribio MD;  Location: [facility] CATH INVASIVE LOCATION;  " Service:    • CARDIAC CATHETERIZATION Left 4/5/2017    Procedure: Cardiac Catheterization/Vascular Study;  Surgeon: Darian Toribio MD;  Location:  [facility] CATH INVASIVE LOCATION;  Service:    • CARDIAC CATHETERIZATION N/A 4/5/2017    Procedure: Left Heart Cath;  Surgeon: Darian Toribio MD;  Location:  [facility] CATH INVASIVE LOCATION;  Service:    • CARDIAC CATHETERIZATION N/A 4/5/2017    Procedure: Coronary angiography;  Surgeon: Darian Toribio MD;  Location:  [facility] CATH INVASIVE LOCATION;  Service:    • CARDIAC CATHETERIZATION N/A 4/5/2017    Procedure: Left ventriculography;  Surgeon: Darian Toribio MD;  Location:  [facility] CATH INVASIVE LOCATION;  Service:    • CARDIAC CATHETERIZATION  4/5/2017    Procedure: Saphenous Vein Graft;  Surgeon: Darian Toribio MD;  Location:  [facility] CATH INVASIVE LOCATION;  Service:    • CARDIAC CATHETERIZATION Left 2/27/2018    Procedure: Cardiac Catheterization/Vascular Study SHAHRZAD OK PRN;  Surgeon: Darian Toribio MD;  Location:  [facility] CATH INVASIVE LOCATION;  Service:    • CARDIAC CATHETERIZATION  2/27/2018    Procedure: Functional Flow [place];  Surgeon: Darian Toribio MD;  Location:  [facility] CATH INVASIVE LOCATION;  Service:    • CARDIAC CATHETERIZATION N/A 2/27/2018    Procedure: Left ventriculography;  Surgeon: Darian Toribio MD;  Location:  [facility] CATH INVASIVE LOCATION;  Service:    • CARDIAC CATHETERIZATION Left 4/28/2020    Procedure: Cardiac Catheterization/Vascular Study SAHHRZAD OK PRN;  Surgeon: Darian Toribio MD;  Location:  [facility] CATH INVASIVE LOCATION;  Service: Cardiology;  Laterality: Left;   • CARDIAC ELECTROPHYSIOLOGY PROCEDURE N/A 1/11/2018    Procedure: PPM generator change - dual;  Surgeon: Darian Toribio MD;  Location: [facility] CATH INVASIVE LOCATION;  Service:    • COLONOSCOPY  09/05/2007    colon polyps   • COLONOSCOPY N/A 5/29/2018    Procedure: COLONOSCOPY WITH ANESTHESIA;  Surgeon: Juan F Tapia MD;  Location: [facility] ENDOSCOPY;  Service: Gastroenterology   • CORONARY ANGIOPLASTY WITH STENT  PLACEMENT  07/2015    SHAHRZAD mid LADDr. Toribio    • CORONARY ARTERY BYPASS GRAFT  2012 AND 2014    2 vessel    • ENDOSCOPY  11/08/2010   • ENDOSCOPY N/A 5/29/2018    Procedure: ESOPHAGOGASTRODUODENOSCOPY WITH ANESTHESIA;  Surgeon: Juan F Tapia MD;  Location: [facility] ENDOSCOPY;  Service: Gastroenterology   • HEAD/NECK LESION/CYST EXCISION Left 12/20/2016    Procedure: EXCISION MALIGNANT MELANOMA WITH SENTINEL NODE BIOPSY, INJECTION AND SCAN PRE-OP, LEFT EAR;  Surgeon: Guanaco Zepeda MD;  Location: [facility] OR;  Service:    • HEMORRHOIDECTOMY     • INSERT / REPLACE / REMOVE PACEMAKER     • SENTINEL NODE BIOPSY Left 12/20/2016    Procedure: SENTINEL NODE BIOPSY;  Surgeon: Guanaco Zepeda MD;  Location: [facility] OR;  Service:    • SKIN CANCER EXCISION     • THYROID SURGERY     • THYROIDECTOMY         Social History:  reports that he quit smoking about 44 years ago. His smoking use included cigarettes. He quit after 10.00 years of use. He quit smokeless tobacco use about 44 years ago.  His smokeless tobacco use included snuff. He reports that he does not drink alcohol and does not use drugs.    Family History: family history includes COPD in his brother; Colon polyps in his father; Coronary artery disease in his father; Dementia in his mother; Heart failure in his mother; Stroke in his mother.     Allergies:  Allergies   Allergen Reactions   • Meperidine Other (See Comments)     HEART STOPS     • Statins Myalgia     Causes leg cramps       Medications:  Prior to Admission medications    Medication Sig Start Date End Date Taking? Authorizing Provider   ALPRAZolam (XANAX) 0.25 MG tablet TAKE 1 TABLET BY MOUTH THREE TIMES DAILY 7/30/18   Rhys Rosen MD   atorvastatin (LIPITOR) 80 MG tablet Take 1 tablet by mouth Every Night. 11/3/20   Rhys Rosen MD   Bacillus Coagulans-Inulin (PROBIOTIC FORMULA PO) Take  by mouth.    ProviderLoyda MD   BIOTIN PO Take  by mouth.    Loyda Arzola MD    "  cetirizine (zyrTEC) 10 MG tablet Take 10 mg by mouth.    Loyda Arzola MD   clopidogrel (PLAVIX) 75 MG tablet TAKE 1 TABLET BY MOUTH DAILY 9/10/20   Darian Toribio MD   donepezil (ARICEPT) 5 MG tablet Take 1 tablet by mouth every night at bedtime. 11/24/20   Rhys Rosen MD   escitalopram (LEXAPRO) 20 MG tablet Take 1 tablet by mouth Daily. 11/20/20   Rhys Rosen MD   furosemide (LASIX) 40 MG tablet TAKE 1 TABLET BY MOUTH DAILY 12/7/20   Rhys Rosen MD   HYDROcodone-acetaminophen (Norco) 5-325 MG per tablet Take 1 tablet by mouth Every 6 (Six) Hours As Needed for Moderate Pain . 2/4/21   Gurpreet Prado APRN   isosorbide mononitrate (IMDUR) 30 MG 24 hr tablet Take 1 tablet by mouth Every Morning. 6/10/20   Darian Toribio MD   lisinopril (PRINIVIL,ZESTRIL) 5 MG tablet Take 1 tablet by mouth Daily. 5/6/20   Darian Toribio MD   metoprolol tartrate (LOPRESSOR) 25 MG tablet Take one tablet by mouth twice a day. 11/5/20   Rhys Rosen MD   NITROSTAT 0.4 MG SL tablet Place 1 tablet under the tongue Every 5 (Five) Minutes As Needed for Chest Pain. Take no more than 3 doses in 15 minutes. 6/10/20   Darian Toribio MD   omega-3 acid ethyl esters (LOVAZA) 1 g capsule Take 1 capsule by mouth Daily. 8/26/20   Rhys Rosen MD   omeprazole (priLOSEC) 20 MG capsule Take 1 capsule by mouth Daily. 12/3/20   Rhys Rosen MD   Potassium 99 MG tablet Take 1 tablet by mouth Daily.    Loyda Arzola MD   pregabalin (LYRICA) 150 MG capsule TAKE 1 CAPSULE BY MOUTH THREE TIMES DAILY 5/7/20   Rhys Rosen MD   sucralfate (CARAFATE) 1 g tablet TK 1 T PO  AC AND HS 8/22/19   Loyda Arzola MD   aspirin 81 MG EC tablet Take 81 mg by mouth daily.    3/21/21  Loyda Arzola, MD       Objective    Vital Signs: /64 (BP Location: Right arm, Patient Position: Lying)   Pulse 72   Temp 97.7 °F (36.5 °C) (Oral)   Resp 18   Ht 172.7 cm (68\")   Wt " 123 kg (271 lb 6.4 oz)   SpO2 94%   BMI 41.27 kg/m²   Physical Exam  Vitals and nursing note reviewed.   Constitutional:       Appearance: Normal appearance. He is normal weight.   HENT:      Head: Normocephalic and atraumatic.      Right Ear: Ear canal normal.      Left Ear: Ear canal normal.      Nose: Nose normal.      Mouth/Throat:      Mouth: Mucous membranes are moist.      Pharynx: Oropharynx is clear.   Eyes:      Extraocular Movements: Extraocular movements intact.      Conjunctiva/sclera: Conjunctivae normal.      Pupils: Pupils are equal, round, and reactive to light.   Cardiovascular:      Rate and Rhythm: Tachycardia present.      Pulses: Normal pulses.      Heart sounds: Normal heart sounds.   Pulmonary:      Effort: Pulmonary effort is normal.      Breath sounds: Rhonchi and rales present.   Abdominal:      General: Abdomen is flat. Bowel sounds are normal.      Palpations: Abdomen is soft.   Musculoskeletal:         General: Normal range of motion.      Cervical back: Normal range of motion and neck supple.   Skin:     General: Skin is warm and dry.      Capillary Refill: Capillary refill takes less than 2 seconds.   Neurological:      General: No focal deficit present.      Mental Status: He is alert and oriented to person, place, and time. Mental status is at baseline.   Psychiatric:         Mood and Affect: Mood normal.         Behavior: Behavior normal.         Thought Content: Thought content normal.         Judgment: Judgment normal.           Results Reviewed:  Lab Results (last 24 hours)     Procedure Component Value Units Date/Time    Basic Metabolic Panel [[id number]]  (Abnormal) Collected: 03/21/21 0621    Specimen: Blood Updated: 03/21/21 0707     Glucose 135 mg/dL      BUN 13 mg/dL      Creatinine 0.64 mg/dL      Sodium 140 mmol/L      Potassium 3.5 mmol/L      Chloride 101 mmol/L      CO2 29.0 mmol/L      Calcium 8.9 mg/dL      eGFR Non African Amer 123 mL/min/1.73      BUN/Creatinine  Ratio 20.3     Anion Gap 10.0 mmol/L     Narrative:      GFR Normal >60  Chronic Kidney Disease <60  Kidney Failure <15      CBC Auto Differential [[id number]]  (Abnormal) Collected: 03/21/21 0621    Specimen: Blood Updated: 03/21/21 0650     WBC 20.98 10*3/mm3      RBC 4.54 10*6/mm3      Hemoglobin 12.4 g/dL      Hematocrit 37.9 %      MCV 83.5 fL      MCH 27.3 pg      MCHC 32.7 g/dL      RDW 15.2 %      RDW-SD 46.3 fl      MPV 9.2 fL      Platelets 152 10*3/mm3      Neutrophil % 87.5 %      Lymphocyte % 4.6 %      Monocyte % 5.5 %      Eosinophil % 0.2 %      Basophil % 0.2 %      Immature Grans % 2.0 %      Neutrophils, Absolute 18.36 10*3/mm3      Lymphocytes, Absolute 0.97 10*3/mm3      Monocytes, Absolute 1.15 10*3/mm3      Eosinophils, Absolute 0.04 10*3/mm3      Basophils, Absolute 0.05 10*3/mm3      Immature Grans, Absolute 0.41 10*3/mm3      nRBC 0.0 /100 WBC         Imaging Results (Last 24 Hours)     Procedure Component Value Units Date/Time    CT Angiogram Chest [[id number]] Collected: 03/20/21 2050     Updated: 03/20/21 2057    Narrative:      CT ANGIOGRAM CHEST- 3/20/2021 8:35 PM CDT     HISTORY: Dyspnea, elevated dimer; J18.9-Pneumonia, unspecified organism;  R09.02-Hypoxemia      COMPARISON: 10/19/2016     DOSE LENGTH PRODUCT: 416 mGy cm. Automated exposure control was also  utilized to decrease patient radiation dose.     TECHNIQUE: Axial images the chest are obtained following IV contrast.  2-D and maximal intensity projection images reconstructed and reviewed.     FINDINGS:  There are no pulmonary emboli. No thoracic aortic aneurysm or  dissection. Prior mediastinal surgery. Cardiomegaly. Left subclavian  cardiac pacer device. No pericardial or pleural effusions. Reactive  appearing mediastinal lymph nodes measure less than a centimeter in  size. Right hilar lymph nodes measuring 1.2 cm. A subcarinal lymph node  measures up to 1.5 cm. No axillary lymphadenopathy.     No adrenal nodules. Elevated  right hemidiaphragm. Decompressed stomach.  Subcentimeter cyst or hemangioma the lateral left hepatic lobe.     New right middle lobe consolidation favoring pneumonia.     Bronchograms. Probable patchy bibasilar atelectasis. No pneumothorax. No  discrete endobronchial lesions.     Focal destructive osseous lesions.       Impression:      1. No pulmonary emboli.  2. No thoracic aortic aneurysm or dissection. Prior mediastinal surgery  with cardiomegaly. Left subclavian cardiac pacer device.  3. New right middle lobe consolidation most consistent with pneumonia.  Probable reactive right hilar and subcarinal lymph nodes. Follow up  chest x-ray recommended to document resolution.  This report was finalized on 03/20/2021 20:54 by Dr. Cassie Mckeon MD.            Active Hospital Problems    Diagnosis    • **Pneumonia of right lung due to infectious organism    • Sick sinus syndrome (CMS/HCC)    • Coronary artery disease      2 vessel CABG (2012), SHAHRZAD LAD (2015).      • Mixed hyperlipidemia    • Essential hypertension        Assessment / Plan  Atbx, nebs, monitor labs and cxr progress       Code Status: full code     I discussed the patient's findings and my recommendations with the patient and his wife..    Estimated length of stay 4 days.    Rhys Rosen MD   03/21/21   15:51 CDT

## 2021-03-21 NOTE — PLAN OF CARE
Goal Outcome Evaluation:  Plan of Care Reviewed With: patient  Progress: no change  Outcome Summary: Pt admit to floor with pneumonia, IVF and IV ABX, santiago diet, up with SBA x1, O2 above 92% on room air, will cont to monitor

## 2021-03-21 NOTE — PLAN OF CARE
Goal Outcome Evaluation:     Progress: improving  Outcome Summary: Pt rates pain in chest as a 3. IV fluids and antibiotics continued. Cont pulse ox in place and O2 sat remains between 92-93 on RA. Ambulated in [place] without c/o SOA.

## 2021-03-21 NOTE — H&P
[facility] Medicine Services  HISTORY AND PHYSICAL    Date of Admission: 3/20/2021  Primary Care Physician: Rhys Rosen MD    Subjective     Chief Complaint: Shortness of breath and fever    History of Present Illness  75 year old male with PMH of CAD/CABG/PPM, CHF, that presents with complaints of cough, shortness of breath and fever worsening for the last 4 days. He also has pleuritic chest pain over right chest. Currently not hypoxemic, tachycardic. WBC is elevated and CXR shows a dense infiltrate over the right base.    Review of Systems   Otherwise complete ROS reviewed and negative except as mentioned in the HPI.    Past Medical History:   Past Medical History:   Diagnosis Date   • Anxiety    • Arthritis    • Cancer (CMS/HCC)     melanoma   • CHF (congestive heart failure) (CMS/HCC)    • Claustrophobia    • Colon polyp    • Coronary artery disease 2012, 2015    2 vessel CABG (2012), SHAHRZAD LAD (2015).    • Depression    • Disease of thyroid gland     partial thyroidectomy on right   • GERD (gastroesophageal reflux disease)    • Hearing loss    • Heart attack (CMS/HCC)    • Heart attack (CMS/HCC)    • History of transfusion    • Hyperlipidemia    • Hypertension    • Kidney stone     history of    • Tobacco abuse, in remission    • Uses hearing aid     BILATERAL     Past Surgical History:  Past Surgical History:   Procedure Laterality Date   • BACK SURGERY      CERVICAL FUSION AND LUMBAR DISC REPLACEMENT   • CARDIAC CATHETERIZATION  07/2015    SHAHRZAD mid Dr. Malu LO   • CARDIAC CATHETERIZATION N/A 10/21/2016    Procedure: Left Heart Cath;  Surgeon: Darian Toribio MD;  Location:  [facility];  Service:    • CARDIAC CATHETERIZATION Left 4/5/2017    Procedure: Cardiac Catheterization/Vascular Study;  Surgeon: Darian Toribio MD;  Location:  [facility];  Service:    • CARDIAC CATHETERIZATION N/A 4/5/2017    Procedure: Left Heart Cath;   Surgeon: Darian Toribio MD;  Location:  [facility];  Service:    • CARDIAC CATHETERIZATION N/A 4/5/2017    Procedure: Coronary angiography;  Surgeon: Darian Toribio MD;  Location:  [facility];  Service:    • CARDIAC CATHETERIZATION N/A 4/5/2017    Procedure: Left ventriculography;  Surgeon: Darian Toribio MD;  Location:  [facility];  Service:    • CARDIAC CATHETERIZATION  4/5/2017    Procedure: Saphenous Vein Graft;  Surgeon: Darian Toribio MD;  Location:  [facility];  Service:    • CARDIAC CATHETERIZATION Left 2/27/2018    Procedure: Cardiac Catheterization/Vascular Study SHAHRZAD OK PRN;  Surgeon: Darian Toribio MD;  Location:  [facility];  Service:    • CARDIAC CATHETERIZATION  2/27/2018    Procedure: Functional Flow [place];  Surgeon: Darian Toribio MD;  Location:  [facility];  Service:    • CARDIAC CATHETERIZATION N/A 2/27/2018    Procedure: Left ventriculography;  Surgeon: Darian Toribio MD;  Location:  [facility];  Service:    • CARDIAC CATHETERIZATION Left 4/28/2020    Procedure: Cardiac Catheterization/Vascular Study SHAHRZAD OK PRN;  Surgeon: Darian Toribio MD;  Location:  [facility];  Service: Cardiology;  Laterality: Left;   • CARDIAC ELECTROPHYSIOLOGY PROCEDURE N/A 1/11/2018    Procedure: PPM generator change - dual;  Surgeon: Darian Toribio MD;  Location:  [facility];  Service:    • COLONOSCOPY  09/05/2007    colon polyps   • COLONOSCOPY N/A 5/29/2018    Procedure: COLONOSCOPY WITH ANESTHESIA;  Surgeon: Juan F Tapia MD;  Location: [facility] ENDOSCOPY;  Service: Gastroenterology   • CORONARY ANGIOPLASTY WITH STENT PLACEMENT  07/2015    SHAHRZAD mid LAD, Dr. Toribio    • CORONARY ARTERY BYPASS GRAFT  2012 AND 2014    2 vessel    • ENDOSCOPY  11/08/2010   • ENDOSCOPY N/A 5/29/2018    Procedure: ESOPHAGOGASTRODUODENOSCOPY WITH ANESTHESIA;  Surgeon: Juan F Tapia MD;   Location: [facility] ENDOSCOPY;  Service: Gastroenterology   • HEAD/NECK LESION/CYST EXCISION Left 12/20/2016    Procedure: EXCISION MALIGNANT MELANOMA WITH SENTINEL NODE BIOPSY, INJECTION AND SCAN PRE-OP, LEFT EAR;  Surgeon: Guanaco Zepeda MD;  Location: [facility] OR;  Service:    • HEMORRHOIDECTOMY     • INSERT / REPLACE / REMOVE PACEMAKER     • SENTINEL NODE BIOPSY Left 12/20/2016    Procedure: SENTINEL NODE BIOPSY;  Surgeon: Guanaco Zepeda MD;  Location: [facility] OR;  Service:    • SKIN CANCER EXCISION     • THYROID SURGERY     • THYROIDECTOMY       Social History:  reports that he quit smoking about 44 years ago. His smoking use included cigarettes. He quit after 10.00 years of use. He quit smokeless tobacco use about 44 years ago.  His smokeless tobacco use included snuff. He reports that he does not drink alcohol and does not use drugs.    Family History: family history includes COPD in his brother; Colon polyps in his father; Coronary artery disease in his father; Dementia in his mother; Heart failure in his mother; Stroke in his mother.       Allergies:  Allergies   Allergen Reactions   • Meperidine Other (See Comments)     HEART STOPS     • Statins Myalgia     Causes leg cramps     Medications:  Prior to Admission medications    Medication Sig Start Date End Date Taking? Authorizing Provider   ALPRAZolam (XANAX) 0.25 MG tablet TAKE 1 TABLET BY MOUTH THREE TIMES DAILY 7/30/18   Rhys Rosen MD   aspirin 81 MG EC tablet Take 81 mg by mouth daily.      Loyda Arzola MD   atorvastatin (LIPITOR) 80 MG tablet Take 1 tablet by mouth Every Night. 11/3/20   Rhys Rosen MD   Bacillus Coagulans-Inulin (PROBIOTIC FORMULA PO) Take  by mouth.    ProviderLoyda MD   BIOTIN PO Take  by mouth.    Loyda Arzola MD   cetirizine (zyrTEC) 10 MG tablet Take 10 mg by mouth.    Loyda Arzola MD   clopidogrel (PLAVIX) 75 MG tablet TAKE 1 TABLET BY MOUTH DAILY 9/10/20   Darian Toribio MD   "  donepezil (ARICEPT) 5 MG tablet Take 1 tablet by mouth every night at bedtime. 11/24/20   Rhys Rosen MD   escitalopram (LEXAPRO) 20 MG tablet Take 1 tablet by mouth Daily. 11/20/20   Rhys Rosen MD   furosemide (LASIX) 40 MG tablet TAKE 1 TABLET BY MOUTH DAILY 12/7/20   Rhys Rosen MD   HYDROcodone-acetaminophen (Norco) 5-325 MG per tablet Take 1 tablet by mouth Every 6 (Six) Hours As Needed for Moderate Pain . 2/4/21   Gurpreet Prado APRN   isosorbide mononitrate (IMDUR) 30 MG 24 hr tablet Take 1 tablet by mouth Every Morning. 6/10/20   Darian Toribio MD   lisinopril (PRINIVIL,ZESTRIL) 5 MG tablet Take 1 tablet by mouth Daily. 5/6/20   Darian Toribio MD   metoprolol tartrate (LOPRESSOR) 25 MG tablet Take one tablet by mouth twice a day. 11/5/20   Rhys Rosen MD   NITROSTAT 0.4 MG SL tablet Place 1 tablet under the tongue Every 5 (Five) Minutes As Needed for Chest Pain. Take no more than 3 doses in 15 minutes. 6/10/20   Darian Toribio MD   omega-3 acid ethyl esters (LOVAZA) 1 g capsule Take 1 capsule by mouth Daily. 8/26/20   Rhys Rosen MD   omeprazole (priLOSEC) 20 MG capsule Take 1 capsule by mouth Daily. 12/3/20   Rhys Rosen MD   Potassium 99 MG tablet Take 1 tablet by mouth Daily.    Loyda Arzola MD   pregabalin (LYRICA) 150 MG capsule TAKE 1 CAPSULE BY MOUTH THREE TIMES DAILY 5/7/20   Rhys Rosen MD   sucralfate (CARAFATE) 1 g tablet TK 1 T PO  AC AND HS 8/22/19   ProviderLoyda MD     Objective     Vital Signs: /61   Pulse 103   Temp 100.1 °F (37.8 °C)   Resp 19   Ht 172.7 cm (68\")   Wt 121 kg (266 lb)   SpO2 94%   BMI 40.45 kg/m²   Physical Exam  Constitutional:       General: He is not in acute distress.     Appearance: He is ill-appearing and diaphoretic.   HENT:      Head: Normocephalic and atraumatic.      Right Ear: External ear normal.      Left Ear: External ear normal.      Nose: Nose normal. "      Mouth/Throat:      Mouth: Mucous membranes are moist.   Eyes:      General:         Right eye: No discharge.         Left eye: No discharge.      Extraocular Movements: Extraocular movements intact.      Conjunctiva/sclera: Conjunctivae normal.      Pupils: Pupils are equal, round, and reactive to light.   Cardiovascular:      Rate and Rhythm: Regular rhythm. Tachycardia present.      Pulses: Normal pulses.      Heart sounds: No murmur heard.     Pulmonary:      Effort: Pulmonary effort is normal. No respiratory distress.      Breath sounds: No stridor. Rales present. No rhonchi.      Comments: Rales right mid lung and decreased air entry to right base  Chest:      Chest wall: No tenderness.   Abdominal:      General: Abdomen is flat. Bowel sounds are normal. There is no distension.      Palpations: Abdomen is soft. There is no mass.      Tenderness: There is no abdominal tenderness.      Hernia: No hernia is present.   Musculoskeletal:         General: No swelling or tenderness. Normal range of motion.      Cervical back: Normal range of motion and neck supple. No rigidity or tenderness.      Right lower leg: No edema.      Left lower leg: No edema.   Skin:     General: Skin is warm.      Capillary Refill: Capillary refill takes less than 2 seconds.      Coloration: Skin is not pale.      Findings: No erythema, lesion or rash.   Neurological:      General: No focal deficit present.      Mental Status: He is oriented to person, place, and time. Mental status is at baseline.      Cranial Nerves: No cranial nerve deficit.      Sensory: No sensory deficit.      Motor: No weakness.      Coordination: Coordination normal.   Psychiatric:         Mood and Affect: Mood normal.         Behavior: Behavior normal.     Results Reviewed:  Lab Results (last 24 hours)     Procedure Component Value Units Date/Time    Urinalysis With Culture If Indicated - Urine, Clean Catch [[id number]]  (Abnormal) Collected: 03/20/21 1911     Specimen: Urine, Clean Catch Updated: 03/20/21 1926     Color, UA Dark Yellow     Appearance, UA Clear     pH, UA 5.5     Specific Gravity, UA 1.029     Glucose, UA Negative     Ketones, UA Negative     Bilirubin, UA Negative     Blood, UA Negative     Protein, UA Trace     Leuk Esterase, UA Negative     Nitrite, UA Negative     Urobilinogen, UA 1.0 E.U./dL    Narrative:      Urine microscopic not indicated.    COVID-19,[facility] IN-HOUSE,Nasal Swab No Transport Media 3-4 HR TAT - Swab, Nasal Cavity [[id number]]  (Normal) Collected: 03/20/21 1755    Specimen: Swab from Nasal Cavity Updated: 03/20/21 1852     COVID19 Not Detected    Narrative:      Fact sheet for providers: https://www.fda.gov/media/234416/download     Fact sheet for patients: https://www.fda.gov/media/403513/download    Test performed by PCR.    Consider negative results in combination with clinical observations, patient history, and epidemiological information.    Comprehensive Metabolic Panel [[id number]]  (Abnormal) Collected: 03/20/21 1755    Specimen: Blood Updated: 03/20/21 1841     Glucose 188 mg/dL      BUN 16 mg/dL      Creatinine 0.85 mg/dL      Sodium 135 mmol/L      Potassium 3.4 mmol/L      Chloride 99 mmol/L      CO2 27.0 mmol/L      Calcium 8.9 mg/dL      Total Protein 6.2 g/dL      Albumin 3.20 g/dL      ALT (SGPT) 16 U/L      AST (SGOT) 14 U/L      Alkaline Phosphatase 74 U/L      Total Bilirubin 0.9 mg/dL      eGFR Non African Amer 88 mL/min/1.73      Globulin 3.0 gm/dL      A/G Ratio 1.1 g/dL      BUN/Creatinine Ratio 18.8     Anion Gap 9.0 mmol/L     Narrative:      GFR Normal >60  Chronic Kidney Disease <60  Kidney Failure <15      Troponin [[id number]]  (Normal) Collected: 03/20/21 1755    Specimen: Blood Updated: 03/20/21 1838     Troponin T <0.010 ng/mL     Narrative:      Troponin T Reference Range:  <= 0.03 ng/mL-   Negative for AMI  >0.03 ng/mL-     Abnormal for myocardial necrosis.  Clinicians would have to utilize clinical  acumen, EKG, Troponin and serial changes to determine if it is an Acute Myocardial Infarction or myocardial injury due to an underlying chronic condition.       Results may be falsely decreased if patient taking Biotin.      BNP [[id number]]  (Normal) Collected: 03/20/21 1755    Specimen: Blood Updated: 03/20/21 1837     proBNP 575.8 pg/mL     Narrative:      Among patients with dyspnea, NT-proBNP is highly sensitive for the detection of acute congestive heart failure. In addition NT-proBNP of <300 pg/ml effectively rules out acute congestive heart failure with 99% negative predictive value.    Results may be falsely decreased if patient taking Biotin.      Magnesium [[id number]]  (Normal) Collected: 03/20/21 1755    Specimen: Blood Updated: 03/20/21 1835     Magnesium 1.8 mg/dL     C-reactive Protein [[id number]]  (Abnormal) Collected: 03/20/21 1755    Specimen: Blood Updated: 03/20/21 1835     C-Reactive Protein 26.98 mg/dL     Lactic Acid, Plasma [[id number]]  (Abnormal) Collected: 03/20/21 1755    Specimen: Blood Updated: 03/20/21 1833     Lactate 2.1 mmol/L     D-dimer, Quantitative [[id number]]  (Abnormal) Collected: 03/20/21 1755    Specimen: Blood Updated: 03/20/21 1828     D-Dimer, Quantitative 0.79 mg/L (FEU)     Narrative:      Reference Range is 0-0.50 mg/L FEU. However, results <0.50 mg/L FEU tends to rule out DVT or PE. Results >0.50 mg/L FEU are not useful in predicting absence or presence of DVT or PE.      Blood Culture - Blood, Arm, Left [[id number]] Collected: 03/20/21 1755    Specimen: Blood from Arm, Left Updated: 03/20/21 1820    Blood Culture - Blood, Arm, Left [[id number]] Collected: 03/20/21 1755    Specimen: Blood from Arm, Left Updated: 03/20/21 1820    CBC & Differential [[id number]]  (Abnormal) Collected: 03/20/21 1755    Specimen: Blood Updated: 03/20/21 1817    Narrative:      The following orders were created for panel order CBC & Differential.  Procedure                                Abnormality         Status                     ---------                               -----------         ------                     CBC Auto Differential[[id number]]        Abnormal            Final result                 Please view results for these tests on the individual orders.    CBC Auto Differential [[id number]]  (Abnormal) Collected: 03/20/21 1755    Specimen: Blood Updated: 03/20/21 1817     WBC 20.62 10*3/mm3      RBC 4.71 10*6/mm3      Hemoglobin 12.9 g/dL      Hematocrit 39.6 %      MCV 84.1 fL      MCH 27.4 pg      MCHC 32.6 g/dL      RDW 15.0 %      RDW-SD 45.8 fl      MPV 9.1 fL      Platelets 158 10*3/mm3      Neutrophil % 86.6 %      Lymphocyte % 5.2 %      Monocyte % 6.7 %      Eosinophil % 0.0 %      Basophil % 0.2 %      Immature Grans % 1.3 %      Neutrophils, Absolute 17.87 10*3/mm3      Lymphocytes, Absolute 1.07 10*3/mm3      Monocytes, Absolute 1.38 10*3/mm3      Eosinophils, Absolute 0.00 10*3/mm3      Basophils, Absolute 0.04 10*3/mm3      Immature Grans, Absolute 0.26 10*3/mm3      nRBC 0.0 /100 WBC     Blood Gas, Arterial - [[id number]]  (Abnormal) Collected: 03/20/21 1755    Specimen: Arterial Blood Updated: 03/20/21 1759     Site Right Radial     Aashish's Test Positive     pH, Arterial 7.467 pH units      Comment: 83 Value above reference range        pCO2, Arterial 39.8 mm Hg      pO2, Arterial 68.4 mm Hg      Comment: 84 Value below reference range        HCO3, Arterial 28.8 mmol/L      Comment: 83 Value above reference range        Base Excess, Arterial 4.7 mmol/L      Comment: 83 Value above reference range        O2 Saturation, Arterial 95.7 %      Temperature 37.0 C      Barometric Pressure for Blood Gas 759 mmHg      Modality Room Air     Ventilator Mode NA     Collected by [id number]     Comment: Meter: [id number]     :  [id number]        pCO2, Temperature Corrected 39.8 mm Hg      pH, Temp Corrected 7.467 pH Units      pO2, Temperature Corrected 68.4 mm Hg              Imaging Results (Last 24 Hours)     Procedure Component Value Units Date/Time    XR Chest 1 View [[id number]] Collected: 03/20/21 1842     Updated: 03/20/21 1846    Narrative:      HISTORY: Cough with fever     CXR: Frontal view the chest obtained.     COMPARISON: 4/27/2020     FINDINGS: There is right mid and lower lobe consolidation consistent  with pneumonia. Left basilar atelectasis. Mild cardiomegaly. Prior  mediastinal surgery. Left subclavian cardiac pacer device stable in  position. Postoperative changes of the cervical spine.       Impression:      1. Right mid and lower lung consolidation consistent with pneumonia.  Questionable small parapneumonic right pleural effusion.  2. Prior mediastinal surgery. Left subclavian cardiac pacer device. Mild  cardiomegaly with no radiographic evidence of edema.  This report was finalized on 03/20/2021 18:43 by Dr. Cassie Mckeon MD.        I have personally reviewed and interpreted the radiology studies and ECG obtained at time of admission.     Assessment / Plan     Assessment:   Active Hospital Problems    Diagnosis    • **Pneumonia of right lung due to infectious organism    • Sick sinus syndrome (CMS/HCC)    • Coronary artery disease      2 vessel CABG (2012), SHAHRZAD LAD (2015).      • Mixed hyperlipidemia    • Essential hypertension      Plan:   Admit to medical floor  Vitals every 4 hours with pulse oxymetry spot check  Cardiac diet, up add odette    CAP > Rocephin/Zithromax  Follow respiratory and blood cultures    Home medications reconciled    DVT prophylaxis > Lovenox       Code Status: Full     I discussed the patient's findings and my recommendations with the patient and wife at the bedside    Estimated length of stay over 2 midnights    Patient seen and examined by me on 3/20/2021 at 1930.    Electronically signed by Cody Lockett MD, 03/20/21, 19:51 CDT.

## 2021-03-21 NOTE — ED PROVIDER NOTES
Subjective   Mr. Velasco is a 75-year-old male who states that for the last two days he has been weak and running a fever. He states that today he became SOA and his chest started hurting which is worse when he takes a deep breath or coughs. He is unsure if he has been exposed to COVID 19 but state he just got back from a vacation.  He denies nausea or vomiting.  He has been using the bathroom normally.  His cough is minimally productive.  He has chest discomfort on the right but no chest pain per se          Review of Systems   Constitutional: Positive for chills, diaphoresis and fever.   Respiratory: Positive for cough and shortness of breath.    Neurological: Positive for weakness.   All other systems reviewed and are negative.      Past Medical History:   Diagnosis Date   • Anxiety    • Arthritis    • Cancer (CMS/HCC)     melanoma   • CHF (congestive heart failure) (CMS/HCC)    • Claustrophobia    • Colon polyp    • Coronary artery disease 2012, 2015    2 vessel CABG (2012), SHAHRZAD LAD (2015).    • Depression    • Disease of thyroid gland     partial thyroidectomy on right   • GERD (gastroesophageal reflux disease)    • Hearing loss    • Heart attack (CMS/HCC)    • Heart attack (CMS/HCC)    • History of transfusion    • Hyperlipidemia    • Hypertension    • Kidney stone     history of    • Tobacco abuse, in remission    • Uses hearing aid     BILATERAL       Allergies   Allergen Reactions   • Meperidine Other (See Comments)     HEART STOPS     • Statins Myalgia     Causes leg cramps       Past Surgical History:   Procedure Laterality Date   • BACK SURGERY      CERVICAL FUSION AND LUMBAR DISC REPLACEMENT   • CARDIAC CATHETERIZATION  07/2015    SHAHRZAD mid LAD, Dr. Toribio   • CARDIAC CATHETERIZATION N/A 10/21/2016    Procedure: Left Heart Cath;  Surgeon: Darian Toribio MD;  Location:  [facility] CATH [facility]VASIVE LOC[facility];  Service:    • CARDIAC CATHETERIZATION Left 4/5/2017    Procedure: Cardiac Catheterization/Vascular Study;   Surgeon: Darian Toribio MD;  Location:  [facility];  Service:    • CARDIAC CATHETERIZATION N/A 4/5/2017    Procedure: Left Heart Cath;  Surgeon: Darian Toribio MD;  Location:  [facility];  Service:    • CARDIAC CATHETERIZATION N/A 4/5/2017    Procedure: Coronary angiography;  Surgeon: Darian Toribio MD;  Location:  [facility];  Service:    • CARDIAC CATHETERIZATION N/A 4/5/2017    Procedure: Left ventriculography;  Surgeon: Darian Toribio MD;  Location:  [facility];  Service:    • CARDIAC CATHETERIZATION  4/5/2017    Procedure: Saphenous Vein Graft;  Surgeon: Darian Toribio MD;  Location:  [facility];  Service:    • CARDIAC CATHETERIZATION Left 2/27/2018    Procedure: Cardiac Catheterization/Vascular Study SHAHRZAD OK PRN;  Surgeon: Darian Toribio MD;  Location:  [facility];  Service:    • CARDIAC CATHETERIZATION  2/27/2018    Procedure: Functional Flow [place];  Surgeon: Darian Toribio MD;  Location:  [facility] CATH INV[facility];  Service:    • CARDIAC CATHETERIZATION N/A 2/27/2018    Procedure: Left ventriculography;  Surgeon: Darian Toribio MD;  Location:  [facility] CATH [facility];  Service:    • CARDIAC CATHETERIZATION Left 4/28/2020    Procedure: Cardiac Catheterization/Vascular Study SHAHRZAD OK PRN;  Surgeon: Darian Toribio MD;  Location:  [facility] C[facility];  Service: Cardiology;  Laterality: Left;   • CARDIAC ELECTROPHYSIOLOGY PROCEDURE N/A 1/11/2018    Procedure: PPM generator change - dual;  Surgeon: Darian Toribio MD;  Location: [facility] CATH INVASIVE LOCATION;  Service:    • COLONOSCOPY  09/05/2007    colon polyps   • COLONOSCOPY N/A 5/29/2018    Procedure: COLONOSCOPY WITH ANESTHESIA;  Surgeon: Juan F Tapia MD;  Location: [facility] ENDOSCOPY;  Service: Gastroenterology   • CORONARY ANGIOPLASTY WITH STENT PLACEMENT  07/2015    SHAHRZAD mid TERESITA, Dr. Toribio    • CORONARY ARTERY BYPASS GRAFT  2012 AND 2014    2 vessel     • ENDOSCOPY  2010   • ENDOSCOPY N/A 2018    Procedure: ESOPHAGOGASTRODUODENOSCOPY WITH ANESTHESIA;  Surgeon: Juan F Tapia MD;  Location: [facility] ENDOSCOPY;  Service: Gastroenterology   • HEAD/NECK LESION/CYST EXCISION Left 2016    Procedure: EXCISION MALIGNANT MELANOMA WITH SENTINEL NODE BIOPSY, INJECTION AND SCAN PRE-OP, LEFT EAR;  Surgeon: Guanaco Zepeda MD;  Location: [facility] OR;  Service:    • HEMORRHOIDECTOMY     • INSERT / REPLACE / REMOVE PACEMAKER     • SENTINEL NODE BIOPSY Left 2016    Procedure: SENTINEL NODE BIOPSY;  Surgeon: Guanaco Zepeda MD;  Location: [facility] OR;  Service:    • SKIN CANCER EXCISION     • THYROID SURGERY     • THYROIDECTOMY         Family History   Problem Relation Age of Onset   • Heart failure Mother    • Stroke Mother    • Dementia Mother    • Coronary artery disease Father    • Colon polyps Father    • COPD Brother    • Colon cancer Neg Hx        Social History     Socioeconomic History   • Marital status:      Spouse name: Not on file   • Number of children: Not on file   • Years of education: Not on file   • Highest education level: Not on file   Tobacco Use   • Smoking status: Former Smoker     Years: 10.00     Types: Cigarettes     Quit date:      Years since quittin.2   • Smokeless tobacco: Former User     Types: Snuff     Quit date:    Substance and Sexual Activity   • Alcohol use: No   • Drug use: No   • Sexual activity: Defer           Objective   Physical Exam  Vitals and nursing note reviewed.   Constitutional:       Appearance: He is well-developed.   HENT:      Head: Normocephalic and atraumatic.      Right Ear: External ear normal.      Left Ear: External ear normal.      Nose: Nose normal.   Eyes:      Conjunctiva/sclera: Conjunctivae normal.   Cardiovascular:      Rate and Rhythm: Normal rate and regular rhythm.      Heart sounds: Normal heart sounds.   Pulmonary:      Effort: Pulmonary effort is normal.      Breath  sounds: Examination of the right-middle field reveals decreased breath sounds. Examination of the right-lower field reveals decreased breath sounds. Decreased breath sounds, rhonchi and rales present.   Abdominal:      General: Bowel sounds are normal.      Palpations: Abdomen is soft.   Musculoskeletal:         General: Normal range of motion.      Cervical back: Normal range of motion and neck supple.   Skin:     General: Skin is warm and dry.      Capillary Refill: Capillary refill takes less than 2 seconds.   Neurological:      Mental Status: He is alert and oriented to person, place, and time.   Psychiatric:         Behavior: Behavior normal.         Thought Content: Thought content normal.         Judgment: Judgment normal.         Procedures           ED Course                                           MDM  Number of Diagnoses or Management Options     Amount and/or Complexity of Data Reviewed  Clinical lab tests: ordered and reviewed  Tests in the radiology section of CPT®: ordered and reviewed    Patient Progress  Patient progress: stable      Final diagnoses:   Pneumonia of right lung due to infectious organism, unspecified part of lung   Hypoxemia       ED Disposition  ED Disposition     ED Disposition Condition Comment    Decision to Admit  Level of Care: Med/Surg [1]   Diagnosis: Pneumonia of right lung due to infectious organism, unspecified part of lung [[id number]]   Admitting Physician: STANTON MARTINEZ [[id number]]   Attending Physician: STANTON MARTINEZ [[id number]]   Certification: I Certify That Inpatient Hospital Services Are Medically Necessary For Greater Than 2 Midnights            No follow-up provider specified.       Medication List      No changes were made to your prescriptions during this visit.         The patient's work-up reveals a very significant right middle and lower lobe pneumonia with markedly elevated white blood cell count, elevated lactate and CRP.  His dimer is slightly  elevated so we will do a CTA but he needs admission to the hospital for treatment of this pneumonia and close observation.  I will discussed the case with Dr. Lockett and he is comfortable having the patient admitted to a [facility] floor.     Rico Langford MD  03/20/21 1949

## 2021-03-22 LAB
ANION GAP SERPL CALCULATED.3IONS-SCNC: 6 MMOL/L (ref 5–15)
BUN SERPL-MCNC: 13 MG/DL (ref 8–23)
BUN/CREAT SERPL: 18.6 (ref 7–25)
CALCIUM SPEC-SCNC: 8.7 MG/DL (ref 8.6–10.5)
CHLORIDE SERPL-SCNC: 103 MMOL/L (ref 98–107)
CO2 SERPL-SCNC: 31 MMOL/L (ref 22–29)
CREAT SERPL-MCNC: 0.7 MG/DL (ref 0.76–1.27)
DEPRECATED RDW RBC AUTO: 47.3 FL (ref 37–54)
ERYTHROCYTE [DISTWIDTH] IN BLOOD BY AUTOMATED COUNT: 15.1 % (ref 12.3–15.4)
GFR SERPL CREATININE-BSD FRML MDRD: 111 ML/MIN/1.73
GLUCOSE SERPL-MCNC: 107 MG/DL (ref 65–99)
HCT VFR BLD AUTO: 37 % (ref 37.5–51)
HGB BLD-MCNC: 11.8 G/DL (ref 13–17.7)
MCH RBC QN AUTO: 27.3 PG (ref 26.6–33)
MCHC RBC AUTO-ENTMCNC: 31.9 G/DL (ref 31.5–35.7)
MCV RBC AUTO: 85.6 FL (ref 79–97)
PLATELET # BLD AUTO: 164 10*3/MM3 (ref 140–450)
PMV BLD AUTO: 9.5 FL (ref 6–12)
POTASSIUM SERPL-SCNC: 3.7 MMOL/L (ref 3.5–5.2)
RBC # BLD AUTO: 4.32 10*6/MM3 (ref 4.14–5.8)
SODIUM SERPL-SCNC: 140 MMOL/L (ref 136–145)
WBC # BLD AUTO: 12.21 10*3/MM3 (ref 3.4–10.8)

## 2021-03-22 PROCEDURE — 25010000002 ENOXAPARIN PER 10 MG: Performed by: FAMILY MEDICINE

## 2021-03-22 PROCEDURE — 85027 COMPLETE CBC AUTOMATED: CPT | Performed by: FAMILY MEDICINE

## 2021-03-22 PROCEDURE — 80048 BASIC METABOLIC PNL TOTAL CA: CPT | Performed by: FAMILY MEDICINE

## 2021-03-22 PROCEDURE — 25010000002 AZITHROMYCIN PER 500 MG: Performed by: FAMILY MEDICINE

## 2021-03-22 PROCEDURE — 99231 SBSQ HOSP IP/OBS SF/LOW 25: CPT | Performed by: FAMILY MEDICINE

## 2021-03-22 PROCEDURE — 25010000002 CEFTRIAXONE PER 250 MG: Performed by: FAMILY MEDICINE

## 2021-03-22 RX ADMIN — ALPRAZOLAM 0.25 MG: 0.25 TABLET ORAL at 21:20

## 2021-03-22 RX ADMIN — ALPRAZOLAM 0.25 MG: 0.25 TABLET ORAL at 05:22

## 2021-03-22 RX ADMIN — METOPROLOL TARTRATE 25 MG: 25 TABLET, FILM COATED ORAL at 20:06

## 2021-03-22 RX ADMIN — ESCITALOPRAM 20 MG: 10 TABLET, FILM COATED ORAL at 08:21

## 2021-03-22 RX ADMIN — ENOXAPARIN SODIUM 40 MG: 40 INJECTION SUBCUTANEOUS at 22:47

## 2021-03-22 RX ADMIN — CLOPIDOGREL 75 MG: 75 TABLET, FILM COATED ORAL at 08:21

## 2021-03-22 RX ADMIN — ISOSORBIDE MONONITRATE 30 MG: 30 TABLET, EXTENDED RELEASE ORAL at 08:20

## 2021-03-22 RX ADMIN — METOPROLOL TARTRATE 25 MG: 25 TABLET, FILM COATED ORAL at 08:20

## 2021-03-22 RX ADMIN — PREGABALIN 150 MG: 75 CAPSULE ORAL at 05:22

## 2021-03-22 RX ADMIN — AZITHROMYCIN DIHYDRATE 500 MG: 500 INJECTION, POWDER, LYOPHILIZED, FOR SOLUTION INTRAVENOUS at 22:47

## 2021-03-22 RX ADMIN — HYDROCODONE BITARTRATE AND ACETAMINOPHEN 1 TABLET: 5; 325 TABLET ORAL at 20:10

## 2021-03-22 RX ADMIN — SUCRALFATE 1 G: 1 TABLET ORAL at 08:21

## 2021-03-22 RX ADMIN — PREGABALIN 150 MG: 75 CAPSULE ORAL at 14:02

## 2021-03-22 RX ADMIN — SUCRALFATE 1 G: 1 TABLET ORAL at 17:48

## 2021-03-22 RX ADMIN — LISINOPRIL 5 MG: 5 TABLET ORAL at 08:20

## 2021-03-22 RX ADMIN — ATORVASTATIN CALCIUM 80 MG: 40 TABLET, FILM COATED ORAL at 20:06

## 2021-03-22 RX ADMIN — PANTOPRAZOLE SODIUM 40 MG: 40 TABLET, DELAYED RELEASE ORAL at 05:22

## 2021-03-22 RX ADMIN — DONEPEZIL HYDROCHLORIDE 5 MG: 5 TABLET, FILM COATED ORAL at 20:06

## 2021-03-22 RX ADMIN — ALPRAZOLAM 0.25 MG: 0.25 TABLET ORAL at 14:02

## 2021-03-22 RX ADMIN — SUCRALFATE 1 G: 1 TABLET ORAL at 20:06

## 2021-03-22 RX ADMIN — PREGABALIN 150 MG: 75 CAPSULE ORAL at 21:20

## 2021-03-22 RX ADMIN — CEFTRIAXONE SODIUM 1 G: 1 INJECTION, POWDER, FOR SOLUTION INTRAMUSCULAR; INTRAVENOUS at 21:20

## 2021-03-22 NOTE — PLAN OF CARE
Goal Outcome Evaluation:  Plan of Care Reviewed With: patient  Progress: no change  Outcome Summary: Pt c/o pain PRN meds given per orders, cont. IV ABX, O2 above 90% on room air, IS encouraged, c/o nonproductive cough, will cont to monitor

## 2021-03-22 NOTE — PLAN OF CARE
Goal Outcome Evaluation:        Outcome Summary: no s/s of resp distress this shift. iv abx continue iid in between meds given as ordered IS encouraged

## 2021-03-22 NOTE — PROGRESS NOTES
Discharge Planning Assessment  [facility]     Patient Name: Carlo Velasco  MRN: [id number]  Today's Date: 3/22/2021    Admit Date: 3/20/2021    Discharge Needs Assessment     Row Name 03/22/21 1257       Living Environment    Lives With  spouse    Current Living Arrangements  home/apartment/condo    Primary Care Provided by  self    Provides Primary Care For  no one    Family Caregiver if Needed  spouse    Quality of Family Relationships  helpful;involved;supportive    Able to Return to Prior Arrangements  yes       Resource/Environmental Concerns    Resource/Environmental Concerns  none       Transition Planning    Patient/Family Anticipates Transition to  home with family    Patient/Family Anticipated Services at Transition  none    Transportation Anticipated  family or friend will provide       Discharge Needs Assessment    Readmission Within the Last 30 Days  no previous admission in last 30 days    Equipment Currently Used at Home  none    Concerns to be Addressed  denies needs/concerns at this time    Anticipated Changes Related to Illness  none    Equipment Needed After Discharge  none    Discharge Coordination/Progress  Pt lives at home with his spouse. He is independent and plans to return home at d/c. He does have rx coverage for his medication. Pt denies needs and does not want home health.        Discharge Plan    No documentation.       Continued Care and Services - Admitted Since 3/20/2021    Coordination has not been started for this encounter.         Demographic Summary    No documentation.       Functional Status    No documentation.       Psychosocial    No documentation.       Abuse/Neglect    No documentation.       Legal    No documentation.       Substance Abuse    No documentation.       Patient Forms    No documentation.           KAUSHAL Collins

## 2021-03-23 ENCOUNTER — APPOINTMENT (OUTPATIENT)
Dept: GENERAL RADIOLOGY | Facility: HOSPITAL | Age: 76
End: 2021-03-23

## 2021-03-23 LAB
ANION GAP SERPL CALCULATED.3IONS-SCNC: 10 MMOL/L (ref 5–15)
BUN SERPL-MCNC: 11 MG/DL (ref 8–23)
BUN/CREAT SERPL: 15.9 (ref 7–25)
CALCIUM SPEC-SCNC: 8.4 MG/DL (ref 8.6–10.5)
CHLORIDE SERPL-SCNC: 103 MMOL/L (ref 98–107)
CO2 SERPL-SCNC: 27 MMOL/L (ref 22–29)
CREAT SERPL-MCNC: 0.69 MG/DL (ref 0.76–1.27)
DEPRECATED RDW RBC AUTO: 48.2 FL (ref 37–54)
ERYTHROCYTE [DISTWIDTH] IN BLOOD BY AUTOMATED COUNT: 15.4 % (ref 12.3–15.4)
GFR SERPL CREATININE-BSD FRML MDRD: 112 ML/MIN/1.73
GLUCOSE SERPL-MCNC: 100 MG/DL (ref 65–99)
HCT VFR BLD AUTO: 38.5 % (ref 37.5–51)
HGB BLD-MCNC: 12.4 G/DL (ref 13–17.7)
MCH RBC QN AUTO: 27.7 PG (ref 26.6–33)
MCHC RBC AUTO-ENTMCNC: 32.2 G/DL (ref 31.5–35.7)
MCV RBC AUTO: 85.9 FL (ref 79–97)
PLATELET # BLD AUTO: 167 10*3/MM3 (ref 140–450)
PMV BLD AUTO: 9.8 FL (ref 6–12)
POTASSIUM SERPL-SCNC: 3.7 MMOL/L (ref 3.5–5.2)
RBC # BLD AUTO: 4.48 10*6/MM3 (ref 4.14–5.8)
SODIUM SERPL-SCNC: 140 MMOL/L (ref 136–145)
WBC # BLD AUTO: 8.31 10*3/MM3 (ref 3.4–10.8)

## 2021-03-23 PROCEDURE — 25010000002 AZITHROMYCIN PER 500 MG: Performed by: FAMILY MEDICINE

## 2021-03-23 PROCEDURE — 25010000002 ENOXAPARIN PER 10 MG: Performed by: FAMILY MEDICINE

## 2021-03-23 PROCEDURE — 80048 BASIC METABOLIC PNL TOTAL CA: CPT | Performed by: FAMILY MEDICINE

## 2021-03-23 PROCEDURE — 99231 SBSQ HOSP IP/OBS SF/LOW 25: CPT | Performed by: FAMILY MEDICINE

## 2021-03-23 PROCEDURE — 71046 X-RAY EXAM CHEST 2 VIEWS: CPT

## 2021-03-23 PROCEDURE — 85027 COMPLETE CBC AUTOMATED: CPT | Performed by: FAMILY MEDICINE

## 2021-03-23 PROCEDURE — 25010000002 CEFTRIAXONE PER 250 MG: Performed by: FAMILY MEDICINE

## 2021-03-23 RX ADMIN — PREGABALIN 150 MG: 75 CAPSULE ORAL at 21:55

## 2021-03-23 RX ADMIN — DONEPEZIL HYDROCHLORIDE 5 MG: 5 TABLET, FILM COATED ORAL at 20:10

## 2021-03-23 RX ADMIN — ISOSORBIDE MONONITRATE 30 MG: 30 TABLET, EXTENDED RELEASE ORAL at 08:10

## 2021-03-23 RX ADMIN — ALPRAZOLAM 0.25 MG: 0.25 TABLET ORAL at 15:46

## 2021-03-23 RX ADMIN — HYDROCODONE BITARTRATE AND ACETAMINOPHEN 1 TABLET: 5; 325 TABLET ORAL at 20:14

## 2021-03-23 RX ADMIN — SUCRALFATE 1 G: 1 TABLET ORAL at 08:10

## 2021-03-23 RX ADMIN — AZITHROMYCIN DIHYDRATE 500 MG: 500 INJECTION, POWDER, LYOPHILIZED, FOR SOLUTION INTRAVENOUS at 23:19

## 2021-03-23 RX ADMIN — SODIUM CHLORIDE, PRESERVATIVE FREE 10 ML: 5 INJECTION INTRAVENOUS at 08:10

## 2021-03-23 RX ADMIN — PREGABALIN 150 MG: 75 CAPSULE ORAL at 06:25

## 2021-03-23 RX ADMIN — ALPRAZOLAM 0.25 MG: 0.25 TABLET ORAL at 21:55

## 2021-03-23 RX ADMIN — LISINOPRIL 5 MG: 5 TABLET ORAL at 08:10

## 2021-03-23 RX ADMIN — ATORVASTATIN CALCIUM 80 MG: 40 TABLET, FILM COATED ORAL at 20:11

## 2021-03-23 RX ADMIN — PREGABALIN 150 MG: 75 CAPSULE ORAL at 15:46

## 2021-03-23 RX ADMIN — SUCRALFATE 1 G: 1 TABLET ORAL at 12:35

## 2021-03-23 RX ADMIN — METOPROLOL TARTRATE 25 MG: 25 TABLET, FILM COATED ORAL at 20:10

## 2021-03-23 RX ADMIN — METOPROLOL TARTRATE 25 MG: 25 TABLET, FILM COATED ORAL at 08:09

## 2021-03-23 RX ADMIN — PANTOPRAZOLE SODIUM 40 MG: 40 TABLET, DELAYED RELEASE ORAL at 06:25

## 2021-03-23 RX ADMIN — CEFTRIAXONE SODIUM 1 G: 1 INJECTION, POWDER, FOR SOLUTION INTRAMUSCULAR; INTRAVENOUS at 21:55

## 2021-03-23 RX ADMIN — SUCRALFATE 1 G: 1 TABLET ORAL at 20:11

## 2021-03-23 RX ADMIN — SUCRALFATE 1 G: 1 TABLET ORAL at 17:00

## 2021-03-23 RX ADMIN — ENOXAPARIN SODIUM 40 MG: 40 INJECTION SUBCUTANEOUS at 23:19

## 2021-03-23 RX ADMIN — ESCITALOPRAM 20 MG: 10 TABLET, FILM COATED ORAL at 08:10

## 2021-03-23 RX ADMIN — ALPRAZOLAM 0.25 MG: 0.25 TABLET ORAL at 06:25

## 2021-03-23 RX ADMIN — CLOPIDOGREL 75 MG: 75 TABLET, FILM COATED ORAL at 08:10

## 2021-03-23 NOTE — PROGRESS NOTES
"CC:\"im doing ok\"-no nursing staff concerns voiced to me    Review of Systems   HENT: Negative.    Eyes: Negative.    Respiratory: Positive for cough.    Cardiovascular: Negative.    Gastrointestinal: Negative.    Endocrine: Negative.    Genitourinary: Negative.    Musculoskeletal: Negative.    Skin: Negative.    Allergic/Immunologic: Negative.    Neurological: Positive for weakness.   Hematological: Negative.    Psychiatric/Behavioral: Negative.      Temp:  [97.6 °F (36.4 °C)-98.3 °F (36.8 °C)] 98 °F (36.7 °C)  Heart Rate:  [64-78] 68  Resp:  [16-18] 18  BP: (105-135)/(53-66) 119/53  I/O last 3 completed shifts:  In: 2170 [P.O.:1080; I.V.:1090]  Out: 975 [Urine:975]  I/O this shift:  In: -   Out: 1400 [Urine:1400]    Physical Exam  Vitals and nursing note reviewed.   Constitutional:       Appearance: Normal appearance.   HENT:      Head: Normocephalic and atraumatic.      Nose: Nose normal.      Mouth/Throat:      Mouth: Mucous membranes are moist.      Pharynx: Oropharynx is clear.   Eyes:      Extraocular Movements: Extraocular movements intact.      Conjunctiva/sclera: Conjunctivae normal.      Pupils: Pupils are equal, round, and reactive to light.   Cardiovascular:      Rate and Rhythm: Normal rate and regular rhythm.      Pulses: Normal pulses.      Heart sounds: Normal heart sounds.   Pulmonary:      Effort: Pulmonary effort is normal.      Breath sounds: Normal breath sounds.   Abdominal:      General: Abdomen is flat. Bowel sounds are normal.      Palpations: Abdomen is soft.   Musculoskeletal:         General: Normal range of motion.      Cervical back: Normal range of motion and neck supple.   Skin:     General: Skin is warm and dry.      Capillary Refill: Capillary refill takes less than 2 seconds.   Neurological:      General: No focal deficit present.      Mental Status: He is alert. Mental status is at baseline.   Psychiatric:         Mood and Affect: Mood normal.         Behavior: Behavior normal.    "      Thought Content: Thought content normal.         Judgment: Judgment normal.           Pneumonia of right lung due to infectious organism    Essential hypertension    Mixed hyperlipidemia    Coronary artery disease    Sick sinus syndrome (CMS/HCC)    Recheck cxr in am---continuing antbx for now

## 2021-03-23 NOTE — PLAN OF CARE
Problem: Adult Inpatient Plan of Care  Goal: Plan of Care Review  Outcome: Ongoing, Progressing  Flowsheets (Taken 3/23/2021 0443)  Progress: no change  Plan of Care Reviewed With: patient  Outcome Summary: Pt complained of pain x1, PO pain med given with good relief. No complaints of nausea. IV abx. Cont pulse ox. VSS.

## 2021-03-24 ENCOUNTER — TELEPHONE (OUTPATIENT)
Dept: FAMILY MEDICINE CLINIC | Facility: CLINIC | Age: 76
End: 2021-03-24

## 2021-03-24 ENCOUNTER — READMISSION MANAGEMENT (OUTPATIENT)
Dept: CALL CENTER | Facility: HOSPITAL | Age: 76
End: 2021-03-24

## 2021-03-24 VITALS
DIASTOLIC BLOOD PRESSURE: 69 MMHG | OXYGEN SATURATION: 94 % | SYSTOLIC BLOOD PRESSURE: 137 MMHG | WEIGHT: 271.4 LBS | RESPIRATION RATE: 16 BRPM | HEIGHT: 68 IN | TEMPERATURE: 98.7 F | HEART RATE: 70 BPM | BODY MASS INDEX: 41.13 KG/M2

## 2021-03-24 PROCEDURE — 99238 HOSP IP/OBS DSCHRG MGMT 30/<: CPT | Performed by: FAMILY MEDICINE

## 2021-03-24 RX ORDER — CEFDINIR 300 MG/1
300 CAPSULE ORAL 2 TIMES DAILY
Qty: 10 CAPSULE | Refills: 0 | Status: SHIPPED | OUTPATIENT
Start: 2021-03-24 | End: 2021-03-30 | Stop reason: SDUPTHER

## 2021-03-24 RX ORDER — AZITHROMYCIN 500 MG/1
500 TABLET, FILM COATED ORAL DAILY
Qty: 2 TABLET | Refills: 0 | Status: SHIPPED | OUTPATIENT
Start: 2021-03-24 | End: 2021-03-30 | Stop reason: SDUPTHER

## 2021-03-24 RX ADMIN — ALPRAZOLAM 0.25 MG: 0.25 TABLET ORAL at 05:01

## 2021-03-24 RX ADMIN — PREGABALIN 150 MG: 75 CAPSULE ORAL at 05:02

## 2021-03-24 RX ADMIN — CLOPIDOGREL 75 MG: 75 TABLET, FILM COATED ORAL at 08:24

## 2021-03-24 RX ADMIN — PANTOPRAZOLE SODIUM 40 MG: 40 TABLET, DELAYED RELEASE ORAL at 05:01

## 2021-03-24 RX ADMIN — METOPROLOL TARTRATE 25 MG: 25 TABLET, FILM COATED ORAL at 08:25

## 2021-03-24 RX ADMIN — ISOSORBIDE MONONITRATE 30 MG: 30 TABLET, EXTENDED RELEASE ORAL at 08:24

## 2021-03-24 RX ADMIN — LISINOPRIL 5 MG: 5 TABLET ORAL at 08:25

## 2021-03-24 RX ADMIN — SUCRALFATE 1 G: 1 TABLET ORAL at 08:24

## 2021-03-24 RX ADMIN — ESCITALOPRAM 20 MG: 10 TABLET, FILM COATED ORAL at 08:25

## 2021-03-24 NOTE — PROGRESS NOTES
"CC:\"im a little weak today\"    Review of Systems   Constitutional: Positive for activity change.   HENT: Negative.    Eyes: Negative.    Respiratory: Positive for cough.    Cardiovascular: Negative.    Gastrointestinal: Negative.    Endocrine: Negative.    Genitourinary: Negative.    Musculoskeletal: Negative.    Skin: Negative.    Allergic/Immunologic: Negative.    Neurological: Negative.    Hematological: Negative.    Psychiatric/Behavioral: Negative.      Temp:  [97.7 °F (36.5 °C)-98.8 °F (37.1 °C)] 98.8 °F (37.1 °C)  Heart Rate:  [65-73] 73  Resp:  [16] 16  BP: (124-149)/(72-82) 149/82  I/O last 3 completed shifts:  In: 240 [P.O.:240]  Out: 2250 [Urine:2250]  I/O this shift:  In: -   Out: 400 [Urine:400]    Physical Exam  Vitals and nursing note reviewed.   Constitutional:       Appearance: Normal appearance.   HENT:      Head: Normocephalic.      Nose: Nose normal.      Mouth/Throat:      Mouth: Mucous membranes are moist.   Eyes:      Conjunctiva/sclera: Conjunctivae normal.      Pupils: Pupils are equal, round, and reactive to light.   Cardiovascular:      Rate and Rhythm: Normal rate and regular rhythm.      Pulses: Normal pulses.   Pulmonary:      Effort: Pulmonary effort is normal.   Abdominal:      General: Abdomen is flat. Bowel sounds are normal.      Palpations: Abdomen is soft.   Musculoskeletal:         General: Normal range of motion.      Cervical back: Normal range of motion.   Skin:     General: Skin is warm and dry.      Capillary Refill: Capillary refill takes less than 2 seconds.   Neurological:      General: No focal deficit present.      Mental Status: He is alert. Mental status is at baseline.   Psychiatric:         Mood and Affect: Mood normal.         Behavior: Behavior normal.         Thought Content: Thought content normal.         Judgment: Judgment normal.           Pneumonia of right lung due to infectious organism    Essential hypertension    Mixed hyperlipidemia    Coronary artery " disease    Sick sinus syndrome (CMS/HCC)    Will reassess in am

## 2021-03-24 NOTE — PLAN OF CARE
Goal Outcome Evaluation:  Plan of Care Reviewed With: patient     Outcome Summary: Pt denies pain and nausea. Pt ambulating in [place] independently. Pt voiding. Pt had chest x-ray that showed partial clearing and right middle lobe consolidation. VSS.

## 2021-03-25 ENCOUNTER — TRANSITIONAL CARE MANAGEMENT TELEPHONE ENCOUNTER (OUTPATIENT)
Dept: CALL CENTER | Facility: HOSPITAL | Age: 76
End: 2021-03-25

## 2021-03-25 LAB
BACTERIA SPEC AEROBE CULT: NORMAL
BACTERIA SPEC AEROBE CULT: NORMAL

## 2021-03-25 NOTE — OUTREACH NOTE
Prep Survey      Responses   [facility] patient discharged from?  [place]   Is LACE score < 7 ?  No   Emergency Room discharge w/ pulse ox?  No   Eligibility  [facility]   Date of Admission  03/20/21   Date of Discharge  03/24/21   Discharge Disposition  Home or Self Care   Discharge diagnosis   Pneumonia    Does the patient have one of the following disease processes/diagnoses(primary or secondary)?  COPD/Pneumonia   Does the patient have Home health ordered?  No   Is there a DME ordered?  No   Prep survey completed?  Yes          Bri Santo RN

## 2021-03-25 NOTE — OUTREACH NOTE
Call Center TCM Note      Responses   [facility] patient discharged from?  [place]   Does the patient have one of the following disease processes/diagnoses(primary or secondary)?  COPD/Pneumonia   Was the primary reason for admission:  Pneumonia   TCM attempt successful?  Yes   Call start time  1627   Call end time  1628   Discharge diagnosis   Pneumonia    Meds reviewed with patient/caregiver?  Yes   Is the patient having any side effects they believe may be caused by any medication additions or changes?  No   Does the patient have all medications ordered at discharge?  Yes   Is the patient taking all medications as directed (includes completed medication regime)?  Yes   Does the patient have a primary care provider?   Yes   Does the patient have an appointment with their PCP or specialist within 7 days of discharge?  Yes   Comments regarding PCP  Patient has a hospital followup on 3/30/2021 with Dr Rosen   Has the patient kept scheduled appointments due by today?  N/A   Has home health visited the patient within 72 hours of discharge?  N/A   Psychosocial issues?  No   Did the patient receive a copy of their discharge instructions?  Yes   Nursing interventions  Reviewed instructions with patient   What is the patient's perception of their health status since discharge?  Improving   Nursing Interventions  Nurse provided patient education   If the patient is a current smoker, are they able to teach back resources for cessation?  Not a smoker   Is the patient/caregiver able to teach back signs and symptoms of worsening condition:  Fever/chills, Shortness of breath, Chest pain   Is the patient/caregiver able to teach back importance of completing antibiotic course of treatment?  Yes   TCM call completed?  Yes          Quentin Vicente RN    3/25/2021, 16:28 EDT

## 2021-03-28 NOTE — DISCHARGE SUMMARY
"[facility]   DISCHARGE SUMMARY       Date of Admission: 3/20/2021  Date of Discharge:  3/24/2021  Primary Care Physician: Rhys Rosen MD    Presenting Problem/History of Present Illness:  Pneumonia of right lung due to infectious organism, unspecified part of lung [J18.9]     Final Discharge Diagnoses:  Active Hospital Problems    Diagnosis    • **Pneumonia of right lung due to infectious organism    • Sick sinus syndrome (CMS/HCC)    • Coronary artery disease      2 vessel CABG (2012), SHAHRZAD LAD (2015).      • Mixed hyperlipidemia    • Essential hypertension        Consults: none    Procedures Performed: none    Pertinent Test Results: see chart    Chief Complaint on Day of Discharge: im better    History of Present Illness on Day of Discharge: recent cough, fever and sob improved      Hospital Course:  The patient is a 75 y.o. male who presented to [facility] with cough, fever and dyspnea with cxr revealing consolidation. With antbx his symptoms irmpoved, he began to ambulate and repeat cxr revealed improvement and he was discharged to home..      Condition on Discharge:  stable    Physical Exam on Discharge:  /69 (BP Location: Left arm, Patient Position: Lying)   Pulse 70   Temp 98.7 °F (37.1 °C) (Oral)   Resp 16   Ht 172.7 cm (68\")   Wt 123 kg (271 lb 6.4 oz)   SpO2 94%   BMI 41.27 kg/m²   Physical Exam  Vitals and nursing note reviewed.   Cardiovascular:      Rate and Rhythm: Normal rate and regular rhythm.      Pulses: Normal pulses.      Heart sounds: Normal heart sounds.   Abdominal:      General: Abdomen is flat. Bowel sounds are normal.      Palpations: Abdomen is soft.   Musculoskeletal:      Cervical back: Normal range of motion and neck supple.           Discharge Disposition:  Home or Self Care    Discharge Medications:     Discharge Medications      New Medications      Instructions Start Date   azithromycin 500 MG tablet  Commonly known as: Zithromax   " 500 mg, Oral, Daily      cefdinir 300 MG capsule  Commonly known as: OMNICEF   300 mg, Oral, 2 Times Daily         Continue These Medications      Instructions Start Date   ALPRAZolam 0.25 MG tablet  Commonly known as: XANAX   TAKE 1 TABLET BY MOUTH THREE TIMES DAILY      atorvastatin 80 MG tablet  Commonly known as: LIPITOR   80 mg, Oral, Nightly      BIOTIN PO   Oral      cetirizine 10 MG tablet  Commonly known as: zyrTEC   10 mg, Oral      clopidogrel 75 MG tablet  Commonly known as: PLAVIX   75 mg, Oral, Daily      donepezil 5 MG tablet  Commonly known as: ARICEPT   5 mg, Oral, Every Night at Bedtime      escitalopram 20 MG tablet  Commonly known as: LEXAPRO   20 mg, Oral, Daily      furosemide 40 MG tablet  Commonly known as: LASIX   40 mg, Oral, Daily      HYDROcodone-acetaminophen 5-325 MG per tablet  Commonly known as: Norco   1 tablet, Oral, Every 6 Hours PRN      isosorbide mononitrate 30 MG 24 hr tablet  Commonly known as: IMDUR   30 mg, Oral, Every Morning      lisinopril 5 MG tablet  Commonly known as: PRINIVIL,ZESTRIL   5 mg, Oral, Daily      metoprolol tartrate 25 MG tablet  Commonly known as: LOPRESSOR   Take one tablet by mouth twice a day.      Nitrostat 0.4 MG SL tablet  Generic drug: nitroglycerin   0.4 mg, Sublingual, Every 5 Minutes PRN, Take no more than 3 doses in 15 minutes.      omega-3 acid ethyl esters 1 g capsule  Commonly known as: LOVAZA   1 g, Oral, Daily      omeprazole 20 MG capsule  Commonly known as: priLOSEC   20 mg, Oral, Daily      Potassium 99 MG tablet   1 tablet, Oral, Daily      pregabalin 150 MG capsule  Commonly known as: LYRICA   TAKE 1 CAPSULE BY MOUTH THREE TIMES DAILY      PROBIOTIC FORMULA PO   Oral      sucralfate 1 g tablet  Commonly known as: CARAFATE   TK 1 T PO  AC AND HS             Discharge Diet:   As tolerated  Activity at Discharge:   As tolerated  Discharge Care Plan/Instructions: take meds as directed    Follow-up Appointments:   Future Appointments    Date Time Provider Department Center   3/30/2021  8:45 AM Rhys Rosen MD [facility]   4/14/2021  8:00 AM Darian Toribio MD [facility]  [facility]   7/13/2021 11:15 AM [facility]   7/22/2021  9:00 AM Rhys Rosen MD [facility]       Test Results Pending at Discharge: none    Rhys Rosen MD  03/28/21  08:16 CDT    Time: 8:18pm

## 2021-03-30 ENCOUNTER — OFFICE VISIT (OUTPATIENT)
Dept: FAMILY MEDICINE CLINIC | Facility: CLINIC | Age: 76
End: 2021-03-30

## 2021-03-30 VITALS
HEART RATE: 79 BPM | BODY MASS INDEX: 40.62 KG/M2 | WEIGHT: 268 LBS | SYSTOLIC BLOOD PRESSURE: 132 MMHG | RESPIRATION RATE: 16 BRPM | OXYGEN SATURATION: 93 % | HEIGHT: 68 IN | DIASTOLIC BLOOD PRESSURE: 82 MMHG

## 2021-03-30 DIAGNOSIS — J18.9 PNEUMONIA OF RIGHT LUNG DUE TO INFECTIOUS ORGANISM, UNSPECIFIED PART OF LUNG: Primary | ICD-10-CM

## 2021-03-30 PROCEDURE — 99213 OFFICE O/P EST LOW 20 MIN: CPT | Performed by: FAMILY MEDICINE

## 2021-03-30 NOTE — PROGRESS NOTES
Subjective   Carlo Velasco is a 75 y.o. male.     Chief Complaint   Patient presents with   • Follow-up     [facility] f/u        History of Present Illness     he recently was admitted to [facility] in [place] for pneumonia---no further sob--his cough is improving      Current Outpatient Medications:   •  ALPRAZolam (XANAX) 0.25 MG tablet, TAKE 1 TABLET BY MOUTH THREE TIMES DAILY, Disp: 90 tablet, Rfl: 0  •  atorvastatin (LIPITOR) 80 MG tablet, Take 1 tablet by mouth Every Night., Disp: 30 tablet, Rfl: 5  •  Bacillus Coagulans-Inulin (PROBIOTIC FORMULA PO), Take  by mouth., Disp: , Rfl:   •  BIOTIN PO, Take  by mouth., Disp: , Rfl:   •  cetirizine (zyrTEC) 10 MG tablet, Take 10 mg by mouth., Disp: , Rfl:   •  clopidogrel (PLAVIX) 75 MG tablet, TAKE 1 TABLET BY MOUTH DAILY, Disp: 90 tablet, Rfl: 3  •  donepezil (ARICEPT) 5 MG tablet, Take 1 tablet by mouth every night at bedtime., Disp: 90 tablet, Rfl: 3  •  escitalopram (LEXAPRO) 20 MG tablet, Take 1 tablet by mouth Daily., Disp: 90 tablet, Rfl: 1  •  furosemide (LASIX) 40 MG tablet, TAKE 1 TABLET BY MOUTH DAILY, Disp: 90 tablet, Rfl: 3  •  HYDROcodone-acetaminophen (Norco) 5-325 MG per tablet, Take 1 tablet by mouth Every 6 (Six) Hours As Needed for Moderate Pain ., Disp: 30 tablet, Rfl: 0  •  isosorbide mononitrate (IMDUR) 30 MG 24 hr tablet, Take 1 tablet by mouth Every Morning., Disp: 90 tablet, Rfl: 3  •  lisinopril (PRINIVIL,ZESTRIL) 5 MG tablet, Take 1 tablet by mouth Daily., Disp: 90 tablet, Rfl: 3  •  metoprolol tartrate (LOPRESSOR) 25 MG tablet, Take one tablet by mouth twice a day., Disp: 180 tablet, Rfl: 3  •  NITROSTAT 0.4 MG SL tablet, Place 1 tablet under the tongue Every 5 (Five) Minutes As Needed for Chest Pain. Take no more than 3 doses in 15 minutes., Disp: 100 tablet, Rfl: 2  •  omega-3 acid ethyl esters (LOVAZA) 1 g capsule, Take 1 capsule by mouth Daily., Disp: 90 capsule, Rfl: 3  •  omeprazole (priLOSEC) 20 MG capsule, Take 1 capsule by  mouth Daily., Disp: 90 capsule, Rfl: 3  •  Potassium 99 MG tablet, Take 1 tablet by mouth Daily., Disp: , Rfl:   •  pregabalin (LYRICA) 150 MG capsule, TAKE 1 CAPSULE BY MOUTH THREE TIMES DAILY, Disp: 270 capsule, Rfl: 0  •  sucralfate (CARAFATE) 1 g tablet, TK 1 T PO  AC AND HS, Disp: , Rfl: 0  Allergies   Allergen Reactions   • Meperidine Other (See Comments)     HEART STOPS     • Statins Myalgia     Causes leg cramps       Past Medical History:   Diagnosis Date   • Anxiety    • Arthritis    • Cancer (CMS/HCC)     melanoma   • CHF (congestive heart failure) (CMS/HCC)    • Claustrophobia    • Colon polyp    • Coronary artery disease 2012, 2015    2 vessel CABG (2012), SHAHRZAD LAD (2015).    • Depression    • Disease of thyroid gland     partial thyroidectomy on right   • GERD (gastroesophageal reflux disease)    • Hearing loss    • Heart attack (CMS/HCC)    • Heart attack (CMS/HCC)    • History of transfusion    • Hyperlipidemia    • Hypertension    • Kidney stone     history of    • Tobacco abuse, in remission    • Uses hearing aid     BILATERAL     Past Surgical History:   Procedure Laterality Date   • BACK SURGERY      CERVICAL FUSION AND LUMBAR DISC REPLACEMENT   • CARDIAC CATHETERIZATION  07/2015    SHAHRZAD mid LADDr. Toribio   • CARDIAC CATHETERIZATION N/A 10/21/2016    Procedure: Left Heart Cath;  Surgeon: Darian Toribio MD;  Location:  [facility] CATH INVASIVE LOC[facility];  Service:    • CARDIAC CATHETERIZATION Left 4/5/2017    Procedure: Cardiac Catheterization/Vascular Study;  Surgeon: Darian Toribio MD;  Location:  [facility];  Service:    • CARDIAC CATHETERIZATION N/A 4/5/2017    Procedure: Left Heart Cath;  Surgeon: Darian Toribio MD;  Location:  [facility];  Service:    • CARDIAC CATHETERIZATION N/A 4/5/2017    Procedure: Coronary angiography;  Surgeon: Darian Toribio MD;  Location:  [facility];  Service:    • CARDIAC CATHETERIZATION N/A 4/5/2017    Procedure: Left  ventriculography;  Surgeon: Darian Toribio MD;  Location:  [facility];  Service:    • CARDIAC CATHETERIZATION  4/5/2017    Procedure: Saphenous Vein Graft;  Surgeon: Darian Toribio MD;  Location:  [facility];  Service:    • CARDIAC CATHETERIZATION Left 2/27/2018    Procedure: Cardiac Catheterization/Vascular Study SHAHRZAD OK PRN;  Surgeon: Darian Toribio MD;  Location: [facility] CATH INVASIVE LOCATION;  Service:    • CARDIAC CATHETERIZATION  2/27/2018    Procedure: Functional Flow [place];  Surgeon: Darian Toribio MD;  Location:  [facility];  Service:    • CARDIAC CATHETERIZATION N/A 2/27/2018    Procedure: Left ventriculography;  Surgeon: Darian Toribio MD;  Location: [facility] CATH INVASIVE LOCATION;  Service:    • CARDIAC CATHETERIZATION Left 4/28/2020    Procedure: Cardiac Catheterization/Vascular Study SHAHRZAD OK PRN;  Surgeon: Darian Toribio MD;  Location: [facility] CATH INVASIVE LOCATION;  Service: Cardiology;  Laterality: Left;   • CARDIAC ELECTROPHYSIOLOGY PROCEDURE N/A 1/11/2018    Procedure: PPM generator change - dual;  Surgeon: Darian Toribio MD;  Location: [facility] CATH INVASIVE LOCATION;  Service:    • COLONOSCOPY  09/05/2007    colon polyps   • COLONOSCOPY N/A 5/29/2018    Procedure: COLONOSCOPY WITH ANESTHESIA;  Surgeon: Juan F Tapia MD;  Location: [facility] ENDOSCOPY;  Service: Gastroenterology   • CORONARY ANGIOPLASTY WITH STENT PLACEMENT  07/2015    SHAHRZAD mid Dr. Malu LO    • CORONARY ARTERY BYPASS GRAFT  2012 AND 2014    2 vessel    • ENDOSCOPY  11/08/2010   • ENDOSCOPY N/A 5/29/2018    Procedure: ESOPHAGOGASTRODUODENOSCOPY WITH ANESTHESIA;  Surgeon: Juan F Tapia MD;  Location: [facility] ENDOSCOPY;  Service: Gastroenterology   • HEAD/NECK LESION/CYST EXCISION Left 12/20/2016    Procedure: EXCISION MALIGNANT MELANOMA WITH SENTINEL NODE BIOPSY, INJECTION AND SCAN PRE-OP, LEFT EAR;  Surgeon: Guanaco Zepeda MD;  Location: [facility] OR;  Service:    • HEMORRHOIDECTOMY     •  "INSERT / REPLACE / REMOVE PACEMAKER     • SENTINEL NODE BIOPSY Left 12/20/2016    Procedure: SENTINEL NODE BIOPSY;  Surgeon: Guanaco Zepeda MD;  Location: [facility] OR;  Service:    • SKIN CANCER EXCISION     • THYROID SURGERY     • THYROIDECTOMY         Review of Systems   Constitutional: Negative.    HENT: Negative.    Eyes: Negative.    Respiratory: Positive for cough.    Cardiovascular: Negative.    Gastrointestinal: Negative.    Endocrine: Negative.    Genitourinary: Negative.    Musculoskeletal: Negative.    Skin: Negative.    Allergic/Immunologic: Negative.    Neurological: Negative.    Hematological: Negative.    Psychiatric/Behavioral: Negative.        Objective  /82   Pulse 79   Resp 16   Ht 172.7 cm (68\")   Wt 122 kg (268 lb)   SpO2 93%   BMI 40.75 kg/m²   Physical Exam  Vitals and nursing note reviewed.   HENT:      Head: Normocephalic and atraumatic.      Nose: Nose normal.      Mouth/Throat:      Mouth: Mucous membranes are moist.   Eyes:      Pupils: Pupils are equal, round, and reactive to light.   Cardiovascular:      Rate and Rhythm: Normal rate and regular rhythm.      Pulses: Normal pulses.      Heart sounds: Normal heart sounds.   Pulmonary:      Effort: Pulmonary effort is normal.   Abdominal:      General: Abdomen is flat. Bowel sounds are normal.      Palpations: Abdomen is soft.   Musculoskeletal:         General: Normal range of motion.      Cervical back: Normal range of motion and neck supple.   Skin:     General: Skin is warm and dry.      Capillary Refill: Capillary refill takes less than 2 seconds.   Neurological:      General: No focal deficit present.      Mental Status: He is alert and oriented to person, place, and time. Mental status is at baseline.   Psychiatric:         Mood and Affect: Mood normal.         Behavior: Behavior normal.         Thought Content: Thought content normal.         Judgment: Judgment normal.         Assessment/Plan   Diagnoses and all orders " for this visit:    1. Pneumonia of right lung due to infectious organism, unspecified part of lung (Primary)  -     XR Chest PA & Lateral      im glad he is feeling  better           Orders Placed This Encounter   Procedures   • XR Chest PA & Lateral     Order Specific Question:   Reason for Exam:     Answer:   follow up pneumonia     Order Specific Question:   Does this patient have a diabetic monitoring/medication delivering device on?     Answer:   No       Follow up: 2 month(s)

## 2021-03-31 ENCOUNTER — READMISSION MANAGEMENT (OUTPATIENT)
Dept: CALL CENTER | Facility: HOSPITAL | Age: 76
End: 2021-03-31

## 2021-03-31 NOTE — OUTREACH NOTE
COPD/PN Week 2 Survey      Responses   [facility] patient discharged from?  [place]   Does the patient have one of the following disease processes/diagnoses(primary or secondary)?  COPD/Pneumonia   Was the primary reason for admission:  Pneumonia   Week 2 attempt successful?  Yes   Call start time  1327   Call end time  1328   Discharge diagnosis   Pneumonia    Meds reviewed with patient/caregiver?  Yes   Is the patient having any side effects they believe may be caused by any medication additions or changes?  No   Does the patient have all medications ordered at discharge?  Yes   Is the patient taking all medications as directed (includes completed medication regime)?  Yes   Does the patient have a primary care provider?   Yes   Does the patient have an appointment with their PCP or specialist within 7 days of discharge?  Yes   Comments regarding PCP  Saw Dr Rosen on 3/30/2021   Has the patient kept scheduled appointments due by today?  Yes   Has home health visited the patient within 72 hours of discharge?  N/A   Psychosocial issues?  No   Did the patient receive a copy of their discharge instructions?  Yes   Nursing interventions  Reviewed instructions with patient   What is the patient's perception of their health status since discharge?  Improving   Nursing Interventions  Nurse provided patient education   If the patient is a current smoker, are they able to teach back resources for cessation?  Not a smoker   Is the patient/caregiver able to teach back signs and symptoms of worsening condition:  Fever/chills, Shortness of breath, Chest pain   Is the patient/caregiver able to teach back importance of completing antibiotic course of treatment?  Yes   Week 2 call completed?  Yes          Quentin Vicente RN

## 2021-04-05 ENCOUNTER — HOSPITAL ENCOUNTER (OUTPATIENT)
Dept: GENERAL RADIOLOGY | Facility: HOSPITAL | Age: 76
Discharge: HOME | End: 2021-04-05
Admitting: FAMILY MEDICINE

## 2021-04-05 PROCEDURE — 71046 X-RAY EXAM CHEST 2 VIEWS: CPT

## 2021-04-06 DIAGNOSIS — R06.02 SOB (SHORTNESS OF BREATH): ICD-10-CM

## 2021-04-06 DIAGNOSIS — R05.3 PERSISTENT COUGH: Primary | ICD-10-CM

## 2021-04-06 DIAGNOSIS — R07.9 CHEST PAIN, UNSPECIFIED TYPE: ICD-10-CM

## 2021-04-07 ENCOUNTER — READMISSION MANAGEMENT (OUTPATIENT)
Dept: CALL CENTER | Facility: HOSPITAL | Age: 76
End: 2021-04-07

## 2021-04-07 NOTE — OUTREACH NOTE
COPD/PN Week 3 Survey      Responses   [facility] patient discharged from?  [place]   Does the patient have one of the following disease processes/diagnoses(primary or secondary)?  COPD/Pneumonia   Was the primary reason for admission:  Pneumonia   Week 3 attempt successful?  Yes   Call start time  1540   Call end time  1547   Discharge diagnosis   Pneumonia    Is patient permission given to speak with other caregiver?  Yes   List who call center can speak with  Amaya Velasco, spouse   Person spoke with today (if not patient) and relationship  spouse and patient   Meds reviewed with patient/caregiver?  Yes   Does the patient have all medications ordered at discharge?  Yes   Is the patient taking all medications as directed (includes completed medication regime)?  Yes   Does the patient have a primary care provider?   Yes   Comments regarding PCP  Saw Dr Rosen on 3/30/2021   Has the patient kept scheduled appointments due by today?  Yes   Comments  Patient reports that he is being set up to have a CT scan of his chest.    Has home health visited the patient within 72 hours of discharge?  N/A   Pulse Ox monitoring  None   Psychosocial issues?  No   Did the patient receive a copy of their discharge instructions?  Yes   Nursing interventions  Reviewed instructions with patient   What is the patient's perception of their health status since discharge?  Improving [Patient with continued cough. ]   Nursing Interventions  Nurse provided patient education   If the patient is a current smoker, are they able to teach back resources for cessation?  Not a smoker   Is the patient/caregiver able to teach back the hierarchy of who to call/visit for symptoms/problems? PCP, Specialist, Home health nurse, Urgent Care, ED, 911  Yes   Is the patient/caregiver able to teach back signs and symptoms of worsening condition:  Fever/chills, Shortness of breath, Chest pain   Is the patient/caregiver able to teach back importance of  completing antibiotic course of treatment?  Yes [Reports that he completed antibiotic. ]   Week 3 call completed?  Yes          Kristen Akhtar RN

## 2021-04-14 ENCOUNTER — OFFICE VISIT (OUTPATIENT)
Dept: CARDIOLOGY | Facility: CLINIC | Age: 76
End: 2021-04-14

## 2021-04-14 VITALS
HEIGHT: 68 IN | BODY MASS INDEX: 41.07 KG/M2 | DIASTOLIC BLOOD PRESSURE: 82 MMHG | WEIGHT: 271 LBS | HEART RATE: 82 BPM | SYSTOLIC BLOOD PRESSURE: 142 MMHG | OXYGEN SATURATION: 97 %

## 2021-04-14 DIAGNOSIS — I25.729 CORONARY ARTERY DISEASE INVOLVING AUTOLOGOUS ARTERY CORONARY BYPASS GRAFT WITH ANGINA PECTORIS (HCC): Primary | ICD-10-CM

## 2021-04-14 DIAGNOSIS — Z95.1 HX OF CABG: ICD-10-CM

## 2021-04-14 DIAGNOSIS — I49.5 SICK SINUS SYNDROME (HCC): ICD-10-CM

## 2021-04-14 DIAGNOSIS — Z95.5 STENTED CORONARY ARTERY: ICD-10-CM

## 2021-04-14 DIAGNOSIS — I10 ESSENTIAL HYPERTENSION: ICD-10-CM

## 2021-04-14 PROCEDURE — 99214 OFFICE O/P EST MOD 30 MIN: CPT | Performed by: INTERNAL MEDICINE

## 2021-04-14 NOTE — PROGRESS NOTES
Carlo Velasco  [id number]  1945  75 y.o.  male    Referring Provider: Rhys Rosen MD    Reason for Follow-up Visit:      Here for routine follow up   coronary artery disease Coronary artery bypass grafting , stented coronary artery   sick sinus syndrome s/p pacemaker   Device interroagations show no incriminating arrhythmia        Subjective      Non exertional chest pain   Chest pain at random and not necessarily related to exertion  Overall feels better and chest pain now improved     Recent pneumonia and healing   Cough persists   Due for CT chest again     Records reviewed    Moderate exertional shortness of breath on exertion relieved with rest  No significant cough or wheezing    No palpitations  No associated chest pain  No significant pedal edema    No fever or chills  No significant expectoration    No hemoptysis  No presyncope or syncope    Tolerating current medications well with no untoward side effects   Compliant with prescribed medication regimen. Tries to adhere to cardiac diet.     BP well controlled at home.     No bleeding, excessive bruising, gait instability or fall risks          History of present illness:  Carlo Velasco is a 75 y.o. yo male with history of coronary artery disease Coronary artery bypass grafting , stented coronary artery sick sinus syndrome s/p pacemaker who presents today for   Chief Complaint   Patient presents with   • Coronary Artery Disease     6 mo f/u   .    History  Past Medical History:   Diagnosis Date   • Anxiety    • Arthritis    • Cancer (CMS/HCC)     melanoma   • CHF (congestive heart failure) (CMS/HCC)    • Claustrophobia    • Colon polyp    • Coronary artery disease 2012, 2015    2 vessel CABG (2012), SHAHRZAD LAD (2015).    • Depression    • Disease of thyroid gland     partial thyroidectomy on right   • GERD (gastroesophageal reflux disease)    • Hearing loss    • Heart attack (CMS/HCC)    • Heart attack (CMS/HCC)    • History of transfusion    •  Hyperlipidemia    • Hypertension    • Kidney stone     history of    • Tobacco abuse, in remission    • Uses hearing aid     BILATERAL   ,   Past Surgical History:   Procedure Laterality Date   • BACK SURGERY      CERVICAL FUSION AND LUMBAR DISC REPLACEMENT   • CARDIAC CATHETERIZATION  07/2015    SHAHRZAD Dr. Malu Devlin   • CARDIAC CATHETERIZATION N/A 10/21/2016    Procedure: Left Heart Cath;  Surgeon: Darian Toribio MD;  Location:  [facility] CATH [facility]ASIVE [facility];  Service:    • CARDIAC CATHETERIZATION Left 4/5/2017    Procedure: Cardiac Catheterization/Vascular Study;  Surgeon: Darian Toribio MD;  Location:  [facility];  Service:    • CARDIAC CATHETERIZATION N/A 4/5/2017    Procedure: Left Heart Cath;  Surgeon: Darian Toribio MD;  Location:  [facility];  Service:    • CARDIAC CATHETERIZATION N/A 4/5/2017    Procedure: Coronary angiography;  Surgeon: Darian Toribio MD;  Location:  [facility];  Service:    • CARDIAC CATHETERIZATION N/A 4/5/2017    Procedure: Left ventriculography;  Surgeon: Darian Toribio MD;  Location:  [facility];  Service:    • CARDIAC CATHETERIZATION  4/5/2017    Procedure: Saphenous Vein Graft;  Surgeon: Darian Toribio MD;  Location:  [facility];  Service:    • CARDIAC CATHETERIZATION Left 2/27/2018    Procedure: Cardiac Catheterization/Vascular Study SHAHRZAD OK PRN;  Surgeon: Darian Toribio MD;  Location:  [facility];  Service:    • CARDIAC CATHETERIZATION  2/27/2018    Procedure: Functional Flow [place];  Surgeon: Darian Toribio MD;  Location:  [facility];  Service:    • CARDIAC CATHETERIZATION N/A 2/27/2018    Procedure: Left ventriculography;  Surgeon: Darian Toribio MD;  Location:  [facility];  Service:    • CARDIAC CATHETERIZATION Left 4/28/2020    Procedure: Cardiac Catheterization/Vascular Study SHAHRZAD OK PRN;  Surgeon: Darian Toribio MD;  Location:  [facility]ATH INVAS[facility]  LOCATION;  Service: Cardiology;  Laterality: Left;   • CARDIAC ELECTROPHYSIOLOGY PROCEDURE N/A 2018    Procedure: PPM generator change - dual;  Surgeon: Darian Toribio MD;  Location: [facility] CATH INVASIVE LOCATION;  Service:    • COLONOSCOPY  2007    colon polyps   • COLONOSCOPY N/A 2018    Procedure: COLONOSCOPY WITH ANESTHESIA;  Surgeon: Juan F Tapia MD;  Location: [facility] ENDOSCOPY;  Service: Gastroenterology   • CORONARY ANGIOPLASTY WITH STENT PLACEMENT  2015    SHAHRZAD mid LAD, Dr. Toribio    • CORONARY ARTERY BYPASS GRAFT   AND     2 vessel    • ENDOSCOPY  2010   • ENDOSCOPY N/A 2018    Procedure: ESOPHAGOGASTRODUODENOSCOPY WITH ANESTHESIA;  Surgeon: Juan F Tapia MD;  Location: [facility] ENDOSCOPY;  Service: Gastroenterology   • HEAD/NECK LESION/CYST EXCISION Left 2016    Procedure: EXCISION MALIGNANT MELANOMA WITH SENTINEL NODE BIOPSY, INJECTION AND SCAN PRE-OP, LEFT EAR;  Surgeon: Guanaco Zepeda MD;  Location: [facility] OR;  Service:    • HEMORRHOIDECTOMY     • INSERT / REPLACE / REMOVE PACEMAKER     • SENTINEL NODE BIOPSY Left 2016    Procedure: SENTINEL NODE BIOPSY;  Surgeon: Guanaco Zepeda MD;  Location: [facility] OR;  Service:    • SKIN CANCER EXCISION     • THYROID SURGERY     • THYROIDECTOMY     ,   Family History   Problem Relation Age of Onset   • Heart failure Mother    • Stroke Mother    • Dementia Mother    • Coronary artery disease Father    • Colon polyps Father    • COPD Brother    • Colon cancer Neg Hx    ,   Social History     Tobacco Use   • Smoking status: Former Smoker     Years: 10.00     Types: Cigarettes     Quit date:      Years since quittin.3   • Smokeless tobacco: Former User     Types: Snuff     Quit date:    Substance Use Topics   • Alcohol use: No   • Drug use: No   ,     Medications  Current Outpatient Medications   Medication Sig Dispense Refill   • ALPRAZolam (XANAX) 0.25 MG tablet TAKE 1 TABLET BY MOUTH THREE TIMES DAILY  90 tablet 0   • atorvastatin (LIPITOR) 80 MG tablet Take 1 tablet by mouth Every Night. 30 tablet 5   • Bacillus Coagulans-Inulin (PROBIOTIC FORMULA PO) Take  by mouth.     • BIOTIN PO Take  by mouth.     • cetirizine (zyrTEC) 10 MG tablet Take 10 mg by mouth.     • clopidogrel (PLAVIX) 75 MG tablet TAKE 1 TABLET BY MOUTH DAILY 90 tablet 3   • donepezil (ARICEPT) 5 MG tablet Take 1 tablet by mouth every night at bedtime. 90 tablet 3   • escitalopram (LEXAPRO) 20 MG tablet Take 1 tablet by mouth Daily. 90 tablet 1   • furosemide (LASIX) 40 MG tablet TAKE 1 TABLET BY MOUTH DAILY 90 tablet 3   • HYDROcodone-acetaminophen (Norco) 5-325 MG per tablet Take 1 tablet by mouth Every 6 (Six) Hours As Needed for Moderate Pain . 30 tablet 0   • isosorbide mononitrate (IMDUR) 30 MG 24 hr tablet Take 1 tablet by mouth Every Morning. 90 tablet 3   • lisinopril (PRINIVIL,ZESTRIL) 5 MG tablet Take 1 tablet by mouth Daily. 90 tablet 3   • metoprolol tartrate (LOPRESSOR) 25 MG tablet Take one tablet by mouth twice a day. 180 tablet 3   • NITROSTAT 0.4 MG SL tablet Place 1 tablet under the tongue Every 5 (Five) Minutes As Needed for Chest Pain. Take no more than 3 doses in 15 minutes. 100 tablet 2   • omega-3 acid ethyl esters (LOVAZA) 1 g capsule Take 1 capsule by mouth Daily. 90 capsule 3   • omeprazole (priLOSEC) 20 MG capsule Take 1 capsule by mouth Daily. 90 capsule 3   • Potassium 99 MG tablet Take 1 tablet by mouth Daily.     • pregabalin (LYRICA) 150 MG capsule TAKE 1 CAPSULE BY MOUTH THREE TIMES DAILY 270 capsule 0   • sucralfate (CARAFATE) 1 g tablet TK 1 T PO  AC AND HS  0     No current facility-administered medications for this visit.       Allergies:  Meperidine and Statins    Review of Systems  Review of Systems   Constitutional: Positive for malaise/fatigue.   HENT: Negative.    Eyes: Negative.    Cardiovascular: Positive for chest pain and dyspnea on exertion. Negative for claudication, cyanosis, irregular heartbeat,  "leg swelling, near-syncope, orthopnea, palpitations, paroxysmal nocturnal dyspnea and syncope.   Respiratory: Positive for cough and shortness of breath.    Endocrine: Negative.    Hematologic/Lymphatic: Negative.    Skin: Negative.    Musculoskeletal: Positive for arthritis and back pain.   Gastrointestinal: Negative for anorexia.   Genitourinary: Negative.    Neurological: Negative for weakness.   Psychiatric/Behavioral: Negative.    Same review of system and physical exam as last visit        Objective     Physical Exam:  /82   Pulse 82   Ht 172.7 cm (68\")   Wt 123 kg (271 lb)   SpO2 97%   BMI 41.21 kg/m²   Physical Exam   Constitutional: He appears well-developed.   HENT:   Head: Normocephalic.   Neck: Normal carotid pulses and no JVD present. No tracheal tenderness present. Carotid bruit is not present. No tracheal deviation present.   Cardiovascular: Regular rhythm, normal heart sounds and normal pulses.   Pulmonary/Chest: Effort normal. No stridor.   Abdominal: Soft. He exhibits no distension. There is no abdominal tenderness.   Neurological: He is alert. No cranial nerve deficit or sensory deficit.   Skin: Skin is warm.   Psychiatric: His speech is normal and behavior is normal.       Results Review:      IMPRESSION:  1. No pulmonary emboli.  2. No thoracic aortic aneurysm or dissection. Prior mediastinal surgery  with cardiomegaly. Left subclavian cardiac pacer device.  3. New right middle lobe consolidation most consistent with pneumonia.  Probable reactive right hilar and subcarinal lymph nodes. Follow up  chest x-ray recommended to document resolution.  This report was finalized on 03/20/2021 20:54 by Dr. Cassie Mckeon MD.      Conclusion of cardiac catheterization    4/28/2020     Left ventricle ejection fraction 45%  Moderately elevated left ventricular end-diastolic pressure of 19 mmHg  Atretic left intramammary artery graft  Patent stents in the left anterior descending coronary " artery  60% stenosis chronic of diagonal branch unchanged from prior cardiac catheterization  95% in-stent restenosis of first obtuse marginal branch treated with PTCA and then implantation of 2.5 x 12 mm Synergy stent with 0% residual stenosis  70% stenosis of distal right coronary artery  Widely patent saphenous venous graft to the distal right coronary artery        ____________________________________________________________________________________________________________________________________________     Plan after cardiac catheterization        Dual antiplatelet therapy minimum of 1 year  Continue on other medication including statin and beta-blockers  Aspirin for the rest of his life  Add ACE inhibitor therapy  Check echocardiogram  Intensive risk factor modifications for both primary and secondary prevention if applicable  Hydration   Observation         Carlo Velasco   Regadenoson Stress Test With Myocardial Perfusion SPECT (Multi Study)   Order# [id number]   Reading physician: Darian Toribio MD Ordering physician: Darian Toribio MD Study date: 20   Patient Information     Patient Name  Carlo Velasco MRN  [id number] Sex  Male  (Age)  1945 (74 y.o.)   Interpretation Summary        · Left ventricular ejection fraction is normal (Calculated EF = 55%).  · Myocardial perfusion imaging indicates a normal myocardial perfusion study with no evidence of ischemia.  · Impressions are consistent with a low risk study.                Results for orders placed during the hospital encounter of 20    STAT Adult Transthoracic Echo Complete W/ Cont if Necessary Per Protocol    Interpretation Summary  · Estimated EF = 55%.  · Left ventricular systolic function is normal.  · Left ventricular wall thickness is consistent with mild concentric hypertrophy.  · Left ventricular diastolic dysfunction.  · Mild pulmonary hypertension is present.       Procedures  Cardiac cath    LVEF of 50%.   occluded the mid  right coronary artery  Patent saphenous venous graft to the distal right coronary artery  Atretic left intramammary artery graft.  Patent stent in the mid left anterior descending coronary artery  Patent stent to the obtuse marginal branch  Patent stent in diagonal branch.   2/18    Conclusion     Patent stents noted in the obtuse marginal branch and the left anterior descending coronary artery.  Atretic left internal mammary artery graft.  Patent saphenous venous graft to the right coronary artery  Significant right coronary artery stenosis proximal to the touchdown site of the saphenous vein graft to the right coronary artery  60% stenosis of diagonal branch with a normal fractional flow reserve of 0.85  Left ventricle ejection fraction of 50%.     LVEDP    16   mm Hg           Plan     Intensive risk factor modifications for both primary and secondary prevention if applicable  Home today if stable  Hydration   Observation  Keep follow-up appointment    Assessment/Plan   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 (CMS/HCC)   • Essential hypertension   • Anxiety   • Colon polyps   • Mixed hyperlipidemia   • Incisional hernia, without obstruction or gangrene   • BRBPR (bright red blood per rectum)   • Esophageal dysphagia   • Prostatism   • Nocturia   • Coronary artery disease   • Orthostasis   • Chronic midline low back pain without sciatica   • Hx of colonic polyp   • Acute pain of right knee   • Morbidly obese (CMS/HCC)   • Pain of right heel   • Hx of CABG      • Stented coronary artery   • Flank pain   • Skin lesion   • Sick sinus syndrome (CMS/HCC)   • Coagulopathy (CMS/HCC)   • Pneumonia of right lung due to infectious organism         Plan    Will let pneumonia heal and then see in follow up in 2-3 months     Moderate shortness of breath and cough and atypical fleeting non exertional chest pain that is focal       Keep LDL below 70 mg/dl. Monitor liver and renal functions.   Monitor CBC, CMP, TSH (as indicated) and Lipid Panel by primary     I support the patient's decision to take the Covid -19 vaccine   Had both dose   No major issues   Now fully immunized      Check BP and heart rates twice daily at least 3x / week, week a month  at home and bring a recording for me to review next visit  If BP >130/85 or < 100/60 persistently over 3 reading 30 mins apart call sooner      Patient expressed understanding  Encouraged and answered all questions   Discussed with the patient and all questioned fully answered. He will call me if any problems arise.   Discussed results of prior testing with patient as well as CT chest   as well last electrocardiogram     S/L NTG PRN for chest pain, call me or go to ER if has to use S/L nitroglycerin      Follow up with ANALI Holley to address interim issues              Return in about 2 months (around 6/14/2021).

## 2021-04-15 ENCOUNTER — HOSPITAL ENCOUNTER (OUTPATIENT)
Dept: CT IMAGING | Facility: HOSPITAL | Age: 76
Discharge: HOME | End: 2021-04-15
Admitting: FAMILY MEDICINE

## 2021-04-15 DIAGNOSIS — R07.9 CHEST PAIN, UNSPECIFIED TYPE: ICD-10-CM

## 2021-04-15 DIAGNOSIS — R05.3 PERSISTENT COUGH: ICD-10-CM

## 2021-04-15 DIAGNOSIS — R06.02 SOB (SHORTNESS OF BREATH): ICD-10-CM

## 2021-04-15 DIAGNOSIS — J90 PLEURAL EFFUSION: Primary | ICD-10-CM

## 2021-04-15 PROCEDURE — 71250 CT THORAX DX C-: CPT

## 2021-04-16 ENCOUNTER — READMISSION MANAGEMENT (OUTPATIENT)
Dept: CALL CENTER | Facility: HOSPITAL | Age: 76
End: 2021-04-16

## 2021-04-16 RX ORDER — AMOXICILLIN AND CLAVULANATE POTASSIUM 875; 125 MG/1; MG/1
1 TABLET, FILM COATED ORAL 2 TIMES DAILY
Qty: 14 TABLET | Refills: 0 | Status: SHIPPED | OUTPATIENT
Start: 2021-04-16 | End: 2021-04-23

## 2021-04-16 NOTE — TELEPHONE ENCOUNTER
"I spoke to Amaya and notified her that if Carlo is hurting and coughing that bad, he needed to go to [facility] ER asap for evaluation.  She says that \"they are in the middle of something and will go to the ER this afternoon\".    "

## 2021-04-16 NOTE — TELEPHONE ENCOUNTER
I spoke to Amaya and she said that Carlo cannot wait 1 or 2 weeks to see pulmonology.  She says that his cough is too bad, his chest is full and he is hurting too bad to wait.     Please advise as to what Carlo should do?

## 2021-04-16 NOTE — TELEPHONE ENCOUNTER
Amaya called back and said that Carlo refused to go to the ER.   It is 1449 right now and no word from pulmonology yet.  Do you want me to call the patient to see about a direct admit?

## 2021-04-16 NOTE — OUTREACH NOTE
COPD/PN Week 4 Survey      Responses   [facility] [place], operated by [facility] patient discharged from?  [place]   Does the patient have one of the following disease processes/diagnoses(primary or secondary)?  COPD/Pneumonia   Was the primary reason for admission:  Pneumonia   Week 4 attempt successful?  Yes   Call start time  1541   Call end time  1546   Discharge diagnosis   Pneumonia    Person spoke with today (if not patient) and relationship  spouse(Jyoti)   Meds reviewed with patient/caregiver?  Yes   Is the patient having any side effects they believe may be caused by any medication additions or changes?  No   Is the patient taking all medications as directed (includes completed medication regime)?  Yes   Has the patient kept scheduled appointments due by today?  Yes   Comments  CT completed   Is the patient still receiving Home Health Services?  No   Pulse Ox monitoring  Intermittent   Pulse Ox device source  Patient   O2 Sat comments  92-93% on RA   O2 Sat: education provided  Sat levels, Monitoring frequency, When to seek care   O2 Sat education comments  Told Jyoti if pt. unable to maintain O2 level 90% or greater to return to ED.   Psychosocial issues?  No   What is the patient's perception of their health status since discharge?  Same   Nursing Interventions  Nurse provided patient education   If the patient is a current smoker, are they able to teach back resources for cessation?  Not a smoker   Is the patient/caregiver able to teach back the hierarchy of who to call/visit for symptoms/problems? PCP, Specialist, Home health nurse, Urgent Care, ED, 911  Yes   Is the patient/caregiver able to teach back signs and symptoms of worsening condition:  Fever/chills, Shortness of breath [off an on fever/chills.]   Is the patient/caregiver able to teach back importance of completing antibiotic course of treatment?  Yes   Week 4 call completed?  Yes   Would the patient like one additional call?  No   Graduated  Yes   Is the patient  interested in additional calls from an ambulatory ?  NOTE:  applies to high risk patients requiring additional follow-up.  Yes   Did the patient feel the follow up calls were helpful during their recovery period?  Yes   Was the number of calls appropriate?  Yes   Wrap up additional comments  Spouse states pt. not doing very well and  MD told pt. to return to ED. Pt. states he wasn't going there and have to wait for hours. Advised any increased SOB, chest pain, fever worsening symptoms to return to ED. Spouse states he was cleared by Cardiologist .           Nicole Green RN

## 2021-04-16 NOTE — TELEPHONE ENCOUNTER
Dr. Rosen spoke to Amaya and she informed him that Pulmonology called them and set them up for Wednesday of next week.  Dr. Rosen said that if Carlo was in too much pain that he needed to present to the [facility] ER for eval.  Amaya verbalized understanding.

## 2021-04-16 NOTE — TELEPHONE ENCOUNTER
DELETE AFTER REVIEWING: Telephone encounter to be sent to the clinical pool     Caller: Amaya Velasco    Relationship: Emergency Contact    What is the medical concern/diagnosis: ANNOMINIA.    PATIENT WOULD LIKE A FOLLOW UP  CALL ON THIS MATTER.    What specialty or service is being requested: PULMONOLOGY          DELETE AFTER READING TO PATIENT: “ Thank you for sharing this information. I will send a message to the clinical team. Please allow 48 hours for the clinical staff to follow up on this request.”

## 2021-04-18 RX ORDER — ATORVASTATIN CALCIUM 80 MG/1
80 TABLET, FILM COATED ORAL NIGHTLY
Qty: 30 TABLET | Refills: 5 | Status: CANCELLED | OUTPATIENT
Start: 2021-04-18

## 2021-04-19 ENCOUNTER — EPISODE CHANGES (OUTPATIENT)
Dept: CASE MANAGEMENT | Facility: OTHER | Age: 76
End: 2021-04-19

## 2021-04-19 RX ORDER — ATORVASTATIN CALCIUM 80 MG/1
80 TABLET, FILM COATED ORAL NIGHTLY
Qty: 30 TABLET | Refills: 5 | Status: ON HOLD | OUTPATIENT
Start: 2021-04-19 | End: 2021-05-01

## 2021-04-19 NOTE — TELEPHONE ENCOUNTER
Requested Prescriptions     Pending Prescriptions Disp Refills   • atorvastatin (LIPITOR) 80 MG tablet [Pharmacy Med Name: ATORVASTATIN 80MG TABLETS] 30 tablet 5     Sig: TAKE 1 TABLET BY MOUTH EVERY NIGHT

## 2021-04-20 ENCOUNTER — PATIENT OUTREACH (OUTPATIENT)
Dept: CASE MANAGEMENT | Facility: OTHER | Age: 76
End: 2021-04-20

## 2021-04-20 NOTE — OUTREACH NOTE
"Patient Outreach Note    Received call center request for additional follow post hospital discharge 3-24-21 for pneumonia. Spoke with patients spouse and she reports \"mr almaraz isn't doing so good he has never gotten over the pneumonia\". He is monitoring his oxygen saturation and it averages 92-93%. He continues to have SOB and a cough. Will see pulmonologist tomorrow. Spouse agreeable to further outreach later this week.     Chanda Feng RN  Ambulatory     4/20/2021, 15:34 CDT      "

## 2021-04-21 ENCOUNTER — OFFICE VISIT (OUTPATIENT)
Dept: PULMONOLOGY | Facility: CLINIC | Age: 76
End: 2021-04-21

## 2021-04-21 VITALS
WEIGHT: 271.4 LBS | BODY MASS INDEX: 41.13 KG/M2 | HEIGHT: 68 IN | OXYGEN SATURATION: 99 % | DIASTOLIC BLOOD PRESSURE: 84 MMHG | HEART RATE: 78 BPM | SYSTOLIC BLOOD PRESSURE: 140 MMHG

## 2021-04-21 DIAGNOSIS — Z78.9 NON-SMOKER: ICD-10-CM

## 2021-04-21 DIAGNOSIS — J30.89 NON-SEASONAL ALLERGIC RHINITIS, UNSPECIFIED TRIGGER: ICD-10-CM

## 2021-04-21 DIAGNOSIS — J90 RECURRENT PLEURAL EFFUSION ON RIGHT: ICD-10-CM

## 2021-04-21 DIAGNOSIS — R06.02 SOB (SHORTNESS OF BREATH): ICD-10-CM

## 2021-04-21 DIAGNOSIS — E66.01 MORBIDLY OBESE (HCC): ICD-10-CM

## 2021-04-21 DIAGNOSIS — J98.4 RESTRICTIVE LUNG DISEASE: ICD-10-CM

## 2021-04-21 DIAGNOSIS — J18.9 PNEUMONIA OF RIGHT LUNG DUE TO INFECTIOUS ORGANISM, UNSPECIFIED PART OF LUNG: Primary | ICD-10-CM

## 2021-04-21 DIAGNOSIS — G47.33 OSA (OBSTRUCTIVE SLEEP APNEA): ICD-10-CM

## 2021-04-21 DIAGNOSIS — R07.81 PLEURITIC CHEST PAIN: ICD-10-CM

## 2021-04-21 DIAGNOSIS — R05.9 COUGH: ICD-10-CM

## 2021-04-21 PROCEDURE — 99204 OFFICE O/P NEW MOD 45 MIN: CPT | Performed by: INTERNAL MEDICINE

## 2021-04-21 RX ORDER — ALBUTEROL SULFATE 90 UG/1
2 AEROSOL, METERED RESPIRATORY (INHALATION) EVERY 4 HOURS PRN
Qty: 18 G | Refills: 5 | Status: SHIPPED | OUTPATIENT
Start: 2021-04-21 | End: 2021-07-20

## 2021-04-21 RX ORDER — BENZONATATE 100 MG/1
100 CAPSULE ORAL 3 TIMES DAILY PRN
Qty: 42 CAPSULE | Refills: 3 | Status: ON HOLD | OUTPATIENT
Start: 2021-04-21 | End: 2021-05-01

## 2021-04-21 RX ORDER — FLUTICASONE PROPIONATE 50 MCG
2 SPRAY, SUSPENSION (ML) NASAL DAILY
Qty: 16 G | Refills: 5 | Status: SHIPPED | OUTPATIENT
Start: 2021-04-21 | End: 2021-08-24 | Stop reason: SDUPTHER

## 2021-04-21 RX ORDER — LORATADINE 10 MG/1
10 TABLET ORAL DAILY
Qty: 90 TABLET | Refills: 3 | Status: SHIPPED | OUTPATIENT
Start: 2021-04-21 | End: 2021-08-24 | Stop reason: SDUPTHER

## 2021-04-21 NOTE — PROGRESS NOTES
[facility]IC NEW CONSULT NOTE     Patient: Carlo Velasco      :  1945   Age: 75 y.o.    Date of Service: 2021      Subjective:   Requesting Physician: Rhys Rosen MD     Reason for Consultation:    Diagnosis Plan   1. Pneumonia of right lung due to infectious organism, unspecified part of lung     2. Recurrent pleural effusion on right     3. Cough     4. SOB (shortness of breath)     5. Morbidly obese (CMS/HCC)     6. Non-smoker     7. Non-seasonal allergic rhinitis, unspecified trigger     8. ERNESTO (obstructive sleep apnea)     9. Restrictive lung disease     10. Pleuritic chest pain          Chief Complaint:     Chief Complaint   Patient presents with   • Cough   • Shortness of Breath       History of present illness: Carlo Velasco is a 75 y.o. male who presents to the office today to be seen for    Diagnosis Plan   1. Pneumonia of right lung due to infectious organism, unspecified part of lung     2. Recurrent pleural effusion on right     3. Cough     4. SOB (shortness of breath)     5. Morbidly obese (CMS/HCC)     6. Non-smoker     7. Non-seasonal allergic rhinitis, unspecified trigger     8. ERNESTO (obstructive sleep apnea)     9. Restrictive lung disease     10. Pleuritic chest pain        Other problems per record.    Patient is a very pleasant elderly  gentleman returns the pulmonary clinic with his wife as a new consult today.  He was sent to the pulmonary clinic by Dr. Rhys Rosen for evaluation of pneumonia and developing right-sided pleural effusion.    Patient reportedly has extensive cardiac history with coronary artery disease and also had a coronary bypass grafting done many years ago.  He had cardiac arrhythmia and has a pacemaker in place and has known history of sick sinus syndrome.  He is morbidly obese and has a diagnosis of obstructive sleep apnea which was diagnosed in early  and he did use CPAP briefly but after that he lost weight and  stopped using CPAP but his wife reported he gained weight back and did not have any further sleep study done.  Patient apparently is a former smoker but smoked a long time back and quit smoking after 10 years in 1977 but never had a diagnosis of COPD or asthma.  His last pulmonary function test done in 2017 showed he had restrictive lung disease.    He was admitted in the hospital in middle of March to 21 with increasing shortness of breath and cough and was noted to have a right middle lobe pneumonia and was treated as inpatient for few days and was discharged home on oral antibiotics for 5 days which he completed.  He returned to Dr. Fiore's office for follow-up for ongoing shortness of breath and was started back on Augmentin again.  A chest CT scan was done noncontrast as an outpatient and it showed right-sided lower lobe loculated pleural effusion developing which is a new finding although his consolidation in the right middle lobe has already resolved.    Patient reported he is having some pleuritic chest pain on the right side in the back of the chest and also had some night sweats.  He coughs up some scanty sputum but did not have any fever.  He denied any hemoptysis or any weight loss but he gets tired easily and is more short of breath and continues to cough.  He also has some sinus problems but is not on any sinus medications.  He did not have his Covid vaccination.  He normally gets his influenza and pneumonia vaccination every year but he is not still decided about the Covid vaccination yet.  He did not have any known exposure to Covid any sick contact or any recent travel.  He is a retired  and lives with his wife.  He had no prior history of cancer.    Past History  Past Medical History:   Diagnosis Date   • Anxiety    • Arthritis    • Cancer (CMS/HCC)     melanoma   • CHF (congestive heart failure) (CMS/HCC)    • Claustrophobia    • Colon polyp    • Coronary artery disease 2012,  2015    2 vessel CABG (2012), SHAHRZAD LAD (2015).    • Depression    • Disease of thyroid gland     partial thyroidectomy on right   • GERD (gastroesophageal reflux disease)    • Hearing loss    • Heart attack (CMS/HCC)    • Heart attack (CMS/HCC)    • History of transfusion    • Hyperlipidemia    • Hypertension    • Kidney stone     history of    • Tobacco abuse, in remission    • Uses hearing aid     BILATERAL     Past Surgical History:   Procedure Laterality Date   • BACK SURGERY      CERVICAL FUSION AND LUMBAR DISC REPLACEMENT   • CARDIAC CATHETERIZATION  07/2015    SHAHRZAD mid LADDr. Toribio   • CARDIAC CATHETERIZATION N/A 10/21/2016    Procedure: Left Heart Cath;  Surgeon: Darian Toribio MD;  Location:  [facility] CATH [facility]VASIVE LOC[facility];  Service:    • CARDIAC CATHETERIZATION Left 4/5/2017    Procedure: Cardiac Catheterization/Vascular Study;  Surgeon: Darian Toribio MD;  Location:  [facility];  Service:    • CARDIAC CATHETERIZATION N/A 4/5/2017    Procedure: Left Heart Cath;  Surgeon: Darian Toribio MD;  Location:  [facility];  Service:    • CARDIAC CATHETERIZATION N/A 4/5/2017    Procedure: Coronary angiography;  Surgeon: Darian Toribio MD;  Location:  [facility];  Service:    • CARDIAC CATHETERIZATION N/A 4/5/2017    Procedure: Left ventriculography;  Surgeon: Darian Toribio MD;  Location:  [facility];  Service:    • CARDIAC CATHETERIZATION  4/5/2017    Procedure: Saphenous Vein Graft;  Surgeon: Darian Toribio MD;  Location:  [facility];  Service:    • CARDIAC CATHETERIZATION Left 2/27/2018    Procedure: Cardiac Catheterization/Vascular Study SHAHRZAD OK PRN;  Surgeon: Darian Toribio MD;  Location:  [facility];  Service:    • CARDIAC CATHETERIZATION  2/27/2018    Procedure: Functional Flow [place];  Surgeon: Darian Toribio MD;  Location:  [facility];  Service:    • CARDIAC CATHETERIZATION N/A 2/27/2018    Procedure:  Left ventriculography;  Surgeon: Darian Toribio MD;  Location: [facility] CATH INVASIVE LOCATION;  Service:    • CARDIAC CATHETERIZATION Left 4/28/2020    Procedure: Cardiac Catheterization/Vascular Study SHAHRZAD OK PRN;  Surgeon: Darian Toribio MD;  Location: [facility] CATH INVASIVE LOCATION;  Service: Cardiology;  Laterality: Left;   • CARDIAC ELECTROPHYSIOLOGY PROCEDURE N/A 1/11/2018    Procedure: PPM generator change - dual;  Surgeon: Darian Toribio MD;  Location: [facility] CATH INVASIVE LOCATION;  Service:    • COLONOSCOPY  09/05/2007    colon polyps   • COLONOSCOPY N/A 5/29/2018    Procedure: COLONOSCOPY WITH ANESTHESIA;  Surgeon: Juan F Tapia MD;  Location: [facility] ENDOSCOPY;  Service: Gastroenterology   • CORONARY ANGIOPLASTY WITH STENT PLACEMENT  07/2015    SHAHRZAD mid LAD, Dr. Toribio    • CORONARY ARTERY BYPASS GRAFT  2012 AND 2014    2 vessel    • ENDOSCOPY  11/08/2010   • ENDOSCOPY N/A 5/29/2018    Procedure: ESOPHAGOGASTRODUODENOSCOPY WITH ANESTHESIA;  Surgeon: Juan F Tapia MD;  Location: [facility] ENDOSCOPY;  Service: Gastroenterology   • HEAD/NECK LESION/CYST EXCISION Left 12/20/2016    Procedure: EXCISION MALIGNANT MELANOMA WITH SENTINEL NODE BIOPSY, INJECTION AND SCAN PRE-OP, LEFT EAR;  Surgeon: Guanaco Zepeda MD;  Location: [facility] OR;  Service:    • HEMORRHOIDECTOMY     • INSERT / REPLACE / REMOVE PACEMAKER     • SENTINEL NODE BIOPSY Left 12/20/2016    Procedure: SENTINEL NODE BIOPSY;  Surgeon: Guanaco Zepeda MD;  Location: [facility] OR;  Service:    • SKIN CANCER EXCISION     • THYROID SURGERY     • THYROIDECTOMY       Allergies   Allergen Reactions   • Meperidine Other (See Comments)     HEART STOPS     • Statins Myalgia     Causes leg cramps     Current Outpatient Medications   Medication Sig Dispense Refill   • ALPRAZolam (XANAX) 0.25 MG tablet TAKE 1 TABLET BY MOUTH THREE TIMES DAILY 90 tablet 0   • amoxicillin-clavulanate (Augmentin) 875-125 MG per tablet Take 1 tablet by mouth 2 (Two) Times a Day for 7 days. 14  tablet 0   • atorvastatin (LIPITOR) 80 MG tablet TAKE 1 TABLET BY MOUTH EVERY NIGHT 30 tablet 5   • Bacillus Coagulans-Inulin (PROBIOTIC FORMULA PO) Take  by mouth.     • BIOTIN PO Take  by mouth.     • cetirizine (zyrTEC) 10 MG tablet Take 10 mg by mouth.     • clopidogrel (PLAVIX) 75 MG tablet TAKE 1 TABLET BY MOUTH DAILY 90 tablet 3   • donepezil (ARICEPT) 5 MG tablet Take 1 tablet by mouth every night at bedtime. 90 tablet 3   • escitalopram (LEXAPRO) 20 MG tablet Take 1 tablet by mouth Daily. 90 tablet 1   • furosemide (LASIX) 40 MG tablet TAKE 1 TABLET BY MOUTH DAILY 90 tablet 3   • HYDROcodone-acetaminophen (Norco) 5-325 MG per tablet Take 1 tablet by mouth Every 6 (Six) Hours As Needed for Moderate Pain . 30 tablet 0   • isosorbide mononitrate (IMDUR) 30 MG 24 hr tablet Take 1 tablet by mouth Every Morning. 90 tablet 3   • lisinopril (PRINIVIL,ZESTRIL) 5 MG tablet Take 1 tablet by mouth Daily. 90 tablet 3   • metoprolol tartrate (LOPRESSOR) 25 MG tablet Take one tablet by mouth twice a day. 180 tablet 3   • NITROSTAT 0.4 MG SL tablet Place 1 tablet under the tongue Every 5 (Five) Minutes As Needed for Chest Pain. Take no more than 3 doses in 15 minutes. 100 tablet 2   • omega-3 acid ethyl esters (LOVAZA) 1 g capsule Take 1 capsule by mouth Daily. 90 capsule 3   • omeprazole (priLOSEC) 20 MG capsule Take 1 capsule by mouth Daily. 90 capsule 3   • Potassium 99 MG tablet Take 1 tablet by mouth Daily.     • pregabalin (LYRICA) 150 MG capsule TAKE 1 CAPSULE BY MOUTH THREE TIMES DAILY 270 capsule 0   • sucralfate (CARAFATE) 1 g tablet TK 1 T PO  AC AND HS  0     No current facility-administered medications for this visit.     Social History     Socioeconomic History   • Marital status:      Spouse name: Not on file   • Number of children: Not on file   • Years of education: Not on file   • Highest education level: Not on file   Tobacco Use   • Smoking status: Former Smoker     Years: 10.00     Types:  "Cigarettes     Quit date:      Years since quittin.3   • Smokeless tobacco: Former User     Types: Snuff     Quit date:    Substance and Sexual Activity   • Alcohol use: No   • Drug use: No   • Sexual activity: Defer     Family History   Problem Relation Age of Onset   • Heart failure Mother    • Stroke Mother    • Dementia Mother    • Coronary artery disease Father    • Colon polyps Father    • COPD Brother    • Colon cancer Neg Hx      Immunization History   Administered Date(s) Administered   • FLUAD TRI 65YR+ 10/10/2019   • Fluad Quad 65+ 10/10/2019   • Fluzone High Dose =>65 Years ([employer] ONLY) 10/11/2018   • TD Preservative Free 2018   • Tdap 2015   • influenza Split 10/22/2016       Review of Systems  A complete review of systems is performed and all other systems were reviewed and negative except as noted above in the HPI.  Review of Systems   Constitutional: Positive for fatigue.   HENT: Positive for congestion, postnasal drip and sinus pressure.    Eyes: Negative.    Respiratory: Positive for cough, chest tightness, shortness of breath and wheezing.    Cardiovascular: Positive for chest pain.        Pleuritic right back pain .   Gastrointestinal: Negative.    Endocrine: Negative.    Genitourinary: Negative.    Musculoskeletal: Positive for arthralgias and back pain.   Skin: Negative.    Allergic/Immunologic: Positive for environmental allergies.   Neurological: Negative.    Hematological: Negative.    Psychiatric/Behavioral: Negative.        Objective:     Vital Signs:  /84   Pulse 78   Ht 172.7 cm (68\")   Wt 123 kg (271 lb 6.4 oz)   SpO2 99% Comment: ra  BMI 41.27 kg/m²     Pulmonary Functions Testing Results:    Results for orders placed during the hospital encounter of 17    Full Pulmonary Function Test With Bronchodilator    Narrative  [facility] - Pulmonary Function Test    [address] [place] [address]  [place]  [address]  [phone]    Patient : Carlo KLINE" Rod  MRN : [id number]  CSN : [id number]  Pulmonologist : Cruz Atkinson MD  Date : 7/13/2017    ______________________________________________________________________    Interpretation :  1.  Spirometry is consistent with a mild restrictive ventilatory defect with coexisting mild decrease in the patient's FEF 75%,  2.  There is some improvement in the patient's FEF 75% postbronchodilator but a mild restrictive ventilatory defect still appears to be present.  3.  Lung volumes reveal a low normal total lung capacity with a decrease in vital capacity and expiratory reserve volume, consistent with a borderline restrictive ventilatory defect.  4.  Diffusion capacity is within normal limits.      Cruz Atkinson MD        Physical Exam  General:  Patient is a 75 y.o. very pleasant obese elderly  male. Looks states age. Appears to be in no acute distress.  Eyes:  EOMI.  PERRLA.  Vision intact.  No scleral icterus.    Ear, Nose, Mouth and Throat:  External inspection of the ears and nose appear to be normal.  No obvious scars or lesions.  Hearing is grossly intact.  No leukoplakia, pharyngitis, stomatitis or thrush. Swollen nasal mucosa with post nasal drip.   Neck:  Range of motion of neck normal.  No thyromegaly or masses. Malanpati Class 3, no jugular venous distention, no thyroid swelling  Respiratory:  Clear to auscultation bilaterally.  No use of accessory muscles. Decreased breath sounds.      Cardiovascular:  Regularly regular rhythm without S3, S4 or murmur.  No hepatojugular reflux nor carotid bruits.  Pulses intact in four extremities.  No obvious signs of edema.    Gastrointestinal:  Nontender, nondistended, soft.  Bowel sounds positive in all four quadrants.  No organomegaly or masses nor bruit.    Lymphatic:  No significant adenopathy appreciated in the neck, axilla or elsewhere.    Musculoskeletal:  Gait was grossly intact.  Range of motion, strength and sensitivity of all four extremities  appear to be intact.  Distal tendon reflexes intact.   Skin:  No obvious rashes, lesions, ulcers or large amount of bruising.    Neurological:  Refer to Eye, Ear, Nose and Throat and musculoskeletal exams for additional details.  Distal tendon reflexes intact.  No clonus or Babinski.  Sensation to touch is grossly intact.    Psychiatric:  Patient is alert and oriented to person, place and time.  Recent and remote memory appear to be intact.  Patient does not show outward signs of depression.      Diagnostic Findings:   Pertinent findings noted and abnormal findings also noted.  Reviewed    Chest Imaging    Last CT scan of the chest showed    IMPRESSION:  1. Mild to moderate size right pleural effusion with pleural fluid  extending into the major fissure resulting in pseudotumor appearance.  Minor fissure fluid also observed. Mild atelectasis associated with the  effusions. The lung fields are otherwise clear.  This report was finalized on 04/15/2021 08:18 by Dr. Shant Sarabia MD.    Assessment      1. Pneumonia of right lung due to infectious organism, unspecified part of lung    2. Recurrent pleural effusion on right    3. Cough    4. SOB (shortness of breath)    5. Morbidly obese (CMS/HCC)    6. Non-smoker    7. Non-seasonal allergic rhinitis, unspecified trigger    8. ERNESTO (obstructive sleep apnea)    9. Restrictive lung disease    10. Pleuritic chest pain        Plan/Recommendations     1.  I reviewed the CT scan of the chest and discussed the result with the patient and his wife.  I also showed them the films.  In reviewing the CT scan of the chest it appears patient had a right middle lobe pneumonia with dense consolidation last month which was treated as an inpatient with IV antibiotics and later on oral antibiotics which resolved and he developed a new right lower lobe loculated pleural effusion which could be complicated parapneumonic effusion or empyema and may need surgical intervention or video-assisted  thoracoscopy or decortication.  2.  After seeing the CT scan of the chest I discussed it with Dr. Skip Jcaobs the cardiothoracic surgeon over the phone and he reviewed the scan for me and agreed the patient may need surgical intervention and we decided to set up an appointment scheduled with Dr. Jacobs this Friday at 1 PM for review and possible surgical intervention soon.  Patient and his wife agreed to go to visit Dr. Jacobs for further management.  3.  Patient has history of cardiac issues he has pacemaker and is also on Plavix and gets regular follow-up with a cardiologist Dr. Darian Toribio.  As we are anticipating a surgical intervention within the next few days after visit with Dr. Jacobs advised him to hold on Plavix for now for planned surgery.  4.  He has allergy problems and postnasal drip and also has chronic cough.  I started the patient on Flonase and Claritin and prescribed him some albuterol rescue inhaler for ongoing shortness of breath.  All prescriptions were sent to the pharmacy.  5.  Patient will need a follow-up pulmonary function test.  His last pulmonary function test done if few years ago showed restrictive lung disease but no further follow-up was done.  He also has untreated obstructive sleep apnea which could cause more problem and I advised him to go for another sleep study and probably will need to start using CPAP again.  He gained significant amount of weight in the last few years and having more respiratory issues.  6.  He was advised to return to the pulmonary clinic in a month's time for follow-up visit with me after his visit with Dr. Jacobs and possible surgical intervention.  He can visit with me earlier if needed.    Follow Up    1 month    Time Spent   45 minutes      I appreciate the opportunity of participating in this patients care. I would like to thank the PCP for the referral. Please feel free to contact me with any other questions.       Katelyn Buckley MD    Pulmonologist/Intensivist     14:23 CDT

## 2021-04-23 ENCOUNTER — PREP FOR SURGERY (OUTPATIENT)
Dept: OTHER | Facility: HOSPITAL | Age: 76
End: 2021-04-23

## 2021-04-23 ENCOUNTER — PRE-ADMISSION TESTING (OUTPATIENT)
Dept: PREADMISSION TESTING | Facility: HOSPITAL | Age: 76
End: 2021-04-23

## 2021-04-23 ENCOUNTER — TRANSCRIBE ORDERS (OUTPATIENT)
Dept: ADMINISTRATIVE | Facility: HOSPITAL | Age: 76
End: 2021-04-23

## 2021-04-23 ENCOUNTER — OFFICE VISIT (OUTPATIENT)
Dept: CARDIAC SURGERY | Facility: CLINIC | Age: 76
End: 2021-04-23

## 2021-04-23 VITALS
OXYGEN SATURATION: 98 % | WEIGHT: 269 LBS | BODY MASS INDEX: 40.77 KG/M2 | SYSTOLIC BLOOD PRESSURE: 112 MMHG | HEIGHT: 68 IN | DIASTOLIC BLOOD PRESSURE: 72 MMHG | HEART RATE: 71 BPM

## 2021-04-23 VITALS
HEART RATE: 70 BPM | BODY MASS INDEX: 39.9 KG/M2 | OXYGEN SATURATION: 95 % | SYSTOLIC BLOOD PRESSURE: 125 MMHG | DIASTOLIC BLOOD PRESSURE: 74 MMHG | HEIGHT: 69 IN | RESPIRATION RATE: 18 BRPM | WEIGHT: 269.4 LBS

## 2021-04-23 DIAGNOSIS — J90 RECURRENT PLEURAL EFFUSION ON RIGHT: Primary | ICD-10-CM

## 2021-04-23 DIAGNOSIS — Z11.59 SCREENING FOR VIRAL DISEASE: Primary | ICD-10-CM

## 2021-04-23 DIAGNOSIS — R06.02 SOB (SHORTNESS OF BREATH): ICD-10-CM

## 2021-04-23 DIAGNOSIS — J90 RECURRENT PLEURAL EFFUSION ON RIGHT: ICD-10-CM

## 2021-04-23 DIAGNOSIS — Z78.9 NON-SMOKER: ICD-10-CM

## 2021-04-23 DIAGNOSIS — J18.9 PNEUMONIA OF RIGHT LUNG DUE TO INFECTIOUS ORGANISM, UNSPECIFIED PART OF LUNG: ICD-10-CM

## 2021-04-23 DIAGNOSIS — Z95.1 HX OF CABG: Primary | ICD-10-CM

## 2021-04-23 LAB
ALBUMIN SERPL-MCNC: 3.3 G/DL (ref 3.5–5.2)
ALBUMIN/GLOB SERPL: 0.9 G/DL
ALP SERPL-CCNC: 71 U/L (ref 39–117)
ALT SERPL W P-5'-P-CCNC: 12 U/L (ref 1–41)
ANION GAP SERPL CALCULATED.3IONS-SCNC: 7 MMOL/L (ref 5–15)
APTT PPP: 37.9 SECONDS (ref 24.1–35)
ARTERIAL PATENCY WRIST A: POSITIVE
AST SERPL-CCNC: 17 U/L (ref 1–40)
ATMOSPHERIC PRESS: 751 MMHG
BASE EXCESS BLDA CALC-SCNC: 6.4 MMOL/L (ref 0–2)
BASOPHILS # BLD AUTO: 0.09 10*3/MM3 (ref 0–0.2)
BASOPHILS NFR BLD AUTO: 0.9 % (ref 0–1.5)
BDY SITE: ABNORMAL
BILIRUB SERPL-MCNC: 0.4 MG/DL (ref 0–1.2)
BUN SERPL-MCNC: 15 MG/DL (ref 8–23)
BUN/CREAT SERPL: 21.7 (ref 7–25)
CALCIUM SPEC-SCNC: 8.5 MG/DL (ref 8.6–10.5)
CHLORIDE SERPL-SCNC: 104 MMOL/L (ref 98–107)
CO2 SERPL-SCNC: 32 MMOL/L (ref 22–29)
CREAT SERPL-MCNC: 0.69 MG/DL (ref 0.76–1.27)
DEPRECATED RDW RBC AUTO: 46.8 FL (ref 37–54)
EOSINOPHIL # BLD AUTO: 1.03 10*3/MM3 (ref 0–0.4)
EOSINOPHIL NFR BLD AUTO: 10.8 % (ref 0.3–6.2)
ERYTHROCYTE [DISTWIDTH] IN BLOOD BY AUTOMATED COUNT: 15.2 % (ref 12.3–15.4)
GFR SERPL CREATININE-BSD FRML MDRD: 112 ML/MIN/1.73
GLOBULIN UR ELPH-MCNC: 3.6 GM/DL
GLUCOSE SERPL-MCNC: 87 MG/DL (ref 65–99)
HCO3 BLDA-SCNC: 31.5 MMOL/L (ref 20–26)
HCT VFR BLD AUTO: 38 % (ref 37.5–51)
HGB BLD-MCNC: 12.2 G/DL (ref 13–17.7)
IMM GRANULOCYTES # BLD AUTO: 0.1 10*3/MM3 (ref 0–0.05)
IMM GRANULOCYTES NFR BLD AUTO: 1 % (ref 0–0.5)
INR PPP: 1.13 (ref 0.91–1.09)
LYMPHOCYTES # BLD AUTO: 2.42 10*3/MM3 (ref 0.7–3.1)
LYMPHOCYTES NFR BLD AUTO: 25.3 % (ref 19.6–45.3)
Lab: ABNORMAL
MCH RBC QN AUTO: 27.4 PG (ref 26.6–33)
MCHC RBC AUTO-ENTMCNC: 32.1 G/DL (ref 31.5–35.7)
MCV RBC AUTO: 85.4 FL (ref 79–97)
MODALITY: ABNORMAL
MONOCYTES # BLD AUTO: 0.69 10*3/MM3 (ref 0.1–0.9)
MONOCYTES NFR BLD AUTO: 7.2 % (ref 5–12)
NEUTROPHILS NFR BLD AUTO: 5.23 10*3/MM3 (ref 1.7–7)
NEUTROPHILS NFR BLD AUTO: 54.8 % (ref 42.7–76)
NRBC BLD AUTO-RTO: 0 /100 WBC (ref 0–0.2)
PCO2 BLDA: 46.4 MM HG (ref 35–45)
PH BLDA: 7.44 PH UNITS (ref 7.35–7.45)
PLATELET # BLD AUTO: 231 10*3/MM3 (ref 140–450)
PMV BLD AUTO: 9 FL (ref 6–12)
PO2 BLDA: 70.1 MM HG (ref 83–108)
POTASSIUM SERPL-SCNC: 3.6 MMOL/L (ref 3.5–5.2)
PROT SERPL-MCNC: 6.9 G/DL (ref 6–8.5)
PROTHROMBIN TIME: 13.6 SECONDS (ref 11.5–13.4)
RBC # BLD AUTO: 4.45 10*6/MM3 (ref 4.14–5.8)
SODIUM SERPL-SCNC: 143 MMOL/L (ref 136–145)
WBC # BLD AUTO: 9.56 10*3/MM3 (ref 3.4–10.8)

## 2021-04-23 PROCEDURE — 36415 COLL VENOUS BLD VENIPUNCTURE: CPT

## 2021-04-23 PROCEDURE — 99204 OFFICE O/P NEW MOD 45 MIN: CPT | Performed by: THORACIC SURGERY (CARDIOTHORACIC VASCULAR SURGERY)

## 2021-04-23 PROCEDURE — 93010 ELECTROCARDIOGRAM REPORT: CPT | Performed by: INTERNAL MEDICINE

## 2021-04-23 PROCEDURE — 82803 BLOOD GASES ANY COMBINATION: CPT

## 2021-04-23 PROCEDURE — 85610 PROTHROMBIN TIME: CPT

## 2021-04-23 PROCEDURE — 93005 ELECTROCARDIOGRAM TRACING: CPT

## 2021-04-23 PROCEDURE — 85730 THROMBOPLASTIN TIME PARTIAL: CPT

## 2021-04-23 PROCEDURE — 85025 COMPLETE CBC W/AUTO DIFF WBC: CPT

## 2021-04-23 PROCEDURE — 36600 WITHDRAWAL OF ARTERIAL BLOOD: CPT

## 2021-04-23 PROCEDURE — 80053 COMPREHEN METABOLIC PANEL: CPT

## 2021-04-23 RX ORDER — SODIUM CHLORIDE 0.9 % (FLUSH) 0.9 %
10 SYRINGE (ML) INJECTION AS NEEDED
Status: CANCELLED | OUTPATIENT
Start: 2021-04-23

## 2021-04-23 RX ORDER — SODIUM CHLORIDE 0.9 % (FLUSH) 0.9 %
3 SYRINGE (ML) INJECTION EVERY 12 HOURS SCHEDULED
Status: CANCELLED | OUTPATIENT
Start: 2021-04-23

## 2021-04-24 ENCOUNTER — LAB (OUTPATIENT)
Dept: LAB | Facility: HOSPITAL | Age: 76
End: 2021-04-24

## 2021-04-24 LAB — SARS-COV-2 ORF1AB RESP QL NAA+PROBE: NOT DETECTED

## 2021-04-24 PROCEDURE — C9803 HOPD COVID-19 SPEC COLLECT: HCPCS | Performed by: THORACIC SURGERY (CARDIOTHORACIC VASCULAR SURGERY)

## 2021-04-24 PROCEDURE — U0005 INFEC AGEN DETEC AMPLI PROBE: HCPCS | Performed by: THORACIC SURGERY (CARDIOTHORACIC VASCULAR SURGERY)

## 2021-04-24 PROCEDURE — U0004 COV-19 TEST NON-CDC HGH THRU: HCPCS | Performed by: THORACIC SURGERY (CARDIOTHORACIC VASCULAR SURGERY)

## 2021-04-25 PROCEDURE — 93296 REM INTERROG EVL PM/IDS: CPT | Performed by: INTERNAL MEDICINE

## 2021-04-25 PROCEDURE — 93294 REM INTERROG EVL PM/LDLS PM: CPT | Performed by: INTERNAL MEDICINE

## 2021-04-26 LAB
QT INTERVAL: 424 MS
QTC INTERVAL: 454 MS

## 2021-04-27 ENCOUNTER — APPOINTMENT (OUTPATIENT)
Dept: GENERAL RADIOLOGY | Facility: HOSPITAL | Age: 76
End: 2021-04-27

## 2021-04-27 ENCOUNTER — HOSPITAL ENCOUNTER (INPATIENT)
Facility: HOSPITAL | Age: 76
LOS: 7 days | Discharge: HOME | End: 2021-05-04
Attending: THORACIC SURGERY (CARDIOTHORACIC VASCULAR SURGERY) | Admitting: THORACIC SURGERY (CARDIOTHORACIC VASCULAR SURGERY)

## 2021-04-27 ENCOUNTER — ANESTHESIA EVENT (OUTPATIENT)
Dept: PERIOP | Facility: HOSPITAL | Age: 76
End: 2021-04-27

## 2021-04-27 ENCOUNTER — ANESTHESIA (OUTPATIENT)
Dept: PERIOP | Facility: HOSPITAL | Age: 76
End: 2021-04-27

## 2021-04-27 DIAGNOSIS — J90 RECURRENT PLEURAL EFFUSION ON RIGHT: ICD-10-CM

## 2021-04-27 DIAGNOSIS — J86.9 EMPYEMA (HCC): Primary | ICD-10-CM

## 2021-04-27 LAB
A-A DO2: 22.5 MMHG
ABO GROUP BLD: NORMAL
ANION GAP SERPL CALCULATED.3IONS-SCNC: 7 MMOL/L (ref 5–15)
ARTERIAL PATENCY WRIST A: ABNORMAL
ATMOSPHERIC PRESS: 750 MMHG
BASE EXCESS BLDA CALC-SCNC: 5 MMOL/L (ref 0–2)
BDY SITE: ABNORMAL
BLD GP AB SCN SERPL QL: NEGATIVE
BODY TEMPERATURE: 37 C
BUN SERPL-MCNC: 17 MG/DL (ref 8–23)
BUN/CREAT SERPL: 25.8 (ref 7–25)
CA-I BLD-MCNC: 4.76 MG/DL (ref 4.6–5.4)
CALCIUM SPEC-SCNC: 8.6 MG/DL (ref 8.6–10.5)
CHLORIDE SERPL-SCNC: 106 MMOL/L (ref 98–107)
CO2 SERPL-SCNC: 27 MMOL/L (ref 22–29)
COHGB MFR BLD: 1.2 % (ref 0–5)
CREAT SERPL-MCNC: 0.66 MG/DL (ref 0.76–1.27)
DEPRECATED RDW RBC AUTO: 46.6 FL (ref 37–54)
ERYTHROCYTE [DISTWIDTH] IN BLOOD BY AUTOMATED COUNT: 15.1 % (ref 12.3–15.4)
GFR SERPL CREATININE-BSD FRML MDRD: 118 ML/MIN/1.73
GLUCOSE SERPL-MCNC: 158 MG/DL (ref 65–99)
HCO3 BLDA-SCNC: 30.9 MMOL/L (ref 20–26)
HCT VFR BLD AUTO: 38 % (ref 37.5–51)
HCT VFR BLD CALC: 38.1 % (ref 38–51)
HGB BLD-MCNC: 12.4 G/DL (ref 13–17.7)
HGB BLDA-MCNC: 12.4 G/DL (ref 14–18)
Lab: ABNORMAL
MCH RBC QN AUTO: 27.8 PG (ref 26.6–33)
MCHC RBC AUTO-ENTMCNC: 32.6 G/DL (ref 31.5–35.7)
MCV RBC AUTO: 85.2 FL (ref 79–97)
METHGB BLD QL: 1.1 % (ref 0–3)
MODALITY: ABNORMAL
NOTE: ABNORMAL
OXYHGB MFR BLDV: 92 % (ref 94–99)
PCO2 BLDA: 50.1 MM HG (ref 35–45)
PCO2 TEMP ADJ BLD: 50.1 MM HG (ref 35–45)
PH BLDA: 7.4 PH UNITS (ref 7.35–7.45)
PH, TEMP CORRECTED: 7.4 PH UNITS (ref 7.35–7.45)
PLATELET # BLD AUTO: 189 10*3/MM3 (ref 140–450)
PMV BLD AUTO: 8.9 FL (ref 6–12)
PO2 BLDA: 68.5 MM HG (ref 83–108)
PO2 TEMP ADJ BLD: 68.5 MM HG (ref 83–108)
POTASSIUM BLDA-SCNC: 3.8 MMOL/L (ref 3.5–5.2)
POTASSIUM SERPL-SCNC: 4.2 MMOL/L (ref 3.5–5.2)
RBC # BLD AUTO: 4.46 10*6/MM3 (ref 4.14–5.8)
RH BLD: POSITIVE
SAO2 % BLDCOA: 94.2 % (ref 94–99)
SODIUM BLDA-SCNC: 143 MMOL/L (ref 136–145)
SODIUM SERPL-SCNC: 140 MMOL/L (ref 136–145)
T&S EXPIRATION DATE: NORMAL
VENTILATOR MODE: AC
WBC # BLD AUTO: 11.66 10*3/MM3 (ref 3.4–10.8)

## 2021-04-27 PROCEDURE — 31622 DX BRONCHOSCOPE/WASH: CPT | Performed by: THORACIC SURGERY (CARDIOTHORACIC VASCULAR SURGERY)

## 2021-04-27 PROCEDURE — 0BNC4ZZ RELEASE RIGHT UPPER LUNG LOBE, PERCUTANEOUS ENDOSCOPIC APPROACH: ICD-10-PCS | Performed by: THORACIC SURGERY (CARDIOTHORACIC VASCULAR SURGERY)

## 2021-04-27 PROCEDURE — 25010000002 PHENYLEPHRINE HCL 0.8 MG/10ML SOLUTION PREFILLED SYRINGE: Performed by: NURSE ANESTHETIST, CERTIFIED REGISTERED

## 2021-04-27 PROCEDURE — 80048 BASIC METABOLIC PNL TOTAL CA: CPT | Performed by: THORACIC SURGERY (CARDIOTHORACIC VASCULAR SURGERY)

## 2021-04-27 PROCEDURE — 83050 HGB METHEMOGLOBIN QUAN: CPT

## 2021-04-27 PROCEDURE — 94799 UNLISTED PULMONARY SVC/PX: CPT

## 2021-04-27 PROCEDURE — 86900 BLOOD TYPING SEROLOGIC ABO: CPT | Performed by: NURSE PRACTITIONER

## 2021-04-27 PROCEDURE — 86901 BLOOD TYPING SEROLOGIC RH(D): CPT | Performed by: NURSE PRACTITIONER

## 2021-04-27 PROCEDURE — 87205 SMEAR GRAM STAIN: CPT | Performed by: THORACIC SURGERY (CARDIOTHORACIC VASCULAR SURGERY)

## 2021-04-27 PROCEDURE — 25010000002 MIDAZOLAM PER 1 MG: Performed by: ANESTHESIOLOGY

## 2021-04-27 PROCEDURE — 87070 CULTURE OTHR SPECIMN AEROBIC: CPT | Performed by: THORACIC SURGERY (CARDIOTHORACIC VASCULAR SURGERY)

## 2021-04-27 PROCEDURE — 25010000002 FUROSEMIDE PER 20 MG: Performed by: ANESTHESIOLOGY

## 2021-04-27 PROCEDURE — 32652 THORACOSCOPY REM TOTL CORTEX: CPT | Performed by: THORACIC SURGERY (CARDIOTHORACIC VASCULAR SURGERY)

## 2021-04-27 PROCEDURE — 82375 ASSAY CARBOXYHB QUANT: CPT

## 2021-04-27 PROCEDURE — 71045 X-RAY EXAM CHEST 1 VIEW: CPT

## 2021-04-27 PROCEDURE — 25010000002 CEFAZOLIN PER 500 MG: Performed by: THORACIC SURGERY (CARDIOTHORACIC VASCULAR SURGERY)

## 2021-04-27 PROCEDURE — 25010000003 CEFAZOLIN PER 500 MG: Performed by: NURSE PRACTITIONER

## 2021-04-27 PROCEDURE — 82805 BLOOD GASES W/O2 SATURATION: CPT

## 2021-04-27 PROCEDURE — 88305 TISSUE EXAM BY PATHOLOGIST: CPT | Performed by: THORACIC SURGERY (CARDIOTHORACIC VASCULAR SURGERY)

## 2021-04-27 PROCEDURE — 25010000002 PROPOFOL 10 MG/ML EMULSION: Performed by: NURSE ANESTHETIST, CERTIFIED REGISTERED

## 2021-04-27 PROCEDURE — 25010000002 DEXAMETHASONE PER 1 MG: Performed by: ANESTHESIOLOGY

## 2021-04-27 PROCEDURE — 94640 AIRWAY INHALATION TREATMENT: CPT

## 2021-04-27 PROCEDURE — 0BJ08ZZ INSPECTION OF TRACHEOBRONCHIAL TREE, VIA NATURAL OR ARTIFICIAL OPENING ENDOSCOPIC: ICD-10-PCS | Performed by: THORACIC SURGERY (CARDIOTHORACIC VASCULAR SURGERY)

## 2021-04-27 PROCEDURE — 86850 RBC ANTIBODY SCREEN: CPT | Performed by: NURSE PRACTITIONER

## 2021-04-27 PROCEDURE — 87075 CULTR BACTERIA EXCEPT BLOOD: CPT

## 2021-04-27 PROCEDURE — 25010000003 HYDROMORPHONE PER 4 MG: Performed by: THORACIC SURGERY (CARDIOTHORACIC VASCULAR SURGERY)

## 2021-04-27 PROCEDURE — 0BNF4ZZ RELEASE RIGHT LOWER LUNG LOBE, PERCUTANEOUS ENDOSCOPIC APPROACH: ICD-10-PCS | Performed by: THORACIC SURGERY (CARDIOTHORACIC VASCULAR SURGERY)

## 2021-04-27 PROCEDURE — 25010000002 HYDROMORPHONE PER 4 MG: Performed by: ANESTHESIOLOGY

## 2021-04-27 PROCEDURE — 25010000002 ONDANSETRON PER 1 MG: Performed by: ANESTHESIOLOGY

## 2021-04-27 PROCEDURE — 85027 COMPLETE CBC AUTOMATED: CPT | Performed by: THORACIC SURGERY (CARDIOTHORACIC VASCULAR SURGERY)

## 2021-04-27 PROCEDURE — 94660 CPAP INITIATION&MGMT: CPT

## 2021-04-27 RX ORDER — MIDAZOLAM HYDROCHLORIDE 1 MG/ML
1 INJECTION INTRAMUSCULAR; INTRAVENOUS
Status: COMPLETED | OUTPATIENT
Start: 2021-04-27 | End: 2021-04-27

## 2021-04-27 RX ORDER — SODIUM CHLORIDE 9 MG/ML
20 INJECTION, SOLUTION INTRAVENOUS CONTINUOUS
Status: DISCONTINUED | OUTPATIENT
Start: 2021-04-27 | End: 2021-04-29

## 2021-04-27 RX ORDER — SODIUM CHLORIDE 0.9 % (FLUSH) 0.9 %
10 SYRINGE (ML) INJECTION AS NEEDED
Status: DISCONTINUED | OUTPATIENT
Start: 2021-04-27 | End: 2021-04-27 | Stop reason: HOSPADM

## 2021-04-27 RX ORDER — BUPIVACAINE HCL/0.9 % NACL/PF 0.1 %
2 PLASTIC BAG, INJECTION (ML) EPIDURAL EVERY 8 HOURS
Status: COMPLETED | OUTPATIENT
Start: 2021-04-27 | End: 2021-04-28

## 2021-04-27 RX ORDER — SODIUM CHLORIDE, SODIUM LACTATE, POTASSIUM CHLORIDE, CALCIUM CHLORIDE 600; 310; 30; 20 MG/100ML; MG/100ML; MG/100ML; MG/100ML
1000 INJECTION, SOLUTION INTRAVENOUS CONTINUOUS
Status: DISCONTINUED | OUTPATIENT
Start: 2021-04-27 | End: 2021-04-27 | Stop reason: HOSPADM

## 2021-04-27 RX ORDER — SODIUM CHLORIDE 0.9 % (FLUSH) 0.9 %
3-10 SYRINGE (ML) INJECTION AS NEEDED
Status: DISCONTINUED | OUTPATIENT
Start: 2021-04-27 | End: 2021-04-27 | Stop reason: HOSPADM

## 2021-04-27 RX ORDER — DOCUSATE SODIUM 100 MG/1
100 CAPSULE, LIQUID FILLED ORAL 2 TIMES DAILY
Status: DISCONTINUED | OUTPATIENT
Start: 2021-04-27 | End: 2021-05-04 | Stop reason: HOSPADM

## 2021-04-27 RX ORDER — ESCITALOPRAM OXALATE 10 MG/1
20 TABLET ORAL DAILY
Status: DISCONTINUED | OUTPATIENT
Start: 2021-04-28 | End: 2021-05-04 | Stop reason: HOSPADM

## 2021-04-27 RX ORDER — SODIUM CHLORIDE 0.9 % (FLUSH) 0.9 %
3 SYRINGE (ML) INJECTION EVERY 12 HOURS SCHEDULED
Status: DISCONTINUED | OUTPATIENT
Start: 2021-04-27 | End: 2021-04-27 | Stop reason: HOSPADM

## 2021-04-27 RX ORDER — FENTANYL CITRATE 50 UG/ML
25 INJECTION, SOLUTION INTRAMUSCULAR; INTRAVENOUS
Status: DISCONTINUED | OUTPATIENT
Start: 2021-04-27 | End: 2021-04-27 | Stop reason: HOSPADM

## 2021-04-27 RX ORDER — PHENYLEPHRINE HCL IN 0.9% NACL 0.8MG/10ML
SYRINGE (ML) INTRAVENOUS AS NEEDED
Status: DISCONTINUED | OUTPATIENT
Start: 2021-04-27 | End: 2021-04-27 | Stop reason: SURG

## 2021-04-27 RX ORDER — ROCURONIUM BROMIDE 10 MG/ML
INJECTION, SOLUTION INTRAVENOUS AS NEEDED
Status: DISCONTINUED | OUTPATIENT
Start: 2021-04-27 | End: 2021-04-27 | Stop reason: SURG

## 2021-04-27 RX ORDER — MAGNESIUM HYDROXIDE 1200 MG/15ML
LIQUID ORAL AS NEEDED
Status: DISCONTINUED | OUTPATIENT
Start: 2021-04-27 | End: 2021-04-27 | Stop reason: HOSPADM

## 2021-04-27 RX ORDER — SUFENTANIL CITRATE 50 UG/ML
INJECTION EPIDURAL; INTRAVENOUS AS NEEDED
Status: DISCONTINUED | OUTPATIENT
Start: 2021-04-27 | End: 2021-04-27 | Stop reason: SURG

## 2021-04-27 RX ORDER — FLUMAZENIL 0.1 MG/ML
0.2 INJECTION INTRAVENOUS AS NEEDED
Status: DISCONTINUED | OUTPATIENT
Start: 2021-04-27 | End: 2021-04-27 | Stop reason: HOSPADM

## 2021-04-27 RX ORDER — IPRATROPIUM BROMIDE AND ALBUTEROL SULFATE 2.5; .5 MG/3ML; MG/3ML
3 SOLUTION RESPIRATORY (INHALATION)
Status: DISPENSED | OUTPATIENT
Start: 2021-04-27 | End: 2021-04-30

## 2021-04-27 RX ORDER — DEXAMETHASONE SODIUM PHOSPHATE 4 MG/ML
4 INJECTION, SOLUTION INTRA-ARTICULAR; INTRALESIONAL; INTRAMUSCULAR; INTRAVENOUS; SOFT TISSUE ONCE AS NEEDED
Status: COMPLETED | OUTPATIENT
Start: 2021-04-27 | End: 2021-04-27

## 2021-04-27 RX ORDER — LIDOCAINE HYDROCHLORIDE 20 MG/ML
INJECTION, SOLUTION EPIDURAL; INFILTRATION; INTRACAUDAL; PERINEURAL AS NEEDED
Status: DISCONTINUED | OUTPATIENT
Start: 2021-04-27 | End: 2021-04-27 | Stop reason: SURG

## 2021-04-27 RX ORDER — ATORVASTATIN CALCIUM 40 MG/1
80 TABLET, FILM COATED ORAL NIGHTLY
Status: DISCONTINUED | OUTPATIENT
Start: 2021-04-27 | End: 2021-05-04 | Stop reason: HOSPADM

## 2021-04-27 RX ORDER — ONDANSETRON 2 MG/ML
4 INJECTION INTRAMUSCULAR; INTRAVENOUS EVERY 6 HOURS PRN
Status: DISCONTINUED | OUTPATIENT
Start: 2021-04-27 | End: 2021-05-04 | Stop reason: HOSPADM

## 2021-04-27 RX ORDER — LISINOPRIL 5 MG/1
5 TABLET ORAL DAILY
Status: DISCONTINUED | OUTPATIENT
Start: 2021-04-27 | End: 2021-05-04 | Stop reason: HOSPADM

## 2021-04-27 RX ORDER — DONEPEZIL HYDROCHLORIDE 5 MG/1
5 TABLET, FILM COATED ORAL NIGHTLY
Status: DISCONTINUED | OUTPATIENT
Start: 2021-04-27 | End: 2021-05-04 | Stop reason: HOSPADM

## 2021-04-27 RX ORDER — SUCCINYLCHOLINE CHLORIDE 20 MG/ML
INJECTION INTRAMUSCULAR; INTRAVENOUS AS NEEDED
Status: DISCONTINUED | OUTPATIENT
Start: 2021-04-27 | End: 2021-04-27

## 2021-04-27 RX ORDER — MIDAZOLAM HYDROCHLORIDE 1 MG/ML
0.5 INJECTION INTRAMUSCULAR; INTRAVENOUS
Status: COMPLETED | OUTPATIENT
Start: 2021-04-27 | End: 2021-04-27

## 2021-04-27 RX ORDER — ONDANSETRON 4 MG/1
4 TABLET, FILM COATED ORAL EVERY 6 HOURS PRN
Status: DISCONTINUED | OUTPATIENT
Start: 2021-04-27 | End: 2021-05-04 | Stop reason: HOSPADM

## 2021-04-27 RX ORDER — ALPRAZOLAM 0.25 MG/1
0.25 TABLET ORAL EVERY 8 HOURS SCHEDULED
Status: DISCONTINUED | OUTPATIENT
Start: 2021-04-27 | End: 2021-05-04 | Stop reason: HOSPADM

## 2021-04-27 RX ORDER — HYDROMORPHONE HYDROCHLORIDE 1 MG/ML
0.5 INJECTION, SOLUTION INTRAMUSCULAR; INTRAVENOUS; SUBCUTANEOUS
Status: DISCONTINUED | OUTPATIENT
Start: 2021-04-27 | End: 2021-04-27 | Stop reason: HOSPADM

## 2021-04-27 RX ORDER — LABETALOL HYDROCHLORIDE 5 MG/ML
5 INJECTION, SOLUTION INTRAVENOUS
Status: DISCONTINUED | OUTPATIENT
Start: 2021-04-27 | End: 2021-04-27 | Stop reason: HOSPADM

## 2021-04-27 RX ORDER — SODIUM CHLORIDE, SODIUM LACTATE, POTASSIUM CHLORIDE, CALCIUM CHLORIDE 600; 310; 30; 20 MG/100ML; MG/100ML; MG/100ML; MG/100ML
100 INJECTION, SOLUTION INTRAVENOUS CONTINUOUS
Status: DISCONTINUED | OUTPATIENT
Start: 2021-04-27 | End: 2021-04-27 | Stop reason: HOSPADM

## 2021-04-27 RX ORDER — OXYCODONE HYDROCHLORIDE AND ACETAMINOPHEN 5; 325 MG/1; MG/1
1 TABLET ORAL
Status: DISCONTINUED | OUTPATIENT
Start: 2021-04-27 | End: 2021-05-04 | Stop reason: HOSPADM

## 2021-04-27 RX ORDER — EPHEDRINE SULFATE 50 MG/ML
INJECTION, SOLUTION INTRAVENOUS AS NEEDED
Status: DISCONTINUED | OUTPATIENT
Start: 2021-04-27 | End: 2021-04-27 | Stop reason: SURG

## 2021-04-27 RX ORDER — PREGABALIN 75 MG/1
150 CAPSULE ORAL EVERY 8 HOURS SCHEDULED
Status: DISCONTINUED | OUTPATIENT
Start: 2021-04-27 | End: 2021-05-04 | Stop reason: HOSPADM

## 2021-04-27 RX ORDER — ISOSORBIDE MONONITRATE 30 MG/1
30 TABLET, EXTENDED RELEASE ORAL DAILY
Status: DISCONTINUED | OUTPATIENT
Start: 2021-04-27 | End: 2021-05-04 | Stop reason: HOSPADM

## 2021-04-27 RX ORDER — OXYCODONE AND ACETAMINOPHEN 10; 325 MG/1; MG/1
1 TABLET ORAL ONCE AS NEEDED
Status: COMPLETED | OUTPATIENT
Start: 2021-04-27 | End: 2021-04-27

## 2021-04-27 RX ORDER — PANTOPRAZOLE SODIUM 40 MG/1
40 TABLET, DELAYED RELEASE ORAL
Status: DISCONTINUED | OUTPATIENT
Start: 2021-04-28 | End: 2021-05-04 | Stop reason: HOSPADM

## 2021-04-27 RX ORDER — FUROSEMIDE 10 MG/ML
20 INJECTION INTRAMUSCULAR; INTRAVENOUS ONCE
Status: COMPLETED | OUTPATIENT
Start: 2021-04-27 | End: 2021-04-27

## 2021-04-27 RX ORDER — SODIUM CHLORIDE 0.9 % (FLUSH) 0.9 %
3 SYRINGE (ML) INJECTION AS NEEDED
Status: DISCONTINUED | OUTPATIENT
Start: 2021-04-27 | End: 2021-04-27 | Stop reason: HOSPADM

## 2021-04-27 RX ORDER — NALOXONE HCL 0.4 MG/ML
0.1 VIAL (ML) INJECTION
Status: DISCONTINUED | OUTPATIENT
Start: 2021-04-27 | End: 2021-05-04 | Stop reason: HOSPADM

## 2021-04-27 RX ORDER — ACETAMINOPHEN 500 MG
1000 TABLET ORAL ONCE
Status: COMPLETED | OUTPATIENT
Start: 2021-04-27 | End: 2021-04-27

## 2021-04-27 RX ORDER — LIDOCAINE HYDROCHLORIDE 10 MG/ML
0.5 INJECTION, SOLUTION EPIDURAL; INFILTRATION; INTRACAUDAL; PERINEURAL ONCE AS NEEDED
Status: DISCONTINUED | OUTPATIENT
Start: 2021-04-27 | End: 2021-04-27 | Stop reason: HOSPADM

## 2021-04-27 RX ORDER — PROPOFOL 10 MG/ML
VIAL (ML) INTRAVENOUS AS NEEDED
Status: DISCONTINUED | OUTPATIENT
Start: 2021-04-27 | End: 2021-04-27 | Stop reason: SURG

## 2021-04-27 RX ORDER — BISACODYL 10 MG
10 SUPPOSITORY, RECTAL RECTAL DAILY PRN
Status: DISCONTINUED | OUTPATIENT
Start: 2021-04-27 | End: 2021-05-04 | Stop reason: HOSPADM

## 2021-04-27 RX ORDER — ONDANSETRON 2 MG/ML
4 INJECTION INTRAMUSCULAR; INTRAVENOUS AS NEEDED
Status: DISCONTINUED | OUTPATIENT
Start: 2021-04-27 | End: 2021-04-27 | Stop reason: HOSPADM

## 2021-04-27 RX ORDER — POLYETHYLENE GLYCOL 3350 17 G/17G
17 POWDER, FOR SOLUTION ORAL DAILY
Status: DISCONTINUED | OUTPATIENT
Start: 2021-04-27 | End: 2021-05-04 | Stop reason: HOSPADM

## 2021-04-27 RX ORDER — ACETAMINOPHEN 325 MG/1
650 TABLET ORAL EVERY 4 HOURS PRN
Status: DISCONTINUED | OUTPATIENT
Start: 2021-04-27 | End: 2021-05-04 | Stop reason: HOSPADM

## 2021-04-27 RX ORDER — ACETYLCYSTEINE 200 MG/ML
3 SOLUTION ORAL; RESPIRATORY (INHALATION)
Status: DISCONTINUED | OUTPATIENT
Start: 2021-04-27 | End: 2021-04-28

## 2021-04-27 RX ORDER — NALOXONE HCL 0.4 MG/ML
0.04 VIAL (ML) INJECTION AS NEEDED
Status: DISCONTINUED | OUTPATIENT
Start: 2021-04-27 | End: 2021-04-27 | Stop reason: HOSPADM

## 2021-04-27 RX ADMIN — HYDROMORPHONE HYDROCHLORIDE 0.5 MG: 1 INJECTION, SOLUTION INTRAMUSCULAR; INTRAVENOUS; SUBCUTANEOUS at 13:30

## 2021-04-27 RX ADMIN — SODIUM CHLORIDE, POTASSIUM CHLORIDE, SODIUM LACTATE AND CALCIUM CHLORIDE 1000 ML: 600; 310; 30; 20 INJECTION, SOLUTION INTRAVENOUS at 05:59

## 2021-04-27 RX ADMIN — EPHEDRINE SULFATE 10 MG: 50 INJECTION INTRAVENOUS at 09:54

## 2021-04-27 RX ADMIN — ATORVASTATIN CALCIUM 80 MG: 40 TABLET, FILM COATED ORAL at 21:30

## 2021-04-27 RX ADMIN — ACETAMINOPHEN 1000 MG: 500 TABLET, FILM COATED ORAL at 06:43

## 2021-04-27 RX ADMIN — CEFAZOLIN 2 G: 1 INJECTION, POWDER, FOR SOLUTION INTRAVENOUS at 07:58

## 2021-04-27 RX ADMIN — ISOSORBIDE MONONITRATE 30 MG: 30 TABLET, EXTENDED RELEASE ORAL at 16:44

## 2021-04-27 RX ADMIN — DOCUSATE SODIUM 100 MG: 100 CAPSULE ORAL at 21:30

## 2021-04-27 RX ADMIN — MIDAZOLAM 1 MG: 1 INJECTION INTRAMUSCULAR; INTRAVENOUS at 06:44

## 2021-04-27 RX ADMIN — MIDAZOLAM 1 MG: 1 INJECTION INTRAMUSCULAR; INTRAVENOUS at 07:00

## 2021-04-27 RX ADMIN — OXYCODONE HYDROCHLORIDE AND ACETAMINOPHEN 1 TABLET: 10; 325 TABLET ORAL at 14:51

## 2021-04-27 RX ADMIN — FUROSEMIDE 20 MG: 10 INJECTION, SOLUTION INTRAVENOUS at 12:30

## 2021-04-27 RX ADMIN — Medication 160 MCG: at 09:54

## 2021-04-27 RX ADMIN — ROCURONIUM BROMIDE 30 MG: 10 INJECTION INTRAVENOUS at 08:20

## 2021-04-27 RX ADMIN — VASOPRESSIN 0.5 ML: 20 INJECTION INTRAVENOUS at 07:58

## 2021-04-27 RX ADMIN — ALPRAZOLAM 0.25 MG: 0.25 TABLET ORAL at 16:44

## 2021-04-27 RX ADMIN — ACETYLCYSTEINE 3 ML: 200 SOLUTION ORAL; RESPIRATORY (INHALATION) at 22:21

## 2021-04-27 RX ADMIN — HYDROMORPHONE HYDROCHLORIDE: 10 INJECTION, SOLUTION INTRAMUSCULAR; INTRAVENOUS; SUBCUTANEOUS at 16:27

## 2021-04-27 RX ADMIN — CEFAZOLIN SODIUM 2 G: 10 INJECTION, POWDER, FOR SOLUTION INTRAVENOUS at 16:27

## 2021-04-27 RX ADMIN — METOPROLOL TARTRATE 25 MG: 25 TABLET, FILM COATED ORAL at 21:30

## 2021-04-27 RX ADMIN — SUFENTANIL CITRATE 10 MCG: 50 INJECTION EPIDURAL; INTRAVENOUS at 09:59

## 2021-04-27 RX ADMIN — PREGABALIN 150 MG: 75 CAPSULE ORAL at 16:44

## 2021-04-27 RX ADMIN — ROCURONIUM BROMIDE 20 MG: 10 INJECTION INTRAVENOUS at 10:35

## 2021-04-27 RX ADMIN — LIDOCAINE HYDROCHLORIDE 100 MG: 20 INJECTION, SOLUTION EPIDURAL; INFILTRATION; INTRACAUDAL; PERINEURAL at 07:45

## 2021-04-27 RX ADMIN — ROCURONIUM BROMIDE 50 MG: 10 INJECTION INTRAVENOUS at 07:45

## 2021-04-27 RX ADMIN — ALPRAZOLAM 0.25 MG: 0.25 TABLET ORAL at 21:34

## 2021-04-27 RX ADMIN — SUGAMMADEX 200 MG: 100 INJECTION, SOLUTION INTRAVENOUS at 11:49

## 2021-04-27 RX ADMIN — DONEPEZIL HYDROCHLORIDE 5 MG: 5 TABLET, FILM COATED ORAL at 21:30

## 2021-04-27 RX ADMIN — SODIUM CHLORIDE 80 ML/HR: 9 INJECTION, SOLUTION INTRAVENOUS at 16:27

## 2021-04-27 RX ADMIN — SUFENTANIL CITRATE 20 MCG: 50 INJECTION EPIDURAL; INTRAVENOUS at 09:02

## 2021-04-27 RX ADMIN — PROPOFOL 150 MG: 10 INJECTION, EMULSION INTRAVENOUS at 07:45

## 2021-04-27 RX ADMIN — ROCURONIUM BROMIDE 20 MG: 10 INJECTION INTRAVENOUS at 09:07

## 2021-04-27 RX ADMIN — SUFENTANIL CITRATE 20 MCG: 50 INJECTION EPIDURAL; INTRAVENOUS at 07:45

## 2021-04-27 RX ADMIN — DEXAMETHASONE SODIUM PHOSPHATE 4 MG: 4 INJECTION, SOLUTION INTRA-ARTICULAR; INTRALESIONAL; INTRAMUSCULAR; INTRAVENOUS; SOFT TISSUE at 06:44

## 2021-04-27 RX ADMIN — IPRATROPIUM BROMIDE AND ALBUTEROL SULFATE 3 ML: 2.5; .5 SOLUTION RESPIRATORY (INHALATION) at 22:21

## 2021-04-27 RX ADMIN — EPHEDRINE SULFATE 10 MG: 50 INJECTION INTRAVENOUS at 10:57

## 2021-04-27 RX ADMIN — ONDANSETRON HYDROCHLORIDE 4 MG: 2 SOLUTION INTRAMUSCULAR; INTRAVENOUS at 13:31

## 2021-04-27 NOTE — ANESTHESIA PROCEDURE NOTES
Airway  Urgency: elective    Date/Time: 4/27/2021 7:48 AM  Airway not difficult    General Information and Staff    Patient location during procedure: OR  CRNA: Fam Freedman CRNA    Indications and Patient Condition  Indications for airway management: airway protection    Preoxygenated: yes  Mask difficulty assessment: 1 - vent by mask    Final Airway Details  Final airway type: endotracheal airway      Successful airway: EBT - double lumen left  Cuffed: yes   Successful intubation technique: direct laryngoscopy and fiberoptic  Facilitating devices/methods: intubating stylet  Endotracheal tube insertion site: oral  Blade: Ortiz  Blade size: 2  EBT DL size (fr): 41  Cormack-Lehane Classification: grade I - full view of glottis  Placement verified by: chest auscultation and capnometry   Measured from: teeth  ETT/EBT  to teeth (cm): 29  Number of attempts at approach: 1  Assessment: atraumatic intubation

## 2021-04-27 NOTE — PROGRESS NOTES
Chief Complaint   Patient presents with   • Pleural Effusion     New patient from [facility]         Subjective     History of Present Illness    76 yo male with salient past medical history of previous CABG by me remotely, history of non smoking status, previous PCI also remotely, aspirin like platelet defect, morbid obesity, and known SSS presents with a recurrent and progressive pleural effusion to me.  He was initially seen Dr. Buckley during a hospitalization in March of this year.  He has SOB and cough with the finding of a RML pneumonia.  He was treated with IV ABX and a 5 day course of PO abx.  Dr. Rosen did see and re-prescribed Augmentin for an additional course.  He had continued shortness of breat.  Dr. Buckley has seen him in his clinic and a repeat CT scan of the chest shows a loculated and progressive pleural effusion with resolved RML consolidation.  He still has F/S/C.  Cough is non productive essentially.  NO hemoptysis.  He did not have SOB prior to this most recent event.    He denies known covid exposure.  Not vaccinated.      Review of Systems   Constitutional: Positive for activity change and fatigue. Negative for appetite change, chills, diaphoresis, fever and unexpected weight change.   HENT: Negative for dental problem, hearing loss, nosebleeds, sore throat, trouble swallowing and voice change.    Eyes: Negative for photophobia, redness and visual disturbance.   Respiratory: Positive for cough and shortness of breath. Negative for apnea, chest tightness, wheezing and stridor.    Cardiovascular: Negative for chest pain, palpitations and leg swelling.   Gastrointestinal: Negative for abdominal distention, abdominal pain, blood in stool, constipation, diarrhea, nausea and vomiting.   Endocrine: Negative for cold intolerance, heat intolerance, polyphagia and polyuria.   Genitourinary: Negative for decreased urine volume, difficulty urinating, dysuria, flank pain, frequency, hematuria and  urgency.   Musculoskeletal: Positive for arthralgias. Negative for back pain, gait problem, joint swelling, myalgias and neck pain.   Skin: Negative for pallor, rash and wound.   Allergic/Immunologic: Negative for immunocompromised state.   Neurological: Negative for dizziness, tremors, seizures, syncope, speech difficulty, weakness, light-headedness, numbness and headaches.   Hematological: Bruises/bleeds easily.   Psychiatric/Behavioral: Negative for confusion, sleep disturbance and suicidal ideas. The patient is not nervous/anxious.           Past Medical History:   Diagnosis Date   • Anxiety    • Arthritis    • Cancer (CMS/HCC)     melanoma   • CHF (congestive heart failure) (CMS/HCC)    • Claustrophobia    • Colon polyp    • Coronary artery disease 2012, 2015    2 vessel CABG (2012), SHAHRZAD LAD (2015).    • Depression    • Disease of thyroid gland     partial thyroidectomy on right   • GERD (gastroesophageal reflux disease)    • Hearing loss    • Heart attack (CMS/HCC)    • Heart attack (CMS/HCC)    • History of transfusion    • Hyperlipidemia    • Hypertension    • Kidney stone     history of    • Tobacco abuse, in remission    • Uses hearing aid     BILATERAL     Past Surgical History:   Procedure Laterality Date   • BACK SURGERY      CERVICAL FUSION AND LUMBAR DISC REPLACEMENT   • CARDIAC CATHETERIZATION  07/2015    SHAHRZAD mid Dr. Malu LO   • CARDIAC CATHETERIZATION N/A 10/21/2016    Procedure: Left Heart Cath;  Surgeon: Darian Toribio MD;  Location:  [facility] CATH [facility]IVE [facility];  Service:    • CARDIAC CATHETERIZATION Left 4/5/2017    Procedure: Cardiac Catheterization/Vascular Study;  Surgeon: Darian Toribio MD;  Location:  [facility]ATH [facility];  Service:    • CARDIAC CATHETERIZATION N/A 4/5/2017    Procedure: Left Heart Cath;  Surgeon: Darian Toribio MD;  Location:  [facility];  Service:    • CARDIAC CATHETERIZATION N/A 4/5/2017    Procedure: Coronary angiography;  Surgeon: Darian Toribio  MD;  Location:  [facility];  Service:    • CARDIAC CATHETERIZATION N/A 4/5/2017    Procedure: Left ventriculography;  Surgeon: Darian Toribio MD;  Location:  [facility] CATH [facility];  Service:    • CARDIAC CATHETERIZATION  4/5/2017    Procedure: Saphenous Vein Graft;  Surgeon: Darian Toribio MD;  Location:  [facility] CATH INV[facility];  Service:    • CARDIAC CATHETERIZATION Left 2/27/2018    Procedure: Cardiac Catheterization/Vascular Study SHAHRZAD OK PRN;  Surgeon: Darian Toribio MD;  Location:  [facility];  Service:    • CARDIAC CATHETERIZATION  2/27/2018    Procedure: Functional Flow Salley;  Surgeon: Darian Toribio MD;  Location:  [facility]ATH [facility];  Service:    • CARDIAC CATHETERIZATION N/A 2/27/2018    Procedure: Left ventriculography;  Surgeon: Darian Toribio MD;  Location:  [facility]ATH [facility];  Service:    • CARDIAC CATHETERIZATION Left 4/28/2020    Procedure: Cardiac Catheterization/Vascular Study SHAHRZAD OK PRN;  Surgeon: Darian Toribio MD;  Location: [facility] CATH INVASIVE LOCATION;  Service: Cardiology;  Laterality: Left;   • CARDIAC ELECTROPHYSIOLOGY PROCEDURE N/A 1/11/2018    Procedure: PPM generator change - dual;  Surgeon: Darian Toribio MD;  Location:  [facility] CATH [facility];  Service:    • CARDIAC SURGERY      open heart surgery x2   • COLONOSCOPY  09/05/2007    colon polyps   • COLONOSCOPY N/A 5/29/2018    Procedure: COLONOSCOPY WITH ANESTHESIA;  Surgeon: Juan F Tapia MD;  Location: [facility] ENDOSCOPY;  Service: Gastroenterology   • CORONARY ANGIOPLASTY WITH STENT PLACEMENT  07/2015    SHAHRZAD mid Dr. Malu LO    • CORONARY ARTERY BYPASS GRAFT  2012 AND 2014    2 vessel    • ENDOSCOPY  11/08/2010   • ENDOSCOPY N/A 5/29/2018    Procedure: ESOPHAGOGASTRODUODENOSCOPY WITH ANESTHESIA;  Surgeon: Juan F Tapia MD;  Location: [facility] ENDOSCOPY;  Service: Gastroenterology   • HEAD/NECK LESION/CYST EXCISION Left 12/20/2016    Procedure: EXCISION MALIGNANT  MELANOMA WITH SENTINEL NODE BIOPSY, INJECTION AND SCAN PRE-OP, LEFT EAR;  Surgeon: Guanaco Zepeda MD;  Location:  [facility] OR;  Service:    • HEMORRHOIDECTOMY     • HERNIA REPAIR     • INSERT / REPLACE / REMOVE PACEMAKER     • SENTINEL NODE BIOPSY Left 2016    Procedure: SENTINEL NODE BIOPSY;  Surgeon: Guanaco Zepeda MD;  Location:  [facility] OR;  Service:    • SKIN CANCER EXCISION     • THYROID SURGERY     • THYROIDECTOMY       Family History   Problem Relation Age of Onset   • Heart failure Mother    • Stroke Mother    • Dementia Mother    • Coronary artery disease Father    • Colon polyps Father    • COPD Brother    • Colon cancer Neg Hx      Social History     Tobacco Use   • Smoking status: Former Smoker     Years: 10.00     Types: Cigarettes     Quit date:      Years since quittin.3   • Smokeless tobacco: Former User     Types: Snuff     Quit date:    Vaping Use   • Vaping Use: Never used   Substance Use Topics   • Alcohol use: No   • Drug use: No     No current facility-administered medications for this visit.     No current outpatient medications on file.     Facility-Administered Medications Ordered in Other Visits   Medication Dose Route Frequency Provider Last Rate Last Admin   • ceFAZolin (ANCEF) 2 g in sodium chloride 0.9 % 100 mL IVPB  2 g Intravenous Once Mojgan gAuilar, ANALI       • lactated ringers infusion 1,000 mL  1,000 mL Intravenous Continuous Rico Jacobs MD 25 mL/hr at 21 0559 1,000 mL at 21 0559   • lactated ringers infusion 1,000 mL  1,000 mL Intravenous Continuous Rico Jacobs MD 25 mL/hr at 21 0559 1,000 mL at 21 0559   • lactated ringers infusion  100 mL/hr Intravenous Continuous May Mcgowan MD       • lidocaine PF 1% (XYLOCAINE) injection 0.5 mL  0.5 mL Intradermal Once PRN Rico Jacobs MD       • lidocaine PF 1% (XYLOCAINE) injection 0.5 mL  0.5 mL Injection Once PRN May Mcgowan MD       • sodium  "chloride 0.9 % flush 10 mL  10 mL Intravenous PRN Mojgan Aguilar APRN       • sodium chloride 0.9 % flush 3 mL  3 mL Intravenous Q12H Mojgan Aguilar APRN       • sodium chloride 0.9 % flush 3 mL  3 mL Intravenous PRN Rico Jacobs MD       • sodium chloride 0.9 % flush 3 mL  3 mL Intravenous Q12H May Mcgowan MD       • sodium chloride 0.9 % flush 3-10 mL  3-10 mL Intravenous PRN May Mcgowan MD         Allergies:  Meperidine and Statins    Objective      Vital Signs  Visit Vitals  /72 (BP Location: Left arm, Patient Position: Sitting, Cuff Size: Large Adult)   Pulse 71   Ht 172.7 cm (68\")   Wt 122 kg (269 lb)   SpO2 98%   BMI 40.90 kg/m²         Physical Exam  Constitutional:       Appearance: He is well-developed.   HENT:      Head: Normocephalic and atraumatic.   Eyes:      Pupils: Pupils are equal, round, and reactive to light.   Neck:      Thyroid: No thyromegaly.      Vascular: No JVD.      Trachea: No tracheal deviation.   Cardiovascular:      Rate and Rhythm: Normal rate and regular rhythm.      Heart sounds: Normal heart sounds. No murmur heard.   No friction rub. No gallop.    Pulmonary:      Effort: Pulmonary effort is normal. No respiratory distress.      Breath sounds: No wheezing or rales.      Comments: Decreased on the right  Chest:      Chest wall: No tenderness.   Abdominal:      General: There is no distension.      Palpations: Abdomen is soft.      Tenderness: There is no abdominal tenderness.   Musculoskeletal:         General: Normal range of motion.      Cervical back: Normal range of motion and neck supple.   Lymphadenopathy:      Cervical: No cervical adenopathy.   Skin:     General: Skin is warm and dry.   Neurological:      Mental Status: He is alert and oriented to person, place, and time.      Cranial Nerves: No cranial nerve deficit.         Results Review:   CT Chest Without Contrast Diagnostic    Result Date: 4/15/2021  Narrative: " EXAMINATION:  CT CHEST WO CONTRAST DIAGNOSTIC-  4/15/2021 7:28 AM CDT  HISTORY: Cough, persistent  COMPARISON: 3/20/2021 chest CT angiography and 4/5/2021 chest radiography  TECHNIQUE: Radiation dose equals DLP 4567 mGy-cm.  Automated exposure control dose reduction technique was implemented.  Thin section axial imaging was obtained without intravenous contrast. 2-D sagittal and coronal reconstruction images were generated.  FINDINGS:  There is a small to moderate size RIGHT pleural effusion, layering posteriorly. There is pleural fluid extending into the major fissure superiorly resulting in masslike configuration/fluid/pseudotumor.  There is pleural fluid along the minor fissure.  There is mild probable atelectasis associated with the pleural effusions.  The left lung is clear.  Calcified granuloma appreciated in the left upper lobe.  There are calcified left hilar lymph nodes.  There is no mediastinal hilar lymphadenopathy observed on this nonenhanced exam.  Changes median sternotomy observed. The heart is generous.  Coronary artery calcifications and defined.  Permanent cardiac pacemaker observed.  Limited imaging the upper abdomen demonstrate no acute abnormality.  Bony structures are intact without acute osseous abnormalities.        Impression: 1. Mild to moderate size right pleural effusion with pleural fluid extending into the major fissure resulting in pseudotumor appearance. Minor fissure fluid also observed. Mild atelectasis associated with the effusions. The lung fields are otherwise clear. This report was finalized on 04/15/2021 08:18 by Dr. Shant Sarabia MD.    XR Chest PA & Lateral    Result Date: 4/5/2021  Narrative: EXAM: XR CHEST PA AND LATERAL- - 4/5/2021 11:09 AM CDT  HISTORY: follow up pneumonia; J18.9-Pneumonia, unspecified organism   COMPARISON: 3/23/2021.  TECHNIQUE:  2 images.  Frontal and lateral views of the chest.  FINDINGS:  Cardiac pulse generator at the left chest with 2 grossly  intact leads. Sternotomy wires.  No pneumothorax. Possible small right pleural effusion. Similar right basilar opacity. Left lung appears clear. Cardiac mediastinal silhouette appear within normal limits. No acute bony finding. Degenerative changes in the visualized spine.       Impression: 1. Persistent right lower lung opacity. New small right pleural effusion. This report was finalized on 04/05/2021 11:23 by Dr Debbie Neil MD.     I reviewed the patient's new clinical results.  Discussed with patient and wife      Assessment/Plan       Diagnoses and all orders for this visit:    1. Hx of CABG    (Primary)    2. Recurrent pleural effusion on right    3. Pneumonia of right lung due to infectious organism, unspecified part of lung    4. Non-smoker          I discussed the patients findings and my recommendations with patient and wife.    We discussed the differential diagnoses possible with malignancy low in the differential.  We discussed options for treatment of a pleural effusion including conservative treatment, chest tube drainage, possible IP TPA/DNase, and VATS.  The pros and cons of each option were discussed at length.  I believe the best option for treatment is Right thoracoscopy, bronchoscopy, possible thoracotomy, decortication and any other indicated procedures.  The risks of the procedure were discussed to included but not limited to bleeding, infection, stroke, heart attack, anesthesia risks, need for additional procedures, great vessel injury, injury to nerve with resultant hoarseness of voice, mechanical ventilation, ICU stay, chronic pain syndromes, need for thoracotomy, need for prolonged chest tube use, and/or death.  All questions have been answered to the best of my ability and he is agreeable to the aforementioned plan.  A date for surgery will be selected.  He has stopped his Plavix the day of his office visit with Dr. Buckley.      He is a non smoker      Patient's Body mass index is  40.9 kg/m². BMI is above normal parameters. Recommendations include: exercise counseling, nutrition counseling and referral to primary care.    He is on statin therapy.

## 2021-04-27 NOTE — ANESTHESIA PREPROCEDURE EVALUATION
Anesthesia Evaluation     Patient summary reviewed   no history of anesthetic complications:  NPO Solid Status: > 8 hours  NPO Liquid Status: > 8 hours           Airway   Mallampati: II  TM distance: >3 FB  Neck ROM: full  Dental          Pulmonary    (+) pneumonia , pleural effusion, shortness of breath, sleep apnea (does not use cpap),   (-) COPD, asthma  Cardiovascular     ECG reviewed    (+) pacemaker ([employer]) pacemaker interrogated <1 month ago, hypertension, past MI , CAD, CABG (x2), cardiac stents CHF , hyperlipidemia,   (-) angina      Neuro/Psych  (-) seizures, TIA, CVA  GI/Hepatic/Renal/Endo    (+) morbid obesity, GERD,  renal disease stones,   (-) liver disease, diabetes    Musculoskeletal     Abdominal    Substance History      OB/GYN          Other                        Anesthesia Plan    ASA 3     general   (Arterial line    Pacemaker interrogration post op)  intravenous induction     Anesthetic plan, all risks, benefits, and alternatives have been provided, discussed and informed consent has been obtained with: patient.

## 2021-04-27 NOTE — ANESTHESIA POSTPROCEDURE EVALUATION
Patient: Carlo Velasco    Procedure Summary     Date: 04/27/21 Room / Location: [facility] OR 15 /  [facility] OR    Anesthesia Start: 0736 Anesthesia Stop: 1205    Procedures:       BRONCHOSCOPY (N/A Bronchus)      THORACOSCOPIC TOTAL DECORTICATION (Right Chest) Diagnosis:       Recurrent pleural effusion on right      (Recurrent pleural effusion on right [J90])    Surgeons: Rico Jacobs MD Provider: Fam Freedman CRNA    Anesthesia Type: general ASA Status: 3          Anesthesia Type: general    Vitals  Vitals Value Taken Time   /71 04/27/21 1515   Temp 97.9 °F (36.6 °C) 04/27/21 1515   Pulse 80 04/27/21 1522   Resp 16 04/27/21 1515   SpO2 96 % 04/27/21 1522   Vitals shown include unvalidated device data.        Post Anesthesia Care and Evaluation    Patient location during evaluation: PACU  Patient participation: complete - patient participated  Level of consciousness: awake and alert  Pain management: adequate  Airway patency: patent  Anesthetic complications: No anesthetic complications  PONV Status: none  Cardiovascular status: acceptable and hemodynamically stable  Respiratory status: acceptable  Hydration status: acceptable    Comments: Blood pressure 131/60, pulse 69, temperature 98.3 °F (36.8 °C), temperature source Oral, resp. rate 16, SpO2 93 %.    Patient discharged from PACU based upon Patria score. Please see RN notes for further details

## 2021-04-28 ENCOUNTER — APPOINTMENT (OUTPATIENT)
Dept: GENERAL RADIOLOGY | Facility: HOSPITAL | Age: 76
End: 2021-04-28

## 2021-04-28 LAB
ANION GAP SERPL CALCULATED.3IONS-SCNC: 6 MMOL/L (ref 5–15)
BUN SERPL-MCNC: 17 MG/DL (ref 8–23)
BUN/CREAT SERPL: 28.3 (ref 7–25)
CALCIUM SPEC-SCNC: 8.1 MG/DL (ref 8.6–10.5)
CHLORIDE SERPL-SCNC: 104 MMOL/L (ref 98–107)
CO2 SERPL-SCNC: 29 MMOL/L (ref 22–29)
CREAT SERPL-MCNC: 0.6 MG/DL (ref 0.76–1.27)
CYTO UR: NORMAL
DEPRECATED RDW RBC AUTO: 48.1 FL (ref 37–54)
ERYTHROCYTE [DISTWIDTH] IN BLOOD BY AUTOMATED COUNT: 15.5 % (ref 12.3–15.4)
GFR SERPL CREATININE-BSD FRML MDRD: 132 ML/MIN/1.73
GLUCOSE SERPL-MCNC: 149 MG/DL (ref 65–99)
HCT VFR BLD AUTO: 36.6 % (ref 37.5–51)
HGB BLD-MCNC: 11.6 G/DL (ref 13–17.7)
LAB AP CASE REPORT: NORMAL
MCH RBC QN AUTO: 27.2 PG (ref 26.6–33)
MCHC RBC AUTO-ENTMCNC: 31.7 G/DL (ref 31.5–35.7)
MCV RBC AUTO: 85.7 FL (ref 79–97)
PATH REPORT.FINAL DX SPEC: NORMAL
PATH REPORT.GROSS SPEC: NORMAL
PLATELET # BLD AUTO: 216 10*3/MM3 (ref 140–450)
PMV BLD AUTO: 9.1 FL (ref 6–12)
POTASSIUM SERPL-SCNC: 4.1 MMOL/L (ref 3.5–5.2)
RBC # BLD AUTO: 4.27 10*6/MM3 (ref 4.14–5.8)
SODIUM SERPL-SCNC: 139 MMOL/L (ref 136–145)
WBC # BLD AUTO: 15.33 10*3/MM3 (ref 3.4–10.8)

## 2021-04-28 PROCEDURE — 99024 POSTOP FOLLOW-UP VISIT: CPT | Performed by: NURSE PRACTITIONER

## 2021-04-28 PROCEDURE — 94799 UNLISTED PULMONARY SVC/PX: CPT

## 2021-04-28 PROCEDURE — 25010000002 CEFAZOLIN PER 500 MG: Performed by: THORACIC SURGERY (CARDIOTHORACIC VASCULAR SURGERY)

## 2021-04-28 PROCEDURE — 85027 COMPLETE CBC AUTOMATED: CPT | Performed by: THORACIC SURGERY (CARDIOTHORACIC VASCULAR SURGERY)

## 2021-04-28 PROCEDURE — 25010000002 FUROSEMIDE PER 20 MG: Performed by: NURSE PRACTITIONER

## 2021-04-28 PROCEDURE — 25010000002 ENOXAPARIN PER 10 MG: Performed by: THORACIC SURGERY (CARDIOTHORACIC VASCULAR SURGERY)

## 2021-04-28 PROCEDURE — 80048 BASIC METABOLIC PNL TOTAL CA: CPT | Performed by: THORACIC SURGERY (CARDIOTHORACIC VASCULAR SURGERY)

## 2021-04-28 PROCEDURE — 71045 X-RAY EXAM CHEST 1 VIEW: CPT

## 2021-04-28 RX ORDER — POTASSIUM CHLORIDE 750 MG/1
20 CAPSULE, EXTENDED RELEASE ORAL 2 TIMES DAILY WITH MEALS
Status: DISCONTINUED | OUTPATIENT
Start: 2021-04-28 | End: 2021-05-04 | Stop reason: HOSPADM

## 2021-04-28 RX ORDER — FUROSEMIDE 10 MG/ML
20 INJECTION INTRAMUSCULAR; INTRAVENOUS
Status: DISCONTINUED | OUTPATIENT
Start: 2021-04-28 | End: 2021-05-04 | Stop reason: HOSPADM

## 2021-04-28 RX ADMIN — DOCUSATE SODIUM 100 MG: 100 CAPSULE ORAL at 21:22

## 2021-04-28 RX ADMIN — ESCITALOPRAM 20 MG: 10 TABLET, FILM COATED ORAL at 08:31

## 2021-04-28 RX ADMIN — ENOXAPARIN SODIUM 30 MG: 30 INJECTION SUBCUTANEOUS at 08:31

## 2021-04-28 RX ADMIN — ALPRAZOLAM 0.25 MG: 0.25 TABLET ORAL at 21:22

## 2021-04-28 RX ADMIN — ACETYLCYSTEINE 1.5 ML: 200 SOLUTION ORAL; RESPIRATORY (INHALATION) at 14:40

## 2021-04-28 RX ADMIN — ALPRAZOLAM 0.25 MG: 0.25 TABLET ORAL at 05:28

## 2021-04-28 RX ADMIN — IPRATROPIUM BROMIDE AND ALBUTEROL SULFATE 3 ML: 2.5; .5 SOLUTION RESPIRATORY (INHALATION) at 07:08

## 2021-04-28 RX ADMIN — DOCUSATE SODIUM 100 MG: 100 CAPSULE ORAL at 08:30

## 2021-04-28 RX ADMIN — PREGABALIN 150 MG: 75 CAPSULE ORAL at 05:27

## 2021-04-28 RX ADMIN — DONEPEZIL HYDROCHLORIDE 5 MG: 5 TABLET, FILM COATED ORAL at 21:23

## 2021-04-28 RX ADMIN — ACETYLCYSTEINE 1.5 ML: 200 SOLUTION ORAL; RESPIRATORY (INHALATION) at 07:08

## 2021-04-28 RX ADMIN — PREGABALIN 150 MG: 75 CAPSULE ORAL at 21:22

## 2021-04-28 RX ADMIN — IPRATROPIUM BROMIDE AND ALBUTEROL SULFATE 3 ML: 2.5; .5 SOLUTION RESPIRATORY (INHALATION) at 14:40

## 2021-04-28 RX ADMIN — PANTOPRAZOLE SODIUM 40 MG: 40 TABLET, DELAYED RELEASE ORAL at 05:28

## 2021-04-28 RX ADMIN — METOPROLOL TARTRATE 25 MG: 25 TABLET, FILM COATED ORAL at 21:22

## 2021-04-28 RX ADMIN — FUROSEMIDE 20 MG: 10 INJECTION, SOLUTION INTRAMUSCULAR; INTRAVENOUS at 17:23

## 2021-04-28 RX ADMIN — CEFAZOLIN SODIUM 2 G: 10 INJECTION, POWDER, FOR SOLUTION INTRAVENOUS at 01:14

## 2021-04-28 RX ADMIN — ENOXAPARIN SODIUM 30 MG: 30 INJECTION SUBCUTANEOUS at 21:23

## 2021-04-28 RX ADMIN — PREGABALIN 150 MG: 75 CAPSULE ORAL at 01:14

## 2021-04-28 RX ADMIN — SODIUM CHLORIDE 80 ML/HR: 9 INJECTION, SOLUTION INTRAVENOUS at 05:39

## 2021-04-28 RX ADMIN — ATORVASTATIN CALCIUM 80 MG: 40 TABLET, FILM COATED ORAL at 21:22

## 2021-04-28 RX ADMIN — ISOSORBIDE MONONITRATE 30 MG: 30 TABLET, EXTENDED RELEASE ORAL at 08:31

## 2021-04-28 RX ADMIN — POTASSIUM CHLORIDE 20 MEQ: 750 CAPSULE, EXTENDED RELEASE ORAL at 10:57

## 2021-04-28 RX ADMIN — POTASSIUM CHLORIDE 20 MEQ: 750 CAPSULE, EXTENDED RELEASE ORAL at 17:22

## 2021-04-28 RX ADMIN — FUROSEMIDE 20 MG: 10 INJECTION, SOLUTION INTRAMUSCULAR; INTRAVENOUS at 10:57

## 2021-04-28 NOTE — ANESTHESIA POSTPROCEDURE EVALUATION
"Patient: Carlo Velasco    Procedure Summary     Date: 04/27/21 Room / Location: [facility] OR 15 /  [facility] OR    Anesthesia Start: 0736 Anesthesia Stop: 1205    Procedures:       BRONCHOSCOPY (N/A Bronchus)      THORACOSCOPIC TOTAL DECORTICATION (Right Chest) Diagnosis:       Recurrent pleural effusion on right      (Recurrent pleural effusion on right [J90])    Surgeons: Rico Jacobs MD Provider: Fam Freedman CRNA    Anesthesia Type: general ASA Status: 3          Anesthesia Type: general    Vitals  Vitals Value Taken Time   /71 04/27/21 1515   Temp 97.9 °F (36.6 °C) 04/27/21 1515   Pulse 80 04/27/21 1522   Resp 16 04/27/21 1515   SpO2 96 % 04/27/21 1522   Vitals shown include unvalidated device data.        Post Anesthesia Care and Evaluation    Patient location during evaluation: PACU  Patient participation: complete - patient participated  Level of consciousness: awake and alert  Pain management: adequate  Airway patency: patent  Anesthetic complications: No anesthetic complications  PONV Status: none  Cardiovascular status: acceptable and hemodynamically stable  Respiratory status: acceptable  Hydration status: acceptable    Comments: Blood pressure 107/49, pulse 82, temperature 98.7 °F (37.1 °C), temperature source Oral, resp. rate 18, height 174 cm (68.5\"), weight 122 kg (269 lb 6.4 oz), SpO2 91 %.    I came to bedside to evaluate patient shortly after arrival to recovery yesterday. Pt had Spo 88% on 8L simple mask. CRNA reports he dropped O2 sats shortly after extubated and he had monitored him in OR for about 15 minutes. Pt was reversed with suggamadex, and awake and complaining of pain. I ordered CXR which showed full expansion of right lung , but bilateral pulmonary edema, right worse than left. I ordered 20 mg lasix, placed patient on CPAP, and notified Dr Jacobs. Pt continued to progress and was able to wean to nasal canula and transfer to floor.       "

## 2021-04-29 ENCOUNTER — APPOINTMENT (OUTPATIENT)
Dept: GENERAL RADIOLOGY | Facility: HOSPITAL | Age: 76
End: 2021-04-29

## 2021-04-29 LAB
ANION GAP SERPL CALCULATED.3IONS-SCNC: 6 MMOL/L (ref 5–15)
BUN SERPL-MCNC: 15 MG/DL (ref 8–23)
BUN/CREAT SERPL: 25.4 (ref 7–25)
CALCIUM SPEC-SCNC: 8 MG/DL (ref 8.6–10.5)
CHLORIDE SERPL-SCNC: 102 MMOL/L (ref 98–107)
CO2 SERPL-SCNC: 29 MMOL/L (ref 22–29)
CREAT SERPL-MCNC: 0.59 MG/DL (ref 0.76–1.27)
GFR SERPL CREATININE-BSD FRML MDRD: 135 ML/MIN/1.73
GLUCOSE SERPL-MCNC: 135 MG/DL (ref 65–99)
POTASSIUM SERPL-SCNC: 4 MMOL/L (ref 3.5–5.2)
SODIUM SERPL-SCNC: 137 MMOL/L (ref 136–145)
WHOLE BLOOD HOLD SPECIMEN: NORMAL

## 2021-04-29 PROCEDURE — 99024 POSTOP FOLLOW-UP VISIT: CPT | Performed by: NURSE PRACTITIONER

## 2021-04-29 PROCEDURE — 94799 UNLISTED PULMONARY SVC/PX: CPT

## 2021-04-29 PROCEDURE — 25010000002 FUROSEMIDE PER 20 MG: Performed by: NURSE PRACTITIONER

## 2021-04-29 PROCEDURE — 25010000002 ENOXAPARIN PER 10 MG: Performed by: THORACIC SURGERY (CARDIOTHORACIC VASCULAR SURGERY)

## 2021-04-29 PROCEDURE — 80048 BASIC METABOLIC PNL TOTAL CA: CPT | Performed by: THORACIC SURGERY (CARDIOTHORACIC VASCULAR SURGERY)

## 2021-04-29 PROCEDURE — 71045 X-RAY EXAM CHEST 1 VIEW: CPT

## 2021-04-29 RX ADMIN — DOCUSATE SODIUM 100 MG: 100 CAPSULE ORAL at 22:00

## 2021-04-29 RX ADMIN — METOPROLOL TARTRATE 25 MG: 25 TABLET, FILM COATED ORAL at 09:23

## 2021-04-29 RX ADMIN — METOPROLOL TARTRATE 25 MG: 25 TABLET, FILM COATED ORAL at 21:58

## 2021-04-29 RX ADMIN — POTASSIUM CHLORIDE 20 MEQ: 750 CAPSULE, EXTENDED RELEASE ORAL at 18:50

## 2021-04-29 RX ADMIN — DOCUSATE SODIUM 100 MG: 100 CAPSULE ORAL at 09:23

## 2021-04-29 RX ADMIN — PREGABALIN 150 MG: 75 CAPSULE ORAL at 22:00

## 2021-04-29 RX ADMIN — IPRATROPIUM BROMIDE AND ALBUTEROL SULFATE 3 ML: 2.5; .5 SOLUTION RESPIRATORY (INHALATION) at 23:39

## 2021-04-29 RX ADMIN — ALPRAZOLAM 0.25 MG: 0.25 TABLET ORAL at 15:34

## 2021-04-29 RX ADMIN — PREGABALIN 150 MG: 75 CAPSULE ORAL at 05:44

## 2021-04-29 RX ADMIN — FUROSEMIDE 20 MG: 10 INJECTION, SOLUTION INTRAMUSCULAR; INTRAVENOUS at 18:50

## 2021-04-29 RX ADMIN — ATORVASTATIN CALCIUM 80 MG: 40 TABLET, FILM COATED ORAL at 21:59

## 2021-04-29 RX ADMIN — POTASSIUM CHLORIDE 20 MEQ: 750 CAPSULE, EXTENDED RELEASE ORAL at 09:23

## 2021-04-29 RX ADMIN — PREGABALIN 150 MG: 75 CAPSULE ORAL at 15:34

## 2021-04-29 RX ADMIN — ESCITALOPRAM 20 MG: 10 TABLET, FILM COATED ORAL at 09:23

## 2021-04-29 RX ADMIN — IPRATROPIUM BROMIDE AND ALBUTEROL SULFATE 3 ML: 2.5; .5 SOLUTION RESPIRATORY (INHALATION) at 15:52

## 2021-04-29 RX ADMIN — ENOXAPARIN SODIUM 30 MG: 30 INJECTION SUBCUTANEOUS at 09:24

## 2021-04-29 RX ADMIN — DONEPEZIL HYDROCHLORIDE 5 MG: 5 TABLET, FILM COATED ORAL at 21:59

## 2021-04-29 RX ADMIN — FUROSEMIDE 20 MG: 10 INJECTION, SOLUTION INTRAMUSCULAR; INTRAVENOUS at 09:23

## 2021-04-29 RX ADMIN — ALPRAZOLAM 0.25 MG: 0.25 TABLET ORAL at 05:43

## 2021-04-29 RX ADMIN — ALPRAZOLAM 0.25 MG: 0.25 TABLET ORAL at 22:00

## 2021-04-29 RX ADMIN — IPRATROPIUM BROMIDE AND ALBUTEROL SULFATE 3 ML: 2.5; .5 SOLUTION RESPIRATORY (INHALATION) at 00:22

## 2021-04-29 RX ADMIN — IPRATROPIUM BROMIDE AND ALBUTEROL SULFATE 3 ML: 2.5; .5 SOLUTION RESPIRATORY (INHALATION) at 07:33

## 2021-04-29 RX ADMIN — ISOSORBIDE MONONITRATE 30 MG: 30 TABLET, EXTENDED RELEASE ORAL at 09:23

## 2021-04-29 RX ADMIN — POLYETHYLENE GLYCOL 3350 17 G: 17 POWDER, FOR SOLUTION ORAL at 09:23

## 2021-04-29 RX ADMIN — ENOXAPARIN SODIUM 30 MG: 30 INJECTION SUBCUTANEOUS at 22:01

## 2021-04-29 RX ADMIN — LISINOPRIL 5 MG: 5 TABLET ORAL at 09:23

## 2021-04-29 RX ADMIN — PANTOPRAZOLE SODIUM 40 MG: 40 TABLET, DELAYED RELEASE ORAL at 05:44

## 2021-04-30 ENCOUNTER — APPOINTMENT (OUTPATIENT)
Dept: GENERAL RADIOLOGY | Facility: HOSPITAL | Age: 76
End: 2021-04-30

## 2021-04-30 LAB
ANION GAP SERPL CALCULATED.3IONS-SCNC: 6 MMOL/L (ref 5–15)
BUN SERPL-MCNC: 14 MG/DL (ref 8–23)
BUN/CREAT SERPL: 22.2 (ref 7–25)
CALCIUM SPEC-SCNC: 8 MG/DL (ref 8.6–10.5)
CHLORIDE SERPL-SCNC: 101 MMOL/L (ref 98–107)
CO2 SERPL-SCNC: 31 MMOL/L (ref 22–29)
CREAT SERPL-MCNC: 0.63 MG/DL (ref 0.76–1.27)
DEPRECATED RDW RBC AUTO: 48.3 FL (ref 37–54)
ERYTHROCYTE [DISTWIDTH] IN BLOOD BY AUTOMATED COUNT: 15.6 % (ref 12.3–15.4)
GFR SERPL CREATININE-BSD FRML MDRD: 125 ML/MIN/1.73
GLUCOSE SERPL-MCNC: 122 MG/DL (ref 65–99)
HCT VFR BLD AUTO: 31.9 % (ref 37.5–51)
HGB BLD-MCNC: 10.1 G/DL (ref 13–17.7)
MCH RBC QN AUTO: 27.2 PG (ref 26.6–33)
MCHC RBC AUTO-ENTMCNC: 31.7 G/DL (ref 31.5–35.7)
MCV RBC AUTO: 86 FL (ref 79–97)
PLATELET # BLD AUTO: 185 10*3/MM3 (ref 140–450)
PMV BLD AUTO: 9.3 FL (ref 6–12)
POTASSIUM SERPL-SCNC: 3.6 MMOL/L (ref 3.5–5.2)
RBC # BLD AUTO: 3.71 10*6/MM3 (ref 4.14–5.8)
SODIUM SERPL-SCNC: 138 MMOL/L (ref 136–145)
WBC # BLD AUTO: 10.48 10*3/MM3 (ref 3.4–10.8)

## 2021-04-30 PROCEDURE — 94799 UNLISTED PULMONARY SVC/PX: CPT

## 2021-04-30 PROCEDURE — 99024 POSTOP FOLLOW-UP VISIT: CPT | Performed by: NURSE PRACTITIONER

## 2021-04-30 PROCEDURE — 25010000002 FUROSEMIDE PER 20 MG: Performed by: NURSE PRACTITIONER

## 2021-04-30 PROCEDURE — 25010000002 ENOXAPARIN PER 10 MG: Performed by: THORACIC SURGERY (CARDIOTHORACIC VASCULAR SURGERY)

## 2021-04-30 PROCEDURE — 80048 BASIC METABOLIC PNL TOTAL CA: CPT | Performed by: NURSE PRACTITIONER

## 2021-04-30 PROCEDURE — 71045 X-RAY EXAM CHEST 1 VIEW: CPT

## 2021-04-30 PROCEDURE — 85027 COMPLETE CBC AUTOMATED: CPT | Performed by: NURSE PRACTITIONER

## 2021-04-30 RX ADMIN — POTASSIUM CHLORIDE 20 MEQ: 750 CAPSULE, EXTENDED RELEASE ORAL at 08:39

## 2021-04-30 RX ADMIN — PREGABALIN 150 MG: 75 CAPSULE ORAL at 05:04

## 2021-04-30 RX ADMIN — ALPRAZOLAM 0.25 MG: 0.25 TABLET ORAL at 05:04

## 2021-04-30 RX ADMIN — ALPRAZOLAM 0.25 MG: 0.25 TABLET ORAL at 22:18

## 2021-04-30 RX ADMIN — PREGABALIN 150 MG: 75 CAPSULE ORAL at 22:19

## 2021-04-30 RX ADMIN — PREGABALIN 150 MG: 75 CAPSULE ORAL at 15:12

## 2021-04-30 RX ADMIN — LISINOPRIL 5 MG: 5 TABLET ORAL at 08:39

## 2021-04-30 RX ADMIN — ENOXAPARIN SODIUM 30 MG: 30 INJECTION SUBCUTANEOUS at 22:18

## 2021-04-30 RX ADMIN — DONEPEZIL HYDROCHLORIDE 5 MG: 5 TABLET, FILM COATED ORAL at 22:18

## 2021-04-30 RX ADMIN — ALPRAZOLAM 0.25 MG: 0.25 TABLET ORAL at 15:12

## 2021-04-30 RX ADMIN — ESCITALOPRAM 20 MG: 10 TABLET, FILM COATED ORAL at 08:39

## 2021-04-30 RX ADMIN — POLYETHYLENE GLYCOL 3350 17 G: 17 POWDER, FOR SOLUTION ORAL at 08:40

## 2021-04-30 RX ADMIN — IPRATROPIUM BROMIDE AND ALBUTEROL SULFATE 3 ML: 2.5; .5 SOLUTION RESPIRATORY (INHALATION) at 06:58

## 2021-04-30 RX ADMIN — METOPROLOL TARTRATE 25 MG: 25 TABLET, FILM COATED ORAL at 08:39

## 2021-04-30 RX ADMIN — ISOSORBIDE MONONITRATE 30 MG: 30 TABLET, EXTENDED RELEASE ORAL at 08:39

## 2021-04-30 RX ADMIN — POTASSIUM CHLORIDE 20 MEQ: 750 CAPSULE, EXTENDED RELEASE ORAL at 17:25

## 2021-04-30 RX ADMIN — METOPROLOL TARTRATE 25 MG: 25 TABLET, FILM COATED ORAL at 22:18

## 2021-04-30 RX ADMIN — PANTOPRAZOLE SODIUM 40 MG: 40 TABLET, DELAYED RELEASE ORAL at 05:03

## 2021-04-30 RX ADMIN — FUROSEMIDE 20 MG: 10 INJECTION, SOLUTION INTRAMUSCULAR; INTRAVENOUS at 08:39

## 2021-04-30 RX ADMIN — ENOXAPARIN SODIUM 30 MG: 30 INJECTION SUBCUTANEOUS at 08:39

## 2021-04-30 RX ADMIN — FUROSEMIDE 20 MG: 10 INJECTION, SOLUTION INTRAMUSCULAR; INTRAVENOUS at 17:25

## 2021-04-30 RX ADMIN — DOCUSATE SODIUM 100 MG: 100 CAPSULE ORAL at 08:39

## 2021-05-01 ENCOUNTER — APPOINTMENT (OUTPATIENT)
Dept: GENERAL RADIOLOGY | Facility: HOSPITAL | Age: 76
End: 2021-05-01

## 2021-05-01 LAB
ANION GAP SERPL CALCULATED.3IONS-SCNC: 5 MMOL/L (ref 5–15)
BUN SERPL-MCNC: 13 MG/DL (ref 8–23)
BUN/CREAT SERPL: 22.4 (ref 7–25)
CALCIUM SPEC-SCNC: 8.6 MG/DL (ref 8.6–10.5)
CHLORIDE SERPL-SCNC: 105 MMOL/L (ref 98–107)
CO2 SERPL-SCNC: 30 MMOL/L (ref 22–29)
CREAT SERPL-MCNC: 0.58 MG/DL (ref 0.76–1.27)
DEPRECATED RDW RBC AUTO: 47.8 FL (ref 37–54)
ERYTHROCYTE [DISTWIDTH] IN BLOOD BY AUTOMATED COUNT: 15.4 % (ref 12.3–15.4)
GFR SERPL CREATININE-BSD FRML MDRD: 137 ML/MIN/1.73
GLUCOSE SERPL-MCNC: 122 MG/DL (ref 65–99)
HCT VFR BLD AUTO: 34.5 % (ref 37.5–51)
HGB BLD-MCNC: 10.9 G/DL (ref 13–17.7)
MCH RBC QN AUTO: 26.9 PG (ref 26.6–33)
MCHC RBC AUTO-ENTMCNC: 31.6 G/DL (ref 31.5–35.7)
MCV RBC AUTO: 85.2 FL (ref 79–97)
PLATELET # BLD AUTO: 213 10*3/MM3 (ref 140–450)
PMV BLD AUTO: 9.2 FL (ref 6–12)
POTASSIUM SERPL-SCNC: 4.2 MMOL/L (ref 3.5–5.2)
RBC # BLD AUTO: 4.05 10*6/MM3 (ref 4.14–5.8)
SODIUM SERPL-SCNC: 140 MMOL/L (ref 136–145)
WBC # BLD AUTO: 10.34 10*3/MM3 (ref 3.4–10.8)

## 2021-05-01 PROCEDURE — 25010000002 FUROSEMIDE PER 20 MG: Performed by: NURSE PRACTITIONER

## 2021-05-01 PROCEDURE — 71045 X-RAY EXAM CHEST 1 VIEW: CPT

## 2021-05-01 PROCEDURE — 25010000002 ENOXAPARIN PER 10 MG: Performed by: THORACIC SURGERY (CARDIOTHORACIC VASCULAR SURGERY)

## 2021-05-01 PROCEDURE — 85027 COMPLETE CBC AUTOMATED: CPT | Performed by: NURSE PRACTITIONER

## 2021-05-01 PROCEDURE — 99024 POSTOP FOLLOW-UP VISIT: CPT | Performed by: THORACIC SURGERY (CARDIOTHORACIC VASCULAR SURGERY)

## 2021-05-01 PROCEDURE — 80048 BASIC METABOLIC PNL TOTAL CA: CPT | Performed by: NURSE PRACTITIONER

## 2021-05-01 RX ORDER — PREGABALIN 150 MG/1
150 CAPSULE ORAL 3 TIMES DAILY
COMMUNITY
End: 2021-07-29 | Stop reason: SDUPTHER

## 2021-05-01 RX ORDER — CLOPIDOGREL BISULFATE 75 MG/1
75 TABLET ORAL DAILY
Status: ON HOLD | COMMUNITY
End: 2022-02-19

## 2021-05-01 RX ORDER — ATORVASTATIN CALCIUM 80 MG/1
80 TABLET, FILM COATED ORAL NIGHTLY
COMMUNITY
End: 2021-09-24

## 2021-05-01 RX ORDER — FUROSEMIDE 40 MG/1
40 TABLET ORAL DAILY
COMMUNITY
End: 2021-11-22

## 2021-05-01 RX ADMIN — ENOXAPARIN SODIUM 30 MG: 30 INJECTION SUBCUTANEOUS at 08:37

## 2021-05-01 RX ADMIN — ALPRAZOLAM 0.25 MG: 0.25 TABLET ORAL at 14:26

## 2021-05-01 RX ADMIN — ISOSORBIDE MONONITRATE 30 MG: 30 TABLET, EXTENDED RELEASE ORAL at 08:32

## 2021-05-01 RX ADMIN — POTASSIUM CHLORIDE 20 MEQ: 750 CAPSULE, EXTENDED RELEASE ORAL at 17:02

## 2021-05-01 RX ADMIN — ENOXAPARIN SODIUM 30 MG: 30 INJECTION SUBCUTANEOUS at 21:45

## 2021-05-01 RX ADMIN — DONEPEZIL HYDROCHLORIDE 5 MG: 5 TABLET, FILM COATED ORAL at 21:45

## 2021-05-01 RX ADMIN — OXYCODONE HYDROCHLORIDE AND ACETAMINOPHEN 1 TABLET: 5; 325 TABLET ORAL at 18:12

## 2021-05-01 RX ADMIN — FUROSEMIDE 20 MG: 10 INJECTION, SOLUTION INTRAMUSCULAR; INTRAVENOUS at 17:02

## 2021-05-01 RX ADMIN — PREGABALIN 150 MG: 75 CAPSULE ORAL at 05:55

## 2021-05-01 RX ADMIN — METOPROLOL TARTRATE 25 MG: 25 TABLET, FILM COATED ORAL at 21:45

## 2021-05-01 RX ADMIN — ATORVASTATIN CALCIUM 80 MG: 40 TABLET, FILM COATED ORAL at 21:45

## 2021-05-01 RX ADMIN — PREGABALIN 150 MG: 75 CAPSULE ORAL at 14:26

## 2021-05-01 RX ADMIN — FUROSEMIDE 20 MG: 10 INJECTION, SOLUTION INTRAMUSCULAR; INTRAVENOUS at 08:32

## 2021-05-01 RX ADMIN — DOCUSATE SODIUM 100 MG: 100 CAPSULE ORAL at 21:45

## 2021-05-01 RX ADMIN — LISINOPRIL 5 MG: 5 TABLET ORAL at 08:32

## 2021-05-01 RX ADMIN — ESCITALOPRAM 20 MG: 10 TABLET, FILM COATED ORAL at 08:32

## 2021-05-01 RX ADMIN — PANTOPRAZOLE SODIUM 40 MG: 40 TABLET, DELAYED RELEASE ORAL at 05:55

## 2021-05-01 RX ADMIN — METOPROLOL TARTRATE 25 MG: 25 TABLET, FILM COATED ORAL at 08:32

## 2021-05-01 RX ADMIN — PREGABALIN 150 MG: 75 CAPSULE ORAL at 21:45

## 2021-05-01 RX ADMIN — ALPRAZOLAM 0.25 MG: 0.25 TABLET ORAL at 05:55

## 2021-05-01 RX ADMIN — ALPRAZOLAM 0.25 MG: 0.25 TABLET ORAL at 21:45

## 2021-05-01 RX ADMIN — POTASSIUM CHLORIDE 20 MEQ: 750 CAPSULE, EXTENDED RELEASE ORAL at 08:33

## 2021-05-02 ENCOUNTER — APPOINTMENT (OUTPATIENT)
Dept: GENERAL RADIOLOGY | Facility: HOSPITAL | Age: 76
End: 2021-05-02

## 2021-05-02 LAB
ANION GAP SERPL CALCULATED.3IONS-SCNC: 7 MMOL/L (ref 5–15)
BACTERIA FLD CULT: NORMAL
BACTERIA SPEC ANAEROBE CULT: NORMAL
BUN SERPL-MCNC: 12 MG/DL (ref 8–23)
BUN/CREAT SERPL: 20 (ref 7–25)
CALCIUM SPEC-SCNC: 8.7 MG/DL (ref 8.6–10.5)
CHLORIDE SERPL-SCNC: 101 MMOL/L (ref 98–107)
CO2 SERPL-SCNC: 31 MMOL/L (ref 22–29)
CREAT SERPL-MCNC: 0.6 MG/DL (ref 0.76–1.27)
DEPRECATED RDW RBC AUTO: 46.6 FL (ref 37–54)
ERYTHROCYTE [DISTWIDTH] IN BLOOD BY AUTOMATED COUNT: 15.5 % (ref 12.3–15.4)
GFR SERPL CREATININE-BSD FRML MDRD: 132 ML/MIN/1.73
GLUCOSE SERPL-MCNC: 118 MG/DL (ref 65–99)
GRAM STN SPEC: NORMAL
GRAM STN SPEC: NORMAL
HCT VFR BLD AUTO: 35.1 % (ref 37.5–51)
HGB BLD-MCNC: 11.4 G/DL (ref 13–17.7)
MCH RBC QN AUTO: 27.4 PG (ref 26.6–33)
MCHC RBC AUTO-ENTMCNC: 32.5 G/DL (ref 31.5–35.7)
MCV RBC AUTO: 84.4 FL (ref 79–97)
PLATELET # BLD AUTO: 233 10*3/MM3 (ref 140–450)
PMV BLD AUTO: 8.9 FL (ref 6–12)
POTASSIUM SERPL-SCNC: 4.3 MMOL/L (ref 3.5–5.2)
RBC # BLD AUTO: 4.16 10*6/MM3 (ref 4.14–5.8)
SODIUM SERPL-SCNC: 139 MMOL/L (ref 136–145)
WBC # BLD AUTO: 11.49 10*3/MM3 (ref 3.4–10.8)

## 2021-05-02 PROCEDURE — 99024 POSTOP FOLLOW-UP VISIT: CPT | Performed by: THORACIC SURGERY (CARDIOTHORACIC VASCULAR SURGERY)

## 2021-05-02 PROCEDURE — 71045 X-RAY EXAM CHEST 1 VIEW: CPT

## 2021-05-02 PROCEDURE — 80048 BASIC METABOLIC PNL TOTAL CA: CPT | Performed by: NURSE PRACTITIONER

## 2021-05-02 PROCEDURE — 25010000002 FUROSEMIDE PER 20 MG: Performed by: NURSE PRACTITIONER

## 2021-05-02 PROCEDURE — 85027 COMPLETE CBC AUTOMATED: CPT | Performed by: NURSE PRACTITIONER

## 2021-05-02 PROCEDURE — 25010000002 ENOXAPARIN PER 10 MG: Performed by: THORACIC SURGERY (CARDIOTHORACIC VASCULAR SURGERY)

## 2021-05-02 RX ADMIN — ESCITALOPRAM 20 MG: 10 TABLET, FILM COATED ORAL at 08:30

## 2021-05-02 RX ADMIN — ENOXAPARIN SODIUM 30 MG: 30 INJECTION SUBCUTANEOUS at 08:30

## 2021-05-02 RX ADMIN — DOCUSATE SODIUM 100 MG: 100 CAPSULE ORAL at 21:18

## 2021-05-02 RX ADMIN — PREGABALIN 150 MG: 75 CAPSULE ORAL at 21:18

## 2021-05-02 RX ADMIN — POTASSIUM CHLORIDE 20 MEQ: 750 CAPSULE, EXTENDED RELEASE ORAL at 08:30

## 2021-05-02 RX ADMIN — ENOXAPARIN SODIUM 30 MG: 30 INJECTION SUBCUTANEOUS at 21:18

## 2021-05-02 RX ADMIN — FUROSEMIDE 20 MG: 10 INJECTION, SOLUTION INTRAMUSCULAR; INTRAVENOUS at 17:15

## 2021-05-02 RX ADMIN — PREGABALIN 150 MG: 75 CAPSULE ORAL at 15:01

## 2021-05-02 RX ADMIN — PANTOPRAZOLE SODIUM 40 MG: 40 TABLET, DELAYED RELEASE ORAL at 06:35

## 2021-05-02 RX ADMIN — ALPRAZOLAM 0.25 MG: 0.25 TABLET ORAL at 15:01

## 2021-05-02 RX ADMIN — ALPRAZOLAM 0.25 MG: 0.25 TABLET ORAL at 21:17

## 2021-05-02 RX ADMIN — ATORVASTATIN CALCIUM 80 MG: 40 TABLET, FILM COATED ORAL at 21:18

## 2021-05-02 RX ADMIN — ISOSORBIDE MONONITRATE 30 MG: 30 TABLET, EXTENDED RELEASE ORAL at 08:30

## 2021-05-02 RX ADMIN — POTASSIUM CHLORIDE 20 MEQ: 750 CAPSULE, EXTENDED RELEASE ORAL at 17:15

## 2021-05-02 RX ADMIN — ALPRAZOLAM 0.25 MG: 0.25 TABLET ORAL at 06:35

## 2021-05-02 RX ADMIN — METOPROLOL TARTRATE 25 MG: 25 TABLET, FILM COATED ORAL at 21:18

## 2021-05-02 RX ADMIN — FUROSEMIDE 20 MG: 10 INJECTION, SOLUTION INTRAMUSCULAR; INTRAVENOUS at 08:30

## 2021-05-02 RX ADMIN — DONEPEZIL HYDROCHLORIDE 5 MG: 5 TABLET, FILM COATED ORAL at 21:18

## 2021-05-02 RX ADMIN — OXYCODONE HYDROCHLORIDE AND ACETAMINOPHEN 1 TABLET: 5; 325 TABLET ORAL at 12:49

## 2021-05-02 RX ADMIN — PREGABALIN 150 MG: 75 CAPSULE ORAL at 06:35

## 2021-05-03 ENCOUNTER — APPOINTMENT (OUTPATIENT)
Dept: GENERAL RADIOLOGY | Facility: HOSPITAL | Age: 76
End: 2021-05-03

## 2021-05-03 PROBLEM — Z79.899 ON STATIN THERAPY: Status: ACTIVE | Noted: 2021-05-03

## 2021-05-03 PROBLEM — D69.1 ASPIRIN-LIKE PLATELET FUNCTION DEFECT (HCC): Status: ACTIVE | Noted: 2021-05-03

## 2021-05-03 PROBLEM — J18.9 PNEUMONIA: Status: ACTIVE | Noted: 2021-05-03

## 2021-05-03 PROBLEM — E66.813 CLASS 3 SEVERE OBESITY DUE TO EXCESS CALORIES WITH SERIOUS COMORBIDITY IN ADULT: Status: ACTIVE | Noted: 2021-05-03

## 2021-05-03 PROBLEM — E66.01 CLASS 3 SEVERE OBESITY DUE TO EXCESS CALORIES WITH SERIOUS COMORBIDITY IN ADULT: Status: ACTIVE | Noted: 2021-05-03

## 2021-05-03 PROBLEM — J86.9 EMPYEMA (HCC): Status: ACTIVE | Noted: 2021-05-03

## 2021-05-03 LAB
ANION GAP SERPL CALCULATED.3IONS-SCNC: 6 MMOL/L (ref 5–15)
BUN SERPL-MCNC: 13 MG/DL (ref 8–23)
BUN/CREAT SERPL: 19.1 (ref 7–25)
CALCIUM SPEC-SCNC: 8.4 MG/DL (ref 8.6–10.5)
CHLORIDE SERPL-SCNC: 102 MMOL/L (ref 98–107)
CO2 SERPL-SCNC: 32 MMOL/L (ref 22–29)
CREAT SERPL-MCNC: 0.68 MG/DL (ref 0.76–1.27)
DEPRECATED RDW RBC AUTO: 46.7 FL (ref 37–54)
ERYTHROCYTE [DISTWIDTH] IN BLOOD BY AUTOMATED COUNT: 15.5 % (ref 12.3–15.4)
GFR SERPL CREATININE-BSD FRML MDRD: 114 ML/MIN/1.73
GLUCOSE SERPL-MCNC: 128 MG/DL (ref 65–99)
HCT VFR BLD AUTO: 34.7 % (ref 37.5–51)
HGB BLD-MCNC: 11.3 G/DL (ref 13–17.7)
MCH RBC QN AUTO: 27.2 PG (ref 26.6–33)
MCHC RBC AUTO-ENTMCNC: 32.6 G/DL (ref 31.5–35.7)
MCV RBC AUTO: 83.6 FL (ref 79–97)
PLATELET # BLD AUTO: 243 10*3/MM3 (ref 140–450)
PMV BLD AUTO: 9.1 FL (ref 6–12)
POTASSIUM SERPL-SCNC: 4.2 MMOL/L (ref 3.5–5.2)
RBC # BLD AUTO: 4.15 10*6/MM3 (ref 4.14–5.8)
SODIUM SERPL-SCNC: 140 MMOL/L (ref 136–145)
WBC # BLD AUTO: 10.92 10*3/MM3 (ref 3.4–10.8)

## 2021-05-03 PROCEDURE — 99024 POSTOP FOLLOW-UP VISIT: CPT | Performed by: NURSE PRACTITIONER

## 2021-05-03 PROCEDURE — 71045 X-RAY EXAM CHEST 1 VIEW: CPT

## 2021-05-03 PROCEDURE — 25010000002 ENOXAPARIN PER 10 MG: Performed by: THORACIC SURGERY (CARDIOTHORACIC VASCULAR SURGERY)

## 2021-05-03 PROCEDURE — 25010000002 FUROSEMIDE PER 20 MG: Performed by: NURSE PRACTITIONER

## 2021-05-03 PROCEDURE — 85027 COMPLETE CBC AUTOMATED: CPT | Performed by: NURSE PRACTITIONER

## 2021-05-03 PROCEDURE — 80048 BASIC METABOLIC PNL TOTAL CA: CPT | Performed by: NURSE PRACTITIONER

## 2021-05-03 RX ADMIN — ALPRAZOLAM 0.25 MG: 0.25 TABLET ORAL at 13:51

## 2021-05-03 RX ADMIN — PREGABALIN 150 MG: 75 CAPSULE ORAL at 13:51

## 2021-05-03 RX ADMIN — ATORVASTATIN CALCIUM 80 MG: 40 TABLET, FILM COATED ORAL at 21:56

## 2021-05-03 RX ADMIN — OXYCODONE HYDROCHLORIDE AND ACETAMINOPHEN 1 TABLET: 5; 325 TABLET ORAL at 16:24

## 2021-05-03 RX ADMIN — POTASSIUM CHLORIDE 20 MEQ: 750 CAPSULE, EXTENDED RELEASE ORAL at 08:22

## 2021-05-03 RX ADMIN — PANTOPRAZOLE SODIUM 40 MG: 40 TABLET, DELAYED RELEASE ORAL at 06:09

## 2021-05-03 RX ADMIN — ALPRAZOLAM 0.25 MG: 0.25 TABLET ORAL at 06:09

## 2021-05-03 RX ADMIN — METOPROLOL TARTRATE 25 MG: 25 TABLET, FILM COATED ORAL at 21:56

## 2021-05-03 RX ADMIN — LISINOPRIL 5 MG: 5 TABLET ORAL at 08:22

## 2021-05-03 RX ADMIN — DONEPEZIL HYDROCHLORIDE 5 MG: 5 TABLET, FILM COATED ORAL at 21:56

## 2021-05-03 RX ADMIN — OXYCODONE HYDROCHLORIDE AND ACETAMINOPHEN 1 TABLET: 5; 325 TABLET ORAL at 10:55

## 2021-05-03 RX ADMIN — FUROSEMIDE 20 MG: 10 INJECTION, SOLUTION INTRAMUSCULAR; INTRAVENOUS at 18:27

## 2021-05-03 RX ADMIN — METOPROLOL TARTRATE 25 MG: 25 TABLET, FILM COATED ORAL at 08:22

## 2021-05-03 RX ADMIN — ALPRAZOLAM 0.25 MG: 0.25 TABLET ORAL at 21:56

## 2021-05-03 RX ADMIN — ENOXAPARIN SODIUM 30 MG: 30 INJECTION SUBCUTANEOUS at 08:23

## 2021-05-03 RX ADMIN — ISOSORBIDE MONONITRATE 30 MG: 30 TABLET, EXTENDED RELEASE ORAL at 08:22

## 2021-05-03 RX ADMIN — PREGABALIN 150 MG: 75 CAPSULE ORAL at 21:56

## 2021-05-03 RX ADMIN — PREGABALIN 150 MG: 75 CAPSULE ORAL at 06:09

## 2021-05-03 RX ADMIN — ESCITALOPRAM 20 MG: 10 TABLET, FILM COATED ORAL at 08:22

## 2021-05-03 RX ADMIN — ENOXAPARIN SODIUM 30 MG: 30 INJECTION SUBCUTANEOUS at 21:56

## 2021-05-03 RX ADMIN — POTASSIUM CHLORIDE 20 MEQ: 750 CAPSULE, EXTENDED RELEASE ORAL at 18:27

## 2021-05-03 RX ADMIN — FUROSEMIDE 20 MG: 10 INJECTION, SOLUTION INTRAMUSCULAR; INTRAVENOUS at 08:22

## 2021-05-04 ENCOUNTER — READMISSION MANAGEMENT (OUTPATIENT)
Dept: CALL CENTER | Facility: HOSPITAL | Age: 76
End: 2021-05-04

## 2021-05-04 ENCOUNTER — APPOINTMENT (OUTPATIENT)
Dept: GENERAL RADIOLOGY | Facility: HOSPITAL | Age: 76
End: 2021-05-04

## 2021-05-04 VITALS
WEIGHT: 266 LBS | SYSTOLIC BLOOD PRESSURE: 108 MMHG | TEMPERATURE: 97.9 F | HEART RATE: 78 BPM | HEIGHT: 69 IN | RESPIRATION RATE: 18 BRPM | BODY MASS INDEX: 39.4 KG/M2 | DIASTOLIC BLOOD PRESSURE: 64 MMHG | OXYGEN SATURATION: 94 %

## 2021-05-04 LAB
ANION GAP SERPL CALCULATED.3IONS-SCNC: 8 MMOL/L (ref 5–15)
BUN SERPL-MCNC: 17 MG/DL (ref 8–23)
BUN/CREAT SERPL: 25 (ref 7–25)
CALCIUM SPEC-SCNC: 8.6 MG/DL (ref 8.6–10.5)
CHLORIDE SERPL-SCNC: 99 MMOL/L (ref 98–107)
CO2 SERPL-SCNC: 32 MMOL/L (ref 22–29)
CREAT SERPL-MCNC: 0.68 MG/DL (ref 0.76–1.27)
DEPRECATED RDW RBC AUTO: 47.4 FL (ref 37–54)
ERYTHROCYTE [DISTWIDTH] IN BLOOD BY AUTOMATED COUNT: 15.2 % (ref 12.3–15.4)
GFR SERPL CREATININE-BSD FRML MDRD: 114 ML/MIN/1.73
GLUCOSE SERPL-MCNC: 111 MG/DL (ref 65–99)
HCT VFR BLD AUTO: 33.7 % (ref 37.5–51)
HGB BLD-MCNC: 10.8 G/DL (ref 13–17.7)
MCH RBC QN AUTO: 27.4 PG (ref 26.6–33)
MCHC RBC AUTO-ENTMCNC: 32 G/DL (ref 31.5–35.7)
MCV RBC AUTO: 85.5 FL (ref 79–97)
PLATELET # BLD AUTO: 249 10*3/MM3 (ref 140–450)
PMV BLD AUTO: 9.2 FL (ref 6–12)
POTASSIUM SERPL-SCNC: 4.2 MMOL/L (ref 3.5–5.2)
RBC # BLD AUTO: 3.94 10*6/MM3 (ref 4.14–5.8)
SODIUM SERPL-SCNC: 139 MMOL/L (ref 136–145)
WBC # BLD AUTO: 11.09 10*3/MM3 (ref 3.4–10.8)

## 2021-05-04 PROCEDURE — 85027 COMPLETE CBC AUTOMATED: CPT | Performed by: NURSE PRACTITIONER

## 2021-05-04 PROCEDURE — 71046 X-RAY EXAM CHEST 2 VIEWS: CPT

## 2021-05-04 PROCEDURE — 71045 X-RAY EXAM CHEST 1 VIEW: CPT

## 2021-05-04 PROCEDURE — 25010000002 FUROSEMIDE PER 20 MG: Performed by: NURSE PRACTITIONER

## 2021-05-04 PROCEDURE — 25010000002 ENOXAPARIN PER 10 MG: Performed by: THORACIC SURGERY (CARDIOTHORACIC VASCULAR SURGERY)

## 2021-05-04 PROCEDURE — 99024 POSTOP FOLLOW-UP VISIT: CPT | Performed by: NURSE PRACTITIONER

## 2021-05-04 PROCEDURE — 80048 BASIC METABOLIC PNL TOTAL CA: CPT | Performed by: NURSE PRACTITIONER

## 2021-05-04 RX ORDER — LISINOPRIL 5 MG/1
5 TABLET ORAL DAILY
Qty: 90 TABLET | Refills: 3 | Status: ON HOLD | OUTPATIENT
Start: 2021-05-04 | End: 2022-02-19

## 2021-05-04 RX ORDER — LISINOPRIL 5 MG/1
5 TABLET ORAL DAILY
Qty: 90 TABLET | Refills: 3 | Status: CANCELLED | OUTPATIENT
Start: 2021-05-04

## 2021-05-04 RX ORDER — OXYCODONE HYDROCHLORIDE AND ACETAMINOPHEN 5; 325 MG/1; MG/1
1 TABLET ORAL EVERY 6 HOURS PRN
Qty: 20 TABLET | Refills: 0 | Status: ON HOLD | OUTPATIENT
Start: 2021-05-04 | End: 2022-02-19

## 2021-05-04 RX ADMIN — ALPRAZOLAM 0.25 MG: 0.25 TABLET ORAL at 14:04

## 2021-05-04 RX ADMIN — POTASSIUM CHLORIDE 20 MEQ: 750 CAPSULE, EXTENDED RELEASE ORAL at 08:56

## 2021-05-04 RX ADMIN — METOPROLOL TARTRATE 25 MG: 25 TABLET, FILM COATED ORAL at 08:56

## 2021-05-04 RX ADMIN — PANTOPRAZOLE SODIUM 40 MG: 40 TABLET, DELAYED RELEASE ORAL at 05:52

## 2021-05-04 RX ADMIN — ENOXAPARIN SODIUM 30 MG: 30 INJECTION SUBCUTANEOUS at 08:56

## 2021-05-04 RX ADMIN — ALPRAZOLAM 0.25 MG: 0.25 TABLET ORAL at 05:52

## 2021-05-04 RX ADMIN — POTASSIUM CHLORIDE 20 MEQ: 750 CAPSULE, EXTENDED RELEASE ORAL at 18:07

## 2021-05-04 RX ADMIN — LISINOPRIL 5 MG: 5 TABLET ORAL at 08:56

## 2021-05-04 RX ADMIN — ISOSORBIDE MONONITRATE 30 MG: 30 TABLET, EXTENDED RELEASE ORAL at 08:56

## 2021-05-04 RX ADMIN — PREGABALIN 150 MG: 75 CAPSULE ORAL at 14:04

## 2021-05-04 RX ADMIN — FUROSEMIDE 20 MG: 10 INJECTION, SOLUTION INTRAMUSCULAR; INTRAVENOUS at 08:56

## 2021-05-04 RX ADMIN — ESCITALOPRAM 20 MG: 10 TABLET, FILM COATED ORAL at 08:56

## 2021-05-04 RX ADMIN — PREGABALIN 150 MG: 75 CAPSULE ORAL at 05:52

## 2021-05-05 ENCOUNTER — TRANSITIONAL CARE MANAGEMENT TELEPHONE ENCOUNTER (OUTPATIENT)
Dept: CALL CENTER | Facility: HOSPITAL | Age: 76
End: 2021-05-05

## 2021-05-05 NOTE — OUTREACH NOTE
Call Center TCM Note      Responses   [facility] patient discharged from?  [facility]   Does the patient have one of the following disease processes/diagnoses(primary or secondary)?  Other   TCM attempt successful?  Yes [Clarence Conde, shane Gann]   Call start time  1058   Call end time  1101   Discharge diagnosis  Right Stage III empyema,  Bronchoscopy,  Right thoracoscopy,  Total decortication    Person spoke with today (if not patient) and relationship  spouse-Amaya   Meds reviewed with patient/caregiver?  Yes   Is the patient having any side effects they believe may be caused by any medication additions or changes?  Yes   Side effects comments   cough this morning maybe r/t lisinopril   Does the patient have all medications ordered at discharge?  Yes   Is the patient taking all medications as directed (includes completed medication regime)?  Yes   Does the patient have a primary care provider?   Yes   Does the patient have an appointment with their PCP within 7 days of discharge?  Yes   Comments regarding PCP  Hospital d/c f/u appt is on 5/11/21 at 8:15 am    Has the patient kept scheduled appointments due by today?  N/A   Psychosocial issues?  No   Did the patient receive a copy of their discharge instructions?  Yes   Nursing interventions  Reviewed instructions with patient   What is the patient's perception of their health status since discharge?  Improving   Is the patient/caregiver able to teach back signs and symptoms related to disease process for when to call PCP?  Yes   Is the patient/caregiver able to teach back signs and symptoms related to disease process for when to call 911?  Yes   Is the patient/caregiver able to teach back the hierarchy of who to call/visit for symptoms/problems? PCP, Specialist, Home health nurse, Urgent Care, ED, 911  Yes   TCM call completed?  Yes          Mayra Klein RN    5/5/2021, 11:01 EDT

## 2021-05-05 NOTE — OUTREACH NOTE
Prep Survey      Responses   [facility] patient discharged from?  [place]   Is LACE score < 7 ?  No   Emergency Room discharge w/ pulse ox?  No   Eligibility  [facility]   Date of Admission  04/27/21   Date of Discharge  05/04/21   Discharge Disposition  Home or Self Care   Discharge diagnosis  Right Stage III empyema,    Bronchoscopy,    Right thoracoscopy,    Total decortication    Does the patient have one of the following disease processes/diagnoses(primary or secondary)?  Other   Does the patient have Home health ordered?  No   Is there a DME ordered?  No   Prep survey completed?  Yes          Bri Santo RN

## 2021-05-11 ENCOUNTER — OFFICE VISIT (OUTPATIENT)
Dept: FAMILY MEDICINE CLINIC | Facility: CLINIC | Age: 76
End: 2021-05-11

## 2021-05-11 VITALS
WEIGHT: 269 LBS | HEART RATE: 76 BPM | SYSTOLIC BLOOD PRESSURE: 124 MMHG | DIASTOLIC BLOOD PRESSURE: 76 MMHG | TEMPERATURE: 98.8 F | HEIGHT: 69 IN | RESPIRATION RATE: 16 BRPM | BODY MASS INDEX: 39.84 KG/M2 | OXYGEN SATURATION: 96 %

## 2021-05-11 DIAGNOSIS — J86.9 EMPYEMA (HCC): Primary | ICD-10-CM

## 2021-05-11 PROCEDURE — 99212 OFFICE O/P EST SF 10 MIN: CPT | Performed by: FAMILY MEDICINE

## 2021-05-11 NOTE — PROGRESS NOTES
Subjective   Carlo Velasco is a 75 y.o. male.     Chief Complaint   Patient presents with   • Follow-up     hospital follow up     History of Present Illness     he recently was admitted for persistent pulmonary effusion---denies any new dyspnea or fever--he has follow ups with pulmonary and ct surgery      Current Outpatient Medications:   •  albuterol sulfate  (90 Base) MCG/ACT inhaler, Inhale 2 puffs Every 4 (Four) Hours As Needed for Wheezing or Shortness of Air for up to 90 days., Disp: 18 g, Rfl: 5  •  atorvastatin (LIPITOR) 80 MG tablet, Take 80 mg by mouth Every Night., Disp: , Rfl:   •  clopidogrel (PLAVIX) 75 MG tablet, Take 75 mg by mouth Daily., Disp: , Rfl:   •  donepezil (ARICEPT) 5 MG tablet, Take 1 tablet by mouth every night at bedtime., Disp: 90 tablet, Rfl: 3  •  escitalopram (LEXAPRO) 20 MG tablet, Take 1 tablet by mouth Daily., Disp: 90 tablet, Rfl: 1  •  fluticasone (Flonase Allergy Relief) 50 MCG/ACT nasal spray, 2 sprays into the nostril(s) as directed by provider Daily., Disp: 16 g, Rfl: 5  •  furosemide (LASIX) 40 MG tablet, Take 40 mg by mouth Daily., Disp: , Rfl:   •  isosorbide mononitrate (IMDUR) 30 MG 24 hr tablet, Take 1 tablet by mouth Every Morning., Disp: 90 tablet, Rfl: 3  •  lisinopril (PRINIVIL,ZESTRIL) 5 MG tablet, Take 1 tablet by mouth Daily., Disp: 90 tablet, Rfl: 3  •  loratadine (CLARITIN) 10 MG tablet, Take 1 tablet by mouth Daily., Disp: 90 tablet, Rfl: 3  •  metoprolol tartrate (LOPRESSOR) 25 MG tablet, Take one tablet by mouth twice a day., Disp: 180 tablet, Rfl: 3  •  NITROSTAT 0.4 MG SL tablet, Place 1 tablet under the tongue Every 5 (Five) Minutes As Needed for Chest Pain. Take no more than 3 doses in 15 minutes., Disp: 100 tablet, Rfl: 2  •  omega-3 acid ethyl esters (LOVAZA) 1 g capsule, Take 1 capsule by mouth Daily., Disp: 90 capsule, Rfl: 3  •  omeprazole (priLOSEC) 20 MG capsule, Take 1 capsule by mouth Daily., Disp: 90 capsule, Rfl: 3  •  Potassium 99  MG tablet, Take 1 tablet by mouth Daily., Disp: , Rfl:   •  pregabalin (LYRICA) 150 MG capsule, Take 150 mg by mouth 3 (Three) Times a Day., Disp: , Rfl:   •  oxyCODONE-acetaminophen (PERCOCET) 5-325 MG per tablet, Take 1 tablet by mouth Every 6 (Six) Hours As Needed (pain)., Disp: 20 tablet, Rfl: 0  Allergies   Allergen Reactions   • Meperidine Other (See Comments)     HEART STOPS     • Statins Myalgia     Causes leg cramps       Past Medical History:   Diagnosis Date   • Anxiety    • Arthritis    • Cancer (CMS/HCC)     melanoma   • CHF (congestive heart failure) (CMS/HCC)    • Claustrophobia    • Colon polyp    • Coronary artery disease 2012, 2015    2 vessel CABG (2012), SHAHRZAD LAD (2015).    • Depression    • Disease of thyroid gland     partial thyroidectomy on right   • GERD (gastroesophageal reflux disease)    • Hearing loss    • Heart attack (CMS/HCC)    • Heart attack (CMS/HCC)    • History of transfusion    • Hyperlipidemia    • Hypertension    • Kidney stone     history of    • Tobacco abuse, in remission    • Uses hearing aid     BILATERAL     Past Surgical History:   Procedure Laterality Date   • BACK SURGERY      CERVICAL FUSION AND LUMBAR DISC REPLACEMENT   • BRONCHOSCOPY N/A 4/27/2021    Procedure: BRONCHOSCOPY;  Surgeon: Rico Jacobs MD;  [facility] OR;  Service: Cardiothoracic;  Laterality: N/A;   • CARDIAC CATHETERIZATION  07/2015    SHAHRZAD mid Dr. Malu LO   • CARDIAC CATHETERIZATION N/A 10/21/2016    Procedure: Left Heart Cath;  Surgeon: Darian Toribio MD;  Location:  [facility] CATH [facility]ASIVE [facility];  Service:    • CARDIAC CATHETERIZATION Left 4/5/2017    Procedure: Cardiac Catheterization/Vascular Study;  Surgeon: Darian Toribio MD;  Location:  [facility];  Service:    • CARDIAC CATHETERIZATION N/A 4/5/2017    Procedure: Left Heart Cath;  Surgeon: Darian Toribio MD;  Location:  [facility];  Service:    • CARDIAC CATHETERIZATION N/A 4/5/2017    Procedure:  Coronary angiography;  Surgeon: Darian Toribio MD;  Location:  [facility];  Service:    • CARDIAC CATHETERIZATION N/A 4/5/2017    Procedure: Left ventriculography;  Surgeon: Darian Toribio MD;  Location:  [facility];  Service:    • CARDIAC CATHETERIZATION  4/5/2017    Procedure: Saphenous Vein Graft;  Surgeon: Darian Toribio MD;  Location:  [facility];  Service:    • CARDIAC CATHETERIZATION Left 2/27/2018    Procedure: Cardiac Catheterization/Vascular Study SHAHRZAD OK PRN;  Surgeon: Darian Toribio MD;  Location:  [facility];  Service:    • CARDIAC CATHETERIZATION  2/27/2018    Procedure: Functional Flow [place];  Surgeon: Darian Toribio MD;  Location:  [facility];  Service:    • CARDIAC CATHETERIZATION N/A 2/27/2018    Procedure: Left ventriculography;  Surgeon: Darian Toribio MD;  Location:  [facility];  Service:    • CARDIAC CATHETERIZATION Left 4/28/2020    Procedure: Cardiac Catheterization/Vascular Study SHAHRZAD OK PRN;  Surgeon: Darian Toribio MD;  Location:  [facility];  Service: Cardiology;  Laterality: Left;   • CARDIAC ELECTROPHYSIOLOGY PROCEDURE N/A 1/11/2018    Procedure: PPM generator change - dual;  Surgeon: Darian Toribio MD;  Location:  [facility];  Service:    • CARDIAC SURGERY      open heart surgery x2   • COLONOSCOPY  09/05/2007    colon polyps   • COLONOSCOPY N/A 5/29/2018    Procedure: COLONOSCOPY WITH ANESTHESIA;  Surgeon: Juan F Tapia MD;  Location: [facility] ENDOSCOPY;  Service: Gastroenterology   • CORONARY ANGIOPLASTY WITH STENT PLACEMENT  07/2015    SHAHRZAD mid Dr. Malu LO    • CORONARY ARTERY BYPASS GRAFT  2012 AND 2014    2 vessel    • ENDOSCOPY  11/08/2010   • ENDOSCOPY N/A 5/29/2018    Procedure: ESOPHAGOGASTRODUODENOSCOPY WITH ANESTHESIA;  Surgeon: Juan F Tapia MD;  Location: [facility] ENDOSCOPY;  Service: Gastroenterology   • HEAD/NECK LESION/CYST EXCISION Left  "12/20/2016    Procedure: EXCISION MALIGNANT MELANOMA WITH SENTINEL NODE BIOPSY, INJECTION AND SCAN PRE-OP, LEFT EAR;  Surgeon: Guanaco Zepeda MD;  Location: [facility] OR;  Service:    • HEMORRHOIDECTOMY     • HERNIA REPAIR     • INSERT / REPLACE / REMOVE PACEMAKER     • SENTINEL NODE BIOPSY Left 12/20/2016    Procedure: SENTINEL NODE BIOPSY;  Surgeon: Guanaco Zepeda MD;  Location: [facility] OR;  Service:    • SKIN CANCER EXCISION     • THORACOSCOPY Right 4/27/2021    Procedure: THORACOSCOPIC TOTAL DECORTICATION;  Surgeon: Rico Jacobs MD;  Location: [facility] OR;  Service: Cardiothoracic;  Laterality: Right;   • THYROID SURGERY     • THYROIDECTOMY         Review of Systems   Constitutional: Negative.    HENT: Negative.    Eyes: Negative.    Respiratory: Negative.    Cardiovascular: Negative.    Gastrointestinal: Negative.    Endocrine: Negative.    Genitourinary: Negative.    Musculoskeletal: Negative.    Skin: Negative.    Allergic/Immunologic: Negative.    Neurological: Negative.    Hematological: Negative.    Psychiatric/Behavioral: Negative.        Objective  /76   Pulse 76   Temp 98.8 °F (37.1 °C)   Resp 16   Ht 174 cm (68.5\")   Wt 122 kg (269 lb)   SpO2 96%   BMI 40.30 kg/m²   Physical Exam  Vitals and nursing note reviewed.   Constitutional:       Appearance: Normal appearance. He is normal weight.   HENT:      Head: Normocephalic.      Nose: Nose normal.      Mouth/Throat:      Mouth: Mucous membranes are moist.   Eyes:      Extraocular Movements: Extraocular movements intact.      Pupils: Pupils are equal, round, and reactive to light.   Cardiovascular:      Rate and Rhythm: Normal rate and regular rhythm.      Pulses: Normal pulses.      Heart sounds: Normal heart sounds.   Pulmonary:      Effort: Pulmonary effort is normal.   Abdominal:      General: Abdomen is flat. Bowel sounds are normal.      Palpations: Abdomen is soft.   Musculoskeletal:         General: Normal range of motion.      " Cervical back: Normal range of motion and neck supple.   Skin:     General: Skin is warm.      Capillary Refill: Capillary refill takes less than 2 seconds.   Neurological:      General: No focal deficit present.      Mental Status: He is alert and oriented to person, place, and time. Mental status is at baseline.   Psychiatric:         Mood and Affect: Mood normal.         Behavior: Behavior normal.         Thought Content: Thought content normal.         Judgment: Judgment normal.         Assessment/Plan   Diagnoses and all orders for this visit:    1. Empyema (CMS/HCC) (Primary)      He will continue post op care         No orders of the defined types were placed in this encounter.      Follow up: 3 month(s)

## 2021-05-12 ENCOUNTER — READMISSION MANAGEMENT (OUTPATIENT)
Dept: CALL CENTER | Facility: HOSPITAL | Age: 76
End: 2021-05-12

## 2021-05-12 NOTE — OUTREACH NOTE
Medical Week 2 Survey      Responses   [facility] patient discharged from?  [place]   Does the patient have one of the following disease processes/diagnoses(primary or secondary)?  Other   Week 2 attempt successful?  Yes   Call start time  1938   Discharge diagnosis  Right Stage III empyema,  Bronchoscopy,  Right thoracoscopy,  Total decortication    Call end time  1942   Is patient permission given to speak with other caregiver?  Yes   Meds reviewed with patient/caregiver?  Yes   Is the patient having any side effects they believe may be caused by any medication additions or changes?  Yes   Side effects comments   cough this morning maybe r/t lisinopril   Does the patient have all medications ordered at discharge?  Yes   Is the patient taking all medications as directed (includes completed medication regime)?  Yes   Does the patient have a primary care provider?   Yes   Does the patient have an appointment with their PCP within 7 days of discharge?  Yes   Has the patient kept scheduled appointments due by today?  Yes   Has home health visited the patient within 72 hours of discharge?  N/A   Psychosocial issues?  No   Did the patient receive a copy of their discharge instructions?  Yes   Nursing interventions  Reviewed instructions with patient   What is the patient's perception of their health status since discharge?  Improving   Is the patient/caregiver able to teach back signs and symptoms related to disease process for when to call PCP?  Yes   Is the patient/caregiver able to teach back signs and symptoms related to disease process for when to call 911?  Yes   Is the patient/caregiver able to teach back the hierarchy of who to call/visit for symptoms/problems? PCP, Specialist, Home health nurse, Urgent Care, ED, 911  Yes   If the patient is a current smoker, are they able to teach back resources for cessation?  Not a smoker   Additional teach back comments  His breathing is slowly improving, sats 95% today  and that is improved.   Week 2 Call Completed?  Yes   Wrap up additional comments  SOA still present with exertion, no chest pain, eating and sleeping well. Gaining weight and sleeping well.          Valeria Hensley RN

## 2021-05-21 ENCOUNTER — READMISSION MANAGEMENT (OUTPATIENT)
Dept: CALL CENTER | Facility: HOSPITAL | Age: 76
End: 2021-05-21

## 2021-05-21 NOTE — OUTREACH NOTE
Medical Week 3 Survey      Responses   [facility] patient discharged from?  [place]   Does the patient have one of the following disease processes/diagnoses(primary or secondary)?  Other   Week 3 attempt successful?  Yes   Call start time  1150   Call end time  1152   Discharge diagnosis  Right Stage III empyema,  Bronchoscopy,  Right thoracoscopy,  Total decortication    Meds reviewed with patient/caregiver?  Yes   Is the patient taking all medications as directed (includes completed medication regime)?  Yes   Has the patient kept scheduled appointments due by today?  Yes   What is the patient's perception of their health status since discharge?  Improving   Additional teach back comments  On vacation in [place]   Week 3 Call Completed?  Yes          Marilee Ge RN

## 2021-05-24 ENCOUNTER — OFFICE VISIT (OUTPATIENT)
Dept: PULMONOLOGY | Facility: CLINIC | Age: 76
End: 2021-05-24

## 2021-05-24 VITALS
HEART RATE: 80 BPM | SYSTOLIC BLOOD PRESSURE: 122 MMHG | DIASTOLIC BLOOD PRESSURE: 74 MMHG | OXYGEN SATURATION: 95 % | WEIGHT: 271 LBS | HEIGHT: 69 IN | BODY MASS INDEX: 40.14 KG/M2

## 2021-05-24 DIAGNOSIS — J86.9 EMPYEMA OF PLEURA (HCC): Primary | ICD-10-CM

## 2021-05-24 DIAGNOSIS — Z78.9 NON-SMOKER: ICD-10-CM

## 2021-05-24 DIAGNOSIS — Z93.8 S/P THORACOSTOMY TUBE PLACEMENT (HCC): ICD-10-CM

## 2021-05-24 DIAGNOSIS — J18.9 PNEUMONIA OF RIGHT LUNG DUE TO INFECTIOUS ORGANISM, UNSPECIFIED PART OF LUNG: ICD-10-CM

## 2021-05-24 DIAGNOSIS — J90 RECURRENT PLEURAL EFFUSION ON RIGHT: ICD-10-CM

## 2021-05-24 DIAGNOSIS — R06.02 SOB (SHORTNESS OF BREATH): ICD-10-CM

## 2021-05-24 DIAGNOSIS — G47.33 OSA (OBSTRUCTIVE SLEEP APNEA): ICD-10-CM

## 2021-05-24 DIAGNOSIS — J30.89 NON-SEASONAL ALLERGIC RHINITIS, UNSPECIFIED TRIGGER: ICD-10-CM

## 2021-05-24 DIAGNOSIS — E66.01 CLASS 3 SEVERE OBESITY DUE TO EXCESS CALORIES WITH SERIOUS COMORBIDITY AND BODY MASS INDEX (BMI) OF 40.0 TO 44.9 IN ADULT (HCC): ICD-10-CM

## 2021-05-24 DIAGNOSIS — J98.4 RESTRICTIVE LUNG DISEASE: ICD-10-CM

## 2021-05-24 PROCEDURE — 99214 OFFICE O/P EST MOD 30 MIN: CPT | Performed by: INTERNAL MEDICINE

## 2021-05-24 RX ORDER — ESCITALOPRAM OXALATE 20 MG/1
20 TABLET ORAL DAILY
Qty: 90 TABLET | Refills: 1 | Status: ON HOLD | OUTPATIENT
Start: 2021-05-24 | End: 2022-02-19

## 2021-05-24 NOTE — TELEPHONE ENCOUNTER
Requested Prescriptions     Pending Prescriptions Disp Refills   • escitalopram (LEXAPRO) 20 MG tablet [Pharmacy Med Name: ESCITALOPRAM 20MG TABLETS] 90 tablet 1     Sig: TAKE 1 TABLET BY MOUTH DAILY

## 2021-05-24 NOTE — PROGRESS NOTES
[facility] OUTPATIENT PROGRESS NOTE    Patient: Carlo Velasco  : 1945  Age: 75 y.o.  Date of Service: May 24, 2021    REASON FOR CLINIC VISIT:  Chief Complaint   Patient presents with   • Pneumonia     Was in hospital with pneumonia about 3 weeks ago.    No exposure to covid and was tested and it was negative.       Subjective:    History of Present Illness:  Carlo Velasco is a 75 y.o. male who presents to the office today to be seen for    Diagnosis Plan   1. Empyema of pleura (CMS/[facility])     2. Pneumonia of right lung due to infectious organism, unspecified part of lung     3. Recurrent pleural effusion on right     4. SOB (shortness of breath)     5. Restrictive lung disease     6. S/P thoracostomy tube placement (CMS/[facility])     7. ERNESTO (obstructive sleep apnea)     8. Non-smoker     9. Class 3 severe obesity due to excess calories with serious comorbidity and body mass index (BMI) of 40.0 to 44.9 in adult (CMS/[facility])     10. Non-seasonal allergic rhinitis, unspecified trigger     .  Other problems per record.    Patient is a very pleasant 75 years old  gentleman who attends the pulmonary clinic with his wife for a follow-up visit.  He was seen by me about a month ago for right-sided pleural effusion and shortness of breath.    He was treated for recurrent pneumonia with right-sided pleural effusion for few months before his presentation to the clinic and he was noted to have a loculated pleural effusion on the right side and empyema suspected.  I refer the patient to Dr. Skip Jacobs the cardiothoracic surgeon and patient was taken for thoracoscopy and decortication with drainage of empyema with chest tube.  Chest tube was later removed and patient was discharged home and completed antibiotics.  He has some vague chest pain at the site of surgery on the right side but overall he is feeling better and his shortness of breath is improved.  He is returning to see Dr. Jacobs in the clinic  next week and will have another chest x-ray prior to the clinic visit a pulmonary function test has been ordered also which will be done within next few weeks.  He was advised to get a sleep study but patient is reluctant to go for another sleep study.  He has known sleep apnea and he stopped using CPAP long time ago but still has some sleep disturbances.    Patient is a remote history of tobacco use but not using any regular inhalers but he uses albuterol rescue not as needed.  He has allergy symptoms and was started on Flonase and Claritin which she has been using.  Overall he is doing better and did not have any new complaints.  To have his Covid vaccination but did not have any exposure to Covid patient any sick contact on recent travel and he was tested negative for Covid.  No other new complaints.      PFT done today:  Not done today      Results for orders placed during the hospital encounter of 07/13/17    Full Pulmonary Function Test With Bronchodilator    Narrative  [facility] - Pulmonary Function Test    [address] [place] [address]  [id number]  [phone]    Patient : Carlo Velasco  MRN : [id number]  CSN : [id number]  Pulmonologist : Cruz Atkinson MD  Date : 7/13/2017    ______________________________________________________________________    Interpretation :  1.  Spirometry is consistent with a mild restrictive ventilatory defect with coexisting mild decrease in the patient's FEF 75%,  2.  There is some improvement in the patient's FEF 75% postbronchodilator but a mild restrictive ventilatory defect still appears to be present.  3.  Lung volumes reveal a low normal total lung capacity with a decrease in vital capacity and expiratory reserve volume, consistent with a borderline restrictive ventilatory defect.  4.  Diffusion capacity is within normal limits.      Cruz Atkinson MD         Bronchodilator therapy: Albuterol rescue inhaler    Smoking Status:   Social History      Tobacco Use   Smoking Status Former Smoker   • Packs/day: 3.00   • Years: 10.00   • Pack years: 30.00   • Types: Cigarettes   • Quit date:    • Years since quittin.4   Smokeless Tobacco Former User   • Types: Snuff   • Quit date:      Pulm Rehab: no  Sleep: yes    Support System: lives with their spouse    Code Status:   There are no questions and answers to display.        Review of Systems:  A complete review of systems is performed and all other systems were reviewed and negative as note above in the HPI.  Review of Systems   Constitutional: Positive for fatigue.   HENT: Positive for congestion, postnasal drip and sinus pressure.    Eyes: Negative.    Respiratory: Positive for cough, chest tightness and shortness of breath.         His respiratory symptoms are better after the chest tube drainage of pleural effusion and empyema.   Cardiovascular: Positive for chest pain.        Patient has vague pleuritic chest pain on the right side at the site of surgery.   Endocrine: Negative.    Genitourinary: Negative.    Musculoskeletal: Positive for arthralgias and back pain.   Skin: Negative.    Allergic/Immunologic: Positive for environmental allergies.   Neurological: Negative.    Hematological: Negative.    Psychiatric/Behavioral: Negative.        CAT/ACT Score:  Not done today    Medications:  Outpatient Encounter Medications as of 2021   Medication Sig Dispense Refill   • albuterol sulfate  (90 Base) MCG/ACT inhaler Inhale 2 puffs Every 4 (Four) Hours As Needed for Wheezing or Shortness of Air for up to 90 days. 18 g 5   • atorvastatin (LIPITOR) 80 MG tablet Take 80 mg by mouth Every Night.     • clopidogrel (PLAVIX) 75 MG tablet Take 75 mg by mouth Daily.     • donepezil (ARICEPT) 5 MG tablet Take 1 tablet by mouth every night at bedtime. 90 tablet 3   • escitalopram (LEXAPRO) 20 MG tablet TAKE 1 TABLET BY MOUTH DAILY 90 tablet 1   • fluticasone (Flonase Allergy Relief) 50 MCG/ACT nasal  "spray 2 sprays into the nostril(s) as directed by provider Daily. 16 g 5   • furosemide (LASIX) 40 MG tablet Take 40 mg by mouth Daily.     • isosorbide mononitrate (IMDUR) 30 MG 24 hr tablet Take 1 tablet by mouth Every Morning. 90 tablet 3   • lisinopril (PRINIVIL,ZESTRIL) 5 MG tablet Take 1 tablet by mouth Daily. 90 tablet 3   • loratadine (CLARITIN) 10 MG tablet Take 1 tablet by mouth Daily. 90 tablet 3   • metoprolol tartrate (LOPRESSOR) 25 MG tablet Take one tablet by mouth twice a day. 180 tablet 3   • NITROSTAT 0.4 MG SL tablet Place 1 tablet under the tongue Every 5 (Five) Minutes As Needed for Chest Pain. Take no more than 3 doses in 15 minutes. 100 tablet 2   • omega-3 acid ethyl esters (LOVAZA) 1 g capsule Take 1 capsule by mouth Daily. 90 capsule 3   • omeprazole (priLOSEC) 20 MG capsule Take 1 capsule by mouth Daily. 90 capsule 3   • oxyCODONE-acetaminophen (PERCOCET) 5-325 MG per tablet Take 1 tablet by mouth Every 6 (Six) Hours As Needed (pain). 20 tablet 0   • Potassium 99 MG tablet Take 1 tablet by mouth Daily.     • pregabalin (LYRICA) 150 MG capsule Take 150 mg by mouth 3 (Three) Times a Day.     • [DISCONTINUED] escitalopram (LEXAPRO) 20 MG tablet Take 1 tablet by mouth Daily. 90 tablet 1     No facility-administered encounter medications on file as of 5/24/2021.       Allergies:  Allergies   Allergen Reactions   • Meperidine Other (See Comments)     HEART STOPS     • Statins Myalgia     Causes leg cramps       Immunizations:  Immunization History   Administered Date(s) Administered   • FLUAD TRI 65YR+ 10/10/2019   • Fluad Quad 65+ 10/10/2019   • Fluzone High Dose =>65 Years ([employer] ONLY) 10/11/2018   • TD Preservative Free 08/17/2018   • Tdap 09/17/2015   • influenza Split 10/22/2016       Objective:    Vitals:  /74   Pulse 80   Ht 174 cm (68.5\")   Wt 123 kg (271 lb)   SpO2 95% Comment: RA  BMI 40.61 kg/m²     Physical Exam:  General: Patient is a 75 y.o. Obese elderly  " male. Looks stated age. Appears to be in no acute distress.  Eyes: EOMI. PERRLA. Vision intact. No scleral icterus.  Ear, Nose, Mouth and Throat: Hearing is grossly intact. No Leukoplakia, pharyngitis, stomatitis or thrush. Swollen nasal mucosa with post nasal drop.  Neck: Range of motion of neck normal. No thyromegaly or masses. Mallampati Class 3, No JVD.  Respiratory: Clear to auscultation bilaterally.  Crease based on the right lung base.  No use of accessory muscles. Decreased breath sounds.  Cardiovascular: Normal heart sounds. Regularly regular rhythm without murmur.  Gastrointestinal: Non tender, non distended, soft. Bowel sounds positive in all four quadrants. No organomegaly.  Skin: No obvious rashes, lesions, ulcers or large amount of bruising. No edema.   Neurological: No new motor deficits. Cranial nerves appear intact.  Psychiatric: Patient is alert and oriented to person, place and time.    Chest Imaging:  Reviewed recent chest x-ray which showed patient is right-sided chest tube removed and has residual pleural effusion on the right side no other new abnormality noted.    Assessment:  1. Empyema of pleura (CMS/[facility])    2. Pneumonia of right lung due to infectious organism, unspecified part of lung    3. Recurrent pleural effusion on right    4. SOB (shortness of breath)    5. Restrictive lung disease    6. S/P thoracostomy tube placement (CMS/[facility])    7. ERNESTO (obstructive sleep apnea)    8. Non-smoker    9. Class 3 severe obesity due to excess calories with serious comorbidity and body mass index (BMI) of 40.0 to 44.9 in adult (CMS/[facility])    10. Non-seasonal allergic rhinitis, unspecified trigger        Plan/Recommendations:    1.  Patient had surgical intervention done for empyema after thoracoscopy and chest tube drainage of the pleural material he is feeling better and breathing better.  2.  He has history of tubes in the past and may have underlying COPD.  His last lung function test done in 2017  shows mild restrictive dysfunction.  Repeat PFT has been ordered for next week.  We will check for the PFT results during next visit.  3.  Continue albuterol rescue letter from now and will add long-term inhaler if needed depending on test results.  4.  Continue follow-up with Dr. Jacobs with a chest x-ray as planned in early June.  5.  Patient will need to continue using Flonase and Claritin for nasal allergy.  No medication refill was needed at this time.  6.  He has untreated sleep apnea but he refused to do the sleep study and further work-up as she could not tolerate CPAP in the past.  He did not want to take Covid vaccine either.  Continue follow-up with a primary care provider and return in 3 months time for follow-up with Dr. Jacobs in my clinic for further evaluation.    Follow up:  3 Months    Time Spent:  30 minutes    I appreciate the opportunity of participating in this patient's care. I would like to thank the PCP for the referral.  Please feel free to contact me with any other questions.    Katelyn Buckley MD   Pulmonologist/Intensivist     Electronically signed by: Katelyn Buckley MD, 5/24/2021 15:25 CDT

## 2021-05-25 ENCOUNTER — TRANSCRIBE ORDERS (OUTPATIENT)
Dept: LAB | Facility: HOSPITAL | Age: 76
End: 2021-05-25

## 2021-05-25 DIAGNOSIS — Z01.818 PREOPERATIVE TESTING: Primary | ICD-10-CM

## 2021-06-02 ENCOUNTER — LAB (OUTPATIENT)
Dept: LAB | Facility: HOSPITAL | Age: 76
End: 2021-06-02

## 2021-06-02 ENCOUNTER — TRANSCRIBE ORDERS (OUTPATIENT)
Dept: LAB | Facility: HOSPITAL | Age: 76
End: 2021-06-02

## 2021-06-02 ENCOUNTER — HOSPITAL ENCOUNTER (OUTPATIENT)
Dept: GENERAL RADIOLOGY | Facility: HOSPITAL | Age: 76
Discharge: HOME | End: 2021-06-02

## 2021-06-02 ENCOUNTER — OFFICE VISIT (OUTPATIENT)
Dept: CARDIAC SURGERY | Facility: CLINIC | Age: 76
End: 2021-06-02

## 2021-06-02 VITALS
DIASTOLIC BLOOD PRESSURE: 80 MMHG | BODY MASS INDEX: 40.49 KG/M2 | WEIGHT: 273.4 LBS | SYSTOLIC BLOOD PRESSURE: 130 MMHG | HEIGHT: 69 IN | OXYGEN SATURATION: 95 % | HEART RATE: 77 BPM

## 2021-06-02 DIAGNOSIS — Z01.818 PREOPERATIVE TESTING: ICD-10-CM

## 2021-06-02 DIAGNOSIS — Z01.818 PREOPERATIVE TESTING: Primary | ICD-10-CM

## 2021-06-02 DIAGNOSIS — J86.9 EMPYEMA (HCC): ICD-10-CM

## 2021-06-02 DIAGNOSIS — J90 RECURRENT PLEURAL EFFUSION ON RIGHT: Primary | ICD-10-CM

## 2021-06-02 LAB — SARS-COV-2 RNA PNL SPEC NAA+PROBE: NOT DETECTED

## 2021-06-02 PROCEDURE — C9803 HOPD COVID-19 SPEC COLLECT: HCPCS

## 2021-06-02 PROCEDURE — 87635 SARS-COV-2 COVID-19 AMP PRB: CPT

## 2021-06-02 PROCEDURE — 99024 POSTOP FOLLOW-UP VISIT: CPT | Performed by: THORACIC SURGERY (CARDIOTHORACIC VASCULAR SURGERY)

## 2021-06-02 PROCEDURE — 71046 X-RAY EXAM CHEST 2 VIEWS: CPT

## 2021-06-03 ENCOUNTER — HOSPITAL ENCOUNTER (OUTPATIENT)
Dept: PULMONOLOGY | Facility: HOSPITAL | Age: 76
Discharge: HOME | End: 2021-06-03
Admitting: INTERNAL MEDICINE

## 2021-06-03 PROCEDURE — 94729 DIFFUSING CAPACITY: CPT | Performed by: INTERNAL MEDICINE

## 2021-06-03 PROCEDURE — 94060 EVALUATION OF WHEEZING: CPT | Performed by: INTERNAL MEDICINE

## 2021-06-03 PROCEDURE — 94729 DIFFUSING CAPACITY: CPT

## 2021-06-03 PROCEDURE — 94726 PLETHYSMOGRAPHY LUNG VOLUMES: CPT

## 2021-06-03 PROCEDURE — 94060 EVALUATION OF WHEEZING: CPT

## 2021-06-03 PROCEDURE — 94726 PLETHYSMOGRAPHY LUNG VOLUMES: CPT | Performed by: INTERNAL MEDICINE

## 2021-06-03 RX ORDER — ALBUTEROL SULFATE 2.5 MG/3ML
2.5 SOLUTION RESPIRATORY (INHALATION) ONCE
Status: COMPLETED | OUTPATIENT
Start: 2021-06-03 | End: 2021-06-03

## 2021-06-03 RX ADMIN — ALBUTEROL SULFATE 2.5 MG: 2.5 SOLUTION RESPIRATORY (INHALATION) at 07:40

## 2021-06-07 ENCOUNTER — HOSPITAL ENCOUNTER (OUTPATIENT)
Dept: GENERAL RADIOLOGY | Facility: HOSPITAL | Age: 76
Discharge: HOME | End: 2021-06-07
Admitting: NURSE PRACTITIONER

## 2021-06-07 ENCOUNTER — TRANSCRIBE ORDERS (OUTPATIENT)
Dept: ADMINISTRATIVE | Facility: HOSPITAL | Age: 76
End: 2021-06-07

## 2021-06-07 DIAGNOSIS — M25.551 RIGHT HIP PAIN: Primary | ICD-10-CM

## 2021-06-07 PROCEDURE — 73502 X-RAY EXAM HIP UNI 2-3 VIEWS: CPT

## 2021-06-15 ENCOUNTER — HOSPITAL ENCOUNTER (OUTPATIENT)
Dept: PAIN MANAGEMENT | Age: 76
Discharge: HOME | End: 2021-06-15
Payer: MEDICARE

## 2021-06-15 VITALS
SYSTOLIC BLOOD PRESSURE: 128 MMHG | RESPIRATION RATE: 20 BRPM | TEMPERATURE: 96.1 F | DIASTOLIC BLOOD PRESSURE: 75 MMHG | OXYGEN SATURATION: 96 % | HEART RATE: 66 BPM

## 2021-06-15 DIAGNOSIS — R52 PAIN MANAGEMENT: ICD-10-CM

## 2021-06-15 PROCEDURE — 6360000002 HC RX W HCPCS

## 2021-06-15 PROCEDURE — 20610 DRAIN/INJ JOINT/BURSA W/O US: CPT

## 2021-06-15 PROCEDURE — 2500000003 HC RX 250 WO HCPCS

## 2021-06-15 PROCEDURE — 3209999900 FLUORO FOR SURGICAL PROCEDURES

## 2021-06-15 RX ORDER — METHYLPREDNISOLONE ACETATE 80 MG/ML
INJECTION, SUSPENSION INTRA-ARTICULAR; INTRALESIONAL; INTRAMUSCULAR; SOFT TISSUE
Status: COMPLETED | OUTPATIENT
Start: 2021-06-15 | End: 2021-06-15

## 2021-06-15 RX ORDER — BUPIVACAINE HYDROCHLORIDE 5 MG/ML
INJECTION, SOLUTION EPIDURAL; INTRACAUDAL
Status: COMPLETED | OUTPATIENT
Start: 2021-06-15 | End: 2021-06-15

## 2021-06-15 RX ORDER — LIDOCAINE HYDROCHLORIDE 10 MG/ML
INJECTION, SOLUTION EPIDURAL; INFILTRATION; INTRACAUDAL; PERINEURAL
Status: COMPLETED | OUTPATIENT
Start: 2021-06-15 | End: 2021-06-15

## 2021-06-15 RX ORDER — SODIUM CHLORIDE 9 MG/ML
INJECTION INTRAVENOUS
Status: COMPLETED | OUTPATIENT
Start: 2021-06-15 | End: 2021-06-15

## 2021-06-15 RX ADMIN — METHYLPREDNISOLONE ACETATE 80 MG: 80 INJECTION, SUSPENSION INTRA-ARTICULAR; INTRALESIONAL; INTRAMUSCULAR; SOFT TISSUE at 08:37

## 2021-06-15 RX ADMIN — SODIUM CHLORIDE 3 ML: 9 INJECTION INTRAVENOUS at 08:37

## 2021-06-15 RX ADMIN — BUPIVACAINE HYDROCHLORIDE 1 ML: 5 INJECTION, SOLUTION EPIDURAL; INTRACAUDAL at 08:37

## 2021-06-15 RX ADMIN — LIDOCAINE HYDROCHLORIDE 6 ML: 10 INJECTION, SOLUTION EPIDURAL; INFILTRATION; INTRACAUDAL; PERINEURAL at 08:36

## 2021-06-15 ASSESSMENT — PAIN SCALES - GENERAL: PAINLEVEL_OUTOF10: 5

## 2021-06-15 ASSESSMENT — PAIN DESCRIPTION - LOCATION: LOCATION: HIP

## 2021-06-15 ASSESSMENT — PAIN DESCRIPTION - ORIENTATION: ORIENTATION: RIGHT

## 2021-06-15 ASSESSMENT — PAIN - FUNCTIONAL ASSESSMENT: PAIN_FUNCTIONAL_ASSESSMENT: 0-10

## 2021-06-15 ASSESSMENT — PAIN DESCRIPTION - PAIN TYPE: TYPE: CHRONIC PAIN

## 2021-06-20 NOTE — PROGRESS NOTES
"Subjective   Patient ID: Carlo Velasco is a 75 y.o. male who is here for follow-up having had  Bronchoscopy, right thoracoscopy, total decortication performed on 4/27/2021 by Dr. Jacobs        History of Present Illness  Post operative recovery was unevent.  Sleep habits are good.  Pain control has been excellent.    No fevers/sweats/chills.   No drainage from incisions. No chest pain or shortness of breath.   His appetite is good.  He is ambulating well.  He is a non smoker.  Overall he feels better than he did preoperatively.  He is encouraged that his stamina is continue to improve      The following portions of the patient's history were reviewed and updated as appropriate: allergies, current medications, past family history, past medical history, past social history, past surgical history and problem list.        Objective   Visit Vitals  /80 (BP Location: Left arm, Patient Position: Sitting, Cuff Size: Adult)   Pulse 77   Ht 174 cm (68.5\")   Wt 124 kg (273 lb 6.4 oz)   SpO2 95%   BMI 40.97 kg/m²       Physical Exam  Constitutional:       Appearance: He is well-developed.   HENT:      Head: Normocephalic and atraumatic.   Eyes:      Pupils: Pupils are equal, round, and reactive to light.   Neck:      Thyroid: No thyromegaly.      Vascular: No JVD.      Trachea: No tracheal deviation.   Cardiovascular:      Rate and Rhythm: Normal rate and regular rhythm.      Heart sounds: Normal heart sounds. No murmur heard.   No friction rub. No gallop.    Pulmonary:      Effort: Pulmonary effort is normal. No respiratory distress.      Breath sounds: Normal breath sounds. No wheezing or rales.   Chest:      Chest wall: No tenderness.   Abdominal:      General: There is no distension.      Palpations: Abdomen is soft.      Tenderness: There is no abdominal tenderness.   Musculoskeletal:         General: Normal range of motion.      Cervical back: Normal range of motion and neck supple.   Lymphadenopathy:      Cervical: " No cervical adenopathy.   Skin:     General: Skin is warm and dry.      Comments: Thoracic incisions are c/d/i   Neurological:      Mental Status: He is alert and oriented to person, place, and time.      Cranial Nerves: No cranial nerve deficit.             XR Chest 2 View    Result Date: 6/2/2021  Narrative: EXAM: XR CHEST 2 VW-  INDICATION: empyema; J86.9-Pyothorax without fistula  COMPARISON: 5/4/2021  FINDINGS:  Cardiac silhouette is within normal limits and stable. Prior median sternotomy. 2-lead LEFT chest wall cardiac pacing device is stable in position. No left-sided pleural effusion. Decrease in mild RIGHT pleural thickening and decrease in RIGHT lower lobe atelectasis. No new airspace opacity. No pneumothorax. Cervical spine fusion hardware is noted. No acute osseous finding.      Impression:  Decrease in mild residual RIGHT pleural thickening and decrease in RIGHT basilar atelectasis. This report was finalized on 06/02/2021 14:28 by Dr. Eleuterio Nieto MD.          Assessment/Plan       Diagnoses and all orders for this visit:    1. Recurrent pleural effusion on right (Primary)        Overall, Carlo Velasco is doing well.  We discussed his thoracoscopy precautions and how those will evolve.  He will see Dr. Buckley soon.      Although I have not provided a specific follow up appointment, should I be of further assistance, please do not hesitate to contact me.      He is a non smoker.      He is on statin therapy.

## 2021-06-23 RX ORDER — ISOSORBIDE MONONITRATE 30 MG/1
30 TABLET, EXTENDED RELEASE ORAL EVERY MORNING
Qty: 90 TABLET | Refills: 3 | Status: SHIPPED | OUTPATIENT
Start: 2021-06-23 | End: 2022-07-05

## 2021-07-13 ENCOUNTER — CLINICAL SUPPORT NO REQUIREMENTS (OUTPATIENT)
Dept: CARDIOLOGY | Facility: CLINIC | Age: 76
End: 2021-07-13

## 2021-07-13 DIAGNOSIS — I49.5 SICK SINUS SYNDROME (HCC): ICD-10-CM

## 2021-07-13 PROCEDURE — 93288 INTERROG EVL PM/LDLS PM IP: CPT | Performed by: PHYSICIAN ASSISTANT

## 2021-07-25 PROCEDURE — 93294 REM INTERROG EVL PM/LDLS PM: CPT | Performed by: INTERNAL MEDICINE

## 2021-07-25 PROCEDURE — 93296 REM INTERROG EVL PM/IDS: CPT | Performed by: INTERNAL MEDICINE

## 2021-07-29 DIAGNOSIS — G62.9 NEUROPATHY: Primary | ICD-10-CM

## 2021-07-29 RX ORDER — PREGABALIN 150 MG/1
150 CAPSULE ORAL 3 TIMES DAILY
Qty: 90 CAPSULE | Refills: 1 | Status: ON HOLD | OUTPATIENT
Start: 2021-07-29 | End: 2022-02-19

## 2021-07-29 NOTE — TELEPHONE ENCOUNTER
Caller: Amaya Velasco    Relationship: Emergency Contact    Best call back number: [phone]    Medication needed:   Requested Prescriptions     Pending Prescriptions Disp Refills   • pregabalin (LYRICA) 150 MG capsule       Sig: Take 1 capsule by mouth 3 (Three) Times a Day.       When do you need the refill by: ASAP    Does the patient have less than a 3 day supply:  [x] Yes  [] No    What is the patient's preferred pharmacy: [id number] [address] [phone] [facility] [phone]

## 2021-08-24 ENCOUNTER — OFFICE VISIT (OUTPATIENT)
Dept: PULMONOLOGY | Facility: CLINIC | Age: 76
End: 2021-08-24

## 2021-08-24 ENCOUNTER — HOSPITAL ENCOUNTER (OUTPATIENT)
Dept: PAIN MANAGEMENT | Age: 76
Discharge: HOME | End: 2021-08-24
Payer: MEDICARE

## 2021-08-24 VITALS
HEART RATE: 74 BPM | RESPIRATION RATE: 18 BRPM | OXYGEN SATURATION: 97 % | TEMPERATURE: 96 F | SYSTOLIC BLOOD PRESSURE: 138 MMHG | DIASTOLIC BLOOD PRESSURE: 86 MMHG

## 2021-08-24 VITALS
HEIGHT: 69 IN | WEIGHT: 274 LBS | DIASTOLIC BLOOD PRESSURE: 78 MMHG | OXYGEN SATURATION: 97 % | BODY MASS INDEX: 40.58 KG/M2 | HEART RATE: 70 BPM | SYSTOLIC BLOOD PRESSURE: 128 MMHG

## 2021-08-24 DIAGNOSIS — R52 PAIN MANAGEMENT: ICD-10-CM

## 2021-08-24 DIAGNOSIS — F41.9 ANXIETY: ICD-10-CM

## 2021-08-24 DIAGNOSIS — J45.20 MILD INTERMITTENT ASTHMA WITHOUT COMPLICATION: ICD-10-CM

## 2021-08-24 DIAGNOSIS — Z78.9 NON-SMOKER: ICD-10-CM

## 2021-08-24 DIAGNOSIS — J30.89 NON-SEASONAL ALLERGIC RHINITIS, UNSPECIFIED TRIGGER: ICD-10-CM

## 2021-08-24 DIAGNOSIS — R05.9 COUGH: ICD-10-CM

## 2021-08-24 DIAGNOSIS — J98.4 RESTRICTIVE LUNG DISEASE: ICD-10-CM

## 2021-08-24 DIAGNOSIS — R06.02 SOB (SHORTNESS OF BREATH): ICD-10-CM

## 2021-08-24 DIAGNOSIS — E66.01 CLASS 3 SEVERE OBESITY DUE TO EXCESS CALORIES WITH SERIOUS COMORBIDITY AND BODY MASS INDEX (BMI) OF 40.0 TO 44.9 IN ADULT (HCC): ICD-10-CM

## 2021-08-24 DIAGNOSIS — J86.9 EMPYEMA OF PLEURA (HCC): Primary | ICD-10-CM

## 2021-08-24 DIAGNOSIS — G47.33 OSA (OBSTRUCTIVE SLEEP APNEA): ICD-10-CM

## 2021-08-24 PROBLEM — Z87.891 FORMER SMOKER, STOPPED SMOKING MANY YEARS AGO: Status: ACTIVE | Noted: 2021-04-21

## 2021-08-24 PROCEDURE — 2500000003 HC RX 250 WO HCPCS

## 2021-08-24 PROCEDURE — 3209999900 FLUORO FOR SURGICAL PROCEDURES

## 2021-08-24 PROCEDURE — 2580000003 HC RX 258

## 2021-08-24 PROCEDURE — 99214 OFFICE O/P EST MOD 30 MIN: CPT | Performed by: INTERNAL MEDICINE

## 2021-08-24 PROCEDURE — 6360000002 HC RX W HCPCS

## 2021-08-24 PROCEDURE — G0260 INJ FOR SACROILIAC JT ANESTH: HCPCS

## 2021-08-24 RX ORDER — LORATADINE 10 MG/1
10 TABLET ORAL DAILY
Qty: 90 TABLET | Refills: 3 | Status: SHIPPED | OUTPATIENT
Start: 2021-08-24 | End: 2022-03-10 | Stop reason: SDUPTHER

## 2021-08-24 RX ORDER — SODIUM CHLORIDE 9 MG/ML
3 INJECTION INTRAVENOUS ONCE
Status: DISCONTINUED | OUTPATIENT
Start: 2021-08-24 | End: 2021-08-26 | Stop reason: HOSPADM

## 2021-08-24 RX ORDER — ALBUTEROL SULFATE 90 UG/1
2 AEROSOL, METERED RESPIRATORY (INHALATION) EVERY 4 HOURS PRN
Qty: 25.5 G | Refills: 2 | Status: SHIPPED | OUTPATIENT
Start: 2021-08-24 | End: 2021-11-22

## 2021-08-24 RX ORDER — ALPRAZOLAM 0.25 MG/1
TABLET ORAL
Status: ON HOLD | COMMUNITY
Start: 2021-08-23 | End: 2022-02-19

## 2021-08-24 RX ORDER — TOPIRAMATE 25 MG/1
CAPSULE, EXTENDED RELEASE ORAL
Status: ON HOLD | COMMUNITY
Start: 2021-08-23 | End: 2022-02-19

## 2021-08-24 RX ORDER — BENZONATATE 100 MG/1
CAPSULE ORAL AS NEEDED
Status: ON HOLD | COMMUNITY
Start: 2021-08-23 | End: 2022-02-19

## 2021-08-24 RX ORDER — METHYLPREDNISOLONE ACETATE 80 MG/ML
80 INJECTION, SUSPENSION INTRA-ARTICULAR; INTRALESIONAL; INTRAMUSCULAR; SOFT TISSUE ONCE
Status: DISCONTINUED | OUTPATIENT
Start: 2021-08-24 | End: 2021-08-26 | Stop reason: HOSPADM

## 2021-08-24 RX ORDER — FLUTICASONE PROPIONATE 50 MCG
2 SPRAY, SUSPENSION (ML) NASAL DAILY
Qty: 16 G | Refills: 5 | Status: SHIPPED | OUTPATIENT
Start: 2021-08-24 | End: 2022-10-19

## 2021-08-24 RX ORDER — AMOXICILLIN AND CLAVULANATE POTASSIUM 875; 125 MG/1; MG/1
TABLET, FILM COATED ORAL
Status: ON HOLD | COMMUNITY
Start: 2021-08-23 | End: 2021-10-21

## 2021-08-24 RX ORDER — BUPIVACAINE HYDROCHLORIDE 5 MG/ML
2 INJECTION, SOLUTION EPIDURAL; INTRACAUDAL ONCE
Status: DISCONTINUED | OUTPATIENT
Start: 2021-08-24 | End: 2021-08-26 | Stop reason: HOSPADM

## 2021-08-24 RX ORDER — TOPIRAMATE 25 MG/1
25 TABLET ORAL 3 TIMES DAILY
Status: ON HOLD | COMMUNITY
Start: 2021-08-09 | End: 2022-02-19

## 2021-08-24 RX ORDER — SUCRALFATE 1 G/1
TABLET ORAL
Status: ON HOLD | COMMUNITY
Start: 2021-08-23 | End: 2022-02-19

## 2021-08-24 RX ORDER — LIDOCAINE HYDROCHLORIDE 10 MG/ML
7 INJECTION, SOLUTION EPIDURAL; INFILTRATION; INTRACAUDAL; PERINEURAL ONCE
Status: DISCONTINUED | OUTPATIENT
Start: 2021-08-24 | End: 2021-08-26 | Stop reason: HOSPADM

## 2021-08-24 ASSESSMENT — PAIN DESCRIPTION - ORIENTATION: ORIENTATION: LOWER

## 2021-08-24 ASSESSMENT — PAIN DESCRIPTION - PAIN TYPE: TYPE: CHRONIC PAIN

## 2021-08-24 ASSESSMENT — PAIN SCALES - GENERAL: PAINLEVEL_OUTOF10: 5

## 2021-08-24 ASSESSMENT — PAIN - FUNCTIONAL ASSESSMENT: PAIN_FUNCTIONAL_ASSESSMENT: 0-10

## 2021-08-24 ASSESSMENT — PAIN DESCRIPTION - LOCATION: LOCATION: BACK

## 2021-08-24 NOTE — INTERVAL H&P NOTE
Update History & Physical    The patient's History and Physical   was reviewed with the patient and I examined the patient. There was  NO CHANGE:[id number]}. The surgical site was confirmed by the patient and me. Plan: The risks, benefits, expected outcome, and alternative to the recommended procedure have been discussed with the patient. Patient understands and wants to proceed with the procedure.      Electronically signed by Ouida Boas, MD on 8/24/2021 at 9:41 AM

## 2021-08-24 NOTE — PROGRESS NOTES
RESPIRATORY DISEASE CLINIC OUTPATIENT PROGRESS NOTE    Patient: Carlo Velasco  : 1945  Age: 76 y.o.  Date of Service: 2021    REASON FOR CLINIC VISIT:  Chief Complaint   Patient presents with   • Empyema of pleura     no new complaints since last vist, no covid vaccine   • Pneumonia of right lung due to infectious organism, unspecif       Subjective:    History of Present Illness:  Carlo Velasco is a 76 y.o. male who presents to the office today to be seen for    Diagnosis Plan   1. Empyema of pleura (CMS/[facility])     2. ERNESTO (obstructive sleep apnea)     3. Non-smoker     4. Non-seasonal allergic rhinitis, unspecified trigger     5. Anxiety     6. Class 3 severe obesity due to excess calories with serious comorbidity and body mass index (BMI) of 40.0 to 44.9 in adult (CMS/[facility])     7. Mild intermittent asthma without complication     8. Restrictive lung disease     9. SOB (shortness of breath)     10. Cough     .  Other problems per record.  Patient is a 76 years old very pleasant  gentleman who has some baseline dementia he attends the pulmonary clinic with his wife for a follow-up visit.    Patient is a former smoker and has a mixed obstructive and restrictive dysfunction.  He was seen by me in the pulmonary clinic in April for abnormal imaging studies and noted to be right-sided pneumonia with recurrent pleural effusion which later turned into empyema.  He was sent to Dr. Jacobs and patient is a thoracotomy and decortication done and he did well afterwards.  He is a former smoker who quit smoking in .  His recent pulmonary function test showed mixed moderate obstructive and restrictive dysfunction.  He was given albuterol rescue inhaler but he is not using it regularly and ran out of it and wanted a refill.  He does not want to use any long-term inhaler.  He also has sleep apnea but he did not want to do a sleep study.  He has allergic rhinitis and using Flonase and Claritin.  He  refused to take the Covid vaccination.    Overall he is doing well and his allergy symptoms are better and his respiratory symptoms has improved since he had a thoracotomy and decortication done.  He had no other acute complaints and he is not admitted in the hospital and ER visit or urgent care visit in the last few months.  He lives at home with his wife and is currently retired.        PFT done today:  Not done today      Results for orders placed in visit on 04/21/21    Pulmonary Function Test    [facility] - Pulmonary Function Test    [address]  [place]  [address]  [phone]    Patient : Carlo Velasco  MRN : [id number]  CSN : [id number]  Pulmonologist : Cruz Atkinson MD  Date : 6/7/2021    ______________________________________________________________________    Interpretation :  1.  Spirometry is consistent with a moderate restrictive ventilatory defect with coexisting mild small airways disease.  2.  There is improvement in spirometry postbronchodilator particularly in small airways function which is now normal but a moderate restrictive ventilatory defect still appears to be present.  3.  Lung volumes confirm a moderate restrictive ventilatory defect.  4.  There is a moderate diffusion impairment which when corrected for alveolar volume is normalized.  5.  When current studies are compared to studies from July 13 of 2017, the patient's current prebronchodilator FVC and FEV1 have dropped significantly compared to previous prebronchodilator studies.  His postbronchodilator FVC and FEV1 have also dropped significantly compared to previous postbronchodilator studies.  There has also been a drop in his total lung capacity compared to previous.  When corrected for alveolar volume there has also been a decrease in his diffusion capacity compared to previous although it remains within normal limits.      Cruz Atkinson MD      Results for orders placed during the  hospital encounter of 17    Full Pulmonary Function Test With Bronchodilator    Narrative  [facility] - Pulmonary Function Test    [address]  [place]  [place]  [address]  [phone]    Patient : Carlo Velasco  MRN : [id number]  CSN : [id number]  Pulmonologist : Cruz Atkinson MD  Date : 2017    ______________________________________________________________________    Interpretation :  1.  Spirometry is consistent with a mild restrictive ventilatory defect with coexisting mild decrease in the patient's FEF 75%,  2.  There is some improvement in the patient's FEF 75% postbronchodilator but a mild restrictive ventilatory defect still appears to be present.  3.  Lung volumes reveal a low normal total lung capacity with a decrease in vital capacity and expiratory reserve volume, consistent with a borderline restrictive ventilatory defect.  4.  Diffusion capacity is within normal limits.      Cruz Atkinson MD         Bronchodilator therapy: Albuterol rescue inhaler as needed. Needs refill.     Smoking Status:   Social History     Tobacco Use   Smoking Status Former Smoker   • Packs/day: 3.00   • Years: 10.00   • Pack years: 30.00   • Types: Cigarettes   • Quit date:    • Years since quittin.6   Smokeless Tobacco Former User   • Types: Snuff   • Quit date:      Pulm Rehab: no  Sleep: yes    Support System: lives with their spouse    Code Status:   There are no questions and answers to display.        Review of Systems:  A complete review of systems is performed and all other systems were reviewed and negative as note above in the HPI.  Review of Systems   Constitutional: Negative.    HENT: Positive for congestion and postnasal drip.    Eyes: Negative.    Respiratory: Positive for cough and shortness of breath.    Cardiovascular: Negative.    Gastrointestinal: Negative.    Endocrine: Negative.    Genitourinary: Negative.    Musculoskeletal: Negative.    Skin: Negative.     Allergic/Immunologic: Positive for environmental allergies.   Neurological: Positive for memory problem.   Hematological: Negative.    Psychiatric/Behavioral: Negative.        CAT/ACT Score:  Not done today    Medications:  Outpatient Encounter Medications as of 8/24/2021   Medication Sig Dispense Refill   • ALPRAZolam (XANAX) 0.25 MG tablet      • atorvastatin (LIPITOR) 80 MG tablet Take 80 mg by mouth Every Night.     • Cholecalciferol 125 MCG (5000 UT) tablet      • clopidogrel (PLAVIX) 75 MG tablet Take 75 mg by mouth Daily.     • donepezil (ARICEPT) 5 MG tablet Take 1 tablet by mouth every night at bedtime. 90 tablet 3   • escitalopram (LEXAPRO) 20 MG tablet TAKE 1 TABLET BY MOUTH DAILY 90 tablet 1   • fluticasone (Flonase Allergy Relief) 50 MCG/ACT nasal spray 2 sprays into the nostril(s) as directed by provider Daily. 16 g 5   • furosemide (LASIX) 40 MG tablet Take 40 mg by mouth Daily.     • isosorbide mononitrate (IMDUR) 30 MG 24 hr tablet TAKE 1 TABLET BY MOUTH EVERY MORNING 90 tablet 3   • lisinopril (PRINIVIL,ZESTRIL) 5 MG tablet Take 1 tablet by mouth Daily. 90 tablet 3   • loratadine (CLARITIN) 10 MG tablet Take 1 tablet by mouth Daily. 90 tablet 3   • metoprolol tartrate (LOPRESSOR) 25 MG tablet Take one tablet by mouth twice a day. 180 tablet 3   • NITROSTAT 0.4 MG SL tablet Place 1 tablet under the tongue Every 5 (Five) Minutes As Needed for Chest Pain. Take no more than 3 doses in 15 minutes. 100 tablet 2   • omega-3 acid ethyl esters (LOVAZA) 1 g capsule Take 1 capsule by mouth Daily. 90 capsule 3   • omeprazole (priLOSEC) 20 MG capsule Take 1 capsule by mouth Daily. 90 capsule 3   • oxyCODONE-acetaminophen (PERCOCET) 5-325 MG per tablet Take 1 tablet by mouth Every 6 (Six) Hours As Needed (pain). (Patient taking differently: Take 1 tablet by mouth Every 6 (Six) Hours As Needed (pain). As needed, rarely takes) 20 tablet 0   • Potassium 99 MG tablet Take 1 tablet by mouth Daily.     • sucralfate  "(CARAFATE) 1 g tablet      • topiramate (TOPAMAX) 25 MG tablet Take 25 mg by mouth 3 (Three) Times a Day.     • Topiramate ER 25 MG capsule sustained-release 24 hr      • amoxicillin-clavulanate (AUGMENTIN) 875-125 MG per tablet      • benzonatate (TESSALON) 100 MG capsule      • pregabalin (LYRICA) 150 MG capsule Take 1 capsule by mouth 3 (Three) Times a Day. 90 capsule 1     No facility-administered encounter medications on file as of 8/24/2021.       Allergies:  Allergies   Allergen Reactions   • Meperidine Other (See Comments)     HEART STOPS    Other reaction(s): Unknown   • Statins Myalgia     Causes leg cramps       Immunizations:  Immunization History   Administered Date(s) Administered   • FLUAD TRI 65YR+ 10/10/2019   • Fluad Quad 65+ 10/10/2019   • Fluzone High Dose =>65 Years ([employer] ONLY) 10/11/2018   • TD Preservative Free 08/17/2018   • Tdap 09/17/2015   • influenza Split 10/22/2016       Objective:    Vitals:  /78   Pulse 70   Ht 174 cm (68.5\")   Wt 124 kg (274 lb)   SpO2 97%   BMI 41.06 kg/m²     Physical Exam:  General: Patient is a 76 y.o. elderly obese  male. Looks stated age. Appears to be in no acute distress.  Eyes: EOMI. PERRLA. Vision intact. No scleral icterus.  Ear, Nose, Mouth and Throat: Hearing is grossly intact. No Leukoplakia, pharyngitis, stomatitis or thrush. Swollen nasal mucosa with post nasal drop.  Neck: Range of motion of neck normal. No thyromegaly or masses. Mallampati Class 3, No JVD  Respiratory: Clear to auscultation bilaterally. No use of accessory muscles. Decreased breath sounds.  Cardiovascular: Normal heart sounds. Regularly regular rhythm without murmur.  Gastrointestinal: Non tender, non distended, soft. Bowel sounds positive in all four quadrants. No organomegaly.  Skin: No obvious rashes, lesions, ulcers or large amount of bruising. No edema.   Neurological: No new motor deficits. Cranial nerves appear intact.  Psychiatric: Patient is alert and " oriented to person, place and time.    Chest Imaging:    Last CXR showed     IMPRESSION:     Decrease in mild residual RIGHT pleural thickening and decrease in RIGHT  basilar atelectasis.    This report was finalized on 06/02/2021 14:28 by Dr. Eleuterio Nieto MD.    Assessment:  1. Empyema of pleura (CMS/[facility])    2. ERNESTO (obstructive sleep apnea)    3. Non-smoker    4. Non-seasonal allergic rhinitis, unspecified trigger    5. Anxiety    6. Class 3 severe obesity due to excess calories with serious comorbidity and body mass index (BMI) of 40.0 to 44.9 in adult (CMS/[facility])    7. Mild intermittent asthma without complication    8. Restrictive lung disease    9. SOB (shortness of breath)    10. Cough        Plan/Recommendations:    1.  Patient has COPD and small airways dysfunction noted in the pulmonary function test with restrictive dysfunction.  He will need to use some form of inhalers and he does not use any long-term inhalers and I prescribed him albuterol rescue inhaler which will be continued as needed.  He is not requiring any home oxygen.  He is a former smoker and he quit smoking many years ago.  2.  I explained the PFT results to the patient and his wife.  The patient did well after the surgery for empyema and his respiratory status is stable and he still getting some shortness of breath and cough at times but overall feeling much better.  3.  Patient has allergic rhinitis and should continue zinc Flonase and Claritin and prescription refill was sent to the pharmacy.  Patient had last chest x-ray done which shows improving infiltrate in the lung and pleural thickening which is postsurgical changes after the surgery for empyema.  No further chest x-ray needed at this point.  He already to follow-up with Dr. Jacobs.  4.  Patient should continue all other treatment as before.  He will need to get Covid vaccination encouraged him to do so although he refused.  He will continue follow-up with the primary care provider  and return to the pulmonary clinic in 6 months time for a follow-up visit or earlier if needed.    Follow up:  6 Months    Time Spent:  30 minutes    I appreciate the opportunity of participating in this patient's care. I would like to thank the PCP for the referral.  Please feel free to contact me with any other questions.    Katelyn Buckley MD   Pulmonologist/Intensivist     Electronically signed by: Katelyn Buckley MD, 8/24/2021 11:18 CDT

## 2021-08-30 ENCOUNTER — TELEPHONE (OUTPATIENT)
Dept: FAMILY MEDICINE CLINIC | Facility: CLINIC | Age: 76
End: 2021-08-30

## 2021-08-30 DIAGNOSIS — Z20.822 CLOSE EXPOSURE TO COVID-19 VIRUS: Primary | ICD-10-CM

## 2021-08-30 NOTE — TELEPHONE ENCOUNTER
Caller: MACHO BERNARDO    Relationship: Friend    Best call back number: [phone]    What orders are you requesting (i.e. lab or imaging): COVID TEST    In what timeframe would the patient need to come in: 08-    Where will you receive your lab/imaging services: [facility] [address]    Additional notes: WANTS TO DO DRIVE THRU COVID TESTING IN [place] OFFERED OFFICE TESTING AND SHE STATES THAT THEY WOULD RATHER DO DRIVE THRU. PATIENT WAS EXPOSED AND HEALTH DEPARTMENT ADVISED TESTING ON 08/31/2021 NOT EXPERIENCING ANY SYMPTOMS

## 2021-09-07 RX ORDER — OMEGA-3-ACID ETHYL ESTERS 1 G/1
1 CAPSULE, LIQUID FILLED ORAL DAILY
Qty: 90 CAPSULE | Refills: 3 | Status: SHIPPED | OUTPATIENT
Start: 2021-09-07

## 2021-09-14 NOTE — PROGRESS NOTES
Agree with assessment and plan of mid level provider as below with any changes if made as noted by Lauren Rendon PA-C   of Carlo Velasco.

## 2021-09-21 PROBLEM — Z86.0101 HISTORY OF ADENOMATOUS POLYP OF COLON: Status: ACTIVE | Noted: 2018-05-09

## 2021-09-21 PROBLEM — Z83.719 FAMILY HX COLONIC POLYPS: Status: ACTIVE | Noted: 2021-09-21

## 2021-09-21 PROBLEM — Z83.71 FAMILY HX COLONIC POLYPS: Status: ACTIVE | Noted: 2021-09-21

## 2021-09-21 NOTE — PROGRESS NOTES
[facility]    Primary Physician Rhys Rosen MD    9/22/2021    Carlo Velasco   1945      Chief Complaint   Patient presents with   • Colonoscopy       Subjective     HPI    Carlo Velasco is a 76 y.o. male who presents as a referral for preventative maintenance. He has no complaints of nausea or vomiting. No change in bowels. No wt loss. No BRBPR. No melena. No abdominal pain.        Last colonoscopy was 5/2018 noting 2 colon polyps ( path tubular adenomatous) and internal hemorrhoid.  The patient does not have history of colon cancer.  There is family history of colon polyps father. There is not a family history of colon cancer.       He is on plavix. Has had cardiac stents. Dr. Toribio is his cardiologist.     Past Medical History:   Diagnosis Date   • Anxiety    • Arthritis    • Cancer (CMS/HCC)     melanoma   • CHF (congestive heart failure) (CMS/HCC)    • Claustrophobia    • Colon polyp    • Coronary artery disease 2012, 2015    2 vessel CABG (2012), SHAHRZAD LAD (2015).    • Depression    • Disease of thyroid gland     partial thyroidectomy on right   • GERD (gastroesophageal reflux disease)    • Hearing loss    • Heart attack (CMS/HCC)    • Heart attack (CMS/HCC)    • History of transfusion    • Hyperlipidemia    • Hypertension    • Kidney stone     history of    • Mild intermittent asthma without complication 8/24/2021   • Tobacco abuse, in remission    • Uses hearing aid     BILATERAL       Past Surgical History:   Procedure Laterality Date   • BACK SURGERY      CERVICAL FUSION AND LUMBAR DISC REPLACEMENT   • BRONCHOSCOPY N/A 4/27/2021    Procedure: BRONCHOSCOPY;  Surgeon: Rico Jacobs MD;  Location: [facility] OR;  Service: Cardiothoracic;  Laterality: N/A;   • CARDIAC CATHETERIZATION  07/2015    SHAHRZAD mid Dr. Malu LO   • CARDIAC CATHETERIZATION N/A 10/21/2016    Procedure: Left Heart Cath;  Surgeon: Darian Toribio MD;  Location:  [facility] CATH INVASIVE LOCATION;  Service:     • CARDIAC CATHETERIZATION Left 4/5/2017    Procedure: Cardiac Catheterization/Vascular Study;  Surgeon: Darian Toribio MD;  Location:  [facility] CATH INVASIVE LOC[facility];  Service:    • CARDIAC CATHETERIZATION N/A 4/5/2017    Procedure: Left Heart Cath;  Surgeon: Darian Toribio MD;  Location:  [facility] CATH INVASIVE LOC[facility];  Service:    • CARDIAC CATHETERIZATION N/A 4/5/2017    Procedure: Coronary angiography;  Surgeon: Darian Toribio MD;  Location:  [facility] CATH INVASIVE LOC[facility];  Service:    • CARDIAC CATHETERIZATION N/A 4/5/2017    Procedure: Left ventriculography;  Surgeon: Darian Toribio MD;  Location:  [facility] CATH INVASIVE LOC[facility];  Service:    • CARDIAC CATHETERIZATION  4/5/2017    Procedure: Saphenous Vein Graft;  Surgeon: Darian Toribio MD;  Location:  [facility] CATH INVASIVE LOC[facility];  Service:    • CARDIAC CATHETERIZATION Left 2/27/2018    Procedure: Cardiac Catheterization/Vascular Study SHAHRZAD OK PRN;  Surgeon: Darian Toribio MD;  Location:  [facility] CATH INVASIVE LOC[facility];  Service:    • CARDIAC CATHETERIZATION  2/27/2018    Procedure: Functional Flow [place];  Surgeon: Darian Toribio MD;  Location:  [facility] CATH INVASIVE [facility];  Service:    • CARDIAC CATHETERIZATION N/A 2/27/2018    Procedure: Left ventriculography;  Surgeon: Darian Toribio MD;  Location:  [facility] CATH INVASIVE [facility];  Service:    • CARDIAC CATHETERIZATION Left 4/28/2020    Procedure: Cardiac Catheterization/Vascular Study SHAHRZAD OK PRN;  Surgeon: Darian Toribio MD;  Location:  [facility] CATH INVASIVE [facility];  Service: Cardiology;  Laterality: Left;   • CARDIAC ELECTROPHYSIOLOGY PROCEDURE N/A 1/11/2018    Procedure: PPM generator change - dual;  Surgeon: Darian Toribio MD;  Location: [facility] CATH INVASIVE LOCATION;  Service:    • CARDIAC SURGERY      open heart surgery x2   • COLONOSCOPY  09/05/2007    colon polyps   • COLONOSCOPY N/A 5/29/2018    Procedure: COLONOSCOPY WITH ANESTHESIA;  Surgeon: Juan F Tapia MD;  Location: [facility] ENDOSCOPY;  Service: Gastroenterology    • CORONARY ANGIOPLASTY WITH STENT PLACEMENT  07/2015    SHAHRZAD mid LAD, Dr. Toribio    • CORONARY ARTERY BYPASS GRAFT  2012 AND 2014    2 vessel    • ENDOSCOPY  11/08/2010   • ENDOSCOPY N/A 5/29/2018    Procedure: ESOPHAGOGASTRODUODENOSCOPY WITH ANESTHESIA;  Surgeon: Juan F Tapia MD;  Location: [facility] ENDOSCOPY;  Service: Gastroenterology   • HEAD/NECK LESION/CYST EXCISION Left 12/20/2016    Procedure: EXCISION MALIGNANT MELANOMA WITH SENTINEL NODE BIOPSY, INJECTION AND SCAN PRE-OP, LEFT EAR;  Surgeon: Guanaco Zepeda MD;  Location: [facility] OR;  Service:    • HEMORRHOIDECTOMY     • HERNIA REPAIR     • INSERT / REPLACE / REMOVE PACEMAKER     • SENTINEL NODE BIOPSY Left 12/20/2016    Procedure: SENTINEL NODE BIOPSY;  Surgeon: Guanaco Zepeda MD;  Location: [facility] OR;  Service:    • SKIN CANCER EXCISION     • THORACOSCOPY Right 4/27/2021    Procedure: THORACOSCOPIC TOTAL DECORTICATION;  Surgeon: Rico Jacobs MD;  Location:  [facility] OR;  Service: Cardiothoracic;  Laterality: Right;   • THYROID SURGERY     • THYROIDECTOMY         Outpatient Medications Marked as Taking for the 9/22/21 encounter (Office Visit) with Carley Velasco APRN   Medication Sig Dispense Refill   • albuterol sulfate  (90 Base) MCG/ACT inhaler Inhale 2 puffs Every 4 (Four) Hours As Needed for Wheezing or Shortness of Air for up to 90 days. 25.5 g 2   • ALPRAZolam (XANAX) 0.25 MG tablet      • atorvastatin (LIPITOR) 80 MG tablet Take 80 mg by mouth Every Night.     • benzonatate (TESSALON) 100 MG capsule As Needed.     • Cholecalciferol 125 MCG (5000 UT) tablet      • clopidogrel (PLAVIX) 75 MG tablet Take 75 mg by mouth Daily.     • donepezil (ARICEPT) 5 MG tablet Take 1 tablet by mouth every night at bedtime. 90 tablet 3   • escitalopram (LEXAPRO) 20 MG tablet TAKE 1 TABLET BY MOUTH DAILY 90 tablet 1   • fluticasone (Flonase Allergy Relief) 50 MCG/ACT nasal spray 2 sprays into the nostril(s) as directed by provider Daily. 16 g 5   •  furosemide (LASIX) 40 MG tablet Take 40 mg by mouth Daily.     • isosorbide mononitrate (IMDUR) 30 MG 24 hr tablet TAKE 1 TABLET BY MOUTH EVERY MORNING 90 tablet 3   • lisinopril (PRINIVIL,ZESTRIL) 5 MG tablet Take 1 tablet by mouth Daily. 90 tablet 3   • loratadine (CLARITIN) 10 MG tablet Take 1 tablet by mouth Daily. 90 tablet 3   • metoprolol tartrate (LOPRESSOR) 25 MG tablet Take one tablet by mouth twice a day. 180 tablet 3   • NITROSTAT 0.4 MG SL tablet Place 1 tablet under the tongue Every 5 (Five) Minutes As Needed for Chest Pain. Take no more than 3 doses in 15 minutes. 100 tablet 2   • omega-3 acid ethyl esters (LOVAZA) 1 g capsule TAKE 1 CAPSULE BY MOUTH DAILY 90 capsule 3   • omeprazole (priLOSEC) 20 MG capsule Take 1 capsule by mouth Daily. 90 capsule 3   • oxyCODONE-acetaminophen (PERCOCET) 5-325 MG per tablet Take 1 tablet by mouth Every 6 (Six) Hours As Needed (pain). (Patient taking differently: Take 1 tablet by mouth Every 6 (Six) Hours As Needed (pain). As needed, rarely takes) 20 tablet 0   • Potassium 99 MG tablet Take 1 tablet by mouth Daily.     • pregabalin (LYRICA) 150 MG capsule Take 1 capsule by mouth 3 (Three) Times a Day. 90 capsule 1   • sucralfate (CARAFATE) 1 g tablet      • Topiramate ER 25 MG capsule sustained-release 24 hr          Allergies   Allergen Reactions   • Meperidine Other (See Comments)     HEART STOPS    Other reaction(s): Unknown   • Statins Myalgia     Causes leg cramps       Social History     Socioeconomic History   • Marital status:      Spouse name: Not on file   • Number of children: Not on file   • Years of education: Not on file   • Highest education level: Not on file   Tobacco Use   • Smoking status: Former Smoker     Packs/day: 3.00     Years: 10.00     Pack years: 30.00     Types: Cigarettes     Quit date:      Years since quittin.7   • Smokeless tobacco: Former User     Types: Snuff     Quit date:    Vaping Use   • Vaping Use: Never  used   Substance and Sexual Activity   • Alcohol use: Yes     Comment: rare   • Drug use: No   • Sexual activity: Defer       Family History   Problem Relation Age of Onset   • Heart failure Mother    • Stroke Mother    • Dementia Mother    • Coronary artery disease Father    • Colon polyps Father    • COPD Brother    • Colon cancer Neg Hx        Review of Systems   Constitutional: Negative for chills, fever and unexpected weight change.   Respiratory: Negative for cough, shortness of breath and wheezing.    Cardiovascular: Negative for chest pain and palpitations.   Gastrointestinal: Negative for abdominal distention, abdominal pain, anal bleeding, blood in stool, constipation, diarrhea, nausea and vomiting.       Objective     Vitals:    09/22/21 0915   BP: 124/80   Pulse: 67   Temp: 96.8 °F (36 °C)   SpO2: 99%         09/22/21 0915   Weight: 119 kg (263 lb)     Body mass index is 39.99 kg/m².    Physical Exam  Vitals reviewed.   Constitutional:       General: He is not in acute distress.  Cardiovascular:      Rate and Rhythm: Normal rate and regular rhythm.      Heart sounds: Normal heart sounds.   Pulmonary:      Effort: Pulmonary effort is normal.      Breath sounds: Normal breath sounds.   Abdominal:      General: Bowel sounds are normal. There is no distension.      Palpations: Abdomen is soft.      Tenderness: There is no abdominal tenderness.   Skin:     General: Skin is warm and dry.   Neurological:      Mental Status: He is alert.         Imaging Results (Most Recent)     None          Assessment/Plan     Diagnoses and all orders for this visit:    1. History of adenomatous polyp of colon (Primary)    2. Family hx colonic polyps    3. Coagulopathy (CMS/HCC)  Comments:  plavix       Plan for colonoscopy. However prior to scheduling I will send a letter to Dr. Toribio in regards to if the patient can safely hold plavix 7 days prior to procedure. I will contact patient once he has responded.               Colonoscopy   All risks, benefits, alternatives, and indications of colonoscopy procedure have been discussed with the patient. Risks to include perforation of the colon requiring possible surgery or colostomy, risk of bleeding from biopsies or removal of colon tissue, possibility of missing a colon polyp or cancer, or adverse drug reaction.  Benefits to include the diagnosis and management of disease of the colon and rectum. Alternatives to include barium enema, radiographic evaluation, lab testing or no intervention. Pt verbalizes understanding and agrees.       The patient was advised on the risks of stopping blood thinners for a procedure.  The risks discussed included the risk of developing myocardial infarction, CVA, embolus, clogging of the arteries or stents, etc.  We discussed the potential consequences of the risks discussed.  The benefits of stopping as well as the alternatives were discussed as well.   Patient is to hold their anticoagulation medication per the direction of their prescribing physician, Dr. Toribio. A letter will be sent to prescribing This is to prevent any risk or complication from bleeding intra and post procedure. If they develop bleeding post procedure they are to go the emergency department for further evaluation and treatment immediately.             ANALI Welch

## 2021-09-22 ENCOUNTER — OFFICE VISIT (OUTPATIENT)
Dept: GASTROENTEROLOGY | Facility: CLINIC | Age: 76
End: 2021-09-22

## 2021-09-22 VITALS
OXYGEN SATURATION: 99 % | HEART RATE: 67 BPM | BODY MASS INDEX: 39.86 KG/M2 | WEIGHT: 263 LBS | TEMPERATURE: 96.8 F | DIASTOLIC BLOOD PRESSURE: 80 MMHG | SYSTOLIC BLOOD PRESSURE: 124 MMHG | HEIGHT: 68 IN

## 2021-09-22 DIAGNOSIS — D68.9 COAGULOPATHY (HCC): ICD-10-CM

## 2021-09-22 DIAGNOSIS — Z86.010 HISTORY OF ADENOMATOUS POLYP OF COLON: Primary | ICD-10-CM

## 2021-09-22 DIAGNOSIS — Z83.71 FAMILY HX COLONIC POLYPS: ICD-10-CM

## 2021-09-22 PROCEDURE — 99214 OFFICE O/P EST MOD 30 MIN: CPT | Performed by: NURSE PRACTITIONER

## 2021-09-23 ENCOUNTER — PREP FOR SURGERY (OUTPATIENT)
Dept: OTHER | Facility: HOSPITAL | Age: 76
End: 2021-09-23

## 2021-09-23 ENCOUNTER — TELEPHONE (OUTPATIENT)
Dept: GASTROENTEROLOGY | Facility: CLINIC | Age: 76
End: 2021-09-23

## 2021-09-23 DIAGNOSIS — Z83.71 FAMILY HX COLONIC POLYPS: ICD-10-CM

## 2021-09-23 DIAGNOSIS — Z86.010 HISTORY OF ADENOMATOUS POLYP OF COLON: Primary | ICD-10-CM

## 2021-09-23 NOTE — TELEPHONE ENCOUNTER
Please let patient know that  approves him to be off Plavix 7 days prior to procedure/colonoscopy. DO NOT STOP ASPIRIN.  Risk of being off of Plavix discussed at time of recent office visit. I will put in case request for colonoscopy. miralax  prep is fine.

## 2021-09-24 RX ORDER — ATORVASTATIN CALCIUM 80 MG/1
TABLET, FILM COATED ORAL
Qty: 30 TABLET | Refills: 2 | Status: SHIPPED | OUTPATIENT
Start: 2021-09-24 | End: 2022-02-17

## 2021-10-18 ENCOUNTER — OFFICE VISIT (OUTPATIENT)
Dept: CARDIOLOGY | Facility: CLINIC | Age: 76
End: 2021-10-18

## 2021-10-18 VITALS
HEART RATE: 71 BPM | HEIGHT: 68 IN | SYSTOLIC BLOOD PRESSURE: 134 MMHG | BODY MASS INDEX: 38.34 KG/M2 | DIASTOLIC BLOOD PRESSURE: 70 MMHG | WEIGHT: 253 LBS | OXYGEN SATURATION: 98 %

## 2021-10-18 DIAGNOSIS — E78.2 MIXED HYPERLIPIDEMIA: ICD-10-CM

## 2021-10-18 DIAGNOSIS — Z95.1 HX OF CABG: ICD-10-CM

## 2021-10-18 DIAGNOSIS — I10 ESSENTIAL HYPERTENSION: ICD-10-CM

## 2021-10-18 DIAGNOSIS — I25.810 CORONARY ARTERY DISEASE INVOLVING AUTOLOGOUS ARTERY CORONARY BYPASS GRAFT WITHOUT ANGINA PECTORIS: Primary | ICD-10-CM

## 2021-10-18 PROCEDURE — 93000 ELECTROCARDIOGRAM COMPLETE: CPT | Performed by: INTERNAL MEDICINE

## 2021-10-18 PROCEDURE — 99214 OFFICE O/P EST MOD 30 MIN: CPT | Performed by: INTERNAL MEDICINE

## 2021-10-18 NOTE — PROGRESS NOTES
Carlo Velasco  [id number]  1945  76 y.o.  male    Referring Provider: Rhys Rosen MD    Reason for Follow-up Visit:      Here for routine follow up   coronary artery disease Coronary artery bypass grafting , stented coronary artery   sick sinus syndrome s/p pacemaker   Device interroagations show no incriminating arrhythmia        Subjective    Mild chronic exertional shortness of breath on exertion relieved with rest  No significant cough or wheezing    No palpitations  No associated chest pain  No significant pedal edema    No fever or chills  No significant expectoration    No hemoptysis  No presyncope or syncope    Tolerating current medications well with no untoward side effects   Compliant with prescribed medication regimen. Tries to adhere to cardiac diet.     No bleeding, excessive bruising, gait instability or fall risks    BP well controlled at home.       Overall feels dramatically better now and no issues       History of present illness:  Carlo Velasco is a 76 y.o. yo male with history of coronary artery disease Coronary artery bypass grafting , stented coronary artery sick sinus syndrome s/p pacemaker who presents today for   Chief Complaint   Patient presents with   • Coronary Artery Disease     2 month follow up    .    History  Past Medical History:   Diagnosis Date   • Anxiety    • Arthritis    • Cancer (HCC)     melanoma   • CHF (congestive heart failure) (HCC)    • Claustrophobia    • Colon polyp    • Coronary artery disease 2012, 2015    2 vessel CABG (2012), SHAHRZAD LAD (2015).    • Depression    • Disease of thyroid gland     partial thyroidectomy on right   • GERD (gastroesophageal reflux disease)    • Hearing loss    • Heart attack (HCC)    • Heart attack (HCC)    • History of transfusion    • Hyperlipidemia    • Hypertension    • Kidney stone     history of    • Mild intermittent asthma without complication 8/24/2021   • Tobacco abuse, in remission    • Uses hearing aid      BILATERAL   ,   Past Surgical History:   Procedure Laterality Date   • BACK SURGERY      CERVICAL FUSION AND LUMBAR DISC REPLACEMENT   • BRONCHOSCOPY N/A 4/27/2021    Procedure: BRONCHOSCOPY;  Surgeon: Rico Jacobs MD;  Location:  [facility] OR;  Service: Cardiothoracic;  Laterality: N/A;   • CARDIAC CATHETERIZATION  07/2015    SHAHRZAD mid LAD, Dr. Toribio   • CARDIAC CATHETERIZATION N/A 10/21/2016    Procedure: Left Heart Cath;  Surgeon: Darian Toribio MD;  Location:  [facility] CATH INVASIVE LOCATION;  Service:    • CARDIAC CATHETERIZATION Left 4/5/2017    Procedure: Cardiac Catheterization/Vascular Study;  Surgeon: Darian Toribio MD;  Location:  [facility] CATH [facility]VASIVE [facility];  Service:    • CARDIAC CATHETERIZATION N/A 4/5/2017    Procedure: Left Heart Cath;  Surgeon: Darian Toribio MD;  Location:  [facility] CATH [facility]VASIVE [facility];  Service:    • CARDIAC CATHETERIZATION N/A 4/5/2017    Procedure: Coronary angiography;  Surgeon: Darian Toribio MD;  Location:  [facility] CATH INVASIVE [facility];  Service:    • CARDIAC CATHETERIZATION N/A 4/5/2017    Procedure: Left ventriculography;  Surgeon: Darian Toribio MD;  Location:  [facility] CATH INVASIVE [facility];  Service:    • CARDIAC CATHETERIZATION  4/5/2017    Procedure: Saphenous Vein Graft;  Surgeon: Darian Toribio MD;  Location:  [facility] CATH INVASIVE [facility];  Service:    • CARDIAC CATHETERIZATION Left 2/27/2018    Procedure: Cardiac Catheterization/Vascular Study SHAHRZAD OK PRN;  Surgeon: Darian Toribio MD;  Location:  [facility] CATH [facility];  Service:    • CARDIAC CATHETERIZATION  2/27/2018    Procedure: Functional Flow [place];  Surgeon: Darian Toribio MD;  Location:  [facility] CATH INVASIVE [facility];  Service:    • CARDIAC CATHETERIZATION N/A 2/27/2018    Procedure: Left ventriculography;  Surgeon: Darian Toribio MD;  Location:  [facility] CATH INVASIVE [facility];  Service:    • CARDIAC CATHETERIZATION Left 4/28/2020    Procedure: Cardiac Catheterization/Vascular Study SHAHRZAD OK PRN;  Surgeon: Darian Toribio MD;   Location:  [facility] CATH INVASIVE [facility];  Service: Cardiology;  Laterality: Left;   • CARDIAC ELECTROPHYSIOLOGY PROCEDURE N/A 1/11/2018    Procedure: PPM generator change - dual;  Surgeon: Darian Toribio MD;  Location: [facility] CATH INVASIVE LOCATION;  Service:    • CARDIAC SURGERY      open heart surgery x2   • COLONOSCOPY  09/05/2007    colon polyps   • COLONOSCOPY N/A 5/29/2018    Procedure: COLONOSCOPY WITH ANESTHESIA;  Surgeon: Juan F Tapia MD;  Location: [facility] ENDOSCOPY;  Service: Gastroenterology   • CORONARY ANGIOPLASTY WITH STENT PLACEMENT  07/2015    SHAHRZAD mid LAD, Dr. Toribio    • CORONARY ARTERY BYPASS GRAFT  2012 AND 2014    2 vessel    • ENDOSCOPY  11/08/2010   • ENDOSCOPY N/A 5/29/2018    Procedure: ESOPHAGOGASTRODUODENOSCOPY WITH ANESTHESIA;  Surgeon: Juan F Tapia MD;  Location: [facility] ENDOSCOPY;  Service: Gastroenterology   • HEAD/NECK LESION/CYST EXCISION Left 12/20/2016    Procedure: EXCISION MALIGNANT MELANOMA WITH SENTINEL NODE BIOPSY, INJECTION AND SCAN PRE-OP, LEFT EAR;  Surgeon: Guanaco Zepeda MD;  Location: [facility] OR;  Service:    • HEMORRHOIDECTOMY     • HERNIA REPAIR     • INSERT / REPLACE / REMOVE PACEMAKER     • SENTINEL NODE BIOPSY Left 12/20/2016    Procedure: SENTINEL NODE BIOPSY;  Surgeon: Guanaco Zepeda MD;  Location: [facility] OR;  Service:    • SKIN CANCER EXCISION     • THORACOSCOPY Right 4/27/2021    Procedure: THORACOSCOPIC TOTAL DECORTICATION;  Surgeon: Rico Jacobs MD;  Location: [facility] OR;  Service: Cardiothoracic;  Laterality: Right;   • THYROID SURGERY     • THYROIDECTOMY     ,   Family History   Problem Relation Age of Onset   • Heart failure Mother    • Stroke Mother    • Dementia Mother    • Coronary artery disease Father    • Colon polyps Father    • COPD Brother    • Colon cancer Neg Hx    ,   Social History     Tobacco Use   • Smoking status: Former Smoker     Packs/day: 3.00     Years: 10.00     Pack years: 30.00     Types: Cigarettes     Quit date: 1977      Years since quittin.8   • Smokeless tobacco: Former User     Types: Snuff     Quit date:    Vaping Use   • Vaping Use: Never used   Substance Use Topics   • Alcohol use: Yes     Comment: rare   • Drug use: No   ,     Medications  Current Outpatient Medications   Medication Sig Dispense Refill   • albuterol sulfate  (90 Base) MCG/ACT inhaler Inhale 2 puffs Every 4 (Four) Hours As Needed for Wheezing or Shortness of Air for up to 90 days. 25.5 g 2   • ALPRAZolam (XANAX) 0.25 MG tablet      • amoxicillin-clavulanate (AUGMENTIN) 875-125 MG per tablet      • atorvastatin (LIPITOR) 80 MG tablet TAKE 1 TABLET BY MOUTH EVERY NIGHT 30 tablet 2   • benzonatate (TESSALON) 100 MG capsule As Needed.     • Cholecalciferol 125 MCG (5000 UT) tablet      • clopidogrel (PLAVIX) 75 MG tablet Take 75 mg by mouth Daily.     • donepezil (ARICEPT) 5 MG tablet Take 1 tablet by mouth every night at bedtime. 90 tablet 3   • escitalopram (LEXAPRO) 20 MG tablet TAKE 1 TABLET BY MOUTH DAILY 90 tablet 1   • fluticasone (Flonase Allergy Relief) 50 MCG/ACT nasal spray 2 sprays into the nostril(s) as directed by provider Daily. 16 g 5   • furosemide (LASIX) 40 MG tablet Take 40 mg by mouth Daily.     • isosorbide mononitrate (IMDUR) 30 MG 24 hr tablet TAKE 1 TABLET BY MOUTH EVERY MORNING 90 tablet 3   • lisinopril (PRINIVIL,ZESTRIL) 5 MG tablet Take 1 tablet by mouth Daily. 90 tablet 3   • loratadine (CLARITIN) 10 MG tablet Take 1 tablet by mouth Daily. 90 tablet 3   • metoprolol tartrate (LOPRESSOR) 25 MG tablet Take one tablet by mouth twice a day. 180 tablet 3   • NITROSTAT 0.4 MG SL tablet Place 1 tablet under the tongue Every 5 (Five) Minutes As Needed for Chest Pain. Take no more than 3 doses in 15 minutes. 100 tablet 2   • omega-3 acid ethyl esters (LOVAZA) 1 g capsule TAKE 1 CAPSULE BY MOUTH DAILY 90 capsule 3   • omeprazole (priLOSEC) 20 MG capsule Take 1 capsule by mouth Daily. 90 capsule 3   • oxyCODONE-acetaminophen  "(PERCOCET) 5-325 MG per tablet Take 1 tablet by mouth Every 6 (Six) Hours As Needed (pain). (Patient taking differently: Take 1 tablet by mouth Every 6 (Six) Hours As Needed (pain). As needed, rarely takes) 20 tablet 0   • Potassium 99 MG tablet Take 1 tablet by mouth Daily.     • pregabalin (LYRICA) 150 MG capsule Take 1 capsule by mouth 3 (Three) Times a Day. 90 capsule 1   • sucralfate (CARAFATE) 1 g tablet      • topiramate (TOPAMAX) 25 MG tablet Take 25 mg by mouth 3 (Three) Times a Day.     • Topiramate ER 25 MG capsule sustained-release 24 hr        No current facility-administered medications for this visit.       Allergies:  Meperidine and Statins    Review of Systems  Review of Systems   Constitutional: Positive for malaise/fatigue.   HENT: Negative.    Eyes: Negative.    Cardiovascular: Positive for dyspnea on exertion. Negative for chest pain, claudication, cyanosis, irregular heartbeat, leg swelling, near-syncope, orthopnea, palpitations, paroxysmal nocturnal dyspnea and syncope.   Respiratory: Positive for cough and shortness of breath.    Endocrine: Negative.    Hematologic/Lymphatic: Negative.    Skin: Negative.    Musculoskeletal: Positive for arthritis and back pain.   Gastrointestinal: Negative for anorexia.   Genitourinary: Negative.    Neurological: Negative for weakness.   Psychiatric/Behavioral: Negative.    Same review of system and physical exam as last visit        Objective     Physical Exam:  /70   Pulse 71   Ht 172.7 cm (68\")   Wt 115 kg (253 lb)   SpO2 98%   BMI 38.47 kg/m²   Physical Exam   Constitutional: He appears well-developed.   HENT:   Head: Normocephalic.   Neck: Normal carotid pulses and no JVD present. No tracheal tenderness present. Carotid bruit is not present. No tracheal deviation present.   Cardiovascular: Regular rhythm, normal heart sounds and normal pulses.   Pulmonary/Chest: Effort normal. No stridor.   Abdominal: Soft. He exhibits no distension. There is " no abdominal tenderness.   Neurological: He is alert. No cranial nerve deficit or sensory deficit.   Skin: Skin is warm.   Psychiatric: His speech is normal and behavior is normal.       Results Review:      IMPRESSION:  1. No pulmonary emboli.  2. No thoracic aortic aneurysm or dissection. Prior mediastinal surgery  with cardiomegaly. Left subclavian cardiac pacer device.  3. New right middle lobe consolidation most consistent with pneumonia.  Probable reactive right hilar and subcarinal lymph nodes. Follow up  chest x-ray recommended to document resolution.  This report was finalized on 03/20/2021 20:54 by Dr. Cassie Mckeon MD.      Conclusion of cardiac catheterization    4/28/2020     Left ventricle ejection fraction 45%  Moderately elevated left ventricular end-diastolic pressure of 19 mmHg  Atretic left intramammary artery graft  Patent stents in the left anterior descending coronary artery  60% stenosis chronic of diagonal branch unchanged from prior cardiac catheterization  95% in-stent restenosis of first obtuse marginal branch treated with PTCA and then implantation of 2.5 x 12 mm Synergy stent with 0% residual stenosis  70% stenosis of distal right coronary artery  Widely patent saphenous venous graft to the distal right coronary artery        ____________________________________________________________________________________________________________________________________________     Plan after cardiac catheterization        Dual antiplatelet therapy minimum of 1 year  Continue on other medication including statin and beta-blockers  Aspirin for the rest of his life  Add ACE inhibitor therapy  Check echocardiogram  Intensive risk factor modifications for both primary and secondary prevention if applicable  Hydration   Observation         Carlo Velasco   Regadenoson Stress Test With Myocardial Perfusion SPECT (Multi Study)   Order# [id number]   Reading physician: Darian Toribio MD Ordering physician:  Darian Toribio MD Study date: 20   Patient Information     Patient Name  Carlo Velasco MRN  [id number] Sex  Male  (Age)  1945 (74 y.o.)   Interpretation Summary        · Left ventricular ejection fraction is normal (Calculated EF = 55%).  · Myocardial perfusion imaging indicates a normal myocardial perfusion study with no evidence of ischemia.  · Impressions are consistent with a low risk study.                Results for orders placed during the hospital encounter of 20    STAT Adult Transthoracic Echo Complete W/ Cont if Necessary Per Protocol    Interpretation Summary  · Estimated EF = 55%.  · Left ventricular systolic function is normal.  · Left ventricular wall thickness is consistent with mild concentric hypertrophy.  · Left ventricular diastolic dysfunction.  · Mild pulmonary hypertension is present.         ECG 12 Lead    Date/Time: 10/18/2021 9:48 AM  Performed by: Darian Toribio MD  Authorized by: Darian Toribio MD   Comparison: compared with previous ECG from 2021  Similar to previous ECG  Rhythm: sinus rhythm  Rate: normal  Conduction: conduction normal  ST Segments: ST segments normal  T Waves: T waves normal  QRS axis: normal  Other: no other findings    Clinical impression: normal ECG          Cardiac cath    LVEF of 50%.   occluded the mid right coronary artery  Patent saphenous venous graft to the distal right coronary artery  Atretic left intramammary artery graft.  Patent stent in the mid left anterior descending coronary artery  Patent stent to the obtuse marginal branch  Patent stent in diagonal branch.       Conclusion     Patent stents noted in the obtuse marginal branch and the left anterior descending coronary artery.  Atretic left internal mammary artery graft.  Patent saphenous venous graft to the right coronary artery  Significant right coronary artery stenosis proximal to the touchdown site of the saphenous vein graft to the right coronary artery  60%  stenosis of diagonal branch with a normal fractional flow reserve of 0.85  Left ventricle ejection fraction of 50%.     LVEDP    16   mm Hg           Plan     Intensive risk factor modifications for both primary and secondary prevention if applicable  Home today if stable  Hydration   Observation  Keep follow-up appointment    Diagnoses and all orders for this visit:    1. Coronary artery disease involving autologous artery coronary bypass graft without angina pectoris (Primary)    2. Essential hypertension    3. Hx of CABG x3 01/2019     4. Mixed hyperlipidemia    Other orders  -     ECG 12 Lead            Plan      Overall dramatically better after pneumonia resolved   Overall doing well no new cardiovascular symptoms and therefore no additional cardiac testing is required   If any interim issues arise will call me for further evaluation.     Keep LDL below 70 mg/dl. Monitor liver and renal functions.   Monitor CBC, CMP, TSH (as indicated) and Lipid Panel by primary     Patient expressed understanding  Encouraged and answered all questions   Discussed with the patient and all questioned fully answered. He will call me if any problems arise.   Discussed results of prior testing with patient : echo   as well electrocardiogram from today        Patient undecided regarding the Covid vaccine   Urged to consider vaccination further   I can provide more input if required   Recommend further discussion with primary care provider     Monitor for any signs of bleeding including red or dark stools as well as easy bruisabilty. Fall precautions.   Monitor cardiac rhythm device function regularly per established schedule or PRN      Check BP and heart rates twice daily at least 3x / week, week a month  at home and bring a recording for me to review next visit  If BP >130/85 or < 100/60 persistently over 3 reading 30 mins apart call sooner      S/L NTG PRN for chest pain, call me or go to ER if has to use S/L nitroglycerin       Follow up with ANALI Holley             Return in about 6 months (around 4/18/2022).

## 2021-10-21 ENCOUNTER — ANESTHESIA EVENT (OUTPATIENT)
Dept: GASTROENTEROLOGY | Facility: HOSPITAL | Age: 76
End: 2021-10-21

## 2021-10-21 ENCOUNTER — ANESTHESIA (OUTPATIENT)
Dept: GASTROENTEROLOGY | Facility: HOSPITAL | Age: 76
End: 2021-10-21

## 2021-10-21 ENCOUNTER — HOSPITAL ENCOUNTER (OUTPATIENT)
Facility: HOSPITAL | Age: 76
Setting detail: HOSPITAL OUTPATIENT SURGERY
Discharge: HOME | End: 2021-10-21
Attending: INTERNAL MEDICINE | Admitting: INTERNAL MEDICINE

## 2021-10-21 VITALS
HEART RATE: 66 BPM | SYSTOLIC BLOOD PRESSURE: 106 MMHG | BODY MASS INDEX: 37.28 KG/M2 | OXYGEN SATURATION: 96 % | HEIGHT: 68 IN | WEIGHT: 246 LBS | DIASTOLIC BLOOD PRESSURE: 55 MMHG | TEMPERATURE: 96.7 F | RESPIRATION RATE: 20 BRPM

## 2021-10-21 DIAGNOSIS — Z83.71 FAMILY HX COLONIC POLYPS: ICD-10-CM

## 2021-10-21 DIAGNOSIS — Z86.010 HISTORY OF ADENOMATOUS POLYP OF COLON: ICD-10-CM

## 2021-10-21 PROCEDURE — 88305 TISSUE EXAM BY PATHOLOGIST: CPT | Performed by: INTERNAL MEDICINE

## 2021-10-21 PROCEDURE — 25010000002 PROPOFOL 10 MG/ML EMULSION: Performed by: NURSE ANESTHETIST, CERTIFIED REGISTERED

## 2021-10-21 PROCEDURE — 45385 COLONOSCOPY W/LESION REMOVAL: CPT | Performed by: INTERNAL MEDICINE

## 2021-10-21 RX ORDER — PROPOFOL 10 MG/ML
VIAL (ML) INTRAVENOUS AS NEEDED
Status: DISCONTINUED | OUTPATIENT
Start: 2021-10-21 | End: 2021-10-21 | Stop reason: SURG

## 2021-10-21 RX ORDER — SODIUM CHLORIDE 0.9 % (FLUSH) 0.9 %
10 SYRINGE (ML) INJECTION AS NEEDED
Status: DISCONTINUED | OUTPATIENT
Start: 2021-10-21 | End: 2021-10-21 | Stop reason: HOSPADM

## 2021-10-21 RX ORDER — ONDANSETRON 2 MG/ML
4 INJECTION INTRAMUSCULAR; INTRAVENOUS ONCE AS NEEDED
Status: DISCONTINUED | OUTPATIENT
Start: 2021-10-21 | End: 2021-10-21 | Stop reason: HOSPADM

## 2021-10-21 RX ORDER — LIDOCAINE HYDROCHLORIDE 10 MG/ML
0.5 INJECTION, SOLUTION EPIDURAL; INFILTRATION; INTRACAUDAL; PERINEURAL ONCE AS NEEDED
Status: CANCELLED | OUTPATIENT
Start: 2021-10-21

## 2021-10-21 RX ORDER — SODIUM CHLORIDE 9 MG/ML
500 INJECTION, SOLUTION INTRAVENOUS CONTINUOUS PRN
Status: DISCONTINUED | OUTPATIENT
Start: 2021-10-21 | End: 2021-10-21 | Stop reason: HOSPADM

## 2021-10-21 RX ORDER — LIDOCAINE HYDROCHLORIDE 20 MG/ML
INJECTION, SOLUTION EPIDURAL; INFILTRATION; INTRACAUDAL; PERINEURAL AS NEEDED
Status: DISCONTINUED | OUTPATIENT
Start: 2021-10-21 | End: 2021-10-21 | Stop reason: SURG

## 2021-10-21 RX ADMIN — PROPOFOL 50 MG: 10 INJECTION, EMULSION INTRAVENOUS at 09:04

## 2021-10-21 RX ADMIN — PROPOFOL 50 MG: 10 INJECTION, EMULSION INTRAVENOUS at 09:08

## 2021-10-21 RX ADMIN — LIDOCAINE HYDROCHLORIDE 100 MG: 20 INJECTION, SOLUTION EPIDURAL; INFILTRATION; INTRACAUDAL; PERINEURAL at 09:04

## 2021-10-21 RX ADMIN — SODIUM CHLORIDE: 0.9 INJECTION, SOLUTION INTRAVENOUS at 08:55

## 2021-10-21 RX ADMIN — PROPOFOL 60 MG: 10 INJECTION, EMULSION INTRAVENOUS at 09:12

## 2021-10-21 NOTE — ANESTHESIA POSTPROCEDURE EVALUATION
"Patient: Carlo Velasco    Procedure Summary     Date: 10/21/21 Room / Location: [facility] ENDOSCOPY 2 / [facility]    Anesthesia Start: 0857 Anesthesia Stop: 0919    Procedure: COLONOSCOPY WITH ANESTHESIA (N/A ) Diagnosis:       History of adenomatous polyp of colon      Family hx colonic polyps      (History of adenomatous polyp of colon [Z86.010])      (Family hx colonic polyps [Z83.71])    Surgeons: Juan F Tapia MD Provider: Fam Freedman CRNA    Anesthesia Type: MAC ASA Status: 3          Anesthesia Type: MAC    Vitals  No vitals data found for the desired time range.          Post Anesthesia Care and Evaluation    Patient location during evaluation: PHASE II  Patient participation: complete - patient participated  Level of consciousness: sleepy but conscious  Pain score: 0  Pain management: adequate  Airway patency: patent  Anesthetic complications: No anesthetic complications  PONV Status: none  Cardiovascular status: acceptable  Respiratory status: acceptable  Hydration status: acceptable    Comments: Blood pressure 121/75, pulse 81, temperature 96.7 °F (35.9 °C), temperature source Temporal, resp. rate 18, height 172.7 cm (68\"), weight 112 kg (246 lb), SpO2 94 %.        "

## 2021-10-21 NOTE — ANESTHESIA PREPROCEDURE EVALUATION
Anesthesia Evaluation     Patient summary reviewed   no history of anesthetic complications:  NPO Solid Status: > 8 hours  NPO Liquid Status: > 2 hours           Airway   Mallampati: II  TM distance: >3 FB  Neck ROM: full  Dental          Pulmonary    (+) pneumonia (s/p thoracotomy for empyema, resolved, 4/2021) resolved , sleep apnea (does not use cpap),   (-) COPD, asthma  Cardiovascular   Exercise tolerance: good (4-7 METS)    ECG reviewed    (+) pacemaker ([employer]) pacemaker interrogated 3-6 months ago, hypertension, past MI , CAD, CABG (2012) >6 Months, cardiac stents (most recent 4/2020) more than 12 months ago CHF , hyperlipidemia,   (-) angina    ROS comment: [employer] pacemaker interrogated 7/2021, 1% paced    Neuro/Psych  (-) seizures, TIA, CVA  GI/Hepatic/Renal/Endo    (+) morbid obesity, GERD,  renal disease stones,   (-) liver disease, diabetes    Musculoskeletal     Abdominal    Substance History      OB/GYN          Other   arthritis,                      Anesthesia Plan    ASA 3     MAC     intravenous induction     Anesthetic plan, all risks, benefits, and alternatives have been provided, discussed and informed consent has been obtained with: patient.

## 2021-10-22 LAB
CYTO UR: NORMAL
LAB AP CASE REPORT: NORMAL
PATH REPORT.FINAL DX SPEC: NORMAL
PATH REPORT.GROSS SPEC: NORMAL

## 2021-10-24 PROCEDURE — 93294 REM INTERROG EVL PM/LDLS PM: CPT | Performed by: INTERNAL MEDICINE

## 2021-10-24 PROCEDURE — 93296 REM INTERROG EVL PM/IDS: CPT | Performed by: INTERNAL MEDICINE

## 2021-11-06 ENCOUNTER — NURSE TRIAGE (OUTPATIENT)
Dept: CALL CENTER | Facility: HOSPITAL | Age: 76
End: 2021-11-06

## 2021-11-06 RX ORDER — CLOPIDOGREL BISULFATE 75 MG/1
75 TABLET ORAL DAILY
Qty: 30 TABLET | Refills: 0 | OUTPATIENT
Start: 2021-11-06 | End: 2021-11-08 | Stop reason: SDUPTHER

## 2021-11-06 NOTE — TELEPHONE ENCOUNTER
"Patient is out of his Plavix,  Called , will be calling the [facility] pharmacy in [place].  gave orders for Plavix 75 mg daily #30 no refills. Spoke with Kishan, the pharmacist given the order.Recall the wife informed meds was order, will be calling the office on Monday for further refills    Reason for Disposition  • [1] Prescription refill request for ESSENTIAL medicine (i.e., likelihood of harm to patient if not taken) AND [2] triager unable to refill per department policy    Additional Information  • Negative: New-onset or worsening symptoms, see that guideline  (e.g., diarrhea, runny nose, sore throat)  • Negative: Medicine question not related to refill or renewal  • Negative: Caller requesting information unrelated to medicine    Answer Assessment - Initial Assessment Questions  1. DRUG NAME: \"What medicine do you need to have refilled?\"      Plavix  2. REFILLS REMAINING: \"How many refills are remaining?\" (Note: The label on the medicine or pill bottle will show how many refills are remaining. If there are no refills remaining, then a renewal may be needed.)     none  3. EXPIRATION DATE: \"What is the expiration date?\" (Note: The label states when the prescription will , and thus can no longer be refilled.)   na   4. PRESCRIBING HCP: \"Who prescribed it?\" Reason: If prescribed by specialist, call should be referred to that group.     5. SYMPTOMS: \"Do you have any symptoms?\"  none  6. PREGNANCY: \"Is there any chance that you are pregnant?\" \"When was your last menstrual period?\"      na    Protocols used: MEDICATION REFILL AND RENEWAL CALL-ADULT-      "

## 2021-11-08 RX ORDER — CLOPIDOGREL BISULFATE 75 MG/1
75 TABLET ORAL DAILY
Qty: 90 TABLET | Refills: 4 | Status: SHIPPED | OUTPATIENT
Start: 2021-11-08 | End: 2022-11-14

## 2021-11-22 RX ORDER — OMEPRAZOLE 20 MG/1
20 CAPSULE, DELAYED RELEASE ORAL DAILY
Qty: 90 CAPSULE | Refills: 3 | Status: SHIPPED | OUTPATIENT
Start: 2021-11-22 | End: 2022-11-14

## 2021-11-22 RX ORDER — FUROSEMIDE 40 MG/1
TABLET ORAL
Qty: 90 TABLET | Refills: 3 | Status: ON HOLD | OUTPATIENT
Start: 2021-11-22 | End: 2022-02-19

## 2021-12-23 ENCOUNTER — OFFICE VISIT (OUTPATIENT)
Dept: PULMONOLOGY | Facility: CLINIC | Age: 76
End: 2021-12-23

## 2021-12-23 VITALS
OXYGEN SATURATION: 97 % | BODY MASS INDEX: 38.16 KG/M2 | DIASTOLIC BLOOD PRESSURE: 84 MMHG | HEART RATE: 90 BPM | SYSTOLIC BLOOD PRESSURE: 142 MMHG | WEIGHT: 251.8 LBS | HEIGHT: 68 IN

## 2021-12-23 DIAGNOSIS — J98.4 RESTRICTIVE LUNG DISEASE: ICD-10-CM

## 2021-12-23 DIAGNOSIS — J30.89 NON-SEASONAL ALLERGIC RHINITIS, UNSPECIFIED TRIGGER: ICD-10-CM

## 2021-12-23 DIAGNOSIS — Z87.01 HISTORY OF PNEUMONIA: ICD-10-CM

## 2021-12-23 DIAGNOSIS — E66.01 MORBIDLY OBESE (HCC): ICD-10-CM

## 2021-12-23 DIAGNOSIS — J86.9 EMPYEMA OF PLEURA (HCC): ICD-10-CM

## 2021-12-23 DIAGNOSIS — R07.81 PLEURITIC CHEST PAIN: ICD-10-CM

## 2021-12-23 DIAGNOSIS — R09.1 PLEURISY: ICD-10-CM

## 2021-12-23 DIAGNOSIS — Z87.891 FORMER SMOKER, STOPPED SMOKING MANY YEARS AGO: ICD-10-CM

## 2021-12-23 DIAGNOSIS — G47.33 OSA (OBSTRUCTIVE SLEEP APNEA): Primary | ICD-10-CM

## 2021-12-23 DIAGNOSIS — J18.9 PNEUMONIA OF RIGHT LUNG DUE TO INFECTIOUS ORGANISM, UNSPECIFIED PART OF LUNG: ICD-10-CM

## 2021-12-23 DIAGNOSIS — Z93.8 S/P THORACOSTOMY TUBE PLACEMENT (HCC): ICD-10-CM

## 2021-12-23 DIAGNOSIS — J45.20 MILD INTERMITTENT ASTHMA WITHOUT COMPLICATION: ICD-10-CM

## 2021-12-23 PROBLEM — Z91.81 HISTORY OF FALL: Status: ACTIVE | Noted: 2021-12-23

## 2021-12-23 PROCEDURE — 99214 OFFICE O/P EST MOD 30 MIN: CPT | Performed by: INTERNAL MEDICINE

## 2021-12-23 RX ORDER — DOXYCYCLINE HYCLATE 100 MG/1
100 CAPSULE ORAL 2 TIMES DAILY
Qty: 20 CAPSULE | Refills: 0 | Status: ON HOLD | OUTPATIENT
Start: 2021-12-23 | End: 2022-02-19

## 2021-12-23 RX ORDER — METHYLPREDNISOLONE 4 MG/1
TABLET ORAL
Status: ON HOLD | COMMUNITY
Start: 2021-12-17 | End: 2022-02-19

## 2021-12-23 RX ORDER — CYCLOBENZAPRINE HCL 5 MG
TABLET ORAL
Status: ON HOLD | COMMUNITY
Start: 2021-12-06 | End: 2022-02-19

## 2021-12-23 NOTE — PROGRESS NOTES
[facility] OUTPATIENT PROGRESS NOTE    Patient: Carlo Velasco  : 1945  Age: 76 y.o.  Date of Service: 2021    REASON FOR CLINIC VISIT:  Chief Complaint   Patient presents with   • Empyema of pleura      fell off a ladder 12/3; had fractured rib and compression fracture to back, but saw a spot on his lungs; unsure if its new or not per pt   • Pneumonia of right lung due to infectious organism, unspecif     last ct with  was 4/15   • Lung Nodule     per MD JAMES Garsia; Pt to bring [facility] CD        Subjective:    History of Present Illness:  Carlo Velasco is a 76 y.o. male who presents to the office today to be seen for    Diagnosis Plan   1. ERNESTO (obstructive sleep apnea)     2. Former smoker, stopped smoking many years ago     3. Mild intermittent asthma without complication     4. Empyema of pleura (HCC)     5. S/P thoracostomy tube placement ([facility])     6. Pneumonia of right lung due to infectious organism, unspecified part of lung     7. Morbidly obese (HCC)     8. Non-seasonal allergic rhinitis, unspecified trigger     9. History of pneumonia     10. Pleurisy     11. Pleuritic chest pain     12. Restrictive lung disease     .  Other problems per record.  Patient is a very pleasant 76 years old  gentleman who returns the pulmonary clinic today with his wife for follow-up visit.    He was seen in the pulmonary clinic in the past for moderate obstructive airway dysfunction and restrictive dysfunction.  He was using albuterol rescue inhaler only for shortness of breath.  He also has nasal allergy and was using Flonase nasal spray and loratadine.  He is a former smoker and stopped smoking many years ago.  He was working at home with his wife earlier this month on 12/3/2021 and when he was on a ladder he lost balance and fell and hit the right side of the chest.  He went to the [facility] in [facility] and had a chest x-ray and CT scan of the chest  done.  The chest x-ray shows brief fracture in the right fifth rib and compression fracture of T6.  But the CT scan shows a few abnormal densities in the right lung which could be from pneumonia or lung contusion related to the right-sided rib fracture.  The patient was advised to do the follow-up as he has new nodular lung lesions which could be malignancy needs to be ruled out.    The patient did bring the CD-ROM of the CT scan of the chest which was reviewed by me and I showed it to the patient and his wife.  It showed multiple lung nodules or infiltrates some peripheral and some little deeper mostly in the right lung and on the same side he had rib fracture noted.  He had no pneumothorax noted no lung mass or any other pneumonic consolidation noted.  The patient was advised to take a course of azithromycin and also was given some Solu-Medrol which she is still taking but azithromycin is completed.  He did not have hemoptysis but has some occasional cough with clear expectoration he did not have any fever.  The patient is already vaccinated for Covid but this is not recorded.  He reported he did take 3 doses of Covid vaccine from outside pharmacy.  His wife is vaccinated as well.    Hearing impairment and did not have recent hospitalizations and ER visit an urgent care visit.  He did not have any long-term inhaler.  The patient did is a pulmonary function test done in the June 2021 which shows mixed moderate obstructive and restrictive dysfunction with some bronchodilator response and mild decrease in diffusion capacity.  Although he is feeling better after his fall from the ladder earlier this month he still has some pleuritic pain on the right side of the chest and shortness of breath on exertion.  He did not have any other hospitalizations and ER visit or urgent care visit since his last visit with me.      PFT done today:  Not done today      Results for orders placed in visit on 04/21/21    Pulmonary  Function Test    [facility] - Pulmonary Function Test    [address]  [place]  [place]  [address]  [phone]    Patient : Carlo Velasco  MRN : [id number]  CSN : [id number]  Pulmonologist : Cruz Atkinson MD  Date : 6/7/2021    ______________________________________________________________________    Interpretation :  1.  Spirometry is consistent with a moderate restrictive ventilatory defect with coexisting mild small airways disease.  2.  There is improvement in spirometry postbronchodilator particularly in small airways function which is now normal but a moderate restrictive ventilatory defect still appears to be present.  3.  Lung volumes confirm a moderate restrictive ventilatory defect.  4.  There is a moderate diffusion impairment which when corrected for alveolar volume is normalized.  5.  When current studies are compared to studies from July 13 of 2017, the patient's current prebronchodilator FVC and FEV1 have dropped significantly compared to previous prebronchodilator studies.  His postbronchodilator FVC and FEV1 have also dropped significantly compared to previous postbronchodilator studies.  There has also been a drop in his total lung capacity compared to previous.  When corrected for alveolar volume there has also been a decrease in his diffusion capacity compared to previous although it remains within normal limits.      Cruz Atkinson MD      Results for orders placed during the hospital encounter of 07/13/17    Full Pulmonary Function Test With Bronchodilator    [facility] - Pulmonary Function Test    [address]  [place]  [place]  [address]  [phone]    Patient : Carlo Velasco  MRN : [id number]  CSN : [id number]  Pulmonologist : Cruz Atkinson MD  Date : 7/13/2017    ______________________________________________________________________    Interpretation :  1.  Spirometry is consistent with a mild restrictive ventilatory defect with  coexisting mild decrease in the patient's FEF 75%,  2.  There is some improvement in the patient's FEF 75% postbronchodilator but a mild restrictive ventilatory defect still appears to be present.  3.  Lung volumes reveal a low normal total lung capacity with a decrease in vital capacity and expiratory reserve volume, consistent with a borderline restrictive ventilatory defect.  4.  Diffusion capacity is within normal limits.      Cruz Atkinson MD         Bronchodilator therapy: Albuterol rescue inhaler    Smoking Status:   Social History     Tobacco Use   Smoking Status Former Smoker   • Packs/day: 3.00   • Years: 10.00   • Pack years: 30.00   • Types: Cigarettes   • Quit date:    • Years since quittin.0   Smokeless Tobacco Former User   • Types: Snuff   • Quit date:      Pulm Rehab: no  Sleep: yes    Support System: lives with their spouse    Code Status:   There are no questions and answers to display.        Review of Systems:  A complete review of systems is performed and all other systems were reviewed and negative as note above in the HPI.  Review of Systems   Constitutional: Positive for fatigue.   HENT: Positive for congestion and postnasal drip.    Eyes: Negative.    Respiratory: Positive for cough and shortness of breath.    Cardiovascular: Positive for chest pain.   Gastrointestinal: Negative.    Endocrine: Negative.    Genitourinary: Negative.    Musculoskeletal: Positive for arthralgias and back pain.   Skin: Negative.    Allergic/Immunologic: Positive for environmental allergies.   Neurological: Negative.    Hematological: Negative.    Psychiatric/Behavioral: Negative.        CAT/ACT Score:  Not done today    Medications:  Outpatient Encounter Medications as of 2021   Medication Sig Dispense Refill   • ALPRAZolam (XANAX) 0.25 MG tablet      • atorvastatin (LIPITOR) 80 MG tablet TAKE 1 TABLET BY MOUTH EVERY NIGHT 30 tablet 2   • benzonatate (TESSALON) 100 MG capsule As  Needed.     • Cholecalciferol 125 MCG (5000 UT) tablet      • clopidogrel (PLAVIX) 75 MG tablet Take 75 mg by mouth Daily.     • clopidogrel (Plavix) 75 MG tablet Take 1 tablet by mouth Daily. 90 tablet 4   • cyclobenzaprine (FLEXERIL) 5 MG tablet TAKE 1 TABLET BY MOUTH TWICE DAILY FOR 14 DAYS AS NEEDED     • donepezil (ARICEPT) 5 MG tablet Take 1 tablet by mouth every night at bedtime. 90 tablet 3   • escitalopram (LEXAPRO) 20 MG tablet TAKE 1 TABLET BY MOUTH DAILY 90 tablet 1   • fluticasone (Flonase Allergy Relief) 50 MCG/ACT nasal spray 2 sprays into the nostril(s) as directed by provider Daily. 16 g 5   • furosemide (LASIX) 40 MG tablet TAKE 1 TABLET BY MOUTH DAILY 90 tablet 3   • isosorbide mononitrate (IMDUR) 30 MG 24 hr tablet TAKE 1 TABLET BY MOUTH EVERY MORNING 90 tablet 3   • lisinopril (PRINIVIL,ZESTRIL) 5 MG tablet Take 1 tablet by mouth Daily. 90 tablet 3   • loratadine (CLARITIN) 10 MG tablet Take 1 tablet by mouth Daily. 90 tablet 3   • methylPREDNISolone (MEDROL) 4 MG dose pack follow package directions     • metoprolol tartrate (LOPRESSOR) 25 MG tablet Take one tablet by mouth twice a day. 180 tablet 3   • NITROSTAT 0.4 MG SL tablet Place 1 tablet under the tongue Every 5 (Five) Minutes As Needed for Chest Pain. Take no more than 3 doses in 15 minutes. 100 tablet 2   • omega-3 acid ethyl esters (LOVAZA) 1 g capsule TAKE 1 CAPSULE BY MOUTH DAILY 90 capsule 3   • omeprazole (priLOSEC) 20 MG capsule TAKE 1 CAPSULE BY MOUTH DAILY 90 capsule 3   • oxyCODONE-acetaminophen (PERCOCET) 5-325 MG per tablet Take 1 tablet by mouth Every 6 (Six) Hours As Needed (pain). (Patient taking differently: Take 1 tablet by mouth Every 6 (Six) Hours As Needed (pain). As needed, rarely takes) 20 tablet 0   • Potassium 99 MG tablet Take 1 tablet by mouth Daily.     • pregabalin (LYRICA) 150 MG capsule Take 1 capsule by mouth 3 (Three) Times a Day. 90 capsule 1   • sucralfate (CARAFATE) 1 g tablet      • topiramate  "(TOPAMAX) 25 MG tablet Take 25 mg by mouth 3 (Three) Times a Day.     • Topiramate ER 25 MG capsule sustained-release 24 hr        No facility-administered encounter medications on file as of 12/23/2021.       Allergies:  Allergies   Allergen Reactions   • Meperidine Other (See Comments)     HEART STOPS    Other reaction(s): Unknown   • Statins Myalgia     Causes leg cramps       Immunizations:  Immunization History   Administered Date(s) Administered   • FLUAD TRI 65YR+ 10/10/2019   • Fluad Quad 65+ 10/10/2019   • Fluzone High Dose =>65 Years ([employer] ONLY) 10/11/2018   • TD Preservative Free 08/17/2018   • Tdap 09/17/2015   • influenza Split 10/22/2016       Objective:    Vitals:  /84   Pulse 90   Ht 172.7 cm (68\")   Wt 114 kg (251 lb 12.8 oz)   SpO2 97%   BMI 38.29 kg/m²     Physical Exam:  General: Patient is a 76 y.o. pleasant elderly  male. Looks stated age. Appears to be in no acute distress.  Eyes: EOMI. PERRLA. Vision intact. No scleral icterus.  Ear, Nose, Mouth and Throat: Hearing is grossly intact. No Leukoplakia, pharyngitis, stomatitis or thrush. Swollen nasal mucosa with post nasal drop.  Neck: Range of motion of neck normal. No thyromegaly or masses. Mallampati Class 3, no JVD  Respiratory: Clear to auscultation bilaterally. No use of accessory muscles. Decreased breath sounds.  Cardiovascular: Normal heart sounds. Regularly regular rhythm without murmur.  Gastrointestinal: Non tender, non distended, soft. Bowel sounds positive in all four quadrants. No organomegaly.  Skin: No obvious rashes, lesions, ulcers or large amount of bruising. No edema.   Neurological: No new motor deficits. Cranial nerves appear intact.  Psychiatric: Patient is alert and oriented to person, place and time.    Chest Imaging:    Assessment:  1. ERNESTO (obstructive sleep apnea)    2. Former smoker, stopped smoking many years ago    3. Mild intermittent asthma without complication    4. Empyema of pleura (HCC) "    5. S/P thoracostomy tube placement (HCC)    6. Pneumonia of right lung due to infectious organism, unspecified part of lung    7. Morbidly obese (HCC)    8. Non-seasonal allergic rhinitis, unspecified trigger    9. History of pneumonia    10. Pleurisy    11. Pleuritic chest pain    12. Restrictive lung disease        Plan/Recommendations:    1.  I reviewed the CT scan of the chest results with the patient which is done in outside hospital.  I have shown the CT scan to the patient and his wife and explained the test results and abnormal findings.  It seems patient has multiple nodular infiltrate in the right lung mostly and he had a rib fractures were noted from the recent fall.  These lesions could be from pulmonary contusion or may be some infectious process and is already treated with azithromycin.  I advised him to take another course of antibiotic for 10 days.  He did not have any hemoptysis.   2.  Nodular infiltrate in the lung is a new finding.  It seems less likely to be malignant but that needs to be ruled out.  A follow-up CT scan is ordered noncontrast in 3 months time.  This time CT scan will [facility] in the [facility] for further review.  If the nodules are still present or getting bigger a biopsy will be done otherwise we will follow it conservatively with serial CT scan of the chest.  3.  Patient will continue albuterol rescue inhaler for his shortness of breath.  He is not very symptomatic with breathing issues and he did not need any long-term inhaler.  He had some other lung problems including a empyema who needed a chest tube placement in the past.  He is also morbidly obese but did not have any sleep apnea does not use any CPAP and is not on any home oxygen.  4.  Patient is already vaccinated for Covid.  He will continue follow-up with the primary care provider he is also going to get influenza vaccine.  He will return to pulmonary clinic in 3 months time for follow-up visit with the CT scan  of the chest or earlier if needed.    Follow up:  3 Months    Time Spent:  30 minutes    I appreciate the opportunity of participating in this patient's care. I would like to thank the PCP for the referral.  Please feel free to contact me with any other questions.    Katelyn Buckley MD   Pulmonologist/Intensivist     Electronically signed by: Katelyn Buckley MD, 12/23/2021 12:03 CST

## 2022-01-23 PROCEDURE — 93296 REM INTERROG EVL PM/IDS: CPT | Performed by: INTERNAL MEDICINE

## 2022-01-23 PROCEDURE — 93294 REM INTERROG EVL PM/LDLS PM: CPT | Performed by: INTERNAL MEDICINE

## 2022-02-17 ENCOUNTER — APPOINTMENT (OUTPATIENT)
Dept: GENERAL RADIOLOGY | Facility: HOSPITAL | Age: 77
End: 2022-02-17

## 2022-02-17 ENCOUNTER — APPOINTMENT (OUTPATIENT)
Dept: CT IMAGING | Facility: HOSPITAL | Age: 77
End: 2022-02-17

## 2022-02-17 ENCOUNTER — HOSPITAL ENCOUNTER (INPATIENT)
Facility: HOSPITAL | Age: 77
LOS: 3 days | Discharge: HOME | End: 2022-02-20
Attending: INTERNAL MEDICINE | Admitting: INTERNAL MEDICINE

## 2022-02-17 DIAGNOSIS — Z74.09 IMPAIRED MOBILITY: ICD-10-CM

## 2022-02-17 PROBLEM — N39.0 UTI (URINARY TRACT INFECTION): Status: ACTIVE | Noted: 2022-02-17

## 2022-02-17 LAB
ALBUMIN SERPL-MCNC: 3.6 G/DL (ref 3.5–5.2)
ALBUMIN/GLOB SERPL: 1.1 G/DL
ALP SERPL-CCNC: 111 U/L (ref 39–117)
ALT SERPL W P-5'-P-CCNC: 29 U/L (ref 1–41)
ANION GAP SERPL CALCULATED.3IONS-SCNC: 10 MMOL/L (ref 5–15)
AST SERPL-CCNC: 28 U/L (ref 1–40)
BACTERIA UR QL AUTO: ABNORMAL /HPF
BASOPHILS # BLD AUTO: 0.04 10*3/MM3 (ref 0–0.2)
BASOPHILS NFR BLD AUTO: 0.2 % (ref 0–1.5)
BILIRUB SERPL-MCNC: 0.9 MG/DL (ref 0–1.2)
BILIRUB UR QL STRIP: ABNORMAL
BUN SERPL-MCNC: 12 MG/DL (ref 8–23)
BUN/CREAT SERPL: 15.2 (ref 7–25)
CALCIUM SPEC-SCNC: 8.7 MG/DL (ref 8.6–10.5)
CHLORIDE SERPL-SCNC: 101 MMOL/L (ref 98–107)
CLARITY UR: ABNORMAL
CO2 SERPL-SCNC: 26 MMOL/L (ref 22–29)
COLOR UR: ABNORMAL
CREAT SERPL-MCNC: 0.79 MG/DL (ref 0.76–1.27)
D-LACTATE SERPL-SCNC: 1.1 MMOL/L (ref 0.5–2)
DEPRECATED RDW RBC AUTO: 46.4 FL (ref 37–54)
EOSINOPHIL # BLD AUTO: 0.01 10*3/MM3 (ref 0–0.4)
EOSINOPHIL NFR BLD AUTO: 0 % (ref 0.3–6.2)
ERYTHROCYTE [DISTWIDTH] IN BLOOD BY AUTOMATED COUNT: 14.6 % (ref 12.3–15.4)
GFR SERPL CREATININE-BSD FRML MDRD: 96 ML/MIN/1.73
GLOBULIN UR ELPH-MCNC: 3.4 GM/DL
GLUCOSE SERPL-MCNC: 194 MG/DL (ref 65–99)
GLUCOSE UR STRIP-MCNC: NEGATIVE MG/DL
HCT VFR BLD AUTO: 42.5 % (ref 37.5–51)
HGB BLD-MCNC: 13.6 G/DL (ref 13–17.7)
HGB UR QL STRIP.AUTO: ABNORMAL
HOLD SPECIMEN: NORMAL
HOLD SPECIMEN: NORMAL
HYALINE CASTS UR QL AUTO: ABNORMAL /LPF
IMM GRANULOCYTES # BLD AUTO: 0.18 10*3/MM3 (ref 0–0.05)
IMM GRANULOCYTES NFR BLD AUTO: 0.9 % (ref 0–0.5)
INR PPP: 1.08 (ref 0.91–1.09)
KETONES UR QL STRIP: ABNORMAL
LEUKOCYTE ESTERASE UR QL STRIP.AUTO: ABNORMAL
LIPASE SERPL-CCNC: 10 U/L (ref 13–60)
LYMPHOCYTES # BLD AUTO: 0.71 10*3/MM3 (ref 0.7–3.1)
LYMPHOCYTES NFR BLD AUTO: 3.4 % (ref 19.6–45.3)
MCH RBC QN AUTO: 27.9 PG (ref 26.6–33)
MCHC RBC AUTO-ENTMCNC: 32 G/DL (ref 31.5–35.7)
MCV RBC AUTO: 87.3 FL (ref 79–97)
MONOCYTES # BLD AUTO: 1.9 10*3/MM3 (ref 0.1–0.9)
MONOCYTES NFR BLD AUTO: 9 % (ref 5–12)
NEUTROPHILS NFR BLD AUTO: 18.18 10*3/MM3 (ref 1.7–7)
NEUTROPHILS NFR BLD AUTO: 86.5 % (ref 42.7–76)
NITRITE UR QL STRIP: POSITIVE
NRBC BLD AUTO-RTO: 0 /100 WBC (ref 0–0.2)
PH UR STRIP.AUTO: 6 [PH] (ref 5–8)
PLATELET # BLD AUTO: 222 10*3/MM3 (ref 140–450)
PMV BLD AUTO: 9 FL (ref 6–12)
POTASSIUM SERPL-SCNC: 3.7 MMOL/L (ref 3.5–5.2)
PROCALCITONIN SERPL-MCNC: 0.37 NG/ML (ref 0–0.25)
PROT SERPL-MCNC: 7 G/DL (ref 6–8.5)
PROT UR QL STRIP: ABNORMAL
PROTHROMBIN TIME: 13.6 SECONDS (ref 11.9–14.6)
RBC # BLD AUTO: 4.87 10*6/MM3 (ref 4.14–5.8)
RBC # UR STRIP: ABNORMAL /HPF
REF LAB TEST METHOD: ABNORMAL
SARS-COV-2 RNA PNL SPEC NAA+PROBE: NOT DETECTED
SODIUM SERPL-SCNC: 137 MMOL/L (ref 136–145)
SP GR UR STRIP: >1.03 (ref 1–1.03)
SQUAMOUS #/AREA URNS HPF: ABNORMAL /HPF
UROBILINOGEN UR QL STRIP: ABNORMAL
WBC # UR STRIP: ABNORMAL /HPF
WBC NRBC COR # BLD: 21.02 10*3/MM3 (ref 3.4–10.8)
WHOLE BLOOD HOLD SPECIMEN: NORMAL
WHOLE BLOOD HOLD SPECIMEN: NORMAL

## 2022-02-17 PROCEDURE — 85610 PROTHROMBIN TIME: CPT | Performed by: NURSE PRACTITIONER

## 2022-02-17 PROCEDURE — 99284 EMERGENCY DEPT VISIT MOD MDM: CPT

## 2022-02-17 PROCEDURE — 83605 ASSAY OF LACTIC ACID: CPT

## 2022-02-17 PROCEDURE — 87635 SARS-COV-2 COVID-19 AMP PRB: CPT | Performed by: NURSE PRACTITIONER

## 2022-02-17 PROCEDURE — 80053 COMPREHEN METABOLIC PANEL: CPT

## 2022-02-17 PROCEDURE — 71045 X-RAY EXAM CHEST 1 VIEW: CPT

## 2022-02-17 PROCEDURE — 84145 PROCALCITONIN (PCT): CPT | Performed by: NURSE PRACTITIONER

## 2022-02-17 PROCEDURE — 81001 URINALYSIS AUTO W/SCOPE: CPT | Performed by: NURSE PRACTITIONER

## 2022-02-17 PROCEDURE — 25010000002 CEFTRIAXONE PER 250 MG: Performed by: NURSE PRACTITIONER

## 2022-02-17 PROCEDURE — 87040 BLOOD CULTURE FOR BACTERIA: CPT | Performed by: INTERNAL MEDICINE

## 2022-02-17 PROCEDURE — 25010000002 IOPAMIDOL 61 % SOLUTION: Performed by: NURSE PRACTITIONER

## 2022-02-17 PROCEDURE — 87086 URINE CULTURE/COLONY COUNT: CPT | Performed by: NURSE PRACTITIONER

## 2022-02-17 PROCEDURE — 85025 COMPLETE CBC W/AUTO DIFF WBC: CPT

## 2022-02-17 PROCEDURE — 87040 BLOOD CULTURE FOR BACTERIA: CPT

## 2022-02-17 PROCEDURE — 74177 CT ABD & PELVIS W/CONTRAST: CPT

## 2022-02-17 PROCEDURE — 83690 ASSAY OF LIPASE: CPT | Performed by: NURSE PRACTITIONER

## 2022-02-17 RX ORDER — ATORVASTATIN CALCIUM 40 MG/1
80 TABLET, FILM COATED ORAL NIGHTLY
Status: DISCONTINUED | OUTPATIENT
Start: 2022-02-17 | End: 2022-02-20 | Stop reason: HOSPADM

## 2022-02-17 RX ORDER — OXYCODONE HYDROCHLORIDE AND ACETAMINOPHEN 5; 325 MG/1; MG/1
1 TABLET ORAL EVERY 6 HOURS PRN
Status: DISCONTINUED | OUTPATIENT
Start: 2022-02-17 | End: 2022-02-20 | Stop reason: HOSPADM

## 2022-02-17 RX ORDER — DONEPEZIL HYDROCHLORIDE 5 MG/1
5 TABLET, FILM COATED ORAL NIGHTLY
Status: DISCONTINUED | OUTPATIENT
Start: 2022-02-17 | End: 2022-02-20 | Stop reason: HOSPADM

## 2022-02-17 RX ORDER — ACETAMINOPHEN 650 MG/1
650 SUPPOSITORY RECTAL EVERY 4 HOURS PRN
Status: DISCONTINUED | OUTPATIENT
Start: 2022-02-17 | End: 2022-02-20 | Stop reason: HOSPADM

## 2022-02-17 RX ORDER — SODIUM CHLORIDE 0.9 % (FLUSH) 0.9 %
10 SYRINGE (ML) INJECTION AS NEEDED
Status: DISCONTINUED | OUTPATIENT
Start: 2022-02-17 | End: 2022-02-20 | Stop reason: HOSPADM

## 2022-02-17 RX ORDER — PANTOPRAZOLE SODIUM 40 MG/1
40 TABLET, DELAYED RELEASE ORAL EVERY MORNING
Status: DISCONTINUED | OUTPATIENT
Start: 2022-02-18 | End: 2022-02-20 | Stop reason: HOSPADM

## 2022-02-17 RX ORDER — LISINOPRIL 5 MG/1
5 TABLET ORAL DAILY
Status: DISCONTINUED | OUTPATIENT
Start: 2022-02-18 | End: 2022-02-20 | Stop reason: HOSPADM

## 2022-02-17 RX ORDER — SODIUM CHLORIDE 0.9 % (FLUSH) 0.9 %
10 SYRINGE (ML) INJECTION EVERY 12 HOURS SCHEDULED
Status: DISCONTINUED | OUTPATIENT
Start: 2022-02-17 | End: 2022-02-20 | Stop reason: HOSPADM

## 2022-02-17 RX ORDER — FUROSEMIDE 40 MG/1
40 TABLET ORAL DAILY
Status: DISCONTINUED | OUTPATIENT
Start: 2022-02-18 | End: 2022-02-18

## 2022-02-17 RX ORDER — ONDANSETRON 2 MG/ML
4 INJECTION INTRAMUSCULAR; INTRAVENOUS EVERY 6 HOURS PRN
Status: DISCONTINUED | OUTPATIENT
Start: 2022-02-17 | End: 2022-02-20 | Stop reason: HOSPADM

## 2022-02-17 RX ORDER — CLOPIDOGREL BISULFATE 75 MG/1
75 TABLET ORAL DAILY
Status: DISCONTINUED | OUTPATIENT
Start: 2022-02-18 | End: 2022-02-20 | Stop reason: HOSPADM

## 2022-02-17 RX ORDER — ACETAMINOPHEN 325 MG/1
650 TABLET ORAL EVERY 4 HOURS PRN
Status: DISCONTINUED | OUTPATIENT
Start: 2022-02-17 | End: 2022-02-20 | Stop reason: HOSPADM

## 2022-02-17 RX ORDER — TOPIRAMATE 25 MG/1
25 TABLET ORAL 3 TIMES DAILY
Status: DISCONTINUED | OUTPATIENT
Start: 2022-02-17 | End: 2022-02-20 | Stop reason: HOSPADM

## 2022-02-17 RX ORDER — ATORVASTATIN CALCIUM 80 MG/1
TABLET, FILM COATED ORAL
Qty: 30 TABLET | Refills: 2 | Status: ON HOLD | OUTPATIENT
Start: 2022-02-17 | End: 2022-02-19

## 2022-02-17 RX ORDER — FLUTICASONE PROPIONATE 50 MCG
2 SPRAY, SUSPENSION (ML) NASAL DAILY
Status: DISCONTINUED | OUTPATIENT
Start: 2022-02-18 | End: 2022-02-20 | Stop reason: HOSPADM

## 2022-02-17 RX ORDER — ISOSORBIDE MONONITRATE 30 MG/1
30 TABLET, EXTENDED RELEASE ORAL EVERY MORNING
Status: DISCONTINUED | OUTPATIENT
Start: 2022-02-18 | End: 2022-02-20 | Stop reason: HOSPADM

## 2022-02-17 RX ORDER — ESCITALOPRAM OXALATE 10 MG/1
20 TABLET ORAL DAILY
Status: DISCONTINUED | OUTPATIENT
Start: 2022-02-18 | End: 2022-02-20 | Stop reason: HOSPADM

## 2022-02-17 RX ORDER — SUCRALFATE 1 G/1
1 TABLET ORAL
Status: DISCONTINUED | OUTPATIENT
Start: 2022-02-17 | End: 2022-02-20 | Stop reason: HOSPADM

## 2022-02-17 RX ORDER — CETIRIZINE HYDROCHLORIDE 10 MG/1
10 TABLET ORAL DAILY
Status: DISCONTINUED | OUTPATIENT
Start: 2022-02-18 | End: 2022-02-20 | Stop reason: HOSPADM

## 2022-02-17 RX ORDER — ACETAMINOPHEN 500 MG
1000 TABLET ORAL ONCE
Status: COMPLETED | OUTPATIENT
Start: 2022-02-17 | End: 2022-02-17

## 2022-02-17 RX ORDER — ACETAMINOPHEN 160 MG/5ML
650 SOLUTION ORAL EVERY 4 HOURS PRN
Status: DISCONTINUED | OUTPATIENT
Start: 2022-02-17 | End: 2022-02-20 | Stop reason: HOSPADM

## 2022-02-17 RX ORDER — NITROGLYCERIN 0.4 MG/1
0.4 TABLET SUBLINGUAL
Status: DISCONTINUED | OUTPATIENT
Start: 2022-02-17 | End: 2022-02-20 | Stop reason: HOSPADM

## 2022-02-17 RX ORDER — PREGABALIN 75 MG/1
150 CAPSULE ORAL 3 TIMES DAILY
Status: DISCONTINUED | OUTPATIENT
Start: 2022-02-17 | End: 2022-02-20 | Stop reason: HOSPADM

## 2022-02-17 RX ADMIN — ACETAMINOPHEN 1000 MG: 500 TABLET, FILM COATED ORAL at 15:53

## 2022-02-17 RX ADMIN — TOPIRAMATE 25 MG: 25 TABLET, FILM COATED ORAL at 23:07

## 2022-02-17 RX ADMIN — ATORVASTATIN CALCIUM 80 MG: 40 TABLET, FILM COATED ORAL at 23:08

## 2022-02-17 RX ADMIN — IOPAMIDOL 100 ML: 612 INJECTION, SOLUTION INTRAVENOUS at 15:50

## 2022-02-17 RX ADMIN — SODIUM CHLORIDE, PRESERVATIVE FREE 10 ML: 5 INJECTION INTRAVENOUS at 23:13

## 2022-02-17 RX ADMIN — SODIUM CHLORIDE 1000 ML: 9 INJECTION, SOLUTION INTRAVENOUS at 15:52

## 2022-02-17 RX ADMIN — DONEPEZIL HYDROCHLORIDE 5 MG: 5 TABLET, FILM COATED ORAL at 23:08

## 2022-02-17 RX ADMIN — METOPROLOL TARTRATE 25 MG: 25 TABLET, FILM COATED ORAL at 23:07

## 2022-02-17 RX ADMIN — SUCRALFATE 1 G: 1 TABLET ORAL at 23:07

## 2022-02-17 RX ADMIN — SODIUM CHLORIDE 1 G: 900 INJECTION INTRAVENOUS at 18:26

## 2022-02-17 RX ADMIN — PREGABALIN 150 MG: 75 CAPSULE ORAL at 23:13

## 2022-02-17 NOTE — H&P
4           [facility]  HISTORY AND PHYSICAL    Date of Admission: 2/17/2022  Primary Care Physician: Cruz Chaudhari MD    Subjective     Chief Complaint: Fever/urinary tract infection  History of Present Illness  He is 76-year-old man with known history of obstructive sleep apnea.  Patient had prior empyema requiring thoracostomy tube placement.  His other comorbidities includes coronary artery disease, obesity hypertension, hyperlipidemia.  Patient reportedly went to clinic and prescribed with antibiotic for urinary tract infection.  Antibiotic yet to be verified.  Despite of this, patient continued to have fever and has white count of 21,000.  Patient has pyuria, positive leukocyte esterase and nitrite.  Specific gravity is greater than 1.030.  Procalcitonin slightly elevated at 0.37  CT imaging showed no prostatic abscess.  Findings of this cystitis/prostatitis  Patient received ceftriaxone in the emergency room.  Hospitalist service asked to further evaluate manage    Said that he was sick since Sunday. He had some blood in his urine. He has known history of urinary stone. He has dysuria, frequency and decreased urinary flow. He claims to have history of prostate cancer in his early 30s.      Review of Systems   positive fever  no chills  no nausea vomiting  no reported abdominal pain  no chest pain, shortness of breath, difficulty breathing  has not endorsed any back discomfort nor reported fall history  Otherwise complete ROS reviewed and negative except as mentioned in the HPI.    Past Medical History:   Past Medical History:   Diagnosis Date   • Anxiety    • Arthritis    • Cancer (HCC)     melanoma   • CHF (congestive heart failure) (HCC)    • Claustrophobia    • Colon polyp    • Coronary artery disease 2012, 2015    2 vessel CABG (2012), SHAHRZAD LAD (2015).    • Depression    • Disease of thyroid gland     partial thyroidectomy on right   • GERD  (gastroesophageal reflux disease)    • Hearing loss    • Heart attack (HCC)    • Heart attack (HCC)    • History of transfusion    • Hyperlipidemia    • Hypertension    • Kidney stone     history of    • Mild intermittent asthma without complication 8/24/2021   • Tobacco abuse, in remission    • Uses hearing aid     BILATERAL     Past Surgical History:  Past Surgical History:   Procedure Laterality Date   • BACK SURGERY      CERVICAL FUSION AND LUMBAR DISC REPLACEMENT   • BRONCHOSCOPY N/A 4/27/2021    Procedure: BRONCHOSCOPY;  Surgeon: Rico Jacobs MD;  Location: [facility] OR;  Service: Cardiothoracic;  Laterality: N/A;   • CARDIAC CATHETERIZATION  07/2015    Dr. Malu Koch   • CARDIAC CATHETERIZATION N/A 10/21/2016    Procedure: Left Heart Cath;  Surgeon: Darian Toribio MD;  Location:  [facility] CATH INVASIVE LOCATION;  Service:    • CARDIAC CATHETERIZATION Left 4/5/2017    Procedure: Cardiac Catheterization/Vascular Study;  Surgeon: Darian Toribio MD;  Location:  [facility] CATH [facility]VAS[facility];  Service:    • CARDIAC CATHETERIZATION N/A 4/5/2017    Procedure: Left Heart Cath;  Surgeon: Darian Toribio MD;  Location:  [facility] CATH [facility];  Service:    • CARDIAC CATHETERIZATION N/A 4/5/2017    Procedure: Coronary angiography;  Surgeon: Darian Toribio MD;  Location:  [facility] CATH [facility]VASIVE [facility];  Service:    • CARDIAC CATHETERIZATION N/A 4/5/2017    Procedure: Left ventriculography;  Surgeon: Darian Toribio MD;  Location:  [facility];  Service:    • CARDIAC CATHETERIZATION  4/5/2017    Procedure: Saphenous Vein Graft;  Surgeon: Darian Toribio MD;  Location:  [facility]ATH [facility];  Service:    • CARDIAC CATHETERIZATION Left 2/27/2018    Procedure: Cardiac Catheterization/Vascular Study SHAHRZAD OK PRN;  Surgeon: Darian Toribio MD;  Location:  [facility];  Service:    • CARDIAC CATHETERIZATION  2/27/2018    Procedure: Functional Flow [place];  Surgeon: Darian Toribio MD;  Location:   [facility] CATH [facility];  Service:    • CARDIAC CATHETERIZATION N/A 2/27/2018    Procedure: Left ventriculography;  Surgeon: Darian Toribio MD;  Location: [facility] CATH INVASIVE LOCATION;  Service:    • CARDIAC CATHETERIZATION Left 4/28/2020    Procedure: Cardiac Catheterization/Vascular Study SHAHRZAD OK PRN;  Surgeon: Darian Toribio MD;  Location: [facility] CATH INVASIVE LOCATION;  Service: Cardiology;  Laterality: Left;   • CARDIAC ELECTROPHYSIOLOGY PROCEDURE N/A 1/11/2018    Procedure: PPM generator change - dual;  Surgeon: Darian Toribio MD;  Location: [facility] CATH INVASIVE LOCATION;  Service:    • CARDIAC SURGERY      open heart surgery x2   • COLONOSCOPY  09/05/2007    colon polyps   • COLONOSCOPY N/A 5/29/2018    Procedure: COLONOSCOPY WITH ANESTHESIA;  Surgeon: Juan F Tapia MD;  Location: [facility] ENDOSCOPY;  Service: Gastroenterology   • COLONOSCOPY N/A 10/21/2021    Procedure: COLONOSCOPY WITH ANESTHESIA;  Surgeon: Juan F Tapia MD;  Location: [facility] ENDOSCOPY;  Service: Gastroenterology;  Laterality: N/A;  pre op: hx polyps  post op;polyp,   PCP: Rhys Rosen MD   • CORONARY ANGIOPLASTY WITH STENT PLACEMENT  07/2015    SHAHRZAD mid LAD, Dr. Toribio    • CORONARY ARTERY BYPASS GRAFT  2012 AND 2014    2 vessel    • ENDOSCOPY  11/08/2010   • ENDOSCOPY N/A 5/29/2018    Procedure: ESOPHAGOGASTRODUODENOSCOPY WITH ANESTHESIA;  Surgeon: Juan F Tapia MD;  Location: [facility] ENDOSCOPY;  Service: Gastroenterology   • HEAD/NECK LESION/CYST EXCISION Left 12/20/2016    Procedure: EXCISION MALIGNANT MELANOMA WITH SENTINEL NODE BIOPSY, INJECTION AND SCAN PRE-OP, LEFT EAR;  Surgeon: Guanaco Zepeda MD;  Location: [facility] OR;  Service:    • HEMORRHOIDECTOMY     • HERNIA REPAIR     • INSERT / REPLACE / REMOVE PACEMAKER     • SENTINEL NODE BIOPSY Left 12/20/2016    Procedure: SENTINEL NODE BIOPSY;  Surgeon: Guanaco Zepeda MD;  Location: [facility] OR;  Service:    • SKIN CANCER EXCISION     • THORACOSCOPY Right 4/27/2021     Procedure: THORACOSCOPIC TOTAL DECORTICATION;  Surgeon: Rico Jacobs MD;  Location: [facility];  Service: Cardiothoracic;  Laterality: Right;   • THYROID SURGERY     • THYROIDECTOMY       Social History:  reports that he quit smoking about 45 years ago. His smoking use included cigarettes. He has a 30.00 pack-year smoking history. He quit smokeless tobacco use about 45 years ago.  His smokeless tobacco use included snuff. He reports current alcohol use. He reports that he does not use drugs.    Family History: family history includes COPD in his brother; Colon polyps in his father; Coronary artery disease in his father; Dementia in his mother; Heart failure in his mother; Stroke in his mother.  Allergies:  Allergies   Allergen Reactions   • Meperidine Other (See Comments)     HEART STOPS    Other reaction(s): Unknown   • Statins Myalgia     Causes leg cramps       Medications:  Prior to Admission medications    Medication Sig Start Date End Date Taking? Authorizing Provider   ALPRAZolam (XANAX) 0.25 MG tablet  8/23/21   Loyda Arzola MD   atorvastatin (LIPITOR) 80 MG tablet TAKE 1 TABLET BY MOUTH EVERY NIGHT 2/17/22   Rhys Rosen MD   benzonatate (TESSALON) 100 MG capsule As Needed. 8/23/21   Loyda Arzola MD   Cholecalciferol 125 MCG (5000 UT) tablet  8/23/21   Loyda Arzola MD   clopidogrel (PLAVIX) 75 MG tablet Take 75 mg by mouth Daily.    Loyda Arzola MD   clopidogrel (Plavix) 75 MG tablet Take 1 tablet by mouth Daily. 11/8/21   Darian Toribio MD   cyclobenzaprine (FLEXERIL) 5 MG tablet TAKE 1 TABLET BY MOUTH TWICE DAILY FOR 14 DAYS AS NEEDED 12/6/21   Loyda Arzola MD   donepezil (ARICEPT) 5 MG tablet Take 1 tablet by mouth every night at bedtime. 11/24/20   Rhys Rosen MD   doxycycline (VIBRAMYCIN) 100 MG capsule Take 1 capsule by mouth 2 (Two) Times a Day. 12/23/21   Katelyn Buckley MD   escitalopram (LEXAPRO) 20 MG tablet TAKE 1 TABLET BY  MOUTH DAILY 5/24/21   Rhys Rosen MD   fluticasone (Flonase Allergy Relief) 50 MCG/ACT nasal spray 2 sprays into the nostril(s) as directed by provider Daily. 8/24/21   Katelyn Buckley MD   furosemide (LASIX) 40 MG tablet TAKE 1 TABLET BY MOUTH DAILY 11/22/21   Rhys Rosen MD   isosorbide mononitrate (IMDUR) 30 MG 24 hr tablet TAKE 1 TABLET BY MOUTH EVERY MORNING 6/23/21   Darian Toribio MD   lisinopril (PRINIVIL,ZESTRIL) 5 MG tablet Take 1 tablet by mouth Daily. 5/4/21   Rico Jacobs MD   loratadine (CLARITIN) 10 MG tablet Take 1 tablet by mouth Daily. 8/24/21   Katelyn Buckley MD   methylPREDNISolone (MEDROL) 4 MG dose pack follow package directions 12/17/21   Loyda Arzola MD   metoprolol tartrate (LOPRESSOR) 25 MG tablet Take one tablet by mouth twice a day. 11/5/20   Rhys Rosen MD   NITROSTAT 0.4 MG SL tablet Place 1 tablet under the tongue Every 5 (Five) Minutes As Needed for Chest Pain. Take no more than 3 doses in 15 minutes. 6/10/20   Darian Toribio MD   omega-3 acid ethyl esters (LOVAZA) 1 g capsule TAKE 1 CAPSULE BY MOUTH DAILY 9/7/21   Rhys Rosen MD   omeprazole (priLOSEC) 20 MG capsule TAKE 1 CAPSULE BY MOUTH DAILY 11/22/21   Rhys Rosen MD   oxyCODONE-acetaminophen (PERCOCET) 5-325 MG per tablet Take 1 tablet by mouth Every 6 (Six) Hours As Needed (pain).  Patient taking differently: Take 1 tablet by mouth Every 6 (Six) Hours As Needed (pain). As needed, rarely takes 5/4/21   Rico Jacobs MD   Potassium 99 MG tablet Take 1 tablet by mouth Daily.    Loyda Arzola MD   pregabalin (LYRICA) 150 MG capsule Take 1 capsule by mouth 3 (Three) Times a Day. 7/29/21   Rhys Rosen MD   sucralfate (CARAFATE) 1 g tablet  8/23/21   Loyda Arzola MD   topiramate (TOPAMAX) 25 MG tablet Take 25 mg by mouth 3 (Three) Times a Day. 8/9/21   Provider, MD Loyda   Topiramate ER 25 MG capsule sustained-release 24 hr   "8/23/21   Provider, MD Loyda   atorvastatin (LIPITOR) 80 MG tablet TAKE 1 TABLET BY MOUTH EVERY NIGHT 9/24/21 2/17/22  JessikaRhys MD     I have utilized all available immediate resources to obtain, update, and review the patient's current medications.    Objective     Vital Signs: /78 (BP Location: Right arm, Patient Position: Sitting)   Pulse (!) 127   Temp (!) 102.9 °F (39.4 °C) (Oral)   Resp 20   Ht 172.7 cm (68\")   Wt 118 kg (260 lb)   SpO2 94%   BMI 39.53 kg/m²   Physical Exam   pleasant man  no distress  hemodynamically stable  accompanied by spouse at bedside  alert oriented x3  grossly nonfocal exam  Short neck no thyromegaly  obese with BMI of 39  lungs are clear to auscultation with good air exchange  S1-S2, regular rate and rhythm  obese abdomen, nontender, could examine for any organomegaly  no cyanosis or gross edema  dry skin  good capillary refill time      Results Reviewed:  Lab Results (last 24 hours)     Procedure Component Value Units Date/Time    COVID PRE-OP / PRE-PROCEDURE SCREENING ORDER (NO ISOLATION) - Swab, Nasal Cavity [[id number]]  (Normal) Collected: 02/17/22 1608    Specimen: Swab from Nasal Cavity Updated: 02/17/22 1708    Narrative:      The following orders were created for panel order COVID PRE-OP / PRE-PROCEDURE SCREENING ORDER (NO ISOLATION) - Swab, Nasal Cavity.  Procedure                               Abnormality         Status                     ---------                               -----------         ------                     COVID-19,[facility] [employer] [facility]...[[id number]]  Normal              Final result                 Please view results for these tests on the individual orders.    COVID-19,[facility] [employer] IN-HOUSE,Nasal Swab No Transport Media 3-4 HR TAT - Swab, Nasal Cavity [[id number]]  (Normal) Collected: 02/17/22 1608    Specimen: Swab from Nasal Cavity Updated: 02/17/22 1708     COVID19 Not Detected    Narrative:      Fact sheet for providers: " https://www.fda.gov/media/930341/download     Fact sheet for patients: https://www.fda.gov/media/270402/download    Test performed by PCR.    Consider negative results in combination with clinical observations, patient history, and epidemiological information.  Fact sheet for providers: https://www.fda.gov/media/690525/download     Fact sheet for patients: https://www.[employer].gov/media/611719/download    Test performed by PCR.    Consider negative results in combination with clinical observations, patient history, and epidemiological information.    Urinalysis, Microscopic Only - Urine, Clean Catch [[id number]]  (Abnormal) Collected: 02/17/22 1604    Specimen: Urine, Clean Catch Updated: 02/17/22 1637     RBC, UA None Seen /HPF      WBC, UA 21-30 /HPF      Bacteria, UA None Seen /HPF      Squamous Epithelial Cells, UA 0-2 /HPF      Hyaline Casts, UA None Seen /LPF      Methodology Manual Light Microscopy    Urinalysis With Culture If Indicated - Urine, Clean Catch [[id number]]  (Abnormal) Collected: 02/17/22 1604    Specimen: Urine, Clean Catch Updated: 02/17/22 1637     Color, UA Dark Yellow     Appearance, UA Cloudy     pH, UA 6.0     Specific Gravity, UA >1.030     Glucose, UA Negative     Ketones, UA Trace     Bilirubin, UA Small (1+)     Blood, UA Trace     Protein,  mg/dL (2+)     Leuk Esterase, UA Moderate (2+)     Nitrite, UA Positive     Urobilinogen, UA 1.0 E.U./dL    Urine Culture - Urine, Urine, Clean Catch [[id number]] Collected: 02/17/22 1604    Specimen: Urine, Clean Catch Updated: 02/17/22 1637    Blood Culture - Blood, Arm, Right [[id number]] Collected: 02/17/22 1535    Specimen: Blood from Arm, Right Updated: 02/17/22 1621    Procalcitonin [[id number]]  (Abnormal) Collected: 02/17/22 1410    Specimen: Blood from Arm, Right Updated: 02/17/22 1522     Procalcitonin 0.37 ng/mL     Narrative:      As a Marker for Sepsis (Non-Neonates):     1. <0.5 ng/mL represents a low risk of severe sepsis and/or septic  "shock.  2. >2 ng/mL represents a high risk of severe sepsis and/or septic shock.    As a Marker for Lower Respiratory Tract Infections that require antibiotic therapy:  PCT on Admission     Antibiotic Therapy             6-12 Hrs later  >0.5                          Strongly Recommended            >0.25 - <0.5             Recommended  0.1 - 0.25                  Discouraged                       Remeasure/reassess PCT  <0.1                         Strongly Discouraged         Remeasure/reassess PCT      As 28 day mortality risk marker: \"Change in Procalcitonin Result\" (>80% or <=80%) if Day 0 (or Day 1) and Day 4 values are available. Refer to http://www.[employer] Mediapct-calculator.com/    Change in PCT <=80 %   A decrease of PCT levels below or equal to 80% defines a positive change in PCT test result representing a higher risk for 28-day all-cause mortality of patients diagnosed with severe sepsis or septic shock.    Change in PCT >80 %   A decrease of PCT levels of more than 80% defines a negative change in PCT result representing a lower risk for 28-day all-cause mortality of patients diagnosed with severe sepsis or septic shock.                [place] Draw [[id number]] Collected: 02/17/22 1410    Specimen: Blood from Arm, Right Updated: 02/17/22 1517    Narrative:      The following orders were created for panel order [place] Draw.  Procedure                               Abnormality         Status                     ---------                               -----------         ------                     Green Top (Gel)[[id number]]                                  Final result               Lavender Top[[id number]]                                     Final result               Red Top[[id number]]                                          Final result               Light Blue Top[[id number]]                                   Final result                 Please view results for these tests on the individual orders.    Green " Top (Gel) [[id number]] Collected: 02/17/22 1410    Specimen: Blood from Arm, Right Updated: 02/17/22 1517     Extra Tube Hold for add-ons.     Comment: Auto resulted.       Lavender Top [[id number]] Collected: 02/17/22 1410    Specimen: Blood from Arm, Right Updated: 02/17/22 1517     Extra Tube hold for add-on     Comment: Auto resulted       Red Top [[id number]] Collected: 02/17/22 1410    Specimen: Blood from Arm, Right Updated: 02/17/22 1517     Extra Tube Hold for add-ons.     Comment: Auto resulted.       Light Blue Top [[id number]] Collected: 02/17/22 1410    Specimen: Blood from Arm, Right Updated: 02/17/22 1517     Extra Tube hold for add-on     Comment: Auto resulted       Lipase [[id number]]  (Abnormal) Collected: 02/17/22 1410    Specimen: Blood from Arm, Right Updated: 02/17/22 1450     Lipase 10 U/L     Comprehensive Metabolic Panel [[id number]]  (Abnormal) Collected: 02/17/22 1410    Specimen: Blood from Arm, Right Updated: 02/17/22 1444     Glucose 194 mg/dL      BUN 12 mg/dL      Creatinine 0.79 mg/dL      Sodium 137 mmol/L      Potassium 3.7 mmol/L      Chloride 101 mmol/L      CO2 26.0 mmol/L      Calcium 8.7 mg/dL      Total Protein 7.0 g/dL      Albumin 3.60 g/dL      ALT (SGPT) 29 U/L      AST (SGOT) 28 U/L      Alkaline Phosphatase 111 U/L      Total Bilirubin 0.9 mg/dL      eGFR Non African Amer 96 mL/min/1.73      Globulin 3.4 gm/dL      A/G Ratio 1.1 g/dL      BUN/Creatinine Ratio 15.2     Anion Gap 10.0 mmol/L     Narrative:      GFR Normal >60  Chronic Kidney Disease <60  Kidney Failure <15      Lactic Acid, Plasma [[id number]]  (Normal) Collected: 02/17/22 1410    Specimen: Blood from Arm, Right Updated: 02/17/22 1441     Lactate 1.1 mmol/L     Protime-INR [[id number]]  (Normal) Collected: 02/17/22 1410    Specimen: Blood from Arm, Right Updated: 02/17/22 1438     Protime 13.6 Seconds      INR 1.08    CBC & Differential [[id number]]  (Abnormal) Collected: 02/17/22 1410    Specimen: Blood from  Arm, Right Updated: 02/17/22 1424    Narrative:      The following orders were created for panel order CBC & Differential.  Procedure                               Abnormality         Status                     ---------                               -----------         ------                     CBC Auto Differential[[id number]]        Abnormal            Final result                 Please view results for these tests on the individual orders.    CBC Auto Differential [[id number]]  (Abnormal) Collected: 02/17/22 1410    Specimen: Blood from Arm, Right Updated: 02/17/22 1424     WBC 21.02 10*3/mm3      RBC 4.87 10*6/mm3      Hemoglobin 13.6 g/dL      Hematocrit 42.5 %      MCV 87.3 fL      MCH 27.9 pg      MCHC 32.0 g/dL      RDW 14.6 %      RDW-SD 46.4 fl      MPV 9.0 fL      Platelets 222 10*3/mm3      Neutrophil % 86.5 %      Lymphocyte % 3.4 %      Monocyte % 9.0 %      Eosinophil % 0.0 %      Basophil % 0.2 %      Immature Grans % 0.9 %      Neutrophils, Absolute 18.18 10*3/mm3      Lymphocytes, Absolute 0.71 10*3/mm3      Monocytes, Absolute 1.90 10*3/mm3      Eosinophils, Absolute 0.01 10*3/mm3      Basophils, Absolute 0.04 10*3/mm3      Immature Grans, Absolute 0.18 10*3/mm3      nRBC 0.0 /100 WBC     Blood Culture - Blood, Arm, Right [[id number]] Collected: 02/17/22 1410    Specimen: Blood from Arm, Right Updated: 02/17/22 1421        Imaging Results (Last 24 Hours)     Procedure Component Value Units Date/Time    XR Chest 1 View [[id number]] Collected: 02/17/22 1641     Updated: 02/17/22 1646    Narrative:      EXAM: XR CHEST 1 VW-     INDICATION: fever     COMPARISON: 6/2/2021     FINDINGS:     Cardiac silhouette is mildly enlarged. Prior median sternotomy. LEFT  chest wall 2-lead cardiac pacing device. Chronic mild RIGHT  hemidiaphragm elevation. Similar-appearing chronic RIGHT lower  pleural/parenchymal opacities. No new airspace opacities. No large  pleural effusion or pneumothorax. Cervical spine fusion  hardware. No  acute osseous finding.       Impression:         1.  No acute findings.  2.  Chronic pleural/parenchymal opacities in the RIGHT lower chest.  This report was finalized on 02/17/2022 16:43 by Dr. Eleuterio Nieto MD.    CT Abdomen Pelvis With Contrast [[id number]] Collected: 02/17/22 1555     Updated: 02/17/22 1606    Narrative:      CT ABDOMEN PELVIS W CONTRAST- 2/17/2022 3:44 PM CST     HISTORY: Abdominal pain, acute, nonlocalized      COMPARISON: 10/19/2016     DOSE LENGTH PRODUCT: 922 mGy cm. Automated exposure control was also  utilized to decrease patient radiation dose.     TECHNIQUE: Axial images of the abdomen pelvis are obtained following IV  contrast.     FINDINGS:  Elevated right hemidiaphragm. Prior mediastinal surgery with  cardiac pacer device. Cardiomegaly. 4 mm nonspecific right lower lobe  pulmonary nodule with basilar atelectasis.     There is mild hepatic steatosis. No suspicious focal liver or splenic  mass. No pancreatic cyst or mass identified. No peripancreatic  inflammation. The gallbladder isn't remarkable. No adrenal nodule. 2  punctate nonobstructing right intrarenal stones with a 2.5 cm inferior  right renal cyst. Single punctate nonobstructing left intrarenal stone.  No enhancing renal mass. No hydronephrosis. There is abnormal bladder  wall thickening with perivesicular fat stranding. The prostate is  enlarged measuring 6.4 cm heterogeneous appearance to the prostate with  minimal fat stranding also noted along the seminal vesicles.     No free intraperitoneal air loculated abscess. No evidence for bowel  obstruction. Normal appendix. Decompressed stomach. No pathologic  lymphadenopathy. Mild vascular calcification.     Nonfused right posterior rib fractures. Postoperative changes of the  lumbar spine. Subacute versus chronic appearing T6 compression  deformity, new since the 6/2/2021 lateral chest x-ray exam.       Impression:      1. There is bladder wall thickening of the  under distended bladder with  perivesicular fat stranding. Prostate remains enlarged with additional  inflammation suspected along the prostate and seminal vesicles. Findings  concerning for a cystitis and/or prostatitis. No abscess collection.  2. Interval T6 compression deformity compared to lateral chest x-ray  6/2/2021. Nonfused old right posterior rib fractures. Postoperative  changes lumbar spine.  3. Cardiomegaly. Cardiac pacer device. Prior mediastinal surgery.  4. Nonobstructing intrarenal stones of the right renal cysts. No  hydronephrosis.  This report was finalized on 02/17/2022 16:02 by Dr. Cassie Mckeon MD.        I have personally reviewed and interpreted the radiology studies and ECG obtained at time of admission.     Assessment / Plan     Assessment:   There are no active hospital problems to display for this patient.    Sepsis from urinary source (cystitis/prostatitis)  Systemic inflammatory response syndrome (fever, tachycardia, leukocytosis) in the setting of urinary tract infection and nonobstructing intrarenal stones.  Incidental (?)T6 compression deformity per CT imaging. Will check in the morning with neurosurgery for any further recommendation  Dehydration    Plan:   Patient received 1 L bolus of IV fluid and acetaminophen in the emergency clinically does not requiring any further bolus. Patient's systolic blood pressure greater than 90 lactic acid is normal.    We will continue on IV fluid though for dehydration as evidenced by increased specific gravity    I increased the ceftriaxone to 2 g daily per sepsis protocol  follow cultures  follow labs in the morning  Code Status/Advanced Care Plan: Full code  The patient's surrogate decision maker is spouse  I discussed my findings and recommendations with the spouse, patient and the nurse Trisha  Estimated length of stay is to be determined  The patient was seen and examined by me on     Electronically signed by Juan Bee MD,  02/17/22, 17:11 CST.

## 2022-02-17 NOTE — NURSING NOTE
Transferred to room 43 in ER to await 3C bed. Dr. Bee at bedside at time of transfer. Awaiting antibiotic from pharmacy at this time. Voided in urinal with hesitancy at time of transfer, small amount dark yellow urine.

## 2022-02-17 NOTE — ED PROVIDER NOTES
Subjective   76 yom with PMH of MI, GERD, depression, hyperlipidemia, anxiety, CHF, CAD, HTN, kidney stones, thyroid disease, asthma and melanoma presents with c/o fever and dysuria.  He states Sunday night he noticed he was having difficulty emptying his bladder.  He states he was seen by his PCP and received antibiotics (macrobid).  He states he is continuing to have a fever and doesn't feel he is urinating as he should.  He denies cough.  He denies CP or SOB.  He denies abdomen pain. He denies n/v/d. He is weak and overall feels bad          Review of Systems   Constitutional: Negative for activity change, appetite change, fatigue and fever.   HENT: Negative for congestion, ear pain, facial swelling and sore throat.    Eyes: Negative for discharge and visual disturbance.   Respiratory: Negative for apnea, chest tightness, shortness of breath, wheezing and stridor.    Cardiovascular: Negative for chest pain and palpitations.   Gastrointestinal: Negative for abdominal distention, abdominal pain, diarrhea, nausea and vomiting.   Genitourinary: Positive for dysuria. Negative for difficulty urinating.   Musculoskeletal: Negative for arthralgias and myalgias.   Skin: Negative for rash and wound.   Neurological: Negative for dizziness and seizures.   Psychiatric/Behavioral: Negative for agitation and confusion.       Past Medical History:   Diagnosis Date   • Anxiety    • Arthritis    • Cancer (HCC)     melanoma   • CHF (congestive heart failure) (HCC)    • Claustrophobia    • Colon polyp    • Coronary artery disease 2012, 2015    2 vessel CABG (2012), SHAHRZAD LAD (2015).    • Depression    • Disease of thyroid gland     partial thyroidectomy on right   • GERD (gastroesophageal reflux disease)    • Hearing loss    • Heart attack (HCC)    • Heart attack (HCC)    • History of transfusion    • Hyperlipidemia    • Hypertension    • Kidney stone     history of    • Mild intermittent asthma without complication 8/24/2021   •  Tobacco abuse, in remission    • Uses hearing aid     BILATERAL       Allergies   Allergen Reactions   • Meperidine Other (See Comments)     HEART STOPS    Other reaction(s): Unknown   • Statins Myalgia     Causes leg cramps       Past Surgical History:   Procedure Laterality Date   • BACK SURGERY      CERVICAL FUSION AND LUMBAR DISC REPLACEMENT   • BRONCHOSCOPY N/A 4/27/2021    Procedure: BRONCHOSCOPY;  Surgeon: Rico Jacobs MD;  Location:  [facility] OR;  Service: Cardiothoracic;  Laterality: N/A;   • CARDIAC CATHETERIZATION  07/2015    SHAHRZAD mid LADDr. Toribio   • CARDIAC CATHETERIZATION N/A 10/21/2016    Procedure: Left Heart Cath;  Surgeon: Darian Toribio MD;  Location:  [facility] CATH INVASIVE LOCATION;  Service:    • CARDIAC CATHETERIZATION Left 4/5/2017    Procedure: Cardiac Catheterization/Vascular Study;  Surgeon: Darian Toribio MD;  Location:  [facility] CATH INVASIVE [facility];  Service:    • CARDIAC CATHETERIZATION N/A 4/5/2017    Procedure: Left Heart Cath;  Surgeon: Darian Toribio MD;  Location:  [facility] CATH INVASIVE [facility];  Service:    • CARDIAC CATHETERIZATION N/A 4/5/2017    Procedure: Coronary angiography;  Surgeon: Darian Toribio MD;  Location:  [facility] CATH INVASIVE [facility];  Service:    • CARDIAC CATHETERIZATION N/A 4/5/2017    Procedure: Left ventriculography;  Surgeon: Darian Toribio MD;  Location:  [facility] CATH INVASIVE [facility];  Service:    • CARDIAC CATHETERIZATION  4/5/2017    Procedure: Saphenous Vein Graft;  Surgeon: Darian Toribio MD;  Location:  [facility] CATH INVASIVE [facility];  Service:    • CARDIAC CATHETERIZATION Left 2/27/2018    Procedure: Cardiac Catheterization/Vascular Study SHAHRZAD OK PRN;  Surgeon: Darian Toribio MD;  Location:  [facility] CATH INVASIVE [facility];  Service:    • CARDIAC CATHETERIZATION  2/27/2018    Procedure: Functional Flow [place];  Surgeon: Darian Toribio MD;  Location:  [facility] CATH INVASIVE LOC[facility];  Service:    • CARDIAC CATHETERIZATION N/A 2/27/2018    Procedure: Left ventriculography;   Surgeon: Darian Toribio MD;  Location: [facility] CATH INVASIVE LOCATION;  Service:    • CARDIAC CATHETERIZATION Left 4/28/2020    Procedure: Cardiac Catheterization/Vascular Study SHAHRZAD OK PRN;  Surgeon: Darian Toribio MD;  Location: [facility] CATH INVASIVE LOCATION;  Service: Cardiology;  Laterality: Left;   • CARDIAC ELECTROPHYSIOLOGY PROCEDURE N/A 1/11/2018    Procedure: PPM generator change - dual;  Surgeon: Darian Toribio MD;  Location: [facility] CATH INVASIVE LOCATION;  Service:    • CARDIAC SURGERY      open heart surgery x2   • COLONOSCOPY  09/05/2007    colon polyps   • COLONOSCOPY N/A 5/29/2018    Procedure: COLONOSCOPY WITH ANESTHESIA;  Surgeon: Juan F Tapia MD;  Location: [facility] ENDOSCOPY;  Service: Gastroenterology   • COLONOSCOPY N/A 10/21/2021    Procedure: COLONOSCOPY WITH ANESTHESIA;  Surgeon: Juan F Tapia MD;  Location: [facility] ENDOSCOPY;  Service: Gastroenterology;  Laterality: N/A;  pre op: hx polyps  post op;polyp,   PCP: Rhys Rosen MD   • CORONARY ANGIOPLASTY WITH STENT PLACEMENT  07/2015    SHAHRZAD mid LAD, Dr. Toribio    • CORONARY ARTERY BYPASS GRAFT  2012 AND 2014    2 vessel    • ENDOSCOPY  11/08/2010   • ENDOSCOPY N/A 5/29/2018    Procedure: ESOPHAGOGASTRODUODENOSCOPY WITH ANESTHESIA;  Surgeon: Juan F Tapia MD;  Location: [facility] ENDOSCOPY;  Service: Gastroenterology   • HEAD/NECK LESION/CYST EXCISION Left 12/20/2016    Procedure: EXCISION MALIGNANT MELANOMA WITH SENTINEL NODE BIOPSY, INJECTION AND SCAN PRE-OP, LEFT EAR;  Surgeon: Guanaco Zepeda MD;  Location: [facility] OR;  Service:    • HEMORRHOIDECTOMY     • HERNIA REPAIR     • INSERT / REPLACE / REMOVE PACEMAKER     • SENTINEL NODE BIOPSY Left 12/20/2016    Procedure: SENTINEL NODE BIOPSY;  Surgeon: Guanaco Zepeda MD;  Location: [facility] OR;  Service:    • SKIN CANCER EXCISION     • THORACOSCOPY Right 4/27/2021    Procedure: THORACOSCOPIC TOTAL DECORTICATION;  Surgeon: Rico Jacobs MD;  Location: [facility] OR;  Service:  Cardiothoracic;  Laterality: Right;   • THYROID SURGERY     • THYROIDECTOMY         Family History   Problem Relation Age of Onset   • Heart failure Mother    • Stroke Mother    • Dementia Mother    • Coronary artery disease Father    • Colon polyps Father    • COPD Brother    • Colon cancer Neg Hx        Social History     Socioeconomic History   • Marital status:    Tobacco Use   • Smoking status: Former Smoker     Packs/day: 3.00     Years: 10.00     Pack years: 30.00     Types: Cigarettes     Quit date:      Years since quittin.1   • Smokeless tobacco: Former User     Types: Snuff     Quit date:    Vaping Use   • Vaping Use: Never used   Substance and Sexual Activity   • Alcohol use: Yes     Comment: rare   • Drug use: No   • Sexual activity: Defer           Objective   Physical Exam  Vitals and nursing note reviewed.   Constitutional:       Appearance: He is well-developed.   HENT:      Head: Normocephalic.   Eyes:      Pupils: Pupils are equal, round, and reactive to light.   Cardiovascular:      Rate and Rhythm: Normal rate and regular rhythm.      Heart sounds: No murmur heard.      Pulmonary:      Effort: Pulmonary effort is normal.      Breath sounds: Normal breath sounds.   Abdominal:      General: Bowel sounds are normal. There is no distension.      Palpations: Abdomen is soft.      Tenderness: There is no abdominal tenderness. There is no right CVA tenderness or left CVA tenderness.   Musculoskeletal:         General: Normal range of motion.      Cervical back: Normal range of motion and neck supple.   Skin:     General: Skin is warm and dry.   Neurological:      Mental Status: He is alert and oriented to person, place, and time.         Procedures           ED Course  ED Course as of 22 0810   Thu 2022   1613 CT of the abdomen / pelvis - IMPRESSION:  1. There is bladder wall thickening of the under distended bladder with perivesicular fat stranding. Prostate remains  enlarged with additional inflammation suspected along the prostate and seminal vesicles. Findings concerning for a cystitis and/or prostatitis. No abscess collection. 2. Interval T6 compression deformity compared to lateral chest x-ray  6/2/2021. Nonfused old right posterior rib fractures. Postoperative changes lumbar spine. 3. Cardiomegaly. Cardiac pacer device. Prior mediastinal surgery. 4. Nonobstructing intrarenal stones of the right renal cysts. No  hydronephrosis.  His WBC is elevated at 21. UA +for nitrites, moderate leukocytes.  Procal slightly elevated. He had a fever initially of 102.9 when .  HR 95 at this time.  He was given IVF and Rocephin.  I will speak with Dr Santos about admission. [KS]   1700 I spoke with Dr Santos, hospitalist, on call.  He will be admitted as he has failed outpatient antibiotic therapy. I spoke with Mg in [place].  He was given macrobid on 02/14/22.  We do not have an old urine culture to check on previous sensitivities. The patient is aware of admission.   [KS]      ED Course User Index  [KS] Josse Perry, ANALI                                                 MDM  Number of Diagnoses or Management Options     Amount and/or Complexity of Data Reviewed  Clinical lab tests: ordered and reviewed  Tests in the radiology section of CPT®: ordered and reviewed  Decide to obtain previous medical records or to obtain history from someone other than the patient: yes  Discuss the patient with other providers: yes    Risk of Complications, Morbidity, and/or Mortality  Presenting problems: low  Diagnostic procedures: low  Management options: low    Patient Progress  Patient progress: stable      Final diagnoses:   Impaired mobility       ED Disposition  ED Disposition     ED Disposition Condition Comment    Decision to Admit  Level of Care: Med/Surg [1]   Diagnosis: UTI (urinary tract infection) [326472]   Admitting Physician: RUTH SCHUSTER  [[id number]]   Attending Physician: RUTH SCHUSTER [[id number]]   Certification: I Certify That Inpatient Hospital Services Are Medically Necessary For Greater Than 2 Midnights            Cruz Chaudhari MD  [address] [address]  [phone]      1 wk    Girish Tineo MD  [address] [facility]  [address]  [place] [id number]  [phone]    Schedule an appointment as soon as possible for a visit in 1 week(s)  call monday for          Medication List      New Prescriptions    cefdinir 300 MG capsule  Commonly known as: OMNICEF  Take 1 capsule by mouth 2 (Two) Times a Day for 10 days.     tamsulosin 0.4 MG capsule 24 hr capsule  Commonly known as: FLOMAX  Take 1 capsule by mouth Daily.        Changed    furosemide 40 MG tablet  Commonly known as: LASIX  Take 1 tablet by mouth Daily As Needed (for any increase in 2 lbs in a daqy or 5 lbs in a week or increassing swelling or congestion).  What changed:   · when to take this  · reasons to take this        Stop    Potassium 99 MG tablet           Where to Get Your Medications      These medications were sent to [id number] - [place], [place] - [address] [facility] AT [address] - [phone] [facility] [phone] FX  [address] [place] [id number]    Phone: [phone]   · cefdinir 300 MG capsule  · escitalopram 20 MG tablet  · lisinopril 5 MG tablet  · tamsulosin 0.4 MG capsule 24 hr capsule     Information about where to get these medications is not yet available    Ask your nurse or doctor about these medications  · furosemide 40 MG tablet  · topiramate 25 MG tablet          Josse Perry, APRMARC  02/24/22 0812

## 2022-02-17 NOTE — ED TRIAGE NOTES
Patient presents to ED with CC of UTI. Patient was sent from [address] [place] [address] internal medicine. Patient states he was placed on some medications 3 days ago, but has gotten worse.

## 2022-02-18 ENCOUNTER — APPOINTMENT (OUTPATIENT)
Dept: GENERAL RADIOLOGY | Facility: HOSPITAL | Age: 77
End: 2022-02-18

## 2022-02-18 ENCOUNTER — APPOINTMENT (OUTPATIENT)
Dept: CT IMAGING | Facility: HOSPITAL | Age: 77
End: 2022-02-18

## 2022-02-18 LAB
BACTERIA SPEC AEROBE CULT: NORMAL
BASOPHILS # BLD AUTO: 0.05 10*3/MM3 (ref 0–0.2)
BASOPHILS NFR BLD AUTO: 0.2 % (ref 0–1.5)
CRP SERPL-MCNC: 26.92 MG/DL (ref 0–0.5)
D-LACTATE SERPL-SCNC: 1.6 MMOL/L (ref 0.5–2)
DEPRECATED RDW RBC AUTO: 49.1 FL (ref 37–54)
EOSINOPHIL # BLD AUTO: 0.01 10*3/MM3 (ref 0–0.4)
EOSINOPHIL NFR BLD AUTO: 0 % (ref 0.3–6.2)
ERYTHROCYTE [DISTWIDTH] IN BLOOD BY AUTOMATED COUNT: 15 % (ref 12.3–15.4)
HCT VFR BLD AUTO: 37.5 % (ref 37.5–51)
HGB BLD-MCNC: 12.2 G/DL (ref 13–17.7)
IMM GRANULOCYTES # BLD AUTO: 0.16 10*3/MM3 (ref 0–0.05)
IMM GRANULOCYTES NFR BLD AUTO: 0.7 % (ref 0–0.5)
LYMPHOCYTES # BLD AUTO: 1.11 10*3/MM3 (ref 0.7–3.1)
LYMPHOCYTES NFR BLD AUTO: 4.8 % (ref 19.6–45.3)
MCH RBC QN AUTO: 28.8 PG (ref 26.6–33)
MCHC RBC AUTO-ENTMCNC: 32.5 G/DL (ref 31.5–35.7)
MCV RBC AUTO: 88.7 FL (ref 79–97)
MONOCYTES # BLD AUTO: 1.17 10*3/MM3 (ref 0.1–0.9)
MONOCYTES NFR BLD AUTO: 5.1 % (ref 5–12)
NEUTROPHILS NFR BLD AUTO: 20.4 10*3/MM3 (ref 1.7–7)
NEUTROPHILS NFR BLD AUTO: 89.2 % (ref 42.7–76)
NRBC BLD AUTO-RTO: 0 /100 WBC (ref 0–0.2)
PLATELET # BLD AUTO: 218 10*3/MM3 (ref 140–450)
PMV BLD AUTO: 9 FL (ref 6–12)
PROCALCITONIN SERPL-MCNC: 0.67 NG/ML (ref 0–0.25)
RBC # BLD AUTO: 4.23 10*6/MM3 (ref 4.14–5.8)
WBC NRBC COR # BLD: 22.9 10*3/MM3 (ref 3.4–10.8)

## 2022-02-18 PROCEDURE — 99223 1ST HOSP IP/OBS HIGH 75: CPT | Performed by: NURSE PRACTITIONER

## 2022-02-18 PROCEDURE — 97165 OT EVAL LOW COMPLEX 30 MIN: CPT

## 2022-02-18 PROCEDURE — 85025 COMPLETE CBC W/AUTO DIFF WBC: CPT | Performed by: INTERNAL MEDICINE

## 2022-02-18 PROCEDURE — 84145 PROCALCITONIN (PCT): CPT | Performed by: INTERNAL MEDICINE

## 2022-02-18 PROCEDURE — 25010000002 CEFTRIAXONE PER 250 MG: Performed by: INTERNAL MEDICINE

## 2022-02-18 PROCEDURE — 72070 X-RAY EXAM THORAC SPINE 2VWS: CPT

## 2022-02-18 PROCEDURE — 86140 C-REACTIVE PROTEIN: CPT | Performed by: INTERNAL MEDICINE

## 2022-02-18 PROCEDURE — 83605 ASSAY OF LACTIC ACID: CPT | Performed by: INTERNAL MEDICINE

## 2022-02-18 PROCEDURE — 70450 CT HEAD/BRAIN W/O DYE: CPT

## 2022-02-18 RX ADMIN — CEFTRIAXONE SODIUM 2 G: 2 INJECTION, POWDER, FOR SOLUTION INTRAMUSCULAR; INTRAVENOUS at 17:15

## 2022-02-18 RX ADMIN — LISINOPRIL 5 MG: 5 TABLET ORAL at 08:20

## 2022-02-18 RX ADMIN — ISOSORBIDE MONONITRATE 30 MG: 30 TABLET, EXTENDED RELEASE ORAL at 08:20

## 2022-02-18 RX ADMIN — DONEPEZIL HYDROCHLORIDE 5 MG: 5 TABLET, FILM COATED ORAL at 20:38

## 2022-02-18 RX ADMIN — TOPIRAMATE 25 MG: 25 TABLET, FILM COATED ORAL at 08:20

## 2022-02-18 RX ADMIN — PREGABALIN 150 MG: 75 CAPSULE ORAL at 17:12

## 2022-02-18 RX ADMIN — CETIRIZINE HYDROCHLORIDE TABLETS 10 MG: 10 TABLET, FILM COATED ORAL at 08:20

## 2022-02-18 RX ADMIN — FUROSEMIDE 40 MG: 40 TABLET ORAL at 08:20

## 2022-02-18 RX ADMIN — SODIUM CHLORIDE, PRESERVATIVE FREE 10 ML: 5 INJECTION INTRAVENOUS at 08:23

## 2022-02-18 RX ADMIN — SUCRALFATE 1 G: 1 TABLET ORAL at 12:02

## 2022-02-18 RX ADMIN — SODIUM CHLORIDE, PRESERVATIVE FREE 10 ML: 5 INJECTION INTRAVENOUS at 20:40

## 2022-02-18 RX ADMIN — TOPIRAMATE 25 MG: 25 TABLET, FILM COATED ORAL at 17:20

## 2022-02-18 RX ADMIN — ATORVASTATIN CALCIUM 80 MG: 40 TABLET, FILM COATED ORAL at 20:38

## 2022-02-18 RX ADMIN — CLOPIDOGREL 75 MG: 75 TABLET, FILM COATED ORAL at 08:20

## 2022-02-18 RX ADMIN — METOPROLOL TARTRATE 25 MG: 25 TABLET, FILM COATED ORAL at 08:20

## 2022-02-18 RX ADMIN — ESCITALOPRAM 20 MG: 10 TABLET, FILM COATED ORAL at 08:20

## 2022-02-18 RX ADMIN — METOPROLOL TARTRATE 25 MG: 25 TABLET, FILM COATED ORAL at 20:38

## 2022-02-18 RX ADMIN — PANTOPRAZOLE SODIUM 40 MG: 40 TABLET, DELAYED RELEASE ORAL at 08:20

## 2022-02-18 RX ADMIN — PREGABALIN 150 MG: 75 CAPSULE ORAL at 08:20

## 2022-02-18 RX ADMIN — SUCRALFATE 1 G: 1 TABLET ORAL at 08:20

## 2022-02-18 NOTE — PLAN OF CARE
Goal Outcome Evaluation:  Plan of Care Reviewed With: patient        Progress: no change  Outcome Summary: OT eval completed. Pt A&Ox4. Pt mod I during bed mobility and SBA for transfers. During LB dressing, pt able bring feet to lap to doff/don socks. Pt CGA during functional mobility due to mild instability upon initial steps, and OT noted that pt holds BUE in high guard. Also, pt demo a shuffling gate during amb, but did not have any sig LOB. Pt reports that he uses a cane on occasion for mobility due to a previous injury and sx to RLE. Due to mild instability noted during amb, OT questioned pt about any recent falls and pt reported that he does fall often. He attributed his falls to catching his R foot on objects and tripping due to chronic effects from polio as a child. Upon further eval of visual motor and gross motor coordination, pt did not demo any sig impairments. Pt would benefit from OT service to increase safety awareness with ADLs and functional mobility as well as reinforcement of home safety environment education to reduce falls. Recommend discharge home with assist and outpatient OT/PT to address higher level balance.

## 2022-02-18 NOTE — THERAPY EVALUATION
Patient Name: Carlo Velasco  : 1945    MRN: [id number]                              Today's Date: 2022       Admit Date: 2022    Visit Dx: No diagnosis found.  Patient Active Problem List   Diagnosis   • Coronary artery disease involving autologous artery coronary bypass graft without angina pectoris   • Essential hypertension   • Anxiety   • Colon polyps   • Mixed hyperlipidemia   • Incisional hernia, without obstruction or gangrene   • BRBPR (bright red blood per rectum)   • Esophageal dysphagia   • Prostatism   • Nocturia   • Coronary artery disease   • Orthostasis   • Chronic midline low back pain without sciatica   • History of adenomatous polyp of colon   • Acute pain of right knee   • Morbidly obese (HCC)   • Pain of right heel   • Hx of CABG x3 2019    • Stented coronary artery   • Flank pain   • Skin lesion   • Pleuritic chest pain   • Sick sinus syndrome (HCC)   • Coagulopathy ([facility])   • Pneumonia of right lung due to infectious organism   • Recurrent pleural effusion on right   • Cough   • SOB (shortness of breath)   • Former smoker, stopped smoking many years ago   • Non-seasonal allergic rhinitis   • ERNESTO (obstructive sleep apnea)   • Restrictive lung disease   • Class 3 severe obesity due to excess calories with serious comorbidity in adult ([facility])   • Empyema of pleura ([facility])   • Pneumonia   • 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    • Depression    • Disease of thyroid gland     partial  thyroidectomy on right   • GERD (gastroesophageal reflux disease)    • Hearing loss    • Heart attack (HCC)    • Heart attack (HCC)    • History of transfusion    • Hyperlipidemia    • Hypertension    • Kidney stone     history of    • Mild intermittent asthma without complication 8/24/2021   • Tobacco abuse, in remission    • Uses hearing aid     BILATERAL     Past Surgical History:   Procedure Laterality Date   • BACK SURGERY      CERVICAL FUSION AND LUMBAR DISC REPLACEMENT   • BRONCHOSCOPY N/A 4/27/2021    Procedure: BRONCHOSCOPY;  Surgeon: Rico Jacobs MD;  Location: [facility] OR;  Service: Cardiothoracic;  Laterality: N/A;   • CARDIAC CATHETERIZATION  07/2015    Dr. Malu Koch   • CARDIAC CATHETERIZATION N/A 10/21/2016    Procedure: Left Heart Cath;  Surgeon: Darian Toribio MD;  Location:  [facility] CATH INVASIVE LOCATION;  Service:    • CARDIAC CATHETERIZATION Left 4/5/2017    Procedure: Cardiac Catheterization/Vascular Study;  Surgeon: Darian Toribio MD;  Location:  [facility] CATH [facility];  Service:    • CARDIAC CATHETERIZATION N/A 4/5/2017    Procedure: Left Heart Cath;  Surgeon: Darian Toribio MD;  Location:  [facility]ATH [facility];  Service:    • CARDIAC CATHETERIZATION N/A 4/5/2017    Procedure: Coronary angiography;  Surgeon: Darian Toribio MD;  Location:  [facility] CATH [facility];  Service:    • CARDIAC CATHETERIZATION N/A 4/5/2017    Procedure: Left ventriculography;  Surgeon: Darian Toribio MD;  Location:  [facility]ATH [facility];  Service:    • CARDIAC CATHETERIZATION  4/5/2017    Procedure: Saphenous Vein Graft;  Surgeon: Darian Toribio MD;  Location:  [facility];  Service:    • CARDIAC CATHETERIZATION Left 2/27/2018    Procedure: Cardiac Catheterization/Vascular Study SHAHRZAD OK PRN;  Surgeon: Darian Toribio MD;  Location:  [facility];  Service:    • CARDIAC CATHETERIZATION  2/27/2018    Procedure: Functional Flow Theodore;  Surgeon: Darian Toribio MD;   Location: [facility] CATH INVASIVE LOCATION;  Service:    • CARDIAC CATHETERIZATION N/A 2/27/2018    Procedure: Left ventriculography;  Surgeon: Darian Toribio MD;  Location: [facility] CATH INVASIVE LOCATION;  Service:    • CARDIAC CATHETERIZATION Left 4/28/2020    Procedure: Cardiac Catheterization/Vascular Study SHAHRZAD OK PRN;  Surgeon: Darian Toribio MD;  Location: [facility] CATH INVASIVE LOCATION;  Service: Cardiology;  Laterality: Left;   • CARDIAC ELECTROPHYSIOLOGY PROCEDURE N/A 1/11/2018    Procedure: PPM generator change - dual;  Surgeon: Darian Toribio MD;  Location: [facility] CATH INVASIVE LOCATION;  Service:    • CARDIAC SURGERY      open heart surgery x2   • COLONOSCOPY  09/05/2007    colon polyps   • COLONOSCOPY N/A 5/29/2018    Procedure: COLONOSCOPY WITH ANESTHESIA;  Surgeon: Juan F Tapia MD;  Location: [facility] ENDOSCOPY;  Service: Gastroenterology   • COLONOSCOPY N/A 10/21/2021    Procedure: COLONOSCOPY WITH ANESTHESIA;  Surgeon: Juan F Tapia MD;  Location: [facility] ENDOSCOPY;  Service: Gastroenterology;  Laterality: N/A;  pre op: hx polyps  post op;polyp,   PCP: Rhys Rosen MD   • CORONARY ANGIOPLASTY WITH STENT PLACEMENT  07/2015    SHAHRZAD mid LAD, Dr. Toribio    • CORONARY ARTERY BYPASS GRAFT  2012 AND 2014    2 vessel    • ENDOSCOPY  11/08/2010   • ENDOSCOPY N/A 5/29/2018    Procedure: ESOPHAGOGASTRODUODENOSCOPY WITH ANESTHESIA;  Surgeon: Juan F Tapia MD;  Location: [facility] ENDOSCOPY;  Service: Gastroenterology   • HEAD/NECK LESION/CYST EXCISION Left 12/20/2016    Procedure: EXCISION MALIGNANT MELANOMA WITH SENTINEL NODE BIOPSY, INJECTION AND SCAN PRE-OP, LEFT EAR;  Surgeon: Guanaco Zepeda MD;  Location: [facility] OR;  Service:    • HEMORRHOIDECTOMY     • HERNIA REPAIR     • INSERT / REPLACE / REMOVE PACEMAKER     • SENTINEL NODE BIOPSY Left 12/20/2016    Procedure: SENTINEL NODE BIOPSY;  Surgeon: Guanaco Zepeda MD;  Location: [facility] OR;  Service:    • SKIN CANCER EXCISION     • THORACOSCOPY Right  4/27/2021    Procedure: THORACOSCOPIC TOTAL DECORTICATION;  Surgeon: Rico Jacobs MD;  Location: [facility];  Service: Cardiothoracic;  Laterality: Right;   • THYROID SURGERY     • THYROIDECTOMY        General Information     Row Name 02/18/22 1030          OT Time and Intention    Document Type evaluation  Pt presented to hoospital with fever and symptoms of UTI. Dx: UTI; T6 compression fx age indeterminant  -CS (r) KG (t) CS (c)     Mode of Treatment occupational therapy  -CS (r) KG (t) CS (c)     Row Name 02/18/22 1030          General Information    Patient Profile Reviewed yes  -CS (r) KG (t) CS (c)     Prior Level of Function independent:; ADL's; driving; all household mobility; home management  -CS (r) KG (t) CS (c)     Existing Precautions/Restrictions fall; spinal  Per ANALI Wells pt does not need a brace  -CS (r) KG (t) CS (c)     Row Name 02/18/22 1030          Occupational Profile    Environmental Supports and Barriers (Occupational Profile) walk-in shower, grab bars, cane  -CS (r) KG (t) CS (c)     Row Name 02/18/22 1030          Living Environment    Lives With spouse  -CS (r) KG (t) CS (c)     Row Name 02/18/22 1030          Home Main Entrance    Number of Stairs, Main Entrance four  -CS (r) KG (t) CS (c)     Stair Railings, Main Entrance railing on left side (ascending)  Also has a ramp  -CS (r) KG (t) CS (c)     Row Name 02/18/22 1030          Stairs Within Home, Primary    Number of Stairs, Within Home, Primary none  -CS (r) KG (t) CS (c)     Stair Railings, Within Home, Primary none  -CS (r) KG (t) CS (c)     Row Name 02/18/22 1030          Cognition    Orientation Status (Cognition) oriented x 4  -CS (r) KG (t) CS (c)     Row Name 02/18/22 1030          Safety Issues, Functional Mobility    Impairments Affecting Function (Mobility) balance; endurance/activity tolerance  -CS (r) KG (t) CS (c)           User Key  (r) = Recorded By, (t) = Taken By, (c) = Cosigned By    Initials Name  Provider Type    CS Mary Santiago, OTR/L, WINTER Occupational Therapist    KG Soniya Jones, OT Student OT Student                 Mobility/ADL's     Row Name 02/18/22 1030          Bed Mobility    Bed Mobility scooting/bridging; supine-sit  -CS (r) KG (t) CS (c)     Scooting/Bridging [place] (Bed Mobility) modified independence  -CS (r) KG (t) CS (c)     Supine-Sit [place] (Bed Mobility) standby assist  -CS (r) KG (t) CS (c)     Assistive Device (Bed Mobility) bed rails; head of bed elevated  -CS (r) KG (t) CS (c)     Row Name 02/18/22 1030          Transfers    Transfers sit-stand transfer  -CS (r) KG (t) CS (c)     Sit-Stand [place] (Transfers) standby assist  -CS (r) KG (t) CS (c)     Row Name 02/18/22 1030          Functional Mobility    Functional Mobility- Ind. Level contact guard assist  -CS (r) KG (t) CS (c)     Functional Mobility- Comment amb to hallway and back  -CS (r) KG (t) CS (c)     Row Name 02/18/22 1030          Activities of Daily Living    BADL Assessment/Intervention lower body dressing  -CS (r) KG (t) CS (c)     Row Name 02/18/22 1030          Lower Body Dressing Assessment/Training    [place] Level (Lower Body Dressing) doff; don; socks; independent  -CS (r) KG (t) CS (c)     Position (Lower Body Dressing) edge of bed sitting  -CS (r) KG (t) CS (c)           User Key  (r) = Recorded By, (t) = Taken By, (c) = Cosigned By    Initials Name Provider Type    CS Mary Santiago, OTR/L, WINTER Occupational Therapist    KG Soniya Jones, OT Student OT Student               Obj/Interventions     Row Name 02/18/22 1030          Range of Motion Comprehensive    Comment, General Range of Motion BUE WFL  -CS (r) KG (t) CS (c)     Row Name 02/18/22 1030          Strength Comprehensive (MMT)    Comment, General Manual Muscle Testing (MMT) Assessment BUE 5/5  -CS (r) KG (t) CS (c)     Row Name 02/18/22 1030          Motor Skills    Motor Skills coordination  -CS (r) KG (t) CS (c)      Coordination finger to nose; WFL  -CS (r) KG (t) CS (c)     Row Name 02/18/22 1030          Balance    Balance Assessment sitting static balance; sitting dynamic balance; standing static balance; standing dynamic balance  -CS (r) KG (t) CS (c)     Static Sitting Balance WFL  -CS (r) KG (t) CS (c)     Dynamic Sitting Balance WFL  -CS (r) KG (t) CS (c)     Static Standing Balance WFL  -CS (r) KG (t) CS (c)     Dynamic Standing Balance mild impairment  -CS (r) KG (t) CS (c)     Comment, Balance Pt a little unsteady during mobility due to a shuffling gait but did not demo any sig LOB  -CS (r) KG (t) CS (c)           User Key  (r) = Recorded By, (t) = Taken By, (c) = Cosigned By    Initials Name Provider Type    CS Mary Santiago, OTR/L, CNT Occupational Therapist    Soniya Marin, OT Student OT Student               Goals/Plan     Row Name 02/18/22 1030          Bathing Goal 1 (OT)    Activity/Device (Bathing Goal 1, OT) bathing skills, all  -CS (r) KG (t) CS (c)     [place] Level/Cues Needed (Bathing Goal 1, OT) independent  -CS (r) KG (t) CS (c)     Time Frame (Bathing Goal 1, OT) long term goal (LTG)  -CS (r) KG (t) CS (c)     Progress/Outcomes (Bathing Goal 1, OT) goal ongoing  -CS (r) KG (t) CS (c)     Row Name 02/18/22 1030          Problem Specific Goal 1 (OT)    Problem Specific Goal 1 (OT) Pt will complete functional balance ADL/IADL activity with supervision and min vc for safety.  -CS (r) KG (t) CS (c)     Time Frame (Problem Specific Goal 1, OT) long term goal (LTG)  -CS (r) KG (t) CS (c)     Progress/Outcome (Problem Specific Goal 1, OT) goal ongoing  -CS (r) KG (t) CS (c)     Row Name 02/18/22 1030          Therapy Assessment/Plan (OT)    Planned Therapy Interventions (OT) activity tolerance training; BADL retraining; IADL retraining; occupation/activity based interventions; patient/caregiver education/training; transfer/mobility retraining; functional balance retraining  -CS (r) KG (t) CS (c)            User Key  (r) = Recorded By, (t) = Taken By, (c) = Cosigned By    Initials Name Provider Type    CS Mary Santiago S, OTR/L, CNT Occupational Therapist    Soniya Marin OT Student OT Student               Clinical Impression     Row Name 02/18/22 1030          Pain Assessment    Additional Documentation Pain Scale: Numbers Pre/Post-Treatment (Group)  -CS (r) KG (t) CS (c)     Row Name 02/18/22 1030          Pain Scale: Numbers Pre/Post-Treatment    Pretreatment Pain Rating 0/10 - no pain  -CS (r) KG (t) CS (c)     Posttreatment Pain Rating 0/10 - no pain  -CS (r) KG (t) CS (c)     Row Name 02/18/22 1030          Plan of Care Review    Plan of Care Reviewed With patient  -CS (r) KG (t) CS (c)     Progress no change  -CS (r) KG (t) CS (c)     Outcome Summary OT eval completed. Pt A&Ox4. Pt mod I during bed mobility and SBA for transfers. During LB dressing, pt able bring feet to lap to doff/don socks. Pt CGA during functional mobility due to mild instability upon initial steps, and OT noted that pt holds BUE in high guard. Also, pt demo a shuffling gate during amb, but did not have any sig LOB. Pt reports that he uses a cane on occasion for mobility due to a previous injury and sx to RLE. Due to mild instability noted during amb, OT questioned pt about any recent falls and pt reported that he does fall often. He attributed his falls to catching his R foot on objects and tripping due to chronic effects from polio as a child. Upon further eval of visual motor and gross motor coordination, pt did not demo any sig impairments. Pt would benefit from OT service to increase safety awareness with ADLs and functional mobility as well as reinforcement of home safety environment education to reduce falls. Recommend discharge home with assist and outpatient OT/PT to address higher level balance.  -CS (r) KG (t) CS (c)     Row Name 02/18/22 1030          Therapy Assessment/Plan (OT)    Patient/Family Therapy Goal  Statement (OT) to go home  -CS (r) KG (t) CS (c)     Rehab Potential (OT) good, to achieve stated therapy goals  -CS (r) KG (t) CS (c)     Criteria for Skilled Therapeutic Interventions Met (OT) yes; skilled treatment is necessary  -CS (r) KG (t) CS (c)     Therapy Frequency (OT) 5 times/wk  -CS (r) KG (t) CS (c)     Predicted Duration of Therapy Intervention (OT) until hospital discharge  -CS (r) KG (t) CS (c)     Row Name 02/18/22 1030          Therapy Plan Review/Discharge Plan (OT)    Anticipated Discharge Disposition (OT) home with assist; home with outpatient therapy services  outpatient PT  -CS (r) KG (t) CS (c)     Row Name 02/18/22 1030          Positioning and Restraints    Pre-Treatment Position in bed  -CS (r) KG (t) CS (c)     Post Treatment Position chair  -CS (r) KG (t) CS (c)     In Chair sitting; call light within reach; encouraged to call for assist  -CS (r) KG (t) CS (c)           User Key  (r) = Recorded By, (t) = Taken By, (c) = Cosigned By    Initials Name Provider Type    CS Mary Santiago, OTR/L, CNT Occupational Therapist    Soniya Marin, OT Student OT Student               Outcome Measures     Row Name 02/18/22 1030          How much help from another is currently needed...    Putting on and taking off regular lower body clothing? 3  -CS (r) KG (t) CS (c)     Bathing (including washing, rinsing, and drying) 4  -CS (r) KG (t) CS (c)     Toileting (which includes using toilet bed pan or urinal) 4  -CS (r) KG (t) CS (c)     Putting on and taking off regular upper body clothing 4  -CS (r) KG (t) CS (c)     Taking care of personal grooming (such as brushing teeth) 4  -CS (r) KG (t) CS (c)     Eating meals 4  -CS (r) KG (t) CS (c)     AM-PAC 6 Clicks Score (OT) 23  -CS (r) KG (t)     Row Name 02/18/22 1030          Functional Assessment    Outcome Measure Options AM-PAC 6 Clicks Daily Activity (OT)  -CS (r) KG (t) CS (c)           User Key  (r) = Recorded By, (t) = Taken By, (c) =  Cosigned By    Initials Name Provider Type    CS Mary Santiago S, OTR/L, CNT Occupational Therapist    KG Soniya Jones, OT Student OT Student                Occupational Therapy Education                 Title: PT OT SLP Therapies (Done)     Topic: Occupational Therapy (Done)     Point: ADL training (Done)     Description:   Instruct learner(s) on proper safety adaptation and remediation techniques during self care or transfers.   Instruct in proper use of assistive devices.              Learning Progress Summary           Patient Acceptance, E, VU by KG at 2/18/2022 1119                   Point: Home exercise program (Done)     Description:   Instruct learner(s) on appropriate technique for monitoring, assisting and/or progressing therapeutic exercises/activities.              Learning Progress Summary           Patient Acceptance, E, VU by KG at 2/18/2022 1119                   Point: Precautions (Done)     Description:   Instruct learner(s) on prescribed precautions during self-care and functional transfers.              Learning Progress Summary           Patient Acceptance, E, VU by KG at 2/18/2022 1119                   Point: Body mechanics (Done)     Description:   Instruct learner(s) on proper positioning and spine alignment during self-care, functional mobility activities and/or exercises.              Learning Progress Summary           Patient Acceptance, E, VU by KG at 2/18/2022 1119                               User Key     Initials Effective Dates Name Provider Type Discipline     12/27/21 -  Soniya Jones OT Student OT Student OT              OT Recommendation and Plan  Planned Therapy Interventions (OT): activity tolerance training, BADL retraining, IADL retraining, occupation/activity based interventions, patient/caregiver education/training, transfer/mobility retraining, functional balance retraining  Therapy Frequency (OT): 5 times/wk  Plan of Care Review  Plan of Care Reviewed With:  patient  Progress: no change  Outcome Summary: OT eval completed. Pt A&Ox4. Pt mod I during bed mobility and SBA for transfers. During LB dressing, pt able bring feet to lap to doff/don socks. Pt CGA during functional mobility due to mild instability upon initial steps, and OT noted that pt holds BUE in high guard. Also, pt demo a shuffling gate during amb, but did not have any sig LOB. Pt reports that he uses a cane on occasion for mobility due to a previous injury and sx to RLE. Due to mild instability noted during amb, OT questioned pt about any recent falls and pt reported that he does fall often. He attributed his falls to catching his R foot on objects and tripping due to chronic effects from polio as a child. Upon further eval of visual motor and gross motor coordination, pt did not demo any sig impairments. Pt would benefit from OT service to increase safety awareness with ADLs and functional mobility as well as reinforcement of home safety environment education to reduce falls. Recommend discharge home with assist and outpatient OT/PT to address higher level balance.     Time Calculation:    Time Calculation- OT     Row Name 02/18/22 1030             Time Calculation- OT    OT Start Time 1030  +5 minute chart review  -CS (r) KG (t) CS (c)      OT Stop Time 1054  -CS (r) KG (t) CS (c)      OT Time Calculation (min) 24 min  -CS (r) KG (t)      OT Received On 02/18/22  -CS (r) KG (t) CS (c)      OT Goal Re-Cert Due Date 02/28/22  -CS (r) KG (t) CS (c)              Untimed Charges    OT Eval/Re-eval Minutes 29  -CS (r) KG (t) CS (c)              Total Minutes    Untimed Charges Total Minutes 29  -CS (r) KG (t)       Total Minutes 29  -CS (r) KG (t)            User Key  (r) = Recorded By, (t) = Taken By, (c) = Cosigned By    Initials Name Provider Type    CS Mary Santiago, OTR/L, CNT Occupational Therapist    Gaudencio Marin OT Student OT Student                       GAUDENCIO WADE OT  Student  2/18/2022

## 2022-02-18 NOTE — CASE MANAGEMENT/SOCIAL WORK
Discharge Planning Assessment  [facility]     Patient Name: Carlo Velasco  MRN: [id number]  Today's Date: 2/18/2022    Admit Date: 2/17/2022     Discharge Needs Assessment     Row Name 02/18/22 1447       Living Environment    Lives With spouse    Current Living Arrangements home/apartment/condo    Primary Care Provided by self    Provides Primary Care For no one, unable/limited ability to care for self    Quality of Family Relationships involved; supportive    Able to Return to Prior Arrangements yes       Resource/Environmental Concerns    Resource/Environmental Concerns none    Transportation Concerns car, none       Transition Planning    Patient/Family Anticipates Transition to home       Discharge Needs Assessment    Equipment Currently Used at Home none    Concerns to be Addressed denies needs/concerns at this time               Discharge Plan     Row Name 02/18/22 1448       Plan    Plan Lives with spouse. Independent and does not use DME. Current with pcp and Rx drug plan. Plans to dc home and denies needs at this time. Will continue to follow as needed.              Continued Care and Services - Admitted Since 2/17/2022    Coordination has not been started for this encounter.          Demographic Summary    No documentation.                Functional Status    No documentation.                Psychosocial    No documentation.                Abuse/Neglect    No documentation.                Legal    No documentation.                Substance Abuse    No documentation.                Patient Forms    No documentation.                   Merlina A Fletcher, RN

## 2022-02-19 PROCEDURE — 97161 PT EVAL LOW COMPLEX 20 MIN: CPT

## 2022-02-19 PROCEDURE — 25010000002 CEFTRIAXONE PER 250 MG: Performed by: INTERNAL MEDICINE

## 2022-02-19 RX ORDER — FUROSEMIDE 40 MG/1
40 TABLET ORAL DAILY
Status: ON HOLD | COMMUNITY
End: 2022-02-20 | Stop reason: SDUPTHER

## 2022-02-19 RX ORDER — TAMSULOSIN HYDROCHLORIDE 0.4 MG/1
0.4 CAPSULE ORAL DAILY
Status: DISCONTINUED | OUTPATIENT
Start: 2022-02-19 | End: 2022-02-20 | Stop reason: HOSPADM

## 2022-02-19 RX ORDER — ATORVASTATIN CALCIUM 80 MG/1
80 TABLET, FILM COATED ORAL DAILY
COMMUNITY

## 2022-02-19 RX ADMIN — ATORVASTATIN CALCIUM 80 MG: 40 TABLET, FILM COATED ORAL at 20:23

## 2022-02-19 RX ADMIN — TOPIRAMATE 25 MG: 25 TABLET, FILM COATED ORAL at 08:42

## 2022-02-19 RX ADMIN — ISOSORBIDE MONONITRATE 30 MG: 30 TABLET, EXTENDED RELEASE ORAL at 08:42

## 2022-02-19 RX ADMIN — PANTOPRAZOLE SODIUM 40 MG: 40 TABLET, DELAYED RELEASE ORAL at 06:39

## 2022-02-19 RX ADMIN — TOPIRAMATE 25 MG: 25 TABLET, FILM COATED ORAL at 20:23

## 2022-02-19 RX ADMIN — SUCRALFATE 1 G: 1 TABLET ORAL at 06:39

## 2022-02-19 RX ADMIN — LISINOPRIL 5 MG: 5 TABLET ORAL at 08:42

## 2022-02-19 RX ADMIN — METOPROLOL TARTRATE 25 MG: 25 TABLET, FILM COATED ORAL at 20:23

## 2022-02-19 RX ADMIN — CEFTRIAXONE SODIUM 2 G: 2 INJECTION, POWDER, FOR SOLUTION INTRAMUSCULAR; INTRAVENOUS at 16:44

## 2022-02-19 RX ADMIN — CLOPIDOGREL 75 MG: 75 TABLET, FILM COATED ORAL at 08:42

## 2022-02-19 RX ADMIN — PREGABALIN 150 MG: 75 CAPSULE ORAL at 16:44

## 2022-02-19 RX ADMIN — DONEPEZIL HYDROCHLORIDE 5 MG: 5 TABLET, FILM COATED ORAL at 20:23

## 2022-02-19 RX ADMIN — SUCRALFATE 1 G: 1 TABLET ORAL at 20:23

## 2022-02-19 RX ADMIN — SODIUM CHLORIDE, PRESERVATIVE FREE 10 ML: 5 INJECTION INTRAVENOUS at 20:24

## 2022-02-19 RX ADMIN — TAMSULOSIN HYDROCHLORIDE 0.4 MG: 0.4 CAPSULE ORAL at 12:27

## 2022-02-19 RX ADMIN — METOPROLOL TARTRATE 25 MG: 25 TABLET, FILM COATED ORAL at 08:42

## 2022-02-19 RX ADMIN — PREGABALIN 150 MG: 75 CAPSULE ORAL at 08:42

## 2022-02-19 RX ADMIN — PREGABALIN 150 MG: 75 CAPSULE ORAL at 20:23

## 2022-02-19 NOTE — PLAN OF CARE
"  Problem: Adult Inpatient Plan of Care  Goal: Plan of Care Review  Recent Flowsheet Documentation  Taken 2/19/2022 1100 by Marcello Guzman, PT  Plan of Care Reviewed With: patient  Outcome Summary: PT IE complete.  Supervision sit<>stand.  Ambulated 250ft sbA x1.  Pt appeared slightly short of breath, but O2sat 96% and HR 76bpm.  Pt reports \"feels good to walk\".  TLSO in place while OOB.  Pt reports chronic R toe drag and is usual cause of falls.  Active R DF present, but weaker than L DF.  Pt reports possible DC 2.20.22.  PT to leave pt on list to provide gait safety and mechanics.  Recommend home w/ spouse at DC.  Thank you for referral.   Goal Outcome Evaluation:  Plan of Care Reviewed With: patient           Outcome Summary: PT IE complete.  Supervision sit<>stand.  Ambulated 250ft sbA x1.  Pt appeared slightly short of breath, but O2sat 96% and HR 76bpm.  Pt reports \"feels good to walk\".  TLSO in place while OOB.  Pt reports chronic R toe drag and is usual cause of falls.  Active R DF present, but weaker than L DF.  Pt reports possible DC 2.20.22.  PT to leave pt on list to provide gait safety and mechanics.  Recommend home w/ spouse at DC.  Thank you for referral.  "

## 2022-02-19 NOTE — PROGRESS NOTES
"    [facility] Medicine Services  INPATIENT PROGRESS NOTE    Patient Name: Carlo Velasco  Date of Admission: 2/17/2022  Today's Date: 02/19/22  Length of Stay: 2  Primary Care Physician: Cruz Chaudhari MD    Subjective   Chief Complaint: f/u   HPI   Admitted for sepsis from urinary source.  Treated in an outpatient setting with Macrobid.  Persistent fever  Dysuria, frequency and decreased urinary flow.  Personal history of prostate cancer early in his 30s  Last stent in 4/28/20  hx of fall 2-3 days prior to admit  Hx of empyema requiring chest tube in April 2021      Urine cx mixed ori  Currently on ceftriaxone  BC NGTD    Wbc still elevated  procal and crp elevated still    Neurosurgery evaluated patient regarding the abnormality incidentally found on CT of the abdomen and pelvis showing T6 compression fracture.  Service recommend conservative management with TLSO brace for comfort and stability.    Discussed with nurse Sidhu  Patient reportedly confused and wondering whether sundowning.  Spoke to spouse over the phone and states that she is concerned with that as well.  Intermittently confused    I informed her of current condition and plan of care.  Anticipating will likely go home tomorrow.    Patient states that he feels \"really\"  He states trouble urinating described as unable to completely void  Discomfort hypogastric area  Overall he claims that he is improved though  Review of Systems   No nausea, no vomiting  All pertinent negatives and positives are as above. All other systems have been reviewed and are negative unless otherwise stated.     Objective    Temp:  [97.9 °F (36.6 °C)-99.1 °F (37.3 °C)] 98.7 °F (37.1 °C)  Heart Rate:  [72-88] 76  Resp:  [16] 16  BP: (102-126)/(43-65) 126/58  Physical Exam  pleasant man  no distress  hemodynamically stable  alert oriented x3  grossly nonfocal exam  Short neck no thyromegaly  obese with BMI of 39  lungs are clear to " auscultation with good air exchange  S1-S2, regular rate and rhythm  obese abdomen, nontender, could examine for any organomegaly due to obese abdomen  no cyanosis or gross edema  dry skin  good capillary refill time  No point tenderness in his back      Results Review:  I have reviewed the labs, radiology results, and diagnostic studies.    Laboratory Data:   Results from last 7 days   Lab Units 02/18/22  1107 02/17/22  1410   WBC 10*3/mm3 22.90* 21.02*   HEMOGLOBIN g/dL 12.2* 13.6   HEMATOCRIT % 37.5 42.5   PLATELETS 10*3/mm3 218 222        Results from last 7 days   Lab Units 02/17/22  1410   SODIUM mmol/L 137   POTASSIUM mmol/L 3.7   CHLORIDE mmol/L 101   CO2 mmol/L 26.0   BUN mg/dL 12   CREATININE mg/dL 0.79   CALCIUM mg/dL 8.7   BILIRUBIN mg/dL 0.9   ALK PHOS U/L 111   ALT (SGPT) U/L 29   AST (SGOT) U/L 28   GLUCOSE mg/dL 194*       Culture Data:   Blood Culture   Date Value Ref Range Status   02/17/2022 No growth at 24 hours  Preliminary   02/17/2022 No growth at 24 hours  Preliminary   02/17/2022 No growth at 24 hours  Preliminary   02/17/2022 No growth at 24 hours  Preliminary     Urine Culture   Date Value Ref Range Status   02/17/2022 >100,000 CFU/mL Mixed Sheree Isolated  Final       Radiology Data:   Imaging Results (Last 24 Hours)     Procedure Component Value Units Date/Time    XR Spine Thoracic 2 View [[id number]] Collected: 02/18/22 1836     Updated: 02/18/22 1848    Narrative:      Exam:   XR SPINE THORACIC 2 VW-       Date:  2/18/2022      History:  Male, age  76 years;Evaluation of T6 compression fracture  upright and supine to ensure stability     COMPARISON:  None.     Findings :  Known T6 compression deformity with vertebral body height being 2 cm  when standing and 2.3 cm when supine.       Impression:      Impression:  Known T6 vertebral body fracture as above.  This report was finalized on 02/18/2022 18:44 by Dr. Becky York MD.    CT Head Without Contrast [[id number]] Collected: 02/18/22  1345     Updated: 02/18/22 1353    Narrative:      EXAM: CT OF THE HEAD WITHOUT IV CONTRAST 2/18/2022     COMPARISON: None      INDICATION: Male, 76 years-old. Multiple falls      PROCEDURE: Non contrast enhanced head CT was performed.      Radiation dose equals  mGy-cm.  Automated exposure control dose  reduction technique was implemented.     FINDINGS:   Ventricles and cerebrospinal fluid spaces are normal in size and  configuration for the patient's age. There is no evidence of mass-effect  or midline shift. The gray-white differentiation is preserved.  There is  no evidence of intracranial contusion, hemorrhage, or skull fracture.   The visualized portions of the mastoid air cells are clear. The  visualized portions of the paranasal sinuses are free of obstructive  mucosal disease.       Impression:      1.   No acute intracranial abnormality.  This report was finalized on 02/18/2022 13:50 by Dr. Becky York MD.          I have reviewed the patient's current medications.     Assessment/Plan     Active Hospital Problems    Diagnosis    • UTI (urinary tract infection)          · Sepsis from urinary source (cystitis/prostatitis)  · Systemic inflammatory response syndrome (fever, tachycardia, leukocytosis) in the setting of urinary tract infection and nonobstructing intrarenal stones.  · Incidental (?)T6 compression deformity per CT imaging.  Subacute in nature.  TLSO brace.  PT OT.  Analgesic.  Avoid lifting greater than 8 pounds, bending or twisting; allowing his pain to guide his physical mobility.  · History of fall  · Dehydration (clinical) -improved  · Coronary artery disease with prior stents.  Last cath April 28, 2020.  · Hypertension -stable continue present management  · Hyperlipidemia -continue present management  · History of empyema last year requiring chest tube  · Personal history of polio as a child     Plan:  Urine culture showed mixed ori  No evidence of bacteremia  Still elevated  CRP/procalcitonin.  Despite of this patient is doing better clinically.  Chest x-ray no acute process  Now afebrile.  Has not needed any antipyretic or medications that contains acetaminophen  Trend CRP, CBC  Continue empiric antibiotic  I will check TSH, B12 and folate to check on correctable causes of mental impairment  We will try on Flomax for (LUTS)  May need to follow with urology in outpatient setting    Discussed with nurse Sidhu  Increase activities as tolerated  Informed spouse of current condition and plan of care  Scheduled Meds:atorvastatin, 80 mg, Oral, Nightly  cefTRIAXone, 2 g, Intravenous, Q24H  cetirizine, 10 mg, Oral, Daily  clopidogrel, 75 mg, Oral, Daily  donepezil, 5 mg, Oral, Nightly  escitalopram, 20 mg, Oral, Daily  fluticasone, 2 spray, Nasal, Daily  isosorbide mononitrate, 30 mg, Oral, QAM  lisinopril, 5 mg, Oral, Daily  metoprolol tartrate, 25 mg, Oral, Q12H  pantoprazole, 40 mg, Oral, QAM  pregabalin, 150 mg, Oral, TID  sodium chloride, 10 mL, Intravenous, Q12H  sucralfate, 1 g, Oral, 4x Daily AC & at Bedtime  topiramate, 25 mg, Oral, TID                Discharge Planning: If remain clinically stable anticipate switching to oral antibiotic by tomorrow and will be sent home with plan for total 14 days antibiotic.    Electronically signed by Juan Bee MD, 02/19/22, 07:43 CST.

## 2022-02-19 NOTE — THERAPY EVALUATION
Patient Name: Carlo Velasco  : 1945    MRN: [id number]                              Today's Date: 2022       Admit Date: 2022    Visit Dx:     ICD-10-CM ICD-9-CM   1. Impaired mobility  Z74.09 799.89     Patient Active Problem List   Diagnosis   • Coronary artery disease involving autologous artery coronary bypass graft without angina pectoris   • Essential hypertension   • Anxiety   • Colon polyps   • Mixed hyperlipidemia   • Incisional hernia, without obstruction or gangrene   • BRBPR (bright red blood per rectum)   • Esophageal dysphagia   • Prostatism   • Nocturia   • Coronary artery disease   • Orthostasis   • Chronic midline low back pain without sciatica   • History of adenomatous polyp of colon   • Acute pain of right knee   • Morbidly obese ([facility])   • Pain of right heel   • Hx of CABG x3 2019    • Stented coronary artery   • Flank pain   • Skin lesion   • Pleuritic chest pain   • Sick sinus syndrome ([facility])   • Coagulopathy ([facility])   • Pneumonia of right lung due to infectious organism   • Recurrent pleural effusion on right   • Cough   • SOB (shortness of breath)   • Former smoker, stopped smoking many years ago   • Non-seasonal allergic rhinitis   • ERNESTO (obstructive sleep apnea)   • Restrictive lung disease   • Class 3 severe obesity due to excess calories with serious comorbidity in adult ([facility])   • Empyema of pleura ([facility])   • Pneumonia   • 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)   • On statin therapy   • S/P thoracostomy tube placement ([facility])   • Mild intermittent asthma without complication   • Family hx colonic polyps   • History of pneumonia   • Pleurisy   • History of fall   • UTI (urinary tract infection)     Past Medical History:   Diagnosis Date   • Anxiety    • Arthritis    • Cancer (HCC)     melanoma   • CHF (congestive heart failure) ([facility])    • Claustrophobia    • Colon polyp    • Coronary artery disease ,     2 vessel CABG (), SHAHRZAD LAD ().    • Depression    •  Disease of thyroid gland     partial thyroidectomy on right   • GERD (gastroesophageal reflux disease)    • Hearing loss    • Heart attack (HCC)    • Heart attack (HCC)    • History of transfusion    • Hyperlipidemia    • Hypertension    • Kidney stone     history of    • Mild intermittent asthma without complication 8/24/2021   • Tobacco abuse, in remission    • Uses hearing aid     BILATERAL     Past Surgical History:   Procedure Laterality Date   • BACK SURGERY      CERVICAL FUSION AND LUMBAR DISC REPLACEMENT   • BRONCHOSCOPY N/A 4/27/2021    Procedure: BRONCHOSCOPY;  Surgeon: Rico Jacobs MD;  Location: [facility] OR;  Service: Cardiothoracic;  Laterality: N/A;   • CARDIAC CATHETERIZATION  07/2015    SHAHRZAD mid Dr. Malu LO   • CARDIAC CATHETERIZATION N/A 10/21/2016    Procedure: Left Heart Cath;  Surgeon: Darian Toribio MD;  Location:  [facility] CATH INVASIVE LOCATION;  Service:    • CARDIAC CATHETERIZATION Left 4/5/2017    Procedure: Cardiac Catheterization/Vascular Study;  Surgeon: Darian Toribio MD;  Location:  [facility] CATH INVASIVE LOC[facility];  Service:    • CARDIAC CATHETERIZATION N/A 4/5/2017    Procedure: Left Heart Cath;  Surgeon: Darian Toribio MD;  Location:  [facility] CATH INVASIVE [facility];  Service:    • CARDIAC CATHETERIZATION N/A 4/5/2017    Procedure: Coronary angiography;  Surgeon: Darian Toribio MD;  Location:  [facility] CATH [facility]VASIVE [facility];  Service:    • CARDIAC CATHETERIZATION N/A 4/5/2017    Procedure: Left ventriculography;  Surgeon: Darian Toribio MD;  Location:  [facility] CATH [facility]VASIVE [facility];  Service:    • CARDIAC CATHETERIZATION  4/5/2017    Procedure: Saphenous Vein Graft;  Surgeon: Darian Toribio MD;  Location:  [facility] CATH [facility]VASIVE [facility];  Service:    • CARDIAC CATHETERIZATION Left 2/27/2018    Procedure: Cardiac Catheterization/Vascular Study SHAHRZAD OK PRN;  Surgeon: Darian Toribio MD;  Location:  [facility]ATH [facility]AS[facility];  Service:    • CARDIAC CATHETERIZATION  2/27/2018    Procedure: Functional Flow  Reserve;  Surgeon: Darian Toribio MD;  Location: [facility] CATH INVASIVE LOCATION;  Service:    • CARDIAC CATHETERIZATION N/A 2/27/2018    Procedure: Left ventriculography;  Surgeon: Darian Toribio MD;  Location:  [facility] CATH [facility]AS[facility];  Service:    • CARDIAC CATHETERIZATION Left 4/28/2020    Procedure: Cardiac Catheterization/Vascular Study SHAHRZAD OK PRN;  Surgeon: Darian Toribio MD;  Location: [facility] CATH INVASIVE LOCATION;  Service: Cardiology;  Laterality: Left;   • CARDIAC ELECTROPHYSIOLOGY PROCEDURE N/A 1/11/2018    Procedure: PPM generator change - dual;  Surgeon: Darian Toribio MD;  Location: [facility] CATH INVASIVE LOCATION;  Service:    • CARDIAC SURGERY      open heart surgery x2   • COLONOSCOPY  09/05/2007    colon polyps   • COLONOSCOPY N/A 5/29/2018    Procedure: COLONOSCOPY WITH ANESTHESIA;  Surgeon: Juan F Tapia MD;  Location: [facility] ENDOSCOPY;  Service: Gastroenterology   • COLONOSCOPY N/A 10/21/2021    Procedure: COLONOSCOPY WITH ANESTHESIA;  Surgeon: Juan F Tapia MD;  Location: [facility] ENDOSCOPY;  Service: Gastroenterology;  Laterality: N/A;  pre op: hx polyps  post op;polyp,   PCP: Rhys Rosen MD   • CORONARY ANGIOPLASTY WITH STENT PLACEMENT  07/2015    SHAHRZAD mid LAD, Dr. Toribio    • CORONARY ARTERY BYPASS GRAFT  2012 AND 2014    2 vessel    • ENDOSCOPY  11/08/2010   • ENDOSCOPY N/A 5/29/2018    Procedure: ESOPHAGOGASTRODUODENOSCOPY WITH ANESTHESIA;  Surgeon: Juan F Tapia MD;  Location: [facility] ENDOSCOPY;  Service: Gastroenterology   • HEAD/NECK LESION/CYST EXCISION Left 12/20/2016    Procedure: EXCISION MALIGNANT MELANOMA WITH SENTINEL NODE BIOPSY, INJECTION AND SCAN PRE-OP, LEFT EAR;  Surgeon: Guanaco Zepeda MD;  Location: [facility] OR;  Service:    • HEMORRHOIDECTOMY     • HERNIA REPAIR     • INSERT / REPLACE / REMOVE PACEMAKER     • SENTINEL NODE BIOPSY Left 12/20/2016    Procedure: SENTINEL NODE BIOPSY;  Surgeon: Guanaco Zepeda MD;  Location: [facility] OR;  Service:    • SKIN CANCER  EXCISION     • THORACOSCOPY Right 4/27/2021    Procedure: THORACOSCOPIC TOTAL DECORTICATION;  Surgeon: Rico Jacobs MD;  Location: [facility] OR;  Service: Cardiothoracic;  Laterality: Right;   • THYROID SURGERY     • THYROIDECTOMY        General Information     [facility] Name 02/19/22 1100          Physical Therapy Time and Intention    Document Type evaluation  T6 comp fx s/p falling 9ft from ladder, he also reports neck pn w/ radiculopathy to the R deltoid in the C5 dist., N/T BUE, intermittent thoracic back pain, lumbar back and bilateral thigh pain and dysesthesias, and recurrent falls.  -     Mode of Treatment physical therapy  -     Row Name 02/19/22 1100          General Information    Patient Profile Reviewed yes  -     Prior Level of Function independent:; community mobility; ADL's  -     Existing Precautions/Restrictions fall; spinal  -     Barriers to Rehab previous functional deficit  -     Row Name 02/19/22 1100          Living Environment    Lives With spouse  -Counts include 234 beds at the [facility] Name 02/19/22 1100          Home Main Entrance    Number of Stairs, Main Entrance four  also has ramp  -     Stair Railings, Main Entrance railings on both sides of stairs  -     Row Name 02/19/22 1100          Stairs Within Home, Primary    Number of Stairs, Within Home, Primary none  -Counts include 234 beds at the [facility] Name 02/19/22 1100          Cognition    Orientation Status (Cognition) oriented x 4  -Counts include 234 beds at the [facility] Name 02/19/22 1100          Safety Issues, Functional Mobility    Safety Issues Affecting Function (Mobility) ability to follow commands  -     Impairments Affecting Function (Mobility) balance; strength  -     Comment, Safety Issues/Impairments (Mobility) poor DF of R ankle  -           User Key  (r) = Recorded By, (t) = Taken By, (c) = Cosigned By    Initials Name Provider Type     Marcello Guzman, PT Physical Therapist               Mobility     Row Name 02/19/22 1100          Bed Mobility    Comment (Bed Mobility) up in  chair upon entering room.  feet propped up on bed and crossed.  no apparent pain  -[facility] Name 02/19/22 1100          Sit-Stand Transfer    Sit-Stand [place] (Transfers) supervision  -[facility] Name 02/19/22 1100          Gait/Stairs (Locomotion)    [place] Level (Gait) standby assist  -     Distance in Feet (Gait) 250  -           User Key  (r) = Recorded By, (t) = Taken By, (c) = Cosigned By    Initials Name Provider Type     Marcello Guzman, PT Physical Therapist               Obj/Interventions     [facility] Name 02/19/22 1100          Range of Motion Comprehensive    General Range of Motion bilateral lower extremity ROM WFL  -[facility] Name 02/19/22 1100          Strength Comprehensive (MMT)    General Manual Muscle Testing (MMT) Assessment lower extremity strength deficits identified  -           User Key  (r) = Recorded By, (t) = Taken By, (c) = Cosigned By    Initials Name Provider Type     Marcello Guzman, PT Physical Therapist               Goals/Plan     [facility] Name 02/19/22 1100          Bed Mobility Goal 1 (PT)    Activity/Assistive Device (Bed Mobility Goal 1, PT) bed mobility activities, all  -     [place] Level/Cues Needed (Bed Mobility Goal 1, PT) independent  -     Time Frame (Bed Mobility Goal 1, PT) by discharge  -     Progress/Outcomes (Bed Mobility Goal 1, PT) goal ongoing  -[facility] Name 02/19/22 1100          Transfer Goal 1 (PT)    Activity/Assistive Device (Transfer Goal 1, PT) sit-to-stand/stand-to-sit  -     [place] Level/Cues Needed (Transfer Goal 1, PT) independent  -     Time Frame (Transfer Goal 1, PT) by discharge  -     Progress/Outcome (Transfer Goal 1, PT) goal ongoing  -[facility] Name 02/19/22 1100          Gait Training Goal 1 (PT)    Activity/Assistive Device (Gait Training Goal 1, PT) gait (walking locomotion)  -     [place] Level (Gait Training Goal 1, PT) independent  -     Distance (Gait Training Goal 1, PT) 250ft  -     Time  "Frame (Gait Training Goal 1, PT) by discharge  -     Progress/Outcome (Gait Training Goal 1, PT) goal ongoing  -     Row Name 02/19/22 1100          Stairs Goal 1 (PT)    Activity/Assistive Device (Stairs Goal 1, PT) ascending stairs; descending stairs; using handrail, left; using handrail, right  -     [place] Level/Cues Needed (Stairs Goal 1, PT) standby assist  -     Number of Stairs (Stairs Goal 1, PT) 4  -     Time Frame (Stairs Goal 1, PT) by discharge  -     Progress/Outcome (Stairs Goal 1, PT) goal ongoing  -           User Key  (r) = Recorded By, (t) = Taken By, (c) = Cosigned By    Initials Name Provider Type     Marcello Guzman, PT Physical Therapist               Clinical Impression     Row Name 02/19/22 1100          Pain    Additional Documentation Pain Scale: Numbers Pre/Post-Treatment (Group)  -     Row Name 02/19/22 1100          Pain Scale: Numbers Pre/Post-Treatment    Pretreatment Pain Rating 2/10  -     Posttreatment Pain Rating 3/10  -     Pain Location back  -     Pain Intervention(s) Medication (See MAR); Repositioned; Ambulation/increased activity  -     Row Name 02/19/22 1100          Plan of Care Review    Plan of Care Reviewed With patient  -     Outcome Summary PT IE complete.  Supervision sit<>stand.  Ambulated 250ft sbA x1.  Pt appeared slightly short of breath, but O2sat 96% and HR 76bpm.  Pt reports \"feels good to walk\".  TLSO in place while OOB.  Pt reports chronic R toe drag and is usual cause of falls.  Active R DF present, but weaker than L DF.  Pt reports possible DC 2.20.22.  PT to leave pt on list to provide gait safety and mechanics.  Recommend home w/ spouse at DC.  Thank you for referral.  -     Row Name 02/19/22 1100          Therapy Assessment/Plan (PT)    Patient/Family Therapy Goals Statement (PT) return home  -     Rehab Potential (PT) good, to achieve stated therapy goals  -     Criteria for Skilled Interventions Met (PT) yes; " skilled treatment is necessary  -     Predicted Duration of Therapy Intervention (PT) until dc  -     Row Name 02/19/22 1100          Positioning and Restraints    Pre-Treatment Position sitting in chair/recliner  -     Post Treatment Position chair  -LH     In Chair sitting; call light within reach; encouraged to call for assist; with family/caregiver  -           User Key  (r) = Recorded By, (t) = Taken By, (c) = Cosigned By    Initials Name Provider Type     Marcello Guzman, PT Physical Therapist               Outcome Measures     Row Name 02/19/22 1100          How much help from another person do you currently need...    Turning from your back to your side while in flat bed without using bedrails? 4  -LH     Moving from lying on back to sitting on the side of a flat bed without bedrails? 4  -LH     Moving to and from a bed to a chair (including a wheelchair)? 3  -LH     Standing up from a chair using your arms (e.g., wheelchair, bedside chair)? 4  -LH     Climbing 3-5 steps with a railing? 3  -LH     To walk in hospital room? 3  -     AM-PAC 6 Clicks Score (PT) 21  -     Row Name 02/19/22 1100          Functional Assessment    Outcome Measure Options AM-PAC 6 Clicks Basic Mobility (PT)  -           User Key  (r) = Recorded By, (t) = Taken By, (c) = Cosigned By    Initials Name Provider Type     Marcello Guzman, PT Physical Therapist                             Physical Therapy Education                 Title: PT OT SLP Therapies (Done)     Topic: Physical Therapy (Done)     Point: Mobility training (Done)     Learning Progress Summary           Patient Acceptance, E,D, DU,VU by  at 2/19/2022 1151    Comment: benefits of PT and POC, call for A OOB, no BLT, TLSO when OOB                   Point: Body mechanics (Done)     Learning Progress Summary           Patient Acceptance, E,D, DU,VU by  at 2/19/2022 1151    Comment: benefits of PT and POC, call for A OOB, no BLT, TLSO when OOB                 "   Point: Precautions (Done)     Learning Progress Summary           Patient Acceptance, E,D, DU,VU by  at 2/19/2022 1151    Comment: benefits of PT and POC, call for A OOB, no BLT, TLSO when OOB                               User Key     Initials Effective Dates Name Provider Type [facility] 06/16/21 -  Marcello Guzman, PT Physical Therapist PT              PT Recommendation and Plan  Planned Therapy Interventions (PT): balance training, bed mobility training, gait training, home exercise program, strengthening, postural re-education, patient/family education, orthotic fitting/training, transfer training  Plan of Care Reviewed With: patient  Outcome Summary: PT IE complete.  Supervision sit<>stand.  Ambulated 250ft sbA x1.  Pt appeared slightly short of breath, but O2sat 96% and HR 76bpm.  Pt reports \"feels good to walk\".  TLSO in place while OOB.  Pt reports chronic R toe drag and is usual cause of falls.  Active R DF present, but weaker than L DF.  Pt reports possible DC 2.20.22.  PT to leave pt on list to provide gait safety and mechanics.  Recommend home w/ spouse at DC.  Thank you for referral.     Time Calculation:    PT Charges     Row Name 02/19/22 1151             Time Calculation    Start Time 1058  -      Stop Time 1152  -      Time Calculation (min) 54 min  -      PT Received On 02/19/22  -      PT Goal Re-Cert Due Date 03/01/22  -              Untimed Charges    PT Eval/Re-eval Minutes 54  -              Total Minutes    Untimed Charges Total Minutes 54  -       Total Minutes 54  -            User Key  (r) = Recorded By, (t) = Taken By, (c) = Cosigned By    Initials Name Provider Type     Marcello Guzman, PT Physical Therapist              Therapy Charges for Today     Code Description Service Date Service Provider Modifiers Qty    56556337841 HC PT EVAL LOW COMPLEXITY 4 2/19/2022 Marcello Guzman, PT GP 1          PT G-Codes  Outcome Measure Options: AM-PAC 6 Clicks Basic Mobility " (PT)  AM-PAC 6 Clicks Score (PT): 21  AM-PAC 6 Clicks Score (OT): 23    Marcello Guzman, PT  2/19/2022

## 2022-02-20 ENCOUNTER — READMISSION MANAGEMENT (OUTPATIENT)
Dept: CALL CENTER | Facility: HOSPITAL | Age: 77
End: 2022-02-20

## 2022-02-20 VITALS
SYSTOLIC BLOOD PRESSURE: 107 MMHG | BODY MASS INDEX: 39.4 KG/M2 | OXYGEN SATURATION: 93 % | WEIGHT: 260 LBS | TEMPERATURE: 98.1 F | RESPIRATION RATE: 16 BRPM | HEIGHT: 68 IN | DIASTOLIC BLOOD PRESSURE: 82 MMHG | HEART RATE: 73 BPM

## 2022-02-20 PROBLEM — S22.050A COMPRESSION FRACTURE OF T6 VERTEBRA (HCC): Status: ACTIVE | Noted: 2022-02-20

## 2022-02-20 PROBLEM — E86.0 DEHYDRATION: Status: ACTIVE | Noted: 2022-02-20

## 2022-02-20 PROBLEM — Z91.81 HISTORY OF FALL: Status: ACTIVE | Noted: 2022-02-20

## 2022-02-20 PROBLEM — I25.10 CORONARY ARTERY DISEASE: Status: ACTIVE | Noted: 2022-02-20

## 2022-02-20 PROBLEM — A41.9 SEPSIS: Status: ACTIVE | Noted: 2022-02-20

## 2022-02-20 LAB
BASOPHILS # BLD AUTO: 0.05 10*3/MM3 (ref 0–0.2)
BASOPHILS NFR BLD AUTO: 0.5 % (ref 0–1.5)
CRP SERPL-MCNC: 10.19 MG/DL (ref 0–0.5)
DEPRECATED RDW RBC AUTO: 47.9 FL (ref 37–54)
EOSINOPHIL # BLD AUTO: 0.29 10*3/MM3 (ref 0–0.4)
EOSINOPHIL NFR BLD AUTO: 3 % (ref 0.3–6.2)
ERYTHROCYTE [DISTWIDTH] IN BLOOD BY AUTOMATED COUNT: 14.6 % (ref 12.3–15.4)
FOLATE SERPL-MCNC: 9.81 NG/ML (ref 4.78–24.2)
HCT VFR BLD AUTO: 36.4 % (ref 37.5–51)
HGB BLD-MCNC: 11.2 G/DL (ref 13–17.7)
IMM GRANULOCYTES # BLD AUTO: 0.31 10*3/MM3 (ref 0–0.05)
IMM GRANULOCYTES NFR BLD AUTO: 3.2 % (ref 0–0.5)
LYMPHOCYTES # BLD AUTO: 1.44 10*3/MM3 (ref 0.7–3.1)
LYMPHOCYTES NFR BLD AUTO: 14.7 % (ref 19.6–45.3)
MCH RBC QN AUTO: 27.4 PG (ref 26.6–33)
MCHC RBC AUTO-ENTMCNC: 30.8 G/DL (ref 31.5–35.7)
MCV RBC AUTO: 89 FL (ref 79–97)
MONOCYTES # BLD AUTO: 0.86 10*3/MM3 (ref 0.1–0.9)
MONOCYTES NFR BLD AUTO: 8.8 % (ref 5–12)
NEUTROPHILS NFR BLD AUTO: 6.85 10*3/MM3 (ref 1.7–7)
NEUTROPHILS NFR BLD AUTO: 69.8 % (ref 42.7–76)
NRBC BLD AUTO-RTO: 0 /100 WBC (ref 0–0.2)
PLATELET # BLD AUTO: 220 10*3/MM3 (ref 140–450)
PMV BLD AUTO: 9.2 FL (ref 6–12)
PROCALCITONIN SERPL-MCNC: 0.28 NG/ML (ref 0–0.25)
RBC # BLD AUTO: 4.09 10*6/MM3 (ref 4.14–5.8)
TSH SERPL DL<=0.05 MIU/L-ACNC: 2.16 UIU/ML (ref 0.27–4.2)
VIT B12 BLD-MCNC: 530 PG/ML (ref 211–946)
WBC NRBC COR # BLD: 9.8 10*3/MM3 (ref 3.4–10.8)

## 2022-02-20 PROCEDURE — 82746 ASSAY OF FOLIC ACID SERUM: CPT | Performed by: INTERNAL MEDICINE

## 2022-02-20 PROCEDURE — 86140 C-REACTIVE PROTEIN: CPT | Performed by: INTERNAL MEDICINE

## 2022-02-20 PROCEDURE — 84443 ASSAY THYROID STIM HORMONE: CPT | Performed by: INTERNAL MEDICINE

## 2022-02-20 PROCEDURE — 84145 PROCALCITONIN (PCT): CPT | Performed by: INTERNAL MEDICINE

## 2022-02-20 PROCEDURE — 82607 VITAMIN B-12: CPT | Performed by: INTERNAL MEDICINE

## 2022-02-20 PROCEDURE — 85025 COMPLETE CBC W/AUTO DIFF WBC: CPT | Performed by: INTERNAL MEDICINE

## 2022-02-20 RX ORDER — TAMSULOSIN HYDROCHLORIDE 0.4 MG/1
0.4 CAPSULE ORAL DAILY
Qty: 30 CAPSULE | Refills: 0 | Status: SHIPPED | OUTPATIENT
Start: 2022-02-21 | End: 2022-04-18

## 2022-02-20 RX ORDER — LISINOPRIL 5 MG/1
5 TABLET ORAL DAILY
Qty: 90 TABLET | Refills: 3 | Status: SHIPPED | OUTPATIENT
Start: 2022-02-20 | End: 2022-03-10

## 2022-02-20 RX ORDER — CEFDINIR 300 MG/1
300 CAPSULE ORAL 2 TIMES DAILY
Qty: 20 CAPSULE | Refills: 0 | Status: SHIPPED | OUTPATIENT
Start: 2022-02-20 | End: 2022-03-02

## 2022-02-20 RX ORDER — TOPIRAMATE 25 MG/1
25 TABLET ORAL 3 TIMES DAILY
Start: 2022-02-20 | End: 2022-11-01

## 2022-02-20 RX ORDER — FUROSEMIDE 40 MG/1
40 TABLET ORAL DAILY PRN
Start: 2022-02-20

## 2022-02-20 RX ORDER — ESCITALOPRAM OXALATE 20 MG/1
20 TABLET ORAL DAILY
Qty: 90 TABLET | Refills: 1 | Status: SHIPPED | OUTPATIENT
Start: 2022-02-20

## 2022-02-20 RX ADMIN — CLOPIDOGREL 75 MG: 75 TABLET, FILM COATED ORAL at 08:06

## 2022-02-20 RX ADMIN — ISOSORBIDE MONONITRATE 30 MG: 30 TABLET, EXTENDED RELEASE ORAL at 08:07

## 2022-02-20 RX ADMIN — TAMSULOSIN HYDROCHLORIDE 0.4 MG: 0.4 CAPSULE ORAL at 08:06

## 2022-02-20 RX ADMIN — PANTOPRAZOLE SODIUM 40 MG: 40 TABLET, DELAYED RELEASE ORAL at 06:24

## 2022-02-20 RX ADMIN — OXYCODONE HYDROCHLORIDE AND ACETAMINOPHEN 1 TABLET: 5; 325 TABLET ORAL at 08:06

## 2022-02-20 RX ADMIN — LISINOPRIL 5 MG: 5 TABLET ORAL at 08:07

## 2022-02-20 RX ADMIN — SUCRALFATE 1 G: 1 TABLET ORAL at 08:06

## 2022-02-20 RX ADMIN — CETIRIZINE HYDROCHLORIDE TABLETS 10 MG: 10 TABLET, FILM COATED ORAL at 08:06

## 2022-02-20 RX ADMIN — METOPROLOL TARTRATE 25 MG: 25 TABLET, FILM COATED ORAL at 08:06

## 2022-02-20 RX ADMIN — TOPIRAMATE 25 MG: 25 TABLET, FILM COATED ORAL at 08:07

## 2022-02-20 RX ADMIN — ESCITALOPRAM 20 MG: 10 TABLET, FILM COATED ORAL at 08:06

## 2022-02-20 RX ADMIN — SODIUM CHLORIDE, PRESERVATIVE FREE 10 ML: 5 INJECTION INTRAVENOUS at 08:07

## 2022-02-20 RX ADMIN — PREGABALIN 150 MG: 75 CAPSULE ORAL at 08:06

## 2022-02-20 NOTE — DISCHARGE SUMMARY
[facility] Medicine Services  DISCHARGE SUMMARY       Date of Admission: 2/17/2022  Date of Discharge:  2/20/2022  Primary Care Physician: Cruz Chaudhari MD    Discharge Diagnoses:  Active Hospital Problems    Diagnosis    • **Sepsis (HCC)    • UTI (urinary tract infection) -cystitis/prostatitis    • Dehydration    • Compression fracture of T6 vertebra (HCC), subacute    • Coronary artery disease    • History of fall    • HLD (hyperlipidemia)    • HTN (hypertension)          Presenting Problem/History of Present Illness:  UTI (urinary tract infection) [N39.0]         Hospital Course  He is a pleasant 76-year-old man who I admitted for sepsis from urinary source. Unfortunately t urine culture only showed mixed ori greater than 100,000 CFU. She received Macrobid prior to admit    He presented with persistent fever, dysuria, frequency and decreased urinary flow. He has pyuria and positive nitrites on admit he carries personal history of prostate cancer in his early 30s.    Diagnostic studies:  Showed white count of 21,000 with predominance of neutrophils at 86%  Blood culture and urine culture showed no growth  WBC improved  Patient also has elevated CRP and procalcitonin    Patient started on ceftriaxone. Patient has not had any recurrence of fever. He is doing significantly better as he claimed. He expressed readiness to go home    I started him on Flomax due to lower urinary tract symptoms. He is is tolerating this. I recommend follow-up with primary care provider who could set him to follow-up with urology in outpatient setting    Spouse expressed concern of cognitive impairment. I checked TSH B12 and folate. All of these are within normal range. Patient is on donepezil prior to this admission. Further follow-up is through primary care provider    Spouse gave me a list of medications. I reconciled his home medications and instructed him regarding Lasix and potassium.  Patient does not have any evidence of fluid overload. In fact, I felt he was dehydrated. I instructed them to take this on as-needed basis.    Patient was seen by neurosurgery service due to incidental finding of T6 compression fracture. The service felt that the fracture is subacute. Record indicates that he has known injury from fall December 2021.    He has not complained of any back pain to me while in the hospital but neurosurgery mention in their note that there is a progressive worsening lumbar back pain and bilateral thigh pain and dysesthesias. If this persists in outpatient setting, I would defer to primary care provider about stopping Plavix. It is my understanding that his last stent dates back April 28 of 2020. 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 Cardiology may have to clear him for this.    Patient has been prescribed with brace.    Overall patient is doing well and felt medically stable and appropriate for discharge        Procedures Performed:  none    Consults:  Dr. Tineo/ NP Arturo Barron    Pertinent Test Results:   Lab Results (all)     Procedure Component Value Units Date/Time    Folate [[id number]]  (Normal) Collected: 02/20/22 0544    Specimen: Blood Updated: 02/20/22 1220     Folate 9.81 ng/mL     Narrative:      Results may be falsely increased if patient taking Biotin.      Vitamin B12 [[id number]]  (Normal) Collected: 02/20/22 0544    Specimen: Blood Updated: 02/20/22 1220     Vitamin B-12 530 pg/mL     Narrative:      Results may be falsely increased if patient taking Biotin.      C-reactive Protein [[id number]]  (Abnormal) Collected: 02/20/22 0544    Specimen: Blood Updated: 02/20/22 0648     C-Reactive Protein 10.19 mg/dL     Procalcitonin [[id number]]  (Abnormal) Collected: 02/20/22 0544    Specimen: Blood Updated: 02/20/22 0648     Procalcitonin 0.28 ng/mL     Narrative:      As a Marker for Sepsis (Non-Neonates):     1. <0.5 ng/mL represents a low risk of severe sepsis and/or septic  "shock.  2. >2 ng/mL represents a high risk of severe sepsis and/or septic shock.    As a Marker for Lower Respiratory Tract Infections that require antibiotic therapy:  PCT on Admission     Antibiotic Therapy             6-12 Hrs later  >0.5                          Strongly Recommended            >0.25 - <0.5             Recommended  0.1 - 0.25                  Discouraged                       Remeasure/reassess PCT  <0.1                         Strongly Discouraged         Remeasure/reassess PCT      As 28 day mortality risk marker: \"Change in Procalcitonin Result\" (>80% or <=80%) if Day 0 (or Day 1) and Day 4 values are available. Refer to http://www.[employer] Screeningpct-calculator.[employer]/    Change in PCT <=80 %   A decrease of PCT levels below or equal to 80% defines a positive change in PCT test result representing a higher risk for 28-day all-cause mortality of patients diagnosed with severe sepsis or septic shock.    Change in PCT >80 %   A decrease of PCT levels of more than 80% defines a negative change in PCT result representing a lower risk for 28-day all-cause mortality of patients diagnosed with severe sepsis or septic shock.                TSH [[id number]]  (Normal) Collected: 02/20/22 0544    Specimen: Blood Updated: 02/20/22 0647     TSH 2.160 uIU/mL     CBC & Differential [[id number]]  (Abnormal) Collected: 02/20/22 0544    Specimen: Blood Updated: 02/20/22 0626    Narrative:      The following orders were created for panel order CBC & Differential.  Procedure                               Abnormality         Status                     ---------                               -----------         ------                     CBC Auto Differential[[id number]]        Abnormal            Final result                 Please view results for these tests on the individual orders.    CBC Auto Differential [[id number]]  (Abnormal) Collected: 02/20/22 0544    Specimen: Blood Updated: 02/20/22 0626     WBC 9.80 10*3/mm3      " RBC 4.09 10*6/mm3      Hemoglobin 11.2 g/dL      Hematocrit 36.4 %      MCV 89.0 fL      MCH 27.4 pg      MCHC 30.8 g/dL      RDW 14.6 %      RDW-SD 47.9 fl      MPV 9.2 fL      Platelets 220 10*3/mm3      Neutrophil % 69.8 %      Lymphocyte % 14.7 %      Monocyte % 8.8 %      Eosinophil % 3.0 %      Basophil % 0.5 %      Immature Grans % 3.2 %      Neutrophils, Absolute 6.85 10*3/mm3      Lymphocytes, Absolute 1.44 10*3/mm3      Monocytes, Absolute 0.86 10*3/mm3      Eosinophils, Absolute 0.29 10*3/mm3      Basophils, Absolute 0.05 10*3/mm3      Immature Grans, Absolute 0.31 10*3/mm3      nRBC 0.0 /100 WBC     Blood Culture - Blood, Arm, Left [[id number]]  (Normal) Collected: 02/17/22 2110    Specimen: Blood from Arm, Left Updated: 02/19/22 2145     Blood Culture No growth at 2 days    Blood Culture - Blood, Hand, Left [[id number]]  (Normal) Collected: 02/17/22 2111    Specimen: Blood from Hand, Left Updated: 02/19/22 2145     Blood Culture No growth at 2 days    Blood Culture - Blood, Arm, Right [[id number]]  (Normal) Collected: 02/17/22 1535    Specimen: Blood from Arm, Right Updated: 02/19/22 1630     Blood Culture No growth at 2 days    Blood Culture - Blood, Arm, Right [[id number]]  (Normal) Collected: 02/17/22 1410    Specimen: Blood from Arm, Right Updated: 02/19/22 1430     Blood Culture No growth at 2 days    Procalcitonin [[id number]]  (Abnormal) Collected: 02/18/22 1107    Specimen: Blood Updated: 02/18/22 1141     Procalcitonin 0.67 ng/mL     Narrative:      As a Marker for Sepsis (Non-Neonates):     1. <0.5 ng/mL represents a low risk of severe sepsis and/or septic shock.  2. >2 ng/mL represents a high risk of severe sepsis and/or septic shock.    As a Marker for Lower Respiratory Tract Infections that require antibiotic therapy:  PCT on Admission     Antibiotic Therapy             6-12 Hrs later  >0.5                          Strongly Recommended            >0.25 - <0.5             Recommended  0.1 - 0.25   "                Discouraged                       Remeasure/reassess PCT  <0.1                         Strongly Discouraged         Remeasure/reassess PCT      As 28 day mortality risk marker: \"Change in Procalcitonin Result\" (>80% or <=80%) if Day 0 (or Day 1) and Day 4 values are available. Refer to http://www.[facility] [place] [facility] the [facility]-calculator.com/    Change in PCT <=80 %   A decrease of PCT levels below or equal to 80% defines a positive change in PCT test result representing a higher risk for 28-day all-cause mortality of patients diagnosed with severe sepsis or septic shock.    Change in PCT >80 %   A decrease of PCT levels of more than 80% defines a negative change in PCT result representing a lower risk for 28-day all-cause mortality of patients diagnosed with severe sepsis or septic shock.                C-reactive Protein [[id number]]  (Abnormal) Collected: 02/18/22 1107    Specimen: Blood Updated: 02/18/22 1137     C-Reactive Protein 26.92 mg/dL     Lactic Acid, Plasma [[id number]]  (Normal) Collected: 02/18/22 1107    Specimen: Blood Updated: 02/18/22 1131     Lactate 1.6 mmol/L     CBC & Differential [[id number]]  (Abnormal) Collected: 02/18/22 1107    Specimen: Blood Updated: 02/18/22 1115    Narrative:      The following orders were created for panel order CBC & Differential.  Procedure                               Abnormality         Status                     ---------                               -----------         ------                     CBC Auto Differential[[id number]]        Abnormal            Final result                 Please view results for these tests on the individual orders.    CBC Auto Differential [[id number]]  (Abnormal) Collected: 02/18/22 1107    Specimen: Blood Updated: 02/18/22 1115     WBC 22.90 10*3/mm3      RBC 4.23 10*6/mm3      Hemoglobin 12.2 g/dL      Hematocrit 37.5 %      MCV 88.7 fL      MCH 28.8 pg      MCHC 32.5 g/dL      RDW 15.0 %      RDW-SD 49.1 fl      MPV 9.0 fL      " Platelets 218 10*3/mm3      Neutrophil % 89.2 %      Lymphocyte % 4.8 %      Monocyte % 5.1 %      Eosinophil % 0.0 %      Basophil % 0.2 %      Immature Grans % 0.7 %      Neutrophils, Absolute 20.40 10*3/mm3      Lymphocytes, Absolute 1.11 10*3/mm3      Monocytes, Absolute 1.17 10*3/mm3      Eosinophils, Absolute 0.01 10*3/mm3      Basophils, Absolute 0.05 10*3/mm3      Immature Grans, Absolute 0.16 10*3/mm3      nRBC 0.0 /100 WBC     Urine Culture - Urine, Urine, Clean Catch [[id number]] Collected: 02/17/22 1604    Specimen: Urine, Clean Catch Updated: 02/18/22 1109     Urine Culture >100,000 CFU/mL Mixed Ori Isolated    Narrative:      Specimen contains mixed organisms of questionable pathogenicity which indicates contamination with commensal ori.  Further identification is unlikely to provide clinically useful information.  Suggest recollection.    COVID PRE-OP / PRE-PROCEDURE SCREENING ORDER (NO ISOLATION) - Swab, Nasal Cavity [[id number]]  (Normal) Collected: 02/17/22 1608    Specimen: Swab from Nasal Cavity Updated: 02/17/22 1708    Narrative:      The following orders were created for panel order COVID PRE-OP / PRE-PROCEDURE SCREENING ORDER (NO ISOLATION) - Swab, Nasal Cavity.  Procedure                               Abnormality         Status                     ---------                               -----------         ------                     COVID-19,[facility]...[[id number]]  Normal              Final result                 Please view results for these tests on the individual orders.    COVID-19,[facility] IN-HOUSE,Nasal Swab No Transport Media 3-4 HR TAT - Swab, Nasal Cavity [[id number]]  (Normal) Collected: 02/17/22 1608    Specimen: Swab from Nasal Cavity Updated: 02/17/22 1708     COVID19 Not Detected    Narrative:      Fact sheet for providers: https://www.fda.gov/media/821860/download     Fact sheet for patients: https://www.fda.gov/media/271075/download    Test performed by  PCR.    Consider negative results in combination with clinical observations, patient history, and epidemiological information.  Fact sheet for providers: https://www.fda.gov/media/000781/download     Fact sheet for patients: https://www.fda.gov/media/021353/download    Test performed by PCR.    Consider negative results in combination with clinical observations, patient history, and epidemiological information.    Urinalysis, Microscopic Only - Urine, Clean Catch [[id number]]  (Abnormal) Collected: 02/17/22 1604    Specimen: Urine, Clean Catch Updated: 02/17/22 1637     RBC, UA None Seen /HPF      WBC, UA 21-30 /HPF      Bacteria, UA None Seen /HPF      Squamous Epithelial Cells, UA 0-2 /HPF      Hyaline Casts, UA None Seen /LPF      Methodology Manual Light Microscopy    Urinalysis With Culture If Indicated - Urine, Clean Catch [[id number]]  (Abnormal) Collected: 02/17/22 1604    Specimen: Urine, Clean Catch Updated: 02/17/22 1637     Color, UA Dark Yellow     Appearance, UA Cloudy     pH, UA 6.0     Specific Gravity, UA >1.030     Glucose, UA Negative     Ketones, UA Trace     Bilirubin, UA Small (1+)     Blood, UA Trace     Protein,  mg/dL (2+)     Leuk Esterase, UA Moderate (2+)     Nitrite, UA Positive     Urobilinogen, UA 1.0 E.U./dL    Procalcitonin [[id number]]  (Abnormal) Collected: 02/17/22 1410    Specimen: Blood from Arm, Right Updated: 02/17/22 1522     Procalcitonin 0.37 ng/mL     Narrative:      As a Marker for Sepsis (Non-Neonates):     1. <0.5 ng/mL represents a low risk of severe sepsis and/or septic shock.  2. >2 ng/mL represents a high risk of severe sepsis and/or septic shock.    As a Marker for Lower Respiratory Tract Infections that require antibiotic therapy:  PCT on Admission     Antibiotic Therapy             6-12 Hrs later  >0.5                          Strongly Recommended            >0.25 - <0.5             Recommended  0.1 - 0.25                  Discouraged                        "Remeasure/reassess PCT  <0.1                         Strongly Discouraged         Remeasure/reassess PCT      As 28 day mortality risk marker: \"Change in Procalcitonin Result\" (>80% or <=80%) if Day 0 (or Day 1) and Day 4 values are available. Refer to http://www.[facility]-pct-calculator.com/    Change in PCT <=80 %   A decrease of PCT levels below or equal to 80% defines a positive change in PCT test result representing a higher risk for 28-day all-cause mortality of patients diagnosed with severe sepsis or septic shock.    Change in PCT >80 %   A decrease of PCT levels of more than 80% defines a negative change in PCT result representing a lower risk for 28-day all-cause mortality of patients diagnosed with severe sepsis or septic shock.                [facility] Draw [[id number]] Collected: 02/17/22 1410    Specimen: Blood from Arm, Right Updated: 02/17/22 1517    Narrative:      The following orders were created for panel order [facility] Draw.  Procedure                               Abnormality         Status                     ---------                               -----------         ------                     Green Top (Gel)[[id number]]                                  Final result               Lavender Top[[id number]]                                     Final result               Red Top[[id number]]                                          Final result               Light Blue Top[[id number]]                                   Final result                 Please view results for these tests on the individual orders.    Green Top (Gel) [[id number]] Collected: 02/17/22 1410    Specimen: Blood from Arm, Right Updated: 02/17/22 1517     Extra Tube Hold for add-ons.     Comment: Auto resulted.       Lavender Top [[id number]] Collected: 02/17/22 1410    Specimen: Blood from Arm, Right Updated: 02/17/22 1517     Extra Tube hold for add-on     Comment: Auto resulted       Red Top [[id number]] Collected: 02/17/22 1410    " Specimen: Blood from Arm, Right Updated: 02/17/22 1517     Extra Tube Hold for add-ons.     Comment: Auto resulted.       Light Blue Top [[id number]] Collected: 02/17/22 1410    Specimen: Blood from Arm, Right Updated: 02/17/22 1517     Extra Tube hold for add-on     Comment: Auto resulted       Lipase [[id number]]  (Abnormal) Collected: 02/17/22 1410    Specimen: Blood from Arm, Right Updated: 02/17/22 1450     Lipase 10 U/L     Comprehensive Metabolic Panel [[id number]]  (Abnormal) Collected: 02/17/22 1410    Specimen: Blood from Arm, Right Updated: 02/17/22 1444     Glucose 194 mg/dL      BUN 12 mg/dL      Creatinine 0.79 mg/dL      Sodium 137 mmol/L      Potassium 3.7 mmol/L      Chloride 101 mmol/L      CO2 26.0 mmol/L      Calcium 8.7 mg/dL      Total Protein 7.0 g/dL      Albumin 3.60 g/dL      ALT (SGPT) 29 U/L      AST (SGOT) 28 U/L      Alkaline Phosphatase 111 U/L      Total Bilirubin 0.9 mg/dL      eGFR Non African Amer 96 mL/min/1.73      Globulin 3.4 gm/dL      A/G Ratio 1.1 g/dL      BUN/Creatinine Ratio 15.2     Anion Gap 10.0 mmol/L     Narrative:      GFR Normal >60  Chronic Kidney Disease <60  Kidney Failure <15      Lactic Acid, Plasma [[id number]]  (Normal) Collected: 02/17/22 1410    Specimen: Blood from Arm, Right Updated: 02/17/22 1441     Lactate 1.1 mmol/L     Protime-INR [[id number]]  (Normal) Collected: 02/17/22 1410    Specimen: Blood from Arm, Right Updated: 02/17/22 1438     Protime 13.6 Seconds      INR 1.08    CBC & Differential [[id number]]  (Abnormal) Collected: 02/17/22 1410    Specimen: Blood from Arm, Right Updated: 02/17/22 1424    Narrative:      The following orders were created for panel order CBC & Differential.  Procedure                               Abnormality         Status                     ---------                               -----------         ------                     CBC Auto Differential[[id number]]        Abnormal            Final result                 Please  view results for these tests on the individual orders.    CBC Auto Differential [[id number]]  (Abnormal) Collected: 02/17/22 1410    Specimen: Blood from Arm, Right Updated: 02/17/22 1424     WBC 21.02 10*3/mm3      RBC 4.87 10*6/mm3      Hemoglobin 13.6 g/dL      Hematocrit 42.5 %      MCV 87.3 fL      MCH 27.9 pg      MCHC 32.0 g/dL      RDW 14.6 %      RDW-SD 46.4 fl      MPV 9.0 fL      Platelets 222 10*3/mm3      Neutrophil % 86.5 %      Lymphocyte % 3.4 %      Monocyte % 9.0 %      Eosinophil % 0.0 %      Basophil % 0.2 %      Immature Grans % 0.9 %      Neutrophils, Absolute 18.18 10*3/mm3      Lymphocytes, Absolute 0.71 10*3/mm3      Monocytes, Absolute 1.90 10*3/mm3      Eosinophils, Absolute 0.01 10*3/mm3      Basophils, Absolute 0.04 10*3/mm3      Immature Grans, Absolute 0.18 10*3/mm3      nRBC 0.0 /100 WBC         Imaging Results (All)     Procedure Component Value Units Date/Time    XR Spine Thoracic 2 View [[id number]] Collected: 02/18/22 1836     Updated: 02/18/22 1848    Narrative:      Exam:   XR SPINE THORACIC 2 VW-       Date:  2/18/2022      History:  Male, age  76 years;Evaluation of T6 compression fracture  upright and supine to ensure stability     COMPARISON:  None.     Findings :  Known T6 compression deformity with vertebral body height being 2 cm  when standing and 2.3 cm when supine.       Impression:      Impression:  Known T6 vertebral body fracture as above.  This report was finalized on 02/18/2022 18:44 by Dr. Becky York MD.    CT Head Without Contrast [[id number]] Collected: 02/18/22 1345     Updated: 02/18/22 1353    Narrative:      EXAM: CT OF THE HEAD WITHOUT IV CONTRAST 2/18/2022     COMPARISON: None      INDICATION: Male, 76 years-old. Multiple falls      PROCEDURE: Non contrast enhanced head CT was performed.      Radiation dose equals  mGy-cm.  Automated exposure control dose  reduction technique was implemented.     FINDINGS:   Ventricles and cerebrospinal fluid  spaces are normal in size and  configuration for the patient's age. There is no evidence of mass-effect  or midline shift. The gray-white differentiation is preserved.  There is  no evidence of intracranial contusion, hemorrhage, or skull fracture.   The visualized portions of the mastoid air cells are clear. The  visualized portions of the paranasal sinuses are free of obstructive  mucosal disease.       Impression:      1.   No acute intracranial abnormality.  This report was finalized on 02/18/2022 13:50 by Dr. Becky York MD.    XR Chest 1 View [[id number]] Collected: 02/17/22 1641     Updated: 02/17/22 1646    Narrative:      EXAM: XR CHEST 1 VW-     INDICATION: fever     COMPARISON: 6/2/2021     FINDINGS:     Cardiac silhouette is mildly enlarged. Prior median sternotomy. LEFT  chest wall 2-lead cardiac pacing device. Chronic mild RIGHT  hemidiaphragm elevation. Similar-appearing chronic RIGHT lower  pleural/parenchymal opacities. No new airspace opacities. No large  pleural effusion or pneumothorax. Cervical spine fusion hardware. No  acute osseous finding.       Impression:         1.  No acute findings.  2.  Chronic pleural/parenchymal opacities in the RIGHT lower chest.  This report was finalized on 02/17/2022 16:43 by Dr. Eleuterio Nieto MD.    CT Abdomen Pelvis With Contrast [[id number]] Collected: 02/17/22 1555     Updated: 02/17/22 1606    Narrative:      CT ABDOMEN PELVIS W CONTRAST- 2/17/2022 3:44 PM CST     HISTORY: Abdominal pain, acute, nonlocalized      COMPARISON: 10/19/2016     DOSE LENGTH PRODUCT: 922 mGy cm. Automated exposure control was also  utilized to decrease patient radiation dose.     TECHNIQUE: Axial images of the abdomen pelvis are obtained following IV  contrast.     FINDINGS:  Elevated right hemidiaphragm. Prior mediastinal surgery with  cardiac pacer device. Cardiomegaly. 4 mm nonspecific right lower lobe  pulmonary nodule with basilar atelectasis.     There is mild hepatic  "steatosis. No suspicious focal liver or splenic  mass. No pancreatic cyst or mass identified. No peripancreatic  inflammation. The gallbladder isn't remarkable. No adrenal nodule. 2  punctate nonobstructing right intrarenal stones with a 2.5 cm inferior  right renal cyst. Single punctate nonobstructing left intrarenal stone.  No enhancing renal mass. No hydronephrosis. There is abnormal bladder  wall thickening with perivesicular fat stranding. The prostate is  enlarged measuring 6.4 cm heterogeneous appearance to the prostate with  minimal fat stranding also noted along the seminal vesicles.     No free intraperitoneal air loculated abscess. No evidence for bowel  obstruction. Normal appendix. Decompressed stomach. No pathologic  lymphadenopathy. Mild vascular calcification.     Nonfused right posterior rib fractures. Postoperative changes of the  lumbar spine. Subacute versus chronic appearing T6 compression  deformity, new since the 6/2/2021 lateral chest x-ray exam.       Impression:      1. There is bladder wall thickening of the under distended bladder with  perivesicular fat stranding. Prostate remains enlarged with additional  inflammation suspected along the prostate and seminal vesicles. Findings  concerning for a cystitis and/or prostatitis. No abscess collection.  2. Interval T6 compression deformity compared to lateral chest x-ray  6/2/2021. Nonfused old right posterior rib fractures. Postoperative  changes lumbar spine.  3. Cardiomegaly. Cardiac pacer device. Prior mediastinal surgery.  4. Nonobstructing intrarenal stones of the right renal cysts. No  hydronephrosis.  This report was finalized on 02/17/2022 16:02 by Dr. Cassie Mckeon MD.            Condition on Discharge: Stable  Physical Exam on Discharge:  /82 (BP Location: Right arm, Patient Position: Lying)   Pulse 73   Temp 98.1 °F (36.7 °C) (Axillary)   Resp 16   Ht 172.7 cm (68\")   Wt 118 kg (260 lb)   SpO2 93%   BMI 39.53 kg/m² "   Physical Exam    Pleasant man  No distress  Laying in the bed with his wife   No distress  He looks a lot better  hemodynamically stable  alert oriented x3  grossly nonfocal exam  Short neck no thyromegaly  obese with BMI of 39  lungs are clear to auscultation with good air exchange  S1-S2, regular rate and rhythm  obese abdomen, nontender, could examine for any organomegaly due to obese abdomen  no cyanosis or gross edema  dry skin  good capillary refill time  No point tenderness in his back      Discharge Disposition:  Discharge home    Discharge Medications:     Discharge Medications      New Medications      Instructions Start Date   cefdinir 300 MG capsule  Commonly known as: OMNICEF   300 mg, Oral, 2 Times Daily      tamsulosin 0.4 MG capsule 24 hr capsule  Commonly known as: FLOMAX   0.4 mg, Oral, Daily   Start Date: February 21, 2022        Changes to Medications      Instructions Start Date   furosemide 40 MG tablet  Commonly known as: LASIX  What changed:   · when to take this  · reasons to take this   40 mg, Oral, Daily PRN         Continue These Medications      Instructions Start Date   atorvastatin 80 MG tablet  Commonly known as: LIPITOR   80 mg, Oral, Daily      clopidogrel 75 MG tablet  Commonly known as: Plavix   75 mg, Oral, Daily      donepezil 5 MG tablet  Commonly known as: ARICEPT   5 mg, Oral, Every Night at Bedtime      escitalopram 20 MG tablet  Commonly known as: LEXAPRO   20 mg, Oral, Daily      fluticasone 50 MCG/ACT nasal spray  Commonly known as: Flonase Allergy Relief   2 sprays, Nasal, Daily      isosorbide mononitrate 30 MG 24 hr tablet  Commonly known as: IMDUR   30 mg, Oral, Every Morning      lisinopril 5 MG tablet  Commonly known as: PRINIVIL,ZESTRIL   5 mg, Oral, Daily      loratadine 10 MG tablet  Commonly known as: CLARITIN   10 mg, Oral, Daily      metoprolol tartrate 25 MG tablet  Commonly known as: LOPRESSOR   25 mg, Oral, 2 Times Daily      Nitrostat 0.4 MG SL  tablet  Generic drug: nitroglycerin   0.4 mg, Sublingual, Every 5 Minutes PRN, Take no more than 3 doses in 15 minutes.      omega-3 acid ethyl esters 1 g capsule  Commonly known as: LOVAZA   1 g, Oral, Daily      omeprazole 20 MG capsule  Commonly known as: priLOSEC   20 mg, Oral, Daily      topiramate 25 MG tablet  Commonly known as: TOPAMAX   25 mg, Oral, 3 Times Daily         Stop These Medications    Potassium 99 MG tablet            Discharge Diet:   Diet Instructions     Diet: Cardiac      Discharge Diet: Cardiac              Activity at Discharge:   Activity Instructions     Gradually Increase Activity Until at Pre-Hospitalization Level            Follow-up Appointments:   Primary care provider within 1 week  Dr. Tineo per his recommendation    Test Results Pending at Discharge:   Pending Labs     Order Current Status    Blood Culture - Blood, Arm, Left Preliminary result    Blood Culture - Blood, Arm, Right Preliminary result    Blood Culture - Blood, Arm, Right Preliminary result    Blood Culture - Blood, Hand, Left Preliminary result           Electronically signed by Juan Bee MD, 2/20/2022, 14:12 CST.    Time: > 30 mins        Part of this note may be an electronic transcription/translation of spoken language to printed text using the Dragon Dictation System.

## 2022-02-20 NOTE — THERAPY DISCHARGE NOTE
Acute Care - Occupational Therapy Discharge Summary  [facility]     Patient Name: Carlo Velasco  : 1945  MRN: [id number]    Today's Date: 2022                 Admit Date: 2022        OT Recommendation and Plan    Visit Dx:    ICD-10-CM ICD-9-CM   1. Impaired mobility  Z74.09 799.89                OT Rehab Goals     Row Name 22 1500             Bathing Goal 1 (OT)    Activity/Device (Bathing Goal 1, OT) bathing skills, all  -MW      [place] Level/Cues Needed (Bathing Goal 1, OT) independent  -MW      Time Frame (Bathing Goal 1, OT) long term goal (LTG)  -MW      Progress/Outcomes (Bathing Goal 1, OT) goal not met  -MW              Problem Specific Goal 1 (OT)    Problem Specific Goal 1 (OT) Pt will complete functional balance ADL/IADL activity with supervision and min vc for safety.  -MW      Time Frame (Problem Specific Goal 1, OT) long term goal (LTG)  -MW      Progress/Outcome (Problem Specific Goal 1, OT) goal not met  -MW            User Key  (r) = Recorded By, (t) = Taken By, (c) = Cosigned By    Initials Name Provider Type Discipline    Norma Lester, OTR/L Occupational Therapist OT                            OT Discharge Summary  Anticipated Discharge Disposition (OT): home with assist, home with outpatient therapy services  Reason for Discharge: Discharge from facility  Outcomes Achieved: Refer to plan of care for updates on goals achieved  Discharge Destination: Home with assist, Home with outpatient services      AUSTIN Paiz/ANGIE  2022

## 2022-02-20 NOTE — PLAN OF CARE
Goal Outcome Evaluation:  Plan of Care Reviewed With: patient        Progress: improving  Outcome Summary: PT tx completed. Pt I don/doffing TLSO, rates back pn 2/10. Transfers I, amb 250' SBA. Mobilizing well.

## 2022-02-20 NOTE — THERAPY DISCHARGE NOTE
Acute Care - Physical Therapy Discharge Summary  [facility]       Patient Name: Carlo Velasco  : 1945  MRN: [id number]    Today's Date: 2022                 Admit Date: 2022      PT Recommendation and Plan    Visit Dx:    ICD-10-CM ICD-9-CM   1. Impaired mobility  Z74.09 799.89            PT Charges     Row Name 22 1043             Time Calculation    Start Time 1031  -KJ      Stop Time 1043  -KJ      Time Calculation (min) 12 min  -KJ      PT Received On 22  -KJ      PT Goal Re-Cert Due Date 22  -KJ              Time Calculation- PT    Total Timed Code Minutes- PT 12 minute(s)  -KJ            User Key  (r) = Recorded By, (t) = Taken By, (c) = Cosigned By    Initials Name Provider Type    Mojgan Kang, PTA Physical Therapy Assistant                 PT Rehab Goals     Row Name 22 1453             Bed Mobility Goal 1 (PT)    Activity/Assistive Device (Bed Mobility Goal 1, PT) bed mobility activities, all  -AB      [place] Level/Cues Needed (Bed Mobility Goal 1, PT) independent  -AB      Time Frame (Bed Mobility Goal 1, PT) by discharge  -AB      Progress/Outcomes (Bed Mobility Goal 1, PT) goal not met  -AB              Transfer Goal 1 (PT)    Activity/Assistive Device (Transfer Goal 1, PT) sit-to-stand/stand-to-sit  -AB      [place] Level/Cues Needed (Transfer Goal 1, PT) independent  -AB      Time Frame (Transfer Goal 1, PT) by discharge  -AB      Progress/Outcome (Transfer Goal 1, PT) goal met  -AB              Gait Training Goal 1 (PT)    Activity/Assistive Device (Gait Training Goal 1, PT) gait (walking locomotion)  -AB      [place] Level (Gait Training Goal 1, PT) independent  -AB      Distance (Gait Training Goal 1, PT) 250ft  -AB      Time Frame (Gait Training Goal 1, PT) by discharge  -AB      Progress/Outcome (Gait Training Goal 1, PT) goal not met  -AB              Stairs Goal 1 (PT)    Activity/Assistive Device (Stairs Goal 1, PT) ascending  stairs; descending stairs; using handrail, left; using handrail, right  -AB      [place] Level/Cues Needed (Stairs Goal 1, PT) standby assist  -AB      Number of Stairs (Stairs Goal 1, PT) 4  -AB      Time Frame (Stairs Goal 1, PT) by discharge  -AB      Progress/Outcome (Stairs Goal 1, PT) goal not met  -AB            User Key  (r) = Recorded By, (t) = Taken By, (c) = Cosigned By    Initials Name Provider Type Discipline    AB Harini Jones, PTA Physical Therapy Assistant PT                    PT Discharge Summary  Anticipated Discharge Disposition (PT): home with assist  Reason for Discharge: Discharge from facility  Outcomes Achieved: Refer to plan of care for updates on goals achieved  Discharge Destination: Home with assist      Harini Jones PTA   2/20/2022

## 2022-02-20 NOTE — PLAN OF CARE
Goal Outcome Evaluation:              Outcome Summary: Patient has had no c/o of pain so far this shift. Up with assistance. Voiding . Safety Maintained.

## 2022-02-20 NOTE — THERAPY TREATMENT NOTE
Acute Care - Physical Therapy Treatment Note  [facility]     Patient Name: Carlo Velasco  : 1945  MRN: [id number]  Today's Date: 2022      Visit Dx:     ICD-10-CM ICD-9-CM   1. Impaired mobility  Z74.09 799.89     Patient Active Problem List   Diagnosis   • Coronary artery disease involving autologous artery coronary bypass graft without angina pectoris   • Essential hypertension   • Anxiety   • Colon polyps   • Mixed hyperlipidemia   • Incisional hernia, without obstruction or gangrene   • BRBPR (bright red blood per rectum)   • Esophageal dysphagia   • Prostatism   • Nocturia   • Coronary artery disease   • Orthostasis   • Chronic midline low back pain without sciatica   • History of adenomatous polyp of colon   • Acute pain of right knee   • Morbidly obese (Formerly [facility])   • Pain of right heel   • Hx of CABG x3 2019    • Stented coronary artery   • Flank pain   • Skin lesion   • Pleuritic chest pain   • Sick sinus syndrome (Formerly [facility])   • Coagulopathy ([facility]ly [facility])   • Pneumonia of right lung due to infectious organism   • Recurrent pleural effusion on right   • Cough   • SOB (shortness of breath)   • Former smoker, stopped smoking many years ago   • Non-seasonal allergic rhinitis   • ERNESTO (obstructive sleep apnea)   • Restrictive lung disease   • Class 3 severe obesity due to excess calories with serious comorbidity in adult (Formerly [facility])   • Empyema of pleura ([facility]ly [facility])   • Pneumonia   • 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[facility])   • On statin therapy   • S/P thoracostomy tube placement ([facility]ly [facility])   • Mild intermittent asthma without complication   • Family hx colonic polyps   • History of pneumonia   • Pleurisy   • History of fall   • UTI (urinary tract infection)     Past Medical History:   Diagnosis Date   • Anxiety    • Arthritis    • Cancer ([facility])     melanoma   • CHF (congestive heart failure) (Formerly [facility])    • Claustrophobia    • Colon polyp    • Coronary artery disease ,     2 vessel CABG (), SHAHRZAD LAD ().    • Depression     • Disease of thyroid gland     partial thyroidectomy on right   • GERD (gastroesophageal reflux disease)    • Hearing loss    • Heart attack (HCC)    • Heart attack (HCC)    • History of transfusion    • Hyperlipidemia    • Hypertension    • Kidney stone     history of    • Mild intermittent asthma without complication 8/24/2021   • Tobacco abuse, in remission    • Uses hearing aid     BILATERAL     Past Surgical History:   Procedure Laterality Date   • BACK SURGERY      CERVICAL FUSION AND LUMBAR DISC REPLACEMENT   • BRONCHOSCOPY N/A 4/27/2021    Procedure: BRONCHOSCOPY;  Surgeon: Rico Jacobs MD;  Location:  [facility] OR;  Service: Cardiothoracic;  Laterality: N/A;   • CARDIAC CATHETERIZATION  07/2015    SHAHRZAD mid LADDr. Toribio   • CARDIAC CATHETERIZATION N/A 10/21/2016    Procedure: Left Heart Cath;  Surgeon: Darian Toribio MD;  Location:  [facility] CATH INVASIVE LOCATION;  Service:    • CARDIAC CATHETERIZATION Left 4/5/2017    Procedure: Cardiac Catheterization/Vascular Study;  Surgeon: Darian Toribio MD;  Location:  [facility] CATH INVASIVE LOCATION;  Service:    • CARDIAC CATHETERIZATION N/A 4/5/2017    Procedure: Left Heart Cath;  Surgeon: Darian Toribio MD;  Location:  [facility] CATH INVASIVE LOCATION;  Service:    • CARDIAC CATHETERIZATION N/A 4/5/2017    Procedure: Coronary angiography;  Surgeon: Darian Toribio MD;  Location:  [facility] CATH INVASIVE LOCATION;  Service:    • CARDIAC CATHETERIZATION N/A 4/5/2017    Procedure: Left ventriculography;  Surgeon: Darian Toribio MD;  Location:  [facility] CATH INVASIVE LOCATION;  Service:    • CARDIAC CATHETERIZATION  4/5/2017    Procedure: Saphenous Vein Graft;  Surgeon: Darian Toribio MD;  Location:  [facility] CATH INVASIVE LOCATION;  Service:    • CARDIAC CATHETERIZATION Left 2/27/2018    Procedure: Cardiac Catheterization/Vascular Study SHAHRZAD OK PRN;  Surgeon: Darian Toribio MD;  Location:  [facility] CATH INVASIVE LOCATION;  Service:    • CARDIAC CATHETERIZATION  2/27/2018    Procedure: Functional  Flow [place];  Surgeon: Darian Toribio MD;  Location: [facility] CATH INVASIVE LOCATION;  Service:    • CARDIAC CATHETERIZATION N/A 2/27/2018    Procedure: Left ventriculography;  Surgeon: Darian Toribio MD;  Location:  [facility] CATH INVASIVE LOCATION;  Service:    • CARDIAC CATHETERIZATION Left 4/28/2020    Procedure: Cardiac Catheterization/Vascular Study SHAHRZAD [place] PRN;  Surgeon: Darian Toribio MD;  Location: [facility] CATH INVASIVE LOCATION;  Service: Cardiology;  Laterality: Left;   • CARDIAC ELECTROPHYSIOLOGY PROCEDURE N/A 1/11/2018    Procedure: PPM generator change - dual;  Surgeon: Darian Toribio MD;  Location: [facility] CATH INVASIVE LOCATION;  Service:    • CARDIAC SURGERY      open heart surgery x2   • COLONOSCOPY  09/05/2007    colon polyps   • COLONOSCOPY N/A 5/29/2018    Procedure: COLONOSCOPY WITH ANESTHESIA;  Surgeon: Juan F Tapia MD;  Location: [facility] ENDOSCOPY;  Service: Gastroenterology   • COLONOSCOPY N/A 10/21/2021    Procedure: COLONOSCOPY WITH ANESTHESIA;  Surgeon: Juan F Tapia MD;  Location: [facility] ENDOSCOPY;  Service: Gastroenterology;  Laterality: N/A;  pre op: hx polyps  post op;polyp,   PCP: Rhys Rosen MD   • CORONARY ANGIOPLASTY WITH STENT PLACEMENT  07/2015    SHAHRZAD mid LAD, Dr. Toribio    • CORONARY ARTERY BYPASS GRAFT  2012 AND 2014    2 vessel    • ENDOSCOPY  11/08/2010   • ENDOSCOPY N/A 5/29/2018    Procedure: ESOPHAGOGASTRODUODENOSCOPY WITH ANESTHESIA;  Surgeon: Juan F Tapia MD;  Location: [facility] ENDOSCOPY;  Service: Gastroenterology   • HEAD/NECK LESION/CYST EXCISION Left 12/20/2016    Procedure: EXCISION MALIGNANT MELANOMA WITH SENTINEL NODE BIOPSY, INJECTION AND SCAN PRE-OP, LEFT EAR;  Surgeon: Guanaco Zepeda MD;  Location: [facility] OR;  Service:    • HEMORRHOIDECTOMY     • HERNIA REPAIR     • INSERT / REPLACE / REMOVE PACEMAKER     • SENTINEL NODE BIOPSY Left 12/20/2016    Procedure: SENTINEL NODE BIOPSY;  Surgeon: Guanaco Zepeda MD;  Location: [facility] OR;  Service:    • SKIN  CANCER EXCISION     • THORACOSCOPY Right 4/27/2021    Procedure: THORACOSCOPIC TOTAL DECORTICATION;  Surgeon: Rico Jacobs MD;  Location: [facility] OR;  Service: Cardiothoracic;  Laterality: Right;   • THYROID SURGERY     • THYROIDECTOMY       PT Assessment (last 12 hours)     PT Evaluation and Treatment     Row Name 02/20/22 1031          Physical Therapy Time and Intention    Subjective Information no complaints  -KJ     Document Type therapy note (daily note)  -KJ     Mode of Treatment physical therapy  -KJ     Patient Effort excellent  -KJ     Comment TLSO; compression fx's  -KJ     Row Name 02/20/22 1031          General Information    Existing Precautions/Restrictions fall; spinal  -KJ     Row Name 02/20/22 1031          Pain Scale: Numbers Pre/Post-Treatment    Pretreatment Pain Rating 3/10  -KJ     Posttreatment Pain Rating 3/10  -KJ     Pain Location back  -KJ     Row Name 02/20/22 1031          Bed Mobility    Comment (Bed Mobility) up in chair  -KJ     Row Name 02/20/22 1031          Transfers    Sit-Stand [place] (Transfers) independent  -KJ     Row Name 02/20/22 1031          Gait/Stairs (Locomotion)    [place] Level (Gait) standby assist  -KJ     Distance in Feet (Gait) 300  up/down 3rd floor ramp  -KJ     Deviations/Abnormal Patterns (Gait) gait speed decreased  -KJ     Comment (Gait/Stairs) some increased sway in gait; alittle unsteady.  -KJ     Row Name 02/20/22 1031          Positioning and Restraints    Pre-Treatment Position sitting in chair/recliner  -KJ     Post Treatment Position chair  -KJ     In Chair call light within reach  -KJ           User Key  (r) = Recorded By, (t) = Taken By, (c) = Cosigned By    Initials Name Provider Type    KJ Mojgan Lyons, PTA Physical Therapy Assistant                Physical Therapy Education                 Title: PT OT SLP Therapies (Done)     Topic: Physical Therapy (Done)     Point: Mobility training (Done)     Learning Progress Summary            Patient Acceptance, E,D, DU,VU by  at 2/19/2022 1151    Comment: benefits of PT and POC, call for A OOB, no BLT, TLSO when OOB                   Point: Body mechanics (Done)     Learning Progress Summary           Patient Acceptance, E,D, DU,VU by  at 2/19/2022 1151    Comment: benefits of PT and POC, call for A OOB, no BLT, TLSO when OOB                   Point: Precautions (Done)     Learning Progress Summary           Patient Acceptance, E,D, DU,VU by  at 2/19/2022 1151    Comment: benefits of PT and POC, call for A OOB, no BLT, TLSO when OOB                               User Key     Initials Effective Dates Name Provider Type Discipline     06/16/21 -  Marcello Guzman, PT Physical Therapist PT              PT Recommendation and Plan     Plan of Care Reviewed With: patient  Progress: improving  Outcome Summary: PT tx completed. Pt I don/doffing TLSO, rates back pn 2/10. Transfers I, amb 250' SBA. Mobilizing well.       Time Calculation:    PT Charges     Row Name 02/20/22 1043             Time Calculation    Start Time 1031  -KJ      Stop Time 1043  -KJ      Time Calculation (min) 12 min  -KJ      PT Received On 02/20/22  -KJ      PT Goal Re-Cert Due Date 03/01/22  -KJ              Time Calculation- PT    Total Timed Code Minutes- PT 12 minute(s)  -KJ            User Key  (r) = Recorded By, (t) = Taken By, (c) = Cosigned By    Initials Name Provider Type    Mojgan Kang PTA Physical Therapy Assistant                  PT G-Codes  Outcome Measure Options: AM-PAC 6 Clicks Basic Mobility (PT)  AM-PAC 6 Clicks Score (PT): 21  AM-PAC 6 Clicks Score (OT): 23    Mojgan Lyons PTA  2/20/2022

## 2022-02-21 ENCOUNTER — TELEPHONE (OUTPATIENT)
Dept: NEUROSURGERY | Facility: CLINIC | Age: 77
End: 2022-02-21

## 2022-02-21 RX ORDER — DONEPEZIL HYDROCHLORIDE 5 MG/1
5 TABLET, FILM COATED ORAL
Qty: 90 TABLET | Refills: 3 | Status: SHIPPED | OUTPATIENT
Start: 2022-02-21

## 2022-02-21 NOTE — TELEPHONE ENCOUNTER
Caller: Amaya Velasco    Relationship to patient: Emergency Contact    Best call back number: [phone]    New or established patient?  [x] New  [] Established    Date of discharge: 02-    Facility discharged from: [facility]  ED to Hosp-Admission (Discharged) with Juan Bee MD (02/17/2022)    Diagnosis/Symptoms:   • **Sepsis (HCC)     • UTI (urinary tract infection) -cystitis/prostatitis     • Dehydration     • Compression fracture of T6 vertebra (HCC), subacute     • Coronary artery disease     • History of fall     • HLD (hyperlipidemia)     • HTN (hypertension)      Follow-up Instructions:    Schedule an appointment with Girish Tineo MD (Neurosurgery) in 1 week (2/27/2022); call monday for     I'm unable to make the scheduling guidance of 1 week, around 2/27/2022. ED/ADMISSION Note indicates Dr. Tineo was on the care team and Arturo Barron PA-C was consulting. Please contact Amaya Velasco (E.C.) at [phone] once patient has been scheduled.     Thank you very much.

## 2022-02-21 NOTE — OUTREACH NOTE
Prep Survey      Responses   [facility] facility patient discharged from? [facility]   Is LACE score < 7 ? No   Emergency Room discharge w/ pulse ox? No   Eligibility Readm Mgmt   Discharge diagnosis Sepsis /UTI   Does the patient have one of the following disease processes/diagnoses(primary or secondary)? Sepsis   Does the patient have Home health ordered? No   Is there a DME ordered? No   Prep survey completed? Yes          Eboni Ho RN

## 2022-02-22 LAB
BACTERIA SPEC AEROBE CULT: NORMAL

## 2022-02-23 ENCOUNTER — READMISSION MANAGEMENT (OUTPATIENT)
Dept: CALL CENTER | Facility: HOSPITAL | Age: 77
End: 2022-02-23

## 2022-02-23 NOTE — OUTREACH NOTE
Sepsis Week 1 Survey      Responses   [facility] patient discharged from? [place]   Does the patient have one of the following disease processes/diagnoses(primary or secondary)? Sepsis   Week 1 attempt successful? Yes   Call start time 1021   Call end time 1024   Discharge diagnosis Sepsis /UTI   Is patient permission given to speak with other caregiver? Yes   List who call center can speak with michelle Velasco   [facility] reviewed with patient/caregiver? Yes   Is the patient having any side effects they believe may be caused by any medication additions or changes? No   Does the patient have all medications related to this admission filled (includes all antibiotics, inhalers, nebulizers,steroids,etc.) Yes   Is the patient taking all medications as directed (includes completed medication regime)? Yes   Does the patient have a primary care provider?  Yes   Does the patient have an appointment with their PCP within 7 days of discharge? Yes   Has the patient kept scheduled appointments due by today? Yes   Psychosocial issues? No   Did the patient receive a copy of their discharge instructions? Yes   Nursing interventions Reviewed instructions with patient   What is the patient's perception of their health status since discharge? Improving   Nursing interventions Nurse provided patient education   Is the patient/caregiver able to teach back Sepsis? S - Short of breath,  S - Sleepy, difficult to arouse,confused,  P - Pale or discolored skin,  E - Extreme pain or generalized discomfort (worst ever,especially abdomen),  S - Shivering,fever or very cold   Nursing interventions Nurse provided reassurance to patient   Is patient/caregiver able to teach back steps to recovery at home? Set small, achievable goals for return to baseline health,  Rest and regain strength,  Make a list of questions for PCP appoinment   Is the patient/caregiver able to teach back signs and symptoms of worsening condition: Fever   If the patient is a  current smoker, are they able to teach back resources for cessation? Not a smoker   Is the patient/caregiver able to teach back the hierarchy of who to call/visit for symptoms/problems? PCP, Specialist, Home health nurse, Urgent Care, ED, 911 Yes   Week 1 call completed? Yes   Wrap up additional comments doing well, no questions today.          Eboni Ho, RN

## 2022-02-25 ENCOUNTER — HOSPITAL ENCOUNTER (OUTPATIENT)
Dept: CT IMAGING | Facility: HOSPITAL | Age: 77
Discharge: HOME | End: 2022-02-25
Admitting: INTERNAL MEDICINE

## 2022-02-25 DIAGNOSIS — J18.9 PNEUMONIA OF RIGHT LUNG DUE TO INFECTIOUS ORGANISM, UNSPECIFIED PART OF LUNG: ICD-10-CM

## 2022-02-25 PROCEDURE — 71250 CT THORAX DX C-: CPT

## 2022-02-28 NOTE — PROGRESS NOTES
Taqueria Garcia is a 78 y.o. male who presents today   Chief Complaint   Patient presents with    New Patient     Bladder Scan        Patient is 77-year-old male presents clinic today with gross hematuria. On 2/17/2022 presented to urgent care for fever, dysuria, gross hematuria, frequency, urgency. UA appeared infected at that time. Urine culture revealed E. coli. He was sent directly to [facility] emergency room. He was admitted for urosepsis although blood cultures were negative. Repeat urine culture revealed only normal cody. Likely had hemorrhagic cystitis. He was placed on Flomax on discharge and has been on this for approximately 7 days due to lower urinary tract symptoms. Today he denies any gross hematuria, fever, chills. He does have some mild intermittent dysuria at the tip of his penis. Apparently he has a history of prostate cancer in his early 35s. He states he went to a public health clinic where they did a prostate biopsy, he states this was positive. He underwent trial drug therapy. I do not have any recent PSAs on file. AUA score today is 19/35 with complaints of urgency, weak stream, incomplete emptying, intermittency, frequency, straining, nocturia twice per night. He does have a former history of smoking, 20-pack-year. He also has a history of stones and saw Dr. Marilu Price in 2020 with flank pain although at that time did not have an obstructing stone only bilateral nonobstructing renal calculi. Has not had a follow-up.   CT performed at [facility] hospitalized for urosepsis reveals bladder wall thickening with perivesicular fat stranding, nonobstructing intrarenal stones right renal cyst.    Past Medical History:   Diagnosis Date    Anxiety     Arthritis    [place] Asymptomatic old MI (myocardial infarction)     BPH (benign prostatic hypertrophy)     CAD (coronary artery disease)     Cardiac pacemaker     CHF (congestive heart failure) (HCC)     Diabetes mellitus ([facility] [address]     Jill hernia     History of smoking     Hx of CABG     Hyperlipidemia     Hypertension     Kidney stones     Obesity     Peptic ulcer disease     Prostatitis        Past Surgical History:   Procedure Laterality Date    CARDIAC CATHETERIZATION  5.1.01/MDL, 6.23.03/UNKNOWN, 4.5.08/CD[facility], 4.14.08/JDT    LT HEART    CARDIAC CATHETERIZATION  4/14/2008    EF greater 50% Patent stent in the LAD, Patent stent in the first marginal, Angiographically significant stenois in the mid RCA, Successful percutaneous coronary intervention utilizing a single drug eluting stent in the mid RCA, Normal LV systolic and diastolic function.      CARDIAC CATHETERIZATION  8/2/12  JDT    EF  50%    CARDIOVASCULAR STRESS TEST  11.8.06/MDL, 5.24.08/MDL, 9.9.09/MDL, 7.26.10/CDH    STRESS ECHO    CARDIOVASCULAR STRESS TEST  12.7.05/CDH, 4.14.08/JDT    HOLTER MONITOR    CERVICAL FUSION      CORONARY ARTERY BYPASS GRAFT  8/8/2012    OPCABG X2, LIMA-LAD, SVG-RCA, RT EVH, LT LOWER LEG VEIN HARVEST, JPO    EPIDURAL STEROID INJECTION N/A 2/5/2019    EPIDURAL STEROID INJECTION L2-3 performed by Rafita Arredondo at [address] [place] [address] INJECTION PROCEDURE FOR SACROILIAC JOINT Left 1/22/2019    SACROILIAC JOINT INJECTION performed by Leanne Arredondo at [address] [place] [address] Right 4/30/2019    SACROILIAC JOINT INJECTION performed by Leanne Arredondo at [address] [place] [address] Right 12/10/2019    SACROILIAC JOINT INJECTION performed by Leanne Arredondo at [address] [place] [address] Right 3/3/2020    SACROILIAC JOINT INJECTION performed by eLanne Arredondo at [facility] LUMB FACET LEVEL 1 BILATERAL Bilateral 2/19/2019    LUMBAR FACET BLOCK 1 L5-S1 performed by Rafita Arredondo at [address]  4.16.08/JDT    [employer]     SHOULDER SURGERY      RT SHOULDER    THYROIDECTOMY, PARTIAL Current Outpatient Medications   Medication Sig Dispense Refill    tamsulosin (FLOMAX) 0.4 MG capsule Take 1 capsule by mouth daily 30 capsule 11    Ticagrelor (BRILINTA PO) Take by mouth      gabapentin (NEURONTIN) 100 MG capsule Take by mouth PATIENT DID NOT HAVE DOSAGE AVAILABLE TODAY. VERIFY DOSAGE FOR MED LIST .  atorvastatin (LIPITOR) 10 MG tablet UNKNOWN DOSAGE      omeprazole (PRILOSEC) 10 MG delayed release capsule Take by mouth PATIENT DID NOT HAVE DOSAGE AVAILABLE TODAY. VERIFY DOSAGE FOR MED LIST      donepezil (ARICEPT) 10 MG tablet Take by mouth nightly PATIENT DID NOT HAVE DOSAGE AVAILABLE TODAY. VERIFY DOSAGE FOR MED LIST      escitalopram (LEXAPRO) 10 MG tablet Take by mouth daily PATIENT DID NOT HAVE DOSAGE AVAILABLE TODAY. VERIFY DOSAGE FOR MED LIST      simvastatin (ZOCOR) 40 MG tablet Take 40 mg by mouth nightly.  cetirizine (ZYRTEC) 10 MG tablet Take 10 mg by mouth daily.  metoprolol (LOPRESSOR) 25 MG tablet Take 25 mg by mouth 2 times daily.  oxyCODONE-acetaminophen (PERCOCET) 5-325 MG per tablet Take 1 tablet by mouth every 4 hours as needed.  B Complex Vitamins (VITAMIN B COMPLEX PO) Take 1 tablet by mouth daily.  Calcium Carbonate-Vitamin D (CALCIUM 600+D PO) Take 1 tablet by mouth daily.  aspirin 81 MG EC tablet Take 81 mg by mouth daily.  furosemide (LASIX) 40 MG tablet Take 40 mg by mouth 2 times daily.  clopidogrel (PLAVIX) 75 MG tablet Take 75 mg by mouth daily.  Multiple Vitamins-Minerals (MULTIVITAMIN & MINERAL PO) Take  by mouth. UNSPECIFIED DATA        No current facility-administered medications for this visit.        Allergies   Allergen Reactions    Demerol     Statins Depletion Therapy      Causes leg cramps         Social History     Socioeconomic History    Marital status:      Spouse name: None    Number of children: None    Years of education: None    Highest education level: None Occupational History    None   Tobacco Use    Smoking status: Former Smoker    Smokeless tobacco: Never Used   Vaping Use    Vaping Use: Never used   Substance and Sexual Activity    Alcohol use: Not Currently    Drug use: Not Currently    Sexual activity: None   Other Topics Concern    None   Social History Narrative    None     Social Determinants of Health     Financial Resource Strain:     Difficulty of Paying Living Expenses: Not on file   Food Insecurity:     Worried About Running Out of Food in the Last Year: Not on file    [place]ovanni of Food in the Last Year: Not on file   Transportation Needs:     Lack of Transportation (Medical): Not on file    Lack of Transportation (Non-Medical): Not on file   Physical Activity:     Days of Exercise per Week: Not on file    Minutes of Exercise per Session: Not on file   Stress:     Feeling of Stress : Not on file   Social Connections:     Frequency of Communication with Friends and Family: Not on file    Frequency of Social Gatherings with Friends and Family: Not on file    Attends Restorationist Services: Not on file    Active Member of [address] [place] [address] or Organizations: Not on file    Attends Club or Organization Meetings: Not on file    Marital Status: Not on file   Intimate Partner Violence:     Fear of Current or Ex-Partner: Not on file    Emotionally Abused: Not on file    Physically Abused: Not on file    Sexually Abused: Not on file   Housing Stability:     Unable to Pay for Housing in the Last Year: Not on file    Number of [place] in the Last Year: Not on file    Unstable Housing in the Last Year: Not on file       History reviewed. No pertinent family history. REVIEW OF SYSTEMS:  Review of Systems   Constitutional: Negative for chills and fever. Gastrointestinal: Negative for abdominal distention, abdominal pain, nausea and vomiting. Genitourinary: Positive for frequency and urgency.  Negative for difficulty urinating, dysuria, flank pain and hematuria. Musculoskeletal: Negative for back pain and gait problem. Psychiatric/Behavioral: Negative for agitation and confusion. PHYSICAL EXAM:  BP (!) 160/90 (Site: Right Upper Arm, Position: Sitting)   Pulse 80   Temp 98.2 °F (36.8 °C)   Ht 5' 8\" (1.727 m)   Wt 259 lb 6.4 oz (117.7 kg)   SpO2 95%   BMI 39.44 kg/m²   Physical Exam  Vitals and nursing note reviewed. Constitutional:       General: He is not in acute distress. Appearance: Normal appearance. He is not ill-appearing. Pulmonary:      Effort: Pulmonary effort is normal. No respiratory distress. Abdominal:      General: There is no distension. Tenderness: There is no abdominal tenderness. There is no right CVA tenderness or left CVA tenderness. Genitourinary:     Prostate: Enlarged (50g smooth). Not tender and no nodules present. Neurological:      Mental Status: He is alert and oriented to person, place, and time. Mental status is at baseline. Psychiatric:         Mood and Affect: Mood normal.         Behavior: Behavior normal.       DATA:    Results for orders placed or performed in visit on 03/01/22   POCT Urinalysis no Micro   Result Value Ref Range    Color, UA YELLOW     Clarity, UA CLEAR     Glucose, UA POC NEG     Bilirubin, UA NEG     Ketones, UA NEG     Spec Grav, UA 1.030     Blood, UA POC NEG     pH, UA 6     Protein, UA POC NEG     Urobilinogen, UA 0.2     Leukocytes, UA NEG     Nitrite, UA NEG     Appearance, Fluid Clear Clear, Slightly Cloudy     IMAGING:  ImpressionPerformed by [address]  1. There is bladder wall thickening of the under distended bladder with   perivesicular fat stranding. Prostate remains enlarged with additional   inflammation suspected along the prostate and seminal vesicles. Findings   concerning for a cystitis and/or prostatitis. No abscess collection. 2. Interval T6 compression deformity compared to lateral chest x-ray   6/2/2021.  Nonfused old right posterior rib fractures. Postoperative   changes lumbar spine. 3. Cardiomegaly. Cardiac pacer device. Prior mediastinal surgery. 4. Nonobstructing intrarenal stones of the right renal cysts. No   hydronephrosis. This report was finalized on 02/17/2022 16:02 by Dr. King Nicole MD      Bladder Scan interpretation  Estimation of residual urine via abdominal ultrasound  Residual Urine: 14 ml  Indication: Frequency  Position: Supine  Examination: Incremental scanning of the suprapubic area using 3 MHz transducer using copious amounts of acoustic gel. Findings: An anechoic area was demonstrated which represented the bladder, with measurement of residual urine as noted. 1. Gross hematuria  Patient with recent history of gross hematuria passing clots although likely secondary to hemorrhagic cystitis due to E. coli positive urine culture. He does have a past history of smoking with a 20-pack-year history as well is a history of prostate cancer although not well documented. We will go ahead and continue work-up due to patient's risk. We will have him follow-up with CT urogram, cystoscopy. - POCT Urinalysis no Micro  - CT ABDOMEN PELVIS W WO CONTRAST Additional Contrast? Radiologist Recommendation (Urogram Protocol); Future    2. Benign prostatic hyperplasia with urinary frequency  History of BPH recently placed on Flomax approximately 1 week ago. He has noticed some improvement in his symptoms although these continue. AUA score is 19/35. JOHNNIE today revealed enlarged but nonsuspicious prostate. Bladder scan revealed he is emptying his bladder well with 14 mL PVR. We will continue on Flomax for an adequate trial of medication and have him follow-up for evaluation of symptoms.  - PSA, Diagnostic; Future    3. History of prostate cancer  Patient states he does have a history of prostate cancer in his early 35s although this is done by a public health clinic and is not well documented.   I do not see a recent PSA. JOHNNIE today revealed enlarged nonsuspicious prostate. We will have him follow-up for cystoscopy with PSA prior.    - PSA, Diagnostic; Future      Orders Placed This Encounter   Procedures    CT ABDOMEN PELVIS W WO CONTRAST Additional Contrast? Radiologist Recommendation (Urogram Protocol)     Standing Status:   Future     Standing Expiration Date:   3/1/2023     Scheduling Instructions:      CT for Urogram protocol     Order Specific Question:   Additional Contrast?     Answer:   Radiologist Recommendation     Comments:   Urogram Protocol     Order Specific Question:   STAT Creatinine as needed:     Answer:   Yes     Order Specific Question:   Reason for exam:     Answer:   gross hematuria    PSA, Diagnostic     Standing Status:   Future     Standing Expiration Date:   3/1/2023    POCT Urinalysis no Micro        Return in about 2 months (around 5/1/2022) for with Dr. Clara Cristobal, cystoscopy, PSA prior, CT prior. All information inputted into the note by the MA to include chief complaint, past medical history, past surgical history, medications, allergies, social and family history and review of systems has been reviewed and updated as needed by me. EMR Dragon/transcription disclaimer: Much of this documentt is electronic  transcription/translation of spoken language to printed text. The  electronic translation of spoken language may be erroneous, or at times,  nonsensical words or phrases may be inadvertently transcribed.  Although I  have reviewed the document for such errors, some may still exist.

## 2022-03-01 ENCOUNTER — OFFICE VISIT (OUTPATIENT)
Dept: UROLOGY | Age: 77
End: 2022-03-01
Payer: MEDICARE

## 2022-03-01 VITALS
BODY MASS INDEX: 39.31 KG/M2 | HEIGHT: 68 IN | SYSTOLIC BLOOD PRESSURE: 160 MMHG | DIASTOLIC BLOOD PRESSURE: 90 MMHG | HEART RATE: 80 BPM | WEIGHT: 259.4 LBS | TEMPERATURE: 98.2 F | OXYGEN SATURATION: 95 %

## 2022-03-01 DIAGNOSIS — R35.0 BENIGN PROSTATIC HYPERPLASIA WITH URINARY FREQUENCY: ICD-10-CM

## 2022-03-01 DIAGNOSIS — R31.0 GROSS HEMATURIA: Primary | ICD-10-CM

## 2022-03-01 DIAGNOSIS — Z85.46 HISTORY OF PROSTATE CANCER: ICD-10-CM

## 2022-03-01 DIAGNOSIS — N40.1 BENIGN PROSTATIC HYPERPLASIA WITH URINARY FREQUENCY: ICD-10-CM

## 2022-03-01 LAB
APPEARANCE FLUID: CLEAR
BILIRUBIN, POC: NORMAL
BLOOD URINE, POC: NORMAL
CLARITY, POC: CLEAR
COLOR, POC: YELLOW
GLUCOSE URINE, POC: NORMAL
KETONES, POC: NORMAL
LEUKOCYTE EST, POC: NORMAL
NITRITE, POC: NORMAL
PH, POC: 6
PROTEIN, POC: NORMAL
SPECIFIC GRAVITY, POC: 1.03
UROBILINOGEN, POC: 0.2

## 2022-03-01 PROCEDURE — 1123F ACP DISCUSS/DSCN MKR DOCD: CPT | Performed by: NURSE PRACTITIONER

## 2022-03-01 PROCEDURE — 1036F TOBACCO NON-USER: CPT | Performed by: NURSE PRACTITIONER

## 2022-03-01 PROCEDURE — 99204 OFFICE O/P NEW MOD 45 MIN: CPT | Performed by: NURSE PRACTITIONER

## 2022-03-01 PROCEDURE — 3017F COLORECTAL CA SCREEN DOC REV: CPT | Performed by: NURSE PRACTITIONER

## 2022-03-01 PROCEDURE — 81002 URINALYSIS NONAUTO W/O SCOPE: CPT | Performed by: NURSE PRACTITIONER

## 2022-03-01 PROCEDURE — G8484 FLU IMMUNIZE NO ADMIN: HCPCS | Performed by: NURSE PRACTITIONER

## 2022-03-01 PROCEDURE — 51798 US URINE CAPACITY MEASURE: CPT | Performed by: NURSE PRACTITIONER

## 2022-03-01 PROCEDURE — G8427 DOCREV CUR MEDS BY ELIG CLIN: HCPCS | Performed by: NURSE PRACTITIONER

## 2022-03-01 PROCEDURE — 4040F PNEUMOC VAC/ADMIN/RCVD: CPT | Performed by: NURSE PRACTITIONER

## 2022-03-01 PROCEDURE — G8417 CALC BMI ABV UP PARAM F/U: HCPCS | Performed by: NURSE PRACTITIONER

## 2022-03-01 RX ORDER — TAMSULOSIN HYDROCHLORIDE 0.4 MG/1
0.4 CAPSULE ORAL DAILY
Qty: 30 CAPSULE | Refills: 11 | Status: SHIPPED | OUTPATIENT
Start: 2022-03-01

## 2022-03-01 ASSESSMENT — ENCOUNTER SYMPTOMS
NAUSEA: 0
VOMITING: 0
BACK PAIN: 0
ABDOMINAL DISTENTION: 0
ABDOMINAL PAIN: 0

## 2022-03-02 ENCOUNTER — READMISSION MANAGEMENT (OUTPATIENT)
Dept: CALL CENTER | Facility: HOSPITAL | Age: 77
End: 2022-03-02

## 2022-03-02 ENCOUNTER — OFFICE VISIT (OUTPATIENT)
Dept: NEUROSURGERY | Facility: CLINIC | Age: 77
End: 2022-03-02

## 2022-03-02 VITALS — WEIGHT: 260 LBS | HEIGHT: 68 IN | BODY MASS INDEX: 39.4 KG/M2

## 2022-03-02 DIAGNOSIS — E66.1 CLASS 2 DRUG-INDUCED OBESITY WITH SERIOUS COMORBIDITY AND BODY MASS INDEX (BMI) OF 39.0 TO 39.9 IN ADULT: ICD-10-CM

## 2022-03-02 DIAGNOSIS — M54.9 MECHANICAL BACK PAIN: ICD-10-CM

## 2022-03-02 DIAGNOSIS — S22.050G CLOSED WEDGE COMPRESSION FRACTURE OF T6 VERTEBRA WITH DELAYED HEALING, SUBSEQUENT ENCOUNTER: Primary | ICD-10-CM

## 2022-03-02 DIAGNOSIS — Z98.1 STATUS POST LUMBAR SPINAL FUSION: ICD-10-CM

## 2022-03-02 PROCEDURE — 99214 OFFICE O/P EST MOD 30 MIN: CPT | Performed by: NEUROLOGICAL SURGERY

## 2022-03-02 RX ORDER — MELOXICAM 15 MG/1
15 TABLET ORAL DAILY
Qty: 30 TABLET | Refills: 0 | Status: SHIPPED | OUTPATIENT
Start: 2022-03-02 | End: 2022-04-20

## 2022-03-02 NOTE — PROGRESS NOTES
Chief complaint:   Chief Complaint   Patient presents with   • Back Pain     New patient here for evaluation of a compression fracture. He was seen in ER on 2/17.       Subjective     HPI:   Interval History: Carlo returns today for follow-up after inpatient visit for compression fracture and low back pain.    On recent admission he was found to have a compression fracture T6.  This was new since his previous imaging in the middle of last year.  However on his imaging in the hospital this did appear to be hyper sclerotic and consistent with a healed fracture.  He states that he has no upper thoracic or thoracic back pain right now.  This is completely resolved.  And no need for further intervention.    He does have a prior history of lumbar fusion from L3-L5 with interbody fusion by Dr. Hensley.  This was done for incapacitating back pain and leg pain in 2008.  He did well for several years after the surgery but over the last several years had progressive back pain.  His back pain radiates into his bilateral legs.  His feet are numb at all times.  He has had increasing falls and states that he should probably use a walker although it is difficult to differentiate as to whether or not this is due to knee pain.  He has had knee injections and sees Dr. Powers for back injections but has not received any in a while.  His last CT myelogram was performed in 2018.    Oswestry Disability Index Lumbar = 14%  SCORE INTERPRETATION OF THE OSWESTRY LBP DISABILITY QUESTIONNAIRE     0-20% Minimal disability Can cope with most ADLs. Usually no treatment is needed, apart from advice on lifting, sitting, posture, physical fitness, and diet. In this group, some patients have particular difficulty with sitting and this may be important if their occupation is sedentary (, , etc.)      Score   Pain Intensity Very mild pain-1   Personal Care Look after myself without pain-0   Lifting Can lift heavy weights with extra pain-1    Walking Pain prevents > 1 mile-1   Sitting Sit as long as I like-0   Standing Stand as long as I like but with extra pain-1   Sleeping Occasionally disturbed-1   Sex Life (if applicable) Not applicable-0   Social Life Social life normal, but increases pain-1   Traveling Travel gives me extra pain-1   (Adarsh et al, 1980)    Review of Systems   HENT: Negative.    Eyes: Negative.    Respiratory: Negative.    Cardiovascular: Negative.    Gastrointestinal: Negative.    Endocrine: Negative.    Genitourinary: Negative.    Musculoskeletal: Positive for back pain and gait problem.   Skin: Negative.    Allergic/Immunologic: Negative.    Neurological: Positive for weakness and numbness.   Hematological: Negative.    Psychiatric/Behavioral: Negative.        PFSH:  Past Medical History:   Diagnosis Date   • Anxiety    • Arthritis    • Cancer (HCC)     melanoma   • CHF (congestive heart failure) (HCC)    • Claustrophobia    • Colon polyp    • Coronary artery disease 2012, 2015    2 vessel CABG (2012), SHAHRZAD LAD (2015).    • Depression    • Disease of thyroid gland     partial thyroidectomy on right   • GERD (gastroesophageal reflux disease)    • Hearing loss    • Heart attack (HCC)    • Heart attack (HCC)    • History of transfusion    • Hyperlipidemia    • Hypertension    • Kidney stone     history of    • Mild intermittent asthma without complication 8/24/2021   • Tobacco abuse, in remission    • Uses hearing aid     BILATERAL       Past Surgical History:   Procedure Laterality Date   • BACK SURGERY      CERVICAL FUSION AND LUMBAR DISC REPLACEMENT   • BRONCHOSCOPY N/A 4/27/2021    Procedure: BRONCHOSCOPY;  Surgeon: Rico Jacobs MD;  Location:  [facility] OR;  Service: Cardiothoracic;  Laterality: N/A;   • CARDIAC CATHETERIZATION  07/2015    SHAHRZAD mid Dr. Malu LO   • CARDIAC CATHETERIZATION N/A 10/21/2016    Procedure: Left Heart Cath;  Surgeon: Darian Toribio MD;  Location:  [facility] CATH INVASIVE LOC[facility];  Service:    •  CARDIAC CATHETERIZATION Left 4/5/2017    Procedure: Cardiac Catheterization/Vascular Study;  Surgeon: Darian Toribio MD;  Location:  [facility];  Service:    • CARDIAC CATHETERIZATION N/A 4/5/2017    Procedure: Left Heart Cath;  Surgeon: Darian Toribio MD;  Location:  [facility];  Service:    • CARDIAC CATHETERIZATION N/A 4/5/2017    Procedure: Coronary angiography;  Surgeon: Darian Toribio MD;  Location:  [facility];  Service:    • CARDIAC CATHETERIZATION N/A 4/5/2017    Procedure: Left ventriculography;  Surgeon: Darian Toribio MD;  Location:  [facility];  Service:    • CARDIAC CATHETERIZATION  4/5/2017    Procedure: Saphenous Vein Graft;  Surgeon: Darian Toribio MD;  Location:  [facility];  Service:    • CARDIAC CATHETERIZATION Left 2/27/2018    Procedure: Cardiac Catheterization/Vascular Study SHAHRZAD OK PRN;  Surgeon: Darian Toribio MD;  Location:  [facility];  Service:    • CARDIAC CATHETERIZATION  2/27/2018    Procedure: Functional Flow [place];  Surgeon: Darian Toribio MD;  Location:  [facility];  Service:    • CARDIAC CATHETERIZATION N/A 2/27/2018    Procedure: Left ventriculography;  Surgeon: Darian Toribio MD;  Location:  [facility];  Service:    • CARDIAC CATHETERIZATION Left 4/28/2020    Procedure: Cardiac Catheterization/Vascular Study SHAHRZAD OK PRN;  Surgeon: Darian Toribio MD;  Location:  [facility];  Service: Cardiology;  Laterality: Left;   • CARDIAC ELECTROPHYSIOLOGY PROCEDURE N/A 1/11/2018    Procedure: PPM generator change - dual;  Surgeon: Darian Toribio MD;  Location:  [facility];  Service:    • CARDIAC SURGERY      open heart surgery x2   • COLONOSCOPY  09/05/2007    colon polyps   • COLONOSCOPY N/A 5/29/2018    Procedure: COLONOSCOPY WITH ANESTHESIA;  Surgeon: Juan F Tapia MD;  Location: [facility] ENDOSCOPY;  Service: Gastroenterology   •  COLONOSCOPY N/A 10/21/2021    Procedure: COLONOSCOPY WITH ANESTHESIA;  Surgeon: Juan F Tapia MD;  Location: [facility] ENDOSCOPY;  Service: Gastroenterology;  Laterality: N/A;  pre op: hx polyps  post op;polyp,   PCP: Rhys Rosen MD   • CORONARY ANGIOPLASTY WITH STENT PLACEMENT  07/2015    SHAHRZAD mid LAD, Dr. Toribio    • CORONARY ARTERY BYPASS GRAFT  2012 AND 2014    2 vessel    • ENDOSCOPY  11/08/2010   • ENDOSCOPY N/A 5/29/2018    Procedure: ESOPHAGOGASTRODUODENOSCOPY WITH ANESTHESIA;  Surgeon: Juan F Tapia MD;  Location: [facility] ENDOSCOPY;  Service: Gastroenterology   • HEAD/NECK LESION/CYST EXCISION Left 12/20/2016    Procedure: EXCISION MALIGNANT MELANOMA WITH SENTINEL NODE BIOPSY, INJECTION AND SCAN PRE-OP, LEFT EAR;  Surgeon: Guanaco Zepeda MD;  Location: [facility] OR;  Service:    • HEMORRHOIDECTOMY     • HERNIA REPAIR     • INSERT / REPLACE / REMOVE PACEMAKER     • SENTINEL NODE BIOPSY Left 12/20/2016    Procedure: SENTINEL NODE BIOPSY;  Surgeon: Guanaco Zepeda MD;  Location: [facility] OR;  Service:    • SKIN CANCER EXCISION     • THORACOSCOPY Right 4/27/2021    Procedure: THORACOSCOPIC TOTAL DECORTICATION;  Surgeon: Rico Jacobs MD;  Location: [facility] OR;  Service: Cardiothoracic;  Laterality: Right;   • THYROID SURGERY     • THYROIDECTOMY         Objective      Current Outpatient Medications   Medication Sig Dispense Refill   • atorvastatin (LIPITOR) 80 MG tablet Take 80 mg by mouth Daily.     • cefdinir (OMNICEF) 300 MG capsule Take 1 capsule by mouth 2 (Two) Times a Day for 10 days. 20 capsule 0   • clopidogrel (Plavix) 75 MG tablet Take 1 tablet by mouth Daily. 90 tablet 4   • donepezil (ARICEPT) 5 MG tablet TAKE 1 TABLET BY MOUTH EVERY NIGHT AT BEDTIME 90 tablet 3   • escitalopram (LEXAPRO) 20 MG tablet Take 1 tablet by mouth Daily. 90 tablet 1   • fluticasone (Flonase Allergy Relief) 50 MCG/ACT nasal spray 2 sprays into the nostril(s) as directed by provider Daily. 16 g 5   • furosemide  "(LASIX) 40 MG tablet Take 1 tablet by mouth Daily As Needed (for any increase in 2 lbs in a daqy or 5 lbs in a week or increassing swelling or congestion).     • isosorbide mononitrate (IMDUR) 30 MG 24 hr tablet TAKE 1 TABLET BY MOUTH EVERY MORNING 90 tablet 3   • lisinopril (PRINIVIL,ZESTRIL) 5 MG tablet Take 1 tablet by mouth Daily. 90 tablet 3   • loratadine (CLARITIN) 10 MG tablet Take 1 tablet by mouth Daily. 90 tablet 3   • meloxicam (MOBIC) 15 MG tablet Take 1 tablet by mouth Daily. 30 tablet 0   • metoprolol tartrate (LOPRESSOR) 25 MG tablet Take 25 mg by mouth 2 (Two) Times a Day.     • NITROSTAT 0.4 MG SL tablet Place 1 tablet under the tongue Every 5 (Five) Minutes As Needed for Chest Pain. Take no more than 3 doses in 15 minutes. 100 tablet 2   • omega-3 acid ethyl esters (LOVAZA) 1 g capsule TAKE 1 CAPSULE BY MOUTH DAILY 90 capsule 3   • omeprazole (priLOSEC) 20 MG capsule TAKE 1 CAPSULE BY MOUTH DAILY 90 capsule 3   • tamsulosin (FLOMAX) 0.4 MG capsule 24 hr capsule Take 1 capsule by mouth Daily. 30 capsule 0   • topiramate (TOPAMAX) 25 MG tablet Take 1 tablet by mouth 3 (Three) Times a Day.       No current facility-administered medications for this visit.       Vital Signs  Ht 172.7 cm (68\")   Wt 118 kg (260 lb)   BMI 39.53 kg/m²   Physical Exam  Eyes:      Extraocular Movements: EOM normal.      Pupils: Pupils are equal, round, and reactive to light.   Neurological:      Mental Status: He is oriented to person, place, and time.      Gait: Gait is intact.      Deep Tendon Reflexes:      Reflex Scores:       Tricep reflexes are 1+ on the right side and 1+ on the left side.       Bicep reflexes are 1+ on the right side and 1+ on the left side.       Brachioradialis reflexes are 1+ on the right side and 1+ on the left side.       Patellar reflexes are 1+ on the right side and 1+ on the left side.       Achilles reflexes are 1+ on the right side and 1+ on the left side.  Psychiatric:         Speech: " Speech normal.       Neurologic Exam     Mental Status   Oriented to person, place, and time.   Speech: speech is normal     Cranial Nerves     CN II   Visual fields full to confrontation.     CN III, IV, VI   Pupils are equal, round, and reactive to light.  Extraocular motions are normal.     CN V   Right facial sensation deficit: none  Left facial sensation deficit: none    CN VII   Facial expression full, symmetric.     CN VIII   Hearing: intact    CN IX, X   Palate: symmetric    CN XI   Right sternocleidomastoid strength: normal  Left sternocleidomastoid strength: normal    CN XII   Tongue deviation: none    Motor Exam     Strength   Right deltoid: 5/5  Left deltoid: 5/5  Right biceps: 5/5  Left biceps: 5/5  Right triceps: 5/5  Left triceps: 5/5  Right interossei: 5/5  Left interossei: 5/5  Right iliopsoas: 5/5  Left iliopsoas: 5/5  Right quadriceps: 5/5  Left quadriceps: 5/5  Right anterior tibial: 5/5  Left anterior tibial: 5/5  Right gastroc: 5/5  Left gastroc: 5/5    Sensory Exam   Right arm light touch: normal  Left arm light touch: normal  Right leg light touch: decreased from ankle  Left leg light touch: decreased from ankle    Gait, Coordination, and Reflexes     Gait  Gait: normal    Reflexes   Right brachioradialis: 1+  Left brachioradialis: 1+  Right biceps: 1+  Left biceps: 1+  Right triceps: 1+  Left triceps: 1+  Right patellar: 1+  Left patellar: 1+  Right achilles: 1+  Left achilles: 1+  Right Sargent: absent  Left Sargent: absent    (12 bullet pts)    Results Review:   XR Spine Thoracic 2 View    Result Date: 2/18/2022  Impression: Known T6 vertebral body fracture as above. This report was finalized on 02/18/2022 18:44 by Dr. Becky York MD.    CT Head Without Contrast    Result Date: 2/18/2022  1.   No acute intracranial abnormality. This report was finalized on 02/18/2022 13:50 by Dr. Becky York MD.    CT Chest Without Contrast Diagnostic    Result Date: 2/25/2022   1.  Lungs appear  clear. No evidence of active pulmonary infectious process.  2.  4 mm RIGHT lower lobe pulmonary nodule. Recommend follow-up chest CT in one year to document stability based on Fleischner criteria.  3.  Subacute healing posterior right-sided rib fractures and subacute compression fractures of T4, T5, and T6 This report was finalized on 02/25/2022 09:16 by Dr. Eleuterio Nieto MD.    CT Abdomen Pelvis With Contrast    Result Date: 2/17/2022  1. There is bladder wall thickening of the under distended bladder with perivesicular fat stranding. Prostate remains enlarged with additional inflammation suspected along the prostate and seminal vesicles. Findings concerning for a cystitis and/or prostatitis. No abscess collection. 2. Interval T6 compression deformity compared to lateral chest x-ray 6/2/2021. Nonfused old right posterior rib fractures. Postoperative changes lumbar spine. 3. Cardiomegaly. Cardiac pacer device. Prior mediastinal surgery. 4. Nonobstructing intrarenal stones of the right renal cysts. No hydronephrosis. This report was finalized on 02/17/2022 16:02 by Dr. Cassie cMkeon MD.    XR Chest 1 View    Result Date: 2/17/2022   1.  No acute findings. 2.  Chronic pleural/parenchymal opacities in the RIGHT lower chest. This report was finalized on 02/17/2022 16:43 by Dr. Eleuterio Nieto MD.    CT myelogram of the thoracic and lumbar spine are personally reviewed from 2018.  This shows no evidence of stenosis or spondylolisthesis      2/17/2022      Carlo Velasco is a 76 y.o. male with a significant medical history of a prior left unilateral posterior fusion L3-5 (Dr. Connell 2009) for lumbar back and bilateral leg pain temporary resolved postoperatively, T6 compression fracture post 9 foot fall from ladder December 2021, CAD currently on Plavix, non-MRI compatible pacemaker, CHF, hypertension, hyperlipidemia, claustrophobia, depression, GERD, tobacco abuse, and obesity.   He presents with a new problem of fever  and dysuria.  He additionally reports concern for neck pain with radiculopathy to the right deltoid in the C5 distribution, numbness and tingling to the bilateral upper extremities, intermittent thoracic back pain, lumbar back and bilateral thigh pain and dysesthesias, and recurrent falls.  Physical exam findings of bright and awake, oriented x3, no pronator drift or dysmetria, tongue fasciculations, right  strength weakness, upper extremity hyperreflexia without Sargent's or clonus, 1+ Achilles and equivocal plantar reflexes.  Their imaging shows a subacute compression fracture to the vertebral body of T6.     Chronic healed T6 compression fracture (known injury from fall 12/2021)  Mr. Velasco appears to be fully recovered from this.  He has no associated back pain.  Imaging shows completely healed fracture.  No further follow-up needed.     Lumbar back and bilateral leg pain and dysesthesias  History of left posterior lateral lumbar fusion L3-5 (Dr. 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 Velasco and I discussed his history of presenting illness, physical exam findings, and imaging.  While he does have increasing falls he is also having knee pain which could be contributing to this.  On my exam he is full strength in his lower extremities.  His muted reflexes suggest that his foot pain could be a peripheral neuropathy.  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  Patient also states that the current pain that he is having is very similar to his pain in 2018.  Therefore we discussed repeat CT myelogram versus conservative management.  Patient is in favor of conservative management.  He knows to contact me should he develop any new worsening weakness, difficulty with gait, or bowel bladder incontinence and we can proceed with a CT myelogram at that time.    Obesity Counseling  We discussed  Carlo's current weight and the effect on his spine, including premature disc degeneration and increased anterior force on the lumbar spine resulting in worsening facet arthropathy.  Carlo's Body mass index is 39.53 kg/m²..  We spent 1 minutes in weight management counseling including discussing current weight, current diet, and exercise patterns.  Additional we set goals for weight reduction.  Therefore above normal parameters. Recommendations include: educational material.       1. Closed wedge compression fracture of T6 vertebra with delayed healing, subsequent encounter    2. Status post lumbar spinal fusion    3. Mechanical back pain    4. Class 2 drug-induced obesity with serious comorbidity and body mass index (BMI) of 39.0 to 39.9 in adult        Recommendations:  Diagnoses and all orders for this visit:    1. Closed wedge compression fracture of T6 vertebra with delayed healing, subsequent encounter (Primary)    2. Status post lumbar spinal fusion    3. Mechanical back pain    4. Class 2 drug-induced obesity with serious comorbidity and body mass index (BMI) of 39.0 to 39.9 in adult    Other orders  -     meloxicam (MOBIC) 15 MG tablet; Take 1 tablet by mouth Daily.  Dispense: 30 tablet; Refill: 0        Return if symptoms worsen or fail to improve.    I spent 23 minutes caring for Carlo on this date of service. This time includes time spent by me in the following activities: preparing for the visit, reviewing tests, obtaining and/or reviewing a separately obtained history, performing a medically appropriate examination and/or evaluation, counseling and educating the patient/family/caregiver, ordering medications, tests, or procedures, referring and communicating with other health care professionals, documenting information in the medical record, independently interpreting results and communicating that information with the patient/family/caregiver, and/or care coordination.     Medical Decision Making  (2/3)  Problem Points (2,3,4 or more)  Established Problem, stable = 1x3  Data Points (2,3,4 or more)  Independent review of imaging or specimen = 2x2  Risk (Low, Mod, High)  2 or more Chronic illnesses (Moderate)    E/M = MDM 2 out of 3   or  Time  Est Level 4 - 48742 = Mod (3, 3, Mod)   or   30-39 minutes    Thank you, for allowing me to continue to participate in the care of this patient.    Sincerely,  Girish Tineo MD

## 2022-03-02 NOTE — PATIENT INSTRUCTIONS
Meloxicam tablets  What is this medicine?  MELOXICAM (wen OX i cam) is a non-steroidal anti-inflammatory drug (NSAID). It is used to reduce swelling and to treat pain. It may be used for osteoarthritis, rheumatoid arthritis, or juvenile rheumatoid arthritis.  This medicine may be used for other purposes; ask your health care provider or pharmacist if you have questions.  COMMON BRAND NAME(S): Kristie  What should I tell my health care provider before I take this medicine?  They need to know if you have any of these conditions:  · bleeding disorders  · cigarette smoker  · coronary artery bypass graft (CABG) surgery within the past 2 weeks  · drink more than 3 alcohol-containing drinks per day  · heart disease  · high blood pressure  · history of stomach bleeding  · kidney disease  · liver disease  · lung or breathing disease, like asthma  · stomach or intestine problems  · an unusual or allergic reaction to meloxicam, aspirin, other NSAIDs, other medicines, foods, dyes, or preservatives  · pregnant or trying to get pregnant  · breast-feeding  How should I use this medicine?  Take this medicine by mouth with a full glass of water. Follow the directions on the prescription label. You can take it with or without food. If it upsets your stomach, take it with food. Take your medicine at regular intervals. Do not take it more often than directed. Do not stop taking except on your doctor's advice.  A special MedGuide will be given to you by the pharmacist with each prescription and refill. Be sure to read this information carefully each time.  Talk to your pediatrician regarding the use of this medicine in children. While this drug may be prescribed for selected conditions, precautions do apply.  Patients over 65 years old may have a stronger reaction and need a smaller dose.  Overdosage: If you think you have taken too much of this medicine contact a poison control center or emergency room at once.  NOTE: This medicine is  only for you. Do not share this medicine with others.  What if I miss a dose?  If you miss a dose, take it as soon as you can. If it is almost time for your next dose, take only that dose. Do not take double or extra doses.  What may interact with this medicine?  Do not take this medicine with any of the following medications:  · cidofovir  · ketorolac  This medicine may also interact with the following medications:  · aspirin and aspirin-like medicines  · certain medicines for blood pressure, heart disease, irregular heart beat  · certain medicines for depression, anxiety, or psychotic disturbances  · certain medicines that treat or prevent blood clots like warfarin, enoxaparin, dalteparin, apixaban, dabigatran, rivaroxaban  · cyclosporine  · diuretics  · fluconazole  · lithium  · methotrexate  · other NSAIDs, medicines for pain and inflammation, like ibuprofen and naproxen  · pemetrexed  This list may not describe all possible interactions. Give your health care provider a list of all the medicines, herbs, non-prescription drugs, or dietary supplements you use. Also tell them if you smoke, drink alcohol, or use illegal drugs. Some items may interact with your medicine.  What should I watch for while using this medicine?  Visit your health care provider for regular checks on your progress. Tell your health care provider if your symptoms do not start to get better or if they get worse.  Do not take other medicines that contain aspirin, ibuprofen, or naproxen with this medicine. Side effects such as stomach upset, nausea, or ulcers may be more likely to occur. Many non-prescription medicines contain aspirin, ibuprofen, or naproxen. Always read labels carefully.  This medicine can cause serious ulcers and bleeding in the stomach. It can happen with no warning. Smoking, drinking alcohol, older age, and poor health can also increase risks. Call your health care provider right away if you have stomach pain or blood in  your vomit or stool.  This medicine does not prevent a heart attack or stroke. This medicine may increase the chance of a heart attack or stroke. The chance may increase the longer you use this medicine or if you have heart disease. If you take aspirin to prevent a heart attack or stroke, talk to your health care provider about using this medicine.  Alcohol may interfere with the effect of this medicine. Avoid alcoholic drinks.  This medicine may cause serious skin reactions. They can happen weeks to months after starting the medicine. Contact your health care provider right away if you notice fevers or flu-like symptoms with a rash. The rash may be red or purple and then turn into blisters or peeling of the skin. Or, you might notice a red rash with swelling of the face, lips or lymph nodes in your neck or under your arms.  Talk to your health care provider if you are pregnant before taking this medicine. Taking this medicine between weeks 20 and 30 of pregnancy may harm your unborn baby. Your health care provider will monitor you closely if you need to take it. After 30 weeks of pregnancy, do not take this medicine.  You may get drowsy or dizzy. Do not drive, use machinery, or do anything that needs mental alertness until you know how this medicine affects you. Do not stand up or sit up quickly, especially if you are an older patient. This reduces the risk of dizzy or fainting spells.  Be careful brushing or flossing your teeth or using a toothpick because you may get an infection or bleed more easily. If you have any dental work done, tell your dentist you are receiving this medicine.  This medicine may make it more difficult to get pregnant. Talk to your health care provider if you are concerned about your fertility.  What side effects may I notice from receiving this medicine?  Side effects that you should report to your doctor or health care professional as soon as possible:  · allergic reactions (skin rash,  itching or hives; swelling of the face, lips, or tongue)  · bleeding (bloody or black, tarry stools; red or dark brown urine; spitting up blood or brown material that looks like coffee grounds; red spots on the skin; unusual bruising or bleeding from the eyes, gums, or nose)  · blood clot (chest pain; shortness of breath; pain, swelling, or warmth in the leg)  · general ill feeling or flu-like symptoms  · high potassium levels (chest pain; fast, irregular heartbeat; muscle weakness)  · kidney injury (trouble passing urine or change in the amount of urine)  · light-colored stool  · liver injury (dark yellow or brown urine; general ill feeling or flu-like symptoms; loss of appetite, right upper belly pain; unusually weak or tired, yellowing of the eyes or skin)  · low red blood cell counts (trouble breathing; feeling faint; lightheaded, falls; unusually weak or tired)  · rash, fever, and swollen lymph nodes  · redness, blistering, peeling, or loosening of the skin, including inside the mouth  · stroke (changes in vision; confusion; trouble speaking or understanding; severe headaches; sudden numbness or weakness of the face, arm or leg; trouble walking; dizziness; loss of balance or coordination)  Side effects that usually do not require medical attention (report to your doctor or health care professional if they continue or are bothersome):  · constipation  · diarrhea  · dizziness  · gas  · headache  · nausea, vomiting  This list may not describe all possible side effects. Call your doctor for medical advice about side effects. You may report side effects to FDA at 1-905-FDA-9831.  Where should I keep my medicine?  Keep out of the reach of children and pets.  Store at room temperature between 20 and 25 degrees C (68 and 77 degrees F). Protect from moisture. Keep the container tightly closed.  Get rid of any unused medicine after the expiration date.  To get rid of medicines that are no longer needed or have  :  · Take the medicine to a medicine take-back program. Check with your pharmacy or law enforcement to find a location.  · If you cannot return the medicine, check the label or package insert to see if the medicine should be thrown out in the garbage or flushed down the toilet. If you are not sure, ask your health care provider. If it is safe to put it in the trash, empty the medicine out of the container. Mix the medicine with cat litter, dirt, coffee grounds, or other unwanted substance. Seal the mixture in a bag or container. Put it in the trash.  NOTE: This sheet is a summary. It may not cover all possible information. If you have questions about this medicine, talk to your doctor, pharmacist, or health care provider.  ©  Elsevier/Gold Standard (2020 07:39:13)

## 2022-03-02 NOTE — OUTREACH NOTE
Sepsis Week 2 Survey      Responses   [facility] patient discharged from? [place]   Does the patient have one of the following disease processes/diagnoses(primary or secondary)? Sepsis   Week 2 attempt successful? Yes   Call start time 1053   Call end time 1055   Discharge diagnosis Sepsis /UTI   Is patient permission given to speak with other caregiver? Yes   List who call center can speak with michelle Velasco   Meds reviewed with patient/caregiver? Yes   Is the patient having any side effects they believe may be caused by any medication additions or changes? No   Does the patient have all medications related to this admission filled (includes all antibiotics, inhalers, nebulizers,steroids,etc.) Yes   Is the patient taking all medications as directed (includes completed medication regime)? Yes   Does the patient have a primary care provider?  Yes   Does the patient have an appointment with their PCP within 7 days of discharge? Yes   Has the patient kept scheduled appointments due by today? Yes   Psychosocial issues? No   Comments Urology is planning to scope him in few weeks, Urology has seen for f/u. Denies hematuria.    Did the patient receive a copy of their discharge instructions? Yes   Nursing interventions Reviewed instructions with patient   What is the patient's perception of their health status since discharge? Improving   Nursing interventions Nurse provided patient education   Is the patient/caregiver able to teach back Sepsis? S - Shivering,fever or very cold,  E - Extreme pain or generalized discomfort (worst ever,especially abdomen)   Nursing interventions Nurse provided reassurance to patient   Is patient/caregiver able to teach back steps to recovery at home? Rest and regain strength,  Set small, achievable goals for return to baseline health   Is the patient/caregiver able to teach back signs and symptoms of worsening condition: Hyperthermia,  Fever   Is the patient/caregiver able to teach back  the hierarchy of who to call/visit for symptoms/problems? PCP, Specialist, Home health nurse, Urgent Care, ED, 911 Yes   Week 2 call completed? Yes   Revoked No further contact(revokes)-requires comment   Is the patient interested in additional calls from an ambulatory ?  NOTE:  applies to high risk patients requiring additional follow-up. No          Lottie Zaman RN

## 2022-03-09 DIAGNOSIS — R91.1 LUNG NODULE: Primary | ICD-10-CM

## 2022-03-10 ENCOUNTER — OFFICE VISIT (OUTPATIENT)
Dept: PULMONOLOGY | Facility: CLINIC | Age: 77
End: 2022-03-10

## 2022-03-10 VITALS
WEIGHT: 260.6 LBS | SYSTOLIC BLOOD PRESSURE: 144 MMHG | HEIGHT: 68 IN | DIASTOLIC BLOOD PRESSURE: 82 MMHG | HEART RATE: 75 BPM | BODY MASS INDEX: 39.5 KG/M2 | OXYGEN SATURATION: 97 %

## 2022-03-10 DIAGNOSIS — Z87.891 FORMER SMOKER, STOPPED SMOKING MANY YEARS AGO: ICD-10-CM

## 2022-03-10 DIAGNOSIS — Z93.8 S/P THORACOSTOMY TUBE PLACEMENT: ICD-10-CM

## 2022-03-10 DIAGNOSIS — J98.4 RESTRICTIVE LUNG DISEASE: ICD-10-CM

## 2022-03-10 DIAGNOSIS — J45.20 MILD INTERMITTENT ASTHMA WITHOUT COMPLICATION: ICD-10-CM

## 2022-03-10 DIAGNOSIS — J30.89 NON-SEASONAL ALLERGIC RHINITIS, UNSPECIFIED TRIGGER: ICD-10-CM

## 2022-03-10 DIAGNOSIS — G47.33 OSA (OBSTRUCTIVE SLEEP APNEA): ICD-10-CM

## 2022-03-10 DIAGNOSIS — J18.9 PNEUMONIA OF RIGHT LUNG DUE TO INFECTIOUS ORGANISM, UNSPECIFIED PART OF LUNG: ICD-10-CM

## 2022-03-10 DIAGNOSIS — J44.9 CHRONIC OBSTRUCTIVE PULMONARY DISEASE, UNSPECIFIED COPD TYPE: Primary | ICD-10-CM

## 2022-03-10 DIAGNOSIS — R91.8 LUNG NODULES: ICD-10-CM

## 2022-03-10 DIAGNOSIS — J86.9 EMPYEMA OF PLEURA: ICD-10-CM

## 2022-03-10 PROCEDURE — 99214 OFFICE O/P EST MOD 30 MIN: CPT | Performed by: INTERNAL MEDICINE

## 2022-03-10 RX ORDER — NITROGLYCERIN 0.4 MG/1
TABLET SUBLINGUAL
COMMUNITY
End: 2022-04-18 | Stop reason: SDUPTHER

## 2022-03-10 RX ORDER — ALBUTEROL SULFATE 90 UG/1
AEROSOL, METERED RESPIRATORY (INHALATION)
COMMUNITY
End: 2022-03-10 | Stop reason: SDUPTHER

## 2022-03-10 RX ORDER — LORATADINE 10 MG/1
10 TABLET ORAL DAILY
Qty: 90 TABLET | Refills: 3 | Status: SHIPPED | OUTPATIENT
Start: 2022-03-10 | End: 2023-03-14

## 2022-03-10 RX ORDER — ALBUTEROL SULFATE 90 UG/1
2 AEROSOL, METERED RESPIRATORY (INHALATION) EVERY 4 HOURS PRN
Qty: 6.7 G | Refills: 11 | Status: SHIPPED | OUTPATIENT
Start: 2022-03-10

## 2022-03-10 NOTE — PROGRESS NOTES
[facility] OUTPATIENT PROGRESS NOTE    Patient: Carlo Velasco  : 1945  Age: 76 y.o.  Date of Service: March 10, 2022    REASON FOR CLINIC VISIT:  Chief Complaint   Patient presents with   • Sleep Apnea   • Empyema of pleura     Chest xray ;  chest CT        Subjective:    History of Present Illness:  Carlo Velasco is a 76 y.o. male who presents to the office today to be seen for    Diagnosis Plan   1. Chronic obstructive pulmonary disease, unspecified COPD type ([facility])     2. Mild intermittent asthma without complication     3. Former smoker, stopped smoking many years ago     4. Empyema of pleura ([facility])     5. Restrictive lung disease     6. Pneumonia of right lung due to infectious organism, unspecified part of lung     7. S/P thoracostomy tube placement ([facility])     8. ERNESTO (obstructive sleep apnea)     9. Non-seasonal allergic rhinitis, unspecified trigger     10. Lung nodules     .  Other problems per record.  Patient is a very pleasant 76 years old  gentleman who was seen in the pulmonary for follow-up visit.    Patient was seen by me in the pulmonary clinic last year.  He had a pneumonia on the right side which was further complicated by development of empyema.  He was referred to Dr. Jacobs and had a decortication done.  His follow-up imaging study showed multiple lung nodules on the right side and he received a course of antibiotic with azithromycin followed by a 10 days course of doxycycline for possible pneumonia.  He returned today after a follow-up CT scan of the chest done a few days ago which shows resolution of pulmonary nodules and some chronic changes and there is no other new findings noted.    He is overall feeling better.  He had pulmonary function test done which showed he had mixed obstructive and restrictive dysfunction.  He has suspected obstructive sleep apnea but he did not want to do sleep study and is not on any CPAP.  Overall he feels little tired and  exhausted but overall feeling better but he has allergic rhinitis and is currently using fluticasone nasal spray and for his underlying COPD he is using only albuterol rescue inhaler.  His wife is requesting a refill.    The patient is not vaccinated for COVID but did not have any Covid infection recent hospitalizations any other acute complaints.  He has some memory problems and is currently getting Aricept.      PFT done today:  Not done today      Results for orders placed in visit on 04/21/21    Pulmonary Function Test    [facility] - Pulmonary Function Test    [address]  [place]  [address]  [phone]    Patient : Carlo Velasco  MRN : [id number]  CSN : [id number]  Pulmonologist : Cruz Atkinson MD  Date : 6/7/2021    ______________________________________________________________________    Interpretation :  1.  Spirometry is consistent with a moderate restrictive ventilatory defect with coexisting mild small airways disease.  2.  There is improvement in spirometry postbronchodilator particularly in small airways function which is now normal but a moderate restrictive ventilatory defect still appears to be present.  3.  Lung volumes confirm a moderate restrictive ventilatory defect.  4.  There is a moderate diffusion impairment which when corrected for alveolar volume is normalized.  5.  When current studies are compared to studies from July 13 of 2017, the patient's current prebronchodilator FVC and FEV1 have dropped significantly compared to previous prebronchodilator studies.  His postbronchodilator FVC and FEV1 have also dropped significantly compared to previous postbronchodilator studies.  There has also been a drop in his total lung capacity compared to previous.  When corrected for alveolar volume there has also been a decrease in his diffusion capacity compared to previous although it remains within normal limits.      Cruz Atkinson MD      Results for orders  placed during the hospital encounter of 17    Full Pulmonary Function Test With Bronchodilator    Narrative  [facility] - Pulmonary Function Test    [address]  [place]  [place]  [address]  [phone]    Patient : Carlo Velasco  MRN : [id number]  CSN : [id number]  Pulmonologist : Curz Atkinson MD  Date : 2017    ______________________________________________________________________    Interpretation :  1.  Spirometry is consistent with a mild restrictive ventilatory defect with coexisting mild decrease in the patient's FEF 75%,  2.  There is some improvement in the patient's FEF 75% postbronchodilator but a mild restrictive ventilatory defect still appears to be present.  3.  Lung volumes reveal a low normal total lung capacity with a decrease in vital capacity and expiratory reserve volume, consistent with a borderline restrictive ventilatory defect.  4.  Diffusion capacity is within normal limits.      Cruz Atkinson MD         Bronchodilator therapy: Albuterol rescue inhaler    Smoking Status:   Social History     Tobacco Use   Smoking Status Former Smoker   • Packs/day: 3.00   • Years: 10.00   • Pack years: 30.00   • Types: Cigarettes   • Quit date:    • Years since quittin.2   Smokeless Tobacco Former User   • Types: Snuff   • Quit date:      Pulm Rehab: no  Sleep: yes    Support System: lives with their spouse    Code Status:   There are no questions and answers to display.        Review of Systems:  A complete review of systems is performed and all other systems were reviewed and negative as note above in the HPI.  Review of Systems   Constitutional: Negative.    HENT: Positive for congestion and postnasal drip.    Eyes: Negative.    Respiratory: Positive for cough, chest tightness and shortness of breath.    Cardiovascular: Negative.    Gastrointestinal: Negative.    Endocrine: Negative.    Genitourinary: Negative.    Musculoskeletal: Positive for arthralgias  and back pain.   Allergic/Immunologic: Negative.    Neurological: Negative.    Hematological: Negative.    Psychiatric/Behavioral: Negative.        CAT/ACT Score:  Not done today    Medications:  Outpatient Encounter Medications as of 3/10/2022   Medication Sig Dispense Refill   • albuterol sulfate  (90 Base) MCG/ACT inhaler albuterol sulfate HFA 90 mcg/actuation aerosol inhaler   INHALE 2 PUFFS BY MOUTH EVERY 4 HOURS AS NEEDED FOR WHEEZING OR SHORTNESS OF BREATH     • ascorbic acid (VITAMIN C) 1000 MG tablet Vitamin C 1,000 mg tablet   Take 1 tablet every day by oral route.     • atorvastatin (LIPITOR) 80 MG tablet Take 80 mg by mouth Daily.     • Cholecalciferol 125 MCG (5000 UT) tablet Vitamin D3 125 mcg (5,000 unit) tablet   Take 1 tablet every day by oral route.     • clopidogrel (Plavix) 75 MG tablet Take 1 tablet by mouth Daily. 90 tablet 4   • donepezil (ARICEPT) 5 MG tablet TAKE 1 TABLET BY MOUTH EVERY NIGHT AT BEDTIME 90 tablet 3   • escitalopram (LEXAPRO) 20 MG tablet Take 1 tablet by mouth Daily. 90 tablet 1   • fluticasone (Flonase Allergy Relief) 50 MCG/ACT nasal spray 2 sprays into the nostril(s) as directed by provider Daily. 16 g 5   • furosemide (LASIX) 40 MG tablet Take 1 tablet by mouth Daily As Needed (for any increase in 2 lbs in a daqy or 5 lbs in a week or increassing swelling or congestion).     • isosorbide mononitrate (IMDUR) 30 MG 24 hr tablet TAKE 1 TABLET BY MOUTH EVERY MORNING 90 tablet 3   • meloxicam (MOBIC) 15 MG tablet Take 1 tablet by mouth Daily. (Patient taking differently: Take 15 mg by mouth Daily. Prn) 30 tablet 0   • metoprolol tartrate (LOPRESSOR) 25 MG tablet Take 25 mg by mouth 2 (Two) Times a Day.     • nitroglycerin (NITROSTAT) 0.4 MG SL tablet nitroglycerin 0.4 mg sublingual tablet   Place 1 tablet as needed by sublingual route.     • NITROSTAT 0.4 MG SL tablet Place 1 tablet under the tongue Every 5 (Five) Minutes As Needed for Chest Pain. Take no more than 3  "doses in 15 minutes. 100 tablet 2   • omega-3 acid ethyl esters (LOVAZA) 1 g capsule TAKE 1 CAPSULE BY MOUTH DAILY 90 capsule 3   • omeprazole (priLOSEC) 20 MG capsule TAKE 1 CAPSULE BY MOUTH DAILY 90 capsule 3   • tamsulosin (FLOMAX) 0.4 MG capsule 24 hr capsule Take 1 capsule by mouth Daily. 30 capsule 0   • loratadine (CLARITIN) 10 MG tablet Take 1 tablet by mouth Daily. 90 tablet 3   • topiramate (TOPAMAX) 25 MG tablet Take 1 tablet by mouth 3 (Three) Times a Day.     • [DISCONTINUED] lisinopril (PRINIVIL,ZESTRIL) 5 MG tablet Take 1 tablet by mouth Daily. 90 tablet 3     No facility-administered encounter medications on file as of 3/10/2022.       Allergies:  Allergies   Allergen Reactions   • Meperidine Other (See Comments)     HEART STOPS    Other reaction(s): Unknown   • Statins Myalgia     Causes leg cramps       Immunizations:  Immunization History   Administered Date(s) Administered   • FLUAD TRI 65YR+ 10/10/2019   • Fluad Quad 65+ 10/10/2019   • Fluzone High Dose =>65 Years ([employer] ONLY) 10/11/2018   • TD Preservative Free 08/17/2018   • Tdap 09/17/2015, 12/06/2021   • influenza Split 10/22/2016       Objective:    Vitals:  /82   Pulse 75   Ht 172.7 cm (68\")   Wt 118 kg (260 lb 9.6 oz)   SpO2 97%   BMI 39.62 kg/m²     Physical Exam:  General: Patient is a 76 y.o. elderly  male. Looks stated age. Appears to be in no acute distress.  Eyes: EOMI. PERRLA. Vision intact. No scleral icterus.  Ear, Nose, Mouth and Throat: Hearing is grossly intact. No Leukoplakia, pharyngitis, stomatitis or thrush. Swollen nasal mucosa with post nasal drop.  Neck: Range of motion of neck normal. No thyromegaly or masses. Mallampati Class 3  Respiratory: Clear to auscultation bilaterally. No use of accessory muscles. Decreased breath sounds.  Cardiovascular: Normal heart sounds. Regularly regular rhythm without murmur.  Gastrointestinal: Non tender, non distended, soft. Bowel sounds positive in all four " quadrants. No organomegaly.  Skin: No obvious rashes, lesions, ulcers or large amount of bruising. No edema.   Neurological: No new motor deficits. Cranial nerves appear intact.  Psychiatric: Patient is alert and oriented to person, place and time.    Chest Imaging:    Last CT scan of the chest showed     IMPRESSION:     1.  Lungs appear clear. No evidence of active pulmonary infectious  process.     2.  4 mm RIGHT lower lobe pulmonary nodule. Recommend follow-up chest CT  in one year to document stability based on Fleischner criteria.      3.  Subacute healing posterior right-sided rib fractures and subacute  compression fractures of T4, T5, and T6    This report was finalized on 02/25/2022 09:16 by Dr. Eleuterio Nieto MD.    Assessment:  1. Chronic obstructive pulmonary disease, unspecified COPD type ([facility])    2. Mild intermittent asthma without complication    3. Former smoker, stopped smoking many years ago    4. Empyema of pleura ([facility])    5. Restrictive lung disease    6. Pneumonia of right lung due to infectious organism, unspecified part of lung    7. S/P thoracostomy tube placement ([facility])    8. ERNESTO (obstructive sleep apnea)    9. Non-seasonal allergic rhinitis, unspecified trigger    10. Lung nodules        Plan/Recommendations:    1.  I reviewed the CT scan of the chest with the patient and his wife and explained to them about the resolution of pulmonary nodules which was most likely infectious and did respond to antibiotic treatment.  2.  As per recommendations I would order a CT scan of the chest follow-up in 1 year right millimeter right lower lobe pulmonary nodule which is most likely benign but needs monitoring.  3.  For his underlying asthma and COPD he is currently using only albuterol rescue not and still gets short of breath.  I started the patient on Advair and albuterol was refilled he should continue both medications as advised.  4.  He has sleep apnea which is untreated and he does not want a  sleep study and does not use any CPAP machine and healthy lifestyle and weight loss was recommended.  He was also advised to sleep on his side and keep the head end of the bed elevated at the time of sleep.  Sleep hygiene was discussed with the patient.  5.  Patient will continue follow-up with the primary care provider and continue other medications as before.  Refused to be vaccinated for COVID.  6.  He will return to pulmonary clinic for a follow-up visit in 6 months time or earlier if needed.    Follow up:  6 Months    Time Spent:  30 minutes    I appreciate the opportunity of participating in this patient's care. I would like to thank the PCP for the referral.  Please feel free to contact me with any other questions.    Katelyn Buckley MD   Pulmonologist/Intensivist     Electronically signed by: Katelyn Buckley MD, 3/10/2022 11:57 CST

## 2022-04-15 PROBLEM — Z95.0 PRESENCE OF CARDIAC PACEMAKER: Status: ACTIVE | Noted: 2022-04-15

## 2022-04-15 PROBLEM — E66.812 CLASS 2 SEVERE OBESITY DUE TO EXCESS CALORIES WITH SERIOUS COMORBIDITY AND BODY MASS INDEX (BMI) OF 39.0 TO 39.9 IN ADULT: Status: ACTIVE | Noted: 2021-05-03

## 2022-04-15 PROBLEM — I27.20 PULMONARY HYPERTENSION: Status: ACTIVE | Noted: 2022-04-15

## 2022-04-15 PROBLEM — I50.32 CHRONIC DIASTOLIC CONGESTIVE HEART FAILURE (HCC): Status: ACTIVE | Noted: 2022-04-15

## 2022-04-15 PROBLEM — I51.7 LVH (LEFT VENTRICULAR HYPERTROPHY): Status: ACTIVE | Noted: 2022-04-15

## 2022-04-15 NOTE — PROGRESS NOTES
Chief Complaint  Coronary Artery Disease (6mo F/U. )    Subjective          Carlo Velasco presents to [facility] CARDIOLOGY for routine follow-up.  He has coronary artery disease status post CABG x2 (LIMA to LAD and SVG to RCA) 8/8/2012 with subsequent 2.5 x 18 mm Xience Alpine drug-eluting stent to the mid LAD 7/8/2015, 2.5 x 18 mm Xience drug-eluting stent to the proximal OM1 10/21/2016 and 2.5 x 12 mm Synergy drug-eluting stent to OM1 for severe in-stent restenosis 4/28/2020, sick sinus syndrome status post pacemaker, chronic diastolic congestive heart failure, left ventricular hypertrophy, mild pulmonary hypertension, thyroid disease status post partial thyroidectomy, hypertension, hyperlipidemia, GERD, obstructive sleep apnea, COPD and obesity. He reports a several month history of increased dyspnea with mild exertion. He reports intermittent bilateral lower extremity edema as well. Patient denies chest pain, palpitations, dizziness, syncope, orthopnea, PND or decreased stamina.  Patient denies any signs of bleeding.    Congestive Heart Failure  Presents for follow-up visit. Associated symptoms include edema and shortness of breath. Pertinent negatives include no abdominal pain, chest pain, chest pressure, claudication, fatigue, muscle weakness, near-syncope, nocturia, orthopnea, palpitations, paroxysmal nocturnal dyspnea or unexpected weight change. The symptoms have been stable. His past medical history is significant for CAD. Compliance with total regimen is 51-75%. Compliance with diet is 51-75%. Compliance with exercise is 51-75%. Compliance with medications is %.   Coronary Artery Disease  Presents for follow-up visit. Symptoms include shortness of breath. Pertinent negatives include no chest pain, chest pressure, chest tightness, dizziness, leg swelling, muscle weakness, palpitations or weight gain. Risk factors include hyperlipidemia and obesity. His past medical history is  "significant for CHF. The symptoms have been stable. Compliance with diet is variable. Compliance with exercise is variable. Compliance with medications is good.   Hypertension  This is a chronic problem. The current episode started more than 1 year ago. The problem is uncontrolled. Associated symptoms include shortness of breath. Pertinent negatives include no anxiety, blurred vision, chest pain, headaches, malaise/fatigue, neck pain, orthopnea, palpitations, peripheral edema, PND or sweats. Risk factors for coronary artery disease include obesity, male gender and dyslipidemia. Current antihypertension treatment includes beta blockers, diuretics and direct vasodilators. The current treatment provides significant improvement. Hypertensive end-organ damage includes CAD/MI and heart failure. Identifiable causes of hypertension include a thyroid problem.   Hyperlipidemia  This is a chronic problem. The current episode started more than 1 year ago. Exacerbating diseases include obesity. Associated symptoms include shortness of breath. Pertinent negatives include no chest pain. Current antihyperlipidemic treatment includes statins. Risk factors for coronary artery disease include hypertension, male sex, obesity and dyslipidemia.       Objective   Vital Signs:   /90   Pulse 66   Ht 172.7 cm (68\")   Wt 122 kg (270 lb)   SpO2 98%   BMI 41.05 kg/m²     Vitals and nursing note reviewed.   Constitutional:       General: Awake.      Appearance: Normal and healthy appearance. Well-developed and not in distress. Morbidly obese.   Eyes:      General: Lids are normal.      Conjunctiva/sclera: Conjunctivae normal.      Pupils: Pupils are equal, round, and reactive to light.   HENT:      Head: Normocephalic and atraumatic.      Nose: Nose normal.   Neck:      Vascular: No JVR. JVD normal.   Pulmonary:      Effort: Pulmonary effort is normal.      Breath sounds: Examination of the left-upper field reveals wheezing. " Examination of the right-lower field reveals decreased breath sounds and rales. Examination of the left-lower field reveals decreased breath sounds and rales. Decreased breath sounds present. Wheezing present. No rhonchi. Rales present.   Chest:      Chest wall: Not tender to palpatation.   Cardiovascular:      PMI at left midclavicular line. Normal rate. Regular rhythm. Normal S1. Normal S2.      Murmurs: There is no murmur.      No gallop. No click. No rub.   Pulses:     Intact distal pulses.   Edema:     Peripheral edema present.     Thigh: bilateral trace edema of the thigh.     Pretibial: bilateral trace edema of the pretibial area.     Ankle: bilateral trace edema of the ankle.     Feet: bilateral trace edema of the feet.  Abdominal:      General: Bowel sounds are normal.      Palpations: Abdomen is soft.      Tenderness: There is no abdominal tenderness.   Musculoskeletal: Normal range of motion.         General: No tenderness.      Cervical back: Normal range of motion. Skin:     General: Skin is warm and dry.   Neurological:      General: No focal deficit present.      Mental Status: Alert, oriented to person, place, and time and oriented to person, place and time.   Psychiatric:         Attention and Perception: Attention and perception normal.         Mood and Affect: Mood and affect normal.         Speech: Speech normal.         Behavior: Behavior normal. Behavior is cooperative.         Thought Content: Thought content normal.         Cognition and Memory: Cognition and memory normal.         Judgment: Judgment normal.        Result Review :   The following data was reviewed by: ANALI Rucker on 04/18/2022:  Common labs    Common Labsle 2/17/22 2/17/22 2/18/22 2/20/22    1410 1410     Glucose  194 (A)     BUN  12     Creatinine  0.79     eGFR Non African Am  96     Sodium  137     Potassium  3.7     Chloride  101     Calcium  8.7     Albumin  3.60     Total Bilirubin  0.9     Alkaline  Phosphatase  111     AST (SGOT)  28     ALT (SGPT)  29     WBC 21.02 (A)  22.90 (A) 9.80   Hemoglobin 13.6  12.2 (A) 11.2 (A)   Hematocrit 42.5  37.5 36.4 (A)   Platelets 222  218 220   (A) Abnormal value            Data reviewed: Cardiology studies 2d echo 4/28/20 and multiple left heart catheterizations.      ECG 12 Lead    Date/Time: 4/18/2022 9:37 AM  Performed by: Julita Quezada APRN  Authorized by: Julita Quezada APRN   Comparison: compared with previous ECG from 10/18/2021  Rhythm: sinus rhythm  Rate: normal  BPM: 66  Q waves: III and aVF      Clinical impression: abnormal EKG              Assessment and Plan    Diagnoses and all orders for this visit:    1. Chronic diastolic congestive heart failure (HCC) (Primary)- NYHA class II. Compensated. Start Entresto 24/26 mg twice daily. BMP in one week. Reviewed signs and symptoms of CHF and what to report with the patient. Patient instructed to restrict sodium and weigh daily. Report weight gain of greater than 2 lbs overnight or 5 lbs in 1 week. Pt verbalized understanding of instructions and plan of care.     2. Coronary artery disease involving autologous artery coronary bypass graft without angina pectoris- no clinical signs of ischemia.  Stable.  Continue Imdur.    3. S/P CABG x 2-LIMA to LAD and SVG to RCA 8/8/12.     4. Stented coronary artery- 2.5 x 18 mm Xience Alpine drug-eluting stent to the mid LAD 7/8/2015, 2.5 x 18 mm Xience drug-eluting stent to the proximal OM1 10/21/2016 and 2.5 x 12 mm Synergy drug-eluting stent to OM1 for severe in-stent restenosis 4/28/2020.  Patient continues on Plavix.  Denies bleeding.    5. Sick sinus syndrome (HCC)-status post pacemaker.  Stable.    6. Presence of cardiac pacemaker-device interrogated 1/11/2022.  5.3% a paced, 1.8% V paced, 0% A. fib burden.  1 alert for high atrial rate with 6 seconds duration.  Normal function, stable thresholds and adequate battery.    7. LVH (left ventricular  hypertrophy)-mild on 2D echo 4/28/2020.  Stable.    8. Pulmonary hypertension (HCC)-mild on 2D echo 4/28/2020.  Stable.    9. Primary hypertension-blood pressure is elevated in office today. Pt reports consistently higher than 130/90 at home. Continue to monitor following above medication adjustments.  Monitor and record daily blood pressure. Report readings consistently higher than 130/90 or consistently lower than 100/60.     10. Hyperlipidemia LDL goal <70-management per PCP.  Continue atorvastatin.    11. Chronic obstructive pulmonary disease, unspecified COPD type (HCC)- stable on room air. Pt follows with Dr. Buckley with pulmonology.     12. ERNESTO (obstructive sleep apnea)- pt is non-compliant with CPAP. He has been educated on the risks of untreated sleep apnea, including atrial fibrillation and worsening heart failure. He verbalized understanding.     13. Class 3 severe obesity due to excess calories with serious comorbidity and body mass index (BMI) of 40.0 to 44.9 in adult (HCC)-Patient's Body mass index is 41.05 kg/m². indicating that he is morbidly obese (BMI >40). Obesity-related health conditions include the following: hypertension, coronary heart disease and dyslipidemias. Obesity is unchanged. BMI is is above average; no BMI management plan is appropriate. We discussed low calorie, low carb based diet program, portion control and increasing exercise.        Follow Up   Return in about 4 weeks (around 5/16/2022).  Patient was given instructions and counseling regarding his condition or for health maintenance advice. Please see specific information pulled into the AVS if appropriate.

## 2022-04-18 ENCOUNTER — OFFICE VISIT (OUTPATIENT)
Dept: CARDIOLOGY | Facility: CLINIC | Age: 77
End: 2022-04-18

## 2022-04-18 ENCOUNTER — TRANSCRIBE ORDERS (OUTPATIENT)
Dept: ADMINISTRATIVE | Facility: HOSPITAL | Age: 77
End: 2022-04-18

## 2022-04-18 ENCOUNTER — LAB (OUTPATIENT)
Dept: LAB | Facility: HOSPITAL | Age: 77
End: 2022-04-18

## 2022-04-18 ENCOUNTER — TRANSCRIBE ORDERS (OUTPATIENT)
Dept: OTHER | Facility: OTHER | Age: 77
End: 2022-04-18

## 2022-04-18 VITALS
DIASTOLIC BLOOD PRESSURE: 90 MMHG | SYSTOLIC BLOOD PRESSURE: 138 MMHG | WEIGHT: 270 LBS | BODY MASS INDEX: 40.92 KG/M2 | HEART RATE: 66 BPM | OXYGEN SATURATION: 98 % | HEIGHT: 68 IN

## 2022-04-18 DIAGNOSIS — I51.7 LVH (LEFT VENTRICULAR HYPERTROPHY): ICD-10-CM

## 2022-04-18 DIAGNOSIS — Z95.0 PRESENCE OF CARDIAC PACEMAKER: ICD-10-CM

## 2022-04-18 DIAGNOSIS — E55.9 VITAMIN D DEFICIENCY, UNSPECIFIED: ICD-10-CM

## 2022-04-18 DIAGNOSIS — Z95.1 S/P CABG X 2: ICD-10-CM

## 2022-04-18 DIAGNOSIS — G47.33 OSA (OBSTRUCTIVE SLEEP APNEA): ICD-10-CM

## 2022-04-18 DIAGNOSIS — I27.20 PULMONARY HYPERTENSION: ICD-10-CM

## 2022-04-18 DIAGNOSIS — I50.32 CHRONIC DIASTOLIC CONGESTIVE HEART FAILURE: Primary | ICD-10-CM

## 2022-04-18 DIAGNOSIS — I25.810 CORONARY ARTERY DISEASE INVOLVING AUTOLOGOUS ARTERY CORONARY BYPASS GRAFT WITHOUT ANGINA PECTORIS: ICD-10-CM

## 2022-04-18 DIAGNOSIS — I49.5 SICK SINUS SYNDROME: ICD-10-CM

## 2022-04-18 DIAGNOSIS — Z95.5 STENTED CORONARY ARTERY: ICD-10-CM

## 2022-04-18 DIAGNOSIS — I50.32 CHRONIC DIASTOLIC CONGESTIVE HEART FAILURE: ICD-10-CM

## 2022-04-18 DIAGNOSIS — E66.01 CLASS 3 SEVERE OBESITY DUE TO EXCESS CALORIES WITH SERIOUS COMORBIDITY AND BODY MASS INDEX (BMI) OF 40.0 TO 44.9 IN ADULT: ICD-10-CM

## 2022-04-18 DIAGNOSIS — J44.9 CHRONIC OBSTRUCTIVE PULMONARY DISEASE, UNSPECIFIED COPD TYPE: ICD-10-CM

## 2022-04-18 DIAGNOSIS — E78.5 HYPERLIPIDEMIA LDL GOAL <70: ICD-10-CM

## 2022-04-18 DIAGNOSIS — E55.9 VITAMIN D DEFICIENCY, UNSPECIFIED: Primary | ICD-10-CM

## 2022-04-18 DIAGNOSIS — I10 PRIMARY HYPERTENSION: ICD-10-CM

## 2022-04-18 LAB
ANION GAP SERPL CALCULATED.3IONS-SCNC: 0 MMOL/L (ref 4–13)
BUN SERPL-MCNC: 19 MG/DL (ref 5–21)
BUN/CREAT SERPL: 28.4
CALCIUM SPEC-SCNC: 9.2 MG/DL (ref 8.4–10.4)
CHLORIDE SERPL-SCNC: 107 MMOL/L (ref 98–110)
CO2 SERPL-SCNC: 34 MMOL/L (ref 24–31)
CREAT SERPL-MCNC: 0.67 MG/DL (ref 0.5–1.4)
EGFRCR SERPLBLD CKD-EPI 2021: 98 ML/MIN/1.73
GLUCOSE SERPL-MCNC: 103 MG/DL (ref 70–100)
POTASSIUM SERPL-SCNC: 4.3 MMOL/L (ref 3.5–5.3)
SODIUM SERPL-SCNC: 141 MMOL/L (ref 135–145)

## 2022-04-18 PROCEDURE — 80048 BASIC METABOLIC PNL TOTAL CA: CPT

## 2022-04-18 PROCEDURE — 82306 VITAMIN D 25 HYDROXY: CPT

## 2022-04-18 PROCEDURE — 36415 COLL VENOUS BLD VENIPUNCTURE: CPT

## 2022-04-18 PROCEDURE — 99214 OFFICE O/P EST MOD 30 MIN: CPT | Performed by: NURSE PRACTITIONER

## 2022-04-18 PROCEDURE — 93000 ELECTROCARDIOGRAM COMPLETE: CPT | Performed by: NURSE PRACTITIONER

## 2022-04-19 LAB — 25(OH)D3 SERPL-MCNC: 50.8 NG/ML (ref 30–100)

## 2022-04-20 RX ORDER — MELOXICAM 15 MG/1
15 TABLET ORAL DAILY
Qty: 30 TABLET | Refills: 0 | Status: SHIPPED | OUTPATIENT
Start: 2022-04-20

## 2022-04-25 ENCOUNTER — LAB (OUTPATIENT)
Dept: LAB | Facility: HOSPITAL | Age: 77
End: 2022-04-25

## 2022-04-25 DIAGNOSIS — I50.40 COMBINED SYSTOLIC AND DIASTOLIC CONGESTIVE HEART FAILURE, UNSPECIFIED HF CHRONICITY: Primary | ICD-10-CM

## 2022-04-25 DIAGNOSIS — I50.40 COMBINED SYSTOLIC AND DIASTOLIC CONGESTIVE HEART FAILURE, UNSPECIFIED HF CHRONICITY: ICD-10-CM

## 2022-04-25 LAB
ANION GAP SERPL CALCULATED.3IONS-SCNC: 8 MMOL/L (ref 5–15)
BUN SERPL-MCNC: 18 MG/DL (ref 8–23)
BUN/CREAT SERPL: 23.7 (ref 7–25)
CALCIUM SPEC-SCNC: 9.3 MG/DL (ref 8.6–10.5)
CHLORIDE SERPL-SCNC: 106 MMOL/L (ref 98–107)
CO2 SERPL-SCNC: 28 MMOL/L (ref 22–29)
CREAT SERPL-MCNC: 0.76 MG/DL (ref 0.76–1.27)
EGFRCR SERPLBLD CKD-EPI 2021: 94.3 ML/MIN/1.73
GLUCOSE SERPL-MCNC: 93 MG/DL (ref 65–99)
POTASSIUM SERPL-SCNC: 4.2 MMOL/L (ref 3.5–5.2)
SODIUM SERPL-SCNC: 142 MMOL/L (ref 136–145)

## 2022-04-25 PROCEDURE — 36415 COLL VENOUS BLD VENIPUNCTURE: CPT

## 2022-04-25 PROCEDURE — 80048 BASIC METABOLIC PNL TOTAL CA: CPT

## 2022-04-27 ENCOUNTER — TELEPHONE (OUTPATIENT)
Dept: CARDIOLOGY | Facility: CLINIC | Age: 77
End: 2022-04-27

## 2022-05-03 ENCOUNTER — TRANSCRIBE ORDERS (OUTPATIENT)
Dept: ADMINISTRATIVE | Facility: HOSPITAL | Age: 77
End: 2022-05-03

## 2022-05-04 ENCOUNTER — TRANSCRIBE ORDERS (OUTPATIENT)
Dept: ADMINISTRATIVE | Facility: HOSPITAL | Age: 77
End: 2022-05-04

## 2022-05-04 ENCOUNTER — LAB (OUTPATIENT)
Dept: LAB | Facility: HOSPITAL | Age: 77
End: 2022-05-04

## 2022-05-04 DIAGNOSIS — E03.9 HYPOTHYROIDISM, UNSPECIFIED TYPE: ICD-10-CM

## 2022-05-04 DIAGNOSIS — N40.0 BENIGN PROSTATIC HYPERPLASIA WITHOUT LOWER URINARY TRACT SYMPTOMS: ICD-10-CM

## 2022-05-04 DIAGNOSIS — E78.5 HYPERLIPIDEMIA, UNSPECIFIED HYPERLIPIDEMIA TYPE: ICD-10-CM

## 2022-05-04 DIAGNOSIS — E61.1 IRON DEFICIENCY: ICD-10-CM

## 2022-05-04 DIAGNOSIS — E55.9 VITAMIN D DEFICIENCY: ICD-10-CM

## 2022-05-04 DIAGNOSIS — I10 ESSENTIAL (PRIMARY) HYPERTENSION: ICD-10-CM

## 2022-05-04 DIAGNOSIS — R06.00 DYSPNEA, UNSPECIFIED TYPE: ICD-10-CM

## 2022-05-04 DIAGNOSIS — E53.8 DEFICIENCY OF OTHER SPECIFIED B GROUP VITAMINS: ICD-10-CM

## 2022-05-04 DIAGNOSIS — R73.9 HYPERGLYCEMIA: ICD-10-CM

## 2022-05-04 DIAGNOSIS — R42 DIZZINESS AND GIDDINESS: ICD-10-CM

## 2022-05-04 DIAGNOSIS — I10 ESSENTIAL (PRIMARY) HYPERTENSION: Primary | ICD-10-CM

## 2022-05-04 LAB
ALBUMIN SERPL-MCNC: 4.2 G/DL (ref 3.5–5)
ALBUMIN/GLOB SERPL: 1.5 G/DL (ref 1.1–2.5)
ALP SERPL-CCNC: 82 U/L (ref 24–120)
ALT SERPL W P-5'-P-CCNC: 23 U/L (ref 0–50)
ANION GAP SERPL CALCULATED.3IONS-SCNC: -1 MMOL/L (ref 4–13)
AST SERPL-CCNC: 28 U/L (ref 7–45)
AUTO MIXED CELLS #: 0.8 10*3/MM3 (ref 0.1–2.6)
AUTO MIXED CELLS %: 10.9 % (ref 0.1–24)
BILIRUB SERPL-MCNC: 0.6 MG/DL (ref 0.1–1)
BILIRUB UR QL STRIP: NEGATIVE
BUN SERPL-MCNC: 22 MG/DL (ref 5–21)
BUN/CREAT SERPL: 31.4
CALCIUM SPEC-SCNC: 9.2 MG/DL (ref 8.4–10.4)
CHLORIDE SERPL-SCNC: 107 MMOL/L (ref 98–110)
CHOLEST SERPL-MCNC: 154 MG/DL (ref 130–200)
CLARITY UR: CLEAR
CO2 SERPL-SCNC: 33 MMOL/L (ref 24–31)
COLOR UR: YELLOW
CREAT SERPL-MCNC: 0.7 MG/DL (ref 0.5–1.4)
EGFRCR SERPLBLD CKD-EPI 2021: 96.7 ML/MIN/1.73
ERYTHROCYTE [DISTWIDTH] IN BLOOD BY AUTOMATED COUNT: 14.1 % (ref 12.3–15.4)
GLOBULIN UR ELPH-MCNC: 2.8 GM/DL
GLUCOSE SERPL-MCNC: 100 MG/DL (ref 70–100)
GLUCOSE UR STRIP-MCNC: NEGATIVE MG/DL
HBA1C MFR BLD: 5.9 % (ref 4.8–5.9)
HCT VFR BLD AUTO: 41.8 % (ref 37.5–51)
HDLC SERPL-MCNC: 51 MG/DL
HGB BLD-MCNC: 13.2 G/DL (ref 13–17.7)
HGB UR QL STRIP.AUTO: NEGATIVE
KETONES UR QL STRIP: NEGATIVE
LDLC SERPL CALC-MCNC: 87 MG/DL (ref 0–99)
LDLC/HDLC SERPL: 1.69 {RATIO}
LEUKOCYTE ESTERASE UR QL STRIP.AUTO: NEGATIVE
LYMPHOCYTES # BLD AUTO: 2.3 10*3/MM3 (ref 0.7–3.1)
LYMPHOCYTES NFR BLD AUTO: 29.5 % (ref 19.6–45.3)
MCH RBC QN AUTO: 27.6 PG (ref 26.6–33)
MCHC RBC AUTO-ENTMCNC: 31.6 G/DL (ref 31.5–35.7)
MCV RBC AUTO: 87.3 FL (ref 79–97)
NEUTROPHILS NFR BLD AUTO: 4.6 10*3/MM3 (ref 1.7–7)
NEUTROPHILS NFR BLD AUTO: 59.6 % (ref 42.7–76)
NITRITE UR QL STRIP: NEGATIVE
PH UR STRIP.AUTO: 6 [PH] (ref 5–8)
PLATELET # BLD AUTO: 194 10*3/MM3 (ref 140–450)
PMV BLD AUTO: 8.4 FL (ref 6–12)
POTASSIUM SERPL-SCNC: 4 MMOL/L (ref 3.5–5.3)
PROT SERPL-MCNC: 7 G/DL (ref 6.3–8.7)
PROT UR QL STRIP: NEGATIVE
RBC # BLD AUTO: 4.79 10*6/MM3 (ref 4.14–5.8)
SODIUM SERPL-SCNC: 139 MMOL/L (ref 135–145)
SP GR UR STRIP: 1.01 (ref 1–1.03)
TRIGL SERPL-MCNC: 84 MG/DL (ref 0–149)
UROBILINOGEN UR QL STRIP: NORMAL
VLDLC SERPL-MCNC: 16 MG/DL (ref 5–40)
WBC NRBC COR # BLD: 7.7 10*3/MM3 (ref 3.4–10.8)

## 2022-05-04 PROCEDURE — 83036 HEMOGLOBIN GLYCOSYLATED A1C: CPT | Performed by: NURSE PRACTITIONER

## 2022-05-04 PROCEDURE — 81003 URINALYSIS AUTO W/O SCOPE: CPT

## 2022-05-04 PROCEDURE — 84466 ASSAY OF TRANSFERRIN: CPT

## 2022-05-04 PROCEDURE — 80061 LIPID PANEL: CPT

## 2022-05-04 PROCEDURE — 82306 VITAMIN D 25 HYDROXY: CPT

## 2022-05-04 PROCEDURE — 84443 ASSAY THYROID STIM HORMONE: CPT

## 2022-05-04 PROCEDURE — 83880 ASSAY OF NATRIURETIC PEPTIDE: CPT

## 2022-05-04 PROCEDURE — 36415 COLL VENOUS BLD VENIPUNCTURE: CPT | Performed by: NURSE PRACTITIONER

## 2022-05-04 PROCEDURE — 82746 ASSAY OF FOLIC ACID SERUM: CPT

## 2022-05-04 PROCEDURE — 82607 VITAMIN B-12: CPT | Performed by: NURSE PRACTITIONER

## 2022-05-04 PROCEDURE — 84439 ASSAY OF FREE THYROXINE: CPT | Performed by: NURSE PRACTITIONER

## 2022-05-04 PROCEDURE — 84153 ASSAY OF PSA TOTAL: CPT

## 2022-05-04 PROCEDURE — 80053 COMPREHEN METABOLIC PANEL: CPT

## 2022-05-04 PROCEDURE — 85025 COMPLETE CBC W/AUTO DIFF WBC: CPT

## 2022-05-04 PROCEDURE — 83540 ASSAY OF IRON: CPT

## 2022-05-04 PROCEDURE — 82728 ASSAY OF FERRITIN: CPT | Performed by: NURSE PRACTITIONER

## 2022-05-05 LAB
25(OH)D3 SERPL-MCNC: 62.2 NG/ML (ref 30–100)
FERRITIN SERPL-MCNC: 45.3 NG/ML (ref 30–400)
FOLATE SERPL-MCNC: >20 NG/ML (ref 4.78–24.2)
IRON 24H UR-MRATE: 58 MCG/DL (ref 59–158)
IRON SATN MFR SERPL: 16 % (ref 20–50)
NT-PROBNP SERPL-MCNC: 300 PG/ML (ref 0–900)
PSA SERPL-MCNC: 4.56 NG/ML (ref 0–4)
T4 FREE SERPL-MCNC: 0.85 NG/DL (ref 0.93–1.7)
TIBC SERPL-MCNC: 373 MCG/DL (ref 298–536)
TRANSFERRIN SERPL-MCNC: 250 MG/DL (ref 200–360)
TSH SERPL DL<=0.05 MIU/L-ACNC: 3.26 UIU/ML (ref 0.27–4.2)
VIT B12 BLD-MCNC: 342 PG/ML (ref 211–946)

## 2022-05-11 ENCOUNTER — HOSPITAL ENCOUNTER (OUTPATIENT)
Dept: CT IMAGING | Age: 77
Discharge: HOME | End: 2022-05-11
Payer: MEDICARE

## 2022-05-11 DIAGNOSIS — R31.0 GROSS HEMATURIA: ICD-10-CM

## 2022-05-11 LAB
GFR AFRICAN AMERICAN: >60
GFR NON-AFRICAN AMERICAN: >60
POC CREATININE: 0.8 MG/DL (ref 0.3–1.3)

## 2022-05-11 PROCEDURE — 74178 CT ABD&PLV WO CNTR FLWD CNTR: CPT

## 2022-05-11 PROCEDURE — 6360000004 HC RX CONTRAST MEDICATION: Performed by: NURSE PRACTITIONER

## 2022-05-11 PROCEDURE — 82565 ASSAY OF CREATININE: CPT

## 2022-05-11 RX ADMIN — IOPAMIDOL 75 ML: 755 INJECTION, SOLUTION INTRAVENOUS at 10:02

## 2022-05-13 DIAGNOSIS — N40.1 BENIGN PROSTATIC HYPERPLASIA WITH URINARY FREQUENCY: ICD-10-CM

## 2022-05-13 DIAGNOSIS — Z85.46 HISTORY OF PROSTATE CANCER: ICD-10-CM

## 2022-05-13 DIAGNOSIS — R35.0 BENIGN PROSTATIC HYPERPLASIA WITH URINARY FREQUENCY: ICD-10-CM

## 2022-05-13 LAB — PROSTATE SPECIFIC ANTIGEN: 3.49 NG/ML (ref 0–4)

## 2022-05-16 ENCOUNTER — PROCEDURE VISIT (OUTPATIENT)
Dept: UROLOGY | Age: 77
End: 2022-05-16
Payer: MEDICARE

## 2022-05-16 VITALS — WEIGHT: 272 LBS | TEMPERATURE: 98.2 F | BODY MASS INDEX: 41.22 KG/M2 | HEIGHT: 68 IN

## 2022-05-16 DIAGNOSIS — N20.0 RENAL CALCULUS, BILATERAL: ICD-10-CM

## 2022-05-16 DIAGNOSIS — R35.0 BENIGN PROSTATIC HYPERPLASIA WITH URINARY FREQUENCY: ICD-10-CM

## 2022-05-16 DIAGNOSIS — Z85.46 HISTORY OF PROSTATE CANCER: ICD-10-CM

## 2022-05-16 DIAGNOSIS — N48.1 BALANITIS: ICD-10-CM

## 2022-05-16 DIAGNOSIS — R31.0 GROSS HEMATURIA: Primary | ICD-10-CM

## 2022-05-16 DIAGNOSIS — N40.1 BENIGN PROSTATIC HYPERPLASIA WITH URINARY FREQUENCY: ICD-10-CM

## 2022-05-16 LAB
BACTERIA URINE, POC: 0
BILIRUBIN URINE: 0 MG/DL
BLOOD, URINE: NEGATIVE
CASTS URINE, POC: 0
CLARITY: CLEAR
COLOR: YELLOW
CRYSTALS URINE, POC: 0
EPI CELLS URINE, POC: 0
GLUCOSE URINE: ABNORMAL
KETONES, URINE: NEGATIVE
LEUKOCYTE EST, POC: ABNORMAL
NITRITE, URINE: NEGATIVE
PH UA: 6.5 (ref 4.5–8)
PROTEIN UA: POSITIVE
RBC URINE, POC: 3
SPECIFIC GRAVITY UA: 1.03 (ref 1–1.03)
UROBILINOGEN, URINE: NORMAL
WBC URINE, POC: 10
YEAST URINE, POC: 0

## 2022-05-16 PROCEDURE — 52000 CYSTOURETHROSCOPY: CPT | Performed by: UROLOGY

## 2022-05-16 PROCEDURE — 81001 URINALYSIS AUTO W/SCOPE: CPT | Performed by: UROLOGY

## 2022-05-16 RX ORDER — NYSTATIN AND TRIAMCINOLONE ACETONIDE 100000; 1 [USP'U]/G; MG/G
OINTMENT TOPICAL
Qty: 15 G | Refills: 1 | Status: SHIPPED | OUTPATIENT
Start: 2022-05-16

## 2022-05-16 NOTE — PROGRESS NOTES
Chief Complaint  Congestive Heart Failure (Started Entresto LOV) and Results (Lab)    Subjective          Carlo Velasco presents to [facility] CARDIOLOGY for routine follow-up of medication adjustments.  He was started on Entresto 24/26 mg twice daily at his last office visit on 4/18/2022.  Follow-up BMP on 4/25/2022 revealed normal creatinine and potassium.  He has coronary artery disease status post CABG x2 (LIMA to LAD and SVG to RCA) 8/8/2012 with subsequent 2.5 x 18 mm Xience Alpine drug-eluting stent to the mid LAD 7/8/2015, 2.5 x 18 mm Xience drug-eluting stent to the proximal OM1 10/21/2016 and 2.5 x 12 mm Synergy drug-eluting stent to OM1 for severe in-stent restenosis 4/28/2020, sick sinus syndrome status post pacemaker, chronic diastolic congestive heart failure, left ventricular hypertrophy, mild pulmonary hypertension, thyroid disease status post partial thyroidectomy, hypertension, hyperlipidemia, GERD, obstructive sleep apnea, COPD and obesity. He continues to report dyspnea with mild exertion. He reports intermittent bilateral lower extremity edema as well. Patient denies chest pain, palpitations, dizziness, syncope, orthopnea, PND or decreased stamina.  Patient denies any signs of bleeding.    Congestive Heart Failure  Presents for follow-up visit. Associated symptoms include edema and shortness of breath. Pertinent negatives include no abdominal pain, chest pain, chest pressure, claudication, fatigue, muscle weakness, near-syncope, nocturia, orthopnea, palpitations, paroxysmal nocturnal dyspnea or unexpected weight change. The symptoms have been stable. His past medical history is significant for CAD. Compliance with total regimen is 51-75%. Compliance with diet is 51-75%. Compliance with exercise is 51-75%. Compliance with medications is %.   Coronary Artery Disease  Presents for follow-up visit. Symptoms include shortness of breath. Pertinent negatives include no chest  "pain, chest pressure, chest tightness, dizziness, leg swelling, muscle weakness, palpitations or weight gain. Risk factors include hyperlipidemia and obesity. His past medical history is significant for CHF. The symptoms have been stable. Compliance with diet is variable. Compliance with exercise is variable. Compliance with medications is good.   Hypertension  This is a chronic problem. The current episode started more than 1 year ago. The problem is controlled. Associated symptoms include shortness of breath. Pertinent negatives include no anxiety, blurred vision, chest pain, headaches, malaise/fatigue, neck pain, orthopnea, palpitations, peripheral edema, PND or sweats. Risk factors for coronary artery disease include obesity, male gender and dyslipidemia. Current antihypertension treatment includes beta blockers, diuretics, direct vasodilators and angiotensin blockers. The current treatment provides significant improvement. Hypertensive end-organ damage includes CAD/MI and heart failure. Identifiable causes of hypertension include a thyroid problem.   Hyperlipidemia  This is a chronic problem. The current episode started more than 1 year ago. Exacerbating diseases include obesity. Associated symptoms include shortness of breath. Pertinent negatives include no chest pain. Current antihyperlipidemic treatment includes statins. Risk factors for coronary artery disease include hypertension, male sex, obesity and dyslipidemia.       Objective   Vital Signs:   /58   Pulse 65   Ht 172.7 cm (68\")   Wt 123 kg (271 lb)   SpO2 98%   BMI 41.21 kg/m²     Vitals and nursing note reviewed.   Constitutional:       General: Awake.      Appearance: Normal and healthy appearance. Well-developed and not in distress. Morbidly obese.   Eyes:      General: Lids are normal.      Conjunctiva/sclera: Conjunctivae normal.      Pupils: Pupils are equal, round, and reactive to light.   HENT:      Head: Normocephalic and " atraumatic.      Nose: Nose normal.   Neck:      Vascular: No JVR. JVD normal.   Pulmonary:      Effort: Pulmonary effort is normal.      Breath sounds: Examination of the right-lower field reveals rales. No decreased breath sounds. No wheezing. No rhonchi. Rales present.   Chest:      Chest wall: Not tender to palpatation.   Cardiovascular:      PMI at left midclavicular line. Normal rate. Regular rhythm. Normal S1. Normal S2.      Murmurs: There is no murmur.      No gallop. No click. No rub.   Pulses:     Intact distal pulses.   Edema:     Peripheral edema present.     Pretibial: bilateral 1+ edema of the pretibial area.     Ankle: bilateral 1+ edema of the ankle.     Feet: bilateral 1+ edema of the feet.  Abdominal:      General: Bowel sounds are normal.      Palpations: Abdomen is soft.      Tenderness: There is no abdominal tenderness.   Musculoskeletal: Normal range of motion.         General: No tenderness.      Cervical back: Normal range of motion. Skin:     General: Skin is warm and dry.   Neurological:      General: No focal deficit present.      Mental Status: Alert, oriented to person, place, and time and oriented to person, place and time.   Psychiatric:         Attention and Perception: Attention and perception normal.         Mood and Affect: Mood and affect normal.         Speech: Speech normal.         Behavior: Behavior normal. Behavior is cooperative.         Thought Content: Thought content normal.         Cognition and Memory: Cognition and memory normal.         Judgment: Judgment normal.        Result Review :   The following data was reviewed by: ANALI Rucker on 5/17/2022:  Common labs    Common Labsle 5/4/22 5/4/22 5/4/22 5/4/22 5/4/22 5/11/22 5/13/22    1157 1157 1157 1157 1157     Glucose   100       BUN   22 (A)       Creatinine   0.70       eGFR Non  Am      >60    eGFR African Am      >60    Sodium   139       Potassium   4.0       Chloride   107       Calcium   9.2        Albumin   4.20       Total Bilirubin   0.6       Alkaline Phosphatase   82       AST (SGOT)   28       ALT (SGPT)   23       WBC 7.70         Hemoglobin 13.2         Hematocrit 41.8         Platelets 194         Total Cholesterol    154      Triglycerides    84      HDL Cholesterol    51      LDL Cholesterol     87      Hemoglobin A1C  5.9        PSA     4.560 (A)  3.49   (A) Abnormal value       Comments are available for some flowsheets but are not being displayed.           Data reviewed: Cardiology studies 2d echo 4/28/20 and multiple left heart catheterizations.           Assessment and Plan    Diagnoses and all orders for this visit:    1. Chronic diastolic congestive heart failure (HCC) (Primary)- NYHA class II. Compensated.  Start Jardiance 10 mg daily. BMP in one week. Reviewed signs and symptoms of CHF and what to report with the patient. Patient instructed to restrict sodium and weigh daily. Report weight gain of greater than 2 lbs overnight or 5 lbs in 1 week. Pt verbalized understanding of instructions and plan of care.  Consider up titration of Entresto and/or metoprolol titrate in the future, if tolerated.    2. Coronary artery disease involving autologous artery coronary bypass graft without angina pectoris- no clinical signs of ischemia.  Stable.  Continue Imdur.    3. S/P CABG x 2-LIMA to LAD and SVG to RCA 8/8/12.     4. Stented coronary artery- 2.5 x 18 mm Xience Alpine drug-eluting stent to the mid LAD 7/8/2015, 2.5 x 18 mm Xience drug-eluting stent to the proximal OM1 10/21/2016 and 2.5 x 12 mm Synergy drug-eluting stent to OM1 for severe in-stent restenosis 4/28/2020.  Patient continues on Plavix.  Denies bleeding.    5. Sick sinus syndrome (HCC)-status post pacemaker.  Stable.    6. Presence of cardiac pacemaker-device interrogated 4/12/2022.  1.7% a paced, 3.8% V paced.  No alerts.  Normal function, stable thresholds and adequate battery.      7. LVH (left ventricular hypertrophy)-mild  on 2D echo 4/28/2020.  Stable.    8. Pulmonary hypertension (HCC)-mild on 2D echo 4/28/2020.  Stable.    9. Primary hypertension-blood pressure is well controlled. Continue to monitor following above medication adjustments.  Monitor and record daily blood pressure. Report readings consistently higher than 130/90 or consistently lower than 100/60.     10. Hyperlipidemia LDL goal <70-management per PCP.  Continue atorvastatin.    11. Chronic obstructive pulmonary disease, unspecified COPD type (HCC)- stable on room air. Pt follows with Dr. Buckley with pulmonology.     12. ERNESTO (obstructive sleep apnea)- pt is non-compliant with CPAP. He has been educated on the risks of untreated sleep apnea, including atrial fibrillation and worsening heart failure. He verbalized understanding.     13. Class 3 severe obesity due to excess calories with serious comorbidity and body mass index (BMI) of 40.0 to 44.9 in adult (HCC)-Patient's Body mass index is 41.21 kg/m². indicating that he is morbidly obese (BMI >40). Obesity-related health conditions include the following: hypertension, coronary heart disease and dyslipidemias. Obesity is unchanged. BMI is is above average; no BMI management plan is appropriate. We discussed low calorie, low carb based diet program, portion control and increasing exercise.      Follow Up   Return in about 4 weeks (around 6/14/2022) for Next scheduled follow up.  Patient was given instructions and counseling regarding his condition or for health maintenance advice. Please see specific information pulled into the AVS if appropriate.

## 2022-05-16 NOTE — PROGRESS NOTES
Patient is 62-year-old male presents clinic today with gross hematuria. On 2/17/2022 presented to urgent care for fever, dysuria, gross hematuria, frequency, urgency. UA appeared infected at that time. Urine culture revealed E. coli. He was sent directly to [facility] emergency room. He was admitted for urosepsis although blood cultures were negative. Repeat urine culture revealed only normal cody. Likely had hemorrhagic cystitis. He was placed on Flomax on discharge and has been on this for approximately 7 days due to lower urinary tract symptoms. Today he denies any gross hematuria, fever, chills. He does have some mild intermittent dysuria at the tip of his penis. Apparently he has a history of prostate cancer in his early 35s. He states he went to a public health clinic where they did a prostate biopsy, he states this was positive. He underwent trial drug therapy. I do not have any recent PSAs on file. AUA score today is 19/35 with complaints of urgency, weak stream, incomplete emptying, intermittency, frequency, straining, nocturia twice per night. He does have a former history of smoking, 20-pack-year. He also has a history of stones and saw Dr. Hilda Douglas in 2020 with flank pain although at that time did not have an obstructing stone only bilateral nonobstructing renal calculi. Has not had a follow-up. CT performed at [facility] hospitalized for urosepsis reveals bladder wall thickening with perivesicular fat stranding, nonobstructing intrarenal stones right renal cyst.    History as described above by the nurse practitioner when seen here on 3/1/2022 he is here for evaluation of hematuria with cystoscopy and CT urogram  There is a's remote history of prostate cancer no definite documentation and there is no documentation of any treatment or follow-up.     On examination today.   Have some balanitis involving his foreskin and glans, JOHNNIE done by me today reveals a prostate approximately 40 g smooth no nodularity or irregularity and nonsuspicious      Cystoscopy Procedure Note    Indications: Diagnosis    Pre-operative Diagnosis: Hematuria    Post-operative Diagnosis: Hematuria    Surgeon: Aquilino Fernandes MD     Assistants: staff    Anesthesia: Local anesthesia topical 2% lidocaine gel    Procedure Details   The risks, benefits, complications, treatment options, and expected outcomes were discussed with the patient. The patient concurred with the proposed plan, giving informed consent. Cystoscopy was performed today under local anesthesia, using sterile technique. The patient was placed in the supine position, prepped with Hibiclens, and draped in the usual sterile fashion. A 17 Lao sheath flexible cystoscope was used to inspect both the urethra and bladder using the flexible scope. Findings:  Anterior urethra: normal without strictures and without scarring. Prostate:  Prostatic urethra: 2+ moderate prostatic hyperplasia. median lobe present? no.   Bladder: 1+ trabeculation, without lesions bladder tumor seen. Ureteral orifice(s) was/were seen bilateral. Ureteral orifice(s) both were in the normal location and both ureteral orifices were effluxing clear urine. Complications:  None; patient tolerated the procedure well. Disposition: To home after observation.            Condition: stable      DATA:  CBC:   Lab Results   Component Value Date    WBC 6.79 09/19/2012    RBC 4.33 09/19/2012    HGB 11.7 09/19/2012    HCT 37.8 09/19/2012    MCV 87.3 09/19/2012    MCH 27.0 09/19/2012    MCHC 31.0 09/19/2012    RDW 14.7 09/19/2012     09/19/2012    MPV 9.1 09/19/2012     CMP:    Lab Results   Component Value Date     08/17/2012    K 4.2 08/17/2012    CL 97 08/17/2012    CO2 36 08/17/2012    BUN 14 08/17/2012    CREATININE 0.8 05/11/2022    CREATININE 0.9 08/17/2012    GFRAA >60 05/11/2022    LABGLOM >60 05/11/2022    GLUCOSE 111 08/17/2012    PROT 5.9 08/16/2012    LABALBU 3.3 08/16/2012    CALCIUM 8.2 08/17/2012    BILITOT 0.6 08/07/2012    ALKPHOS 111 08/16/2012    AST 78 08/16/2012    ALT 75 08/16/2012     PSA:   Lab Results   Component Value Date    PSA 3.49 05/13/2022     Results for orders placed or performed in visit on 05/16/22   POCT Urinalysis Dipstick w/ Micro (Auto)   Result Value Ref Range    Color, UA Yellow     Clarity, UA Clear Clear    Glucose, Ur neg     Bilirubin Urine 0 mg/dL    Ketones, Urine Negative     Specific Gravity, UA 1.030 1.005 - 1.030    Blood, Urine Negative     pH, UA 6.5 4.5 - 8.0    Protein, UA Positive (A) Negative    Nitrite, Urine Negative     Leukocytes, UA small     Urobilinogen, Urine Normal     rbc urine, poc 3     wbc urine, poc 10     bacteria urine, poc 0     yeast urine, poc 0     casts urine, poc 0     Epi Cells Urine, POC 0     crystals urine, poc 0                IMAGING:  CT scan for urogram protocol done on 5/11/2022 was reviewed. This shows bilateral nonobstructing renal calculi up to 3 to 4 mm in size more numerous on the right. Otherwise normal-appearing kidneys except for a 2 cm cyst off the lower pole the right kidney normal-appearing collecting system no solid enhancing masses. 1. Gross hematuria  CT urogram shows nonobstructing stones cystoscopy no bladder masses or tumors. - Cystoscopy  - POCT Urinalysis Dipstick w/ Micro (Auto)    2. Renal calculus, bilateral  We will get a KUB to see if the stones are visible and can be treated with ESWL  - XR ABDOMEN (KUB) (SINGLE AP VIEW); Future    3. Benign prostatic hyperplasia with urinary frequency  Continue Flomax    4. Balanitis  Begin topical Mycolog  - nystatin-triamcinolone (MYCOLOG) 171472-4.1 UNIT/GM-% ointment; Apply topically 2 times daily. Apply to foreskin and end of penis  Dispense: 15 g; Refill: 1    5.  History of prostate cancer  His PSA within normal limits he has a normal JOHNNIE I doubt he was diagnosed with prostate cancer when he was 29 years of age that would be highly unlikely and especially with no prior documentation or prior treatment I would just recommend annual PSA and JOHNNIE for follow-up        Orders Placed This Encounter   Procedures    XR ABDOMEN (KUB) (SINGLE AP VIEW)     Standing Status:   Future     Standing Expiration Date:   5/16/2023     Order Specific Question:   Reason for exam:     Answer:   Bilateral renal calculi seen on CT scan see if visible on KUB for ESWL    POCT Urinalysis Dipstick w/ Micro (Auto)    Cystoscopy        Return in about 1 month (around 6/16/2022) for F/U with Luisa CALLAHAN, office visit after xray study.

## 2022-05-17 ENCOUNTER — OFFICE VISIT (OUTPATIENT)
Dept: CARDIOLOGY | Facility: CLINIC | Age: 77
End: 2022-05-17

## 2022-05-17 VITALS
BODY MASS INDEX: 41.07 KG/M2 | WEIGHT: 271 LBS | SYSTOLIC BLOOD PRESSURE: 104 MMHG | DIASTOLIC BLOOD PRESSURE: 58 MMHG | OXYGEN SATURATION: 98 % | HEIGHT: 68 IN | HEART RATE: 65 BPM

## 2022-05-17 DIAGNOSIS — G47.33 OSA (OBSTRUCTIVE SLEEP APNEA): ICD-10-CM

## 2022-05-17 DIAGNOSIS — I10 PRIMARY HYPERTENSION: ICD-10-CM

## 2022-05-17 DIAGNOSIS — I25.810 CORONARY ARTERY DISEASE INVOLVING AUTOLOGOUS ARTERY CORONARY BYPASS GRAFT WITHOUT ANGINA PECTORIS: ICD-10-CM

## 2022-05-17 DIAGNOSIS — I50.32 CHRONIC DIASTOLIC CONGESTIVE HEART FAILURE: Primary | ICD-10-CM

## 2022-05-17 DIAGNOSIS — Z95.5 STENTED CORONARY ARTERY: ICD-10-CM

## 2022-05-17 DIAGNOSIS — I27.20 PULMONARY HYPERTENSION: ICD-10-CM

## 2022-05-17 DIAGNOSIS — E66.01 CLASS 3 SEVERE OBESITY DUE TO EXCESS CALORIES WITH SERIOUS COMORBIDITY AND BODY MASS INDEX (BMI) OF 40.0 TO 44.9 IN ADULT: ICD-10-CM

## 2022-05-17 DIAGNOSIS — I49.5 SICK SINUS SYNDROME: ICD-10-CM

## 2022-05-17 DIAGNOSIS — J44.9 CHRONIC OBSTRUCTIVE PULMONARY DISEASE, UNSPECIFIED COPD TYPE: ICD-10-CM

## 2022-05-17 DIAGNOSIS — I51.7 LVH (LEFT VENTRICULAR HYPERTROPHY): ICD-10-CM

## 2022-05-17 DIAGNOSIS — Z95.0 PRESENCE OF CARDIAC PACEMAKER: ICD-10-CM

## 2022-05-17 DIAGNOSIS — E78.5 HYPERLIPIDEMIA LDL GOAL <70: ICD-10-CM

## 2022-05-17 DIAGNOSIS — Z95.1 S/P CABG X 2: ICD-10-CM

## 2022-05-17 PROCEDURE — 99214 OFFICE O/P EST MOD 30 MIN: CPT | Performed by: NURSE PRACTITIONER

## 2022-05-17 RX ORDER — TAMSULOSIN HYDROCHLORIDE 0.4 MG/1
1 CAPSULE ORAL DAILY
COMMUNITY
Start: 2022-05-10

## 2022-05-17 RX ORDER — SACUBITRIL AND VALSARTAN 24; 26 MG/1; MG/1
1 TABLET, FILM COATED ORAL 2 TIMES DAILY
Qty: 60 TABLET | Refills: 11 | Status: SHIPPED | OUTPATIENT
Start: 2022-05-17 | End: 2022-06-27 | Stop reason: SDUPTHER

## 2022-05-17 RX ORDER — PREGABALIN 150 MG/1
150 CAPSULE ORAL 2 TIMES DAILY
COMMUNITY
Start: 2022-05-13

## 2022-06-02 ENCOUNTER — HOSPITAL ENCOUNTER (OUTPATIENT)
Dept: ULTRASOUND IMAGING | Facility: HOSPITAL | Age: 77
Discharge: HOME | End: 2022-06-02

## 2022-06-02 ENCOUNTER — HOSPITAL ENCOUNTER (OUTPATIENT)
Dept: GENERAL RADIOLOGY | Facility: HOSPITAL | Age: 77
Discharge: HOME | End: 2022-06-02

## 2022-06-02 ENCOUNTER — TRANSCRIBE ORDERS (OUTPATIENT)
Dept: ADMINISTRATIVE | Facility: HOSPITAL | Age: 77
End: 2022-06-02

## 2022-06-02 DIAGNOSIS — R42 DIZZINESS AND GIDDINESS: ICD-10-CM

## 2022-06-02 DIAGNOSIS — Z01.818 ENCOUNTER FOR OTHER PREPROCEDURAL EXAMINATION: Primary | ICD-10-CM

## 2022-06-02 PROCEDURE — 93880 EXTRACRANIAL BILAT STUDY: CPT

## 2022-06-02 PROCEDURE — 71046 X-RAY EXAM CHEST 2 VIEWS: CPT

## 2022-06-02 PROCEDURE — 93880 EXTRACRANIAL BILAT STUDY: CPT | Performed by: SURGERY

## 2022-06-07 ENCOUNTER — OFFICE VISIT (OUTPATIENT)
Dept: VASCULAR SURGERY | Facility: CLINIC | Age: 77
End: 2022-06-07

## 2022-06-07 VITALS
DIASTOLIC BLOOD PRESSURE: 70 MMHG | OXYGEN SATURATION: 94 % | BODY MASS INDEX: 41.07 KG/M2 | WEIGHT: 271 LBS | SYSTOLIC BLOOD PRESSURE: 90 MMHG | HEIGHT: 68 IN | RESPIRATION RATE: 18 BRPM | HEART RATE: 69 BPM

## 2022-06-07 DIAGNOSIS — I10 PRIMARY HYPERTENSION: ICD-10-CM

## 2022-06-07 DIAGNOSIS — I65.23 BILATERAL CAROTID ARTERY STENOSIS: Primary | ICD-10-CM

## 2022-06-07 DIAGNOSIS — E78.5 HYPERLIPIDEMIA LDL GOAL <70: ICD-10-CM

## 2022-06-07 DIAGNOSIS — I73.9 PAD (PERIPHERAL ARTERY DISEASE): ICD-10-CM

## 2022-06-07 PROBLEM — Z87.891 HISTORY OF SMOKING: Status: ACTIVE | Noted: 2022-06-07

## 2022-06-07 PROBLEM — F33.9 RECURRENT MAJOR DEPRESSION: Status: ACTIVE | Noted: 2022-04-26

## 2022-06-07 PROBLEM — I65.29 CAROTID ARTERY STENOSIS: Status: ACTIVE | Noted: 2022-06-02

## 2022-06-07 PROBLEM — K44.9 HIATAL HERNIA: Status: ACTIVE | Noted: 2022-06-07

## 2022-06-07 PROBLEM — M17.11 OSTEOARTHRITIS OF RIGHT KNEE: Status: ACTIVE | Noted: 2022-06-07

## 2022-06-07 PROBLEM — J01.90 ACUTE SINUSITIS: Status: ACTIVE | Noted: 2022-05-03

## 2022-06-07 PROBLEM — S22.000A COMPRESSION FRACTURE OF THORACIC VERTEBRA: Status: ACTIVE | Noted: 2021-12-13

## 2022-06-07 PROBLEM — N20.0 KIDNEY STONES: Status: ACTIVE | Noted: 2022-06-07

## 2022-06-07 PROBLEM — E55.9 VITAMIN D DEFICIENCY: Status: ACTIVE | Noted: 2021-10-26

## 2022-06-07 PROBLEM — H04.129 DRY EYE SYNDROME: Status: ACTIVE | Noted: 2021-08-23

## 2022-06-07 PROBLEM — H25.9 AGE-RELATED CATARACT: Status: ACTIVE | Noted: 2021-08-23

## 2022-06-07 PROBLEM — H53.469 HOMONYMOUS HEMIANOPIA: Status: ACTIVE | Noted: 2021-08-23

## 2022-06-07 PROBLEM — S42.253A CLOSED FRACTURE OF GREATER TUBEROSITY OF HUMERUS: Status: ACTIVE | Noted: 2022-06-07

## 2022-06-07 PROBLEM — E11.9 DIABETES MELLITUS: Status: ACTIVE | Noted: 2022-06-07

## 2022-06-07 PROBLEM — E61.1 IRON DEFICIENCY: Status: ACTIVE | Noted: 2022-05-03

## 2022-06-07 PROBLEM — E53.8 COBALAMIN DEFICIENCY: Status: ACTIVE | Noted: 2022-06-02

## 2022-06-07 PROBLEM — H40.013 OPEN ANGLE WITH BORDERLINE FINDINGS, LOW RISK, BILATERAL: Status: ACTIVE | Noted: 2021-08-23

## 2022-06-07 PROBLEM — M43.10 SPONDYLOLISTHESIS, ACQUIRED: Status: ACTIVE | Noted: 2022-06-07

## 2022-06-07 PROBLEM — S80.00XA CONTUSION OF KNEE: Status: ACTIVE | Noted: 2018-06-04

## 2022-06-07 PROBLEM — D50.9 IRON DEFICIENCY ANEMIA: Status: ACTIVE | Noted: 2022-06-02

## 2022-06-07 PROBLEM — M25.469 KNEE JOINT EFFUSION: Status: ACTIVE | Noted: 2018-06-04

## 2022-06-07 PROBLEM — F03.90 DEMENTIA: Status: ACTIVE | Noted: 2022-04-26

## 2022-06-07 PROBLEM — S22.39XA CLOSED FRACTURE OF RIB: Status: ACTIVE | Noted: 2021-12-13

## 2022-06-07 PROBLEM — Z13.31 SCREENING FOR DEPRESSION: Status: ACTIVE | Noted: 2022-04-26

## 2022-06-07 PROBLEM — Z28.20 VACCINE REFUSED BY PATIENT: Status: ACTIVE | Noted: 2022-04-26

## 2022-06-07 PROBLEM — R42 VERTIGO: Status: ACTIVE | Noted: 2022-05-03

## 2022-06-07 PROBLEM — G89.29 CHRONIC PAIN: Status: ACTIVE | Noted: 2018-08-20

## 2022-06-07 PROBLEM — R30.0 DIFFICULT OR PAINFUL URINATION: Status: ACTIVE | Noted: 2022-05-03

## 2022-06-07 PROBLEM — M53.3 DISORDER OF SACROILIAC JOINT: Status: ACTIVE | Noted: 2018-07-16

## 2022-06-07 PROBLEM — H02.839 DERMATOCHALASIS: Status: ACTIVE | Noted: 2021-08-23

## 2022-06-07 PROBLEM — N40.0 BENIGN PROSTATIC HYPERPLASIA: Status: ACTIVE | Noted: 2021-10-26

## 2022-06-07 PROBLEM — G14 POST POLIOMYELITIS SYNDROME: Status: ACTIVE | Noted: 2022-04-25

## 2022-06-07 PROCEDURE — 99204 OFFICE O/P NEW MOD 45 MIN: CPT | Performed by: SURGERY

## 2022-06-07 RX ORDER — MAGNESIUM 200 MG
1 TABLET ORAL DAILY
COMMUNITY
Start: 2022-06-02

## 2022-06-07 RX ORDER — IRON,CARBONYL/ASCORBIC ACID 65MG-125MG
1 TABLET, DELAYED RELEASE (ENTERIC COATED) ORAL 2 TIMES DAILY WITH MEALS
COMMUNITY
Start: 2022-06-02 | End: 2022-11-08

## 2022-06-07 NOTE — PROGRESS NOTES
06/07/2022      Mojgan De La Torre, APRN  [address] [place] [address]    Carlo Velasco  1945    Chief Complaint   Patient presents with   • Establish Care     Referral marina BRIONES for occlusion and stenosis of unspecified carotid artery.  US pad carotid bilateral 6/2/22.  Pt is Former Smoker - Quit: 1977.       Dear ANALI Powers    HPI  I had the pleasure of seeing your patient Carlo Velasco in the office today.  Thank you kindly for this consultation.  As you recall, Carlo Velasco is a 76 y.o.  male who you are currently following for routine health maintenance.he reports having some dizziness which prompted testing.  He denies any strokelike symptoms.  He is maintained on Plavix and Lipitor.  He does have chronic back pain with previous back surgeries.  His main complaint is no strength to his legs.  He does have swelling to his lower extremities.  He did have noninvasive testing performed, which I did personally review.     Past Medical History:   Diagnosis Date   • Anxiety    • Arthritis    • Cancer (HCC)     melanoma   • Chronic diastolic congestive heart failure ([facility]) 4/15/2022   • Claustrophobia    • Colon polyp    • Coronary artery disease involving autologous artery coronary bypass graft without angina pectoris 9/22/2016   • Depression    • Disease of thyroid gland     partial thyroidectomy on right   • GERD (gastroesophageal reflux disease)    • Hearing loss    • Heart attack ([facility])    • History of transfusion    • Hypertension    • Kidney stone     history of    • Mild intermittent asthma without complication 8/24/2021   • Open angle with borderline findings, low risk, bilateral 8/23/2021   • Presence of cardiac pacemaker 4/15/2022   • Primary hypertension 9/22/2016   • Pulmonary hypertension ([facility]) 4/15/2022   • S/P CABG x 2 1/4/2019    LIMA to LAD and SVG to RCA 8/8/12   • Sick sinus syndrome ([facility]) 7/23/2020   • Stented coronary artery 1/4/2019    2.5 x 18 mm Xience  Alpine drug-eluting stent to the mid LAD 7/8/2015, 2.5 x 18 mm Xience drug-eluting stent to the proximal OM1 10/21/2016 and 2.5 x 12 mm Synergy drug-eluting stent to OM1 for severe in-stent restenosis 4/28/2020   • Tobacco abuse, in remission    • Uses hearing aid     BILATERAL       Past Surgical History:   Procedure Laterality Date   • BACK SURGERY      CERVICAL FUSION AND LUMBAR DISC REPLACEMENT   • BRONCHOSCOPY N/A 4/27/2021    Procedure: BRONCHOSCOPY;  Surgeon: Rico Jacobs MD;  Location: [facility] OR;  Service: Cardiothoracic;  Laterality: N/A;   • CARDIAC CATHETERIZATION  07/2015    SHAHRZAD mid LAD, Dr. Toribio   • CARDIAC CATHETERIZATION N/A 10/21/2016    Procedure: Left Heart Cath;  Surgeon: Darian Toribio MD;  Location:  [facility] CATH INVASIVE LOCATION;  Service:    • CARDIAC CATHETERIZATION Left 4/5/2017    Procedure: Cardiac Catheterization/Vascular Study;  Surgeon: Darian Toribio MD;  Location:  [facility] CATH INVASIVE [facility];  Service:    • CARDIAC CATHETERIZATION N/A 4/5/2017    Procedure: Left Heart Cath;  Surgeon: Darian Toribio MD;  Location:  [facility] CATH INVASIVE [facility];  Service:    • CARDIAC CATHETERIZATION N/A 4/5/2017    Procedure: Coronary angiography;  Surgeon: Darian Toribio MD;  Location:  [facility] CATH INVASIVE [facility];  Service:    • CARDIAC CATHETERIZATION N/A 4/5/2017    Procedure: Left ventriculography;  Surgeon: Darian Toribio MD;  Location:  [facility] CATH [facility]VASIVE [facility];  Service:    • CARDIAC CATHETERIZATION  4/5/2017    Procedure: Saphenous Vein Graft;  Surgeon: aDrian Toribio MD;  Location:  [facility] CATH INVASIVE [facility];  Service:    • CARDIAC CATHETERIZATION Left 2/27/2018    Procedure: Cardiac Catheterization/Vascular Study SHAHRZAD OK PRN;  Surgeon: Darian Toribio MD;  Location:  [facility] CATH INV[facility];  Service:    • CARDIAC CATHETERIZATION  2/27/2018    Procedure: Functional Flow [place];  Surgeon: Darian Toribio MD;  Location:  [facility] CATH INVAS[facility];  Service:    • CARDIAC CATHETERIZATION  N/A 2/27/2018    Procedure: Left ventriculography;  Surgeon: Darian Toribio MD;  Location: [facility] CATH INVASIVE LOCATION;  Service:    • CARDIAC CATHETERIZATION Left 4/28/2020    Procedure: Cardiac Catheterization/Vascular Study SHAHRZAD OK PRN;  Surgeon: Darian Toriboi MD;  Location: [facility] CATH INVASIVE LOCATION;  Service: Cardiology;  Laterality: Left;   • CARDIAC ELECTROPHYSIOLOGY PROCEDURE N/A 1/11/2018    Procedure: PPM generator change - dual;  Surgeon: Darian Toribio MD;  Location: [facility] CATH INVASIVE LOCATION;  Service:    • CARDIAC SURGERY      open heart surgery x2   • COLONOSCOPY  09/05/2007    colon polyps   • COLONOSCOPY N/A 5/29/2018    Procedure: COLONOSCOPY WITH ANESTHESIA;  Surgeon: Juan F Tapia MD;  Location: [facility] ENDOSCOPY;  Service: Gastroenterology   • COLONOSCOPY N/A 10/21/2021    Procedure: COLONOSCOPY WITH ANESTHESIA;  Surgeon: Juan F Tapia MD;  Location: [facility] ENDOSCOPY;  Service: Gastroenterology;  Laterality: N/A;  pre op: hx polyps  post op;polyp,   PCP: Rhys Rosen MD   • CORONARY ANGIOPLASTY WITH STENT PLACEMENT  07/2015    SHAHRZAD mid LAD, Dr. Toribio    • CORONARY ARTERY BYPASS GRAFT  2012 AND 2014    2 vessel    • ENDOSCOPY  11/08/2010   • ENDOSCOPY N/A 5/29/2018    Procedure: ESOPHAGOGASTRODUODENOSCOPY WITH ANESTHESIA;  Surgeon: Juan F Tapia MD;  Location: [facility] ENDOSCOPY;  Service: Gastroenterology   • HEAD/NECK LESION/CYST EXCISION Left 12/20/2016    Procedure: EXCISION MALIGNANT MELANOMA WITH SENTINEL NODE BIOPSY, INJECTION AND SCAN PRE-OP, LEFT EAR;  Surgeon: Guanaco Zepeda MD;  Location: [facility] OR;  Service:    • HEMORRHOIDECTOMY     • HERNIA REPAIR     • INSERT / REPLACE / REMOVE PACEMAKER     • SENTINEL NODE BIOPSY Left 12/20/2016    Procedure: SENTINEL NODE BIOPSY;  Surgeon: Guanaco Zepeda MD;  Location: [facility] OR;  Service:    • SKIN CANCER EXCISION     • THORACOSCOPY Right 4/27/2021    Procedure: THORACOSCOPIC TOTAL DECORTICATION;  Surgeon: Reynaldo  Rico FELIZ MD;  Location: [facility] OR;  Service: Cardiothoracic;  Laterality: Right;   • THYROID SURGERY     • THYROIDECTOMY         Family History   Problem Relation Age of Onset   • Heart failure Mother    • Stroke Mother    • Dementia Mother    • Coronary artery disease Father    • Colon polyps Father    • COPD Brother    • Colon cancer Neg Hx        Social History     Socioeconomic History   • Marital status:    Tobacco Use   • Smoking status: Former Smoker     Packs/day: 3.00     Years: 10.00     Pack years: 30.00     Types: Cigarettes     Quit date:      Years since quittin.4   • Smokeless tobacco: Former User     Types: Snuff     Quit date:    Vaping Use   • Vaping Use: Never used   Substance and Sexual Activity   • Alcohol use: Yes     Comment: rare   • Drug use: No   • Sexual activity: Defer       Allergies   Allergen Reactions   • Meperidine Other (See Comments)     HEART STOPS    Other reaction(s): Unknown   • Statins Myalgia     Causes leg cramps         Current Outpatient Medications:   •  albuterol sulfate  (90 Base) MCG/ACT inhaler, Inhale 2 puffs Every 4 (Four) Hours As Needed for Wheezing., Disp: 6.7 g, Rfl: 11  •  ascorbic acid (VITAMIN C) 1000 MG tablet, Vitamin C 1,000 mg tablet  Take 1 tablet every day by oral route., Disp: , Rfl:   •  atorvastatin (LIPITOR) 80 MG tablet, Take 80 mg by mouth Daily., Disp: , Rfl:   •  Cholecalciferol 125 MCG (5000 UT) tablet, Vitamin D3 125 mcg (5,000 unit) tablet  Take 1 tablet every day by oral route., Disp: , Rfl:   •  clopidogrel (Plavix) 75 MG tablet, Take 1 tablet by mouth Daily., Disp: 90 tablet, Rfl: 4  •  Cyanocobalamin (B-12) 1000 MCG sublingual tablet, 1 tablet Daily., Disp: , Rfl:   •  donepezil (ARICEPT) 5 MG tablet, TAKE 1 TABLET BY MOUTH EVERY NIGHT AT BEDTIME, Disp: 90 tablet, Rfl: 3  •  empagliflozin (Jardiance) 10 MG tablet tablet, Take 1 tablet by mouth Daily., Disp: 30 tablet, Rfl: 11  •  escitalopram (LEXAPRO) 20 MG  tablet, Take 1 tablet by mouth Daily., Disp: 90 tablet, Rfl: 1  •  fluticasone (Flonase Allergy Relief) 50 MCG/ACT nasal spray, 2 sprays into the nostril(s) as directed by provider Daily., Disp: 16 g, Rfl: 5  •  fluticasone-salmeterol (ADVAIR) 100-50 MCG/DOSE DISKUS, Inhale 1 puff 2 (Two) Times a Day., Disp: 60 each, Rfl: 11  •  furosemide (LASIX) 40 MG tablet, Take 1 tablet by mouth Daily As Needed (for any increase in 2 lbs in a daqy or 5 lbs in a week or increassing swelling or congestion)., Disp: , Rfl:   •  isosorbide mononitrate (IMDUR) 30 MG 24 hr tablet, TAKE 1 TABLET BY MOUTH EVERY MORNING, Disp: 90 tablet, Rfl: 3  •  loratadine (CLARITIN) 10 MG tablet, Take 1 tablet by mouth Daily., Disp: 90 tablet, Rfl: 3  •  meloxicam (MOBIC) 15 MG tablet, Take 1 tablet by mouth Daily. Prn, Disp: 30 tablet, Rfl: 0  •  metoprolol tartrate (LOPRESSOR) 25 MG tablet, Take 12.5 mg by mouth Daily., Disp: , Rfl:   •  NITROSTAT 0.4 MG SL tablet, Place 1 tablet under the tongue Every 5 (Five) Minutes As Needed for Chest Pain. Take no more than 3 doses in 15 minutes., Disp: 100 tablet, Rfl: 2  •  omega-3 acid ethyl esters (LOVAZA) 1 g capsule, TAKE 1 CAPSULE BY MOUTH DAILY, Disp: 90 capsule, Rfl: 3  •  omeprazole (priLOSEC) 20 MG capsule, TAKE 1 CAPSULE BY MOUTH DAILY, Disp: 90 capsule, Rfl: 3  •  pregabalin (LYRICA) 150 MG capsule, 150 mg 2 (Two) Times a Day., Disp: , Rfl:   •  sacubitril-valsartan (Entresto) 24-26 MG tablet, Take 1 tablet by mouth 2 (Two) Times a Day., Disp: 60 tablet, Rfl: 11  •  tamsulosin (FLOMAX) 0.4 MG capsule 24 hr capsule, Take 1 capsule by mouth Daily., Disp: , Rfl:   •  topiramate (TOPAMAX) 25 MG tablet, Take 1 tablet by mouth 3 (Three) Times a Day., Disp: , Rfl:   •  Zinc Sulfate (ZINC-220 PO), Take  by mouth., Disp: , Rfl:   •  Vitron-C  MG tablet, Take 1 tablet by mouth 2 (Two) Times a Day With Meals., Disp: , Rfl:     Review of Systems   HENT: Negative.    Eyes: Negative.    Respiratory:  "Negative.    Cardiovascular: Positive for leg swelling.   Gastrointestinal: Negative.    Endocrine: Negative.    Genitourinary: Negative.    Musculoskeletal: Positive for arthralgias and back pain.   Skin: Negative.    Allergic/Immunologic: Negative.    Neurological: Positive for dizziness and weakness.   Hematological: Negative.    Psychiatric/Behavioral: Negative.    All other systems reviewed and are negative.    BP 90/70 (BP Location: Left arm, Patient Position: Sitting, Cuff Size: Adult)   Pulse 69   Resp 18   Ht 172.7 cm (68\")   Wt 123 kg (271 lb)   SpO2 94%   BMI 41.21 kg/m²     Physical Exam  Vitals and nursing note reviewed.   Constitutional:       Appearance: Normal appearance. He is well-developed.   HENT:      Head: Normocephalic and atraumatic.   Eyes:      General: No scleral icterus.     Pupils: Pupils are equal, round, and reactive to light.   Neck:      Thyroid: No thyromegaly.      Vascular: No carotid bruit or JVD.   Cardiovascular:      Rate and Rhythm: Normal rate and regular rhythm.      Pulses:           Carotid pulses are 2+ on the right side and 2+ on the left side.       Femoral pulses are 2+ on the right side and 2+ on the left side.       Popliteal pulses are 2+ on the right side and 2+ on the left side.        Dorsalis pedis pulses are 2+ on the right side and 2+ on the left side.        Posterior tibial pulses are 2+ on the right side and 2+ on the left side.      Heart sounds: Normal heart sounds.   Pulmonary:      Effort: Pulmonary effort is normal.      Breath sounds: Normal breath sounds.   Abdominal:      General: Bowel sounds are normal. There is no distension or abdominal bruit.      Palpations: Abdomen is soft. There is no mass.      Tenderness: There is no abdominal tenderness.   Musculoskeletal:         General: Normal range of motion.      Cervical back: Neck supple.      Right lower leg: Edema present.      Left lower leg: Edema present.   Lymphadenopathy:      " Cervical: No cervical adenopathy.   Skin:     General: Skin is warm and dry.   Neurological:      Mental Status: He is alert and oriented to person, place, and time.      Cranial Nerves: No cranial nerve deficit.      Sensory: No sensory deficit.   Psychiatric:         Mood and Affect: Mood normal.         Behavior: Behavior normal.         Thought Content: Thought content normal.         Judgment: Judgment normal.         XR Chest 2 View    Result Date: 6/2/2022  Narrative:  XR CHEST 2 VW- 6/2/2022 12:20 PM CDT  HISTORY: Z01.818; Z01.818-Encounter for other preprocedural examination   COMPARISON: February 17, 2022.  FINDINGS: Upright frontal and lateral radiographs of the chest were obtained.  The lungs are clear. Cardiac silhouette is normal. Pacing device present soft tissues of the left chest. Wires are present present median sternotomy.. The osseous structures and surrounding soft tissues demonstrate no acute abnormality.      Impression: 1. No radiographic evidence of acute cardiopulmonary process.   This report was finalized on 06/02/2022 12:32 by Dr. Krzysztof Parker MD.    US Carotid Bilateral    Result Date: 6/2/2022  Narrative: History: Carotid occlusive disease      Impression: Impression: 1. There is less than 50% stenosis of the right internal carotid artery. 2. There is 50-69% stenosis of the left internal carotid artery. 3. Antegrade flow is demonstrated in bilateral vertebral arteries.  Comments: Bilateral carotid vertebral arterial duplex scan was performed.  Grayscale imaging shows intimal thickening and calcified elements at the carotid bifurcation. The right internal carotid artery peak systolic velocity is 99.6 cm/sec. The end-diastolic velocity is 24.2 cm/sec. The right ICA/CCA ratio is approximately 0.8 . These findings correlate with less than 50% stenosis of the right internal carotid artery.  Grayscale imaging shows intimal thickening and calcified elements at the carotid bifurcation. The  left internal carotid artery peak systolic velocity is 139.7 cm/sec. The end-diastolic velocity is 50.4 cm/sec. The left ICA/CCA ratio is approximately 1.1 . These findings correlate with 50-69% stenosis of the left internal carotid artery.  Antegrade flow is demonstrated in bilateral vertebral arteries. There is greater than 50% stenosis in the left common carotid artery and the right external carotid artery. This report was finalized on 06/02/2022 15:58 by Dr. Shelton Renteria MD.    CT ABDOMEN PELVIS W WO CONTRAST Additional Contrast? Radiologist Recommendation (Urogram Protocol)    Result Date: 5/11/2022  Narrative: Examination. CT ABDOMEN PELVIS W WO CONTRAST 5/11/2022 9:44 AM History: Gross hematuria. DLP: 4937 mGycm. The automated exposure control was utilized to minimize the patient's radiation exposure. The CT scan of the abdomen and pelvis is performed before and after intravenous contrast enhancement. The images are acquired in axial plane during corticomedullary, nephrographic and urographic phases and subsequent reconstruction in coronal and sagittal planes. The comparison is made with the previous study dated 5/11/2011. The lung bases included in the study show atelectatic changes in the lower lungs, right more than the left. An ill-defined opacity in the right lower lung laterally appears to be at the junction of the right major and minor fissure and measures approximately 8 mm. This may represent focal pleural thickening at this level?. However this needs to be further evaluated with CT scan of the chest. The limited visualized cardiomediastinal structures show severe atheromatous changes of coronary arteries. The distal ends of the cardiac pacer are seen in place with extensive streak artifacts. A tiny low-density nodule in the dome of the left lobe of the liver was also seen in the previous study in 2011. No significant change. No new nodules. The spleen is normal. No radiopaque gallstones. The  pancreas appear normal. The adrenal glands bilaterally are normal. There is moderate lobulation of renal contour bilaterally. There are bilateral small, 2 to 3 mm, renal calculi. No hydronephrosis. There is a well-defined sharply marginated low-density nodule located anteriorly in the lower pole of the right kidney measuring 2 cm in AP dimension in axial plane images. The CT density suggest a cyst. It shows no contrast enhancement. The remaining kidneys are unremarkable. The ureters bilaterally appear normal.The poorly distended urinary bladder seen. Moderate thickening of the walls. No intrinsic abnormality. Prostate is enlarged with intrinsic calcification. There are small fat-containing inguinal hernias bilaterally. There is a tiny fat-containing umbilical hernia. The stomach duodenum and small bowel appear normal. Normal appendix seen. Moderate gas and stool are seen in the colon. There is diverticulosis of the distal colon. No evidence for diverticulitis. The atheromatous changes of the abdominal aorta and iliac arteries. No aneurysmal dilatation. There is no evidence of abdominal or pelvic lymphadenopathy. There is an enlarged abnormal lymph node in the left inguinal region measuring 6 x 1.4 cm. It was barely visible in the previous study. The images reviewed in bone window show no acute bony abnormality. Chronic degenerative changes of the lumbar spine with a mild levoscoliosis. There is hardware fusion of the lower lumbar spine. No hardware complication. There are multiple old healed ununited right lower rib fractures.    Impression: 1. Bilateral nonobstructing renal calculi. 2. Right renal cyst which was barely visible in the previous study in 2011. 3. The diverticulosis of the distal colon. No evidence for diverticulitis. 4. Moderate thickening of the wall of the poorly distended urinary bladder may partly be due to incomplete distention. Possibility of chronic partial outlet obstruction or chronic  cystitis is not excluded. 5. Enlarged prostate. 6. An abnormal enlarged lymph node in the left groin. Etiology and clinical significance is not certain. If clinically warranted please obtain percutaneous biopsy for further evaluation. Signed by Dr Jake Mascorro       Patient Active Problem List   Diagnosis   • Coronary artery disease involving autologous artery coronary bypass graft without angina pectoris   • Primary hypertension   • Anxiety   • Colon polyps   • Hyperlipidemia LDL goal <70   • Incisional hernia, without obstruction or gangrene   • BRBPR (bright red blood per rectum)   • Esophageal dysphagia   • Prostatism   • Nocturia   • Orthostasis   • Chronic midline low back pain without sciatica   • History of adenomatous polyp of colon   • Acute pain of right knee   • Morbidly obese ([facility])   • Pain of right heel   • S/P CABG x 2   • Stented coronary artery   • Flank pain   • Skin lesion   • Pleuritic chest pain   • Sick sinus syndrome ([facility])   • Coagulopathy ([facility])   • Pneumonia of right lung due to infectious organism   • Recurrent pleural effusion on right   • Cough   • SOB (shortness of breath)   • Former smoker, stopped smoking many years ago   • Non-seasonal allergic rhinitis   • ERNESTO (obstructive sleep apnea)   • Restrictive lung disease   • Class 3 severe obesity due to excess calories with serious comorbidity and body mass index (BMI) of 40.0 to 44.9 in adult ([facility])   • Empyema of pleura ([facility])   • Pneumonia   • 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[facility])   • S/P thoracostomy tube placement ([facility])   • Mild intermittent asthma without complication   • Family hx colonic polyps   • History of pneumonia   • Pleurisy   • History of fall   • UTI (urinary tract infection) -cystitis/prostatitis   • Sepsis ([facility])   • Compression fracture of T6 vertebra ([facility]), subacute   • Dehydration   • History of fall   • COPD (chronic obstructive pulmonary disease) ([facility])   • Lung nodules   • Chronic diastolic congestive heart  failure ([facility])   • Presence of cardiac pacemaker   • LVH (left ventricular hypertrophy)   • Pulmonary hypertension ([facility])   • Acute sinusitis   • Age-related cataract   • Benign prostatic hyperplasia   • Carotid artery stenosis   • Cervical spondylosis without myelopathy   • Chronic pain   • Closed fracture of greater tuberosity of humerus   • Closed fracture of rib   • Compression fracture of thoracic vertebra ([facility])   • Contusion of knee   • Degeneration of lumbar intervertebral disc   • Dementia ([facility])   • Dermatochalasis   • Diabetes mellitus ([facility])   • Difficult or painful urination   • Disorder of sacroiliac joint   • Dry eye syndrome   • Hiatal hernia   • History of smoking   • Homonymous hemianopia   • Hypothyroidism   • Iron deficiency   • Iron deficiency anemia   • Kidney stones   • Klippel-Feil sequence   • Knee joint effusion   • Lumbosacral radiculitis   • Lumbosacral spondylosis without myelopathy   • Open angle with borderline findings, low risk, bilateral   • Osteoarthritis of right knee   • Post poliomyelitis syndrome   • Recurrent major depression ([facility])   • Screening for depression   • Spondylolisthesis, acquired   • Vaccine refused by patient   • Vertigo   • Cobalamin deficiency   • Vitamin D deficiency        Diagnosis Plan   1. Bilateral carotid artery stenosis  US Carotid Bilateral   2. PAD (peripheral artery disease) ([facility])  US Ankle / Brachial Indices Extremity Complete   3. Primary hypertension     4. Hyperlipidemia LDL goal <70         Plan: After thoroughly evaluating Carlo Velasco, I believe the best course of action is to remain conservative from vascular surgery standpoint.  I did review his testing which shows less than 50% right carotid stenosis and 50 to 69% left carotid stenosis.  This is age-appropriate.  I will see him back in 1 years time with repeat noninvasive testing including a carotid duplex and ABIs..  He can follow-up when his wife's appointment is which will be closer to a  year and a half which is fine.  I did discuss vascular risk factors as they pertain to the progression of vascular disease including controlling his hypertension and hyperlipidemia.  These risk factors are currently stable.  Recommended he could wear compression stockings to aid with his swelling.  The patient is to continue taking their medications as previously discussed.   This was all discussed in full with complete understanding.  Thank you for allowing me to participate in the care of your patient.  Please do not hesitate to call with any questions or concerns.  We will keep you aware of any further encounters with Carlo Velasco.        Sincerely yours,         DO Fara Naranjo Jeffrey L, MD

## 2022-06-16 ENCOUNTER — OFFICE VISIT (OUTPATIENT)
Dept: UROLOGY | Age: 77
End: 2022-06-16
Payer: MEDICARE

## 2022-06-16 ENCOUNTER — HOSPITAL ENCOUNTER (OUTPATIENT)
Dept: GENERAL RADIOLOGY | Age: 77
Discharge: HOME | End: 2022-06-16
Payer: MEDICARE

## 2022-06-16 VITALS
BODY MASS INDEX: 40.47 KG/M2 | WEIGHT: 267 LBS | DIASTOLIC BLOOD PRESSURE: 78 MMHG | TEMPERATURE: 97.2 F | SYSTOLIC BLOOD PRESSURE: 121 MMHG | HEIGHT: 68 IN

## 2022-06-16 DIAGNOSIS — N48.1 BALANITIS: ICD-10-CM

## 2022-06-16 DIAGNOSIS — N20.0 RENAL CALCULUS, BILATERAL: ICD-10-CM

## 2022-06-16 DIAGNOSIS — N40.1 BENIGN PROSTATIC HYPERPLASIA WITH URINARY FREQUENCY: Primary | ICD-10-CM

## 2022-06-16 DIAGNOSIS — R35.0 BENIGN PROSTATIC HYPERPLASIA WITH URINARY FREQUENCY: Primary | ICD-10-CM

## 2022-06-16 LAB
APPEARANCE FLUID: CLEAR
BILIRUBIN, POC: NORMAL
BLOOD URINE, POC: NORMAL
CLARITY, POC: CLEAR
COLOR, POC: YELLOW
GLUCOSE URINE, POC: NORMAL
KETONES, POC: NORMAL
LEUKOCYTE EST, POC: NORMAL
NITRITE, POC: NORMAL
PH, POC: 5.5
PROTEIN, POC: NORMAL
SPECIFIC GRAVITY, POC: 1.02
UROBILINOGEN, POC: 0.2

## 2022-06-16 PROCEDURE — 74018 RADEX ABDOMEN 1 VIEW: CPT | Performed by: RADIOLOGY

## 2022-06-16 PROCEDURE — 3017F COLORECTAL CA SCREEN DOC REV: CPT | Performed by: NURSE PRACTITIONER

## 2022-06-16 PROCEDURE — G8427 DOCREV CUR MEDS BY ELIG CLIN: HCPCS | Performed by: NURSE PRACTITIONER

## 2022-06-16 PROCEDURE — 99214 OFFICE O/P EST MOD 30 MIN: CPT | Performed by: NURSE PRACTITIONER

## 2022-06-16 PROCEDURE — 1036F TOBACCO NON-USER: CPT | Performed by: NURSE PRACTITIONER

## 2022-06-16 PROCEDURE — 74018 RADEX ABDOMEN 1 VIEW: CPT

## 2022-06-16 PROCEDURE — 1123F ACP DISCUSS/DSCN MKR DOCD: CPT | Performed by: NURSE PRACTITIONER

## 2022-06-16 PROCEDURE — G8417 CALC BMI ABV UP PARAM F/U: HCPCS | Performed by: NURSE PRACTITIONER

## 2022-06-16 PROCEDURE — 81002 URINALYSIS NONAUTO W/O SCOPE: CPT | Performed by: NURSE PRACTITIONER

## 2022-06-16 ASSESSMENT — ENCOUNTER SYMPTOMS
NAUSEA: 0
BACK PAIN: 0
ABDOMINAL PAIN: 0
VOMITING: 0
ABDOMINAL DISTENTION: 0

## 2022-06-16 NOTE — H&P (VIEW-ONLY)
Kendall Salmeron is a 78 y.o. male who presents today   Chief Complaint   Patient presents with    Follow-up     I am here today for my 1 mo stone fu       Patient 68-year-old male who presents to clinic today for stone follow-up KUB prior. Has a history of gross hematuria unremarkable CT urogram and cystoscopy. He also has a history of nonobstructing renal stones. KUB today does indicate several nonobstructing stones in the right kidney. He was also diagnosed with balanitis at his last visit. He reports improvement in this although not completely resolved. He comes in today to schedule ESWL although he does request intervention with circumcision prior. Currently maintained on Brilinta and Plavix with Dr. Nora Gr. Currently being treated for a UTI with Dr. Randy Damian office, I do not have these results. Today denies any frequency, urgency, dysuria, flank pain, gross hematuria. Maintained on flomax for BPH.      Past Medical History:   Diagnosis Date    Anxiety     Arthritis    Gabriel Stager Asymptomatic old MI (myocardial infarction)     BPH (benign prostatic hypertrophy)     CAD (coronary artery disease)     Cardiac pacemaker     [facility]     Diabetes mellitus ([facility] [address]     Hiatal hernia     History of smoking     Hx of CABG     Hyperlipidemia     Hypertension     Kidney stones     Obesity     Peptic ulcer disease     Prostatitis        Past Surgical History:   Procedure Laterality Date    CARDIAC CATHETERIZATION  5.1.01/MDL, 6.23.03/UNKNOWN, 4.5.08/[facility], 4.14.08/[facility]    LT HEART    CARDIAC CATHETERIZATION  4/14/2008    EF greater 50% Patent stent in the LAD, Patent stent in the first marginal, Angiographically significant stenois in the mid RCA, Successful percutaneous coronary intervention utilizing a single drug eluting stent in the mid RCA, Normal LV systolic and diastolic function.      CARDIAC CATHETERIZATION  8/2/12  JDT    EF  50%    CARDIOVASCULAR STRESS TEST  11.8.06/MDL, 5.24.08/MDL, 9.9.09/MDL, 7.26.10/[facility]    STRESS ECHO    CARDIOVASCULAR STRESS TEST  12.7.05/[facility], 4.14.08/JDT    HOLTER MONITOR    CERVICAL FUSION      CORONARY ARTERY BYPASS GRAFT  8/8/2012    OPCABG X2, LIMA-LAD, SVG-RCA, RT EVH, LT LOWER LEG VEIN HARVEST, JPO    EPIDURAL STEROID INJECTION N/A 2/5/2019    EPIDURAL STEROID INJECTION L2-3 performed by Rafita Arredondo at [facility] HC INJECTION PROCEDURE FOR SACROILIAC JOINT Left 1/22/2019    SACROILIAC JOINT INJECTION performed by Uriel Arredondo at [address] [place] [address] Right 4/30/2019    SACROILIAC JOINT INJECTION performed by Leonor Chinchilla at [address] [place] [address] Right 12/10/2019    SACROILIAC JOINT INJECTION performed by Uriel Arredondo at [address] [place] [address] Right 3/3/2020    SACROILIAC JOINT INJECTION performed by Uriel Arredondo at [address] LUMB FACET LEVEL 1 BILATERAL Bilateral 2/19/2019    LUMBAR FACET BLOCK 1 L5-S1 performed by Rafita Arredondo at [address]   4.16.08/JDT    [employer]     SHOULDER SURGERY      RT SHOULDER    THYROIDECTOMY, PARTIAL         Current Outpatient Medications   Medication Sig Dispense Refill    nystatin-triamcinolone (MYCOLOG) 545202-2.5 UNIT/GM-% ointment Apply topically 2 times daily. Apply to foreskin and end of penis 15 g 1    tamsulosin (FLOMAX) 0.4 MG capsule Take 1 capsule by mouth daily 30 capsule 11    gabapentin (NEURONTIN) 100 MG capsule Take by mouth PATIENT DID NOT HAVE DOSAGE AVAILABLE TODAY. VERIFY DOSAGE FOR MED LIST .  atorvastatin (LIPITOR) 10 MG tablet UNKNOWN DOSAGE      omeprazole (PRILOSEC) 10 MG delayed release capsule Take by mouth PATIENT DID NOT HAVE DOSAGE AVAILABLE TODAY. VERIFY DOSAGE FOR MED LIST      donepezil (ARICEPT) 10 MG tablet Take by mouth nightly PATIENT DID NOT HAVE DOSAGE AVAILABLE TODAY. VERIFY DOSAGE FOR MED LIST      escitalopram (LEXAPRO) 10 MG tablet Take by mouth daily PATIENT DID NOT HAVE DOSAGE AVAILABLE TODAY. VERIFY DOSAGE FOR MED LIST      simvastatin (ZOCOR) 40 MG tablet Take 40 mg by mouth nightly.  cetirizine (ZYRTEC) 10 MG tablet Take 10 mg by mouth daily.  metoprolol (LOPRESSOR) 25 MG tablet Take 25 mg by mouth 2 times daily.  oxyCODONE-acetaminophen (PERCOCET) 5-325 MG per tablet Take 1 tablet by mouth every 4 hours as needed.  B Complex Vitamins (VITAMIN B COMPLEX PO) Take 1 tablet by mouth daily.  Calcium Carbonate-Vitamin D (CALCIUM 600+D PO) Take 1 tablet by mouth daily.  furosemide (LASIX) 40 MG tablet Take 40 mg by mouth 2 times daily.  Multiple Vitamins-Minerals (MULTIVITAMIN & MINERAL PO) Take  by mouth. UNSPECIFIED DATA       clopidogrel (PLAVIX) 75 MG tablet Take 75 mg by mouth daily. (Patient not taking: Reported on 6/16/2022)       No current facility-administered medications for this visit.        Allergies   Allergen Reactions    Meperidine Other (See Comments)     HEART STOPS    Other reaction(s): Unknown      Demerol     Statins Depletion Therapy      Causes leg cramps         Social History     Socioeconomic History    Marital status:      Spouse name: None    Number of children: None    Years of education: None    Highest education level: None   Occupational History    None   Tobacco Use    Smoking status: Former Smoker    Smokeless tobacco: Never Used   Vaping Use    Vaping Use: Never used   Substance and Sexual Activity    Alcohol use: Not Currently    Drug use: Not Currently    Sexual activity: None   Other Topics Concern    None   Social History Narrative    None     Social Determinants of Health     Financial Resource Strain:     Difficulty of Paying Living Expenses: Not on file   Food Insecurity:     Worried About Running Out of Food in the Last Year: Not on file    [address] [facility] [address] in Tenderness: There is no abdominal tenderness. There is no right CVA tenderness or left CVA tenderness. Genitourinary:      Neurological:      Mental Status: He is alert and oriented to person, place, and time. Mental status is at baseline. Psychiatric:         Mood and Affect: Mood normal.         Behavior: Behavior normal.       DATA:    Results for orders placed or performed in visit on 06/16/22   POCT Urinalysis no Micro   Result Value Ref Range    Color, UA YELLOW     Clarity, UA CLEAR     Glucose, UA POC 1000mg/dL     Bilirubin, UA NEG     Ketones, UA NEG     Spec Grav, UA 1.020     Blood, UA POC NEG     pH, UA 5.5     Protein, UA POC NEG     Urobilinogen, UA 0.2     Leukocytes, UA NEG     Nitrite, UA NEG     Appearance, Fluid Clear Clear, Slightly Cloudy     Lab Results   Component Value Date    PSA 3.49 05/13/2022     Imaging  I reviewed the KUB from today. He does have bilateral renal calculi more so on the right. The largest stone being the lower pole around 3 to 4 mm in size. 1. Benign prostatic hyperplasia with urinary frequency  Is pleased with his voiding symptoms currently maintained on Flomax. - POCT Urinalysis no Micro    2. Balanitis  Has been using Mycolog for balanitis with improvement. He does continue to have some mild erythema. He does inquire about circumcision today. We discussed circumcision versus dorsal slit. We did discuss risk and complications associated with circumcision. He does understand these risks and is willing to proceed. We will contact cardiology to hold Brilinta and Plavix. We will tentatively schedule him for surgery. He does request this being after 4 July. 3. Renal calculus, bilateral  Bilateral renal stones easily visualized on KUB on the right. We did discuss ESWL at this time he would like to have circumcision prior and then proceed with ESWL for stone burden.       Orders Placed This Encounter   Procedures    POCT Urinalysis no Micro        No follow-ups on file. All information inputted into the note by the MA to include chief complaint, past medical history, past surgical history, medications, allergies, social and family history and review of systems has been reviewed and updated as needed by me. EMR Dragon/transcription disclaimer: Much of this documentt is electronic  transcription/translation of spoken language to printed text. The  electronic translation of spoken language may be erroneous, or at times,  nonsensical words or phrases may be inadvertently transcribed.  Although I  have reviewed the document for such errors, some may still exist.

## 2022-06-16 NOTE — PROGRESS NOTES
Amita Hassan is a 78 y.o. male who presents today   Chief Complaint   Patient presents with    Follow-up     I am here today for my 1 mo stone fu       Patient 61-year-old male who presents to clinic today for stone follow-up KUB prior. Has a history of gross hematuria unremarkable CT urogram and cystoscopy. He also has a history of nonobstructing renal stones. KUB today does indicate several nonobstructing stones in the right kidney. He was also diagnosed with balanitis at his last visit. He reports improvement in this although not completely resolved. He comes in today to schedule ESWL although he does request intervention with circumcision prior. Currently maintained on Brilinta and Plavix with Dr. Jeff Gabriel. Currently being treated for a UTI with Dr. Fariha Tejeda office, I do not have these results. Today denies any frequency, urgency, dysuria, flank pain, gross hematuria. Maintained on flomax for BPH.      Past Medical History:   Diagnosis Date    Anxiety     Arthritis    [place] Asymptomatic old MI (myocardial infarction)     BPH (benign prostatic hypertrophy)     CAD (coronary artery disease)     Cardiac pacemaker     [facility])     Diabetes mellitus ([facility] [address]     Hiatal hernia     History of smoking     Hx of CABG     Hyperlipidemia     Hypertension     Kidney stones     Obesity     Peptic ulcer disease     Prostatitis        Past Surgical History:   Procedure Laterality Date    CARDIAC CATHETERIZATION  5.1.01/MDL, 6.23.03/UNKNOWN, 4.5.08/[facility], 4.14.08/JDT    LT HEART    CARDIAC CATHETERIZATION  4/14/2008    EF greater 50% Patent stent in the LAD, Patent stent in the first marginal, Angiographically significant stenois in the mid RCA, Successful percutaneous coronary intervention utilizing a single drug eluting stent in the mid RCA, Normal LV systolic and diastolic function.      CARDIAC CATHETERIZATION  8/2/12  JDT    EF  50%    CARDIOVASCULAR STRESS TEST  11.8.06/MDL, 5.24.08/MDL, 9.9.09/MDL, 7.26.10/[facility]    STRESS ECHO    CARDIOVASCULAR STRESS TEST  12.7.05/[facility], 4.14.08/JDT    HOLTER MONITOR    CERVICAL FUSION      CORONARY ARTERY BYPASS GRAFT  8/8/2012    OPCABG X2, LIMA-LAD, SVG-RCA, RT EVH, LT LOWER LEG VEIN HARVEST, JPO    EPIDURAL STEROID INJECTION N/A 2/5/2019    EPIDURAL STEROID INJECTION L2-3 performed by Rafita Arredondo at [address] [facility] HC INJECTION PROCEDURE FOR SACROILIAC JOINT Left 1/22/2019    SACROILIAC JOINT INJECTION performed by Tez Arredondo at [address] [place] [address] Right 4/30/2019    SACROILIAC JOINT INJECTION performed by Demario Doty at [address] [place] [address] Right 12/10/2019    SACROILIAC JOINT INJECTION performed by Tez Arredondo at [address] [place] [address] Right 3/3/2020    SACROILIAC JOINT INJECTION performed by Tez Arredondo at [address] LUMB FACET LEVEL 1 BILATERAL Bilateral 2/19/2019    LUMBAR FACET BLOCK 1 L5-S1 performed by Rafita Arredondo at [address] [facility]   4.16.08/JDT    [employer]     SHOULDER SURGERY      RT SHOULDER    THYROIDECTOMY, PARTIAL         Current Outpatient Medications   Medication Sig Dispense Refill    nystatin-triamcinolone (MYCOLOG) 407574-3.0 UNIT/GM-% ointment Apply topically 2 times daily. Apply to foreskin and end of penis 15 g 1    tamsulosin (FLOMAX) 0.4 MG capsule Take 1 capsule by mouth daily 30 capsule 11    gabapentin (NEURONTIN) 100 MG capsule Take by mouth PATIENT DID NOT HAVE DOSAGE AVAILABLE TODAY. VERIFY DOSAGE FOR MED LIST .  atorvastatin (LIPITOR) 10 MG tablet UNKNOWN DOSAGE      omeprazole (PRILOSEC) 10 MG delayed release capsule Take by mouth PATIENT DID NOT HAVE DOSAGE AVAILABLE TODAY. VERIFY DOSAGE FOR MED LIST      donepezil (ARICEPT) 10 MG tablet Take by mouth nightly PATIENT DID NOT HAVE DOSAGE AVAILABLE TODAY. VERIFY DOSAGE FOR MED LIST      escitalopram (LEXAPRO) 10 MG tablet Take by mouth daily PATIENT DID NOT HAVE DOSAGE AVAILABLE TODAY. VERIFY DOSAGE FOR MED LIST      simvastatin (ZOCOR) 40 MG tablet Take 40 mg by mouth nightly.  cetirizine (ZYRTEC) 10 MG tablet Take 10 mg by mouth daily.  metoprolol (LOPRESSOR) 25 MG tablet Take 25 mg by mouth 2 times daily.  oxyCODONE-acetaminophen (PERCOCET) 5-325 MG per tablet Take 1 tablet by mouth every 4 hours as needed.  B Complex Vitamins (VITAMIN B COMPLEX PO) Take 1 tablet by mouth daily.  Calcium Carbonate-Vitamin D (CALCIUM 600+D PO) Take 1 tablet by mouth daily.  furosemide (LASIX) 40 MG tablet Take 40 mg by mouth 2 times daily.  Multiple Vitamins-Minerals (MULTIVITAMIN & MINERAL PO) Take  by mouth. UNSPECIFIED DATA       clopidogrel (PLAVIX) 75 MG tablet Take 75 mg by mouth daily. (Patient not taking: Reported on 6/16/2022)       No current facility-administered medications for this visit.        Allergies   Allergen Reactions    Meperidine Other (See Comments)     HEART STOPS    Other reaction(s): Unknown      Demerol     Statins Depletion Therapy      Causes leg cramps         Social History     Socioeconomic History    Marital status:      Spouse name: None    Number of children: None    Years of education: None    Highest education level: None   Occupational History    None   Tobacco Use    Smoking status: Former Smoker    Smokeless tobacco: Never Used   Vaping Use    Vaping Use: Never used   Substance and Sexual Activity    Alcohol use: Not Currently    Drug use: Not Currently    Sexual activity: None   Other Topics Concern    None   Social History Narrative    None     Social Determinants of Health     Financial Resource Strain:     Difficulty of Paying Living Expenses: Not on file   Food Insecurity:     Worried About Running Out of Food in the Last Year: Not on file    [address] [facility] [address] in the Last Year: Not on file   Transportation Needs:     Lack of Transportation (Medical): Not on file    Lack of Transportation (Non-Medical): Not on file   Physical Activity:     Days of Exercise per Week: Not on file    Minutes of Exercise per Session: Not on file   Stress:     Feeling of Stress : Not on file   Social Connections:     Frequency of Communication with Friends and Family: Not on file    Frequency of Social Gatherings with Friends and Family: Not on file    Attends Latter-day Services: Not on file    Active Member of [address] [place] [address] [employer] or Organizations: Not on file    Attends Club or Organization Meetings: Not on file    Marital Status: Not on file   Intimate Partner Violence:     Fear of Current or Ex-Partner: Not on file    Emotionally Abused: Not on file    Physically Abused: Not on file    Sexually Abused: Not on file   Housing Stability:     Unable to Pay for Housing in the Last Year: Not on file    Number of [place] in the Last Year: Not on file    Unstable Housing in the Last Year: Not on file       History reviewed. No pertinent family history. REVIEW OF SYSTEMS:  Review of Systems   Constitutional: Negative for chills and fever. Gastrointestinal: Negative for abdominal distention, abdominal pain, nausea and vomiting. Genitourinary: Negative for difficulty urinating, dysuria, flank pain, frequency, hematuria and urgency. Musculoskeletal: Negative for back pain and gait problem. Psychiatric/Behavioral: Negative for agitation and confusion. PHYSICAL EXAM:  /78   Temp 97.2 °F (36.2 °C) (Temporal)   Ht 5' 8\" (1.727 m)   Wt 267 lb (121.1 kg)   BMI 40.60 kg/m²   Physical Exam  Vitals and nursing note reviewed. Constitutional:       General: He is not in acute distress. Appearance: Normal appearance. He is not ill-appearing. Pulmonary:      Effort: Pulmonary effort is normal. No respiratory distress. Abdominal:      General: There is no distension. Tenderness: There is no abdominal tenderness. There is no right CVA tenderness or left CVA tenderness. Genitourinary:      Neurological:      Mental Status: He is alert and oriented to person, place, and time. Mental status is at baseline. Psychiatric:         Mood and Affect: Mood normal.         Behavior: Behavior normal.       DATA:    Results for orders placed or performed in visit on 06/16/22   POCT Urinalysis no Micro   Result Value Ref Range    Color, UA YELLOW     Clarity, UA CLEAR     Glucose, UA POC 1000mg/dL     Bilirubin, UA NEG     Ketones, UA NEG     Spec Grav, UA 1.020     Blood, UA POC NEG     pH, UA 5.5     Protein, UA POC NEG     Urobilinogen, UA 0.2     Leukocytes, UA NEG     Nitrite, UA NEG     Appearance, Fluid Clear Clear, Slightly Cloudy     Lab Results   Component Value Date    PSA 3.49 05/13/2022     Imaging  I reviewed the KUB from today. He does have bilateral renal calculi more so on the right. The largest stone being the lower pole around 3 to 4 mm in size. 1. Benign prostatic hyperplasia with urinary frequency  Is pleased with his voiding symptoms currently maintained on Flomax. - POCT Urinalysis no Micro    2. Balanitis  Has been using Mycolog for balanitis with improvement. He does continue to have some mild erythema. He does inquire about circumcision today. We discussed circumcision versus dorsal slit. We did discuss risk and complications associated with circumcision. He does understand these risks and is willing to proceed. We will contact cardiology to hold Brilinta and Plavix. We will tentatively schedule him for surgery. He does request this being after 4 July. 3. Renal calculus, bilateral  Bilateral renal stones easily visualized on KUB on the right. We did discuss ESWL at this time he would like to have circumcision prior and then proceed with ESWL for stone burden.       Orders Placed This Encounter   Procedures    POCT Urinalysis no Micro        No

## 2022-06-20 ENCOUNTER — TELEPHONE (OUTPATIENT)
Dept: CARDIOLOGY | Facility: CLINIC | Age: 77
End: 2022-06-20

## 2022-06-20 NOTE — TELEPHONE ENCOUNTER
Patient is needing cardiac clearance for a scheduled Circumcision procedure with Dr Carr. Patient is being asked to hold his Plavix 7 days prior to this procedure.       LOV: 5/17/22  HX: CHF CAD AND SSS        Please advise

## 2022-06-27 ENCOUNTER — OFFICE VISIT (OUTPATIENT)
Dept: CARDIOLOGY | Facility: CLINIC | Age: 77
End: 2022-06-27

## 2022-06-27 VITALS
WEIGHT: 276 LBS | HEART RATE: 72 BPM | BODY MASS INDEX: 41.83 KG/M2 | SYSTOLIC BLOOD PRESSURE: 142 MMHG | HEIGHT: 68 IN | OXYGEN SATURATION: 98 % | DIASTOLIC BLOOD PRESSURE: 82 MMHG

## 2022-06-27 DIAGNOSIS — I51.7 LVH (LEFT VENTRICULAR HYPERTROPHY): ICD-10-CM

## 2022-06-27 DIAGNOSIS — I50.32 CHRONIC DIASTOLIC CONGESTIVE HEART FAILURE: Primary | ICD-10-CM

## 2022-06-27 DIAGNOSIS — I49.5 SICK SINUS SYNDROME: ICD-10-CM

## 2022-06-27 DIAGNOSIS — G47.33 OSA (OBSTRUCTIVE SLEEP APNEA): ICD-10-CM

## 2022-06-27 DIAGNOSIS — Z95.1 S/P CABG X 2: ICD-10-CM

## 2022-06-27 DIAGNOSIS — I10 PRIMARY HYPERTENSION: ICD-10-CM

## 2022-06-27 DIAGNOSIS — I25.810 CORONARY ARTERY DISEASE INVOLVING AUTOLOGOUS ARTERY CORONARY BYPASS GRAFT WITHOUT ANGINA PECTORIS: ICD-10-CM

## 2022-06-27 DIAGNOSIS — J44.9 CHRONIC OBSTRUCTIVE PULMONARY DISEASE, UNSPECIFIED COPD TYPE: ICD-10-CM

## 2022-06-27 DIAGNOSIS — E66.01 CLASS 3 SEVERE OBESITY DUE TO EXCESS CALORIES WITH SERIOUS COMORBIDITY AND BODY MASS INDEX (BMI) OF 40.0 TO 44.9 IN ADULT: ICD-10-CM

## 2022-06-27 DIAGNOSIS — I27.20 PULMONARY HYPERTENSION: ICD-10-CM

## 2022-06-27 DIAGNOSIS — E78.5 HYPERLIPIDEMIA LDL GOAL <70: ICD-10-CM

## 2022-06-27 DIAGNOSIS — Z95.0 PRESENCE OF CARDIAC PACEMAKER: ICD-10-CM

## 2022-06-27 DIAGNOSIS — Z95.5 STENTED CORONARY ARTERY: ICD-10-CM

## 2022-06-27 PROCEDURE — 99214 OFFICE O/P EST MOD 30 MIN: CPT | Performed by: NURSE PRACTITIONER

## 2022-06-27 RX ORDER — SACUBITRIL AND VALSARTAN 24; 26 MG/1; MG/1
1 TABLET, FILM COATED ORAL 2 TIMES DAILY
Qty: 60 TABLET | Refills: 11 | Status: SHIPPED | OUTPATIENT
Start: 2022-06-27

## 2022-06-27 RX ORDER — CEFUROXIME AXETIL 500 MG/1
TABLET ORAL
COMMUNITY
Start: 2022-06-09 | End: 2022-11-08

## 2022-06-27 NOTE — PROGRESS NOTES
Chief Complaint  Congestive Heart Failure (Started Jardiance LOV. He has stopped this 1 week ago. /* Wife decreased dose of Entresto to once daily due to fatigue. ) and Results (Lab)    Subjective          Carlo Velasco presents to [facility] for routine follow-up of medication adjustments.  He was started on Jardiance 10 mg daily at his last office visit on 5/17/2022.  Follow-up BMP on 5/27/2022 revealed normal creatinine and potassium. However, pt's wife states this was discontinued one week ago due to fatigue. She has also decreased pt's Entresto dose to once a day. He has coronary artery disease status post CABG x2 (LIMA to LAD and SVG to RCA) 8/8/2012 with subsequent 2.5 x 18 mm Xience Alpine drug-eluting stent to the mid LAD 7/8/2015, 2.5 x 18 mm Xience drug-eluting stent to the proximal OM1 10/21/2016 and 2.5 x 12 mm Synergy drug-eluting stent to OM1 for severe in-stent restenosis 4/28/2020, sick sinus syndrome status post pacemaker, chronic diastolic congestive heart failure, left ventricular hypertrophy, mild pulmonary hypertension, thyroid disease status post partial thyroidectomy, hypertension, hyperlipidemia, GERD, obstructive sleep apnea, COPD and obesity. He continues to report dyspnea with mild exertion. He reports intermittent bilateral lower extremity edema as well. Patient denies chest pain, palpitations, dizziness, syncope, orthopnea, PND or decreased stamina.  Patient denies any signs of bleeding.    Congestive Heart Failure  Presents for follow-up visit. Associated symptoms include edema and shortness of breath. Pertinent negatives include no abdominal pain, chest pain, chest pressure, claudication, fatigue, muscle weakness, near-syncope, nocturia, orthopnea, palpitations, paroxysmal nocturnal dyspnea or unexpected weight change. The symptoms have been stable. His past medical history is significant for CAD. Compliance with total regimen is 51-75%. Compliance with  "diet is 51-75%. Compliance with exercise is 51-75%. Compliance with medications is %.   Coronary Artery Disease  Presents for follow-up visit. Symptoms include shortness of breath. Pertinent negatives include no chest pain, chest pressure, chest tightness, dizziness, leg swelling, muscle weakness, palpitations or weight gain. Risk factors include hyperlipidemia and obesity. His past medical history is significant for CHF. The symptoms have been stable. Compliance with diet is variable. Compliance with exercise is variable. Compliance with medications is good.   Hypertension  This is a chronic problem. The current episode started more than 1 year ago. The problem is controlled. Associated symptoms include shortness of breath. Pertinent negatives include no anxiety, blurred vision, chest pain, headaches, malaise/fatigue, neck pain, orthopnea, palpitations, peripheral edema, PND or sweats. Risk factors for coronary artery disease include obesity, male gender and dyslipidemia. Current antihypertension treatment includes beta blockers, diuretics, direct vasodilators and angiotensin blockers. The current treatment provides significant improvement. Hypertensive end-organ damage includes CAD/MI and heart failure. Identifiable causes of hypertension include a thyroid problem.   Hyperlipidemia  This is a chronic problem. The current episode started more than 1 year ago. Exacerbating diseases include obesity. Associated symptoms include shortness of breath. Pertinent negatives include no chest pain. Current antihyperlipidemic treatment includes statins. Risk factors for coronary artery disease include hypertension, male sex, obesity and dyslipidemia.     I have reviewed and confirmed the accuracy of the HPI ANALI Rucker      Objective   Vital Signs:   /82   Pulse 72   Ht 172.7 cm (68\")   Wt 125 kg (276 lb)   SpO2 98%   BMI 41.97 kg/m²     Vitals and nursing note reviewed.   Constitutional:       " General: Awake.      Appearance: Normal and healthy appearance. Well-developed and not in distress. Morbidly obese.   Eyes:      General: Lids are normal.      Conjunctiva/sclera: Conjunctivae normal.      Pupils: Pupils are equal, round, and reactive to light.   HENT:      Head: Normocephalic and atraumatic.      Nose: Nose normal.   Neck:      Vascular: No JVR. JVD normal.   Pulmonary:      Effort: Pulmonary effort is normal.      Breath sounds: Examination of the right-lower field reveals rales. No decreased breath sounds. No wheezing. No rhonchi. Rales present.   Chest:      Chest wall: Not tender to palpatation.   Cardiovascular:      PMI at left midclavicular line. Normal rate. Regular rhythm. Normal S1. Normal S2.      Murmurs: There is no murmur.      No gallop. No click. No rub.   Pulses:     Intact distal pulses.   Edema:     Peripheral edema present.     Pretibial: bilateral 1+ edema of the pretibial area.     Ankle: bilateral 1+ edema of the ankle.     Feet: bilateral 1+ edema of the feet.  Abdominal:      General: Bowel sounds are normal.      Palpations: Abdomen is soft.      Tenderness: There is no abdominal tenderness.   Musculoskeletal: Normal range of motion.         General: No tenderness.      Cervical back: Normal range of motion. Skin:     General: Skin is warm and dry.   Neurological:      General: No focal deficit present.      Mental Status: Alert, oriented to person, place, and time and oriented to person, place and time.   Psychiatric:         Attention and Perception: Attention and perception normal.         Mood and Affect: Mood and affect normal.         Speech: Speech normal.         Behavior: Behavior normal. Behavior is cooperative.         Thought Content: Thought content normal.         Cognition and Memory: Cognition and memory normal.         Judgment: Judgment normal.        Result Review :   The following data was reviewed by: ANALI Rucker on 6/27/2022:  Common labs     Common Labsle 5/4/22 5/4/22 5/4/22 5/4/22 5/4/22 5/11/22 5/13/22    1157 1157 1157 1157 1157     Glucose   100       BUN   22 (A)       Creatinine   0.70       eGFR Non  Am      >60    eGFR African Am      >60    Sodium   139       Potassium   4.0       Chloride   107       Calcium   9.2       Albumin   4.20       Total Bilirubin   0.6       Alkaline Phosphatase   82       AST (SGOT)   28       ALT (SGPT)   23       WBC 7.70         Hemoglobin 13.2         Hematocrit 41.8         Platelets 194         Total Cholesterol    154      Triglycerides    84      HDL Cholesterol    51      LDL Cholesterol     87      Hemoglobin A1C  5.9        PSA     4.560 (A)  3.49   (A) Abnormal value       Comments are available for some flowsheets but are not being displayed.           Data reviewed: Cardiology studies 2d echo 4/28/20 and multiple left heart catheterizations.           Assessment and Plan    Diagnoses and all orders for this visit:    1. Chronic diastolic congestive heart failure (HCC) (Primary)- NYHA class II. Compensated.  Resume Entresto 24/26 mg twice daily. Reviewed signs and symptoms of CHF and what to report with the patient. Patient instructed to restrict sodium and weigh daily. Report weight gain of greater than 2 lbs overnight or 5 lbs in 1 week. Pt verbalized understanding of instructions and plan of care.  Consider re-initiation of Jardiance in the future.     2. Coronary artery disease involving autologous artery coronary bypass graft without angina pectoris- no clinical signs of ischemia.  Stable.  Continue Imdur.    3. S/P CABG x 2-LIMA to LAD and SVG to RCA 8/8/12.     4. Stented coronary artery- 2.5 x 18 mm Xience Alpine drug-eluting stent to the mid LAD 7/8/2015, 2.5 x 18 mm Xience drug-eluting stent to the proximal OM1 10/21/2016 and 2.5 x 12 mm Synergy drug-eluting stent to OM1 for severe in-stent restenosis 4/28/2020.  Patient continues on Plavix.  Denies bleeding.    5. Sick sinus  syndrome (HCC)-status post pacemaker.  Stable.    6. Presence of cardiac pacemaker-device interrogated 4/12/2022.  1.7% a paced, 3.8% V paced.  No alerts.  Normal function, stable thresholds and adequate battery.      7. LVH (left ventricular hypertrophy)-mild on 2D echo 4/28/2020.  Stable.    8. Pulmonary hypertension (HCC)-mild on 2D echo 4/28/2020.  Stable.    9. Primary hypertension-blood pressure is elevated in office today, however pt states he has not taken his medications today. Continue to monitor following above medication adjustments.  Monitor and record daily blood pressure. Report readings consistently higher than 130/90 or consistently lower than 100/60.     10. Hyperlipidemia LDL goal <70-management per PCP.  Continue atorvastatin.    11. Chronic obstructive pulmonary disease, unspecified COPD type (HCC)- stable on room air. Pt follows with Dr. Buckley with pulmonology.     12. ERNESTO (obstructive sleep apnea)- pt is non-compliant with CPAP. He has been educated on the risks of untreated sleep apnea, including atrial fibrillation and worsening heart failure. He verbalized understanding.     13. Class 3 severe obesity due to excess calories with serious comorbidity and body mass index (BMI) of 40.0 to 44.9 in adult (HCC)-Patient's Body mass index is 41.97 kg/m². indicating that he is morbidly obese (BMI >40). Obesity-related health conditions include the following: hypertension, coronary heart disease and dyslipidemias. Obesity is unchanged. BMI is is above average; no BMI management plan is appropriate. We discussed low calorie, low carb based diet program, portion control and increasing exercise.      Follow Up   Return in about 3 months (around 9/27/2022) for Next scheduled follow up.  Patient was given instructions and counseling regarding his condition or for health maintenance advice. Please see specific information pulled into the AVS if appropriate.

## 2022-06-29 ENCOUNTER — HOSPITAL ENCOUNTER (OUTPATIENT)
Dept: PREADMISSION TESTING | Age: 77
Discharge: HOME | End: 2022-07-03
Payer: MEDICARE

## 2022-06-29 VITALS — WEIGHT: 265 LBS | BODY MASS INDEX: 40.29 KG/M2

## 2022-06-29 LAB
ALBUMIN SERPL-MCNC: 3.6 G/DL (ref 3.5–5.2)
ALP BLD-CCNC: 77 U/L (ref 40–130)
ALT SERPL-CCNC: 18 U/L (ref 5–41)
ANION GAP SERPL CALCULATED.3IONS-SCNC: 11 MMOL/L (ref 7–19)
AST SERPL-CCNC: 19 U/L (ref 5–40)
BASOPHILS ABSOLUTE: 0 K/UL (ref 0–0.2)
BASOPHILS RELATIVE PERCENT: 0.4 % (ref 0–1)
BILIRUB SERPL-MCNC: 0.3 MG/DL (ref 0.2–1.2)
BUN BLDV-MCNC: 25 MG/DL (ref 8–23)
CALCIUM SERPL-MCNC: 8.8 MG/DL (ref 8.8–10.2)
CHLORIDE BLD-SCNC: 104 MMOL/L (ref 98–111)
CO2: 26 MMOL/L (ref 22–29)
CREAT SERPL-MCNC: 1 MG/DL (ref 0.5–1.2)
EOSINOPHILS ABSOLUTE: 0.3 K/UL (ref 0–0.6)
EOSINOPHILS RELATIVE PERCENT: 3.8 % (ref 0–5)
GFR AFRICAN AMERICAN: >59
GFR NON-AFRICAN AMERICAN: >60
GLUCOSE BLD-MCNC: 120 MG/DL (ref 74–109)
HCT VFR BLD CALC: 41.8 % (ref 42–52)
HEMOGLOBIN: 12.9 G/DL (ref 14–18)
IMMATURE GRANULOCYTES #: 0.1 K/UL
INR BLD: 0.97 (ref 0.88–1.18)
LYMPHOCYTES ABSOLUTE: 1.5 K/UL (ref 1.1–4.5)
LYMPHOCYTES RELATIVE PERCENT: 21.6 % (ref 20–40)
MCH RBC QN AUTO: 28.2 PG (ref 27–31)
MCHC RBC AUTO-ENTMCNC: 30.9 G/DL (ref 33–37)
MCV RBC AUTO: 91.3 FL (ref 80–94)
MONOCYTES ABSOLUTE: 0.5 K/UL (ref 0–0.9)
MONOCYTES RELATIVE PERCENT: 7.8 % (ref 0–10)
NEUTROPHILS ABSOLUTE: 4.5 K/UL (ref 1.5–7.5)
NEUTROPHILS RELATIVE PERCENT: 65.1 % (ref 50–65)
PDW BLD-RTO: 15.3 % (ref 11.5–14.5)
PLATELET # BLD: 148 K/UL (ref 130–400)
PMV BLD AUTO: 9.3 FL (ref 9.4–12.4)
POTASSIUM SERPL-SCNC: 3.9 MMOL/L (ref 3.5–5)
PROTHROMBIN TIME: 12.8 SEC (ref 12–14.6)
RBC # BLD: 4.58 M/UL (ref 4.7–6.1)
SODIUM BLD-SCNC: 141 MMOL/L (ref 136–145)
TOTAL PROTEIN: 6.1 G/DL (ref 6.6–8.7)
WBC # BLD: 6.9 K/UL (ref 4.8–10.8)

## 2022-06-29 PROCEDURE — 80053 COMPREHEN METABOLIC PANEL: CPT

## 2022-06-29 PROCEDURE — 93005 ELECTROCARDIOGRAM TRACING: CPT | Performed by: NURSE PRACTITIONER

## 2022-06-29 PROCEDURE — 85025 COMPLETE CBC W/AUTO DIFF WBC: CPT

## 2022-06-29 PROCEDURE — 85610 PROTHROMBIN TIME: CPT

## 2022-06-30 LAB
EKG P AXIS: 54 DEGREES
EKG P-R INTERVAL: 198 MS
EKG Q-T INTERVAL: 388 MS
EKG QRS DURATION: 102 MS
EKG QTC CALCULATION (BAZETT): 409 MS
EKG T AXIS: 21 DEGREES

## 2022-07-05 RX ORDER — ISOSORBIDE MONONITRATE 30 MG/1
30 TABLET, EXTENDED RELEASE ORAL EVERY MORNING
Qty: 90 TABLET | Refills: 3 | Status: SHIPPED | OUTPATIENT
Start: 2022-07-05

## 2022-07-06 ENCOUNTER — ANESTHESIA (OUTPATIENT)
Dept: OPERATING ROOM | Age: 77
End: 2022-07-06
Payer: MEDICARE

## 2022-07-06 ENCOUNTER — ANESTHESIA EVENT (OUTPATIENT)
Dept: OPERATING ROOM | Age: 77
End: 2022-07-06
Payer: MEDICARE

## 2022-07-06 ENCOUNTER — HOSPITAL ENCOUNTER (OUTPATIENT)
Age: 77
Setting detail: OUTPATIENT SURGERY
Discharge: HOME | End: 2022-07-06
Attending: UROLOGY | Admitting: UROLOGY
Payer: MEDICARE

## 2022-07-06 VITALS
OXYGEN SATURATION: 95 % | DIASTOLIC BLOOD PRESSURE: 81 MMHG | BODY MASS INDEX: 39.4 KG/M2 | HEART RATE: 70 BPM | WEIGHT: 260 LBS | RESPIRATION RATE: 18 BRPM | SYSTOLIC BLOOD PRESSURE: 139 MMHG | HEIGHT: 68 IN | TEMPERATURE: 97.6 F

## 2022-07-06 DIAGNOSIS — N47.8 REDUNDANT PREPUCE: ICD-10-CM

## 2022-07-06 DIAGNOSIS — N48.1 BALANITIS: ICD-10-CM

## 2022-07-06 DIAGNOSIS — G89.18 POSTOPERATIVE PAIN: Primary | ICD-10-CM

## 2022-07-06 LAB
GLUCOSE BLD-MCNC: 101 MG/DL (ref 70–99)
PERFORMED ON: ABNORMAL

## 2022-07-06 PROCEDURE — 2580000003 HC RX 258: Performed by: ANESTHESIOLOGY

## 2022-07-06 PROCEDURE — 3600000004 HC SURGERY LEVEL 4 BASE: Performed by: UROLOGY

## 2022-07-06 PROCEDURE — 7100000010 HC PHASE II RECOVERY - FIRST 15 MIN: Performed by: UROLOGY

## 2022-07-06 PROCEDURE — 2500000003 HC RX 250 WO HCPCS: Performed by: UROLOGY

## 2022-07-06 PROCEDURE — 3700000001 HC ADD 15 MINUTES (ANESTHESIA): Performed by: UROLOGY

## 2022-07-06 PROCEDURE — 2500000003 HC RX 250 WO HCPCS: Performed by: NURSE ANESTHETIST, CERTIFIED REGISTERED

## 2022-07-06 PROCEDURE — 88304 TISSUE EXAM BY PATHOLOGIST: CPT

## 2022-07-06 PROCEDURE — 6360000002 HC RX W HCPCS: Performed by: NURSE ANESTHETIST, CERTIFIED REGISTERED

## 2022-07-06 PROCEDURE — 7100000000 HC PACU RECOVERY - FIRST 15 MIN: Performed by: UROLOGY

## 2022-07-06 PROCEDURE — 6370000000 HC RX 637 (ALT 250 FOR IP): Performed by: UROLOGY

## 2022-07-06 PROCEDURE — 2580000003 HC RX 258: Performed by: NURSE ANESTHETIST, CERTIFIED REGISTERED

## 2022-07-06 PROCEDURE — 2709999900 HC NON-CHARGEABLE SUPPLY: Performed by: UROLOGY

## 2022-07-06 PROCEDURE — 2580000003 HC RX 258: Performed by: NURSE PRACTITIONER

## 2022-07-06 PROCEDURE — 7100000001 HC PACU RECOVERY - ADDTL 15 MIN: Performed by: UROLOGY

## 2022-07-06 PROCEDURE — 7100000011 HC PHASE II RECOVERY - ADDTL 15 MIN: Performed by: UROLOGY

## 2022-07-06 PROCEDURE — 54161 CIRCUM 28 DAYS OR OLDER: CPT | Performed by: UROLOGY

## 2022-07-06 PROCEDURE — 3600000014 HC SURGERY LEVEL 4 ADDTL 15MIN: Performed by: UROLOGY

## 2022-07-06 PROCEDURE — 6360000002 HC RX W HCPCS: Performed by: NURSE PRACTITIONER

## 2022-07-06 PROCEDURE — 82947 ASSAY GLUCOSE BLOOD QUANT: CPT

## 2022-07-06 PROCEDURE — 3700000000 HC ANESTHESIA ATTENDED CARE: Performed by: UROLOGY

## 2022-07-06 RX ORDER — HYDROMORPHONE HYDROCHLORIDE 1 MG/ML
0.25 INJECTION, SOLUTION INTRAMUSCULAR; INTRAVENOUS; SUBCUTANEOUS EVERY 5 MIN PRN
Status: DISCONTINUED | OUTPATIENT
Start: 2022-07-06 | End: 2022-07-06 | Stop reason: HOSPADM

## 2022-07-06 RX ORDER — LIDOCAINE HYDROCHLORIDE 10 MG/ML
INJECTION, SOLUTION EPIDURAL; INFILTRATION; INTRACAUDAL; PERINEURAL PRN
Status: DISCONTINUED | OUTPATIENT
Start: 2022-07-06 | End: 2022-07-06 | Stop reason: SDUPTHER

## 2022-07-06 RX ORDER — BACITRACIN ZINC AND POLYMYXIN B SULFATE 500; 1000 [USP'U]/G; [USP'U]/G
OINTMENT TOPICAL
Qty: 15 G | Refills: 1 | COMMUNITY
Start: 2022-07-06

## 2022-07-06 RX ORDER — SODIUM CHLORIDE 9 MG/ML
INJECTION, SOLUTION INTRAVENOUS CONTINUOUS
Status: DISCONTINUED | OUTPATIENT
Start: 2022-07-06 | End: 2022-07-06 | Stop reason: HOSPADM

## 2022-07-06 RX ORDER — DIAPER,BRIEF,INFANT-TODD,DISP
EACH MISCELLANEOUS PRN
Status: DISCONTINUED | OUTPATIENT
Start: 2022-07-06 | End: 2022-07-06 | Stop reason: ALTCHOICE

## 2022-07-06 RX ORDER — PROPOFOL 10 MG/ML
INJECTION, EMULSION INTRAVENOUS PRN
Status: DISCONTINUED | OUTPATIENT
Start: 2022-07-06 | End: 2022-07-06 | Stop reason: SDUPTHER

## 2022-07-06 RX ORDER — HYDROMORPHONE HYDROCHLORIDE 1 MG/ML
0.5 INJECTION, SOLUTION INTRAMUSCULAR; INTRAVENOUS; SUBCUTANEOUS
Status: DISCONTINUED | OUTPATIENT
Start: 2022-07-06 | End: 2022-07-06 | Stop reason: HOSPADM

## 2022-07-06 RX ORDER — SODIUM CHLORIDE 9 MG/ML
INJECTION, SOLUTION INTRAVENOUS PRN
Status: DISCONTINUED | OUTPATIENT
Start: 2022-07-06 | End: 2022-07-06 | Stop reason: HOSPADM

## 2022-07-06 RX ORDER — SODIUM CHLORIDE 0.9 % (FLUSH) 0.9 %
5-40 SYRINGE (ML) INJECTION PRN
Status: DISCONTINUED | OUTPATIENT
Start: 2022-07-06 | End: 2022-07-06 | Stop reason: HOSPADM

## 2022-07-06 RX ORDER — LIDOCAINE HYDROCHLORIDE 10 MG/ML
INJECTION, SOLUTION EPIDURAL; INFILTRATION; INTRACAUDAL; PERINEURAL PRN
Status: DISCONTINUED | OUTPATIENT
Start: 2022-07-06 | End: 2022-07-06 | Stop reason: ALTCHOICE

## 2022-07-06 RX ORDER — SODIUM CHLORIDE, SODIUM LACTATE, POTASSIUM CHLORIDE, CALCIUM CHLORIDE 600; 310; 30; 20 MG/100ML; MG/100ML; MG/100ML; MG/100ML
INJECTION, SOLUTION INTRAVENOUS CONTINUOUS
Status: DISCONTINUED | OUTPATIENT
Start: 2022-07-06 | End: 2022-07-06 | Stop reason: HOSPADM

## 2022-07-06 RX ORDER — SODIUM CHLORIDE, SODIUM LACTATE, POTASSIUM CHLORIDE, CALCIUM CHLORIDE 600; 310; 30; 20 MG/100ML; MG/100ML; MG/100ML; MG/100ML
INJECTION, SOLUTION INTRAVENOUS CONTINUOUS PRN
Status: DISCONTINUED | OUTPATIENT
Start: 2022-07-06 | End: 2022-07-06 | Stop reason: SDUPTHER

## 2022-07-06 RX ORDER — ONDANSETRON 2 MG/ML
INJECTION INTRAMUSCULAR; INTRAVENOUS PRN
Status: DISCONTINUED | OUTPATIENT
Start: 2022-07-06 | End: 2022-07-06 | Stop reason: SDUPTHER

## 2022-07-06 RX ORDER — DEXAMETHASONE SODIUM PHOSPHATE 10 MG/ML
INJECTION, SOLUTION INTRAMUSCULAR; INTRAVENOUS PRN
Status: DISCONTINUED | OUTPATIENT
Start: 2022-07-06 | End: 2022-07-06 | Stop reason: SDUPTHER

## 2022-07-06 RX ORDER — ROCURONIUM BROMIDE 10 MG/ML
INJECTION, SOLUTION INTRAVENOUS PRN
Status: DISCONTINUED | OUTPATIENT
Start: 2022-07-06 | End: 2022-07-06 | Stop reason: SDUPTHER

## 2022-07-06 RX ORDER — HYDROMORPHONE HYDROCHLORIDE 1 MG/ML
0.5 INJECTION, SOLUTION INTRAMUSCULAR; INTRAVENOUS; SUBCUTANEOUS EVERY 5 MIN PRN
Status: DISCONTINUED | OUTPATIENT
Start: 2022-07-06 | End: 2022-07-06 | Stop reason: HOSPADM

## 2022-07-06 RX ORDER — MIDAZOLAM HYDROCHLORIDE 1 MG/ML
INJECTION INTRAMUSCULAR; INTRAVENOUS PRN
Status: DISCONTINUED | OUTPATIENT
Start: 2022-07-06 | End: 2022-07-06 | Stop reason: SDUPTHER

## 2022-07-06 RX ORDER — OXYCODONE HYDROCHLORIDE AND ACETAMINOPHEN 5; 325 MG/1; MG/1
2 TABLET ORAL EVERY 4 HOURS PRN
Status: DISCONTINUED | OUTPATIENT
Start: 2022-07-06 | End: 2022-07-06 | Stop reason: HOSPADM

## 2022-07-06 RX ORDER — SODIUM CHLORIDE 0.9 % (FLUSH) 0.9 %
5-40 SYRINGE (ML) INJECTION EVERY 12 HOURS SCHEDULED
Status: DISCONTINUED | OUTPATIENT
Start: 2022-07-06 | End: 2022-07-06 | Stop reason: HOSPADM

## 2022-07-06 RX ORDER — FENTANYL CITRATE 50 UG/ML
INJECTION, SOLUTION INTRAMUSCULAR; INTRAVENOUS PRN
Status: DISCONTINUED | OUTPATIENT
Start: 2022-07-06 | End: 2022-07-06 | Stop reason: SDUPTHER

## 2022-07-06 RX ORDER — DIPHENHYDRAMINE HYDROCHLORIDE 50 MG/ML
12.5 INJECTION INTRAMUSCULAR; INTRAVENOUS
Status: DISCONTINUED | OUTPATIENT
Start: 2022-07-06 | End: 2022-07-06 | Stop reason: HOSPADM

## 2022-07-06 RX ORDER — BUPIVACAINE HYDROCHLORIDE 2.5 MG/ML
INJECTION, SOLUTION INFILTRATION; PERINEURAL PRN
Status: DISCONTINUED | OUTPATIENT
Start: 2022-07-06 | End: 2022-07-06 | Stop reason: ALTCHOICE

## 2022-07-06 RX ORDER — HYDROCODONE BITARTRATE AND ACETAMINOPHEN 5; 325 MG/1; MG/1
1 TABLET ORAL EVERY 6 HOURS PRN
Qty: 12 TABLET | Refills: 0 | Status: SHIPPED | OUTPATIENT
Start: 2022-07-06 | End: 2022-07-09

## 2022-07-06 RX ORDER — METOCLOPRAMIDE HYDROCHLORIDE 5 MG/ML
10 INJECTION INTRAMUSCULAR; INTRAVENOUS
Status: DISCONTINUED | OUTPATIENT
Start: 2022-07-06 | End: 2022-07-06 | Stop reason: HOSPADM

## 2022-07-06 RX ADMIN — FENTANYL CITRATE 25 MCG: 50 INJECTION, SOLUTION INTRAMUSCULAR; INTRAVENOUS at 08:41

## 2022-07-06 RX ADMIN — SODIUM CHLORIDE, SODIUM LACTATE, POTASSIUM CHLORIDE, AND CALCIUM CHLORIDE: 600; 310; 30; 20 INJECTION, SOLUTION INTRAVENOUS at 09:15

## 2022-07-06 RX ADMIN — ROCURONIUM BROMIDE 50 MG: 10 INJECTION, SOLUTION INTRAVENOUS at 08:06

## 2022-07-06 RX ADMIN — SUGAMMADEX 250 MG: 100 INJECTION, SOLUTION INTRAVENOUS at 09:10

## 2022-07-06 RX ADMIN — SODIUM CHLORIDE, POTASSIUM CHLORIDE, SODIUM LACTATE AND CALCIUM CHLORIDE: 600; 310; 30; 20 INJECTION, SOLUTION INTRAVENOUS at 07:06

## 2022-07-06 RX ADMIN — ONDANSETRON 4 MG: 2 INJECTION INTRAMUSCULAR; INTRAVENOUS at 08:06

## 2022-07-06 RX ADMIN — SODIUM CHLORIDE, SODIUM LACTATE, POTASSIUM CHLORIDE, AND CALCIUM CHLORIDE: 600; 310; 30; 20 INJECTION, SOLUTION INTRAVENOUS at 08:00

## 2022-07-06 RX ADMIN — ROCURONIUM BROMIDE 20 MG: 10 INJECTION, SOLUTION INTRAVENOUS at 08:40

## 2022-07-06 RX ADMIN — PROPOFOL 100 MG: 10 INJECTION, EMULSION INTRAVENOUS at 08:06

## 2022-07-06 RX ADMIN — CEFAZOLIN 2000 MG: 2 INJECTION, POWDER, FOR SOLUTION INTRAMUSCULAR; INTRAVENOUS at 08:01

## 2022-07-06 RX ADMIN — MIDAZOLAM 2 MG: 1 INJECTION INTRAMUSCULAR; INTRAVENOUS at 08:00

## 2022-07-06 RX ADMIN — LIDOCAINE HYDROCHLORIDE 50 MG: 10 INJECTION, SOLUTION EPIDURAL; INFILTRATION; INTRACAUDAL; PERINEURAL at 08:06

## 2022-07-06 RX ADMIN — DEXAMETHASONE SODIUM PHOSPHATE 10 MG: 10 INJECTION, SOLUTION INTRAMUSCULAR; INTRAVENOUS at 08:06

## 2022-07-06 RX ADMIN — FENTANYL CITRATE 25 MCG: 50 INJECTION, SOLUTION INTRAMUSCULAR; INTRAVENOUS at 08:06

## 2022-07-06 ASSESSMENT — LIFESTYLE VARIABLES: SMOKING_STATUS: 0

## 2022-07-06 NOTE — OP NOTE
[address] [facility] [address] [place] [address] [facility]                                OPERATIVE REPORT    PATIENT NAME: Ifeoma Lopes                     :        1945  MED REC NO:   [id number]                              ROOM:  ACCOUNT NO:   [de-identified]                           ADMIT DATE: 2022  PROVIDER:     Kavon High MD    DATE OF PROCEDURE:  2022    TITLE OF OPERATION:  Circumcision. PREOPERATIVE DIAGNOSES:  1.  Balanitis. 2.  Redundant prepuce. POSTOPERATIVE DIAGNOSES:  1.  Balanitis. 2.  Redundant prepuce. ANESTHETIC:  General anesthetic; penile block. ESTIMATED BLOOD LOSS:  5 mL. ATTENDING SURGEON:  Kavon High MD    HISTORY:  The patient is a 57-year-old gentleman who had an episode of  balanitis. He is uncircumcised. He has diabetes. He was offered  dorsal slit versus circumcision. He elected to proceed with  circumcision. The risks and complications have been discussed with him  including the risk of removal of too much or too little skin, continued  some redundancy and needing to retract the foreskin. Loss of  sensitivity, meatal stenosis, wound infection or breakdown. He  understands and agrees to proceed. DESCRIPTION OF PROCEDURE:  The patient was brought to the operating  room, underwent general anesthetic. His genitalia was prepped and  draped in routine sterile fashion. He has pretty significant redundant  prepuce. The foreskin was retracted behind the glans. After good prep  and drape, the patient received preoperative antibiotic and time-out was  performed. With foreskin retracted, a tacking stitch was placed through the glans  with 0-silk. I then marked off preputial collar circumferentially. The  inferior foreskin was tethered by the frenulum and raphe. I incised  circumferentially. This allowed the tether to release creating an  inverted V and some redundant skin.   I excised this redundant skin and  then approximated this horizontally in the ventral midline with a  running stitch of 3-0 chromic. Hemostasis was obtained with the  cautery. I then pulled the penis on some stretch and pulled the shaft  skin over the glans. I then did a dorsal slit at 12 o'clock down to the  appropriate level and this was tacked to the preputial collar at the 12  o'clock position. Likewise, I did the same thing ventrally up to  ventral raphe, and incised this longitudinally at the 6 o'clock position  down to the appropriate level. This was tacked to the preputial collar  ventrally with a 3-0 chromic. I now had bisected the skin in two  halves. The penis was placed on stretch to the left side exposing the  right and I marked the shaft skin at the appropriate level. This was  incised with a 15 blade and then the dartos was divided with cautery. This was tacked then again between the 6 and 12 o'clock position at 9  o'clock with 3-0 chromic. The two edges of the preputial collar and the  shaft skin were then approximated using a running horizontal mattress  stitch with some intermittent locking stitch until the skin was  approximated to full half. I then used some interrupted stitches to  close some of the redundant edges. The penis was then placed on stretch in the opposite direction now  exposing the left side, again marked the shaft skin at the appropriate  level from 6 to 12 o'clock. This was incised with a scalpel and then  the dartos was divided with cautery, hemostasis was obtained. This was  tacked midway between the 12 and 6 o'clock position at 3 o'clock. These  two edges were then reapproximated with a running horizontal mattress  stitch, a 3-0 chromic utilizing some locking throws on each quarter. I  then approximated the redundant edges with some interrupted 3-0 chromic.   The penis was placed on stretch and a penile block was then performed  using a 50% mixture of 1% lidocaine and 0.5% Marcaine mixed 50:50. After penile block was obtained, I then placed some clean towels around  the penis, sloppy wet Ray-Evni was used to wash it off. Bacitracin  ointment was placed, Lynn dressing was wrapped around it with Coban as  an outer layer. Tacking stitch was removed and pressure was held until  the glans was no longer bleeding. The patient tolerated the procedure  well. Estimated blood loss was approximately 5 mL. He was awakened  from his anesthetic and taken to recovery room in stable condition. He  will follow up in 1 month for wound check.         Dimitri Lamar MD    D: 07/06/2022 10:31:59      T: 07/06/2022 11:38:28     [email or web address]  Job#: [id number]     Doc#: [id number]    CC:

## 2022-07-06 NOTE — ANESTHESIA PRE PROCEDURE
Department of Anesthesiology  Preprocedure Note       Name:  Marcie Mcclelland   Age:  78 y.o.  :  1945                                          MRN:  [id number]         Date:  2022      Surgeon: Abram Mendoza):  Andrei Veras MD    Procedure: Procedure(s):  CIRCUMCISION    Medications prior to admission:   Prior to Admission medications    Medication Sig Start Date End Date Taking? Authorizing Provider   nystatin-triamcinolone (MYCOLOG) 105067-0.4 UNIT/GM-% ointment Apply topically 2 times daily. Apply to foreskin and end of penis 22   Andrei Veras MD   tamsulosin [facility]) 0.4 MG capsule Take 1 capsule by mouth daily 3/1/22   [facility], APRN - CNP   gabapentin (NEURONTIN) 100 MG capsule Take by mouth PATIENT DID NOT HAVE DOSAGE AVAILABLE TODAY. VERIFY DOSAGE FOR MED LIST . Historical Provider, MD   atorvastatin (LIPITOR) 10 MG tablet UNKNOWN DOSAGE    Historical Provider, MD   omeprazole (PRILOSEC) 10 MG delayed release capsule Take by mouth PATIENT DID NOT HAVE DOSAGE AVAILABLE TODAY. VERIFY DOSAGE FOR MED LIST    Historical Provider, MD   donepezil (ARICEPT) 10 MG tablet Take by mouth nightly PATIENT DID NOT HAVE DOSAGE AVAILABLE TODAY. VERIFY DOSAGE FOR MED LIST    Historical Provider, MD   escitalopram (LEXAPRO) 10 MG tablet Take by mouth daily PATIENT DID NOT HAVE DOSAGE AVAILABLE TODAY. VERIFY DOSAGE FOR MED LIST    Historical Provider, MD   simvastatin (ZOCOR) 40 MG tablet Take 40 mg by mouth nightly. Patient not taking: Reported on 2022    Historical Provider, MD   cetirizine (ZYRTEC) 10 MG tablet Take 10 mg by mouth daily. Historical Provider, MD   metoprolol (LOPRESSOR) 25 MG tablet Take 25 mg by mouth 2 times daily. Historical Provider, MD   oxyCODONE-acetaminophen (PERCOCET) 5-325 MG per tablet Take 1 tablet by mouth every 4 hours as needed.     Patient not taking: Reported on 2022    Historical Provider, MD   B Complex Vitamins (VITAMIN B COMPLEX PO) Take 1 tablet by mouth daily. Historical Provider, MD   Calcium Carbonate-Vitamin D (CALCIUM 600+D PO) Take 1 tablet by mouth daily. Historical Provider, MD   furosemide (LASIX) 40 MG tablet Take 40 mg by mouth daily as needed     Historical Provider, MD   clopidogrel (PLAVIX) 75 MG tablet Take 75 mg by mouth daily. Patient not taking: Reported on 6/16/2022    Historical Provider, MD   Multiple Vitamins-Minerals (MULTIVITAMIN & MINERAL PO) Take  by mouth. UNSPECIFIED DATA     Historical Provider, MD       Current medications:    No current facility-administered medications for this encounter. Allergies:     Allergies   Allergen Reactions    Meperidine Other (See Comments)     HEART STOPS    Other reaction(s): Unknown      Demerol     Statins Depletion Therapy      Causes leg cramps         Problem List:    Patient Active Problem List   Diagnosis Code    CAD (coronary artery disease) I25.10    Anxiety F41.9    Hiatal hernia K44.9    Benign prostatic hyperplasia N40.0    History of smoking Z87.891    Kidney stones N20.0    Hyperlipidemia E78.5    Hypertension I10    Diabetes mellitus ([address] E11.9    Arthritis M19.90    Obesity E66.9    SSS (sick sinus syndrome) ([facility]) I49.5    Hx of CABG Z95.1       Past Medical History:        Diagnosis Date    Anxiety     Arthritis    Jenni Villarreal Asymptomatic old MI (myocardial infarction)     BPH (benign prostatic hypertrophy)     CAD (coronary artery disease)     Cardiac pacemaker     CHF (congestive heart failure) ([facility])     Diabetes mellitus (HCC)     Hiatal hernia     History of smoking     Hx of CABG     Hyperlipidemia     Hypertension     Kidney stones     Obesity     Peptic ulcer disease     Prostatitis        Past Surgical History:        Procedure Laterality Date    CARDIAC CATHETERIZATION  5.1.01/[facility], 6.23.03/UNKNOWN, 4.5.08/[facility], 4.14.08/JDT    LT HEART    CARDIAC CATHETERIZATION  4/14/2008    EF greater 50% Patent stent in the LAD, Patent stent in the first marginal, Angiographically significant stenois in the mid RCA, Successful percutaneous coronary intervention utilizing a single drug eluting stent in the mid RCA, Normal LV systolic and diastolic function.      CARDIAC CATHETERIZATION  8/2/12  JDT    EF  50%    CARDIOVASCULAR STRESS TEST  11.8.06/MDL, 5.24.08/[facility], 9.9.09/[facility], 7.26.10/[facility]    STRESS ECHO    CARDIOVASCULAR STRESS TEST  12.7.05/[facility], 4.14.08/JDT    HOLTER MONITOR    CERVICAL FUSION      CORONARY ARTERY BYPASS GRAFT  8/8/2012    OPCABG X2, LIMA-LAD, SVG-RCA, RT EVH, LT LOWER LEG VEIN HARVEST, JPO    EPIDURAL STEROID INJECTION N/A 2/5/2019    EPIDURAL STEROID INJECTION L2-3 performed by Rafita Arredondo at [address] [place] [address] INJECTION PROCEDURE FOR SACROILIAC JOINT Left 1/22/2019    SACROILIAC JOINT INJECTION performed by Frank Arredondo at [address] [place] [address] Right 4/30/2019    SACROILIAC JOINT INJECTION performed by Frank Arredondo at [address] [place] [address] Right 12/10/2019    SACROILIAC JOINT INJECTION performed by James Amos at [address] [place] [address] Right 3/3/2020    SACROILIAC JOINT INJECTION performed by Frank Arredondo at [address] LUMB FACET LEVEL 1 BILATERAL Bilateral 2/19/2019    LUMBAR FACET BLOCK 1 L5-S1 performed by Rafita Arredondo at [address] [facility]   4.16.08/JDT    [employer]     SHOULDER SURGERY      RT SHOULDER    THYROIDECTOMY, PARTIAL         Social History:    Social History     Tobacco Use    Smoking status: Former Smoker    Smokeless tobacco: Never Used   Substance Use Topics    Alcohol use: Not Currently                                Counseling given: Not Answered      Vital Signs (Current):   Vitals:    07/06/22 0648   BP: 124/68   Pulse: 69   Resp: 18   Temp: 98 °F (36.7 °C)   TempSrc: Temporal   SpO2: 96%   Weight: 260 lb (117.9 kg)   Height: 5' 8\" (1.727 m)                                              BP Readings from Last 3 Encounters:   07/06/22 124/68   06/16/22 121/78   03/01/22 (!) 160/90       NPO Status:                                                                                 BMI:   Wt Readings from Last 3 Encounters:   07/06/22 260 lb (117.9 kg)   06/29/22 265 lb (120.2 kg)   06/16/22 267 lb (121.1 kg)     Body mass index is 39.53 kg/m². CBC:   Lab Results   Component Value Date/Time    WBC 6.9 06/29/2022 09:51 AM    RBC 4.58 06/29/2022 09:51 AM    HGB 12.9 06/29/2022 09:51 AM    HCT 41.8 06/29/2022 09:51 AM    MCV 91.3 06/29/2022 09:51 AM    RDW 15.3 06/29/2022 09:51 AM     06/29/2022 09:51 AM       CMP:   Lab Results   Component Value Date/Time     06/29/2022 09:51 AM    K 3.9 06/29/2022 09:51 AM     06/29/2022 09:51 AM    CO2 26 06/29/2022 09:51 AM    BUN 25 06/29/2022 09:51 AM    CREATININE 1.0 06/29/2022 09:51 AM    GFRAA >59 06/29/2022 09:51 AM    LABGLOM >60 06/29/2022 09:51 AM    GLUCOSE 120 06/29/2022 09:51 AM    PROT 6.1 06/29/2022 09:51 AM    PROT 5.9 08/16/2012 05:50 PM    CALCIUM 8.8 06/29/2022 09:51 AM    BILITOT 0.3 06/29/2022 09:51 AM    ALKPHOS 77 06/29/2022 09:51 AM    AST 19 06/29/2022 09:51 AM    ALT 18 06/29/2022 09:51 AM       POC Tests: No results for input(s): POCGLU, POCNA, POCK, POCCL, POCBUN, POCHEMO, POCHCT in the last 72 hours.     Coags:   Lab Results   Component Value Date/Time    PROTIME 12.8 06/29/2022 09:51 AM    INR 0.97 06/29/2022 09:51 AM       HCG (If Applicable): No results found for: PREGTESTUR, PREGSERUM, HCG, HCGQUANT     ABGs:   Lab Results   Component Value Date/Time    PO2ART 343 08/08/2012 09:57 AM    M0TCGPCZ 100 08/08/2012 09:57 AM        Type & Screen (If Applicable):  No results found for: LABABO, LABRH    Drug/Infectious Status (If Applicable):  No results found for: HIV, HEPCAB    COVID-19 Screening (If Applicable): No results found for: COVID19        Anesthesia Evaluation  Patient summary reviewed and Nursing notes reviewed history of anesthetic complications (h/o awareness during a back surgery): Airway: Mallampati: II  TM distance: >3 FB   Neck ROM: full  Mouth opening: > = 3 FB   Dental:          Pulmonary:normal exam    (+) COPD:  sleep apnea: on noncompliant,      (-) not a current smoker                           Cardiovascular:    (+) hypertension:, pacemaker:, past MI: > 6 months, CAD:, CABG/stent:, hyperlipidemia      ECG reviewed          Cleared by cardiology     Beta Blocker:  Not on Beta Blocker      ROS comment: CABG  Multiple stents, last [facility])     Neuro/Psych:   (+) depression/anxiety    (-) seizures and CVA           GI/Hepatic/Renal:   (+) GERD: well controlled, PUD,           Endo/Other:    (+) DiabetesType II DM, , : arthritis:., .                 Abdominal:             Vascular: negative vascular ROS. Other Findings:           Anesthesia Plan      general     ASA 4       Induction: intravenous. MIPS: Postoperative opioids intended and Prophylactic antiemetics administered. Anesthetic plan and risks discussed with patient and spouse.                         Ame Vieira MD   7/6/2022

## 2022-07-06 NOTE — OP NOTE
Brief operative Note      Patient: Bandar Joshi  YOB: 1945  MRN: [id number]    Date of Procedure: 7/6/2022    Pre-Op Diagnosis: Balanitis [N48.1] redundant prepuce    Post-Op Diagnosis: Same       Procedure(s):  CIRCUMCISION    Surgeon(s):  Ani Francois MD    Assistant:   * No surgical staff found *    Anesthesia: General; penile block    Estimated Blood Loss (mL): 5 mL    Complications: None    Specimens:   ID Type Source Tests Collected by Time Destination   A : FORESKIN Tissue Penis SURGICAL PATHOLOGY Ani Francois MD 7/6/2022 1925        Implants:  * No implants in log *      Drains: * No LDAs found *    Findings: Redundant prepuce glans without lesion    Detailed Description of Procedure:   See dictated report: [id number]    Disposition to PACU op care outpatient follow-up in 1 month    Electronically signed by Bessy Serrano MD on 7/6/2022 at 9:21 AM

## 2022-07-06 NOTE — INTERVAL H&P NOTE
Update History & Physical    The patient's History and Physical of June 16, 2022 was reviewed with the patient and I examined the patient. There was no change. The surgical site was confirmed by the patient and me. Plan for circumcision    Plan: The risks, benefits, expected outcome, and alternative to the recommended procedure have been discussed with the patient. Patient understands and wants to proceed with the procedure.      Electronically signed by Jesús Hernandez MD on 7/6/2022 at 7:39 AM

## 2022-07-06 NOTE — ANESTHESIA POSTPROCEDURE EVALUATION
Department of Anesthesiology  Postprocedure Note    Patient: Sveta Perea  MRN: [id number]  YOB: 1945  Date of evaluation: 7/6/2022      Procedure Summary     Date: 07/06/22 Room / Location: [address]    Anesthesia Start: 0800 Anesthesia Stop: 9114    Procedure: [id number] (N/A ) Diagnosis:       Balanitis      (Balanitis [N48.1])    Surgeons: [address] MD Responsible Provider: LONDON Boland CRNA    Anesthesia Type: general ASA Status: 4          Anesthesia Type: No value filed.     Komal Phase I: Komal Score: 6    Komal Phase II:        Anesthesia Post Evaluation    Patient location during evaluation: bedside  Patient participation: complete - patient participated  Level of consciousness: awake and alert  Pain score: 0  Airway patency: patent  Nausea & Vomiting: no nausea  Complications: no  Cardiovascular status: hemodynamically stable  Respiratory status: acceptable  Hydration status: euvolemic

## 2022-07-18 RX ORDER — ATORVASTATIN CALCIUM 80 MG/1
TABLET, FILM COATED ORAL
Qty: 30 TABLET | OUTPATIENT
Start: 2022-07-18

## 2022-07-19 ENCOUNTER — CLINICAL SUPPORT NO REQUIREMENTS (OUTPATIENT)
Dept: CARDIOLOGY | Facility: CLINIC | Age: 77
End: 2022-07-19

## 2022-07-19 DIAGNOSIS — I49.5 SICK SINUS SYNDROME: ICD-10-CM

## 2022-07-19 DIAGNOSIS — Z95.0 PRESENCE OF CARDIAC PACEMAKER: Primary | ICD-10-CM

## 2022-07-19 PROCEDURE — 93280 PM DEVICE PROGR EVAL DUAL: CPT | Performed by: INTERNAL MEDICINE

## 2022-07-19 NOTE — PROGRESS NOTES
Dual Chamber Pacemaker Evaluation Report  IN OFFICE INTERROGATION BY COMPANY DEVICE REP (AMI HUGHES, HERMAN)    July 19, 2022    Primary Cardiologist: Malu  : [employer] Medical Model: Shantel FENTON 2240 Pacemaker  Implant date: 1/11/2018    Reason for evaluation: routine  Indication for pacemaker: sick sinus syndrome    Measurements  Atrial sensing - P wave: 4.2 mV  Atrial threshold: 1.25V@ 0.5ms  Atrial lead impedance: 1250 ohms  Ventricular sensing - R wave: >12 mV  Ventricular threshold: 0.5 V @ 0.5 ms  Ventricular lead impedance:   1150 ohms     Manual sensing and threshold testing performed:  Yes    Diagnostic Data  Atrial paced: 17 %  Ventricular paced: 3.8 %    Episodes recorded since 4/12/22:  No episodes.    Battery status: satisfactory   Estimated 11-11.8 years remaining, 2.99V      Final Parameters  Mode:  DDDR  Lower rate: 60 bpm   Upper rate: 130 bpm  AV Delay: paced- 220 ms  Sensed-200 ms  Atrial - Amplitude: 2.5 V   Pulse width: 0.5 ms   Sensitivity: 1 mV     Ventricular - Amplitude: 2 V  Pulse width: 0.5 ms  Sensitivity: 2 mV    Changes made: No changes made.  Conclusions: normal pacemaker function, stable pacing and sensing thresholds and adequate battery reserve    Follow up: Every 3 months via merlin, annually in office

## 2022-07-28 ENCOUNTER — TELEPHONE (OUTPATIENT)
Dept: CARDIOLOGY | Facility: CLINIC | Age: 77
End: 2022-07-28

## 2022-07-28 ENCOUNTER — TRANSCRIBE ORDERS (OUTPATIENT)
Dept: ADMINISTRATIVE | Facility: HOSPITAL | Age: 77
End: 2022-07-28

## 2022-07-28 DIAGNOSIS — E03.9 HYPOTHYROIDISM, UNSPECIFIED TYPE: Primary | ICD-10-CM

## 2022-07-28 DIAGNOSIS — F45.8 OTHER SOMATOFORM DISORDERS: Primary | ICD-10-CM

## 2022-07-28 NOTE — TELEPHONE ENCOUNTER
Patient Is needing cardiac clearance for a Right Total knee with Dr Encinas in [facility].    Patient is being asked to hold his Plavix prior to this procedure.      LOV: 6/27/22  Hx: CAD/SSS/CHF    Please Advise

## 2022-08-03 ENCOUNTER — HOSPITAL ENCOUNTER (OUTPATIENT)
Dept: ULTRASOUND IMAGING | Facility: HOSPITAL | Age: 77
Discharge: HOME | End: 2022-08-03

## 2022-08-03 ENCOUNTER — LAB (OUTPATIENT)
Dept: LAB | Facility: HOSPITAL | Age: 77
End: 2022-08-03

## 2022-08-03 DIAGNOSIS — F45.8 OTHER SOMATOFORM DISORDERS: ICD-10-CM

## 2022-08-03 DIAGNOSIS — E03.9 HYPOTHYROIDISM, UNSPECIFIED TYPE: ICD-10-CM

## 2022-08-03 DIAGNOSIS — I50.32 CHRONIC DIASTOLIC CONGESTIVE HEART FAILURE: ICD-10-CM

## 2022-08-03 LAB
ANION GAP SERPL CALCULATED.3IONS-SCNC: 7 MMOL/L (ref 5–15)
BUN SERPL-MCNC: 19 MG/DL (ref 8–23)
BUN/CREAT SERPL: 19.2 (ref 7–25)
CALCIUM SPEC-SCNC: 8.9 MG/DL (ref 8.6–10.5)
CHLORIDE SERPL-SCNC: 103 MMOL/L (ref 98–107)
CO2 SERPL-SCNC: 29 MMOL/L (ref 22–29)
CREAT SERPL-MCNC: 0.99 MG/DL (ref 0.76–1.27)
EGFRCR SERPLBLD CKD-EPI 2021: 79.4 ML/MIN/1.73
GLUCOSE SERPL-MCNC: 113 MG/DL (ref 65–99)
POTASSIUM SERPL-SCNC: 3.3 MMOL/L (ref 3.5–5.2)
SODIUM SERPL-SCNC: 139 MMOL/L (ref 136–145)
T4 FREE SERPL-MCNC: 0.96 NG/DL (ref 0.93–1.7)
TSH SERPL DL<=0.05 MIU/L-ACNC: 2.57 UIU/ML (ref 0.27–4.2)

## 2022-08-03 PROCEDURE — 80048 BASIC METABOLIC PNL TOTAL CA: CPT | Performed by: NURSE PRACTITIONER

## 2022-08-03 PROCEDURE — 84443 ASSAY THYROID STIM HORMONE: CPT | Performed by: NURSE PRACTITIONER

## 2022-08-03 PROCEDURE — 36415 COLL VENOUS BLD VENIPUNCTURE: CPT

## 2022-08-03 PROCEDURE — 76536 US EXAM OF HEAD AND NECK: CPT

## 2022-08-03 PROCEDURE — 84439 ASSAY OF FREE THYROXINE: CPT

## 2022-08-04 ENCOUNTER — TELEPHONE (OUTPATIENT)
Dept: CARDIOLOGY | Facility: CLINIC | Age: 77
End: 2022-08-04

## 2022-08-04 NOTE — TELEPHONE ENCOUNTER
----- Message from ANALI Rucker sent at 8/3/2022  4:56 PM CDT -----  Please let this pt know that his potassium was a little low on his most recent lab work. Make sure he is only taking lasix as needed. If this is the case, I will need to start him on oral potassium.

## 2022-08-05 NOTE — TELEPHONE ENCOUNTER
Spoke with patients wife. He has been taking the Lasix everyday for the last 3 weeks due to swelling. When I asked about weights they have not been weighing. When I asked her how they determine if he needs a lasix and is swelling they do so if their is a  finger indention. I explained that it was important to weigh everyday and she is going to try to get him to. What do you advise.

## 2022-08-07 ENCOUNTER — DOCUMENTATION (OUTPATIENT)
Dept: CARDIOLOGY | Facility: CLINIC | Age: 77
End: 2022-08-07

## 2022-08-07 DIAGNOSIS — I50.32 CHRONIC DIASTOLIC CONGESTIVE HEART FAILURE: Primary | ICD-10-CM

## 2022-08-08 NOTE — TELEPHONE ENCOUNTER
Spoke with patients wife. They will have them drawn on Friday. She stopped giving him the daily Lasix after our conversation.

## 2022-08-12 ENCOUNTER — LAB (OUTPATIENT)
Dept: LAB | Facility: HOSPITAL | Age: 77
End: 2022-08-12

## 2022-08-12 DIAGNOSIS — I50.32 CHRONIC DIASTOLIC CONGESTIVE HEART FAILURE: ICD-10-CM

## 2022-08-12 LAB
ANION GAP SERPL CALCULATED.3IONS-SCNC: 1 MMOL/L (ref 4–13)
BUN SERPL-MCNC: 12 MG/DL (ref 5–21)
BUN/CREAT SERPL: 14.6
CALCIUM SPEC-SCNC: 8.9 MG/DL (ref 8.4–10.4)
CHLORIDE SERPL-SCNC: 111 MMOL/L (ref 98–110)
CO2 SERPL-SCNC: 28 MMOL/L (ref 24–31)
CREAT SERPL-MCNC: 0.82 MG/DL (ref 0.5–1.4)
EGFRCR SERPLBLD CKD-EPI 2021: 91.6 ML/MIN/1.73
GLUCOSE SERPL-MCNC: 90 MG/DL (ref 70–100)
POTASSIUM SERPL-SCNC: 4.1 MMOL/L (ref 3.5–5.3)
SODIUM SERPL-SCNC: 140 MMOL/L (ref 135–145)

## 2022-08-12 PROCEDURE — 80048 BASIC METABOLIC PNL TOTAL CA: CPT

## 2022-08-12 PROCEDURE — 36415 COLL VENOUS BLD VENIPUNCTURE: CPT

## 2022-09-07 ENCOUNTER — OFFICE VISIT (OUTPATIENT)
Dept: UROLOGY | Age: 77
End: 2022-09-07
Payer: MEDICARE

## 2022-09-07 VITALS
WEIGHT: 267.2 LBS | BODY MASS INDEX: 40.5 KG/M2 | TEMPERATURE: 97.8 F | OXYGEN SATURATION: 97 % | HEIGHT: 68 IN | SYSTOLIC BLOOD PRESSURE: 144 MMHG | HEART RATE: 74 BPM | DIASTOLIC BLOOD PRESSURE: 82 MMHG

## 2022-09-07 DIAGNOSIS — Z09 FOLLOW-UP AFTER CIRCUMCISION: Primary | ICD-10-CM

## 2022-09-07 DIAGNOSIS — N20.0 NEPHROLITHIASIS: ICD-10-CM

## 2022-09-07 PROCEDURE — 99213 OFFICE O/P EST LOW 20 MIN: CPT | Performed by: NURSE PRACTITIONER

## 2022-09-07 PROCEDURE — 81002 URINALYSIS NONAUTO W/O SCOPE: CPT | Performed by: NURSE PRACTITIONER

## 2022-09-07 PROCEDURE — G8417 CALC BMI ABV UP PARAM F/U: HCPCS | Performed by: NURSE PRACTITIONER

## 2022-09-07 PROCEDURE — 3017F COLORECTAL CA SCREEN DOC REV: CPT | Performed by: NURSE PRACTITIONER

## 2022-09-07 PROCEDURE — 1123F ACP DISCUSS/DSCN MKR DOCD: CPT | Performed by: NURSE PRACTITIONER

## 2022-09-07 PROCEDURE — G8427 DOCREV CUR MEDS BY ELIG CLIN: HCPCS | Performed by: NURSE PRACTITIONER

## 2022-09-07 PROCEDURE — 1036F TOBACCO NON-USER: CPT | Performed by: NURSE PRACTITIONER

## 2022-09-07 ASSESSMENT — ENCOUNTER SYMPTOMS
BACK PAIN: 0
ABDOMINAL PAIN: 0
NAUSEA: 0
ABDOMINAL DISTENTION: 0
VOMITING: 0

## 2022-09-07 NOTE — PROGRESS NOTES
Kena Roy is a 78 y.o., male, Established patient who presents today   Chief Complaint   Patient presents with    Post-Op Check     I am here today for post op visit after circumcision on 7/6/22        HPI   Patient presents for follow-up after circumcision on 7/6/2022 with Dr. Osito Castle for recurrent balanitis. He reports he has been doing well since his surgery without significant pain or swelling. He has been having no significant issues. He is also followed for nephrolithiasis. REVIEW OF SYSTEMS:  Review of Systems   Constitutional:  Negative for chills and fever. Gastrointestinal:  Negative for abdominal distention, abdominal pain, nausea and vomiting. Genitourinary:  Negative for difficulty urinating, dysuria, flank pain, frequency, hematuria and urgency. Musculoskeletal:  Positive for arthralgias and gait problem. Negative for back pain. Psychiatric/Behavioral:  Negative for agitation and confusion. PHYSICAL EXAM:  BP (!) 144/82   Pulse 74   Temp 97.8 °F (36.6 °C)   Ht 5' 8\" (1.727 m)   Wt 267 lb 3.2 oz (121.2 kg)   SpO2 97%   BMI 40.63 kg/m²   Physical Exam  Vitals and nursing note reviewed. Constitutional:       General: He is not in acute distress. Appearance: Normal appearance. He is not ill-appearing. Pulmonary:      Effort: Pulmonary effort is normal. No respiratory distress. Abdominal:      General: There is no distension. Tenderness: There is no abdominal tenderness. There is no right CVA tenderness or left CVA tenderness. Neurological:      Mental Status: He is alert and oriented to person, place, and time. Mental status is at baseline.    Psychiatric:         Mood and Affect: Mood normal.         Behavior: Behavior normal.       DATA:  Results for orders placed or performed in visit on 09/07/22   POCT Urinalysis no Micro   Result Value Ref Range    Color, UA yellow     Clarity, UA clear     Glucose, UA POC neg     Bilirubin, UA neg     Ketones, UA neg Spec Grav, UA 1.020     Blood, UA POC neg     pH, UA 5.5     Protein, UA POC neg     Urobilinogen, UA 0.2     Leukocytes, UA neg     Nitrite, UA neg     Appearance, Fluid Clear Clear, Slightly Cloudy       ASSESSMENT/PLAN  1. Follow-up after circumcision  Patient presents for follow-up after circumcision approximately 2 months ago. He has been doing well since the surgery. He does report some mild pulling on the ventral side of the penis. There does appear to be one stitch that has not quite dissolved all the way, however, otherwise, his wound is clear without significant drainage or swelling. The wound is well-healed and all but 1 suture has dissolved. - POCT Urinalysis no Micro    2. Nephrolithiasis  Patient also with a history of nephrolithiasis. Was considering ESWL, however, he underwent circumcision first and now he is planning to have his knee replaced next week. He would like to follow-up after he finishes rehab with [facility] to determine if he would like to pursue ESWL. - XR ABDOMEN ([facility]) (SINGLE AP VIEW); Future      Orders Placed This Encounter   Procedures    XR ABDOMEN ([facility]) (SINGLE AP VIEW)     Standing Status:   Future     Standing Expiration Date:   9/7/2023    POCT Urinalysis no Micro        Return in about 6 months (around 3/7/2023) for [facility] prior, with LONDON Ruiz. An electronic signature was used to authenticate this note. LONDON JIMENEZ - CNP    All information inputted into the note by the MA to include chief complaint, past medical history, past surgical history, medications, allergies, social and family history and review of systems has been reviewed and updated as needed by me. EMR Dragon/transcription disclaimer: Much of this document is electronic transcription/translation of spoken language to printed text. The electronic translation of spoken language may be erroneous or, at times, nonsensical words or phrases may be inadvertently transcribed.  Although I have reviewed the document for such errors, some may still exist.

## 2022-10-19 RX ORDER — FLUTICASONE PROPIONATE 50 MCG
SPRAY, SUSPENSION (ML) NASAL
Qty: 16 G | Refills: 5 | Status: SHIPPED | OUTPATIENT
Start: 2022-10-19

## 2022-10-19 NOTE — TELEPHONE ENCOUNTER
Rx Refill Note  Requested Prescriptions     Pending Prescriptions Disp Refills   • fluticasone (FLONASE) 50 MCG/ACT nasal spray [Pharmacy Med Name: FLUTICASONE 50MCG NASAL SP (120) RX] 16 g 5     Sig: INSTILL 2 SPRAYS INTO THE NOSTRILS EVERY DAY AS DIRECTED BY THE PROVIDER      Last office visit with prescribing clinician: 3/10/2022      Next office visit with prescribing clinician: 11/1/2022            Sonja Campbell MA  10/19/22, 10:12 CDT

## 2022-10-25 ENCOUNTER — TRANSCRIBE ORDERS (OUTPATIENT)
Dept: ADMINISTRATIVE | Facility: HOSPITAL | Age: 77
End: 2022-10-25

## 2022-10-25 DIAGNOSIS — E03.9 HYPOTHYROIDISM, UNSPECIFIED TYPE: Primary | ICD-10-CM

## 2022-10-26 ENCOUNTER — TRANSCRIBE ORDERS (OUTPATIENT)
Dept: ADMINISTRATIVE | Facility: HOSPITAL | Age: 77
End: 2022-10-26

## 2022-10-26 DIAGNOSIS — E03.9 HYPOTHYROIDISM, UNSPECIFIED TYPE: ICD-10-CM

## 2022-10-26 DIAGNOSIS — E53.8 DEFICIENCY OF OTHER SPECIFIED B GROUP VITAMINS: ICD-10-CM

## 2022-10-26 DIAGNOSIS — E78.5 HYPERLIPIDEMIA, UNSPECIFIED HYPERLIPIDEMIA TYPE: ICD-10-CM

## 2022-10-26 DIAGNOSIS — D50.9 IRON DEFICIENCY ANEMIA, UNSPECIFIED IRON DEFICIENCY ANEMIA TYPE: ICD-10-CM

## 2022-10-26 DIAGNOSIS — E11.59 TYPE 2 DIABETES MELLITUS WITH OTHER CIRCULATORY COMPLICATIONS: Primary | ICD-10-CM

## 2022-10-26 DIAGNOSIS — R97.20 ELEVATED PROSTATE SPECIFIC ANTIGEN (PSA): ICD-10-CM

## 2022-11-01 ENCOUNTER — OFFICE VISIT (OUTPATIENT)
Dept: PULMONOLOGY | Facility: CLINIC | Age: 77
End: 2022-11-01

## 2022-11-01 VITALS
WEIGHT: 260.6 LBS | OXYGEN SATURATION: 96 % | SYSTOLIC BLOOD PRESSURE: 118 MMHG | HEIGHT: 68 IN | BODY MASS INDEX: 39.5 KG/M2 | DIASTOLIC BLOOD PRESSURE: 70 MMHG | HEART RATE: 72 BPM

## 2022-11-01 DIAGNOSIS — J44.9 CHRONIC OBSTRUCTIVE PULMONARY DISEASE, UNSPECIFIED COPD TYPE: Primary | ICD-10-CM

## 2022-11-01 DIAGNOSIS — J45.20 MILD INTERMITTENT ASTHMA WITHOUT COMPLICATION: ICD-10-CM

## 2022-11-01 DIAGNOSIS — J86.9 EMPYEMA OF PLEURA: ICD-10-CM

## 2022-11-01 DIAGNOSIS — J98.4 RESTRICTIVE LUNG DISEASE: ICD-10-CM

## 2022-11-01 DIAGNOSIS — G47.33 OSA (OBSTRUCTIVE SLEEP APNEA): ICD-10-CM

## 2022-11-01 DIAGNOSIS — I27.20 PULMONARY HYPERTENSION: ICD-10-CM

## 2022-11-01 DIAGNOSIS — Z93.8 S/P THORACOSTOMY TUBE PLACEMENT: ICD-10-CM

## 2022-11-01 DIAGNOSIS — Z87.01 HISTORY OF PNEUMONIA: ICD-10-CM

## 2022-11-01 DIAGNOSIS — R91.8 LUNG NODULES: ICD-10-CM

## 2022-11-01 DIAGNOSIS — Z87.891 FORMER SMOKER, STOPPED SMOKING MANY YEARS AGO: ICD-10-CM

## 2022-11-01 PROCEDURE — 99214 OFFICE O/P EST MOD 30 MIN: CPT | Performed by: INTERNAL MEDICINE

## 2022-11-01 RX ORDER — TOPIRAMATE 50 MG/1
50 TABLET, FILM COATED ORAL 2 TIMES DAILY
COMMUNITY
Start: 2022-07-28 | End: 2022-11-08

## 2022-11-01 RX ORDER — FERROUS SULFATE 325(65) MG
325 TABLET ORAL EVERY MORNING
COMMUNITY

## 2022-11-01 RX ORDER — GABAPENTIN 100 MG/1
1 CAPSULE ORAL 3 TIMES DAILY PRN
COMMUNITY
Start: 2022-09-12

## 2022-11-01 NOTE — PROGRESS NOTES
RESP[facility]ATORY [facility]EASE CLINIC OUTPATIENT PROGRESS NOTE    Patient: Carlo Velasco  : 1945  Age: 77 y.o.  Date of Service: 2022    REASON FOR CLINIC VISIT:  Chief Complaint   Patient presents with   • COPD   • Sleep Apnea   • Emphysema       Subjective:    History of Present Illness:  Carlo Velasco is a 77 y.o. male who presents to the office today to be seen for    Diagnosis Plan   1. Chronic obstructive pulmonary disease, unspecified COPD type (HCC)        2. Pulmonary hypertension (HCC)        3. ERNESTO (obstructive sleep apnea)        4. Lung nodules        5. Former smoker, stopped smoking many years ago        6. Mild intermittent asthma without complication        7. History of pneumonia        8. Restrictive lung disease        9. Empyema of pleura (HCC)        10. S/P thoracostomy tube placement ([facility])        .  Other problems per record.  Patient is a very pleasant elderly  gentleman who was seen in the pulmonary clinic for follow-up visit.  In the pulmonary clinic with his wife    Patient is a former smoker and did quit smoking long time ago.  He has history of chronic obstructive pulmonary disease and is currently using Wixela and albuterol rescue inhaler although his wife complained that he is not using Wixela as advised.  Patient has baseline dementia and is on Aricept.  The patient is not on any oxygen.  He has obstructive sleep apnea but he is unable to wear the CPAP and not using it at night.  He keeps his head elevated during sleep.  He had recent knee surgery done within the replacement and recovering from the surgery.  He is currently using fluticasone nasal spray and loratadine for his nasal allergy.    Patient did not take COVID-vaccine but he did not get the COVID infection.  However he took his influenza vaccine and told me he is going to get the pneumonia vaccine as well.  He had no other recent hospitalizations and ER visit any urgent care visit or any other new  complaints.  Patient did not need any medication refills at this time.  He had a CT scan done few months ago which showed a lung nodule but no follow-up CT scan and neck CT scan is ordered in 3 months time from now.        PFT done today:  Not done today      Results for orders placed in visit on 04/21/21    Pulmonary Function Test    [facility] - Pulmonary Function Test    [address]  [place]  [place]  [address]  [phone]    Patient : Carlo Velasco  MRN : [id number]  CSN : [id number]  Pulmonologist : Cruz Atkinson MD  Date : 6/7/2021    ______________________________________________________________________    Interpretation :  1.  Spirometry is consistent with a moderate restrictive ventilatory defect with coexisting mild small airways disease.  2.  There is improvement in spirometry postbronchodilator particularly in small airways function which is now normal but a moderate restrictive ventilatory defect still appears to be present.  3.  Lung volumes confirm a moderate restrictive ventilatory defect.  4.  There is a moderate diffusion impairment which when corrected for alveolar volume is normalized.  5.  When current studies are compared to studies from July 13 of 2017, the patient's current prebronchodilator FVC and FEV1 have dropped significantly compared to previous prebronchodilator studies.  His postbronchodilator FVC and FEV1 have also dropped significantly compared to previous postbronchodilator studies.  There has also been a drop in his total lung capacity compared to previous.  When corrected for alveolar volume there has also been a decrease in his diffusion capacity compared to previous although it remains within normal limits.      Cruz Atkinson MD      Results for orders placed during the hospital encounter of 07/13/17    Full Pulmonary Function Test With Bronchodilator    [facility] - Pulmonary Function Test    [address]   [place]  [address]  [phone]    Patient : Carlo Velasco  MRN : [id number]  CSN : [id number]  Pulmonologist : Cruz Atkinson MD  Date : 2017    ______________________________________________________________________    Interpretation :  1.  Spirometry is consistent with a mild restrictive ventilatory defect with coexisting mild decrease in the patient's FEF 75%,  2.  There is some improvement in the patient's FEF 75% postbronchodilator but a mild restrictive ventilatory defect still appears to be present.  3.  Lung volumes reveal a low normal total lung capacity with a decrease in vital capacity and expiratory reserve volume, consistent with a borderline restrictive ventilatory defect.  4.  Diffusion capacity is within normal limits.      Cruz Atkinson MD         Bronchodilator therapy: Wixela inhaler, Albuterol rescue inhaler    Smoking Status:   Social History     Tobacco Use   Smoking Status Former   • Packs/day: 3.00   • Years: 10.00   • Pack years: 30.00   • Types: Cigarettes   • Quit date:    • Years since quittin.8   Smokeless Tobacco Former   • Types: Snuff   • Quit date:      Pulm Rehab: no  Sleep: yes    Support System: lives with their spouse    Code Status:   There are no questions and answers to display.        Review of Systems:  A complete review of systems is performed and all other systems were reviewed and negative as note above in the HPI.  Review of Systems   Constitutional: Negative.    HENT: Positive for congestion, postnasal drip and sinus pressure.    Eyes: Negative.    Respiratory: Positive for cough, chest tightness and shortness of breath.    Cardiovascular: Negative.    Gastrointestinal: Negative.    Endocrine: Negative.    Genitourinary: Negative.    Musculoskeletal: Negative.    Skin: Negative.    Allergic/Immunologic: Positive for environmental allergies.   Neurological: Negative.    Hematological: Negative.    Psychiatric/Behavioral: Negative.        CAT/ACT  Score:  Not done today    Medications:  Outpatient Encounter Medications as of 11/1/2022   Medication Sig Dispense Refill   • albuterol sulfate  (90 Base) MCG/ACT inhaler Inhale 2 puffs Every 4 (Four) Hours As Needed for Wheezing. 6.7 g 11   • ascorbic acid (VITAMIN C) 1000 MG tablet Vitamin C 1,000 mg tablet   Take 1 tablet every day by oral route.     • atorvastatin (LIPITOR) 80 MG tablet Take 80 mg by mouth Daily.     • Calcium Carbonate 1500 (600 Ca) MG tablet Take 1 tablet by mouth Daily.     • cefuroxime (CEFTIN) 500 MG tablet TAKE 1 TABLET BY MOUTH EVERY 12 HOURS WITH MEALS FOR 14 DAYS     • Cholecalciferol 125 MCG (5000 UT) tablet Vitamin D3 125 mcg (5,000 unit) tablet   Take 1 tablet every day by oral route.     • clopidogrel (Plavix) 75 MG tablet Take 1 tablet by mouth Daily. 90 tablet 4   • Cyanocobalamin (B-12) 1000 MCG sublingual tablet 1 tablet Daily.     • donepezil (ARICEPT) 5 MG tablet TAKE 1 TABLET BY MOUTH EVERY NIGHT AT BEDTIME 90 tablet 3   • escitalopram (LEXAPRO) 20 MG tablet Take 1 tablet by mouth Daily. 90 tablet 1   • ferrous sulfate 325 (65 FE) MG tablet Take 1 tablet by mouth Every Morning.     • fluticasone (FLONASE) 50 MCG/ACT nasal spray INSTILL 2 SPRAYS INTO THE NOSTRILS EVERY DAY AS DIRECTED BY THE PROVIDER 16 g 5   • fluticasone-salmeterol (ADVAIR) 100-50 MCG/DOSE DISKUS Inhale 1 puff 2 (Two) Times a Day. 60 each 11   • furosemide (LASIX) 40 MG tablet Take 1 tablet by mouth Daily As Needed (for any increase in 2 lbs in a daqy or 5 lbs in a week or increassing swelling or congestion).     • gabapentin (NEURONTIN) 100 MG capsule 1 capsule 3 (Three) Times a Day As Needed.     • isosorbide mononitrate (IMDUR) 30 MG 24 hr tablet TAKE 1 TABLET BY MOUTH EVERY MORNING 90 tablet 3   • loratadine (CLARITIN) 10 MG tablet Take 1 tablet by mouth Daily. 90 tablet 3   • meloxicam (MOBIC) 15 MG tablet Take 1 tablet by mouth Daily. Prn 30 tablet 0   • metoprolol tartrate (LOPRESSOR) 25 MG  "tablet Take 1 tablet by mouth Daily. 90 tablet 3   • NITROSTAT 0.4 MG SL tablet Place 1 tablet under the tongue Every 5 (Five) Minutes As Needed for Chest Pain. Take no more than 3 doses in 15 minutes. 100 tablet 2   • omega-3 acid ethyl esters (LOVAZA) 1 g capsule TAKE 1 CAPSULE BY MOUTH DAILY 90 capsule 3   • omeprazole (priLOSEC) 20 MG capsule TAKE 1 CAPSULE BY MOUTH DAILY 90 capsule 3   • Potassium Gluconate 2.5 MEQ tablet Take 1 tablet by mouth Daily As Needed. When he takes lasix     • pregabalin (LYRICA) 150 MG capsule 150 mg 2 (Two) Times a Day.     • sacubitril-valsartan (Entresto) 24-26 MG tablet Take 1 tablet by mouth 2 (Two) Times a Day. 60 tablet 11   • tamsulosin (FLOMAX) 0.4 MG capsule 24 hr capsule Take 1 capsule by mouth Daily.     • topiramate (TOPAMAX) 50 MG tablet Take 1 tablet by mouth 2 (Two) Times a Day.     • Vitron-C  MG tablet Take 1 tablet by mouth 2 (Two) Times a Day With Meals.     • Zinc Sulfate (ZINC-220 PO) Take  by mouth.     • [DISCONTINUED] topiramate (TOPAMAX) 25 MG tablet Take 1 tablet by mouth 3 (Three) Times a Day. (Patient taking differently: Take 25 mg by mouth 2 (Two) Times a Day.)       No facility-administered encounter medications on file as of 11/1/2022.       Allergies:  Allergies   Allergen Reactions   • Meperidine Other (See Comments)     HEART STOPS    Other reaction(s): Unknown   • Statins Myalgia     Causes leg cramps       Immunizations:  Immunization History   Administered Date(s) Administered   • FLUAD TRI 65YR+ 10/10/2019   • Fluad Quad 65+ 10/10/2019   • Fluzone High Dose =>65 Years ([employer] ONLY) 10/11/2018, 10/01/2022   • TD Preservative Free 08/17/2018   • Tdap 09/17/2015, 12/06/2021   • influenza Split 10/22/2016       Objective:    Vitals:  /70   Pulse 72   Ht 172.7 cm (68\")   Wt 118 kg (260 lb 9.6 oz)   SpO2 96%   BMI 39.62 kg/m²     Physical Exam:  General: Patient is a 77 y.o. pleasant elderly  male. Looks stated age. Appears " to be in no acute distress.  Eyes: EOMI. PERRLA. Vision intact. No scleral icterus.  Ear, Nose, Mouth and Throat: Hearing is grossly intact. No Leukoplakia, pharyngitis, stomatitis or thrush. Swollen nasal mucosa with post nasal drop.  Neck: Range of motion of neck normal. No thyromegaly or masses. Mallampati Class 3  Respiratory: Clear to auscultation bilaterally. No use of accessory muscles. Decreased breath sounds.  Cardiovascular: Normal heart sounds. Regularly regular rhythm without murmur.  Gastrointestinal: Non tender, non distended, soft. Bowel sounds positive in all four quadrants. No organomegaly.  Skin: No obvious rashes, lesions, ulcers or large amount of bruising. No edema.   Neurological: No new motor deficits. Cranial nerves appear intact.  Psychiatric: Patient is alert and oriented to person, place and time.    Chest Imaging:    Study Result  Narrative & Impression    XR CHEST 2 VW- 6/2/2022 12:20 PM CDT     HISTORY: Z01.818; Z01.818-Encounter for other preprocedural examination         COMPARISON: February 17, 2022.     FINDINGS:   Upright frontal and lateral radiographs of the chest were obtained.     The lungs are clear. Cardiac silhouette is normal. Pacing device present  soft tissues of the left chest. Wires are present present median  sternotomy.. The osseous structures and surrounding soft tissues  demonstrate no acute abnormality.     IMPRESSION:  1. No radiographic evidence of acute cardiopulmonary process.     This report was finalized on 06/02/2022 12:32 by Dr. Krzysztof Parker MD.       Assessment:  1. Chronic obstructive pulmonary disease, unspecified COPD type ([facility])    2. Pulmonary hypertension ([facility])    3. ERNESTO (obstructive sleep apnea)    4. Lung nodules    5. Former smoker, stopped smoking many years ago    6. Mild intermittent asthma without complication    7. History of pneumonia    8. Restrictive lung disease    9. Empyema of pleura ([facility])    10. S/P thoracostomy tube placement ([facility])         Plan/Recommendations:    1.  Clinically doing well from the pulmonary standpoint but unfortunately due to underlying dementia he has memory problems and he cannot remember things very well and not using the medications properly.  I encouraged him to continue using Wixela routinely twice a day in addition to the albuterol scheduled as needed.  2.  He has nasal allergy and he should continue fluticasone nasal spray and loratadine.  No medication refill was needed at this time.  3.  Patient has obstructive sleep apnea and pulmonary hypertension most likely secondary to obstructive sleep apnea but he refused to use the CPAP and is unable to tolerate it.  He keeps his head and elevated and overall sleeps good according to his wife.  4.  Patient is vaccinated for influenza but refused to get COVID-vaccine.  Fortunately did not get COVID infection.  He will continue follow-up with the primary care provider.  I also advised him to get a pneumonia vaccine due to his age.  He will return to pulmonary clinic for follow-up visit in 6 months time.  I have ordered a follow-up CT scan in 3 months time which is 1 year from the prior CT scan for monitoring the lung nodule.    Follow up:  6 Months    Time Spent:  30 minutes    I appreciate the opportunity of participating in this patient's care. I would like to thank the PCP for the referral.  Please feel free to contact me with any other questions.    Katelyn Buckley MD   Pulmonologist/Intensivist     Electronically signed by: Katelyn Buckley MD, 11/1/2022 10:59 CDT

## 2022-11-08 ENCOUNTER — OFFICE VISIT (OUTPATIENT)
Dept: CARDIOLOGY | Facility: CLINIC | Age: 77
End: 2022-11-08

## 2022-11-08 VITALS
HEIGHT: 68 IN | DIASTOLIC BLOOD PRESSURE: 82 MMHG | HEART RATE: 74 BPM | OXYGEN SATURATION: 98 % | BODY MASS INDEX: 39.4 KG/M2 | SYSTOLIC BLOOD PRESSURE: 138 MMHG | WEIGHT: 260 LBS

## 2022-11-08 DIAGNOSIS — I27.20 PULMONARY HYPERTENSION: ICD-10-CM

## 2022-11-08 DIAGNOSIS — Z95.1 S/P CABG X 2: ICD-10-CM

## 2022-11-08 DIAGNOSIS — Z95.0 PRESENCE OF CARDIAC PACEMAKER: ICD-10-CM

## 2022-11-08 DIAGNOSIS — I50.32 CHRONIC DIASTOLIC CONGESTIVE HEART FAILURE: Primary | ICD-10-CM

## 2022-11-08 DIAGNOSIS — I49.5 SICK SINUS SYNDROME: ICD-10-CM

## 2022-11-08 DIAGNOSIS — I51.7 LVH (LEFT VENTRICULAR HYPERTROPHY): ICD-10-CM

## 2022-11-08 DIAGNOSIS — J44.9 CHRONIC OBSTRUCTIVE PULMONARY DISEASE, UNSPECIFIED COPD TYPE: ICD-10-CM

## 2022-11-08 DIAGNOSIS — G47.33 OSA (OBSTRUCTIVE SLEEP APNEA): ICD-10-CM

## 2022-11-08 DIAGNOSIS — E66.01 CLASS 2 SEVERE OBESITY DUE TO EXCESS CALORIES WITH SERIOUS COMORBIDITY AND BODY MASS INDEX (BMI) OF 39.0 TO 39.9 IN ADULT: ICD-10-CM

## 2022-11-08 DIAGNOSIS — Z95.5 STENTED CORONARY ARTERY: ICD-10-CM

## 2022-11-08 DIAGNOSIS — I25.810 CORONARY ARTERY DISEASE INVOLVING AUTOLOGOUS ARTERY CORONARY BYPASS GRAFT WITHOUT ANGINA PECTORIS: ICD-10-CM

## 2022-11-08 DIAGNOSIS — I10 PRIMARY HYPERTENSION: ICD-10-CM

## 2022-11-08 DIAGNOSIS — E78.5 HYPERLIPIDEMIA LDL GOAL <70: ICD-10-CM

## 2022-11-08 PROCEDURE — 99214 OFFICE O/P EST MOD 30 MIN: CPT | Performed by: NURSE PRACTITIONER

## 2022-11-08 PROCEDURE — 93000 ELECTROCARDIOGRAM COMPLETE: CPT | Performed by: NURSE PRACTITIONER

## 2022-11-08 RX ORDER — SPIRONOLACTONE 25 MG/1
25 TABLET ORAL DAILY
Qty: 90 TABLET | Refills: 3 | Status: SHIPPED | OUTPATIENT
Start: 2022-11-08

## 2022-11-08 NOTE — PROGRESS NOTES
Chief Complaint  Congestive Heart Failure (5mo F/U. ) and Coronary Artery Disease    Subjective          Carlo Velasco presents to [id number] for routine follow-up of medication adjustments. Entresto was resumed at his last office visit on 6/27/22. He has coronary artery disease status post CABG x2 (LIMA to LAD and SVG to RCA) 8/8/2012 with subsequent 2.5 x 18 mm Xience Alpine drug-eluting stent to the mid LAD 7/8/2015, 2.5 x 18 mm Xience drug-eluting stent to the proximal OM1 10/21/2016 and 2.5 x 12 mm Synergy drug-eluting stent to OM1 for severe in-stent restenosis 4/28/2020, sick sinus syndrome status post pacemaker, chronic diastolic congestive heart failure, left ventricular hypertrophy, mild pulmonary hypertension, thyroid disease status post partial thyroidectomy, hypertension, hyperlipidemia, GERD, obstructive sleep apnea, COPD and obesity. He continues to report dyspnea with mild exertion. He reports intermittent bilateral lower extremity edema as well. Patient denies chest pain, palpitations, dizziness, syncope, orthopnea, PND or decreased stamina.  Patient denies any signs of bleeding.    Congestive Heart Failure  Presents for follow-up visit. Associated symptoms include edema and shortness of breath. Pertinent negatives include no abdominal pain, chest pain, chest pressure, claudication, fatigue, muscle weakness, near-syncope, nocturia, orthopnea, palpitations, paroxysmal nocturnal dyspnea or unexpected weight change. The symptoms have been stable. His past medical history is significant for CAD. Compliance with total regimen is 51-75%. Compliance with diet is 51-75%. Compliance with exercise is 51-75%. Compliance with medications is %.   Coronary Artery Disease  Presents for follow-up visit. Symptoms include shortness of breath. Pertinent negatives include no chest pain, chest pressure, chest tightness, dizziness, leg swelling, muscle weakness, palpitations or  "weight gain. Risk factors include hyperlipidemia and obesity. His past medical history is significant for CHF. The symptoms have been stable. Compliance with diet is variable. Compliance with exercise is variable. Compliance with medications is good.   Hypertension  This is a chronic problem. The current episode started more than 1 year ago. The problem is controlled. Associated symptoms include shortness of breath. Pertinent negatives include no anxiety, blurred vision, chest pain, headaches, malaise/fatigue, neck pain, orthopnea, palpitations, peripheral edema, PND or sweats. Risk factors for coronary artery disease include obesity, male gender and dyslipidemia. Current antihypertension treatment includes beta blockers, diuretics, direct vasodilators and angiotensin blockers. The current treatment provides significant improvement. Hypertensive end-organ damage includes CAD/MI and heart failure. Identifiable causes of hypertension include a thyroid problem.   Hyperlipidemia  This is a chronic problem. The current episode started more than 1 year ago. Exacerbating diseases include obesity. Associated symptoms include shortness of breath. Pertinent negatives include no chest pain. Current antihyperlipidemic treatment includes statins. Risk factors for coronary artery disease include hypertension, male sex, obesity and dyslipidemia.     I have reviewed and confirmed the accuracy of the HPI ANALI Rucker      Objective   Vital Signs:   /82   Pulse 74   Ht 172.7 cm (68\")   Wt 118 kg (260 lb)   SpO2 98%   BMI 39.53 kg/m²     Vitals and nursing note reviewed.   Constitutional:       General: Awake.      Appearance: Normal and healthy appearance. Well-developed and not in distress. Morbidly obese.   Eyes:      General: Lids are normal.      Conjunctiva/sclera: Conjunctivae normal.      Pupils: Pupils are equal, round, and reactive to light.   HENT:      Head: Normocephalic and atraumatic.      Nose: " Nose normal.   Neck:      Vascular: No JVR. JVD normal.   Pulmonary:      Effort: Pulmonary effort is normal.      Breath sounds: Examination of the right-lower field reveals rales. No decreased breath sounds. No wheezing. No rhonchi. Rales present.   Chest:      Chest wall: Not tender to palpatation.   Cardiovascular:      PMI at left midclavicular line. Normal rate. Regular rhythm. Normal S1. Normal S2.      Murmurs: There is no murmur.      No gallop. No click. No rub.   Pulses:     Intact distal pulses.   Edema:     Peripheral edema present.     Pretibial: bilateral 1+ edema of the pretibial area.     Ankle: bilateral 1+ edema of the ankle.     Feet: bilateral 1+ edema of the feet.  Abdominal:      General: Bowel sounds are normal.      Palpations: Abdomen is soft.      Tenderness: There is no abdominal tenderness.   Musculoskeletal: Normal range of motion.         General: No tenderness.      Cervical back: Normal range of motion. Skin:     General: Skin is warm and dry.   Neurological:      General: No focal deficit present.      Mental Status: Alert, oriented to person, place, and time and oriented to person, place and time.   Psychiatric:         Attention and Perception: Attention and perception normal.         Mood and Affect: Mood and affect normal.         Speech: Speech normal.         Behavior: Behavior normal. Behavior is cooperative.         Thought Content: Thought content normal.         Cognition and Memory: Cognition and memory normal.         Judgment: Judgment normal.        Result Review :   The following data was reviewed by: ANALI Rucker on 11/8/2022:  Common labs    Common Labs 6/29/22 6/29/22 8/3/22 8/12/22    1051 1051     Glucose  120 (A) 113 (A) 90   BUN  25 (A) 19 12   Creatinine  1 0.99 0.82   eGFR Non African Am  >60     eGFR African Am  >59     Sodium  141 139 140   Potassium  3.9 3.3 (A) 4.1   Chloride  104 103 111 (A)   Calcium  8.8 8.9 8.9   Albumin  3.6     Total  Bilirubin  0.3     Alkaline Phosphatase  77     AST (SGOT)  19     ALT (SGPT)  18     WBC 6.9      Hemoglobin 12.9 (A)      Hematocrit 41.8 (A)      Platelets 148      (A) Abnormal value       Comments are available for some flowsheets but are not being displayed.           Data reviewed: Cardiology studies 2d echo 4/28/20 and multiple left heart catheterizations.      ECG 12 Lead    Date/Time: 11/8/2022 1:30 PM  Performed by: Julita Quezada APRN  Authorized by: Julita Quezada APRN   Comparison: compared with previous ECG from 4/18/2022  Similar to previous ECG  Rhythm: sinus rhythm  Rate: normal  BPM: 74  Q waves: III and aVF      Clinical impression: abnormal EKG              Assessment and Plan    Diagnoses and all orders for this visit:    1. Chronic diastolic congestive heart failure (HCC) (Primary)- NYHA class II.  Stage C.  Compensated.  Start spironolactone 25 mg daily.  BMP in 1 week. Reviewed signs and symptoms of CHF and what to report with the patient. Patient instructed to restrict sodium and weigh daily. Report weight gain of greater than 2 lbs overnight or 5 lbs in 1 week. Pt verbalized understanding of instructions and plan of care.  Continue Entresto, metoprolol tartrate and as needed Lasix.  Patient was previously on Jardiance, however this was discontinued due to fatigue.  Consider re-initiation of Jardiance in the future.     2. Coronary artery disease involving autologous artery coronary bypass graft without angina pectoris- no clinical signs of ischemia.  Stable.  Continue Imdur.    3. S/P CABG x 2-LIMA to LAD and SVG to RCA 8/8/12.     4. Stented coronary artery- 2.5 x 18 mm Xience Alpine drug-eluting stent to the mid LAD 7/8/2015, 2.5 x 18 mm Xience drug-eluting stent to the proximal OM1 10/21/2016 and 2.5 x 12 mm Synergy drug-eluting stent to OM1 for severe in-stent restenosis 4/28/2020.  Patient continues on Plavix.  Denies bleeding.    5. Sick sinus syndrome (HCC)-status post  pacemaker.  Stable.    6. Presence of cardiac pacemaker-device interrogated 7/19/2022.  17% a paced, 3.8% V paced.  No alerts.  Normal function, stable thresholds and adequate battery.      7. LVH (left ventricular hypertrophy)-mild on 2D echo 4/28/2020.  Stable.    8. Pulmonary hypertension (HCC)-mild on 2D echo 4/28/2020.  Likely group 5 related to left-sided heart disease and chronic lung disease.  Stable.    9. Primary hypertension-blood pressure is elevated in office today, however he reports consistently lower than 130/80 at home. Continue to monitor following above medication adjustments.  Continue Entresto and metoprolol tartrate.  Monitor and record daily blood pressure. Report readings consistently higher than 130/80 or consistently lower than 100/60.     10. Hyperlipidemia LDL goal <70-management per PCP.  Continue atorvastatin.    11. Chronic obstructive pulmonary disease, unspecified COPD type (HCC)- stable on room air. Pt follows with Dr. Buckley with pulmonology.     12. ERNESTO (obstructive sleep apnea)- pt is non-compliant with CPAP. He has been educated on the risks of untreated sleep apnea, including atrial fibrillation and worsening heart failure. He verbalized understanding.     13. Class 2 severe obesity due to excess calories with serious comorbidity and body mass index (BMI) of 39.0 to 39.9 in adult (HCC)- Class 2 Severe Obesity (BMI >=35 and <=39.9). Obesity-related health conditions include the following: obstructive sleep apnea, hypertension, coronary heart disease and dyslipidemias. Obesity is unchanged. BMI is is above average; no BMI management plan is appropriate. We discussed low calorie, low carb based diet program, portion control and increasing exercise.       Follow Up   Return in about 4 weeks (around 12/6/2022) for Next scheduled follow up.  Patient was given instructions and counseling regarding his condition or for health maintenance advice. Please see specific information pulled  into the AVS if appropriate.

## 2022-11-14 RX ORDER — CLOPIDOGREL BISULFATE 75 MG/1
75 TABLET ORAL DAILY
Qty: 90 TABLET | Refills: 3 | Status: SHIPPED | OUTPATIENT
Start: 2022-11-14

## 2022-11-14 RX ORDER — OMEPRAZOLE 20 MG/1
20 CAPSULE, DELAYED RELEASE ORAL DAILY
Qty: 90 CAPSULE | Refills: 3 | Status: SHIPPED | OUTPATIENT
Start: 2022-11-14

## 2022-11-18 ENCOUNTER — LAB (OUTPATIENT)
Dept: LAB | Facility: HOSPITAL | Age: 77
End: 2022-11-18

## 2022-11-18 DIAGNOSIS — I50.32 CHRONIC DIASTOLIC CONGESTIVE HEART FAILURE: ICD-10-CM

## 2022-11-18 LAB
ANION GAP SERPL CALCULATED.3IONS-SCNC: 8 MMOL/L (ref 5–15)
BUN SERPL-MCNC: 16 MG/DL (ref 8–23)
BUN/CREAT SERPL: 21.6 (ref 7–25)
CALCIUM SPEC-SCNC: 9.1 MG/DL (ref 8.6–10.5)
CHLORIDE SERPL-SCNC: 105 MMOL/L (ref 98–107)
CO2 SERPL-SCNC: 27 MMOL/L (ref 22–29)
CREAT SERPL-MCNC: 0.74 MG/DL (ref 0.76–1.27)
EGFRCR SERPLBLD CKD-EPI 2021: 94.5 ML/MIN/1.73
GLUCOSE SERPL-MCNC: 125 MG/DL (ref 65–99)
POTASSIUM SERPL-SCNC: 4.1 MMOL/L (ref 3.5–5.2)
SODIUM SERPL-SCNC: 140 MMOL/L (ref 136–145)

## 2022-11-18 PROCEDURE — 36415 COLL VENOUS BLD VENIPUNCTURE: CPT

## 2022-11-18 PROCEDURE — 80048 BASIC METABOLIC PNL TOTAL CA: CPT

## 2022-12-07 ENCOUNTER — APPOINTMENT (OUTPATIENT)
Dept: ULTRASOUND IMAGING | Facility: HOSPITAL | Age: 77
End: 2022-12-07

## 2022-12-16 ENCOUNTER — OFFICE VISIT (OUTPATIENT)
Dept: CARDIOLOGY | Facility: CLINIC | Age: 77
End: 2022-12-16

## 2022-12-16 VITALS
SYSTOLIC BLOOD PRESSURE: 123 MMHG | HEART RATE: 80 BPM | HEIGHT: 68 IN | OXYGEN SATURATION: 97 % | BODY MASS INDEX: 39.71 KG/M2 | WEIGHT: 262 LBS | DIASTOLIC BLOOD PRESSURE: 78 MMHG

## 2022-12-16 DIAGNOSIS — I25.810 CORONARY ARTERY DISEASE INVOLVING AUTOLOGOUS ARTERY CORONARY BYPASS GRAFT WITHOUT ANGINA PECTORIS: ICD-10-CM

## 2022-12-16 DIAGNOSIS — Z95.1 S/P CABG X 2: ICD-10-CM

## 2022-12-16 DIAGNOSIS — I27.20 PULMONARY HYPERTENSION: ICD-10-CM

## 2022-12-16 DIAGNOSIS — Z95.0 PRESENCE OF CARDIAC PACEMAKER: ICD-10-CM

## 2022-12-16 DIAGNOSIS — I50.32 CHRONIC DIASTOLIC CONGESTIVE HEART FAILURE: Primary | ICD-10-CM

## 2022-12-16 DIAGNOSIS — J44.9 CHRONIC OBSTRUCTIVE PULMONARY DISEASE, UNSPECIFIED COPD TYPE: ICD-10-CM

## 2022-12-16 DIAGNOSIS — R06.02 SOB (SHORTNESS OF BREATH): ICD-10-CM

## 2022-12-16 DIAGNOSIS — G47.33 OSA (OBSTRUCTIVE SLEEP APNEA): ICD-10-CM

## 2022-12-16 DIAGNOSIS — R94.31 ABNORMAL ECG: ICD-10-CM

## 2022-12-16 DIAGNOSIS — I51.7 LVH (LEFT VENTRICULAR HYPERTROPHY): ICD-10-CM

## 2022-12-16 PROCEDURE — 99214 OFFICE O/P EST MOD 30 MIN: CPT | Performed by: INTERNAL MEDICINE

## 2022-12-16 NOTE — PROGRESS NOTES
Carlo Velasco  [id number]  1945  77 y.o.  male    Referring Provider: Cruz Chaudhari MD    Reason for Follow-up Visit:      Here for routine follow up   coronary artery disease Coronary artery bypass grafting , stented coronary artery   sick sinus syndrome s/p pacemaker   Device interroagations show no incriminating arrhythmia      Subjective    Mild chronic exertional shortness of breath on exertion relieved with rest  No significant cough or wheezing    Intermittent palpitations, once every several days to several weeks lasting for less than 1 minute  No associated symptoms of dizziness, weakness, chest pain,  shortness of breath    NSVT on device check     No associated chest pain  No significant pedal edema    No fever or chills  No significant expectoration    No hemoptysis  No presyncope or syncope    Tolerating current medications well with no untoward side effects   Compliant with prescribed medication regimen. Tries to adhere to cardiac diet.     No bleeding, excessive bruising, gait instability or fall risks    BP well controlled at home.       Device check as below        History of present illness:  Carlo Velasco is a 77 y.o. yo male with history of coronary artery disease Coronary artery bypass grafting , stented coronary artery sick sinus syndrome s/p pacemaker who presents today for   Chief Complaint   Patient presents with   • Congestive Heart Failure     6 wk fu   • Coronary Artery Disease   .    History  Past Medical History:   Diagnosis Date   • Anxiety    • Arthritis    • Cancer (HCC)     melanoma   • Chronic diastolic congestive heart failure (HCC) 4/15/2022   • Claustrophobia    • Colon polyp    • Coronary artery disease involving autologous artery coronary bypass graft without angina pectoris 9/22/2016   • Depression    • Disease of thyroid gland     partial thyroidectomy on right   • GERD (gastroesophageal reflux disease)    • Hearing loss    • Heart attack (HCC)    • History of  transfusion    • Hypertension    • Kidney stone     history of    • Mild intermittent asthma without complication 8/24/2021   • Open angle with borderline findings, low risk, bilateral 8/23/2021   • Presence of cardiac pacemaker 4/15/2022   • Primary hypertension 9/22/2016   • Pulmonary hypertension (HCC) 4/15/2022   • S/P CABG x 2 1/4/2019    LIMA to LAD and SVG to RCA 8/8/12   • Sick sinus syndrome (HCC) 7/23/2020   • Stented coronary artery 1/4/2019    2.5 x 18 mm Xience Alpine drug-eluting stent to the mid LAD 7/8/2015, 2.5 x 18 mm Xience drug-eluting stent to the proximal OM1 10/21/2016 and 2.5 x 12 mm Synergy drug-eluting stent to OM1 for severe in-stent restenosis 4/28/2020   • Tobacco abuse, in remission    • Uses hearing aid     BILATERAL   ,   Past Surgical History:   Procedure Laterality Date   • BACK SURGERY      CERVICAL FUSION AND LUMBAR DISC REPLACEMENT   • BRONCHOSCOPY N/A 04/27/2021    Procedure: BRONCHOSCOPY;  Surgeon: Rico Jacobs MD;  Location: [facility] OR;  Service: Cardiothoracic;  Laterality: N/A;   • CARDIAC CATHETERIZATION  07/2015    SHAHRZAD mid LAD, Dr. Toribio   • CARDIAC CATHETERIZATION N/A 10/21/2016    Procedure: Left Heart Cath;  Surgeon: Darian Toribio MD;  Location:  [facility] CATH INVASIVE LOCATION;  Service:    • CARDIAC CATHETERIZATION Left 04/05/2017    Procedure: Cardiac Catheterization/Vascular Study;  Surgeon: Darian Toribio MD;  Location:  [facility] CATH INVASIVE LOCATION;  Service:    • CARDIAC CATHETERIZATION N/A 04/05/2017    Procedure: Left Heart Cath;  Surgeon: Darian Toribio MD;  Location:  [facility] CATH INVASIVE LOCATION;  Service:    • CARDIAC CATHETERIZATION N/A 04/05/2017    Procedure: Coronary angiography;  Surgeon: Darian Toribio MD;  Location:  [facility] CATH INVASIVE LOCATION;  Service:    • CARDIAC CATHETERIZATION N/A 04/05/2017    Procedure: Left ventriculography;  Surgeon: Darian Toribio MD;  Location:  [facility] CATH INVASIVE LOC[facility];  Service:    • CARDIAC CATHETERIZATION  04/05/2017     Procedure: Saphenous Vein Graft;  Surgeon: Darian Toribio MD;  Location:  [facility] CATH INVASIVE LOC[facility];  Service:    • CARDIAC CATHETERIZATION Left 02/27/2018    Procedure: Cardiac Catheterization/Vascular Study SHAHRZAD OK PRN;  Surgeon: Darian Toribio MD;  Location:  [facility] CATH INVASIVE LOC[facility];  Service:    • CARDIAC CATHETERIZATION  02/27/2018    Procedure: Functional Flow [place];  Surgeon: Darian Toribio MD;  Location:  [facility] CATH INVASIVE LOC[facility];  Service:    • CARDIAC CATHETERIZATION N/A 02/27/2018    Procedure: Left ventriculography;  Surgeon: Darian Toribio MD;  Location:  [facility] CATH INVASIVE LOC[facility];  Service:    • CARDIAC CATHETERIZATION Left 04/28/2020    Procedure: Cardiac Catheterization/Vascular Study SHAHRZAD OK PRN;  Surgeon: Darian Toribio MD;  Location:  [facility] CATH INVASIVE LOC[facility];  Service: Cardiology;  Laterality: Left;   • CARDIAC ELECTROPHYSIOLOGY PROCEDURE N/A 01/11/2018    Procedure: PPM generator change - dual;  Surgeon: Darian Toribio MD;  Location: [facility] CATH INVASIVE LOCATION;  Service:    • CARDIAC SURGERY      open heart surgery x2   • COLONOSCOPY  09/05/2007    colon polyps   • COLONOSCOPY N/A 05/29/2018    Procedure: COLONOSCOPY WITH ANESTHESIA;  Surgeon: Juan F Tapia MD;  Location: [facility] ENDOSCOPY;  Service: Gastroenterology   • COLONOSCOPY N/A 10/21/2021    Procedure: COLONOSCOPY WITH ANESTHESIA;  Surgeon: Juan F Tapia MD;  Location: [facility] ENDOSCOPY;  Service: Gastroenterology;  Laterality: N/A;  pre op: hx polyps  post op;polyp,   PCP: Rhys Rosen MD   • CORONARY ANGIOPLASTY WITH STENT PLACEMENT  07/2015    SHAHRZAD mid LAD, Dr. Toribio    • CORONARY ARTERY BYPASS GRAFT  2012 AND 2014    2 vessel    • ENDOSCOPY  11/08/2010   • ENDOSCOPY N/A 05/29/2018    Procedure: ESOPHAGOGASTRODUODENOSCOPY WITH ANESTHESIA;  Surgeon: Juan F Tapia MD;  Location: [facility] ENDOSCOPY;  Service: Gastroenterology   • HEAD/NECK LESION/CYST EXCISION Left 12/20/2016    Procedure: EXCISION MALIGNANT  MELANOMA WITH SENTINEL NODE BIOPSY, INJECTION AND SCAN PRE-OP, LEFT EAR;  Surgeon: Guanaco Zepeda MD;  Location:  [facility] OR;  Service:    • HEMORRHOIDECTOMY     • HERNIA REPAIR     • INSERT / REPLACE / REMOVE PACEMAKER     • REPLACEMENT TOTAL KNEE Left    • SENTINEL NODE BIOPSY Left 2016    Procedure: SENTINEL NODE BIOPSY;  Surgeon: Guanaco Zepeda MD;  Location:  [facility] OR;  Service:    • SKIN CANCER EXCISION     • THORACOSCOPY Right 2021    Procedure: THORACOSCOPIC TOTAL DECORTICATION;  Surgeon: Rico Jacobs MD;  Location:  [facility] OR;  Service: Cardiothoracic;  Laterality: Right;   • THYROID SURGERY     • THYROIDECTOMY     ,   Family History   Problem Relation Age of Onset   • Heart failure Mother    • Stroke Mother    • Dementia Mother    • Coronary artery disease Father    • Colon polyps Father    • COPD Brother    • Colon cancer Neg Hx    ,   Social History     Tobacco Use   • Smoking status: Former     Packs/day: 3.00     Years: 10.00     Pack years: 30.00     Types: Cigarettes     Quit date:      Years since quittin.9   • Smokeless tobacco: Former     Types: Snuff     Quit date:    Vaping Use   • Vaping Use: Never used   Substance Use Topics   • Alcohol use: Yes     Comment: rare   • Drug use: No   ,     Medications  Current Outpatient Medications   Medication Sig Dispense Refill   • albuterol sulfate  (90 Base) MCG/ACT inhaler Inhale 2 puffs Every 4 (Four) Hours As Needed for Wheezing. 6.7 g 11   • ascorbic acid (VITAMIN C) 1000 MG tablet Vitamin C 1,000 mg tablet   Take 1 tablet every day by oral route.     • atorvastatin (LIPITOR) 80 MG tablet Take 80 mg by mouth Daily.     • Calcium Carbonate 1500 (600 Ca) MG tablet Take 1 tablet by mouth Daily.     • Cholecalciferol 125 MCG (5000 UT) tablet Vitamin D3 125 mcg (5,000 unit) tablet   Take 1 tablet every day by oral route.     • clopidogrel (PLAVIX) 75 MG tablet TAKE 1 TABLET BY MOUTH DAILY 90 tablet 3   • Cyanocobalamin  (B-12) 1000 MCG sublingual tablet 1 tablet Daily.     • donepezil (ARICEPT) 5 MG tablet TAKE 1 TABLET BY MOUTH EVERY NIGHT AT BEDTIME 90 tablet 3   • escitalopram (LEXAPRO) 20 MG tablet Take 1 tablet by mouth Daily. 90 tablet 1   • ferrous sulfate 325 (65 FE) MG tablet Take 1 tablet by mouth Every Morning.     • fluticasone (FLONASE) 50 MCG/ACT nasal spray INSTILL 2 SPRAYS INTO THE NOSTRILS EVERY DAY AS DIRECTED BY THE PROVIDER 16 g 5   • fluticasone-salmeterol (ADVAIR) 100-50 MCG/DOSE DISKUS Inhale 1 puff 2 (Two) Times a Day. 60 each 11   • furosemide (LASIX) 40 MG tablet Take 1 tablet by mouth Daily As Needed (for any increase in 2 lbs in a daqy or 5 lbs in a week or increassing swelling or congestion).     • gabapentin (NEURONTIN) 100 MG capsule 1 capsule 3 (Three) Times a Day As Needed.     • isosorbide mononitrate (IMDUR) 30 MG 24 hr tablet TAKE 1 TABLET BY MOUTH EVERY MORNING 90 tablet 3   • loratadine (CLARITIN) 10 MG tablet Take 1 tablet by mouth Daily. 90 tablet 3   • meloxicam (MOBIC) 15 MG tablet Take 1 tablet by mouth Daily. Prn 30 tablet 0   • metoprolol tartrate (LOPRESSOR) 25 MG tablet Take 1 tablet by mouth Daily. 90 tablet 3   • NITROSTAT 0.4 MG SL tablet Place 1 tablet under the tongue Every 5 (Five) Minutes As Needed for Chest Pain. Take no more than 3 doses in 15 minutes. 100 tablet 2   • omega-3 acid ethyl esters (LOVAZA) 1 g capsule TAKE 1 CAPSULE BY MOUTH DAILY 90 capsule 3   • omeprazole (priLOSEC) 20 MG capsule TAKE 1 CAPSULE BY MOUTH DAILY 90 capsule 3   • Potassium Gluconate 2.5 MEQ tablet Take 1 tablet by mouth Daily As Needed. When he takes lasix     • pregabalin (LYRICA) 150 MG capsule 150 mg 2 (Two) Times a Day.     • sacubitril-valsartan (Entresto) 24-26 MG tablet Take 1 tablet by mouth 2 (Two) Times a Day. 60 tablet 11   • spironolactone (ALDACTONE) 25 MG tablet Take 1 tablet by mouth Daily. 90 tablet 3   • tamsulosin (FLOMAX) 0.4 MG capsule 24 hr capsule Take 1 capsule by mouth  "Daily.     • Zinc Sulfate (ZINC-220 PO) Take  by mouth.       No current facility-administered medications for this visit.       Allergies:  Meperidine and Statins    Review of Systems  Review of Systems   Constitutional: Positive for malaise/fatigue.   HENT: Negative.    Eyes: Negative.    Cardiovascular: Positive for dyspnea on exertion and leg swelling. Negative for chest pain, claudication, cyanosis, irregular heartbeat, near-syncope, orthopnea, palpitations, paroxysmal nocturnal dyspnea and syncope.   Respiratory: Positive for cough and shortness of breath.    Endocrine: Negative.    Hematologic/Lymphatic: Negative.    Skin: Negative.    Musculoskeletal: Positive for arthritis and back pain.   Gastrointestinal: Negative for anorexia.   Genitourinary: Negative.    Neurological: Negative for weakness.   Psychiatric/Behavioral: Negative.    Same review of system and physical exam as last visit        Objective     Physical Exam:  /78 (BP Location: Left arm, Patient Position: Sitting, Cuff Size: Large Adult)   Pulse 80   Ht 172.7 cm (67.99\")   Wt 119 kg (262 lb)   SpO2 97%   BMI 39.85 kg/m²   Physical Exam   Constitutional: He appears well-developed.   HENT:   Head: Normocephalic.   Neck: Normal carotid pulses and no JVD present. No tracheal tenderness present. Carotid bruit is not present. No tracheal deviation present.   Cardiovascular: Regular rhythm, normal heart sounds and normal pulses.   Pulmonary/Chest: Effort normal. No stridor.   Abdominal: Soft. He exhibits no distension. There is no abdominal tenderness.   Neurological: He is alert. No cranial nerve deficit or sensory deficit.   Skin: Skin is warm.   Psychiatric: His speech is normal and behavior is normal.       Results Review:      IMPRESSION:  1. No pulmonary emboli.  2. No thoracic aortic aneurysm or dissection. Prior mediastinal surgery  with cardiomegaly. Left subclavian cardiac pacer device.  3. New right middle lobe consolidation most " consistent with pneumonia.  Probable reactive right hilar and subcarinal lymph nodes. Follow up  chest x-ray recommended to document resolution.  This report was finalized on 2021 20:54 by Dr. Cassie Mckeon MD.      Conclusion of cardiac catheterization    2020     Left ventricle ejection fraction 45%  Moderately elevated left ventricular end-diastolic pressure of 19 mmHg  Atretic left intramammary artery graft  Patent stents in the left anterior descending coronary artery  60% stenosis chronic of diagonal branch unchanged from prior cardiac catheterization  95% in-stent restenosis of first obtuse marginal branch treated with PTCA and then implantation of 2.5 x 12 mm Synergy stent with 0% residual stenosis  70% stenosis of distal right coronary artery  Widely patent saphenous venous graft to the distal right coronary artery        ____________________________________________________________________________________________________________________________________________     Plan after cardiac catheterization        Dual antiplatelet therapy minimum of 1 year  Continue on other medication including statin and beta-blockers  Aspirin for the rest of his life  Add ACE inhibitor therapy  Check echocardiogram  Intensive risk factor modifications for both primary and secondary prevention if applicable  Hydration   Observation         Carlo Velasco   Regadenoson Stress Test With Myocardial Perfusion SPECT (Multi Study)   Order# [id number]   Reading physician: Darian Toribio MD Ordering physician: Darian Toribio MD Study date: 20   Patient Information     Patient Name  Carlo Velasco MRN  [id number] Sex  Male  (Age)  1945 (74 y.o.)   Interpretation Summary        · Left ventricular ejection fraction is normal (Calculated EF = 55%).  · Myocardial perfusion imaging indicates a normal myocardial perfusion study with no evidence of ischemia.  · Impressions are consistent with a low risk study.                 Results for orders placed during the hospital encounter of 04/27/20    STAT Adult Transthoracic Echo Complete W/ Cont if Necessary Per Protocol    Interpretation Summary  · Estimated EF = 55%.  · Left ventricular systolic function is normal.  · Left ventricular wall thickness is consistent with mild concentric hypertrophy.  · Left ventricular diastolic dysfunction.  · Mild pulmonary hypertension is present.       Procedures  Cardiac cath  4/17  LVEF of 50%.   occluded the mid right coronary artery  Patent saphenous venous graft to the distal right coronary artery  Atretic left intramammary artery graft.  Patent stent in the mid left anterior descending coronary artery  Patent stent to the obtuse marginal branch  Patent stent in diagonal branch.   2/18    Conclusion     Patent stents noted in the obtuse marginal branch and the left anterior descending coronary artery.  Atretic left internal mammary artery graft.  Patent saphenous venous graft to the right coronary artery  Significant right coronary artery stenosis proximal to the touchdown site of the saphenous vein graft to the right coronary artery  60% stenosis of diagonal branch with a normal fractional flow reserve of 0.85  Left ventricle ejection fraction of 50%.     LVEDP    16   mm Hg           Plan     Intensive risk factor modifications for both primary and secondary prevention if applicable  Home today if stable  Hydration   Observation  Keep follow-up appointment    Diagnoses and all orders for this visit:    1. Chronic diastolic congestive heart failure (HCC) (Primary)    2. Chronic obstructive pulmonary disease, unspecified COPD type (HCC)    3. Coronary artery disease involving autologous artery coronary bypass graft without angina pectoris  -     Stress Test With Myocardial Perfusion (1 Day); Future    4. Abnormal ECG  -     Adult Transthoracic Echo Complete w/ Color, Spectral and Contrast if necessary per protocol; Future    5. LVH (left  ventricular hypertrophy)    6. ERNESTO (obstructive sleep apnea)    7. Presence of cardiac pacemaker    8. Pulmonary hypertension (HCC)    9. S/P CABG x 2  -     Stress Test With Myocardial Perfusion (1 Day); Future    10. SOB (shortness of breath)            Plan        Recommend evaluation for obstructive sleep apnea given snoring by primary provider  In addition has body habitus including increasing the likelihood of obstructive sleep apnea     Per patient his pulmonologist also recommended this    Had used CPAP in distant past and stopped after loosing weight     Orders Placed This Encounter   Procedures   • Stress Test With Myocardial Perfusion (1 Day)     Standing Status:   Future     Standing Expiration Date:   12/16/2023     Order Specific Question:   What stress agent will be used?     Answer:   Regadenoson (Lexiscan)     Order Specific Question:   Difficulty walking criteria?     Answer:   Musculoskeletal (hips, knees, feet, back, amputee)     Order Specific Question:   Reason for exam?     Answer:   Known Coronary Artery Disease     Order Specific Question:   Release to patient     Answer:   Routine Release   • Adult Transthoracic Echo Complete w/ Color, Spectral and Contrast if necessary per protocol     Standing Status:   Future     Standing Expiration Date:   12/16/2023     Scheduling Instructions:      Myocardial strain to be performed during echocardiogram as long as technically feasible     Order Specific Question:   Reason for exam?     Answer:   Dyspnea     Order Specific Question:   Release to patient     Answer:   Routine Release      Weigh yourself frequently, at least weekly, preferably daily, call me if more than 2 pounds a day or 5 pounds a week weight gain.  Flexible diuretic dosing    Keep LDL below 70 mg/dl. Monitor liver and renal functions.   Monitor CBC, CMP, TSH (as indicated) and Lipid Panel by primary     Patient expressed understanding  Encouraged and answered all questions   Discussed  with the patient and all questioned fully answered. He will call me if any problems arise.   Discussed results of prior testing with patient : echo and prior stress test and PCI      Patient undecided regarding the Covid vaccine   Urged to consider vaccination further   I can provide more input if required   Recommend further discussion with primary care provider     Monitor for any signs of bleeding including red or dark stools as well as easy bruisabilty. Fall precautions.   Monitor cardiac rhythm device function regularly per established schedule or PRN      Check BP and heart rates twice daily at least 3x / week, week a month  at home and bring a recording for me to review next visit  If BP >130/85 or < 100/60 persistently over 3 reading 30 mins apart call sooner      S/L NTG PRN for chest pain, call me or go to ER if has to use S/L nitroglycerin      Follow up with ANALI Holley             Return in about 3 months (around 3/16/2023).

## 2023-01-16 ENCOUNTER — HOSPITAL ENCOUNTER (OUTPATIENT)
Dept: CARDIOLOGY | Facility: HOSPITAL | Age: 78
Discharge: HOME | End: 2023-01-16
Payer: MEDICARE

## 2023-01-16 ENCOUNTER — HOSPITAL ENCOUNTER (OUTPATIENT)
Dept: CARDIOLOGY | Facility: HOSPITAL | Age: 78
Discharge: HOME | End: 2023-01-16
Admitting: INTERNAL MEDICINE
Payer: MEDICARE

## 2023-01-16 VITALS
DIASTOLIC BLOOD PRESSURE: 78 MMHG | BODY MASS INDEX: 39.12 KG/M2 | SYSTOLIC BLOOD PRESSURE: 142 MMHG | HEART RATE: 62 BPM | HEIGHT: 69 IN

## 2023-01-16 VITALS
HEIGHT: 68 IN | WEIGHT: 262 LBS | SYSTOLIC BLOOD PRESSURE: 142 MMHG | BODY MASS INDEX: 39.71 KG/M2 | DIASTOLIC BLOOD PRESSURE: 78 MMHG

## 2023-01-16 DIAGNOSIS — R94.31 ABNORMAL ECG: ICD-10-CM

## 2023-01-16 DIAGNOSIS — Z95.1 S/P CABG X 2: ICD-10-CM

## 2023-01-16 DIAGNOSIS — I25.810 CORONARY ARTERY DISEASE INVOLVING AUTOLOGOUS ARTERY CORONARY BYPASS GRAFT WITHOUT ANGINA PECTORIS: ICD-10-CM

## 2023-01-16 PROCEDURE — 93306 TTE W/DOPPLER COMPLETE: CPT | Performed by: INTERNAL MEDICINE

## 2023-01-16 PROCEDURE — 93306 TTE W/DOPPLER COMPLETE: CPT

## 2023-01-16 PROCEDURE — 78452 HT MUSCLE IMAGE SPECT MULT: CPT

## 2023-01-16 PROCEDURE — 0 TECHNETIUM TETROFOSMIN KIT: Performed by: INTERNAL MEDICINE

## 2023-01-16 PROCEDURE — 25010000002 REGADENOSON 0.4 MG/5ML SOLUTION: Performed by: INTERNAL MEDICINE

## 2023-01-16 PROCEDURE — 93356 MYOCRD STRAIN IMG SPCKL TRCK: CPT | Performed by: INTERNAL MEDICINE

## 2023-01-16 PROCEDURE — 93018 CV STRESS TEST I&R ONLY: CPT | Performed by: INTERNAL MEDICINE

## 2023-01-16 PROCEDURE — A9502 TC99M TETROFOSMIN: HCPCS | Performed by: INTERNAL MEDICINE

## 2023-01-16 PROCEDURE — 93017 CV STRESS TEST TRACING ONLY: CPT

## 2023-01-16 PROCEDURE — 78452 HT MUSCLE IMAGE SPECT MULT: CPT | Performed by: INTERNAL MEDICINE

## 2023-01-16 PROCEDURE — 93356 MYOCRD STRAIN IMG SPCKL TRCK: CPT

## 2023-01-16 RX ADMIN — REGADENOSON 0.4 MG: 0.08 INJECTION, SOLUTION INTRAVENOUS at 09:53

## 2023-01-16 RX ADMIN — TETROFOSMIN 1 DOSE: 1.38 INJECTION, POWDER, LYOPHILIZED, FOR SOLUTION INTRAVENOUS at 09:58

## 2023-01-16 RX ADMIN — TETROFOSMIN 1 DOSE: 1.38 INJECTION, POWDER, LYOPHILIZED, FOR SOLUTION INTRAVENOUS at 08:39

## 2023-01-17 LAB
BH CV NUCLEAR PRIOR STUDY: 3
BH CV REST NUCLEAR ISOTOPE DOSE: 11.3 MCI
BH CV STRESS BP STAGE 1: NORMAL
BH CV STRESS COMMENTS STAGE 1: NORMAL
BH CV STRESS DOSE REGADENOSON STAGE 1: 0.4
BH CV STRESS DURATION MIN STAGE 1: 0
BH CV STRESS DURATION SEC STAGE 1: 10
BH CV STRESS HR STAGE 1: 88
BH CV STRESS NUCLEAR ISOTOPE DOSE: 34.2 MCI
BH CV STRESS PROTOCOL 1: NORMAL
BH CV STRESS RECOVERY BP: NORMAL MMHG
BH CV STRESS RECOVERY HR: 72 BPM
BH CV STRESS STAGE 1: 1
LV EF NUC BP: 50 %
MAXIMAL PREDICTED HEART RATE: 145 BPM
PERCENT MAX PREDICTED HR: 60.69 %
STRESS BASELINE BP: NORMAL MMHG
STRESS BASELINE HR: 62 BPM
STRESS PERCENT HR: 71 %
STRESS POST EXERCISE DUR MIN: 0 MIN
STRESS POST EXERCISE DUR SEC: 10 SEC
STRESS POST PEAK BP: NORMAL MMHG
STRESS POST PEAK HR: 88 BPM
STRESS TARGET HR: 123 BPM

## 2023-01-18 LAB
BH CV ECHO LEFT VENTRICLE GLOBAL LONGITUDINAL STRAIN: -11.6 %
BH CV ECHO MEAS - ACS: 2.1 CM
BH CV ECHO MEAS - AO MAX PG: 6.6 MMHG
BH CV ECHO MEAS - AO MEAN PG: 4 MMHG
BH CV ECHO MEAS - AO V2 MAX: 128 CM/SEC
BH CV ECHO MEAS - AO V2 VTI: 30.7 CM
BH CV ECHO MEAS - AVA(I,D): 4.1 CM2
BH CV ECHO MEAS - EDV(CUBED): 81.7 ML
BH CV ECHO MEAS - EDV(MOD-SP2): 93.9 ML
BH CV ECHO MEAS - EDV(MOD-SP4): 84.5 ML
BH CV ECHO MEAS - EF(MOD-BP): 58.6 %
BH CV ECHO MEAS - EF(MOD-SP2): 53.1 %
BH CV ECHO MEAS - EF(MOD-SP4): 63.6 %
BH CV ECHO MEAS - ESV(CUBED): 26.2 ML
BH CV ECHO MEAS - ESV(MOD-SP2): 44 ML
BH CV ECHO MEAS - ESV(MOD-SP4): 30.8 ML
BH CV ECHO MEAS - FS: 31.6 %
BH CV ECHO MEAS - IVS/LVPW: 0.77 CM
BH CV ECHO MEAS - IVSD: 0.92 CM
BH CV ECHO MEAS - LA DIMENSION: 4.8 CM
BH CV ECHO MEAS - LAT PEAK E' VEL: 9.8 CM/SEC
BH CV ECHO MEAS - LV DIASTOLIC VOL/BSA (35-75): 37.3 CM2
BH CV ECHO MEAS - LV MASS(C)D: 156.1 GRAMS
BH CV ECHO MEAS - LV MAX PG: 4.5 MMHG
BH CV ECHO MEAS - LV MEAN PG: 3 MMHG
BH CV ECHO MEAS - LV SYSTOLIC VOL/BSA (12-30): 13.6 CM2
BH CV ECHO MEAS - LV V1 MAX: 106 CM/SEC
BH CV ECHO MEAS - LV V1 VTI: 28 CM
BH CV ECHO MEAS - LVIDD: 4.3 CM
BH CV ECHO MEAS - LVIDS: 3 CM
BH CV ECHO MEAS - LVOT AREA: 4.5 CM2
BH CV ECHO MEAS - LVOT DIAM: 2.4 CM
BH CV ECHO MEAS - LVPWD: 1.19 CM
BH CV ECHO MEAS - MED PEAK E' VEL: 7.5 CM/SEC
BH CV ECHO MEAS - MR MAX PG: 70.9 MMHG
BH CV ECHO MEAS - MR MAX VEL: 421 CM/SEC
BH CV ECHO MEAS - MV A MAX VEL: 86.3 CM/SEC
BH CV ECHO MEAS - MV DEC SLOPE: 279 CM/SEC2
BH CV ECHO MEAS - MV E MAX VEL: 89.4 CM/SEC
BH CV ECHO MEAS - MV E/A: 1.04
BH CV ECHO MEAS - MV P1/2T: 102 MSEC
BH CV ECHO MEAS - MVA(P1/2T): 2.16 CM2
BH CV ECHO MEAS - PA V2 MAX: 78.7 CM/SEC
BH CV ECHO MEAS - PULM A REVS DUR: 0.11 SEC
BH CV ECHO MEAS - PULM A REVS VEL: 25.3 CM/SEC
BH CV ECHO MEAS - PULM DIAS VEL: 48.3 CM/SEC
BH CV ECHO MEAS - PULM S/D: 1.61
BH CV ECHO MEAS - PULM SYS VEL: 77.9 CM/SEC
BH CV ECHO MEAS - RAP SYSTOLE: 5 MMHG
BH CV ECHO MEAS - RV MAX PG: 1.2 MMHG
BH CV ECHO MEAS - RV V1 MAX: 54.8 CM/SEC
BH CV ECHO MEAS - RVDD: 4.4 CM
BH CV ECHO MEAS - RVSP: 30.8 MMHG
BH CV ECHO MEAS - SI(MOD-SP2): 22 ML/M2
BH CV ECHO MEAS - SI(MOD-SP4): 23.7 ML/M2
BH CV ECHO MEAS - SV(LVOT): 126.7 ML
BH CV ECHO MEAS - SV(MOD-SP2): 49.9 ML
BH CV ECHO MEAS - SV(MOD-SP4): 53.7 ML
BH CV ECHO MEAS - TAPSE (>1.6): 1.41 CM
BH CV ECHO MEAS - TR MAX PG: 25.8 MMHG
BH CV ECHO MEAS - TR MAX VEL: 254 CM/SEC
BH CV ECHO MEASUREMENTS AVERAGE E/E' RATIO: 10.34
BH CV XLRA - RV BASE: 3.8 CM
LEFT ATRIUM VOLUME INDEX: 39.1 ML/M2
LEFT ATRIUM VOLUME: 88.8 ML
MAXIMAL PREDICTED HEART RATE: 145 BPM
STRESS TARGET HR: 123 BPM

## 2023-01-19 ENCOUNTER — TELEPHONE (OUTPATIENT)
Dept: CARDIOLOGY | Facility: CLINIC | Age: 78
End: 2023-01-19
Payer: MEDICARE

## 2023-01-23 NOTE — TELEPHONE ENCOUNTER
Pt notified  
Pt wife called for stress/echo results - please advise  
h/o T2DM reports infrequent b/s monitoring

## 2023-01-25 ENCOUNTER — TRANSCRIBE ORDERS (OUTPATIENT)
Dept: ADMINISTRATIVE | Facility: HOSPITAL | Age: 78
End: 2023-01-25
Payer: MEDICARE

## 2023-01-25 DIAGNOSIS — F03.A11 MILD DEMENTIA WITH AGITATION, UNSPECIFIED DEMENTIA TYPE: ICD-10-CM

## 2023-01-25 DIAGNOSIS — E78.5 HYPERLIPIDEMIA, UNSPECIFIED HYPERLIPIDEMIA TYPE: ICD-10-CM

## 2023-01-25 DIAGNOSIS — E11.59 TYPE 2 DIABETES MELLITUS WITH OTHER CIRCULATORY COMPLICATION, UNSPECIFIED WHETHER LONG TERM INSULIN USE: Primary | ICD-10-CM

## 2023-02-03 ENCOUNTER — HOSPITAL ENCOUNTER (OUTPATIENT)
Dept: CT IMAGING | Facility: HOSPITAL | Age: 78
Discharge: HOME | End: 2023-02-03
Admitting: INTERNAL MEDICINE
Payer: MEDICARE

## 2023-02-03 DIAGNOSIS — R91.8 LUNG NODULES: ICD-10-CM

## 2023-02-03 PROCEDURE — 71250 CT THORAX DX C-: CPT

## 2023-03-13 NOTE — TELEPHONE ENCOUNTER
Pharmacy sent a request for refills on Claritin. Refill request sent to Dr. Buckley.   Rx Refill Note  Requested Prescriptions     Pending Prescriptions Disp Refills   • loratadine (CLARITIN) 10 MG tablet [Pharmacy Med Name: LORATADINE 10MG TABLETS] 90 tablet 3     Sig: TAKE 1 TABLET BY MOUTH EVERY DAY      Last office visit with prescribing clinician: 11/1/2022   Last telemedicine visit with prescribing clinician: 5/2/2023   Next office visit with prescribing clinician: 5/2/2023                         Would you like a call back once the refill request has been completed: [] Yes [] No    If the office needs to give you a call back, can they leave a voicemail: [] Yes [] No    Laureano Johns, KIRAN  03/13/23, 08:56 CDT

## 2023-03-14 RX ORDER — LORATADINE 10 MG/1
TABLET ORAL
Qty: 90 TABLET | Refills: 3 | Status: SHIPPED | OUTPATIENT
Start: 2023-03-14

## 2023-03-17 RX ORDER — TAMSULOSIN HYDROCHLORIDE 0.4 MG/1
0.4 CAPSULE ORAL DAILY
Qty: 30 CAPSULE | Refills: 11 | Status: SHIPPED | OUTPATIENT
Start: 2023-03-17

## 2023-03-22 ENCOUNTER — OFFICE VISIT (OUTPATIENT)
Dept: CARDIOLOGY | Facility: CLINIC | Age: 78
End: 2023-03-22
Payer: MEDICARE

## 2023-03-22 VITALS
HEIGHT: 68 IN | WEIGHT: 268 LBS | HEART RATE: 73 BPM | DIASTOLIC BLOOD PRESSURE: 79 MMHG | SYSTOLIC BLOOD PRESSURE: 124 MMHG | BODY MASS INDEX: 40.62 KG/M2

## 2023-03-22 DIAGNOSIS — Z95.5 STENTED CORONARY ARTERY: ICD-10-CM

## 2023-03-22 DIAGNOSIS — E78.5 HYPERLIPIDEMIA LDL GOAL <70: ICD-10-CM

## 2023-03-22 DIAGNOSIS — R06.02 SOB (SHORTNESS OF BREATH): ICD-10-CM

## 2023-03-22 DIAGNOSIS — J44.9 CHRONIC OBSTRUCTIVE PULMONARY DISEASE, UNSPECIFIED COPD TYPE: ICD-10-CM

## 2023-03-22 DIAGNOSIS — E66.01 MORBIDLY OBESE: ICD-10-CM

## 2023-03-22 DIAGNOSIS — I27.20 PULMONARY HYPERTENSION: ICD-10-CM

## 2023-03-22 DIAGNOSIS — Z95.1 S/P CABG X 2: Primary | ICD-10-CM

## 2023-03-22 PROCEDURE — 1160F RVW MEDS BY RX/DR IN RCRD: CPT | Performed by: INTERNAL MEDICINE

## 2023-03-22 PROCEDURE — 1159F MED LIST DOCD IN RCRD: CPT | Performed by: INTERNAL MEDICINE

## 2023-03-22 PROCEDURE — 99214 OFFICE O/P EST MOD 30 MIN: CPT | Performed by: INTERNAL MEDICINE

## 2023-03-22 PROCEDURE — 3078F DIAST BP <80 MM HG: CPT | Performed by: INTERNAL MEDICINE

## 2023-03-22 PROCEDURE — 3074F SYST BP LT 130 MM HG: CPT | Performed by: INTERNAL MEDICINE

## 2023-03-22 NOTE — PROGRESS NOTES
Carlo Velasco  [id number]  1945  77 y.o.  male    Referring Provider: Cruz Chaudhari MD    Reason for Follow-up Visit:      Here for routine follow up   coronary artery disease Coronary artery bypass grafting , stented coronary artery   sick sinus syndrome s/p pacemaker   Cardiac workup test results as below: echo, stress test     Subjective    Mild chronic exertional shortness of breath on exertion relieved with rest  No significant cough or wheezing    No palpitations  No associated chest pain  No significant pedal edema    No fever or chills  No significant expectoration    No hemoptysis  No presyncope or syncope    Tolerating current medications well with no untoward side effects   Compliant with prescribed medication regimen. Tries to adhere to cardiac diet.     Overall better   Fair effort tolerance, somewhat limited by orthopedics issues     No new events or complaints since last visit     History of present illness:  Carlo Velasco is a 77 y.o. yo male with history of coronary artery disease Coronary artery bypass grafting , stented coronary artery sick sinus syndrome s/p pacemaker who presents today for   Chief Complaint   Patient presents with   • Follow-up     4 month follow up - patient stated he doesn't have as much energy   .    History  Past Medical History:   Diagnosis Date   • Anxiety    • Arthritis    • Cancer (HCC)     melanoma   • Chronic diastolic congestive heart failure (HCC) 4/15/2022   • Claustrophobia    • Colon polyp    • Coronary artery disease involving autologous artery coronary bypass graft without angina pectoris 9/22/2016   • Depression    • Disease of thyroid gland     partial thyroidectomy on right   • GERD (gastroesophageal reflux disease)    • Hearing loss    • Heart attack (HCC)    • History of transfusion    • Hypertension    • Kidney stone     history of    • Mild intermittent asthma without complication 8/24/2021   • Open angle with borderline findings, low risk,  bilateral 8/23/2021   • Presence of cardiac pacemaker 4/15/2022   • Primary hypertension 9/22/2016   • Pulmonary hypertension (HCC) 4/15/2022   • S/P CABG x 2 1/4/2019    LIMA to LAD and SVG to RCA 8/8/12   • Sick sinus syndrome (HCC) 7/23/2020   • Stented coronary artery 1/4/2019    2.5 x 18 mm Xience Alpine drug-eluting stent to the mid LAD 7/8/2015, 2.5 x 18 mm Xience drug-eluting stent to the proximal OM1 10/21/2016 and 2.5 x 12 mm Synergy drug-eluting stent to OM1 for severe in-stent restenosis 4/28/2020   • Tobacco abuse, in remission    • Uses hearing aid     BILATERAL   ,   Past Surgical History:   Procedure Laterality Date   • BACK SURGERY      CERVICAL FUSION AND LUMBAR DISC REPLACEMENT   • BRONCHOSCOPY N/A 04/27/2021    Procedure: BRONCHOSCOPY;  Surgeon: Rico Jacobs MD;  [facility]: [facility] OR;  Service: Cardiothoracic;  Laterality: N/A;   • CARDIAC CATHETERIZATION  07/2015    SHAHRZAD mid LAD, Dr. Toribio   • CARDIAC CATHETERIZATION N/A 10/21/2016    Procedure: Left Heart Cath;  Surgeon: Darian Toribio MD;  Location:  [facility] CATH INVASIVE LOCATION;  Service:    • CARDIAC CATHETERIZATION Left 04/05/2017    Procedure: Cardiac Catheterization/Vascular Study;  Surgeon: Darian Toribio MD;  Location:  [facility] CATH INVASIVE LOCATION;  Service:    • CARDIAC CATHETERIZATION N/A 04/05/2017    Procedure: Left Heart Cath;  Surgeon: Darian Toribio MD;  Location:  [facility] CATH INVASIVE LOCATION;  Service:    • CARDIAC CATHETERIZATION N/A 04/05/2017    Procedure: Coronary angiography;  Surgeon: Darian Toribio MD;  Location:  [facility] CATH INVASIVE LOCATION;  Service:    • CARDIAC CATHETERIZATION N/A 04/05/2017    Procedure: Left ventriculography;  Surgeon: Darian Toribio MD;  Location:  [facility] CATH INVASIVE LOCATION;  Service:    • CARDIAC CATHETERIZATION  04/05/2017    Procedure: Saphenous Vein Graft;  Surgeon: Darian Toribio MD;  Location:  [facility] CATH INVASIVE LOC[facility];  Service:    • CARDIAC CATHETERIZATION Left 02/27/2018    Procedure:  Cardiac Catheterization/Vascular Study SHAHRZAD OK PRN;  Surgeon: Darian Toribio MD;  Location:  [facility] CATH INVASIVE LOC[facility];  Service:    • CARDIAC CATHETERIZATION  02/27/2018    Procedure: Functional Flow [place];  Surgeon: Darian Toribio MD;  Location:  [facility] CATH INVASIVE LOC[facility];  Service:    • CARDIAC CATHETERIZATION N/A 02/27/2018    Procedure: Left ventriculography;  Surgeon: Darian Toribio MD;  Location:  [facility] CATH INVASIVE LOC[facility];  Service:    • CARDIAC CATHETERIZATION Left 04/28/2020    Procedure: Cardiac Catheterization/Vascular Study SHAHRZAD OK PRN;  Surgeon: Darian Toribio MD;  Location:  [facility] CATH INVASIVE LOC[facility];  Service: Cardiology;  Laterality: Left;   • CARDIAC ELECTROPHYSIOLOGY PROCEDURE N/A 01/11/2018    Procedure: PPM generator change - dual;  Surgeon: Darian Toribio MD;  Location: [facility] CATH INVASIVE LOCATION;  Service:    • CARDIAC SURGERY      open heart surgery x2   • COLONOSCOPY  09/05/2007    colon polyps   • COLONOSCOPY N/A 05/29/2018    Procedure: COLONOSCOPY WITH ANESTHESIA;  Surgeon: Juan F Tapia MD;  Location: [facility] ENDOSCOPY;  Service: Gastroenterology   • COLONOSCOPY N/A 10/21/2021    Procedure: COLONOSCOPY WITH ANESTHESIA;  Surgeon: Juan F Tapia MD;  Location: [facility] ENDOSCOPY;  Service: Gastroenterology;  Laterality: N/A;  pre op: hx polyps  post op;polyp,   PCP: Rhys Rosen MD   • CORONARY ANGIOPLASTY WITH STENT PLACEMENT  07/2015    SHAHRZAD mid LAD, Dr. Toribio    • CORONARY ARTERY BYPASS GRAFT  2012 AND 2014    2 vessel    • ENDOSCOPY  11/08/2010   • ENDOSCOPY N/A 05/29/2018    Procedure: ESOPHAGOGASTRODUODENOSCOPY WITH ANESTHESIA;  Surgeon: Juan F Tapia MD;  Location: [facility] ENDOSCOPY;  Service: Gastroenterology   • HEAD/NECK LESION/CYST EXCISION Left 12/20/2016    Procedure: EXCISION MALIGNANT MELANOMA WITH SENTINEL NODE BIOPSY, INJECTION AND SCAN PRE-OP, LEFT EAR;  Surgeon: Guanaco Zepeda MD;  Location: [facility] OR;  Service:    • HEMORRHOIDECTOMY     • HERNIA  REPAIR     • INSERT / REPLACE / REMOVE PACEMAKER     • REPLACEMENT TOTAL KNEE Left    • SENTINEL NODE BIOPSY Left 2016    Procedure: SENTINEL NODE BIOPSY;  Surgeon: Guanaco Zepeda MD;  Location:  [facility] OR;  Service:    • SKIN CANCER EXCISION     • THORACOSCOPY Right 2021    Procedure: THORACOSCOPIC TOTAL DECORTICATION;  Surgeon: Rico Jacobs MD;  Location:  [facility] OR;  Service: Cardiothoracic;  Laterality: Right;   • THYROID SURGERY     • THYROIDECTOMY     ,   Family History   Problem Relation Age of Onset   • Heart failure Mother    • Stroke Mother    • Dementia Mother    • Coronary artery disease Father    • Colon polyps Father    • COPD Brother    • Colon cancer Neg Hx    ,   Social History     Tobacco Use   • Smoking status: Former     Packs/day: 3.00     Years: 10.00     Pack years: 30.00     Types: Cigarettes     Quit date:      Years since quittin.2   • Smokeless tobacco: Former     Types: Snuff     Quit date:    Vaping Use   • Vaping Use: Never used   Substance Use Topics   • Alcohol use: Yes     Comment: rare   • Drug use: No   ,     Medications  Current Outpatient Medications   Medication Sig Dispense Refill   • albuterol sulfate  (90 Base) MCG/ACT inhaler Inhale 2 puffs Every 4 (Four) Hours As Needed for Wheezing. 6.7 g 11   • ascorbic acid (VITAMIN C) 1000 MG tablet Vitamin C 1,000 mg tablet   Take 1 tablet every day by oral route.     • atorvastatin (LIPITOR) 80 MG tablet Take 1 tablet by mouth Daily.     • Calcium Carbonate 1500 (600 Ca) MG tablet Take 1 tablet by mouth Daily.     • Cholecalciferol 125 MCG (5000 UT) tablet Vitamin D3 125 mcg (5,000 unit) tablet   Take 1 tablet every day by oral route.     • clopidogrel (PLAVIX) 75 MG tablet TAKE 1 TABLET BY MOUTH DAILY 90 tablet 3   • Cyanocobalamin (B-12) 1000 MCG sublingual tablet 1 tablet Daily.     • donepezil (ARICEPT) 5 MG tablet TAKE 1 TABLET BY MOUTH EVERY NIGHT AT BEDTIME 90 tablet 3   • escitalopram  (LEXAPRO) 20 MG tablet Take 1 tablet by mouth Daily. 90 tablet 1   • ferrous sulfate 325 (65 FE) MG tablet Take 1 tablet by mouth Every Morning.     • fluticasone (FLONASE) 50 MCG/ACT nasal spray INSTILL 2 SPRAYS INTO THE NOSTRILS EVERY DAY AS DIRECTED BY THE PROVIDER 16 g 5   • fluticasone-salmeterol (ADVAIR) 100-50 MCG/DOSE DISKUS Inhale 1 puff 2 (Two) Times a Day. 60 each 11   • furosemide (LASIX) 40 MG tablet Take 1 tablet by mouth Daily As Needed (for any increase in 2 lbs in a daqy or 5 lbs in a week or increassing swelling or congestion).     • gabapentin (NEURONTIN) 100 MG capsule 1 capsule 3 (Three) Times a Day As Needed.     • isosorbide mononitrate (IMDUR) 30 MG 24 hr tablet TAKE 1 TABLET BY MOUTH EVERY MORNING 90 tablet 3   • loratadine (CLARITIN) 10 MG tablet TAKE 1 TABLET BY MOUTH EVERY DAY 90 tablet 3   • meloxicam (MOBIC) 15 MG tablet Take 1 tablet by mouth Daily. Prn 30 tablet 0   • metoprolol tartrate (LOPRESSOR) 25 MG tablet Take 1 tablet by mouth Daily. 90 tablet 3   • NITROSTAT 0.4 MG SL tablet Place 1 tablet under the tongue Every 5 (Five) Minutes As Needed for Chest Pain. Take no more than 3 doses in 15 minutes. 100 tablet 2   • omega-3 acid ethyl esters (LOVAZA) 1 g capsule TAKE 1 CAPSULE BY MOUTH DAILY 90 capsule 3   • omeprazole (priLOSEC) 20 MG capsule TAKE 1 CAPSULE BY MOUTH DAILY 90 capsule 3   • Potassium Gluconate 2.5 MEQ tablet Take 1 tablet by mouth Daily As Needed. When he takes lasix     • pregabalin (LYRICA) 150 MG capsule 1 capsule 2 (Two) Times a Day.     • sacubitril-valsartan (Entresto) 24-26 MG tablet Take 1 tablet by mouth 2 (Two) Times a Day. 60 tablet 11   • spironolactone (ALDACTONE) 25 MG tablet Take 1 tablet by mouth Daily. 90 tablet 3   • tamsulosin (FLOMAX) 0.4 MG capsule 24 hr capsule Take 1 capsule by mouth Daily.     • Zinc Sulfate (ZINC-220 PO) Take  by mouth.       No current facility-administered medications for this visit.       Allergies:  Meperidine and  "Statins    Review of Systems  Review of Systems   Constitutional: Positive for malaise/fatigue.   HENT: Negative.    Eyes: Negative.    Cardiovascular: Positive for dyspnea on exertion and leg swelling. Negative for chest pain, claudication, cyanosis, irregular heartbeat, near-syncope, orthopnea, palpitations, paroxysmal nocturnal dyspnea and syncope.   Respiratory: Positive for cough and shortness of breath.    Endocrine: Negative.    Hematologic/Lymphatic: Negative.    Skin: Negative.    Musculoskeletal: Positive for arthritis and back pain.   Gastrointestinal: Negative for anorexia.   Genitourinary: Negative.    Neurological: Negative for weakness.   Psychiatric/Behavioral: Negative.         Objective     Physical Exam:  /79   Pulse 73   Ht 172.7 cm (68\")   Wt 122 kg (268 lb)   BMI 40.75 kg/m²   Physical Exam   Constitutional: He appears well-developed.   HENT:   Head: Normocephalic.   Neck: Normal carotid pulses and no JVD present. No tracheal tenderness present. Carotid bruit is not present. No tracheal deviation present.   Cardiovascular: Regular rhythm, normal heart sounds and normal pulses.   Pulmonary/Chest: Effort normal. No stridor.   Abdominal: Soft. He exhibits no distension. There is no abdominal tenderness.   Neurological: He is alert. No cranial nerve deficit or sensory deficit.   Skin: Skin is warm.   Psychiatric: His speech is normal and behavior is normal.       Results Review:      Carlo Velasco  Regadenoson Stress Test with Myocardial Perfusion SPECT (Multi Study)  Order# [id number]  Reading physician: Darian Toribio MD Ordering physician: Darian Toribio MD Study date: 23     Patient Information    Patient Name   Carlo Velasco MRN   [id number] Legal Sex   Male  (Age)   1945 (77 y.o.)     Interpretation Summary       •  Left ventricular ejection fraction is normal (Calculated EF = 50%).  •  Myocardial perfusion imaging indicates a normal myocardial perfusion study with no " evidence of ischemia.  •  Impressions are consistent with a low risk study.  •  Diaphragmatic attenuation artifact is present.  •  Physiological apical thinning noted             IMPRESSION:  1. No pulmonary emboli.  2. No thoracic aortic aneurysm or dissection. Prior mediastinal surgery  with cardiomegaly. Left subclavian cardiac pacer device.  3. New right middle lobe consolidation most consistent with pneumonia.  Probable reactive right hilar and subcarinal lymph nodes. Follow up  chest x-ray recommended to document resolution.  This report was finalized on 03/20/2021 20:54 by Dr. Cassie Mckeon MD.      Conclusion of cardiac catheterization    4/28/2020     Left ventricle ejection fraction 45%  Moderately elevated left ventricular end-diastolic pressure of 19 mmHg  Atretic left intramammary artery graft  Patent stents in the left anterior descending coronary artery  60% stenosis chronic of diagonal branch unchanged from prior cardiac catheterization  95% in-stent restenosis of first obtuse marginal branch treated with PTCA and then implantation of 2.5 x 12 mm Synergy stent with 0% residual stenosis  70% stenosis of distal right coronary artery  Widely patent saphenous venous graft to the distal right coronary artery        ____________________________________________________________________________________________________________________________________________     Plan after cardiac catheterization        Dual antiplatelet therapy minimum of 1 year  Continue on other medication including statin and beta-blockers  Aspirin for the rest of his life  Add ACE inhibitor therapy  Check echocardiogram  Intensive risk factor modifications for both primary and secondary prevention if applicable  Hydration   Observation         Carlo Velasco   Regadenoson Stress Test With Myocardial Perfusion SPECT (Multi Study)   Order# [id number]   Reading physician: Darian Toribio MD Ordering physician: Darian Toribio MD Study date:  20   Patient Information     Patient Name  Carlo Velasco MRN  [id number] Sex  Male  (Age)  1945 (74 y.o.)   Interpretation Summary        · Left ventricular ejection fraction is normal (Calculated EF = 55%).  · Myocardial perfusion imaging indicates a normal myocardial perfusion study with no evidence of ischemia.  · Impressions are consistent with a low risk study.                Results for orders placed during the hospital encounter of 23    Adult Transthoracic Echo Complete w/ Color, Spectral and Contrast if necessary per protocol    Interpretation Summary  •  Left ventricular systolic function is normal. Left ventricular ejection fraction appears to be 56 - 60%.  •  Left ventricular diastolic function was normal.  •  Abnormal global longitudinal LV strain (GLS) = -11.6%.  •  Left atrial volume is mildly increased.  •  Estimated right ventricular systolic pressure from tricuspid regurgitation is normal (<35 mmHg).       Procedures  Cardiac cath    LVEF of 50%.   occluded the mid right coronary artery  Patent saphenous venous graft to the distal right coronary artery  Atretic left intramammary artery graft.  Patent stent in the mid left anterior descending coronary artery  Patent stent to the obtuse marginal branch  Patent stent in diagonal branch.       Conclusion     Patent stents noted in the obtuse marginal branch and the left anterior descending coronary artery.  Atretic left internal mammary artery graft.  Patent saphenous venous graft to the right coronary artery  Significant right coronary artery stenosis proximal to the touchdown site of the saphenous vein graft to the right coronary artery  60% stenosis of diagonal branch with a normal fractional flow reserve of 0.85  Left ventricle ejection fraction of 50%.     LVEDP    16   mm Hg           Plan     Intensive risk factor modifications for both primary and secondary prevention if applicable  Home today if stable  Hydration    Observation  Keep follow-up appointment    Diagnoses and all orders for this visit:    1. S/P CABG x 2 (Primary)    2. Stented coronary artery    3. SOB (shortness of breath)    4. Pulmonary hypertension (HCC)    5. Morbidly obese (HCC)    6. Hyperlipidemia LDL goal <70    7. Chronic obstructive pulmonary disease, unspecified COPD type (HCC)            Plan      Patient expressed understanding  Encouraged and answered all questions   Discussed with the patient and all questioned fully answered. He will call me if any problems arise.   Discussed results of prior testing with patient : echo and myocardial perfusion scan       Overall doing well no new cardiovascular symptoms and therefore no additional cardiac testing is required prior to next visit  If any interim issues arise will call me for further evaluation.      Weigh yourself frequently, at least weekly, preferably daily, call me if more than 2 pounds a day or 5 pounds a week weight gain.  Flexible diuretic dosing    Keep LDL below 70 mg/dl. Monitor liver and renal functions.   Monitor CBC, CMP, TSH (as indicated) and Lipid Panel by primary      Patient undecided regarding the Covid vaccine   Urged to consider vaccination further   I can provide more input if required   Recommend further discussion with primary care provider     Monitor for any signs of bleeding including red or dark stools as well as easy bruisabilty. Fall precautions.   Monitor cardiac rhythm device function regularly per established schedule or PRN      Check BP and heart rates twice daily at least 3x / week, week a month  at home and bring a recording for me to review next visit  If BP >130/85 or < 100/60 persistently over 3 reading 30 mins apart call sooner      S/L NTG PRN for chest pain, call me or go to ER if has to use S/L nitroglycerin                  Return in about 6 months (around 9/22/2023).

## 2023-04-18 DIAGNOSIS — J44.9 CHRONIC OBSTRUCTIVE PULMONARY DISEASE, UNSPECIFIED COPD TYPE: Primary | ICD-10-CM

## 2023-04-18 RX ORDER — ALBUTEROL SULFATE 90 UG/1
AEROSOL, METERED RESPIRATORY (INHALATION)
Qty: 8.5 G | Refills: 5 | Status: SHIPPED | OUTPATIENT
Start: 2023-04-18

## 2023-04-18 NOTE — TELEPHONE ENCOUNTER
Pharmacy sent a request for refills on Albuterol HFA. Refill passed protocol and sent to the pharmacy.  Rx Refill Note  Requested Prescriptions     Pending Prescriptions Disp Refills   • albuterol sulfate  (90 Base) MCG/ACT inhaler [Pharmacy Med Name: ALBUTEROL HFA INH (200 PUFFS) 8.5GM] 8.5 g 5     Sig: INHALE 2 PUFFS EVERY 4 HOURS AS NEEDED FOR WHEEZING      Last office visit with prescribing clinician: 11/1/2022   Last telemedicine visit with prescribing clinician: 5/2/2023   Next office visit with prescribing clinician: 5/2/2023                         Would you like a call back once the refill request has been completed: [] Yes [] No    If the office needs to give you a call back, can they leave a voicemail: [] Yes [] No    Laureano Johns, KIRAN  04/18/23, 12:54 CDT

## 2023-04-24 RX ORDER — DONEPEZIL HYDROCHLORIDE 5 MG/1
5 TABLET, FILM COATED ORAL
Qty: 90 TABLET | Refills: 3 | OUTPATIENT
Start: 2023-04-24

## 2023-04-25 ENCOUNTER — TRANSCRIBE ORDERS (OUTPATIENT)
Dept: ADMINISTRATIVE | Facility: HOSPITAL | Age: 78
End: 2023-04-25
Payer: MEDICARE

## 2023-04-25 ENCOUNTER — HOSPITAL ENCOUNTER (OUTPATIENT)
Dept: GENERAL RADIOLOGY | Facility: HOSPITAL | Age: 78
Discharge: HOME | End: 2023-04-25
Admitting: FAMILY MEDICINE
Payer: MEDICARE

## 2023-04-25 DIAGNOSIS — R06.09 OTHER FORMS OF DYSPNEA: Primary | ICD-10-CM

## 2023-04-25 DIAGNOSIS — R06.09 OTHER FORMS OF DYSPNEA: ICD-10-CM

## 2023-04-25 PROCEDURE — 71046 X-RAY EXAM CHEST 2 VIEWS: CPT

## 2023-04-26 ENCOUNTER — TRANSCRIBE ORDERS (OUTPATIENT)
Dept: ADMINISTRATIVE | Facility: HOSPITAL | Age: 78
End: 2023-04-26
Payer: MEDICARE

## 2023-04-26 DIAGNOSIS — R91.8 OTHER NONSPECIFIC ABNORMAL FINDING OF LUNG FIELD: Primary | ICD-10-CM

## 2023-05-01 ENCOUNTER — HOSPITAL ENCOUNTER (OUTPATIENT)
Dept: CT IMAGING | Facility: HOSPITAL | Age: 78
Discharge: HOME | End: 2023-05-01
Admitting: FAMILY MEDICINE
Payer: MEDICARE

## 2023-05-01 DIAGNOSIS — R91.8 OTHER NONSPECIFIC ABNORMAL FINDING OF LUNG FIELD: ICD-10-CM

## 2023-05-01 LAB — CREAT BLDA-MCNC: 1 MG/DL (ref 0.6–1.3)

## 2023-05-01 PROCEDURE — 71260 CT THORAX DX C+: CPT

## 2023-05-01 PROCEDURE — 25510000001 IOPAMIDOL 61 % SOLUTION: Performed by: FAMILY MEDICINE

## 2023-05-01 PROCEDURE — 82565 ASSAY OF CREATININE: CPT

## 2023-05-01 RX ADMIN — IOPAMIDOL 100 ML: 612 INJECTION, SOLUTION INTRAVENOUS at 13:46

## 2023-05-02 ENCOUNTER — OFFICE VISIT (OUTPATIENT)
Dept: PULMONOLOGY | Facility: CLINIC | Age: 78
End: 2023-05-02
Payer: MEDICARE

## 2023-05-02 VITALS
BODY MASS INDEX: 40.16 KG/M2 | OXYGEN SATURATION: 96 % | HEART RATE: 68 BPM | WEIGHT: 265 LBS | DIASTOLIC BLOOD PRESSURE: 86 MMHG | SYSTOLIC BLOOD PRESSURE: 122 MMHG | HEIGHT: 68 IN

## 2023-05-02 DIAGNOSIS — J30.89 NON-SEASONAL ALLERGIC RHINITIS, UNSPECIFIED TRIGGER: ICD-10-CM

## 2023-05-02 DIAGNOSIS — F03.A0 MILD DEMENTIA WITHOUT BEHAVIORAL DISTURBANCE, PSYCHOTIC DISTURBANCE, MOOD DISTURBANCE, OR ANXIETY, UNSPECIFIED DEMENTIA TYPE: ICD-10-CM

## 2023-05-02 DIAGNOSIS — J44.9 CHRONIC OBSTRUCTIVE PULMONARY DISEASE, UNSPECIFIED COPD TYPE: Primary | ICD-10-CM

## 2023-05-02 DIAGNOSIS — E66.01 MORBIDLY OBESE: ICD-10-CM

## 2023-05-02 DIAGNOSIS — Z87.01 HISTORY OF PNEUMONIA: ICD-10-CM

## 2023-05-02 DIAGNOSIS — Z93.8 S/P THORACOSTOMY TUBE PLACEMENT: ICD-10-CM

## 2023-05-02 DIAGNOSIS — Z87.891 FORMER SMOKER, STOPPED SMOKING MANY YEARS AGO: ICD-10-CM

## 2023-05-02 DIAGNOSIS — R91.8 LUNG NODULES: ICD-10-CM

## 2023-05-02 DIAGNOSIS — J18.9 PNEUMONIA OF RIGHT LUNG DUE TO INFECTIOUS ORGANISM, UNSPECIFIED PART OF LUNG: ICD-10-CM

## 2023-05-02 DIAGNOSIS — J86.9 EMPYEMA OF PLEURA: ICD-10-CM

## 2023-05-02 DIAGNOSIS — J98.4 RESTRICTIVE LUNG DISEASE: ICD-10-CM

## 2023-05-02 DIAGNOSIS — J45.20 MILD INTERMITTENT ASTHMA WITHOUT COMPLICATION: ICD-10-CM

## 2023-05-02 DIAGNOSIS — G47.33 OSA (OBSTRUCTIVE SLEEP APNEA): ICD-10-CM

## 2023-05-02 DIAGNOSIS — I27.20 PULMONARY HYPERTENSION: ICD-10-CM

## 2023-05-02 RX ORDER — FLUTICASONE PROPIONATE AND SALMETEROL 250; 50 UG/1; UG/1
1 POWDER RESPIRATORY (INHALATION) 2 TIMES DAILY
Qty: 60 EACH | Refills: 5 | Status: SHIPPED | OUTPATIENT
Start: 2023-05-02 | End: 2023-05-08

## 2023-05-02 RX ORDER — ALBUTEROL SULFATE 90 UG/1
2 AEROSOL, METERED RESPIRATORY (INHALATION) EVERY 4 HOURS PRN
Qty: 8.5 G | Refills: 5 | Status: SHIPPED | OUTPATIENT
Start: 2023-05-02

## 2023-05-02 RX ORDER — LORATADINE 10 MG/1
10 TABLET ORAL DAILY
Qty: 90 TABLET | Refills: 3 | Status: SHIPPED | OUTPATIENT
Start: 2023-05-02

## 2023-05-02 RX ORDER — FLUTICASONE PROPIONATE 50 MCG
2 SPRAY, SUSPENSION (ML) NASAL DAILY
Qty: 16 G | Refills: 5 | Status: SHIPPED | OUTPATIENT
Start: 2023-05-02

## 2023-05-02 NOTE — PROGRESS NOTES
[facility] OUTPATIENT PROGRESS NOTE    Patient: Carlo Velasco  : 1945  Age: 77 y.o.  Date of Service: May 2, 2023    REASON FOR CLINIC VISIT:  Chief Complaint   Patient presents with   • COPD     Pt here for follow up. Wants to discuss recent imaging       Subjective:    History of Present Illness:  Carlo Velasco is a 77 y.o. male who presents to the office today to be seen for    Diagnosis Plan   1. Chronic obstructive pulmonary disease, unspecified COPD type        2. Lung nodules        3. Pulmonary hypertension        4. ERNESTO (obstructive sleep apnea)        5. Former smoker, stopped smoking many years ago        6. Morbidly obese        7. Non-seasonal allergic rhinitis, unspecified trigger        8. Restrictive lung disease        9. Empyema of pleura        10. Pneumonia of right lung due to infectious organism, unspecified part of lung        11. S/P thoracostomy tube placement        12. Mild intermittent asthma without complication        13. History of pneumonia        14. Mild dementia without behavioral disturbance, psychotic disturbance, mood disturbance, or anxiety, unspecified dementia type        .  Other problems per record.  Patient is a very pleasant elderly  gentleman who was seen in the pulmonary clinic for follow-up visit.  He attended the clinic with his wife    Patient is known to me for the last few years.  He initially presented to the clinic about 2 years ago with a right-sided pleuritic chest pain and was diagnosed with a parapneumonic effusion or empyema following an episode of pneumonia which was treated as inpatient.  He eventually needed thoracoscopy and decortication with Dr. Jacobs and was doing well.  Patient also has obesity with possible obstructive sleep apnea but he does not use any CPAP and did not want to do any sleep study.  The patient had a pulmonary function test done which showed he had mostly restrictive lung raise with possible small  airways dysfunction and COPD with asthma.  He is currently using Advair 100 over 51 puff once a day and albuterol rescue inhaler as needed.  His wife reported he is more short of breath.  Obviously he was not using Advair twice a day.  He also has history of memory problem and probably has early mild dementia and is on Aricept.  He has nasal allergy and uses fluticasone nasal spray and loratadine.  He had a recent follow-up chest x-ray done which showed he had elevated right hemidiaphragm in his primary care provider Dr. Chaudhari ordered a CT scan of the chest which showed patient has mild mediastinal lymphadenopathy and patient also had some chronic atelectatic changes noted in the lung with minimal pulmonary scarring mostly on the right side which is related to the prior surgery.    Patient currently weighs 265 pounds he has some snoring and feeling tired and exhausted the daytime.  He did not take COVID-vaccine but did not have any COVID infection.  He had a CT scan of the chest which also showed a right upper lobe lung nodule which is apparently stable.  He had no other new complaints.    PFT done today:  Not done today      Results for orders placed in visit on 04/21/21    Pulmonary Function Test    [facility] - Pulmonary Function Test    [address] [place] [address]  [id number]  [phone]    Patient : Carlo Velasco  MRN : [id number]  CSN : [id number]  Pulmonologist : Cruz Atkinson MD  Date : 6/7/2021    ______________________________________________________________________    Interpretation :  1.  Spirometry is consistent with a moderate restrictive ventilatory defect with coexisting mild small airways disease.  2.  There is improvement in spirometry postbronchodilator particularly in small airways function which is now normal but a moderate restrictive ventilatory defect still appears to be present.  3.  Lung volumes confirm a moderate restrictive ventilatory defect.  4.  There is a  moderate diffusion impairment which when corrected for alveolar volume is normalized.  5.  When current studies are compared to studies from , the patient's current prebronchodilator FVC and FEV1 have dropped significantly compared to previous prebronchodilator studies.  His postbronchodilator FVC and FEV1 have also dropped significantly compared to previous postbronchodilator studies.  There has also been a drop in his total lung capacity compared to previous.  When corrected for alveolar volume there has also been a decrease in his diffusion capacity compared to previous although it remains within normal limits.      Cruz Atkinson MD      Results for orders placed during the hospital encounter of 17    Full Pulmonary Function Test With Bronchodilator    Narrative  [facility] - Pulmonary Function Test    [address] [place] [address]  [address]  [phone]    Patient : Carlo Velasco  MRN : [id number]  CSN : [id number]  Pulmonologist : Cruz Atkinson MD  Date : 2017    ______________________________________________________________________    Interpretation :  1.  Spirometry is consistent with a mild restrictive ventilatory defect with coexisting mild decrease in the patient's FEF 75%,  2.  There is some improvement in the patient's FEF 75% postbronchodilator but a mild restrictive ventilatory defect still appears to be present.  3.  Lung volumes reveal a low normal total lung capacity with a decrease in vital capacity and expiratory reserve volume, consistent with a borderline restrictive ventilatory defect.  4.  Diffusion capacity is within normal limits.      Cruz Atkinson MD         Bronchodilator therapy: Advair, Albuterol rescue inhaler.     Smoking Status:   Social History     Tobacco Use   Smoking Status Former   • Packs/day: 3.00   • Years: 10.00   • Pack years: 30.00   • Types: Cigarettes   • Quit date:    • Years since quittin.3   Smokeless  Tobacco Former   • Types: Snuff   • Quit date: 1977     Pulm Rehab: no  Sleep: yes    Support System: lives with their spouse    Code Status:   There are no questions and answers to display.        Review of Systems:  A complete review of systems is performed and all other systems were reviewed and negative as note above in the HPI.  Review of Systems   Constitutional: Positive for fatigue.   HENT: Positive for congestion, postnasal drip and sinus pressure.    Eyes: Negative.    Respiratory: Positive for cough and shortness of breath.    Cardiovascular: Negative.    Gastrointestinal: Negative.    Endocrine: Negative.    Genitourinary: Negative.    Musculoskeletal: Positive for arthralgias and back pain.   Skin: Negative.    Allergic/Immunologic: Positive for environmental allergies.   Neurological: Negative.    Hematological: Negative.    Psychiatric/Behavioral: Negative.        CAT/ACT Score:  Not done today    Medications:  Outpatient Encounter Medications as of 5/2/2023   Medication Sig Dispense Refill   • albuterol sulfate  (90 Base) MCG/ACT inhaler INHALE 2 PUFFS EVERY 4 HOURS AS NEEDED FOR WHEEZING 8.5 g 5   • atorvastatin (LIPITOR) 80 MG tablet Take 1 tablet by mouth Daily.     • Calcium Carbonate 1500 (600 Ca) MG tablet Take 1 tablet by mouth Daily.     • Cholecalciferol 125 MCG (5000 UT) tablet Vitamin D3 125 mcg (5,000 unit) tablet   Take 1 tablet every day by oral route.     • clopidogrel (PLAVIX) 75 MG tablet TAKE 1 TABLET BY MOUTH DAILY 90 tablet 3   • Cyanocobalamin (B-12) 1000 MCG sublingual tablet 1 tablet Daily.     • donepezil (ARICEPT) 5 MG tablet TAKE 1 TABLET BY MOUTH EVERY NIGHT AT BEDTIME 90 tablet 3   • escitalopram (LEXAPRO) 20 MG tablet Take 1 tablet by mouth Daily. 90 tablet 1   • ferrous sulfate 325 (65 FE) MG tablet Take 1 tablet by mouth Every Morning.     • fluticasone (FLONASE) 50 MCG/ACT nasal spray INSTILL 2 SPRAYS INTO THE NOSTRILS EVERY DAY AS DIRECTED BY THE PROVIDER 16 g  5   • fluticasone-salmeterol (ADVAIR) 100-50 MCG/DOSE DISKUS Inhale 1 puff 2 (Two) Times a Day. 60 each 11   • furosemide (LASIX) 40 MG tablet Take 1 tablet by mouth Daily As Needed (for any increase in 2 lbs in a daqy or 5 lbs in a week or increassing swelling or congestion).     • loratadine (CLARITIN) 10 MG tablet TAKE 1 TABLET BY MOUTH EVERY DAY 90 tablet 3   • meloxicam (MOBIC) 15 MG tablet Take 1 tablet by mouth Daily. Prn 30 tablet 0   • metoprolol tartrate (LOPRESSOR) 25 MG tablet Take 1 tablet by mouth Daily. 90 tablet 3   • NITROSTAT 0.4 MG SL tablet Place 1 tablet under the tongue Every 5 (Five) Minutes As Needed for Chest Pain. Take no more than 3 doses in 15 minutes. 100 tablet 2   • omega-3 acid ethyl esters (LOVAZA) 1 g capsule TAKE 1 CAPSULE BY MOUTH DAILY 90 capsule 3   • omeprazole (priLOSEC) 20 MG capsule TAKE 1 CAPSULE BY MOUTH DAILY 90 capsule 3   • Potassium Gluconate 2.5 MEQ tablet Take 1 tablet by mouth Daily As Needed. When he takes lasix     • spironolactone (ALDACTONE) 25 MG tablet Take 1 tablet by mouth Daily. 90 tablet 3   • tamsulosin (FLOMAX) 0.4 MG capsule 24 hr capsule Take 1 capsule by mouth Daily.     • Zinc Sulfate (ZINC-220 PO) Take  by mouth.     • [DISCONTINUED] ascorbic acid (VITAMIN C) 1000 MG tablet Vitamin C 1,000 mg tablet   Take 1 tablet every day by oral route. (Patient not taking: Reported on 5/2/2023)     • [DISCONTINUED] gabapentin (NEURONTIN) 100 MG capsule 1 capsule 3 (Three) Times a Day As Needed. (Patient not taking: Reported on 5/2/2023)     • [DISCONTINUED] isosorbide mononitrate (IMDUR) 30 MG 24 hr tablet TAKE 1 TABLET BY MOUTH EVERY MORNING (Patient not taking: Reported on 5/2/2023) 90 tablet 3   • [DISCONTINUED] pregabalin (LYRICA) 150 MG capsule 1 capsule 2 (Two) Times a Day. (Patient not taking: Reported on 5/2/2023)     • [DISCONTINUED] sacubitril-valsartan (Entresto) 24-26 MG tablet Take 1 tablet by mouth 2 (Two) Times a Day. (Patient not taking: Reported  "on 5/2/2023) 60 tablet 11     Facility-Administered Encounter Medications as of 5/2/2023   Medication Dose Route Frequency Provider Last Rate Last Admin   • [COMPLETED] iopamidol (ISOVUE-300) 61 % injection 100 mL  100 mL Intravenous Once in imaging Cruz Chaudhari MD   100 mL at 05/01/23 1346       Allergies:  Allergies   Allergen Reactions   • Meperidine Other (See Comments)     HEART STOPS    Other reaction(s): Unknown   • Statins Myalgia     Causes leg cramps       Immunizations:  Immunization History   Administered Date(s) Administered   • FLUAD TRI 65YR+ 10/10/2019   • Fluad Quad 65+ 10/10/2019   • Fluzone High Dose =>65 Years ([employer] ONLY) 10/11/2018, 10/01/2022   • TD Preservative Free 08/17/2018   • Tdap 09/17/2015, 12/06/2021   • influenza Split 10/22/2016       Objective:    Vitals:  /86 (BP Location: Left arm, Patient Position: Sitting, Cuff Size: Adult)   Pulse 68   Ht 172.7 cm (68\")   Wt 120 kg (265 lb)   SpO2 96%   BMI 40.29 kg/m²     Physical Exam:  General: Patient is a 77 y.o. Obese elderly  male. Looks stated age. Appears to be in no acute distress.  Eyes: EOMI. PERRLA. Vision intact. No scleral icterus.  Ear, Nose, Mouth and Throat: Hearing is grossly intact. No Leukoplakia, pharyngitis, stomatitis or thrush. Swollen nasal mucosa with post nasal drop.  Neck: Range of motion of neck normal. No thyromegaly or masses. Mallampati Class 3  Respiratory: Clear to auscultation bilaterally. No use of accessory muscles. Decreased breath sounds.  Cardiovascular: Normal heart sounds. Regularly regular rhythm without murmur.  Gastrointestinal: Non tender, non distended, soft. Bowel sounds positive in all four quadrants. No organomegaly.  Skin: No obvious rashes, lesions, ulcers or large amount of bruising. No edema.   Neurological: No new motor deficits. Cranial nerves appear intact.  Psychiatric: Patient is alert and oriented to person, place and time.    Chest Imaging:    Study " Result  Narrative & Impression   EXAMINATION: Chest 2 views 04/25/2023     HISTORY: Subacute cough.     FINDINGS: Two-view exam of the chest demonstrates elevation right  diaphragm with right basilar atelectasis. Lungs are otherwise clear. A  transvenous pacer is in place. No consolidative pneumonia or effusion.  The patient's undergone previous fusion at the lower cervical spine.     IMPRESSION:  1.. Elevated right diaphragm with right basilar atelectasis. Lungs are  otherwise clear.  This report was finalized on 04/25/2023 12:21 by Dr. Mukund Bess MD.     Study Result  Narrative & Impression   EXAMINATION: CT CHEST W CONTRAST DIAGNOSTIC-      5/1/2023 1:37 AM CDT     HISTORY: R91.8; R91.8-Other nonspecific abnormal finding of lung field.  A previous abnormal CT scan of the chest. Follow-up of lung nodule.     In order to have a CT radiation dose as low as reasonably achievable  Automated Exposure Control was utilized for adjustment of the mA and/or  KV according to patient size.     DLP in mGycm= 433     The CT scan of the chest are performed after intravenous contrast  enhancement.     The images are acquired in axial plane with subsequent reconstruction in  coronal and sagittal planes.     Comparison is made with the previous study dated 02/03/2023.     The lungs are moderately well-expanded.     The previously seen small nodule in the right upper lobe posterior  laterally, adjacent and anterior to the lateral most part of the  proximal right major fissure, image #55 in series 4 is reidentified. It  measures 4 mm in maximum dimension as in the previous study.     Small foci of pleural thickening in the right minor fissure, image #55  in series 4 are similar to the previous study. No discrete parenchymal  nodules are seen.     A few small calcified granulomas particularly in the left upper lobe are  similar to the previous study.     No areas of focal consolidation or acute appearing infiltrate.     A  nonspecific area of pleural thickening along the mid posterior lateral  chest wall are similar to the previous study.     Limited visualized soft tissues of the neck are unremarkable. 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 The left lobe is unremarkable.  Atheromatous changes of the thoracic aorta is seen. No aneurysmal  dilatation. Severe atheromatous changes of coronary arteries are similar  to the previous study. Evidence of prior CABG.     Pulmonary arterial branches are unremarkable.     A few borderline prominent mediastinal lymph nodes are similar to the  previous study. Level 4R lymph node measures 1.1 cm. No change. A few  borderline prominent lymph nodes are seen in the right hilum, level R  10, the largest one measuring 1.1 cm in short axis. Moderately prominent  right parahilar/level R 11 lymph node measures 1.3 cm in short axis.     Limited visualized upper abdominal organs are unremarkable. There is a  small fat-containing proximal abdominal central ventral hernia is seen  similar to the previous study. A moderate-sized Bochdalek hernia is  seen.     The images reviewed in bone window show compression deformity of  midthoracic vertebrae which is similar to the previous study. No new  fracture or compression.     IMPRESSION:  1. Stable small nodule in the right upper lobe. No further follow-up of  this nodule is suggested.  2. Nonspecific mediastinal and right hilar/parahilar lymphadenopathy. No  change in mediastinal lymphadenopathy.  3. Stable compression fracture/deformity of midthoracic vertebrae.       This report was finalized on 05/01/2023 14:48 by Dr. Jake Mascorro MD.         Assessment:  1. Chronic obstructive pulmonary disease, unspecified COPD type    2. Lung nodules    3. Pulmonary hypertension    4. ERNESTO (obstructive sleep apnea)    5. Former smoker, stopped smoking many years ago    6. Morbidly obese    7. Non-seasonal allergic rhinitis, unspecified trigger    8. Restrictive lung  disease    9. Empyema of pleura    10. Pneumonia of right lung due to infectious organism, unspecified part of lung    11. S/P thoracostomy tube placement    12. Mild intermittent asthma without complication    13. History of pneumonia    14. Mild dementia without behavioral disturbance, psychotic disturbance, mood disturbance, or anxiety, unspecified dementia type        Plan/Recommendations:    1.  Patient is doing well from the pulmonary standpoint and I advised him to get a sleep study done for possible sleep apnea.  His shortness of breath is most likely from noncompliance to medications.  He is not using Advair properly and advised him to start using Advair properly and increase the dose to 250 over 51 puff twice a day he will also use albuterol rescue Ortiz as before.  2.  If his sleep study is positive for sleep apnea he will need to start using CPAP.  Weight loss and healthy lifestyle was recommended.  3.  I reviewed the chest x-ray and CT scan of the chest.  He had elevated right hemidiaphragm which could be from diaphragm paralysis of any Injury.  He had a surgery done in the right lung by Dr. Jacobs 2 years ago for empyema.  This is not causing any serious problem for him but I advised him to start using incentive spirometry to improve pulmonary compliance but he already has incentive spirometer at home from his hospitalizations in the past.  I also reviewed the CT scan of the chest and explained the finding to the patient and his wife.  There was some mediastinal lymphadenopathy noted and there is a right upper lobe lung nodule noted which are stable finding and did not need any further work-up but I will order another CT scan for further evaluation in 6 months.  4.  For the nasal allergy should continue using fluticasone nasal spray and loratadine as before.  He will also continue other medications.  He did not take COVID-vaccine and does not want to take any oxygenation.  He will continue follow-up  with the primary care physician and return to pulmonary clinic for a follow-up visit in 6 months time or earlier if needed.  He is all medication refills were sent to the pharmacy    Follow up:  6 Months    Time Spent:  30 minutes    I appreciate the opportunity of participating in this patient's care. I would like to thank the PCP for the referral.  Please feel free to contact me with any other questions.    Katelyn Buckley MD   Pulmonologist/Intensivist     Electronically signed by: Katelyn Buckley MD, 5/2/2023 11:14 CDT

## 2023-05-04 ENCOUNTER — TELEPHONE (OUTPATIENT)
Dept: PULMONOLOGY | Facility: CLINIC | Age: 78
End: 2023-05-04
Payer: MEDICARE

## 2023-05-04 NOTE — TELEPHONE ENCOUNTER
Advair diskus is not covered on insurance but the advair HFA is.    Do you want to change it to that and if so what milligram do you want for the HFA?

## 2023-05-08 RX ORDER — FLUTICASONE PROPIONATE AND SALMETEROL XINAFOATE 115; 21 UG/1; UG/1
2 AEROSOL, METERED RESPIRATORY (INHALATION)
Qty: 12 G | Refills: 11 | Status: SHIPPED | OUTPATIENT
Start: 2023-05-08 | End: 2023-05-10 | Stop reason: ALTCHOICE

## 2023-05-09 ENCOUNTER — TELEPHONE (OUTPATIENT)
Dept: PULMONOLOGY | Facility: CLINIC | Age: 78
End: 2023-05-09
Payer: MEDICARE

## 2023-05-09 NOTE — TELEPHONE ENCOUNTER
The prior authorization for Advair HFA was denied for the patient.  I called the pharmacy to see if they could let me know what is covered but they wasn't able to let me know of anything.   I called the wife to ask her to call the insurance company to find out what is covered for him and let me know.      Letter in media.

## 2023-05-10 RX ORDER — FLUTICASONE PROPIONATE AND SALMETEROL 250; 50 UG/1; UG/1
1 POWDER RESPIRATORY (INHALATION) 2 TIMES DAILY
Qty: 1 EACH | Refills: 11 | Status: SHIPPED | OUTPATIENT
Start: 2023-05-10 | End: 2023-05-11 | Stop reason: ALTCHOICE

## 2023-05-10 NOTE — TELEPHONE ENCOUNTER
The patients wife called stating that she called [employer] on the Advair. She was told there was nothing that was covered on the insurance to replace the Advair. What would you like to do for the patient?

## 2023-05-11 RX ORDER — BUDESONIDE AND FORMOTEROL FUMARATE DIHYDRATE 160; 4.5 UG/1; UG/1
2 AEROSOL RESPIRATORY (INHALATION) 2 TIMES DAILY
Qty: 1 EACH | Refills: 5 | Status: SHIPPED | OUTPATIENT
Start: 2023-05-11

## 2023-05-11 NOTE — TELEPHONE ENCOUNTER
I tried to do a prior authorization on it again.   It was Denied today  The drug you asked for is not listed in your preferred drug list (formulary). The preferred drug(s), you may not have tried is: Symbicort HFA aerosol inhaler.    Please send in a prescription for Symbicort that should be covered.

## 2023-05-12 NOTE — TELEPHONE ENCOUNTER
Per Dr Buckley Please let him know I called in a prescription for Symbicort instead of Advair.  Thank you.     Patients wife notified of the message.

## 2023-06-01 ENCOUNTER — TRANSCRIBE ORDERS (OUTPATIENT)
Dept: ADMINISTRATIVE | Facility: HOSPITAL | Age: 78
End: 2023-06-01
Payer: MEDICARE

## 2023-06-01 DIAGNOSIS — I10 ESSENTIAL HYPERTENSION: ICD-10-CM

## 2023-06-01 DIAGNOSIS — N40.0 BENIGN LOCALIZED HYPERPLASIA OF PROSTATE WITHOUT URINARY OBSTRUCTION: ICD-10-CM

## 2023-06-01 DIAGNOSIS — E53.8 DEFICIENCY OF OTHER SPECIFIED B GROUP VITAMINS: ICD-10-CM

## 2023-06-01 DIAGNOSIS — Z12.5 SPECIAL SCREENING FOR MALIGNANT NEOPLASM OF PROSTATE: ICD-10-CM

## 2023-06-01 DIAGNOSIS — E55.9 VITAMIN D DEFICIENCY: ICD-10-CM

## 2023-06-01 DIAGNOSIS — N40.1 BENIGN PROSTATIC HYPERPLASIA (BPH) WITH POST-VOID DRIBBLING: ICD-10-CM

## 2023-06-01 DIAGNOSIS — R73.9 HYPERGLYCEMIA: ICD-10-CM

## 2023-06-01 DIAGNOSIS — N20.0 KIDNEY STONE: Primary | ICD-10-CM

## 2023-06-01 DIAGNOSIS — R30.9 PAINFUL MICTURITION: Primary | ICD-10-CM

## 2023-06-01 DIAGNOSIS — E78.5 HYPERLIPIDEMIA, UNSPECIFIED HYPERLIPIDEMIA TYPE: ICD-10-CM

## 2023-06-01 DIAGNOSIS — N39.43 BENIGN PROSTATIC HYPERPLASIA (BPH) WITH POST-VOID DRIBBLING: ICD-10-CM

## 2023-06-02 ENCOUNTER — LAB (OUTPATIENT)
Dept: LAB | Facility: HOSPITAL | Age: 78
End: 2023-06-02
Payer: MEDICARE

## 2023-06-02 ENCOUNTER — HOSPITAL ENCOUNTER (OUTPATIENT)
Dept: CT IMAGING | Facility: HOSPITAL | Age: 78
Discharge: HOME | End: 2023-06-02
Payer: MEDICARE

## 2023-06-02 DIAGNOSIS — Z12.5 SPECIAL SCREENING FOR MALIGNANT NEOPLASM OF PROSTATE: ICD-10-CM

## 2023-06-02 DIAGNOSIS — I10 ESSENTIAL HYPERTENSION: ICD-10-CM

## 2023-06-02 DIAGNOSIS — E78.5 HYPERLIPIDEMIA, UNSPECIFIED HYPERLIPIDEMIA TYPE: ICD-10-CM

## 2023-06-02 DIAGNOSIS — R73.9 HYPERGLYCEMIA: ICD-10-CM

## 2023-06-02 DIAGNOSIS — E55.9 VITAMIN D DEFICIENCY: ICD-10-CM

## 2023-06-02 DIAGNOSIS — N40.0 BENIGN LOCALIZED HYPERPLASIA OF PROSTATE WITHOUT URINARY OBSTRUCTION: ICD-10-CM

## 2023-06-02 DIAGNOSIS — N20.0 KIDNEY STONE: ICD-10-CM

## 2023-06-02 DIAGNOSIS — E53.8 DEFICIENCY OF OTHER SPECIFIED B GROUP VITAMINS: ICD-10-CM

## 2023-06-02 DIAGNOSIS — R30.9 PAINFUL MICTURITION: ICD-10-CM

## 2023-06-02 LAB
25(OH)D3 SERPL-MCNC: 42.2 NG/ML (ref 30–100)
ALBUMIN SERPL-MCNC: 3.9 G/DL (ref 3.5–5)
ALBUMIN UR-MCNC: 8.8 MG/DL
ALBUMIN/GLOB SERPL: 1.3 G/DL (ref 1.1–2.5)
ALP SERPL-CCNC: 69 U/L (ref 24–120)
ALT SERPL W P-5'-P-CCNC: 24 U/L (ref 0–50)
ANION GAP SERPL CALCULATED.3IONS-SCNC: 5 MMOL/L (ref 4–13)
AST SERPL-CCNC: 25 U/L (ref 7–45)
AUTO MIXED CELLS #: 0.7 10*3/MM3 (ref 0.1–2.6)
AUTO MIXED CELLS %: 9 % (ref 0.1–24)
BILIRUB SERPL-MCNC: 0.6 MG/DL (ref 0.1–1)
BUN SERPL-MCNC: 22 MG/DL (ref 5–21)
BUN/CREAT SERPL: 21.6
CALCIUM SPEC-SCNC: 8.9 MG/DL (ref 8.4–10.4)
CHLORIDE SERPL-SCNC: 106 MMOL/L (ref 98–110)
CHOLEST SERPL-MCNC: 234 MG/DL (ref 130–200)
CO2 SERPL-SCNC: 32 MMOL/L (ref 24–31)
CREAT SERPL-MCNC: 1.02 MG/DL (ref 0.5–1.4)
CREAT UR-MCNC: 193.1 MG/DL
EGFRCR SERPLBLD CKD-EPI 2021: 76.6 ML/MIN/1.73
ERYTHROCYTE [DISTWIDTH] IN BLOOD BY AUTOMATED COUNT: 14.5 % (ref 12.3–15.4)
FOLATE SERPL-MCNC: 8.82 NG/ML (ref 4.78–24.2)
GLOBULIN UR ELPH-MCNC: 3 GM/DL
GLUCOSE SERPL-MCNC: 112 MG/DL (ref 70–100)
HBA1C MFR BLD: 5.7 % (ref 4.8–5.9)
HCT VFR BLD AUTO: 42.3 % (ref 37.5–51)
HDLC SERPL-MCNC: 44 MG/DL
HGB BLD-MCNC: 13.9 G/DL (ref 13–17.7)
LDLC SERPL CALC-MCNC: 159 MG/DL (ref 0–99)
LDLC/HDLC SERPL: 3.54 {RATIO}
LYMPHOCYTES # BLD AUTO: 1.6 10*3/MM3 (ref 0.7–3.1)
LYMPHOCYTES NFR BLD AUTO: 21 % (ref 19.6–45.3)
MCH RBC QN AUTO: 29.1 PG (ref 26.6–33)
MCHC RBC AUTO-ENTMCNC: 32.9 G/DL (ref 31.5–35.7)
MCV RBC AUTO: 88.7 FL (ref 79–97)
MICROALBUMIN/CREAT UR: 45.6 MG/G
NEUTROPHILS NFR BLD AUTO: 5.5 10*3/MM3 (ref 1.7–7)
NEUTROPHILS NFR BLD AUTO: 70 % (ref 42.7–76)
PLATELET # BLD AUTO: 191 10*3/MM3 (ref 140–450)
PMV BLD AUTO: 8.1 FL (ref 6–12)
POTASSIUM SERPL-SCNC: 4.1 MMOL/L (ref 3.5–5.3)
PROT SERPL-MCNC: 6.9 G/DL (ref 6.3–8.7)
PSA SERPL-MCNC: 2.15 NG/ML (ref 0–4)
RBC # BLD AUTO: 4.77 10*6/MM3 (ref 4.14–5.8)
SODIUM SERPL-SCNC: 143 MMOL/L (ref 135–145)
T4 FREE SERPL-MCNC: 0.89 NG/DL (ref 0.93–1.7)
TRIGL SERPL-MCNC: 171 MG/DL (ref 0–149)
TSH SERPL DL<=0.05 MIU/L-ACNC: 2.91 UIU/ML (ref 0.27–4.2)
VIT B12 BLD-MCNC: 400 PG/ML (ref 211–946)
VLDLC SERPL-MCNC: 31 MG/DL (ref 5–40)
WBC NRBC COR # BLD: 7.8 10*3/MM3 (ref 3.4–10.8)

## 2023-06-02 PROCEDURE — 84443 ASSAY THYROID STIM HORMONE: CPT

## 2023-06-02 PROCEDURE — 84439 ASSAY OF FREE THYROXINE: CPT

## 2023-06-02 PROCEDURE — 80061 LIPID PANEL: CPT

## 2023-06-02 PROCEDURE — 83036 HEMOGLOBIN GLYCOSYLATED A1C: CPT | Performed by: NURSE PRACTITIONER

## 2023-06-02 PROCEDURE — G0103 PSA SCREENING: HCPCS

## 2023-06-02 PROCEDURE — 82746 ASSAY OF FOLIC ACID SERUM: CPT

## 2023-06-02 PROCEDURE — 82306 VITAMIN D 25 HYDROXY: CPT

## 2023-06-02 PROCEDURE — 82607 VITAMIN B-12: CPT

## 2023-06-02 PROCEDURE — 85025 COMPLETE CBC W/AUTO DIFF WBC: CPT

## 2023-06-02 PROCEDURE — 80053 COMPREHEN METABOLIC PANEL: CPT | Performed by: NURSE PRACTITIONER

## 2023-06-02 PROCEDURE — 82570 ASSAY OF URINE CREATININE: CPT

## 2023-06-02 PROCEDURE — 36415 COLL VENOUS BLD VENIPUNCTURE: CPT

## 2023-06-02 PROCEDURE — 82043 UR ALBUMIN QUANTITATIVE: CPT

## 2023-06-02 PROCEDURE — 87086 URINE CULTURE/COLONY COUNT: CPT

## 2023-06-02 PROCEDURE — 74176 CT ABD & PELVIS W/O CONTRAST: CPT

## 2023-06-03 LAB — BACTERIA SPEC AEROBE CULT: NO GROWTH

## 2023-06-07 ENCOUNTER — TELEPHONE (OUTPATIENT)
Dept: UROLOGY | Facility: CLINIC | Age: 78
End: 2023-06-07

## 2023-06-07 DIAGNOSIS — N20.0 KIDNEY STONE: Primary | ICD-10-CM

## 2023-06-07 NOTE — TELEPHONE ENCOUNTER
Provider: DEREJE JOHNSTON  Caller: ALIN BERNARDO  Relationship to Patient: SELF    Phone Number: [phone]  Reason for Call: PATIENT HAS AN  APPT WITH DEREJE DOZIER ON 6/13/23 AT 1:45  PLEASE PLACE ORDER FOR KUB

## 2023-06-12 NOTE — PROGRESS NOTES
Subjective    Mr. Velasco is 77 y.o. male    Chief Complaint: Kidney stone    History of Present Illness  Patient with previous history of kidney stones who has not been seen here since 2020 has been having some right-sided pain which has improved but is still present he had a CT scan that was ordered by his PCP this was done 06/02/2023 that showed a 5 to 6 mm nonobstructing stone in the right kidney and a calcification within the central portion of the prostate that could also be a stone within the prostatic urethra.  There is some dilation of the right ureter compared to the left which could indicate recently passed stone.  A faint free stone in the left kidney but no ureteral obstruction on the left.  No fever or chills his urine is clear he denies any changes in his urine flow or stream.    The following portions of the patient's history were reviewed and updated as appropriate: allergies, current medications, past family history, past medical history, past social history, past surgical history and problem list.    Review of Systems   Constitutional:  Negative for chills and fever.   Gastrointestinal:  Negative for abdominal pain, anal bleeding and blood in stool.   Genitourinary:  Positive for flank pain (Right side). Negative for dysuria and hematuria.       Current Outpatient Medications:     albuterol sulfate  (90 Base) MCG/ACT inhaler, Inhale 2 puffs Every 4 (Four) Hours As Needed for Shortness of Air. for wheezing, Disp: 8.5 g, Rfl: 5    ascorbic acid (VITAMIN C) 1000 MG tablet, Daily., Disp: , Rfl:     budesonide-formoterol (SYMBICORT) 160-4.5 MCG/ACT inhaler, Inhale 2 puffs 2 (Two) Times a Day., Disp: 1 each, Rfl: 5    Calcium Carbonate 1500 (600 Ca) MG tablet, Take 1 tablet by mouth Daily., Disp: , Rfl:     cefdinir (OMNICEF) 300 MG capsule, Every 12 (Twelve) Hours., Disp: , Rfl:     Cholecalciferol 125 MCG (5000 UT) tablet, Vitamin D3 125 mcg (5,000 unit) tablet  Take 1 tablet every day by oral  route., Disp: , Rfl:     clopidogrel (PLAVIX) 75 MG tablet, TAKE 1 TABLET BY MOUTH DAILY, Disp: 90 tablet, Rfl: 3    Cyanocobalamin (B-12) 1000 MCG sublingual tablet, 1 tablet Daily., Disp: , Rfl:     donepezil (ARICEPT) 5 MG tablet, TAKE 1 TABLET BY MOUTH EVERY NIGHT AT BEDTIME, Disp: 90 tablet, Rfl: 3    escitalopram (LEXAPRO) 20 MG tablet, Take 1 tablet by mouth Daily., Disp: 90 tablet, Rfl: 1    ferrous sulfate 325 (65 FE) MG tablet, Take 1 tablet by mouth Every Morning., Disp: , Rfl:     fluticasone (FLONASE) 50 MCG/ACT nasal spray, 2 sprays into the nostril(s) as directed by provider Daily., Disp: 16 g, Rfl: 5    furosemide (LASIX) 40 MG tablet, Take 1 tablet by mouth Daily As Needed (for any increase in 2 lbs in a daqy or 5 lbs in a week or increassing swelling or congestion)., Disp: , Rfl:     HYDROcodone-acetaminophen (NORCO) 5-325 MG per tablet, Every 6 (Six) Hours., Disp: , Rfl:     loratadine (CLARITIN) 10 MG tablet, Take 1 tablet by mouth Daily., Disp: 90 tablet, Rfl: 3    metoprolol tartrate (LOPRESSOR) 25 MG tablet, Take 1 tablet by mouth Daily., Disp: 90 tablet, Rfl: 3    NITROSTAT 0.4 MG SL tablet, Place 1 tablet under the tongue Every 5 (Five) Minutes As Needed for Chest Pain. Take no more than 3 doses in 15 minutes., Disp: 100 tablet, Rfl: 2    omega-3 acid ethyl esters (LOVAZA) 1 g capsule, TAKE 1 CAPSULE BY MOUTH DAILY, Disp: 90 capsule, Rfl: 3    omeprazole (priLOSEC) 20 MG capsule, TAKE 1 CAPSULE BY MOUTH DAILY, Disp: 90 capsule, Rfl: 3    ondansetron ODT (ZOFRAN-ODT) 4 MG disintegrating tablet, Every 8 (Eight) Hours., Disp: , Rfl:     Potassium Gluconate 2.5 MEQ tablet, Take 1 tablet by mouth Daily As Needed. When he takes lasix, Disp: , Rfl:     spironolactone (ALDACTONE) 25 MG tablet, Take 1 tablet by mouth Daily., Disp: 90 tablet, Rfl: 3    tamsulosin (FLOMAX) 0.4 MG capsule 24 hr capsule, Take 1 capsule by mouth Daily., Disp: , Rfl:     topiramate (TOPAMAX) 50 MG tablet, Take 1 tablet by  mouth Every 12 (Twelve) Hours., Disp: , Rfl:     Wixela Inhub 250-50 MCG/ACT DISKUS, Inhale 1 puff 2 (Two) Times a Day., Disp: , Rfl:     Zinc Sulfate (ZINC-220 PO), Take  by mouth., Disp: , Rfl:     Past Medical History:   Diagnosis Date    Anxiety     Arthritis     Cancer     melanoma    Chronic diastolic congestive heart failure 4/15/2022    Claustrophobia     Colon polyp     Coronary artery disease involving autologous artery coronary bypass graft without angina pectoris 9/22/2016    Depression     Disease of thyroid gland     partial thyroidectomy on right    GERD (gastroesophageal reflux disease)     Hearing loss     Heart attack     History of transfusion     Hypertension     Kidney stone     history of     Mild intermittent asthma without complication 8/24/2021    Open angle with borderline findings, low risk, bilateral 8/23/2021    Presence of cardiac pacemaker 4/15/2022    Primary hypertension 9/22/2016    Pulmonary hypertension 4/15/2022    S/P CABG x 2 1/4/2019    LIMA to LAD and SVG to RCA 8/8/12    Sick sinus syndrome 7/23/2020    Stented coronary artery 1/4/2019    2.5 x 18 mm Xience Alpine drug-eluting stent to the mid LAD 7/8/2015, 2.5 x 18 mm Xience drug-eluting stent to the proximal OM1 10/21/2016 and 2.5 x 12 mm Synergy drug-eluting stent to OM1 for severe in-stent restenosis 4/28/2020    Tobacco abuse, in remission     Uses hearing aid     BILATERAL       Past Surgical History:   Procedure Laterality Date    BACK SURGERY      CERVICAL FUSION AND LUMBAR DISC REPLACEMENT    BRONCHOSCOPY N/A 04/27/2021    Procedure: BRONCHOSCOPY;  Surgeon: Rico Jacobs MD;  Location:  [facility] OR;  Service: Cardiothoracic;  Laterality: N/A;    CARDIAC CATHETERIZATION  07/2015    SHAHRZAD mid LAD, Dr. Toribio    CARDIAC CATHETERIZATION N/A 10/21/2016    Procedure: Left Heart Cath;  Surgeon: Darian Toribio MD;  Location:  [facility] CATH INVASIVE LOCATION;  Service:     CARDIAC CATHETERIZATION Left 04/05/2017    Procedure:  Cardiac Catheterization/Vascular Study;  Surgeon: Darian Toribio MD;  Location:  [facility] CATH INVASIVE [facility];  Service:     CARDIAC CATHETERIZATION N/A 04/05/2017    Procedure: Left Heart Cath;  Surgeon: Darian Toribio MD;  Location:  [facility] CATH INVASIVE [facility];  Service:     CARDIAC CATHETERIZATION N/A 04/05/2017    Procedure: Coronary angiography;  Surgeon: Darian Toribio MD;  Location:  [facility] CATH INVASIVE [facility];  Service:     CARDIAC CATHETERIZATION N/A 04/05/2017    Procedure: Left ventriculography;  Surgeon: Darian Toriibo MD;  Location:  [facility] CATH INVASIVE [facility];  Service:     CARDIAC CATHETERIZATION  04/05/2017    Procedure: Saphenous Vein Graft;  Surgeon: Darian Toribio MD;  Location:  [facility] CATH INVASIVE [facility];  Service:     CARDIAC CATHETERIZATION Left 02/27/2018    Procedure: Cardiac Catheterization/Vascular Study SHAHRZAD OK PRN;  Surgeon: Darian Toribio MD;  Location:  [facility] CATH INVASIVE LOC[facility];  Service:     CARDIAC CATHETERIZATION  02/27/2018    Procedure: Functional Flow [place];  Surgeon: Darian Toribio MD;  Location:  [facility] CATH INVASIVE [facility];  Service:     CARDIAC CATHETERIZATION N/A 02/27/2018    Procedure: Left ventriculography;  Surgeon: Darian Toribio MD;  Location:  [facility] CATH INV[facility]IVE [facility];  Service:     CARDIAC CATHETERIZATION Left 04/28/2020    Procedure: Cardiac Catheterization/Vascular Study SHAHRZAD OK PRN;  Surgeon: Darian Toribio MD;  Location:  [facility] CATH INVASIVE LOC[facility];  Service: Cardiology;  Laterality: Left;    CARDIAC ELECTROPHYSIOLOGY PROCEDURE N/A 01/11/2018    Procedure: PPM generator change - dual;  Surgeon: Darian Toribio MD;  Location: [facility] CATH INVASIVE LOCATION;  Service:     CARDIAC SURGERY      open heart surgery x2    COLONOSCOPY  09/05/2007    colon polyps    COLONOSCOPY N/A 05/29/2018    Procedure: COLONOSCOPY WITH ANESTHESIA;  Surgeon: Juan F Tapia MD;  Location: [facility] ENDOSCOPY;  Service: Gastroenterology    COLONOSCOPY N/A 10/21/2021    Procedure:  COLONOSCOPY WITH ANESTHESIA;  Surgeon: Juan F Tapia MD;  Location: [facility] ENDOSCOPY;  Service: Gastroenterology;  Laterality: N/A;  pre op: hx polyps  post op;polyp,   PCP: Rhys Rosen MD    CORONARY ANGIOPLASTY WITH STENT PLACEMENT  2015    SHAHRZAD mid LAD, Dr. Toribio     CORONARY ARTERY BYPASS GRAFT   AND     2 vessel     ENDOSCOPY  2010    ENDOSCOPY N/A 2018    Procedure: ESOPHAGOGASTRODUODENOSCOPY WITH ANESTHESIA;  Surgeon: Juan F Tapia MD;  Location: [facility] ENDOSCOPY;  Service: Gastroenterology    HEAD/NECK LESION/CYST EXCISION Left 2016    Procedure: EXCISION MALIGNANT MELANOMA WITH SENTINEL NODE BIOPSY, INJECTION AND SCAN PRE-OP, LEFT EAR;  Surgeon: Guanaco Zepeda MD;  Location: [facility] OR;  Service:     HEMORRHOIDECTOMY      HERNIA REPAIR      INSERT / REPLACE / REMOVE PACEMAKER      REPLACEMENT TOTAL KNEE Left     SENTINEL NODE BIOPSY Left 2016    Procedure: SENTINEL NODE BIOPSY;  Surgeon: Guanaco Zepeda MD;  Location: [facility] OR;  Service:     SKIN CANCER EXCISION      THORACOSCOPY Right 2021    Procedure: THORACOSCOPIC TOTAL DECORTICATION;  Surgeon: Rico Jacobs MD;  Location: [facility] OR;  Service: Cardiothoracic;  Laterality: Right;    THYROID SURGERY      THYROIDECTOMY         Social History     Socioeconomic History    Marital status:    Tobacco Use    Smoking status: Former     Packs/day: 3.00     Years: 10.00     Pack years: 30.00     Types: Cigarettes     Quit date:      Years since quittin.4    Smokeless tobacco: Former     Types: Snuff     Quit date:    Vaping Use    Vaping Use: Never used   Substance and Sexual Activity    Alcohol use: Yes     Comment: rare    Drug use: No    Sexual activity: Defer       Family History   Problem Relation Age of Onset    Heart failure Mother     Stroke Mother     Dementia Mother     Coronary artery disease Father     Colon polyps Father     COPD Brother     Colon cancer Neg Hx   "      Objective    Temp 98.5 °F (36.9 °C)   Ht 172.7 cm (68\")   Wt 120 kg (264 lb 3.2 oz)   BMI 40.17 kg/m²     Physical Exam  Vitals reviewed.   Constitutional:       Appearance: Normal appearance.   HENT:      Head: Atraumatic.   Pulmonary:      Effort: Pulmonary effort is normal.   Skin:     Coloration: Skin is not pale.   Neurological:      Mental Status: He is alert and oriented to person, place, and time.   Psychiatric:         Mood and Affect: Mood normal.         Behavior: Behavior normal.           Results for orders placed or performed in visit on 06/13/23   POC Urinalysis Dipstick, Multipro    Specimen: Urine   Result Value Ref Range    Color Yellow Yellow, Straw, Dark Yellow, Eleanor    Clarity, UA Clear Clear    Glucose, UA Negative Negative mg/dL    Bilirubin Negative Negative    Ketones, UA Negative Negative    Specific Gravity  1.020 1.005 - 1.030    Blood, UA Negative Negative    pH, Urine 5.0 5.0 - 8.0    Protein, POC Negative Negative mg/dL    Urobilinogen, UA 0.2 E.U./dL Normal, 0.2 E.U./dL    Nitrite, UA Negative Negative    Leukocytes Negative Negative     KUB independent review    A KUB is available for me to review today.  The image is inspected for a bowel gas pattern and the general bone structure of the spine and pelvis. The kidneys are then inspected closely.  Renal outline is noted if identifiable. The kidney, collecting system, and anticipated path of the ureter are examined for calcifications including those in the true pelvis.  This film reveals:    On the right there is a single renal stone measuring 5-6 mm.    On the left there are no calcificaitons seen in the kidney or the expected course of the ureter.     Independent review of a CT scan of abdomen and pelvis without contrast  The CT scan of the abdomen/pelvis done without contrast is available for me to review.  Treatment recommendations require an independent review.  First I scanned the liver, spleen, and bowel pattern.  The " retroperitoneum including the major vessels and lymphatic packages are briefly reviewed.  This film as been reviewed by the radiologist to determine any non urologic abnormalities that are present.  The kidneys are closely inspected for size, symmetry, contour, parenchymal thickness, perinephric reaction, presence of calcifications, and intrarenal dilation of the collecting system.  The ureters are inspected for their course, caliber, and any calcifications.  The bladder is inspected for its thickness, size, and presence of any calcifications.  This scan shows 5 to 6 mm nonobstructing stone in the right kidney.  Possibly smaller faint stone in the right kidney.  Obvious stone in the course of either ureter however there is more dilation of the right ureter compared to the left.  4.5 mm calcification within the central portion of the prostate.      Assessment and Plan    Diagnoses and all orders for this visit:    1. Kidney stone (Primary)  -     POC Urinalysis Dipstick, Multipro  -     CT Abdomen Pelvis Without Contrast; Future    2. Right flank pain  -     CT Abdomen Pelvis Without Contrast; Future    Patient has been having right flank right lower back pain for the past few weeks it is mildly improved from previous patient had a CT scan in 06/02/2023 that showed a 5 to 6 mm nonobstructing stone in the right kidney and a 4.5 mm stone in the central portion of the prostate possibly in the prostatic urethra.  No fever or chills.  Urine is clear he has had no changes in his flow or stream.  KUB shows the stone in the right kidney but no other obvious calcifications.  If it has continued symptoms I would like him to get a CT renal stone protocol which I will get today and contact him with results.  Is in the prostatic urethra I would recommend he take 2 tamsulosin daily for now as I feel this will pass.  It was not present on the CT from 2022.             Infliximab Counseling:  I discussed with the patient the risks of infliximab including but not limited to myelosuppression, immunosuppression, autoimmune hepatitis, demyelinating diseases, lymphoma, and serious infections.  The patient understands that monitoring is required including a PPD at baseline and must alert us or the primary physician if symptoms of infection or other concerning signs are noted.

## 2023-06-13 ENCOUNTER — OFFICE VISIT (OUTPATIENT)
Dept: UROLOGY | Facility: CLINIC | Age: 78
End: 2023-06-13
Payer: MEDICARE

## 2023-06-13 ENCOUNTER — HOSPITAL ENCOUNTER (OUTPATIENT)
Dept: CT IMAGING | Facility: HOSPITAL | Age: 78
Discharge: HOME | End: 2023-06-13
Payer: MEDICARE

## 2023-06-13 ENCOUNTER — HOSPITAL ENCOUNTER (OUTPATIENT)
Dept: GENERAL RADIOLOGY | Facility: HOSPITAL | Age: 78
Discharge: HOME | End: 2023-06-13
Admitting: PHYSICIAN ASSISTANT
Payer: MEDICARE

## 2023-06-13 VITALS — BODY MASS INDEX: 40.04 KG/M2 | WEIGHT: 264.2 LBS | HEIGHT: 68 IN | TEMPERATURE: 98.5 F

## 2023-06-13 DIAGNOSIS — N20.0 KIDNEY STONE: Primary | ICD-10-CM

## 2023-06-13 DIAGNOSIS — N20.0 KIDNEY STONE: ICD-10-CM

## 2023-06-13 DIAGNOSIS — R10.9 RIGHT FLANK PAIN: ICD-10-CM

## 2023-06-13 LAB
BILIRUB BLD-MCNC: NEGATIVE MG/DL
CLARITY, POC: CLEAR
COLOR UR: YELLOW
GLUCOSE UR STRIP-MCNC: NEGATIVE MG/DL
KETONES UR QL: NEGATIVE
LEUKOCYTE EST, POC: NEGATIVE
NITRITE UR-MCNC: NEGATIVE MG/ML
PH UR: 5 [PH] (ref 5–8)
PROT UR STRIP-MCNC: NEGATIVE MG/DL
RBC # UR STRIP: NEGATIVE /UL
SP GR UR: 1.02 (ref 1–1.03)
UROBILINOGEN UR QL: NORMAL

## 2023-06-13 PROCEDURE — 74176 CT ABD & PELVIS W/O CONTRAST: CPT

## 2023-06-13 PROCEDURE — 74018 RADEX ABDOMEN 1 VIEW: CPT

## 2023-06-13 RX ORDER — CEFDINIR 300 MG/1
CAPSULE ORAL EVERY 12 HOURS SCHEDULED
COMMUNITY

## 2023-06-13 RX ORDER — TOPIRAMATE 50 MG/1
1 TABLET, FILM COATED ORAL EVERY 12 HOURS SCHEDULED
COMMUNITY
Start: 2023-05-25

## 2023-06-13 RX ORDER — FLUTICASONE PROPIONATE AND SALMETEROL 250; 50 UG/1; UG/1
1 POWDER RESPIRATORY (INHALATION) 2 TIMES DAILY
COMMUNITY
Start: 2023-05-12

## 2023-06-13 RX ORDER — HYDROCODONE BITARTRATE AND ACETAMINOPHEN 5; 325 MG/1; MG/1
TABLET ORAL EVERY 6 HOURS
COMMUNITY

## 2023-06-13 RX ORDER — ONDANSETRON 4 MG/1
TABLET, ORALLY DISINTEGRATING ORAL EVERY 8 HOURS SCHEDULED
COMMUNITY

## 2023-06-14 ENCOUNTER — TELEPHONE (OUTPATIENT)
Dept: UROLOGY | Facility: CLINIC | Age: 78
End: 2023-06-14
Payer: MEDICARE

## 2023-06-14 NOTE — TELEPHONE ENCOUNTER
I called the number listed in his chart and left a message about his CT scan I also told him for further information to call me back.  No acute findings on the CT no ureteral obstruction bilateral nonobstructing stones.

## 2023-06-20 ENCOUNTER — HOSPITAL ENCOUNTER (OUTPATIENT)
Dept: PAIN MANAGEMENT | Age: 78
Discharge: HOME | End: 2023-06-20
Payer: MEDICARE

## 2023-06-20 VITALS
DIASTOLIC BLOOD PRESSURE: 74 MMHG | OXYGEN SATURATION: 98 % | TEMPERATURE: 96.7 F | HEART RATE: 66 BPM | SYSTOLIC BLOOD PRESSURE: 146 MMHG | RESPIRATION RATE: 18 BRPM

## 2023-06-20 PROCEDURE — G0260 INJ FOR SACROILIAC JT ANESTH: HCPCS

## 2023-06-20 PROCEDURE — A4216 STERILE WATER/SALINE, 10 ML: HCPCS

## 2023-06-20 PROCEDURE — 6360000002 HC RX W HCPCS

## 2023-06-20 PROCEDURE — 2580000003 HC RX 258

## 2023-06-20 PROCEDURE — 2500000003 HC RX 250 WO HCPCS

## 2023-06-20 RX ORDER — BUPIVACAINE HYDROCHLORIDE 5 MG/ML
2 INJECTION, SOLUTION EPIDURAL; INTRACAUDAL ONCE
Status: DISCONTINUED | OUTPATIENT
Start: 2023-06-20 | End: 2023-06-22 | Stop reason: HOSPADM

## 2023-06-20 RX ORDER — SODIUM CHLORIDE 9 MG/ML
3 INJECTION INTRAVENOUS ONCE
Status: DISCONTINUED | OUTPATIENT
Start: 2023-06-20 | End: 2023-06-22 | Stop reason: HOSPADM

## 2023-06-20 RX ORDER — METHYLPREDNISOLONE ACETATE 80 MG/ML
80 INJECTION, SUSPENSION INTRA-ARTICULAR; INTRALESIONAL; INTRAMUSCULAR; SOFT TISSUE ONCE
Status: DISCONTINUED | OUTPATIENT
Start: 2023-06-20 | End: 2023-06-22 | Stop reason: HOSPADM

## 2023-06-20 RX ORDER — LIDOCAINE HYDROCHLORIDE 10 MG/ML
7 INJECTION, SOLUTION EPIDURAL; INFILTRATION; INTRACAUDAL; PERINEURAL ONCE
Status: DISCONTINUED | OUTPATIENT
Start: 2023-06-20 | End: 2023-06-22 | Stop reason: HOSPADM

## 2023-06-20 ASSESSMENT — PAIN - FUNCTIONAL ASSESSMENT
PAIN_FUNCTIONAL_ASSESSMENT: PREVENTS OR INTERFERES SOME ACTIVE ACTIVITIES AND ADLS
PAIN_FUNCTIONAL_ASSESSMENT: NONE - DENIES PAIN

## 2023-06-20 NOTE — INTERVAL H&P NOTE
Update History & Physical    The patient's History and Physical  was reviewed with the patient and I examined the patient. There was no change. The surgical site was confirmed by the patient and me. Plan: The risks, benefits, expected outcome, and alternative to the recommended procedure have been discussed with the patient. Patient understands and wants to proceed with the procedure.      Electronically signed by Darnell Prtichard MD on 6/20/2023 at 11:58 AM

## 2023-06-21 PROBLEM — K45.8 LUMBAR HERNIA: Status: ACTIVE | Noted: 2023-06-21

## 2023-07-18 ENCOUNTER — HOSPITAL ENCOUNTER (OUTPATIENT)
Dept: SLEEP CENTER | Age: 78
Discharge: HOME | End: 2023-07-20
Payer: MEDICARE

## 2023-07-18 PROCEDURE — 95810 POLYSOM 6/> YRS 4/> PARAM: CPT

## 2023-07-19 NOTE — PROGRESS NOTES
[facility], [facility]  [address] [facility] [address]  Phone [phone] Fax [phone]        Polysomnography Report  Patient Name Eliana Foster Account Number [de-identified]    1945 Referring Provider Vero Colindres M.D.   Age/ Gender 78 years/M Interpreting physician Sam Sinclair M.D.,[facility]   Neck circumference/  Mallampati classification 16.5 in/class 3 Night Technician David Vazquez, RPSGT   [place] score 10/24 Scoring Technician Silvana Lamar, RPSGT   Height 68.0 in Indications for the test excessive daytime somnolence   Weight 267.0 lbs Test Diagnostic Polysomnogram   BMI 40.6 Date of test 2023     Procedure  A Diagnostic Polysomnogram was conducted on the night of 2023. The study was performed and scored per AASM guidelines. The following were monitored: frontal, central, and occipital EEG, electrooculogram (EOG), submentalis EMG, nasal and oral airflow, intranasal pressure, thoracic plethysmography, abdominal plethysmography, anterior tibialis EMG, electrocardiogram, body position, and positive airway pressure (PAP). Arterial oxygen saturation was monitored with a pulse oximeter. The study was scored utilizing 30 second epochs. Hypopneas were scored using per AASM definition VIII, D, 1B.     Sleep Scoring Data  Lights out 9:08:27 PM Sleep latency 14.0 min Time in N1 10.0 min N1% 2.5%   Lights on 4:40:27 AM WASO 42.0 min Time in N2 334.0 min N2% 84.3%   TIB/.0 min Sleep efficiency 90.4% Time in N3 6.0 min N3% 1.5%   .0 min REM latency 283.5 min Time in R 46.0 min R% 11.6%     Respiratory Events Summary   NREM REM Total   Hypopnea index 1.4 14.3 2.9   Apnea index 3.4 22.2 5.6   RERA index 0.0 0.0 0.0   AHI 4.8 36.5 8.5   RDI (AHI + RERA index) 4.8 36.5 8.5     Respiratory Events by Sleep Stage   Obstructive Apneas OA Index Central Apneas CA Index Mixed Apneas MA Index   Hypopneas H Index   RERAs   R Index   NREM 20 3.4 0

## 2023-07-19 NOTE — PROGRESS NOTES
[address]  Phone [phone] Fax [phone]     Sleep Study Technician Review    Patient Name:  Ann Casanova  :   1945  Referring Provider: Laine Rodgers MD    Brief History:  Ann Casanova is a 78 y.o. male with a history of Anxiety, CAD, CHF, Diabetes, HTN, Obesity, and CARLOZ who presented for a split night PSG if the AHI was greater than or equal to 15. Pt reported that the has used CPAP in the past, but after loosing some weight he no longer felt it was necessary. Height:  68\"  Weight: 267lbs  BMI:  40.6  Neck Circ: 16.5\"  Mallampati  3  ESS:  10    Type of Study: PSG  Time Stage Position Snore Hypopnea Obs Apnea Rae Apnea PAP O2   2200 N2 supine Yes No No No N/A RA   2300 N2 right Yes No No No N/A RA   2400 N2 supine Yes Yes No No N/A RA   0100 N2 supine No Yes Yes No N/A RA   0200 N2 supine No Yes Yes No N/A RA   0300 REM supine No Yes No No N/A RA   0400 N2/W supine No Yes No No N/A RA   0440  L/on N2 supine No Yes No No N/A RA     Summary:Pt arrived at the sleep center on time. Tech introduced self, verified pt's name and , and escorted pt to room. Tech explained procedure and answered questions. Pt was instructed n supine sleep. Pt verbalized understanding of procedure. Pt was prepared for PSG per protocol without complication. The estimated AHI was in low to mild range. The SpO2 les was 84%. The pt did not qualify for a split-night study. The AHI was increased in supine and REM sleep. EKG showed NSR with extrasystoles noted in epochs 128, 414, 620, 651, as well as others. Limb movements were noted through out the study. DME:  [facility]     The study was reviewed briefly with Ann Casanova. Mr. Radha Collins will be notified of the formal results and recommendations after the study is scored and interpreted. The report will be sent to his referring provider.     Technician: Erica Olivera, CRT,RPSGT,RST

## 2023-07-26 ENCOUNTER — TELEPHONE (OUTPATIENT)
Dept: SLEEP CENTER | Age: 78
End: 2023-07-26

## 2023-07-26 NOTE — TELEPHONE ENCOUNTER
Spoke to Mr. Pamela Queen  and went over the results of PSG performed  07/18/2023. Questions answered. Orders, documentation and insurance information were sent to [address] [place] [address] today.

## 2023-07-27 ENCOUNTER — TRANSCRIBE ORDERS (OUTPATIENT)
Dept: ADMINISTRATIVE | Facility: HOSPITAL | Age: 78
End: 2023-07-27
Payer: MEDICARE

## 2023-07-27 DIAGNOSIS — E11.59 TYPE 2 DIABETES MELLITUS WITH OTHER CIRCULATORY COMPLICATIONS: ICD-10-CM

## 2023-07-27 DIAGNOSIS — I10 ESSENTIAL HYPERTENSION: Primary | ICD-10-CM

## 2023-07-27 DIAGNOSIS — N40.1 BENIGN PROSTATIC HYPERPLASIA WITH LOWER URINARY TRACT SYMPTOMS, SYMPTOM DETAILS UNSPECIFIED: ICD-10-CM

## 2023-07-27 DIAGNOSIS — E78.5 HYPERLIPIDEMIA, UNSPECIFIED HYPERLIPIDEMIA TYPE: ICD-10-CM

## 2023-08-07 NOTE — PROGRESS NOTES
Office Established Patient Note:     Referring Provider: Dr. Jeff Rinney    Chief complaint follow-up left lumbar hernia repair 7/7/2023    Subjective .     History of present illness:  Carlo Velasco is a 78 y.o. male who is here for follow-up status post a lumbar hernia repair     Findings: Exploration of the left flank, reduction of the lumbar hernia, and placement of a 6 x 6 inch piece of mesh in the retroperitoneum, closure of the surrounding muscle over this area.  Loss less than 25 cc complications none.        Currently 10 days out from a lumbar hernia, he is doing well from his surgery he is having regular bowel movements no nausea no vomiting and he is very surprised that his back pain that he was having is greatly improved I think the lumbar hernia was causing some discomfort as it was being pinched between his rib cage and his pelvis he is having good bowel movements, and the pain seems to be greatly improved.  History     Findings: Exploration of the left flank, reduction of the lumbar hernia, and placement of a 6 x 6 inch piece of mesh in the retroperitoneum, closure of the surrounding muscle over this area.  Loss less than 25 cc complications none.        Currently 10 days out from a lumbar hernia, he is doing well from his surgery he is having regular bowel movements no nausea no vomiting and he is very surprised that his back pain that he was having is greatly improved I think the lumbar hernia was causing some discomfort as it was being pinched between his rib cage and his pelvis he is having good bowel movements, and the pain seems to be greatly improved.    Currently 6 weeks out from having his lumbar hernia fixed he is doing well from his surgery he is increasing his activity increasing his diet his preoperative discomfort is fully resolved he was not sure what was going to happen but he feels great he feels much better no further discomfort from this lumbar hernia.  He is having regular bowel  movements and no further left-sided pain  History    History  Past Medical History:   Diagnosis Date    Anxiety     Arthritis     Cancer     melanoma    Chronic diastolic congestive heart failure 04/15/2022    Claustrophobia     Colon polyp     COPD (chronic obstructive pulmonary disease)     Coronary artery disease involving autologous artery coronary bypass graft without angina pectoris 09/22/2016    Depression     Disease of thyroid gland     partial thyroidectomy on right    GERD (gastroesophageal reflux disease)     Hearing loss     Heart attack     History of transfusion     Hypertension     Kidney stone     history of     Mild intermittent asthma without complication 08/24/2021    Open angle with borderline findings, low risk, bilateral 08/23/2021    Pain management     STATES JUST HAD INJECTION IN BACK ON 06/20/2023    Presence of cardiac pacemaker 04/15/2022    Primary hypertension 09/22/2016    Pulmonary hypertension 04/15/2022    S/P CABG x 2 01/04/2019    LIMA to LAD and SVG to RCA 8/8/12    Sick sinus syndrome 07/23/2020    Stented coronary artery 01/04/2019    2.5 x 18 mm Xience Alpine drug-eluting stent to the mid LAD 7/8/2015, 2.5 x 18 mm Xience drug-eluting stent to the proximal OM1 10/21/2016 and 2.5 x 12 mm Synergy drug-eluting stent to OM1 for severe in-stent restenosis 4/28/2020    Tobacco abuse, in remission     Uses hearing aid     BILATERAL   ,   Past Surgical History:   Procedure Laterality Date    BACK SURGERY      CERVICAL FUSION AND LUMBAR DISC REPLACEMENT    BRONCHOSCOPY N/A 04/27/2021    Procedure: BRONCHOSCOPY;  Surgeon: Rico Jacobs MD;  Location: [facility] OR;  Service: Cardiothoracic;  Laterality: N/A;    CARDIAC CATHETERIZATION  07/2015    SHAHRZAD mid LAD, Dr. Toribio    CARDIAC CATHETERIZATION N/A 10/21/2016    Procedure: Left Heart Cath;  Surgeon: Darian Toribio MD;  Location:  [facility] CATH INVASIVE LOCATION;  Service:     CARDIAC CATHETERIZATION Left 04/05/2017    Procedure: Cardiac  Catheterization/Vascular Study;  Surgeon: Darian Toribio MD;  Location:  [facility];  Service:     CARDIAC CATHETERIZATION N/A 04/05/2017    Procedure: Left Heart Cath;  Surgeon: Darian Toribio MD;  Location:  [facility];  Service:     CARDIAC CATHETERIZATION N/A 04/05/2017    Procedure: Coronary angiography;  Surgeon: Darian Toribio MD;  Location:  [facility];  Service:     CARDIAC CATHETERIZATION N/A 04/05/2017    Procedure: Left ventriculography;  Surgeon: Darian Toribio MD;  Location:  [facility];  Service:     CARDIAC CATHETERIZATION  04/05/2017    Procedure: Saphenous Vein Graft;  Surgeon: Darian Toribio MD;  Location:  [facility];  Service:     CARDIAC CATHETERIZATION Left 02/27/2018    Procedure: Cardiac Catheterization/Vascular Study SHAHRZAD OK PRN;  Surgeon: Darian Toribio MD;  Location:  [facility];  Service:     CARDIAC CATHETERIZATION  02/27/2018    Procedure: Functional Flow [place];  Surgeon: Darian Toribio MD;  Location:  [facility];  Service:     CARDIAC CATHETERIZATION N/A 02/27/2018    Procedure: Left ventriculography;  Surgeon: Darian Toribio MD;  Location:  [facility];  Service:     CARDIAC CATHETERIZATION Left 04/28/2020    Procedure: Cardiac Catheterization/Vascular Study SHAHRZAD OK PRN;  Surgeon: Darian Toribio MD;  Location:  [facility];  Service: Cardiology;  Laterality: Left;    CARDIAC ELECTROPHYSIOLOGY PROCEDURE N/A 01/11/2018    Procedure: PPM generator change - dual;  Surgeon: Darian Toribio MD;  Location: [facility] CATH INVASIVE LOCATION;  Service:     CARDIAC SURGERY      open heart surgery x2    COLONOSCOPY  09/05/2007    colon polyps    COLONOSCOPY N/A 05/29/2018    Procedure: COLONOSCOPY WITH ANESTHESIA;  Surgeon: Juan F Tapia MD;  Location: [facility] ENDOSCOPY;  Service: Gastroenterology    COLONOSCOPY N/A 10/21/2021    Procedure: COLONOSCOPY WITH  ANESTHESIA;  Surgeon: Juan F Tapia MD;  Location: [facility] ENDOSCOPY;  Service: Gastroenterology;  Laterality: N/A;  pre op: hx polyps  post op;polyp,   PCP: Rhys Rosen MD    CORONARY ANGIOPLASTY WITH STENT PLACEMENT  2015    SHAHRZAD mid LAD, Dr. Toribio     CORONARY ARTERY BYPASS GRAFT   AND     2 vessel     ENDOSCOPY  2010    ENDOSCOPY N/A 2018    Procedure: ESOPHAGOGASTRODUODENOSCOPY WITH ANESTHESIA;  Surgeon: Juan F Tapia MD;  Location: [facility] ENDOSCOPY;  Service: Gastroenterology    HEAD/NECK LESION/CYST EXCISION Left 2016    Procedure: EXCISION MALIGNANT MELANOMA WITH SENTINEL NODE BIOPSY, INJECTION AND SCAN PRE-OP, LEFT EAR;  Surgeon: Guanaco Zepeda MD;  Location: [facility] OR;  Service:     HEMORRHOIDECTOMY      HERNIA REPAIR      INSERT / REPLACE / REMOVE PACEMAKER      REPLACEMENT TOTAL KNEE Left     BRIAN-STOPPA INCISIONAL HERNIA REPAIR Left 2023    Procedure: BRIAN-STOPPA INCISIONAL HERNIA REPAIR left lumbar hernia repair with mesh;  Surgeon: Govind Celeste MD;  Location: [facility] OR;  Service: General;  Laterality: Left;    SENTINEL NODE BIOPSY Left 2016    Procedure: SENTINEL NODE BIOPSY;  Surgeon: Guanaco Zepeda MD;  Location: [facility] OR;  Service:     SKIN CANCER EXCISION      THORACOSCOPY Right 2021    Procedure: THORACOSCOPIC TOTAL DECORTICATION;  Surgeon: Rico Jacobs MD;  Location: [facility] OR;  Service: Cardiothoracic;  Laterality: Right;    THYROID SURGERY      THYROIDECTOMY     ,   Family History   Problem Relation Age of Onset    Heart failure Mother     Stroke Mother     Dementia Mother     Coronary artery disease Father     Colon polyps Father     COPD Brother     Colon cancer Neg Hx    ,   Social History     Tobacco Use    Smoking status: Former     Packs/day: 3.00     Years: 10.00     Pack years: 30.00     Types: Cigarettes     Quit date:      Years since quittin.6    Smokeless tobacco: Former     Types: Snuff      Quit date: 1977   Vaping Use    Vaping Use: Never used   Substance Use Topics    Alcohol use: Yes     Comment: rare    Drug use: No   , (Not in a hospital admission)   and Allergies:  Meperidine and Statins    Current Outpatient Medications:     albuterol sulfate  (90 Base) MCG/ACT inhaler, Inhale 2 puffs Every 4 (Four) Hours As Needed for Shortness of Air. for wheezing, Disp: 8.5 g, Rfl: 5    budesonide-formoterol (SYMBICORT) 160-4.5 MCG/ACT inhaler, Inhale 2 puffs 2 (Two) Times a Day., Disp: 1 each, Rfl: 5    Calcium Carbonate 1500 (600 Ca) MG tablet, Take 1 tablet by mouth Daily., Disp: , Rfl:     Cholecalciferol 125 MCG (5000 UT) tablet, Take 1 tablet by mouth Daily., Disp: , Rfl:     Cyanocobalamin (B-12) 1000 MCG sublingual tablet, 1 tablet Daily., Disp: , Rfl:     escitalopram (LEXAPRO) 20 MG tablet, Take 1 tablet by mouth Daily., Disp: 90 tablet, Rfl: 1    ferrous sulfate 325 (65 FE) MG tablet, Take 1 tablet by mouth Every Morning., Disp: , Rfl:     fluticasone (FLONASE) 50 MCG/ACT nasal spray, 2 sprays into the nostril(s) as directed by provider Daily., Disp: 16 g, Rfl: 5    furosemide (LASIX) 40 MG tablet, Take 1 tablet by mouth Daily As Needed (for any increase in 2 lbs in a daqy or 5 lbs in a week or increassing swelling or congestion). (Patient taking differently: Take 1 tablet by mouth Daily.), Disp: , Rfl:     HYDROcodone-acetaminophen (NORCO) 7.5-325 MG per tablet, Take 1 tablet by mouth Every 4 (Four) Hours As Needed for Moderate Pain for up to 30 doses., Disp: 30 tablet, Rfl: 0    isosorbide mononitrate (IMDUR) 30 MG 24 hr tablet, TAKE 1 TABLET BY MOUTH EVERY MORNING, Disp: 90 tablet, Rfl: 3    loratadine (CLARITIN) 10 MG tablet, Take 1 tablet by mouth Daily., Disp: 90 tablet, Rfl: 3    metoprolol tartrate (LOPRESSOR) 25 MG tablet, Take 1 tablet by mouth Daily., Disp: 90 tablet, Rfl: 3    NITROSTAT 0.4 MG SL tablet, Place 1 tablet under the tongue Every 5 (Five) Minutes As Needed for Chest  Pain. Take no more than 3 doses in 15 minutes., Disp: 100 tablet, Rfl: 2    omega-3 acid ethyl esters (LOVAZA) 1 g capsule, TAKE 1 CAPSULE BY MOUTH DAILY, Disp: 90 capsule, Rfl: 3    omeprazole (priLOSEC) 20 MG capsule, TAKE 1 CAPSULE BY MOUTH DAILY, Disp: 90 capsule, Rfl: 3    ondansetron (Zofran) 8 MG tablet, Take 0.5 tablets by mouth Every 8 (Eight) Hours As Needed for Nausea or Vomiting for up to 5 doses., Disp: 5 tablet, Rfl: 1    Potassium Gluconate 2.5 MEQ tablet, Take 1 tablet by mouth Daily., Disp: , Rfl:     sodium-potassium-magnesium sulfates (Suprep Bowel Prep Kit) 17.5-3.13-1.6 GM/177ML solution oral solution, Take 1 bottle by mouth Every 12 (Twelve) Hours. 1 bottle of Suprep to be taken on Tuesday afternoon after 3 PM, nothing to eat after midnight surgery on midday Wednesday., Disp: 354 mL, Rfl: 0    spironolactone (ALDACTONE) 25 MG tablet, Take 1 tablet by mouth Daily., Disp: 90 tablet, Rfl: 3    tamsulosin (FLOMAX) 0.4 MG capsule 24 hr capsule, Take 1 capsule by mouth Daily., Disp: , Rfl:     Wixela Inhub 250-50 MCG/ACT DISKUS, Inhale 1 puff 2 (Two) Times a Day., Disp: , Rfl:     Zinc Sulfate (ZINC-220 PO), Take 1 tablet by mouth Daily., Disp: , Rfl:     Objective     Vital Signs   There were no vitals taken for this visit.     Physical Exam:  Respirations clear and equal    Abdomen is obese, soft, nontender, nicely healed left flank incision, underlying healing ridge, no further hernia no further pain and discomfort.  No sign of any infection.        Results Review:  Result Review :  Lab Results   Component Value Date    FINALDX  06/28/2023     Scar tissue, left abdomen, excision:  Scar.      GROSSDES  06/28/2023     1. Abdomen, Left.   Received in a formalin filled container labeled with the patient's name, date of birth, and designated scar tissue left abdomen.  The specimen consists of an elongated skin ellipse measuring 5.5 x 0.6 cm and measures 1.5 cm in depth.  The skin surface is marked by  an incisional, well-healed scar measuring 4.5 cm in length by 0.3 cm in diameter.  The cut surface shows slightly thickened and is otherwise yellow-pink and fatty.  A representative section of scar is submitted in block 1A.                Assessment & Plan     78-year-old gentleman with multiple medical problems recently had a lumbar hernia from previous back stabilization surgery this hernia was reduced and repaired he is 6 weeks out from surgery doing well he will be discharged to follow-up with me if questions or problems arise.  Discussed BMI elevation and need to move toward a reduced BMI for health concerns he appears to understand he is a former smoker and his meds were reviewed.      Govind Celeste MD  08/07/23  17:02 CDT

## 2023-08-08 ENCOUNTER — OFFICE VISIT (OUTPATIENT)
Dept: SURGERY | Facility: CLINIC | Age: 78
End: 2023-08-08
Payer: MEDICARE

## 2023-08-08 VITALS
WEIGHT: 266 LBS | SYSTOLIC BLOOD PRESSURE: 136 MMHG | HEIGHT: 68 IN | DIASTOLIC BLOOD PRESSURE: 82 MMHG | BODY MASS INDEX: 40.32 KG/M2

## 2023-08-08 DIAGNOSIS — K43.2 INCISIONAL HERNIA, WITHOUT OBSTRUCTION OR GANGRENE: Primary | ICD-10-CM

## 2023-08-08 DIAGNOSIS — R10.9 FLANK PAIN: ICD-10-CM

## 2023-08-08 DIAGNOSIS — K45.8 LUMBAR HERNIA: ICD-10-CM

## 2023-08-08 NOTE — PATIENT INSTRUCTIONS
Thank you Mr. Velasco for letting me take care of your problem looks like it has been addressed without problems continue working smarter not lifting as much and have a great rest of the summer come back and see me as you need to.

## 2023-08-16 DIAGNOSIS — G47.33 OSA (OBSTRUCTIVE SLEEP APNEA): Primary | ICD-10-CM

## 2023-08-19 PROCEDURE — 93296 REM INTERROG EVL PM/IDS: CPT | Performed by: INTERNAL MEDICINE

## 2023-08-19 PROCEDURE — 93294 REM INTERROG EVL PM/LDLS PM: CPT | Performed by: INTERNAL MEDICINE

## 2023-09-06 DIAGNOSIS — J44.9 CHRONIC OBSTRUCTIVE PULMONARY DISEASE, UNSPECIFIED COPD TYPE: Primary | ICD-10-CM

## 2023-09-06 NOTE — TELEPHONE ENCOUNTER
Fax received from [employer] Wixela is not covered. The covered alternative is Symbicort.  Rx Refill Note  Requested Prescriptions     Pending Prescriptions Disp Refills    budesonide-formoterol (SYMBICORT) 160-4.5 MCG/ACT inhaler 10.2 g 5     Sig: Inhale 2 puffs 2 (Two) Times a Day.      Last office visit with prescribing clinician: 5/2/2023   Last telemedicine visit with prescribing clinician: Visit date not found   Next office visit with prescribing clinician: 11/9/2023                         Would you like a call back once the refill request has been completed: [] Yes [] No    If the office needs to give you a call back, can they leave a voicemail: [] Yes [] No    Laureano Johns CMA  09/06/23, 16:21 CDT

## 2023-09-10 RX ORDER — BUDESONIDE AND FORMOTEROL FUMARATE DIHYDRATE 160; 4.5 UG/1; UG/1
2 AEROSOL RESPIRATORY (INHALATION) 2 TIMES DAILY
Qty: 10.2 G | Refills: 5 | Status: SHIPPED | OUTPATIENT
Start: 2023-09-10

## 2023-09-18 ENCOUNTER — TELEPHONE (OUTPATIENT)
Dept: PULMONOLOGY | Facility: CLINIC | Age: 78
End: 2023-09-18
Payer: MEDICARE

## 2023-09-18 NOTE — TELEPHONE ENCOUNTER
Per Rosana at [facility], patient refused pap setup for now.  He stated he didn't want to use a pap machine.

## 2023-10-05 DIAGNOSIS — I73.9 PAD (PERIPHERAL ARTERY DISEASE): Primary | ICD-10-CM

## 2023-10-05 DIAGNOSIS — I65.23 BILATERAL CAROTID ARTERY STENOSIS: ICD-10-CM

## 2023-10-06 ENCOUNTER — HOSPITAL ENCOUNTER (EMERGENCY)
Facility: HOSPITAL | Age: 78
Discharge: HOME | End: 2023-10-06
Attending: STUDENT IN AN ORGANIZED HEALTH CARE EDUCATION/TRAINING PROGRAM
Payer: MEDICARE

## 2023-10-06 ENCOUNTER — APPOINTMENT (OUTPATIENT)
Dept: GENERAL RADIOLOGY | Facility: HOSPITAL | Age: 78
End: 2023-10-06
Payer: MEDICARE

## 2023-10-06 ENCOUNTER — APPOINTMENT (OUTPATIENT)
Dept: CT IMAGING | Facility: HOSPITAL | Age: 78
End: 2023-10-06
Payer: MEDICARE

## 2023-10-06 VITALS
TEMPERATURE: 97.1 F | BODY MASS INDEX: 36.68 KG/M2 | WEIGHT: 242 LBS | SYSTOLIC BLOOD PRESSURE: 134 MMHG | OXYGEN SATURATION: 97 % | HEIGHT: 68 IN | HEART RATE: 60 BPM | RESPIRATION RATE: 16 BRPM | DIASTOLIC BLOOD PRESSURE: 76 MMHG

## 2023-10-06 DIAGNOSIS — S06.0X9A CONCUSSION WITH LOSS OF CONSCIOUSNESS, INITIAL ENCOUNTER: Primary | ICD-10-CM

## 2023-10-06 PROCEDURE — 73502 X-RAY EXAM HIP UNI 2-3 VIEWS: CPT

## 2023-10-06 PROCEDURE — 71046 X-RAY EXAM CHEST 2 VIEWS: CPT

## 2023-10-06 PROCEDURE — 99284 EMERGENCY DEPT VISIT MOD MDM: CPT

## 2023-10-06 PROCEDURE — 72125 CT NECK SPINE W/O DYE: CPT

## 2023-10-06 PROCEDURE — 70450 CT HEAD/BRAIN W/O DYE: CPT

## 2023-10-06 RX ORDER — ACETAMINOPHEN 500 MG
1000 TABLET ORAL ONCE
Status: DISCONTINUED | OUTPATIENT
Start: 2023-10-06 | End: 2023-10-06

## 2023-10-06 NOTE — DISCHARGE INSTRUCTIONS
Please minimize stimulating activities for the next few days including working, being around bright lights, or loud noises.  Please try to rest your brain this weekend by taking it easy and not doing much activity.  Follow-up with Dr. Chaudhari regarding your diagnosis of concussion

## 2023-10-06 NOTE — ED PROVIDER NOTES
Subjective   History of Present Illness  Patient presents due to head injury.  He was working with his tractor earlier today when he struck something in the front and his body went forward and he hit his head against the cage.  He also says that his left chest and left hip were sore afterwards.  He lost consciousness with a head injury for an unknown period of time.  He has been sleepy since then and had a mild headache prior to arrival which resolved with Tylenol.  No numbness or tingling or weakness anywhere.  No syncope since.  Does not take systemic anticoagulation    Review of Systems   Constitutional:  Negative for chills and fever.   Respiratory:  Negative for cough and shortness of breath.    Cardiovascular:  Negative for chest pain and palpitations.   Gastrointestinal:  Negative for abdominal pain and vomiting.   Genitourinary:  Negative for difficulty urinating and dysuria.   Neurological:  Negative for syncope and light-headedness.       Past Medical History:   Diagnosis Date    Anxiety     Arthritis     Cancer     melanoma    Chronic diastolic congestive heart failure 04/15/2022    Claustrophobia     Colon polyp     COPD (chronic obstructive pulmonary disease)     Coronary artery disease involving autologous artery coronary bypass graft without angina pectoris 09/22/2016    Depression     Disease of thyroid gland     partial thyroidectomy on right    GERD (gastroesophageal reflux disease)     Hearing loss     Heart attack     History of transfusion     Hypertension     Kidney stone     history of     Mild intermittent asthma without complication 08/24/2021    Open angle with borderline findings, low risk, bilateral 08/23/2021    Pain management     STATES JUST HAD INJECTION IN BACK ON 06/20/2023    Presence of cardiac pacemaker 04/15/2022    Primary hypertension 09/22/2016    Pulmonary hypertension 04/15/2022    S/P CABG x 2 01/04/2019    LIMA to LAD and SVG to RCA 8/8/12    Sick sinus syndrome  07/23/2020    Stented coronary artery 01/04/2019    2.5 x 18 mm Xience Alpine drug-eluting stent to the mid LAD 7/8/2015, 2.5 x 18 mm Xience drug-eluting stent to the proximal OM1 10/21/2016 and 2.5 x 12 mm Synergy drug-eluting stent to OM1 for severe in-stent restenosis 4/28/2020    Tobacco abuse, in remission     Uses hearing aid     BILATERAL       Allergies   Allergen Reactions    Meperidine Other (See Comments)     HEART STOPS      Statins Myalgia     Causes leg cramps       Past Surgical History:   Procedure Laterality Date    BACK SURGERY      CERVICAL FUSION AND LUMBAR DISC REPLACEMENT    BRONCHOSCOPY N/A 04/27/2021    Procedure: BRONCHOSCOPY;  Surgeon: Rico Jacobs MD;  Location: [facility] OR;  Service: Cardiothoracic;  Laterality: N/A;    CARDIAC CATHETERIZATION  07/2015    SHAHRZAD mid LAD, Dr. Toribio    CARDIAC CATHETERIZATION N/A 10/21/2016    Procedure: Left Heart Cath;  Surgeon: Darian Toribio MD;  Location:  [facility] CATH INVASIVE LOCATION;  Service:     CARDIAC CATHETERIZATION Left 04/05/2017    Procedure: Cardiac Catheterization/Vascular Study;  Surgeon: Darian Toribio MD;  Location:  [facility] CATH INVASIVE LOC[facility];  Service:     CARDIAC CATHETERIZATION N/A 04/05/2017    Procedure: Left Heart Cath;  Surgeon: Darian Toribio MD;  Location:  [facility] CATH INVASIVE LOC[facility];  Service:     CARDIAC CATHETERIZATION N/A 04/05/2017    Procedure: Coronary angiography;  Surgeon: Darian Toribio MD;  Location:  [facility] CATH INVASIVE LOC[facility];  Service:     CARDIAC CATHETERIZATION N/A 04/05/2017    Procedure: Left ventriculography;  Surgeon: Darian Toribio MD;  Location:  [facility] CATH INVASIVE LOC[facility];  Service:     CARDIAC CATHETERIZATION  04/05/2017    Procedure: Saphenous Vein Graft;  Surgeon: Darian Toribio MD;  Location:  [facility] CATH INVASIVE LOC[facility];  Service:     CARDIAC CATHETERIZATION Left 02/27/2018    Procedure: Cardiac Catheterization/Vascular Study SHAHRZAD OK PRN;  Surgeon: Darian Toribio MD;  Location:  [facility] CATH INVASIVE  LOCATION;  Service:     CARDIAC CATHETERIZATION  02/27/2018    Procedure: Functional Flow [place];  Surgeon: Darian Toribio MD;  Location: [facility] CATH INVASIVE LOCATION;  Service:     CARDIAC CATHETERIZATION N/A 02/27/2018    Procedure: Left ventriculography;  Surgeon: Darian Toribio MD;  Location: [facility] CATH INVASIVE LOCATION;  Service:     CARDIAC CATHETERIZATION Left 04/28/2020    Procedure: Cardiac Catheterization/Vascular Study SHAHRZAD OK PRN;  Surgeon: Darian Toribio MD;  Location: [facility] CATH INVASIVE LOCATION;  Service: Cardiology;  Laterality: Left;    CARDIAC ELECTROPHYSIOLOGY PROCEDURE N/A 01/11/2018    Procedure: PPM generator change - dual;  Surgeon: Darian Toribio MD;  Location: [facility] CATH INVASIVE LOCATION;  Service:     CARDIAC SURGERY      open heart surgery x2    COLONOSCOPY  09/05/2007    colon polyps    COLONOSCOPY N/A 05/29/2018    Procedure: COLONOSCOPY WITH ANESTHESIA;  Surgeon: Juan F Tapia MD;  Location: [facility] ENDOSCOPY;  Service: Gastroenterology    COLONOSCOPY N/A 10/21/2021    Procedure: COLONOSCOPY WITH ANESTHESIA;  Surgeon: Juan F Tapia MD;  Location: [facility] ENDOSCOPY;  Service: Gastroenterology;  Laterality: N/A;  pre op: hx polyps  post op;polyp,   PCP: Rhys Rosen MD    CORONARY ANGIOPLASTY WITH STENT PLACEMENT  07/2015    SHAHRZAD mid LAD, Dr. Toribio     CORONARY ARTERY BYPASS GRAFT  2012 AND 2014    2 vessel     ENDOSCOPY  11/08/2010    ENDOSCOPY N/A 05/29/2018    Procedure: ESOPHAGOGASTRODUODENOSCOPY WITH ANESTHESIA;  Surgeon: Juan F Tapia MD;  Location: [facility] ENDOSCOPY;  Service: Gastroenterology    HEAD/NECK LESION/CYST EXCISION Left 12/20/2016    Procedure: EXCISION MALIGNANT MELANOMA WITH SENTINEL NODE BIOPSY, INJECTION AND SCAN PRE-OP, LEFT EAR;  Surgeon: Guanaco Zepeda MD;  Location: [facility] OR;  Service:     HEMORRHOIDECTOMY      HERNIA REPAIR      INSERT / REPLACE / REMOVE PACEMAKER      REPLACEMENT TOTAL KNEE Left     BRIAN-STOPPA INCISIONAL HERNIA REPAIR  Left 2023    Procedure: BRIAN-STOPPA INCISIONAL HERNIA REPAIR left lumbar hernia repair with mesh;  Surgeon: Govind Celeste MD;  Location:  [facility] OR;  Service: General;  Laterality: Left;    SENTINEL NODE BIOPSY Left 2016    Procedure: SENTINEL NODE BIOPSY;  Surgeon: Guanaco Zepeda MD;  Location:  [facility] OR;  Service:     SKIN CANCER EXCISION      THORACOSCOPY Right 2021    Procedure: THORACOSCOPIC TOTAL DECORTICATION;  Surgeon: Rico Jacobs MD;  Location:  [facility] OR;  Service: Cardiothoracic;  Laterality: Right;    THYROID SURGERY      THYROIDECTOMY         Family History   Problem Relation Age of Onset    Heart failure Mother     Stroke Mother     Dementia Mother     Coronary artery disease Father     Colon polyps Father     COPD Brother     Colon cancer Neg Hx        Social History     Socioeconomic History    Marital status:    Tobacco Use    Smoking status: Former     Packs/day: 3.00     Years: 10.00     Additional pack years: 0.00     Total pack years: 30.00     Types: Cigarettes     Quit date:      Years since quittin.7    Smokeless tobacco: Former     Types: Snuff     Quit date:    Vaping Use    Vaping Use: Never used   Substance and Sexual Activity    Alcohol use: Yes     Comment: rare    Drug use: No    Sexual activity: Defer           Objective   Physical Exam  Vitals reviewed.   Constitutional:       General: He is not in acute distress.  HENT:      Head: Normocephalic and atraumatic.   Eyes:      Extraocular Movements: Extraocular movements intact.      Conjunctiva/sclera: Conjunctivae normal.   Cardiovascular:      Pulses: Normal pulses.      Heart sounds: Normal heart sounds.   Pulmonary:      Effort: Pulmonary effort is normal. No respiratory distress.   Abdominal:      General: Abdomen is flat. There is no distension.   Musculoskeletal:      Cervical back: Normal range of motion and neck supple.      Comments: Scalp atraumatic.   Forehead atraumatic,  stable to palpation, nontender.  Abrasion lateral to left eyebrow   Midface stable, nontender, no injuries noted.   No intraoral injuries noted.   No otorrhea.   Trachea midline, breath sounds were noted bilaterally. PERRL.    Cervical spine: no midline tenderness to palpation. No paraspinal tenderness to palpation.  Thoracic spine: no midline tenderness to palpation. No paraspinal tenderness to palpation.  Lumbar spine: no midline tenderness to palpation. No paraspinal tenderness to palpation.  Spine stable with no palpable deformity or step-off    Clavicles stable and nontender to AP and lateral compression.  Chest stable and nontender to AP and lateral compression.  Pelvis stable and nontender to lateral compression.    Extremities are warm, well-perfused with capillary refill intact and no gross motor deficits.   Skin:     General: Skin is warm and dry.   Neurological:      General: No focal deficit present.      Mental Status: He is alert. Mental status is at baseline.      Comments: Right upper extremity: 5/5 strength with handgrip and flexion/extension of shoulders, elbows.   Light touch sensation intact and equal when compared to the left upper extremity.    Left upper extremity: 5/5 strength with handgrip and flexion/extension of shoulders, elbows.   Light touch sensation intact and equal when compared to the right upper extremity.    Right lower extremity: 5/5 strength with flexion/extension of hips, knees, and dorsi/plantarflexion of ankles. Able to wiggle toes.   Light touch sensation intact and equal when compared to the left lower extremity.    Left lower extremity: 5/5 strength with flexion/extension of hips, knees, and dorsi/plantarflexion of ankles. Able to wiggle toes.   Light touch sensation intact and equal when compared to the right lower extremity.    Light sensation intact in bilateral face. CN 2-12 normal.    Psychiatric:         Behavior: Behavior normal.         Thought Content: Thought  content normal.         Procedures           ED Course                                           Medical Decision Making  Problems Addressed:  Concussion with loss of consciousness, initial encounter: complicated acute illness or injury    Amount and/or Complexity of Data Reviewed  Radiology: ordered.      Carlo Velasco is a 78 y.o. male with PMH above who presents to the Emergency Department with head injury.  No focal neurologic deficit.  Concerning given LOC.  Given abrasion, tetanus shot updated.  X-rays and CT imaging were without acute injury.  Discussed concussion and expectant management.  Return precautions discussed.         Final diagnosis: concussion    All questions answered. Patient/family was understanding and in agreement with today's assessment and plan. The patient was monitored during their stay in the ED and dispositioned without acute event.    Electronically signed by:  Humberto Win MD 10/7/2023 10:16 CDT      Note: Dragon medical dictation software was used in the creation of this note.      Final diagnoses:   Concussion with loss of consciousness, initial encounter       ED Disposition  ED Disposition       ED Disposition   Discharge    Condition   Stable    Comment   --               Cruz Chaudhari MD  [facility] [place] [id number] [phone]               Medication List        Changed      furosemide 40 MG tablet  Commonly known as: LASIX  Take 1 tablet by mouth Daily As Needed (for any increase in 2 lbs in a daqy or 5 lbs in a week or increassing swelling or congestion).  What changed: when to take this                 Humberto Win MD  10/07/23 1016

## 2023-10-09 ENCOUNTER — OFFICE VISIT (OUTPATIENT)
Dept: CARDIOLOGY | Facility: CLINIC | Age: 78
End: 2023-10-09
Payer: MEDICARE

## 2023-10-09 VITALS
BODY MASS INDEX: 36.8 KG/M2 | DIASTOLIC BLOOD PRESSURE: 80 MMHG | SYSTOLIC BLOOD PRESSURE: 120 MMHG | HEART RATE: 73 BPM | OXYGEN SATURATION: 99 % | WEIGHT: 242 LBS

## 2023-10-09 DIAGNOSIS — I51.7 LVH (LEFT VENTRICULAR HYPERTROPHY): ICD-10-CM

## 2023-10-09 DIAGNOSIS — I50.32 CHRONIC DIASTOLIC CONGESTIVE HEART FAILURE: Primary | ICD-10-CM

## 2023-10-09 DIAGNOSIS — Z95.1 S/P CABG X 2: ICD-10-CM

## 2023-10-09 DIAGNOSIS — Z95.5 STENTED CORONARY ARTERY: ICD-10-CM

## 2023-10-09 DIAGNOSIS — E78.5 HYPERLIPIDEMIA LDL GOAL <70: ICD-10-CM

## 2023-10-09 DIAGNOSIS — J44.9 CHRONIC OBSTRUCTIVE PULMONARY DISEASE, UNSPECIFIED COPD TYPE: ICD-10-CM

## 2023-10-09 DIAGNOSIS — Z95.0 PRESENCE OF CARDIAC PACEMAKER: ICD-10-CM

## 2023-10-09 DIAGNOSIS — R06.09 DOE (DYSPNEA ON EXERTION): ICD-10-CM

## 2023-10-09 DIAGNOSIS — I25.810 CORONARY ARTERY DISEASE INVOLVING AUTOLOGOUS ARTERY CORONARY BYPASS GRAFT WITHOUT ANGINA PECTORIS: ICD-10-CM

## 2023-10-09 PROCEDURE — 3074F SYST BP LT 130 MM HG: CPT | Performed by: INTERNAL MEDICINE

## 2023-10-09 PROCEDURE — 3079F DIAST BP 80-89 MM HG: CPT | Performed by: INTERNAL MEDICINE

## 2023-10-09 PROCEDURE — 99214 OFFICE O/P EST MOD 30 MIN: CPT | Performed by: INTERNAL MEDICINE

## 2023-10-09 PROCEDURE — 93000 ELECTROCARDIOGRAM COMPLETE: CPT | Performed by: INTERNAL MEDICINE

## 2023-10-09 NOTE — PROGRESS NOTES
Carlo Velasco  [id number]  1945  78 y.o.  male    Referring Provider: Cruz Chaudhari MD    Reason for Follow-up Visit:      Here for routine follow up   coronary artery disease Coronary artery bypass grafting , stented coronary artery   sick sinus syndrome s/p pacemaker   Cardiac workup test results as below: echo, stress test     Subjective    Mild chronic exertional shortness of breath on exertion relieved with rest  No significant cough or wheezing    No palpitations  No associated chest pain  No significant pedal edema    No fever or chills  No significant expectoration    No hemoptysis  No presyncope or syncope    Tolerating current medications well with no untoward side effects   Compliant with prescribed medication regimen. Tries to adhere to cardiac diet.     Overall better   Fair effort tolerance, somewhat limited by orthopedics issues     No new events or complaints since last visit     History of present illness:  Carlo Velasco is a 78 y.o. yo male with history of coronary artery disease Coronary artery bypass grafting , stented coronary artery sick sinus syndrome s/p pacemaker who presents today for   Chief Complaint   Patient presents with    Congestive Heart Failure     6 month follow up    .    History  Past Medical History:   Diagnosis Date    Anxiety     Arthritis     Cancer     melanoma    Chronic diastolic congestive heart failure 04/15/2022    Claustrophobia     Colon polyp     COPD (chronic obstructive pulmonary disease)     Coronary artery disease involving autologous artery coronary bypass graft without angina pectoris 09/22/2016    Depression     Disease of thyroid gland     partial thyroidectomy on right    GERD (gastroesophageal reflux disease)     Hearing loss     Heart attack     History of transfusion     Hypertension     Kidney stone     history of     Mild intermittent asthma without complication 08/24/2021    Open angle with borderline findings, low risk, bilateral 08/23/2021     Pain management     STATES JUST HAD INJECTION IN BACK ON 06/20/2023    Presence of cardiac pacemaker 04/15/2022    Primary hypertension 09/22/2016    Pulmonary hypertension 04/15/2022    S/P CABG x 2 01/04/2019    LIMA to LAD and SVG to RCA 8/8/12    Sick sinus syndrome 07/23/2020    Stented coronary artery 01/04/2019    2.5 x 18 mm Xience Alpine drug-eluting stent to the mid LAD 7/8/2015, 2.5 x 18 mm Xience drug-eluting stent to the proximal OM1 10/21/2016 and 2.5 x 12 mm Synergy drug-eluting stent to OM1 for severe in-stent restenosis 4/28/2020    Tobacco abuse, in remission     Uses hearing aid     BILATERAL   ,   Past Surgical History:   Procedure Laterality Date    BACK SURGERY      CERVICAL FUSION AND LUMBAR DISC REPLACEMENT    BRONCHOSCOPY N/A 04/27/2021    Procedure: BRONCHOSCOPY;  Surgeon: Rico Jacobs MD;  Location: [facility] OR;  Service: Cardiothoracic;  Laterality: N/A;    CARDIAC CATHETERIZATION  07/2015    SHAHRZAD mid LAD, Dr. Toribio    CARDIAC CATHETERIZATION N/A 10/21/2016    Procedure: Left Heart Cath;  Surgeon: Darian Toribio MD;  Location:  [facility] CATH INVASIVE LOCATION;  Service:     CARDIAC CATHETERIZATION Left 04/05/2017    Procedure: Cardiac Catheterization/Vascular Study;  Surgeon: Darian Toribio MD;  Location:  [facility] CATH INVASIVE LOC[facility];  Service:     CARDIAC CATHETERIZATION N/A 04/05/2017    Procedure: Left Heart Cath;  Surgeon: Darian Toribio MD;  Location:  [facility] CATH INVASIVE [facility];  Service:     CARDIAC CATHETERIZATION N/A 04/05/2017    Procedure: Coronary angiography;  Surgeon: Darian Toribio MD;  Location:  [facility] CATH INVASIVE [facility];  Service:     CARDIAC CATHETERIZATION N/A 04/05/2017    Procedure: Left ventriculography;  Surgeon: Darian Toribio MD;  Location:  [facility] CATH INVASIVE [facility];  Service:     CARDIAC CATHETERIZATION  04/05/2017    Procedure: Saphenous Vein Graft;  Surgeon: Darian Toribio MD;  Location:  [facility] CATH INVASIVE [facility];  Service:     CARDIAC CATHETERIZATION Left  02/27/2018    Procedure: Cardiac Catheterization/Vascular Study SHAHRZAD OK PRN;  Surgeon: Darian Toribio MD;  Location:  [facility] CATH INVASIVE [facility];  Service:     CARDIAC CATHETERIZATION  02/27/2018    Procedure: Functional Flow [place];  Surgeon: Darian Toribio MD;  Location:  [facility] CATH INVASIVE [facility];  Service:     CARDIAC CATHETERIZATION N/A 02/27/2018    Procedure: Left ventriculography;  Surgeon: Darian Toribio MD;  Location:  [facility] CATH INV[facility]IVE [facility];  Service:     CARDIAC CATHETERIZATION Left 04/28/2020    Procedure: Cardiac Catheterization/Vascular Study SHAHRZAD OK PRN;  Surgeon: Darian Toribio MD;  Location:  [facility] CATH INVAS[facility];  Service: Cardiology;  Laterality: Left;    CARDIAC ELECTROPHYSIOLOGY PROCEDURE N/A 01/11/2018    Procedure: PPM generator change - dual;  Surgeon: Darian Toribio MD;  Location: [facility] CATH INVASIVE LOCATION;  Service:     CARDIAC SURGERY      open heart surgery x2    COLONOSCOPY  09/05/2007    colon polyps    COLONOSCOPY N/A 05/29/2018    Procedure: COLONOSCOPY WITH ANESTHESIA;  Surgeon: uJan F Tapia MD;  Location: [facility] ENDOSCOPY;  Service: Gastroenterology    COLONOSCOPY N/A 10/21/2021    Procedure: COLONOSCOPY WITH ANESTHESIA;  Surgeon: Juan F Tapia MD;  Location: [facility] ENDOSCOPY;  Service: Gastroenterology;  Laterality: N/A;  pre op: hx polyps  post op;polyp,   PCP: Rhys Rosen MD    CORONARY ANGIOPLASTY WITH STENT PLACEMENT  07/2015    SHAHRZAD mid LAD, Dr. Toribio     CORONARY ARTERY BYPASS GRAFT  2012 AND 2014    2 vessel     ENDOSCOPY  11/08/2010    ENDOSCOPY N/A 05/29/2018    Procedure: ESOPHAGOGASTRODUODENOSCOPY WITH ANESTHESIA;  Surgeon: Juan F Tapia MD;  Location: [facility] ENDOSCOPY;  Service: Gastroenterology    HEAD/NECK LESION/CYST EXCISION Left 12/20/2016    Procedure: EXCISION MALIGNANT MELANOMA WITH SENTINEL NODE BIOPSY, INJECTION AND SCAN PRE-OP, LEFT EAR;  Surgeon: Guanaco Zepeda MD;  Location: [facility] OR;  Service:     HEMORRHOIDECTOMY       HERNIA REPAIR      INSERT / REPLACE / REMOVE PACEMAKER      REPLACEMENT TOTAL KNEE Left     BRIAN-STOPPA INCISIONAL HERNIA REPAIR Left 2023    Procedure: BRIAN-STOPPA INCISIONAL HERNIA REPAIR left lumbar hernia repair with mesh;  Surgeon: Govind Celeste MD;  Location:  [facility] OR;  Service: General;  Laterality: Left;    SENTINEL NODE BIOPSY Left 2016    Procedure: SENTINEL NODE BIOPSY;  Surgeon: Guanaco Zepeda MD;  Location:  [facility] OR;  Service:     SKIN CANCER EXCISION      THORACOSCOPY Right 2021    Procedure: THORACOSCOPIC TOTAL DECORTICATION;  Surgeon: Rico Jacobs MD;  Location:  [facility] OR;  Service: Cardiothoracic;  Laterality: Right;    THYROID SURGERY      THYROIDECTOMY     ,   Family History   Problem Relation Age of Onset    Heart failure Mother     Stroke Mother     Dementia Mother     Coronary artery disease Father     Colon polyps Father     COPD Brother     Colon cancer Neg Hx    ,   Social History     Tobacco Use    Smoking status: Former     Packs/day: 3.00     Years: 10.00     Additional pack years: 0.00     Total pack years: 30.00     Types: Cigarettes     Quit date:      Years since quittin.8    Smokeless tobacco: Former     Types: Snuff     Quit date:    Vaping Use    Vaping Use: Never used   Substance Use Topics    Alcohol use: Yes     Comment: rare    Drug use: No   ,     Medications  Current Outpatient Medications   Medication Sig Dispense Refill    albuterol sulfate  (90 Base) MCG/ACT inhaler Inhale 2 puffs Every 4 (Four) Hours As Needed for Shortness of Air. for wheezing 8.5 g 5    budesonide-formoterol (SYMBICORT) 160-4.5 MCG/ACT inhaler Inhale 2 puffs 2 (Two) Times a Day. 10.2 g 5    Calcium Carbonate 1500 (600 Ca) MG tablet Take 1 tablet by mouth Daily.      Cholecalciferol 125 MCG (5000 UT) tablet Take 1 tablet by mouth Daily.      escitalopram (LEXAPRO) 20 MG tablet Take 1 tablet by mouth Daily. 90 tablet 1    ferrous sulfate 325 (65 FE)  MG tablet Take 1 tablet by mouth Every Morning.      fluticasone (FLONASE) 50 MCG/ACT nasal spray 2 sprays into the nostril(s) as directed by provider Daily. 16 g 5    furosemide (LASIX) 40 MG tablet Take 1 tablet by mouth Daily As Needed (for any increase in 2 lbs in a daqy or 5 lbs in a week or increassing swelling or congestion). (Patient taking differently: Take 1 tablet by mouth Daily.)      HYDROcodone-acetaminophen (NORCO) 7.5-325 MG per tablet Take 1 tablet by mouth Every 4 (Four) Hours As Needed for Moderate Pain for up to 30 doses. 30 tablet 0    isosorbide mononitrate (IMDUR) 30 MG 24 hr tablet TAKE 1 TABLET BY MOUTH EVERY MORNING 90 tablet 3    loratadine (CLARITIN) 10 MG tablet Take 1 tablet by mouth Daily. 90 tablet 3    metoprolol tartrate (LOPRESSOR) 25 MG tablet TAKE 1 TABLET BY MOUTH DAILY 90 tablet 3    NITROSTAT 0.4 MG SL tablet Place 1 tablet under the tongue Every 5 (Five) Minutes As Needed for Chest Pain. Take no more than 3 doses in 15 minutes. 100 tablet 2    omega-3 acid ethyl esters (LOVAZA) 1 g capsule TAKE 1 CAPSULE BY MOUTH DAILY 90 capsule 3    omeprazole (priLOSEC) 20 MG capsule TAKE 1 CAPSULE BY MOUTH DAILY 90 capsule 3    ondansetron (Zofran) 8 MG tablet Take 0.5 tablets by mouth Every 8 (Eight) Hours As Needed for Nausea or Vomiting for up to 5 doses. 5 tablet 1    Potassium Gluconate 2.5 MEQ tablet Take 1 tablet by mouth Daily.      spironolactone (ALDACTONE) 25 MG tablet Take 1 tablet by mouth Daily. 90 tablet 3    tamsulosin (FLOMAX) 0.4 MG capsule 24 hr capsule Take 1 capsule by mouth Daily.      Zinc Sulfate (ZINC-220 PO) Take 1 tablet by mouth Daily.      Cyanocobalamin (B-12) 1000 MCG sublingual tablet 1 tablet Daily. (Patient not taking: Reported on 10/9/2023)       No current facility-administered medications for this visit.       Allergies:  Meperidine and Statins    Review of Systems  Review of Systems   Constitutional: Positive for malaise/fatigue.   HENT: Negative.      Eyes: Negative.    Cardiovascular:  Positive for dyspnea on exertion and leg swelling. Negative for chest pain, claudication, cyanosis, irregular heartbeat, near-syncope, orthopnea, palpitations, paroxysmal nocturnal dyspnea and syncope.   Respiratory:  Positive for cough and shortness of breath.    Endocrine: Negative.    Hematologic/Lymphatic: Negative.    Skin: Negative.    Musculoskeletal:  Positive for arthritis and back pain.   Gastrointestinal:  Negative for anorexia.   Genitourinary: Negative.    Neurological:  Negative for weakness.   Psychiatric/Behavioral: Negative.          Objective     Physical Exam:  /80   Pulse 73   Wt 110 kg (242 lb)   SpO2 99%   BMI 36.80 kg/mý   Physical Exam   Constitutional: He appears well-developed.   HENT:   Head: Normocephalic.   Neck: Normal carotid pulses and no JVD present. No tracheal tenderness present. Carotid bruit is not present. No tracheal deviation present.   Cardiovascular: Regular rhythm, normal heart sounds and normal pulses.      with a grade of 2/6.  Pulmonary/Chest: Effort normal. No stridor.   Abdominal: Soft. He exhibits no distension. There is no abdominal tenderness.   Neurological: He is alert. No cranial nerve deficit or sensory deficit.   Skin: Skin is warm.   Psychiatric: His speech is normal and behavior is normal.       Results Review:      Carlo Velasco  Regadenoson Stress Test with Myocardial Perfusion SPECT (Multi Study)  Order# [id number]  Reading physician: Darian Toribio MD Ordering physician: Darian Toribio MD Study date: 23     Patient Information    Patient Name   Carlo Velasco MRN   [id number] Legal Sex   Male  (Age)   1945 (77 y.o.)     Interpretation Summary         Left ventricular ejection fraction is normal (Calculated EF = 50%).    Myocardial perfusion imaging indicates a normal myocardial perfusion study with no evidence of ischemia.    Impressions are consistent with a low risk study.    Diaphragmatic  attenuation artifact is present.    Physiological apical thinning noted             IMPRESSION:  1. No pulmonary emboli.  2. No thoracic aortic aneurysm or dissection. Prior mediastinal surgery  with cardiomegaly. Left subclavian cardiac pacer device.  3. New right middle lobe consolidation most consistent with pneumonia.  Probable reactive right hilar and subcarinal lymph nodes. Follow up  chest x-ray recommended to document resolution.  This report was finalized on 2021 20:54 by Dr. Cassie Mckeon MD.      Conclusion of cardiac catheterization    2020     Left ventricle ejection fraction 45%  Moderately elevated left ventricular end-diastolic pressure of 19 mmHg  Atretic left intramammary artery graft  Patent stents in the left anterior descending coronary artery  60% stenosis chronic of diagonal branch unchanged from prior cardiac catheterization  95% in-stent restenosis of first obtuse marginal branch treated with PTCA and then implantation of 2.5 x 12 mm Synergy stent with 0% residual stenosis  70% stenosis of distal right coronary artery  Widely patent saphenous venous graft to the distal right coronary artery        ____________________________________________________________________________________________________________________________________________     Plan after cardiac catheterization        Dual antiplatelet therapy minimum of 1 year  Continue on other medication including statin and beta-blockers  Aspirin for the rest of his life  Add ACE inhibitor therapy  Check echocardiogram  Intensive risk factor modifications for both primary and secondary prevention if applicable  Hydration   Observation         Carlo Velasco   Regadenoson Stress Test With Myocardial Perfusion SPECT (Multi Study)   Order# [id number]   Reading physician: Darian Toribio MD Ordering physician: Darian Toribio MD Study date: 20   Patient Information     Patient Name  Carlo Velasco MRN  [id number] Sex  Male   (Age)  1945 (74 y.o.)   Interpretation Summary        Left ventricular ejection fraction is normal (Calculated EF = 55%).  Myocardial perfusion imaging indicates a normal myocardial perfusion study with no evidence of ischemia.  Impressions are consistent with a low risk study.                Results for orders placed during the hospital encounter of 01/16/23    Adult Transthoracic Echo Complete w/ Color, Spectral and Contrast if necessary per protocol    Interpretation Summary    Left ventricular systolic function is normal. Left ventricular ejection fraction appears to be 56 - 60%.    Left ventricular diastolic function was normal.    Abnormal global longitudinal LV strain (GLS) = -11.6%.    Left atrial volume is mildly increased.    Estimated right ventricular systolic pressure from tricuspid regurgitation is normal (<35 mmHg).         ECG 12 Lead    Date/Time: 10/9/2023 11:19 AM  Performed by: Darian Toribio MD    Authorized by: Darian Toribio MD  Comparison: compared with previous ECG from 6/23/2023  Comparison to previous ECG: Ventricular rate changed from 68  to 66  beats per minute      Rhythm: sinus rhythm  Rate: normal  Q waves: III and aVF    QRS axis: normal    Clinical impression: abnormal EKG        Cardiac cath  4/17  LVEF of 50%.   occluded the mid right coronary artery  Patent saphenous venous graft to the distal right coronary artery  Atretic left intramammary artery graft.  Patent stent in the mid left anterior descending coronary artery  Patent stent to the obtuse marginal branch  Patent stent in diagonal branch.   2/18    Conclusion     Patent stents noted in the obtuse marginal branch and the left anterior descending coronary artery.  Atretic left internal mammary artery graft.  Patent saphenous venous graft to the right coronary artery  Significant right coronary artery stenosis proximal to the touchdown site of the saphenous vein graft to the right coronary artery  60% stenosis of diagonal  branch with a normal fractional flow reserve of 0.85  Left ventricle ejection fraction of 50%.     LVEDP    16   mm Hg           Plan     Intensive risk factor modifications for both primary and secondary prevention if applicable  Home today if stable  Hydration   Observation  Keep follow-up appointment    Diagnoses and all orders for this visit:    1. Chronic diastolic congestive heart failure (Primary)    2. Coronary artery disease involving autologous artery coronary bypass graft without angina pectoris    3. Hyperlipidemia LDL goal <70    4. LVH (left ventricular hypertrophy)    5. Presence of cardiac pacemaker    6. Stented coronary artery    7. Chronic obstructive pulmonary disease, unspecified COPD type    8. S/P CABG x 2    Other orders  -     ECG 12 Lead            Plan        Check BP and heart rates twice daily initially till blood pressures and heart rates under good control and then at least 3x / week,   If blood pressures continue to be well-controlled then can check week a month  at home and bring a recording for review next visit  If BP >130/85 or < 100/60 persistently over 3 reading 30 mins apart or if heart rates persistently above 100 bpm or less than 55 bpm call sooner for evaluation and advise     Keep LDL below 70 mg/dl. Monitor liver and renal functions.   Monitor CBC, CMP, TSH (as indicated) and Lipid Panel by primary     S/L NTG PRN for chest pain, call me or go to ER if has to use S/L nitroglycerin     MDM     Amount and/or Complexity of Data Reviewed  Clinical lab tests: reviewed  Tests in the medicine section of [facility]: reviewed  Review and summarize past medical records: yes  Independent visualization of images, tracings, or specimens: yes    Risk of Complications, Morbidity, and/or Mortality  Presenting problems: moderate  Diagnostic procedures: moderate  Management options: moderate   Patient expressed understanding  Encouraged and answered all questions   Discussed with the patient and  all questioned fully answered. He will call me if any problems arise.   Discussed results of prior testing with patient : echo and myocardial perfusion scan       Overall doing well no new cardiovascular symptoms and therefore no additional cardiac testing is required prior to next visit  If any interim issues arise will call me for further evaluation.      Weigh yourself frequently, at least weekly, preferably daily, call me if more than 2 pounds a day or 5 pounds a week weight gain.  Flexible diuretic dosing    Keep LDL below 70 mg/dl. Monitor liver and renal functions.   Monitor CBC, CMP, TSH (as indicated) and Lipid Panel by primary      Monitor for any signs of bleeding including red or dark stools as well as easy bruisabilty. Fall precautions.   Monitor cardiac rhythm device function regularly per established schedule or PRN      Check BP and heart rates twice daily at least 3x / week, week a month  at home and bring a recording for me to review next visit  If BP >130/85 or < 100/60 persistently over 3 reading 30 mins apart call sooner       Bring copies or have primary fax to our office labs and other tests prior to next visit              Return in about 6 months (around 4/9/2024).

## 2023-10-10 ENCOUNTER — HOSPITAL ENCOUNTER (OUTPATIENT)
Dept: PAIN MANAGEMENT | Age: 78
Discharge: HOME | End: 2023-10-10
Payer: MEDICARE

## 2023-10-10 VITALS
TEMPERATURE: 96.3 F | OXYGEN SATURATION: 96 % | SYSTOLIC BLOOD PRESSURE: 123 MMHG | HEART RATE: 70 BPM | RESPIRATION RATE: 18 BRPM | DIASTOLIC BLOOD PRESSURE: 74 MMHG

## 2023-10-10 DIAGNOSIS — R52 PAIN MANAGEMENT: ICD-10-CM

## 2023-10-10 PROCEDURE — 6360000002 HC RX W HCPCS

## 2023-10-10 PROCEDURE — 2500000003 HC RX 250 WO HCPCS

## 2023-10-10 PROCEDURE — A4216 STERILE WATER/SALINE, 10 ML: HCPCS

## 2023-10-10 PROCEDURE — G0260 INJ FOR SACROILIAC JT ANESTH: HCPCS

## 2023-10-10 PROCEDURE — 2580000003 HC RX 258

## 2023-10-10 RX ORDER — SODIUM CHLORIDE 9 MG/ML
3 INJECTION INTRAVENOUS ONCE
Status: DISCONTINUED | OUTPATIENT
Start: 2023-10-10 | End: 2023-10-12 | Stop reason: HOSPADM

## 2023-10-10 RX ORDER — LIDOCAINE HYDROCHLORIDE 10 MG/ML
7 INJECTION, SOLUTION EPIDURAL; INFILTRATION; INTRACAUDAL; PERINEURAL ONCE
Status: DISCONTINUED | OUTPATIENT
Start: 2023-10-10 | End: 2023-10-12 | Stop reason: HOSPADM

## 2023-10-10 RX ORDER — BUPIVACAINE HYDROCHLORIDE 5 MG/ML
2 INJECTION, SOLUTION EPIDURAL; INTRACAUDAL ONCE
Status: DISCONTINUED | OUTPATIENT
Start: 2023-10-10 | End: 2023-10-12 | Stop reason: HOSPADM

## 2023-10-10 RX ORDER — METHYLPREDNISOLONE ACETATE 80 MG/ML
80 INJECTION, SUSPENSION INTRA-ARTICULAR; INTRALESIONAL; INTRAMUSCULAR; SOFT TISSUE ONCE
Status: DISCONTINUED | OUTPATIENT
Start: 2023-10-10 | End: 2023-10-12 | Stop reason: HOSPADM

## 2023-10-10 NOTE — INTERVAL H&P NOTE
Update History & Physical    The patient's History and Physical  was reviewed with the patient and I examined the patient. There was no change. The surgical site was confirmed by the patient and me. Plan: The risks, benefits, expected outcome, and alternative to the recommended procedure have been discussed with the patient. Patient understands and wants to proceed with the procedure.      Electronically signed by Selina Waller MD on 10/10/2023 at 9:17 AM

## 2023-10-10 NOTE — PROGRESS NOTES
Procedure:  Level of Consciousness: [x]Alert [x]Oriented []Disoriented []Lethargic  Anxiety Level: [x]Calm []Anxious []Depressed []Other  Skin: []Warm [x]Dry []Cool []Moist []Intact []Other  Cardiovascular: [x]Palpitations: [x]Never []Occasionally []Frequently  Chest Pain: [x]No []Yes  Respiratory:  [x]Unlabored []Labored []Cough ([] Productive []Unproductive)  HCG Required: [x]No []Yes   Results: []Negative []Positive  Knowledge Level:        [x]Patient/Other verbalized understanding of pre-procedure instructions. [x]Assessment of post-op care needs (transportation, responsible caregiver)        [x]Able to discuss health care problems and how to deal with it.   Factors that Affect Teaching:        Language Barrier: [x]No []Yes - why:        Hearing Loss:        []No [x]Yes            Corrective Device:  [x]Yes []No        Vision Loss:           []No [x]Yes            Corrective Device:  [x]Yes []No        Memory Loss:       [x]No []Yes            []Short Term []Long Term  Motivational Level:  [x]Asks Questions                  []Extremely Anxious       [x]Seems Interested               []Seems Uninterested                  [x]Denies need for Education  Risk for Injury:  [x]Patient oriented to person, place and time  []History of frequent falls/loss of balance  Nutritional:  []Change in appetite   []Weight Gain   []Weight Loss  Functional:  []Requires assistance with ADL's

## 2023-10-22 NOTE — PROGRESS NOTES
Procedure:  Level of Consciousness: [x]Alert [x]Oriented []Disoriented []Lethargic  Anxiety Level: [x]Calm []Anxious []Depressed []Other  Skin: [x]Warm [x]Dry []Cool []Moist []Intact []Other  Cardiovascular: [x]Palpitations: [x]Never []Occasionally []Frequently  Chest Pain: [x]No []Yes  Respiratory:  [x]Unlabored []Labored []Cough ([] Productive []Unproductive)  HCG Required: [x]No []Yes   Results: []Negative []Positive  Knowledge Level:        [x]Patient/Other verbalized understanding of pre-procedure instructions. [x]Assessment of post-op care needs (transportation, responsible caregiver)        [x]Able to discuss health care problems and how to deal with it.   Factors that Affect Teaching:        Language Barrier: [x]No []Yes - why:        Hearing Loss:        []No [x]Yes            Corrective Device:  [x]Yes []No        Vision Loss:           []No [x]Yes            Corrective Device:  [x]Yes []No        Memory Loss:       [x]No []Yes            []Short Term []Long Term  Motivational Level:  [x]Asks Questions                  []Extremely Anxious       [x]Seems Interested               []Seems Uninterested                  []Denies need for Education  Risk for Injury:  [x]Patient oriented to person, place and time  []History of frequent falls/loss of balance  Nutritional:  []Change in appetite   []Weight Gain   []Weight Loss  Functional:  []Requires assistance with ADL's Take zofran as prescribed for nausea.     Eat a bland diet for the next 24-48 hours.  See handout for details.      Please seek immediate re-evaluation if you develop fever, persistent vomiting despite taking zofran, or have any new or worsening symptoms.

## 2023-11-02 ENCOUNTER — HOSPITAL ENCOUNTER (OUTPATIENT)
Dept: CT IMAGING | Facility: HOSPITAL | Age: 78
Discharge: HOME | End: 2023-11-02
Admitting: INTERNAL MEDICINE
Payer: MEDICARE

## 2023-11-02 DIAGNOSIS — R91.8 LUNG NODULES: ICD-10-CM

## 2023-11-02 PROCEDURE — 71250 CT THORAX DX C-: CPT

## 2023-11-09 ENCOUNTER — TELEPHONE (OUTPATIENT)
Dept: PULMONOLOGY | Facility: CLINIC | Age: 78
End: 2023-11-09

## 2023-11-09 ENCOUNTER — OFFICE VISIT (OUTPATIENT)
Dept: PULMONOLOGY | Facility: CLINIC | Age: 78
End: 2023-11-09
Payer: MEDICARE

## 2023-11-09 VITALS
SYSTOLIC BLOOD PRESSURE: 100 MMHG | HEART RATE: 70 BPM | BODY MASS INDEX: 34.71 KG/M2 | OXYGEN SATURATION: 95 % | HEIGHT: 68 IN | DIASTOLIC BLOOD PRESSURE: 72 MMHG | WEIGHT: 229 LBS

## 2023-11-09 DIAGNOSIS — Z23 NEED FOR VACCINATION: ICD-10-CM

## 2023-11-09 DIAGNOSIS — G47.33 OSA (OBSTRUCTIVE SLEEP APNEA): ICD-10-CM

## 2023-11-09 DIAGNOSIS — J44.9 CHRONIC OBSTRUCTIVE PULMONARY DISEASE, UNSPECIFIED COPD TYPE: Primary | ICD-10-CM

## 2023-11-09 DIAGNOSIS — Z87.01 HISTORY OF PNEUMONIA: ICD-10-CM

## 2023-11-09 DIAGNOSIS — Z87.891 FORMER SMOKER, STOPPED SMOKING MANY YEARS AGO: ICD-10-CM

## 2023-11-09 DIAGNOSIS — J98.4 RESTRICTIVE LUNG DISEASE: ICD-10-CM

## 2023-11-09 DIAGNOSIS — R91.8 LUNG NODULES: ICD-10-CM

## 2023-11-09 DIAGNOSIS — F41.9 ANXIETY: ICD-10-CM

## 2023-11-09 DIAGNOSIS — J45.20 MILD INTERMITTENT ASTHMA WITHOUT COMPLICATION: ICD-10-CM

## 2023-11-09 DIAGNOSIS — Z87.09 HISTORY OF PLEURAL EFFUSION: ICD-10-CM

## 2023-11-09 RX ORDER — IBUPROFEN 800 MG/1
800 TABLET ORAL EVERY 8 HOURS PRN
COMMUNITY
Start: 2023-10-24

## 2023-11-09 RX ORDER — BUDESONIDE AND FORMOTEROL FUMARATE DIHYDRATE 160; 4.5 UG/1; UG/1
2 AEROSOL RESPIRATORY (INHALATION) 2 TIMES DAILY
Qty: 10.2 G | Refills: 5 | Status: SHIPPED | OUTPATIENT
Start: 2023-11-09

## 2023-11-09 RX ORDER — TOPIRAMATE 50 MG/1
50 TABLET, FILM COATED ORAL 2 TIMES DAILY
COMMUNITY

## 2023-11-09 RX ORDER — CHLORHEXIDINE GLUCONATE ORAL RINSE 1.2 MG/ML
SOLUTION DENTAL SEE ADMIN INSTRUCTIONS
COMMUNITY

## 2023-11-09 RX ORDER — FLUTICASONE PROPIONATE AND SALMETEROL 250; 50 UG/1; UG/1
1 POWDER RESPIRATORY (INHALATION) 2 TIMES DAILY
COMMUNITY
Start: 2023-10-31 | End: 2023-11-09 | Stop reason: ALTCHOICE

## 2023-11-09 RX ORDER — OMEPRAZOLE 20 MG/1
20 CAPSULE, DELAYED RELEASE ORAL DAILY
Qty: 90 CAPSULE | Refills: 3 | OUTPATIENT
Start: 2023-11-09

## 2023-11-09 RX ORDER — CETIRIZINE HYDROCHLORIDE 10 MG/1
10 TABLET ORAL DAILY
COMMUNITY
End: 2023-11-09 | Stop reason: ALTCHOICE

## 2023-11-09 RX ORDER — CLOPIDOGREL BISULFATE 75 MG/1
75 TABLET ORAL DAILY
COMMUNITY

## 2023-11-09 RX ORDER — ATORVASTATIN CALCIUM 80 MG/1
1 TABLET, FILM COATED ORAL NIGHTLY
COMMUNITY

## 2023-11-09 NOTE — PROGRESS NOTES
RESPIRATORY DISEASE CLINIC OUTPATIENT PROGRESS NOTE    Patient: Carlo Velasco  : 1945  Age: 78 y.o.  Date of Service: 2023    REASON FOR CLINIC VISIT:  Chief Complaint   Patient presents with    COPD       Subjective:    History of Present Illness:  Carlo Velasco is a 78 y.o. male who presents to the office today to be seen for    Diagnosis Plan   1. Chronic obstructive pulmonary disease, unspecified COPD type        2. Need for vaccination  Pneumococcal Conjugate Vaccine 20-Valent All      3. Mild intermittent asthma without complication        4. Former smoker, stopped smoking many years ago        5. ERNESTO (obstructive sleep apnea)        6. Lung nodules        7. History of pneumonia        8. Restrictive lung disease        9. History of pleural effusion        10. Anxiety        .  Other problems per record.    History:    Patient is a very pleasant elderly  gentleman who was seen in the pulmonary clinic for a follow-up visit.  He attended the clinic with his wife.    Patient is a former smoker and quit smoking many years ago.  He has chronic obstructive pulmonary disease and asthma overlap with small airways disease and restrictive lung disease noted in the last pulmonary function test.  He is currently using Symbicort and albuterol rescue inhaler and is respiratory status is stable he does not use albuterol much.  History of pneumonia in the past.  He had morbid obesity and had a recent sleep study done in [facility] sleep center which showed he had mild obstructive sleep apnea and he is currently doing well from the sleep standpoint because he lost a lot of weight and sleeping better with the head and elevated and on his sides.  He does not do much snoring and feels much more refreshed.  He does not use any other titrating CPAP.  The patient has nasal allergy and uses fluticasone nasal spray and loratadine and sometimes uses Zyrtec as well i.  His allergy symptoms are  better    He already had pneumonia vaccine and other vaccines and is requesting influenza vaccine from the office today which was given.  He has cardiac issues and uses Lasix and spironolactone and follows up with cardiology he is also on Plavix.  He had no recent hospitalizations and ER visit and urgent care visit or any other new complaints.  He is not on any home oxygen.  He is currently retired and lives with his wife.    He had recent CT scan of the chest done which showed some chronic changes which is stable and there was stable pulmonary disease noted.        PFT done today:  Not done today      Results for orders placed in visit on 04/21/21    Pulmonary Function Test    [facility] - Pulmonary Function Test    [address] [place] [address]  [id number]  [phone]    Patient : Carlo Velasco  MRN : [id number]  CSN : [id number]  Pulmonologist : Cruz Atkinson MD  Date : 6/7/2021    ______________________________________________________________________    Interpretation :  1.  Spirometry is consistent with a moderate restrictive ventilatory defect with coexisting mild small airways disease.  2.  There is improvement in spirometry postbronchodilator particularly in small airways function which is now normal but a moderate restrictive ventilatory defect still appears to be present.  3.  Lung volumes confirm a moderate restrictive ventilatory defect.  4.  There is a moderate diffusion impairment which when corrected for alveolar volume is normalized.  5.  When current studies are compared to studies from July 13 of 2017, the patient's current prebronchodilator FVC and FEV1 have dropped significantly compared to previous prebronchodilator studies.  His postbronchodilator FVC and FEV1 have also dropped significantly compared to previous postbronchodilator studies.  There has also been a drop in his total lung capacity compared to previous.  When corrected for alveolar volume there has also  been a decrease in his diffusion capacity compared to previous although it remains within normal limits.      Cruz Atkinson MD      Results for orders placed during the hospital encounter of 17    Full Pulmonary Function Test With Bronchodilator    Narrative  [facility] - Pulmonary Function Test    [address] [place] [facility]  [id number]  [phone]    Patient : Carlo Velasco  MRN : [id number]  CSN : [id number]  Pulmonologist : Cruz Atkinson MD  Date : 2017    ______________________________________________________________________    Interpretation :  1.  Spirometry is consistent with a mild restrictive ventilatory defect with coexisting mild decrease in the patient's FEF 75%,  2.  There is some improvement in the patient's FEF 75% postbronchodilator but a mild restrictive ventilatory defect still appears to be present.  3.  Lung volumes reveal a low normal total lung capacity with a decrease in vital capacity and expiratory reserve volume, consistent with a borderline restrictive ventilatory defect.  4.  Diffusion capacity is within normal limits.      Cruz Atkinson MD         Bronchodilator therapy: Symbicort and Albuterol rescue inhaler.    Smoking Status:   Social History     Tobacco Use   Smoking Status Former    Packs/day: 3.00    Years: 10.00    Additional pack years: 0.00    Total pack years: 30.00    Types: Cigarettes    Quit date:     Years since quittin.8    Passive exposure: Never   Smokeless Tobacco Never     Pulm Rehab: no  Sleep: yes    Support System: lives with their spouse    Code Status:   There are no questions and answers to display.        Review of Systems:  A complete review of systems is performed and all other systems were reviewed and negative as note above in the HPI.  Review of Systems   Constitutional:  Positive for unexpected weight loss. Negative for fatigue.   HENT:  Positive for congestion, postnasal drip and sinus pressure.     Eyes: Negative.    Respiratory:  Positive for chest tightness and shortness of breath.    Cardiovascular: Negative.    Gastrointestinal: Negative.    Endocrine: Negative.    Genitourinary: Negative.    Musculoskeletal:  Positive for arthralgias and back pain.   Skin: Negative.    Allergic/Immunologic: Positive for environmental allergies.   Neurological: Negative.    Hematological: Negative.    Psychiatric/Behavioral: Negative.         CAT/ACT Score:  Not done today    Medications:  Outpatient Encounter Medications as of 11/9/2023   Medication Sig Dispense Refill    albuterol sulfate  (90 Base) MCG/ACT inhaler Inhale 2 puffs Every 4 (Four) Hours As Needed for Shortness of Air. for wheezing 8.5 g 5    atorvastatin (LIPITOR) 80 MG tablet Take 1 tablet by mouth Every Night.      budesonide-formoterol (SYMBICORT) 160-4.5 MCG/ACT inhaler Inhale 2 puffs 2 (Two) Times a Day. 10.2 g 5    Calcium Carbonate 1500 (600 Ca) MG tablet Take 1 tablet by mouth Daily.      cetirizine (zyrTEC) 10 MG tablet Take 1 tablet by mouth Daily.      chlorhexidine (PERIDEX) 0.12 % solution See Admin Instructions.      Cholecalciferol 125 MCG (5000 UT) tablet Take 1 tablet by mouth Daily.      clopidogrel (PLAVIX) 75 MG tablet Take 1 tablet by mouth Daily.      Cyanocobalamin (B-12) 1000 MCG sublingual tablet 1 tablet Daily.      escitalopram (LEXAPRO) 20 MG tablet Take 1 tablet by mouth Daily. 90 tablet 1    ferrous sulfate 325 (65 FE) MG tablet Take 1 tablet by mouth Every Morning.      fluticasone (FLONASE) 50 MCG/ACT nasal spray 2 sprays into the nostril(s) as directed by provider Daily. 16 g 5    furosemide (LASIX) 40 MG tablet Take 1 tablet by mouth Daily As Needed (for any increase in 2 lbs in a daqy or 5 lbs in a week or increassing swelling or congestion). (Patient taking differently: Take 1 tablet by mouth Daily.)      HYDROcodone-acetaminophen (NORCO) 7.5-325 MG per tablet Take 1 tablet by mouth Every 4 (Four) Hours As Needed  for Moderate Pain for up to 30 doses. 30 tablet 0    ibuprofen (ADVIL,MOTRIN) 800 MG tablet Take 1 tablet by mouth Every 8 (Eight) Hours As Needed. for pain      isosorbide mononitrate (IMDUR) 30 MG 24 hr tablet TAKE 1 TABLET BY MOUTH EVERY MORNING 90 tablet 3    loratadine (CLARITIN) 10 MG tablet Take 1 tablet by mouth Daily. 90 tablet 3    metoprolol tartrate (LOPRESSOR) 25 MG tablet TAKE 1 TABLET BY MOUTH DAILY 90 tablet 3    NITROSTAT 0.4 MG SL tablet Place 1 tablet under the tongue Every 5 (Five) Minutes As Needed for Chest Pain. Take no more than 3 doses in 15 minutes. 100 tablet 2    omega-3 acid ethyl esters (LOVAZA) 1 g capsule TAKE 1 CAPSULE BY MOUTH DAILY 90 capsule 3    omeprazole (priLOSEC) 20 MG capsule TAKE 1 CAPSULE BY MOUTH DAILY 90 capsule 3    ondansetron (Zofran) 8 MG tablet Take 0.5 tablets by mouth Every 8 (Eight) Hours As Needed for Nausea or Vomiting for up to 5 doses. 5 tablet 1    Potassium Gluconate 2.5 MEQ tablet Take 1 tablet by mouth Daily.      spironolactone (ALDACTONE) 25 MG tablet Take 1 tablet by mouth Daily. 90 tablet 3    tamsulosin (FLOMAX) 0.4 MG capsule 24 hr capsule Take 1 capsule by mouth Daily.      Wixela Inhub 250-50 MCG/ACT DISKUS Inhale 1 puff 2 (Two) Times a Day.      Zinc Sulfate (ZINC-220 PO) Take 1 tablet by mouth Daily.      topiramate (TOPAMAX) 50 MG tablet Take 1 tablet by mouth 2 (Two) Times a Day.       No facility-administered encounter medications on file as of 11/9/2023.       Allergies:  Allergies   Allergen Reactions    Meperidine Other (See Comments) and Unknown - High Severity     HEART STOPS    Statins Myalgia     Causes leg cramps       Immunizations:  Immunization History   Administered Date(s) Administered    ABRYSVO 10/07/2023    FLUAD TRI 65YR+ 10/10/2019    Fluad Quad 65+ 10/10/2019, 10/25/2022    Fluzone High Dose =>65 Years ([employer] ONLY) 10/11/2018, 10/01/2022    Fluzone High-Dose 65+yrs 10/07/2023    Influenza, Unspecified 10/11/2023     "Pneumococcal Conjugate 20-Valent (PCV20) 11/09/2023    TD Preservative Free (Tenivac) 08/17/2018    Td (TDVAX) 10/07/2023    Tdap 09/17/2015, 12/06/2021    influenza Split 10/22/2016       Objective:    Vitals:  /72   Pulse 70   Ht 172.7 cm (68\")   Wt 104 kg (229 lb)   SpO2 95% Comment: RA  BMI 34.82 kg/m²     Physical Exam:  General: Patient is a 78 y.o. pleasant elderly  male. Looks stated age. Appears to be in no acute distress.  Eyes: EOMI. PERRLA. Vision intact. No scleral icterus.  Ear, Nose, Mouth and Throat: Hearing is grossly intact. No Leukoplakia, pharyngitis, stomatitis or thrush. Swollen nasal mucosa with post nasal drop.  Neck: Range of motion of neck normal. No thyromegaly or masses. Mallampati Class 3  Respiratory: Clear to auscultation bilaterally. No use of accessory muscles. Decreased breath sounds.  Cardiovascular: Normal heart sounds. Regularly regular rhythm without murmur.  Gastrointestinal: Non tender, non distended, soft. Bowel sounds positive in all four quadrants. No organomegaly.  Skin: No obvious rashes, lesions, ulcers or large amount of bruising. No edema.   Neurological: No new motor deficits. Cranial nerves appear intact.  Psychiatric: Patient is alert and oriented to person, place and time.    Chest Imaging:    Study Result    Narrative & Impression   EXAM/TECHNIQUE: CT chest without contrast     INDICATION: Lung nodule, 6-8mm; R91.8-Other nonspecific abnormal finding  of lung field     COMPARISON: 5/1/2023     DLP: 503 mGy cm. Automated exposure control was also utilized to  decrease patient radiation dose.     FINDINGS:     No change in 4 mm right upper lobe pulmonary nodule on image 71 of  series 4. No change in 3 mm left upper lobe pulmonary nodule on image  52. No new or enlarging pulmonary nodule.     Central airways are clear. No consolidation or pleural effusion. Mild  right hemidiaphragm elevation. Mild right greater than left bibasilar  atelectasis. No " advanced emphysematous or fibrotic change.     No enlarged axillary, supraclavicular, or mediastinal lymph nodes. The  main pulmonary artery is nondilated. The thoracic aorta is nonaneurysmal  and contains mild atherosclerotic plaque. 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 Left chest wall cardiac pacing/ICD device. No  pericardial effusion.     No acute chest wall soft tissue abnormality. No change in small  low-density left liver lesion. No acute finding in the included portion  of the upper abdomen. T3, T4, T5, and T6 chronic compression deformities  are again noted. No acute osseous finding.     IMPRESSION:     No acute findings. No change in small pulmonary nodules including a 4 mm  nodule in the right upper lobe.     This report was signed and finalized on 2023 9:32 AM CDT by Dr. Eleuterio Nieto MD.             Sleep Study results    [facility]  [address] [place] [address]  Phone [phone] Fax [phone]      Polysomnography Report  Patient Name Carlo Velasco Account Number [id number]   1945 Referring Provider Katelyn Buckley M.D.  Age/ Gender 77 years/M Interpreting physician Mary Merlos M.D.,[facility]  Neck circumference/  Mallampati classification 16.5 in/class 3 Night Technician Mukund Goodman, RPSGT  [place] score 10/24 Scoring Technician Barbara Sandoval, RPSGT  Height 68.0 in Indications for the test excessive daytime somnolence  Weight 267.0 lbs Test Diagnostic Polysomnogram  BMI 40.6 Date of test 2023    Procedure  A Diagnostic Polysomnogram was conducted on the night of 2023. The study was performed and scored per AASM guidelines. The following were monitored: frontal, central, and occipital EEG, electrooculogram (EOG), submentalis EMG, nasal and oral airflow, intranasal pressure, thoracic plethysmography, abdominal plethysmography, anterior tibialis EMG, electrocardiogram, body position, and positive  airway pressure (PAP). Arterial oxygen saturation was monitored with a pulse oximeter. The study was scored utilizing 30 second epochs. Hypopneas were scored using per AASM definition VIII, D, 1B.    Sleep Scoring Data  Lights out 9:08:27 PM Sleep latency 14.0 min Time in N1 10.0 min N1% 2.5%  Lights on 4:40:27 AM WASO 42.0 min Time in N2 334.0 min N2% 84.3%  TIB/.0 min Sleep efficiency 90.4% Time in N3 6.0 min N3% 1.5%  .0 min REM latency 283.5 min Time in R 46.0 min R% 11.6%    Respiratory Events Summary  NREM REM Total  Hypopnea index 1.4 14.3 2.9  Apnea index 3.4 22.2 5.6  RERA index 0.0 0.0 0.0  AHI 4.8 36.5 8.5  RDI (AHI + RERA index) 4.8 36.5 8.5    Respiratory Events by Sleep Stage  Obstructive Apneas OA Index Central Apneas CA Index Mixed Apneas MA Index  Hypopneas H Index  RERAs  R Index  NREM 20 3.4 0 0.0 0 0.0 8 1.4 0 0.0  REM 17 22.2 0 0.0 0 0.0 11 14.3 0 0.0  Total 37 5.6 0 0.0 0 0.0 19 2.9 0 0.0    Respiratory Events by Body Position  Time (min) Obstructive Apneas OA Index Central Apneas CA Index Mixed Apneas MA Index  Hypopneas H Index  RERAs RERA  Index Total  AHI Total RDI  Supine 344.1 37 7.0 0 0.0 0 0.0 18 3.4 0 0.0 10.5 10.5  R/Supine  L/Supine  Right 87.5 0 0.0 0 0.0 0 0.0 1 0.7 0 0.0 0.7 0.7  Left 6.4 0 0.0 0 0.0 0 0.0 0 0.0 0 0.0 0.0 0.0  Prone  R/Prone  L/Prone  Up    Snoring  Snoring Rating (loudness 0-4) 2    Snoring Amount (% of total sleep time with snoring) 0%    Oximetry Data  Wake NREM REM TST  Average Saturation 95% 96% 95% 96%  Desaturation Index (#/hour) 4.3 36.5 8.0  Desaturation Max Duration (sec) 62.0 49.0 62.0  Minimum O2 Saturation 84%    Oximetry Distribution  WaKe REM NREM TOTAL %TIB  <90% (min) 0.0 1.8 0.2 2.0 0.4%  <85% (min) 0.0 0.2 0.0 0.2 0.0%  <80% (min) 0.0 0.0 0.0 0.0 0.0%  <75% (min) 0.0 0.0 0.0 0.0 0.0%  <70% (min) 0.0 0.0 0.0 0.0 0.0%  <60% (min) 0.0 0.0 0.0 0.0 0.0%  <50% (min) 0.0 0.0 0.0 0.0 0.0%  Fail (min) 0.3 0.0 0.0 0.3    Respiratory  Pattern  Present Absent Duration  Hypoventilation ü N/A  Cheyne Walker Respirations ü N/A  Periodic Breathing ü N/A    Heart Rate  Mean heart rate (bmp) Maximum heart rate (bmp)  During recording 88  During sleep 64.1 84    Heart Rhythm Summary  Normal sinus rhythm    Heart Rhythm Events  Type of events Present Absent Rate-Duration/Total Duration  Bradycardia P N/A  Asystole ü N/A  Sinus Tachycardia During Sleep ü N/A  Narrow Complex Tachycardia ü N/A  Wide Complex Tachycardia ü N/A  Atrial Fibrillation ü N/A  Other Arrhythmias P N/A  Arousals  NREM REM Total Arousal Index  Spontaneous 64 10 75 11.4  Respiratory 3 7 10 1.5  Snore 0 0 0 0.0  Leg movement 67 1 68 10.3  Total 134 18 154 23.3    Periodic Limb Movement Data (legs unless otherwise noted)  Leg Movements PLMS PLMS with arousal  # of events [phone]  Index (#/hr TST) 105.9 102.9 9.7  Note: The interpreting physician named above, reviewed this record in its entirety, including sleep staging, EMG activity, EEG, EKG, breathing parameters, oxygen saturation, body position, and behavior unless otherwise noted. The interpretation is based on this information in addition to available clinical history and physical examination data.    Interpretation:    Mild obstructive sleep apnea.  Morbid obesity.  Subjective hypersomnia.  Periodic limb movements noted.    Recommendations:    Consider Auto CPAP to titrate between 8cm water pressure and 20cm water pressure.  Weight loss and exercise.  Avoid risky activity such as driving if sleepy.  Discuss sleep hygiene with the patient.  Monitor PAP use and effectiveness.      Mary Pete MD, [facility], [facility]      Electronically signed by Mary Pete MD at 07/21/2023 5:40 PM EDT       Assessment:  1. Chronic obstructive pulmonary disease, unspecified COPD type    2. Need for vaccination    3. Mild intermittent asthma without complication    4. Former smoker, stopped smoking many years ago    5. ERNESTO  (obstructive sleep apnea)    6. Lung nodules    7. History of pneumonia    8. Restrictive lung disease    9. History of pleural effusion    10. Anxiety        Plan/Recommendations:    1.  I reviewed the sleep study results and also reviewed the CT scan of the chest.  The sleep study shows he had mild obstructive sleep apnea but he lost weight and is symptomatically better so I did not recommend to start him on auto titrating CPAP and patient is also not willing to go for CPAP treatment.  Healthy sleep habits sleep hygiene and sleep positions discussed with the patient in details.  2.  CT scan of the chest showed he had stable lung nodules and follow-up CT scan will be done in a year time.  Patient had obstructive pulmonary disease with asthma and COPD overlap and will continue doing Symbicort and albuterol rescue med as before.  He is breathing better after the weight loss and starting on the regular inhaler treatment.  He used to Wixela in the past which has been stopped and advised him to continue Symbicort only.   3.  Patient will continue using fluticasone nasal spray and I told him to use fluticasone and loratadine and Zyrtec will be stopped because he was probably using both of them.  He will continue his weight loss program and he is working on healthy diet plan.  He will get all other vaccinations from his primary care provider and he was given influenza vaccine from the office today.  4.  Continue follow-up with the primary care port and cardiology for his other medical issues and heart issues and I advised him to return to pulmonary clinic for follow-up visit in 6 months time or earlier if needed.    Follow up:  6 Months    Time Spent:  30 minutes    I appreciate the opportunity of participating in this patient's care. I would like to thank the PCP for the referral.  Please feel free to contact me with any other questions.    Katelyn Buckley MD   Pulmonologist/Intensivist     Electronically signed by:  Katelyn Buckley MD, 11/9/2023 12:05 CST

## 2023-11-09 NOTE — TELEPHONE ENCOUNTER
Spoke with patient per Dr. Buckley and advised he should only be using Symbicort and his rescue inhaler. He was concerned he had another inhaler that did not mix with Symbicort. Patient voiced understanding and stated he is only using Symbicort and his rescue inhaler.

## 2023-11-10 RX ORDER — CLOPIDOGREL BISULFATE 75 MG/1
75 TABLET ORAL DAILY
Qty: 90 TABLET | Refills: 3 | Status: SHIPPED | OUTPATIENT
Start: 2023-11-10

## 2023-11-20 RX ORDER — SPIRONOLACTONE 25 MG/1
25 TABLET ORAL DAILY
Qty: 90 TABLET | Refills: 3 | Status: SHIPPED | OUTPATIENT
Start: 2023-11-20

## 2023-11-27 ENCOUNTER — TELEPHONE (OUTPATIENT)
Dept: VASCULAR SURGERY | Facility: CLINIC | Age: 78
End: 2023-11-27
Payer: MEDICARE

## 2023-11-28 ENCOUNTER — OFFICE VISIT (OUTPATIENT)
Dept: VASCULAR SURGERY | Facility: CLINIC | Age: 78
End: 2023-11-28
Payer: MEDICARE

## 2023-11-28 ENCOUNTER — HOSPITAL ENCOUNTER (OUTPATIENT)
Dept: ULTRASOUND IMAGING | Facility: HOSPITAL | Age: 78
Discharge: HOME | End: 2023-11-28
Payer: MEDICARE

## 2023-11-28 VITALS
OXYGEN SATURATION: 97 % | BODY MASS INDEX: 35.92 KG/M2 | SYSTOLIC BLOOD PRESSURE: 124 MMHG | DIASTOLIC BLOOD PRESSURE: 68 MMHG | WEIGHT: 237 LBS | HEIGHT: 68 IN | HEART RATE: 77 BPM

## 2023-11-28 DIAGNOSIS — I73.9 PAD (PERIPHERAL ARTERY DISEASE): ICD-10-CM

## 2023-11-28 DIAGNOSIS — I65.23 BILATERAL CAROTID ARTERY STENOSIS: Primary | ICD-10-CM

## 2023-11-28 DIAGNOSIS — I10 PRIMARY HYPERTENSION: ICD-10-CM

## 2023-11-28 DIAGNOSIS — I65.23 BILATERAL CAROTID ARTERY STENOSIS: ICD-10-CM

## 2023-11-28 DIAGNOSIS — E78.5 HYPERLIPIDEMIA LDL GOAL <70: ICD-10-CM

## 2023-11-28 PROCEDURE — 1159F MED LIST DOCD IN RCRD: CPT | Performed by: NURSE PRACTITIONER

## 2023-11-28 PROCEDURE — 93880 EXTRACRANIAL BILAT STUDY: CPT

## 2023-11-28 PROCEDURE — 93923 UPR/LXTR ART STDY 3+ LVLS: CPT

## 2023-11-28 PROCEDURE — 99214 OFFICE O/P EST MOD 30 MIN: CPT | Performed by: NURSE PRACTITIONER

## 2023-11-28 PROCEDURE — 1160F RVW MEDS BY RX/DR IN RCRD: CPT | Performed by: NURSE PRACTITIONER

## 2023-11-28 PROCEDURE — 3078F DIAST BP <80 MM HG: CPT | Performed by: NURSE PRACTITIONER

## 2023-11-28 PROCEDURE — 3074F SYST BP LT 130 MM HG: CPT | Performed by: NURSE PRACTITIONER

## 2023-11-28 NOTE — LETTER
"November 28, 2023     Cruz Chaudhari MD  [address] [facility] [place] [address]    Patient: Carlo Velasco   YOB: 1945   Date of Visit: 11/28/2023     Dear Cruz Chaudhari MD:       Thank you for referring Carlo Velasco to me for evaluation. Below are the relevant portions of my assessment and plan of care.    If you have questions, please do not hesitate to call me. I look forward to following Carlo along with you.         Sincerely,        Shelton Renteria, DO        CC: No Recipients    Ambreen Mcnamara APRN  11/28/23 1737  Sign when Signing Visit  11/28/2023       Cruz Chaudhari MD  [address] [facility] [address]      Carlo Velasco  1945    Chief Complaint   Patient presents with   • Follow-up     1 year follow up w/ testing. Last seen 6/7/22. Patient denies any stroke type symptoms.        Dear Cruz Chaudhari MD     HPI  I had the pleasure of seeing your patient Carlo Velasco in the office today.  As you recall, Carlo Velasco is a 78 y.o.  male who you are currently following for routine health maintenance.he reports having some dizziness which prompted testing.  He denies any strokelike symptoms.  He does have chronic back pain with previous back surgeries.  He is maintained on Plavix and Lipitor.  He did have noninvasive testing performed today, which I did review in office.        Review of Systems   HENT: Negative.     Eyes: Negative.    Respiratory: Negative.     Cardiovascular:  Positive for leg swelling.   Gastrointestinal: Negative.    Endocrine: Negative.    Genitourinary: Negative.    Musculoskeletal:  Positive for arthralgias and back pain.   Skin: Negative.    Allergic/Immunologic: Negative.    Neurological:  Positive for dizziness and weakness.   Hematological: Negative.    Psychiatric/Behavioral: Negative.     All other systems reviewed and are negative.    /68   Pulse 77   Ht 172.7 cm (68\")   Wt 108 kg (237 lb)   SpO2 97%   BMI 36.04 kg/m² "   Physical Exam  Vitals and nursing note reviewed.   Constitutional:       General: He is not in acute distress.     Appearance: Normal appearance. He is well-developed. He is obese. He is not diaphoretic.   HENT:      Head: Normocephalic and atraumatic.   Neck:      Vascular: No carotid bruit or JVD.   Cardiovascular:      Rate and Rhythm: Normal rate and regular rhythm.      Pulses: Normal pulses.           Femoral pulses are 2+ on the right side and 2+ on the left side.       Popliteal pulses are 2+ on the right side and 2+ on the left side.        Dorsalis pedis pulses are 2+ on the right side and 2+ on the left side.        Posterior tibial pulses are 2+ on the right side and 2+ on the left side.      Heart sounds: Normal heart sounds, S1 normal and S2 normal. No murmur heard.     No friction rub. No gallop.   Pulmonary:      Effort: Pulmonary effort is normal.      Breath sounds: Normal breath sounds.   Abdominal:      General: Bowel sounds are normal. There is no abdominal bruit.      Palpations: Abdomen is soft.      Tenderness: There is no abdominal tenderness.   Musculoskeletal:         General: Swelling present. Normal range of motion.      Comments: Back pain   Skin:     General: Skin is warm and dry.   Neurological:      General: No focal deficit present.      Mental Status: He is alert and oriented to person, place, and time.      Cranial Nerves: No cranial nerve deficit.   Psychiatric:         Mood and Affect: Mood normal.         Behavior: Behavior normal.         Thought Content: Thought content normal.         Judgment: Judgment normal.            US Carotid Bilateral    Result Date: 11/28/2023  Narrative: History: Carotid occlusive disease      Impression: Impression: 1. There is less than 50% stenosis of the right internal carotid artery. 2. There is less than 50% stenosis of the left internal carotid artery. 3. Antegrade flow is demonstrated in bilateral vertebral arteries.  Comments: Bilateral  carotid vertebral arterial duplex scan was performed.  Grayscale imaging shows intimal thickening and calcified elements at the carotid bifurcation. The right internal carotid artery peak systolic velocity is 87.4 cm/sec. The end-diastolic velocity is 24.5 cm/sec. The right ICA/CCA ratio is approximately 0.9. These findings correlate with less than 50% stenosis of the right internal carotid artery.  Grayscale imaging shows intimal thickening and calcified elements at the carotid bifurcation. The left internal carotid artery peak systolic velocity is 91.5 cm/sec. The end-diastolic velocity is 32.6 cm/sec. The left ICA/CCA ratio is approximately 1.0. These findings correlate with less than 50% stenosis of the left internal carotid artery.  Antegrade flow is demonstrated in bilateral vertebral arteries.   This report was signed and finalized on 11/28/2023 4:41 PM CST by Dr. Shelton Renteria MD.      US Ankle / Brachial Indices Extremity Complete    Result Date: 11/28/2023  Narrative:  History: PAD  Comments: Bilateral lower extremity arterial with multi-level pulse volume recordings and segmental pressures were performed at rest and stress.  The right ankle/brachial index is 1.24. The waveforms are triphasic without dampening. These findings are consistent with no significant arterial insufficiency of the right lower extremity at rest.  The left ankle/brachial index is 1.28. The waveforms are triphasic without dampening. These findings are consistent with no significant arterial insufficiency of the left lower extremity at rest.      Impression: Impression: 1. No significant arterial insufficiency of the right lower extremity at rest. 2. No significant arterial insufficiency of the left lower extremity at rest.    This report was signed and finalized on 11/28/2023 4:25 PM CST by Dr. Shelton Renteria MD.      CT Chest Without Contrast Diagnostic    Result Date: 11/2/2023  Narrative: EXAM/TECHNIQUE: CT chest without  contrast  INDICATION: Lung nodule, 6-8mm; R91.8-Other nonspecific abnormal finding of lung field  COMPARISON: 5/1/2023  DLP: 503 mGy cm. Automated exposure control was also utilized to decrease patient radiation dose.  FINDINGS:  No change in 4 mm right upper lobe pulmonary nodule on image 71 of series 4. No change in 3 mm left upper lobe pulmonary nodule on image 52. No new or enlarging pulmonary nodule.  Central airways are clear. No consolidation or pleural effusion. Mild right hemidiaphragm elevation. Mild right greater than left bibasilar atelectasis. No advanced emphysematous or fibrotic change.  No enlarged axillary, supraclavicular, or mediastinal lymph nodes. The main pulmonary artery is nondilated. The thoracic aorta is nonaneurysmal and contains mild atherosclerotic plaque. Postoperative change of median sternotomy and CABG. Left chest wall cardiac pacing/ICD device. No pericardial effusion.  No acute chest wall soft tissue abnormality. No change in small low-density left liver lesion. No acute finding in the included portion of the upper abdomen. T3, T4, T5, and T6 chronic compression deformities are again noted. No acute osseous finding.      Impression:  No acute findings. No change in small pulmonary nodules including a 4 mm nodule in the right upper lobe.  This report was signed and finalized on 11/2/2023 9:32 AM CDT by Dr. Eleuterio Nieto MD.          Patient Active Problem List   Diagnosis   • Coronary artery disease involving autologous artery coronary bypass graft without angina pectoris   • Primary hypertension   • Anxiety   • Colon polyps   • Hyperlipidemia LDL goal <70   • Incisional hernia, without obstruction or gangrene   • BRBPR (bright red blood per rectum)   • Esophageal dysphagia   • Prostatism   • Nocturia   • Orthostasis   • Chronic midline low back pain without sciatica   • History of adenomatous polyp of colon   • Acute pain of right knee   • Morbidly obese   • Pain of right heel    • S/P CABG x 2   • Stented coronary artery   • Flank pain   • Skin lesion   • Pleuritic chest pain   • Sick sinus syndrome   • Coagulopathy   • Pneumonia of right lung due to infectious organism   • Recurrent pleural effusion on right   • Cough   • SOB (shortness of breath)   • Former smoker, stopped smoking many years ago   • Non-seasonal allergic rhinitis   • ERNESTO (obstructive sleep apnea)   • Restrictive lung disease   • Class 2 severe obesity due to excess calories with serious comorbidity and body mass index (BMI) of 39.0 to 39.9 in adult   • Empyema of pleura   • Pneumonia   • Aspirin-like platelet function defect   • S/P thoracostomy tube placement   • Mild intermittent asthma without complication   • Family hx colonic polyps   • History of pneumonia   • Pleurisy   • History of fall   • UTI (urinary tract infection) -cystitis/prostatitis   • Sepsis   • Compression fracture of T6 vertebra   • Dehydration   • History of fall   • COPD (chronic obstructive pulmonary disease)   • Lung nodules   • Chronic diastolic congestive heart failure   • Presence of cardiac pacemaker   • LVH (left ventricular hypertrophy)   • Pulmonary hypertension   • Acute sinusitis   • Age-related cataract   • Benign prostatic hyperplasia   • Carotid artery stenosis   • Cervical spondylosis without myelopathy   • Chronic pain   • Closed fracture of greater tuberosity of humerus   • Closed fracture of rib   • Compression fracture of thoracic vertebra   • Contusion of knee   • Degeneration of lumbar intervertebral disc   • Dementia   • Dermatochalasis   • Diabetes mellitus   • Difficult or painful urination   • Disorder of sacroiliac joint   • Dry eye syndrome   • Hiatal hernia   • History of smoking   • Homonymous hemianopia   • Hypothyroidism   • Iron deficiency   • Iron deficiency anemia   • Kidney stones   • Klippel-Feil sequence   • Knee joint effusion   • Lumbosacral radiculitis   • Lumbosacral spondylosis without myelopathy   • Open  angle with borderline findings, low risk, bilateral   • Osteoarthritis of right knee   • Post poliomyelitis syndrome   • Recurrent major depression   • Screening for depression   • Spondylolisthesis, acquired   • Vaccine refused by patient   • Vertigo   • Cobalamin deficiency   • Vitamin D deficiency   • Lumbar hernia   • History of pleural effusion        Diagnosis Plan   1. Bilateral carotid artery stenosis  US Carotid Bilateral      2. PAD (peripheral artery disease)  US Ankle / Brachial Indices Extremity Complete      3. Primary hypertension        4. Hyperlipidemia LDL goal <70              Plan: After thoroughly evaluating Carlo Velasco, I believe the best course of action is to remain conservative from vascular surgery standpoint.  Currently he is doing well and denies any strokelike symptoms.  He also denies any changes to his lower extremities.  I did review his testing which shows less than 50% carotid stenosis bilaterally.  His ABIs show no arterial insufficiency to his lower extremities.  We will see him back in 1 year with repeat noninvasive testing for continued surveillance, including a carotid duplex and ABIs.  I did discuss vascular risk factors as they pertain to the progression of vascular disease including controlling his hypertension and hyperlipidemia.  His blood pressure stable on his current medications.  He should continue on his Plavix 75 mg daily and Lipitor 80 mg nightly in addition to his other medications.   Recommended he could wear compression stockings to aid with his swelling.  The patient is to continue taking their medications as previously discussed.   This was all discussed in full with complete understanding.  Thank you for allowing me to participate in the care of your patient.  Please do not hesitate to call with any questions or concerns.  We will keep you aware of any further encounters with Carlo Velasco.        Sincerely yours,         ANALI Ferguson,  Cruz ADAMS MD

## 2023-11-28 NOTE — PROGRESS NOTES
"11/28/2023       Cruz Chaudhari MD  [address] [facility] [place] [address]      Carlo Velasco  1945    Chief Complaint   Patient presents with    Follow-up     1 year follow up w/ testing. Last seen 6/7/22. Patient denies any stroke type symptoms.        Dear Cruz Chaudhari MD     HPI  I had the pleasure of seeing your patient Carlo Velasco in the office today.  As you recall, Carlo Velasco is a 78 y.o.  male who you are currently following for routine health maintenance.he reports having some dizziness which prompted testing.  He denies any strokelike symptoms.  He does have chronic back pain with previous back surgeries.  He is maintained on Plavix and Lipitor.  He did have noninvasive testing performed today, which I did review in office.        Review of Systems   HENT: Negative.     Eyes: Negative.    Respiratory: Negative.     Cardiovascular:  Positive for leg swelling.   Gastrointestinal: Negative.    Endocrine: Negative.    Genitourinary: Negative.    Musculoskeletal:  Positive for arthralgias and back pain.   Skin: Negative.    Allergic/Immunologic: Negative.    Neurological:  Positive for dizziness and weakness.   Hematological: Negative.    Psychiatric/Behavioral: Negative.     All other systems reviewed and are negative.    /68   Pulse 77   Ht 172.7 cm (68\")   Wt 108 kg (237 lb)   SpO2 97%   BMI 36.04 kg/m²   Physical Exam  Vitals and nursing note reviewed.   Constitutional:       General: He is not in acute distress.     Appearance: Normal appearance. He is well-developed. He is obese. He is not diaphoretic.   HENT:      Head: Normocephalic and atraumatic.   Neck:      Vascular: No carotid bruit or JVD.   Cardiovascular:      Rate and Rhythm: Normal rate and regular rhythm.      Pulses: Normal pulses.           Femoral pulses are 2+ on the right side and 2+ on the left side.       Popliteal pulses are 2+ on the right side and 2+ on the left side.        Dorsalis pedis pulses are 2+ " on the right side and 2+ on the left side.        Posterior tibial pulses are 2+ on the right side and 2+ on the left side.      Heart sounds: Normal heart sounds, S1 normal and S2 normal. No murmur heard.     No friction rub. No gallop.   Pulmonary:      Effort: Pulmonary effort is normal.      Breath sounds: Normal breath sounds.   Abdominal:      General: Bowel sounds are normal. There is no abdominal bruit.      Palpations: Abdomen is soft.      Tenderness: There is no abdominal tenderness.   Musculoskeletal:         General: Swelling present. Normal range of motion.      Comments: Back pain   Skin:     General: Skin is warm and dry.   Neurological:      General: No focal deficit present.      Mental Status: He is alert and oriented to person, place, and time.      Cranial Nerves: No cranial nerve deficit.   Psychiatric:         Mood and Affect: Mood normal.         Behavior: Behavior normal.         Thought Content: Thought content normal.         Judgment: Judgment normal.            US Carotid Bilateral    Result Date: 11/28/2023  Narrative: History: Carotid occlusive disease      Impression: Impression: 1. There is less than 50% stenosis of the right internal carotid artery. 2. There is less than 50% stenosis of the left internal carotid artery. 3. Antegrade flow is demonstrated in bilateral vertebral arteries.  Comments: Bilateral carotid vertebral arterial duplex scan was performed.  Grayscale imaging shows intimal thickening and calcified elements at the carotid bifurcation. The right internal carotid artery peak systolic velocity is 87.4 cm/sec. The end-diastolic velocity is 24.5 cm/sec. The right ICA/CCA ratio is approximately 0.9. These findings correlate with less than 50% stenosis of the right internal carotid artery.  Grayscale imaging shows intimal thickening and calcified elements at the carotid bifurcation. The left internal carotid artery peak systolic velocity is 91.5 cm/sec. The end-diastolic  velocity is 32.6 cm/sec. The left ICA/CCA ratio is approximately 1.0. These findings correlate with less than 50% stenosis of the left internal carotid artery.  Antegrade flow is demonstrated in bilateral vertebral arteries.   This report was signed and finalized on 11/28/2023 4:41 PM CST by Dr. Shelton Renteria MD.      US Ankle / Brachial Indices Extremity Complete    Result Date: 11/28/2023  Narrative:  History: PAD  Comments: Bilateral lower extremity arterial with multi-level pulse volume recordings and segmental pressures were performed at rest and stress.  The right ankle/brachial index is 1.24. The waveforms are triphasic without dampening. These findings are consistent with no significant arterial insufficiency of the right lower extremity at rest.  The left ankle/brachial index is 1.28. The waveforms are triphasic without dampening. These findings are consistent with no significant arterial insufficiency of the left lower extremity at rest.      Impression: Impression: 1. No significant arterial insufficiency of the right lower extremity at rest. 2. No significant arterial insufficiency of the left lower extremity at rest.    This report was signed and finalized on 11/28/2023 4:25 PM CST by Dr. Shelton Renteria MD.      CT Chest Without Contrast Diagnostic    Result Date: 11/2/2023  Narrative: EXAM/TECHNIQUE: CT chest without contrast  INDICATION: Lung nodule, 6-8mm; R91.8-Other nonspecific abnormal finding of lung field  COMPARISON: 5/1/2023  DLP: 503 mGy cm. Automated exposure control was also utilized to decrease patient radiation dose.  FINDINGS:  No change in 4 mm right upper lobe pulmonary nodule on image 71 of series 4. No change in 3 mm left upper lobe pulmonary nodule on image 52. No new or enlarging pulmonary nodule.  Central airways are clear. No consolidation or pleural effusion. Mild right hemidiaphragm elevation. Mild right greater than left bibasilar atelectasis. No advanced emphysematous or  fibrotic change.  No enlarged axillary, supraclavicular, or mediastinal lymph nodes. The main pulmonary artery is nondilated. The thoracic aorta is nonaneurysmal and contains mild atherosclerotic plaque. Postoperative change of median sternotomy and CABG. Left chest wall cardiac pacing/ICD device. No pericardial effusion.  No acute chest wall soft tissue abnormality. No change in small low-density left liver lesion. No acute finding in the included portion of the upper abdomen. T3, T4, T5, and T6 chronic compression deformities are again noted. No acute osseous finding.      Impression:  No acute findings. No change in small pulmonary nodules including a 4 mm nodule in the right upper lobe.  This report was signed and finalized on 11/2/2023 9:32 AM CDT by Dr. Eleuterio Nieto MD.          Patient Active Problem List   Diagnosis    Coronary artery disease involving autologous artery coronary bypass graft without angina pectoris    Primary hypertension    Anxiety    Colon polyps    Hyperlipidemia LDL goal <70    Incisional hernia, without obstruction or gangrene    BRBPR (bright red blood per rectum)    Esophageal dysphagia    Prostatism    Nocturia    Orthostasis    Chronic midline low back pain without sciatica    History of adenomatous polyp of colon    Acute pain of right knee    Morbidly obese    Pain of right heel    S/P CABG x 2    Stented coronary artery    Flank pain    Skin lesion    Pleuritic chest pain    Sick sinus syndrome    Coagulopathy    Pneumonia of right lung due to infectious organism    Recurrent pleural effusion on right    Cough    SOB (shortness of breath)    Former smoker, stopped smoking many years ago    Non-seasonal allergic rhinitis    ERNESTO (obstructive sleep apnea)    Restrictive lung disease    Class 2 severe obesity due to excess calories with serious comorbidity and body mass index (BMI) of 39.0 to 39.9 in adult    Empyema of pleura    Pneumonia    Aspirin-like platelet function defect     S/P thoracostomy tube placement    Mild intermittent asthma without complication    Family hx colonic polyps    History of pneumonia    Pleurisy    History of fall    UTI (urinary tract infection) -cystitis/prostatitis    Sepsis    Compression fracture of T6 vertebra    Dehydration    History of fall    COPD (chronic obstructive pulmonary disease)    Lung nodules    Chronic diastolic congestive heart failure    Presence of cardiac pacemaker    LVH (left ventricular hypertrophy)    Pulmonary hypertension    Acute sinusitis    Age-related cataract    Benign prostatic hyperplasia    Carotid artery stenosis    Cervical spondylosis without myelopathy    Chronic pain    Closed fracture of greater tuberosity of humerus    Closed fracture of rib    Compression fracture of thoracic vertebra    Contusion of knee    Degeneration of lumbar intervertebral disc    Dementia    Dermatochalasis    Diabetes mellitus    Difficult or painful urination    Disorder of sacroiliac joint    Dry eye syndrome    Hiatal hernia    History of smoking    Homonymous hemianopia    Hypothyroidism    Iron deficiency    Iron deficiency anemia    Kidney stones    Klippel-Feil sequence    Knee joint effusion    Lumbosacral radiculitis    Lumbosacral spondylosis without myelopathy    Open angle with borderline findings, low risk, bilateral    Osteoarthritis of right knee    Post poliomyelitis syndrome    Recurrent major depression    Screening for depression    Spondylolisthesis, acquired    Vaccine refused by patient    Vertigo    Cobalamin deficiency    Vitamin D deficiency    Lumbar hernia    History of pleural effusion        Diagnosis Plan   1. Bilateral carotid artery stenosis  US Carotid Bilateral      2. PAD (peripheral artery disease)  US Ankle / Brachial Indices Extremity Complete      3. Primary hypertension        4. Hyperlipidemia LDL goal <70              Plan: After thoroughly evaluating Carlo Velasco, I believe the best course of  action is to remain conservative from vascular surgery standpoint.  Currently he is doing well and denies any strokelike symptoms.  He also denies any changes to his lower extremities.  I did review his testing which shows less than 50% carotid stenosis bilaterally.  His ABIs show no arterial insufficiency to his lower extremities.  We will see him back in 1 year with repeat noninvasive testing for continued surveillance, including a carotid duplex and ABIs.  I did discuss vascular risk factors as they pertain to the progression of vascular disease including controlling his hypertension and hyperlipidemia.  His blood pressure stable on his current medications.  He should continue on his Plavix 75 mg daily and Lipitor 80 mg nightly in addition to his other medications.   Recommended he could wear compression stockings to aid with his swelling.  The patient is to continue taking their medications as previously discussed.   This was all discussed in full with complete understanding.  Thank you for allowing me to participate in the care of your patient.  Please do not hesitate to call with any questions or concerns.  We will keep you aware of any further encounters with Carlo Velasco.        Sincerely yours,         ANALI Ferguson Jeffrey L, MD

## 2023-12-06 ENCOUNTER — TRANSCRIBE ORDERS (OUTPATIENT)
Dept: ADMINISTRATIVE | Facility: HOSPITAL | Age: 78
End: 2023-12-06
Payer: MEDICARE

## 2023-12-06 ENCOUNTER — LAB (OUTPATIENT)
Dept: LAB | Facility: HOSPITAL | Age: 78
End: 2023-12-06
Payer: MEDICARE

## 2023-12-06 DIAGNOSIS — E78.5 HYPERLIPIDEMIA, UNSPECIFIED HYPERLIPIDEMIA TYPE: ICD-10-CM

## 2023-12-06 DIAGNOSIS — I10 ESSENTIAL (PRIMARY) HYPERTENSION: Primary | ICD-10-CM

## 2023-12-06 DIAGNOSIS — R73.9 HYPERGLYCEMIA, UNSPECIFIED: ICD-10-CM

## 2023-12-06 DIAGNOSIS — I10 ESSENTIAL (PRIMARY) HYPERTENSION: ICD-10-CM

## 2023-12-06 LAB
ALBUMIN SERPL-MCNC: 4.1 G/DL (ref 3.5–5)
ALBUMIN/GLOB SERPL: 1.2 G/DL (ref 1.1–2.5)
ALP SERPL-CCNC: 73 U/L (ref 24–120)
ALT SERPL W P-5'-P-CCNC: 26 U/L (ref 0–50)
ANION GAP SERPL CALCULATED.3IONS-SCNC: 8 MMOL/L (ref 4–13)
AST SERPL-CCNC: 26 U/L (ref 7–45)
AUTO MIXED CELLS #: 0.9 10*3/MM3 (ref 0.1–2.6)
AUTO MIXED CELLS %: 10 % (ref 0.1–24)
BILIRUB SERPL-MCNC: 0.7 MG/DL (ref 0.1–1)
BUN SERPL-MCNC: 17 MG/DL (ref 5–21)
BUN/CREAT SERPL: 16.8
CALCIUM SPEC-SCNC: 9.8 MG/DL (ref 8.4–10.4)
CHLORIDE SERPL-SCNC: 102 MMOL/L (ref 98–110)
CHOLEST SERPL-MCNC: 260 MG/DL (ref 130–200)
CO2 SERPL-SCNC: 28 MMOL/L (ref 24–31)
CREAT SERPL-MCNC: 1.01 MG/DL (ref 0.5–1.4)
EGFRCR SERPLBLD CKD-EPI 2021: 77.1 ML/MIN/1.73
ERYTHROCYTE [DISTWIDTH] IN BLOOD BY AUTOMATED COUNT: 14.4 % (ref 12.3–15.4)
GLOBULIN UR ELPH-MCNC: 3.4 GM/DL
GLUCOSE SERPL-MCNC: 127 MG/DL (ref 70–100)
HBA1C MFR BLD: 5.9 % (ref 4.8–5.9)
HCT VFR BLD AUTO: 46.1 % (ref 37.5–51)
HDLC SERPL-MCNC: 44 MG/DL
HGB BLD-MCNC: 14.5 G/DL (ref 13–17.7)
LDLC SERPL CALC-MCNC: 193 MG/DL (ref 0–99)
LDLC/HDLC SERPL: 4.34 {RATIO}
LYMPHOCYTES # BLD AUTO: 1.8 10*3/MM3 (ref 0.7–3.1)
LYMPHOCYTES NFR BLD AUTO: 19.4 % (ref 19.6–45.3)
MCH RBC QN AUTO: 28.5 PG (ref 26.6–33)
MCHC RBC AUTO-ENTMCNC: 31.5 G/DL (ref 31.5–35.7)
MCV RBC AUTO: 90.6 FL (ref 79–97)
NEUTROPHILS NFR BLD AUTO: 6.4 10*3/MM3 (ref 1.7–7)
NEUTROPHILS NFR BLD AUTO: 70.6 % (ref 42.7–76)
PLATELET # BLD AUTO: 240 10*3/MM3 (ref 140–450)
PMV BLD AUTO: 8 FL (ref 6–12)
POTASSIUM SERPL-SCNC: 3.9 MMOL/L (ref 3.5–5.3)
PROT SERPL-MCNC: 7.5 G/DL (ref 6.3–8.7)
RBC # BLD AUTO: 5.09 10*6/MM3 (ref 4.14–5.8)
SODIUM SERPL-SCNC: 138 MMOL/L (ref 135–145)
TRIGL SERPL-MCNC: 125 MG/DL (ref 0–149)
VLDLC SERPL-MCNC: 23 MG/DL (ref 5–40)
WBC NRBC COR # BLD AUTO: 9.1 10*3/MM3 (ref 3.4–10.8)

## 2023-12-06 PROCEDURE — 83036 HEMOGLOBIN GLYCOSYLATED A1C: CPT

## 2023-12-06 PROCEDURE — 85025 COMPLETE CBC W/AUTO DIFF WBC: CPT

## 2023-12-06 PROCEDURE — 36415 COLL VENOUS BLD VENIPUNCTURE: CPT

## 2023-12-06 PROCEDURE — 80061 LIPID PANEL: CPT

## 2023-12-06 PROCEDURE — 80053 COMPREHEN METABOLIC PANEL: CPT

## 2024-01-11 NOTE — TELEPHONE ENCOUNTER
Abdomen , soft, nontender, nondistended , no guarding or rigidity , no masses palpable , normal bowel sounds , Liver and Spleen , no hepatomegaly present  , liver nontender ,  TAMAR DAMICO - WE NEED A PROCEDURE DATE FOR MR BERNARDO' COLONOSCOPY SO THAT WE CAN PLAN HIS LOVENOX BRIDGING. THANKS!    ERIN MAY DR.

## 2024-02-13 ENCOUNTER — HOSPITAL ENCOUNTER (OUTPATIENT)
Dept: PAIN MANAGEMENT | Age: 79
Discharge: HOME | End: 2024-02-13
Payer: MEDICARE

## 2024-02-13 VITALS
DIASTOLIC BLOOD PRESSURE: 53 MMHG | TEMPERATURE: 97 F | OXYGEN SATURATION: 99 % | RESPIRATION RATE: 18 BRPM | HEART RATE: 66 BPM | SYSTOLIC BLOOD PRESSURE: 126 MMHG

## 2024-02-13 DIAGNOSIS — R52 PAIN MANAGEMENT: ICD-10-CM

## 2024-02-13 PROCEDURE — 2580000003 HC RX 258

## 2024-02-13 PROCEDURE — A4216 STERILE WATER/SALINE, 10 ML: HCPCS

## 2024-02-13 PROCEDURE — G0260 INJ FOR SACROILIAC JT ANESTH: HCPCS

## 2024-02-13 PROCEDURE — 6360000002 HC RX W HCPCS

## 2024-02-13 PROCEDURE — 2500000003 HC RX 250 WO HCPCS

## 2024-02-13 RX ORDER — METHYLPREDNISOLONE ACETATE 80 MG/ML
80 INJECTION, SUSPENSION INTRA-ARTICULAR; INTRALESIONAL; INTRAMUSCULAR; SOFT TISSUE ONCE
Status: DISCONTINUED | OUTPATIENT
Start: 2024-02-13 | End: 2024-02-15 | Stop reason: HOSPADM

## 2024-02-13 RX ORDER — LIDOCAINE HYDROCHLORIDE 10 MG/ML
7 INJECTION, SOLUTION EPIDURAL; INFILTRATION; INTRACAUDAL; PERINEURAL ONCE
Status: DISCONTINUED | OUTPATIENT
Start: 2024-02-13 | End: 2024-02-15 | Stop reason: HOSPADM

## 2024-02-13 RX ORDER — BUPIVACAINE HYDROCHLORIDE 5 MG/ML
2 INJECTION, SOLUTION EPIDURAL; INTRACAUDAL ONCE
Status: DISCONTINUED | OUTPATIENT
Start: 2024-02-13 | End: 2024-02-15 | Stop reason: HOSPADM

## 2024-02-13 RX ORDER — SODIUM CHLORIDE 9 MG/ML
3 INJECTION INTRAVENOUS ONCE
Status: DISCONTINUED | OUTPATIENT
Start: 2024-02-13 | End: 2024-02-15 | Stop reason: HOSPADM

## 2024-02-13 NOTE — INTERVAL H&P NOTE
Update History & Physical    The patient's History and Physical  was reviewed with the patient and I examined the patient. There was no change. The surgical site was confirmed by the patient and me.     Plan: The risks, benefits, expected outcome, and alternative to the recommended procedure have been discussed with the patient. Patient understands and wants to proceed with the procedure.     Electronically signed by Rafita Arredondo MD on 2/13/2024 at 11:54 AM

## 2024-02-13 NOTE — PROGRESS NOTES
Procedure:  Level of Consciousness: [x]Alert [x]Oriented []Disoriented []Lethargic  Anxiety Level: [x]Calm []Anxious []Depressed []Other  Skin: [x]Warm [x]Dry []Cool []Moist []Intact []Other  Cardiovascular: [x]Palpitations: [x]Never []Occasionally []Frequently  Chest Pain: [x]No []Yes  Respiratory:  [x]Unlabored []Labored []Cough ([] Productive []Unproductive)  HCG Required: [x]No []Yes   Results: []Negative []Positive  Knowledge Level:        [x]Patient/Other verbalized understanding of pre-procedure instructions.        [x]Assessment of post-op care needs (transportation, responsible caregiver)        [x]Able to discuss health care problems and how to deal with it.  Factors that Affect Teaching:        Language Barrier: [x]No []Yes - why:        Hearing Loss:        []No [x]Yes            Corrective Device:  [x]Yes []No        Vision Loss:           []No [x]Yes            Corrective Device:  [x]Yes []No        Memory Loss:       [x]No []Yes            []Short Term []Long Term  Motivational Level:  [x]Asks Questions                  []Extremely Anxious       [x]Seems Interested               []Seems Uninterested                  []Denies need for Education  Risk for Injury:  [x]Patient oriented to person, place and time  []History of frequent falls/loss of balance  Nutritional:  []Change in appetite   []Weight Gain   []Weight Loss  Functional:  []Requires assistance with ADL's

## 2024-03-11 ENCOUNTER — OFFICE VISIT (OUTPATIENT)
Dept: NEUROSURGERY | Facility: CLINIC | Age: 79
End: 2024-03-11
Payer: MEDICARE

## 2024-03-11 ENCOUNTER — APPOINTMENT (OUTPATIENT)
Dept: OTHER | Facility: HOSPITAL | Age: 79
End: 2024-03-11
Payer: MEDICARE

## 2024-03-11 ENCOUNTER — HOSPITAL ENCOUNTER (OUTPATIENT)
Dept: GENERAL RADIOLOGY | Facility: HOSPITAL | Age: 79
Discharge: HOME | End: 2024-03-11
Payer: MEDICARE

## 2024-03-11 VITALS — BODY MASS INDEX: 32.58 KG/M2 | WEIGHT: 215 LBS | HEIGHT: 68 IN

## 2024-03-11 DIAGNOSIS — R20.2 NUMBNESS AND TINGLING OF BOTH UPPER EXTREMITIES: ICD-10-CM

## 2024-03-11 DIAGNOSIS — R20.0 NUMBNESS AND TINGLING OF BOTH UPPER EXTREMITIES: ICD-10-CM

## 2024-03-11 DIAGNOSIS — R29.2 HYPERREFLEXIA: ICD-10-CM

## 2024-03-11 DIAGNOSIS — M54.6 THORACIC BACK PAIN, UNSPECIFIED BACK PAIN LATERALITY, UNSPECIFIED CHRONICITY: ICD-10-CM

## 2024-03-11 DIAGNOSIS — R29.6 RECURRENT FALLS: ICD-10-CM

## 2024-03-11 DIAGNOSIS — M54.2 CERVICALGIA: ICD-10-CM

## 2024-03-11 DIAGNOSIS — M54.2 CERVICALGIA: Primary | ICD-10-CM

## 2024-03-11 DIAGNOSIS — Z98.1 HISTORY OF FUSION OF CERVICAL SPINE: ICD-10-CM

## 2024-03-11 PROCEDURE — 99214 OFFICE O/P EST MOD 30 MIN: CPT | Performed by: NURSE PRACTITIONER

## 2024-03-11 PROCEDURE — 1160F RVW MEDS BY RX/DR IN RCRD: CPT | Performed by: NURSE PRACTITIONER

## 2024-03-11 PROCEDURE — 1159F MED LIST DOCD IN RCRD: CPT | Performed by: NURSE PRACTITIONER

## 2024-03-11 PROCEDURE — 72052 X-RAY EXAM NECK SPINE 6/>VWS: CPT

## 2024-03-11 NOTE — PROGRESS NOTES
Chief complaint:   Chief Complaint   Patient presents with    Back Pain     Pt is here with complains of back pain.     Subjective     HPI:   Last encounter: 3/2/2022    Interval History: Carlo Velasco is a 78 y.o.  male who presents today for with a complaint of midthoracic back pain.  History of a left unilateral posterior fusion L3-5 (Dr. Connell 2009) for lumbar back and bilateral leg pain temporary resolved postoperatively, C5-6 ACDF, as well as a T6 compression fracture as a result of a 9 foot fall from ladder December 2021.  Known to have a non MRI compatible pacemaker.    Onset of his mid thoracic back pain began approximately 4 weeks ago after he fell face first from a bulldozer.  He denies worsening lumbar back pain or radicular pain or dysesthesias in the thoracic or lumbar distribution.  He additionally endorses persistent neck pain, as well as numbness and tingling to digits 3-5 to the bilateral hands.  He denies upper extremity weakness or decline in fine motor skills.  He does endorse progressively decline in his gait and balance and recurrent falls.  Mr. Velasco states he is fallen several times since the onset of his worsening back pain.  He intermittently uses an assistive device while ambulating.  His back pain worsens with prolonged sitting in upright position, as well as with physical activity that requires lifting.  Minimal alleviation of his discomfort with rest and use of Tylenol.  He denies fevers, chills, night sweats, unexplained weight loss, saddle anesthesia, or bowel or bladder dysfunction.  He currently rates the severity of his symptoms 3/5.  No additional concerns at this time.    Oswestry Disability Index = 40%   Score   Pain Intensity Moderate pain-2   Personal Care I can look after myself but it is slow and painful-2   Lifting Medium weights off a table-3   Walking Pain prevents > 1/4 mile-2   Sitting Pain prevents sitting > 30 min-3   Standing Pain limits standing to < 1/2  hr-3   Sleeping Occasionally disturbed-1   Sex Life (if applicable) Not applicable-0   Social Life I do not go out as often because of pain-3   Traveling Travel gives me extra pain-1   (Adarsh et al, 1980)    SCORE INTERPRETATION OF THE OSWESTRY LBP DISABILITY QUESTIONNAIRE     20-40% Moderate disability.  This group experiences more pain and problems with sitting, lifting, and standing.  Travel and social life are more difficult and they may well be off work. Personal care, sexual activity, and sleeping are not grossly affected, and the back condition can usually be managed by conservative means.      ROS  Review of Systems   Constitutional: Negative.    HENT: Negative.     Eyes: Negative.    Respiratory: Negative.     Cardiovascular: Negative.    Gastrointestinal: Negative.    Endocrine: Negative.    Genitourinary: Negative.    Musculoskeletal:  Positive for back pain and gait problem.   Skin: Negative.    Allergic/Immunologic: Negative.    Neurological:  Positive for numbness.   Hematological: Negative.    Psychiatric/Behavioral: Negative.     All other systems reviewed and are negative.    PFSH:  Past Medical History:   Diagnosis Date    Anxiety     Arthritis     Cancer     melanoma    Chronic diastolic congestive heart failure 04/15/2022    Claustrophobia     Colon polyp     COPD (chronic obstructive pulmonary disease)     Coronary artery disease involving autologous artery coronary bypass graft without angina pectoris 09/22/2016    Depression     Disease of thyroid gland     partial thyroidectomy on right    GERD (gastroesophageal reflux disease)     Hearing loss     Heart attack     History of transfusion     Hypertension     Kidney stone     history of     Low back pain     Mild intermittent asthma without complication 08/24/2021    Open angle with borderline findings, low risk, bilateral 08/23/2021    Pain management     STATES JUST HAD INJECTION IN BACK ON 06/20/2023    Presence of cardiac pacemaker  04/15/2022    Primary hypertension 09/22/2016    Pulmonary hypertension 04/15/2022    S/P CABG x 2 01/04/2019    LIMA to LAD and SVG to RCA 8/8/12    Sick sinus syndrome 07/23/2020    Stented coronary artery 01/04/2019    2.5 x 18 mm Xience Alpine drug-eluting stent to the mid LAD 7/8/2015, 2.5 x 18 mm Xience drug-eluting stent to the proximal OM1 10/21/2016 and 2.5 x 12 mm Synergy drug-eluting stent to OM1 for severe in-stent restenosis 4/28/2020    Tobacco abuse, in remission     Uses hearing aid     BILATERAL     Past Surgical History:   Procedure Laterality Date    BACK SURGERY      CERVICAL FUSION AND LUMBAR DISC REPLACEMENT    BRONCHOSCOPY N/A 04/27/2021    Procedure: BRONCHOSCOPY;  Surgeon: Rico Jacobs MD;  Location: [facility] OR;  Service: Cardiothoracic;  Laterality: N/A;    CARDIAC CATHETERIZATION  07/2015    SHAHRZAD mid LAD, Dr. Toribio    CARDIAC CATHETERIZATION N/A 10/21/2016    Procedure: Left Heart Cath;  Surgeon: Darian Toribio MD;  Location:  [facility] CATH INVASIVE LOCATION;  Service:     CARDIAC CATHETERIZATION Left 04/05/2017    Procedure: Cardiac Catheterization/Vascular Study;  Surgeon: Darian Toribio MD;  Location:  [facility] CATH INVASIVE [facility];  Service:     CARDIAC CATHETERIZATION N/A 04/05/2017    Procedure: Left Heart Cath;  Surgeon: Darian Toribio MD;  Location:  [facility] CATH INVASIVE [facility];  Service:     CARDIAC CATHETERIZATION N/A 04/05/2017    Procedure: Coronary angiography;  Surgeon: Darian Toribio MD;  Location:  [facility] CATH INVASIVE [facility];  Service:     CARDIAC CATHETERIZATION N/A 04/05/2017    Procedure: Left ventriculography;  Surgeon: Darian Toribio MD;  Location:  [facility] CATH INVASIVE [facility];  Service:     CARDIAC CATHETERIZATION  04/05/2017    Procedure: Saphenous Vein Graft;  Surgeon: Darian Toribio MD;  Location:  [facility] CATH INVASIVE [facility];  Service:     CARDIAC CATHETERIZATION Left 02/27/2018    Procedure: Cardiac Catheterization/Vascular Study SHAHRZAD OK PRN;  Surgeon: Darian Toribio MD;   Location:  [facility] CATH INVAS[facility];  Service:     CARDIAC CATHETERIZATION  02/27/2018    Procedure: Functional Flow [place];  Surgeon: Darian Toribio MD;  Location: [facility] CATH INVASIVE LOCATION;  Service:     CARDIAC CATHETERIZATION N/A 02/27/2018    Procedure: Left ventriculography;  Surgeon: Darian Toribio MD;  Location:  [facility] CATH INVASIVE [facility];  Service:     CARDIAC CATHETERIZATION Left 04/28/2020    Procedure: Cardiac Catheterization/Vascular Study SHAHRZAD OK PRN;  Surgeon: Darian Toribio MD;  Location:  [facility] CATH INVAS[facility];  Service: Cardiology;  Laterality: Left;    CARDIAC ELECTROPHYSIOLOGY PROCEDURE N/A 01/11/2018    Procedure: PPM generator change - dual;  Surgeon: Darian Toribio MD;  Location: [facility] CATH INVASIVE LOCATION;  Service:     CARDIAC SURGERY      open heart surgery x2    COLONOSCOPY  09/05/2007    colon polyps    COLONOSCOPY N/A 05/29/2018    Procedure: COLONOSCOPY WITH ANESTHESIA;  Surgeon: Juan F Tapia MD;  Location: [facility] ENDOSCOPY;  Service: Gastroenterology    COLONOSCOPY N/A 10/21/2021    Procedure: COLONOSCOPY WITH ANESTHESIA;  Surgeon: Juan F Tapia MD;  Location: [facility] ENDOSCOPY;  Service: Gastroenterology;  Laterality: N/A;  pre op: hx polyps  post op;polyp,   PCP: Rhys Rosen MD    CORONARY ANGIOPLASTY WITH STENT PLACEMENT  07/2015    SHAHRZAD mid LAD, Dr. Toribio     CORONARY ARTERY BYPASS GRAFT  2012 AND 2014    2 vessel     ENDOSCOPY  11/08/2010    ENDOSCOPY N/A 05/29/2018    Procedure: ESOPHAGOGASTRODUODENOSCOPY WITH ANESTHESIA;  Surgeon: Juan F Tapia MD;  Location: [facility] ENDOSCOPY;  Service: Gastroenterology    HEAD/NECK LESION/CYST EXCISION Left 12/20/2016    Procedure: EXCISION MALIGNANT MELANOMA WITH SENTINEL NODE BIOPSY, INJECTION AND SCAN PRE-OP, LEFT EAR;  Surgeon: Guanaco Zepeda MD;  Location: [facility] OR;  Service:     HEMORRHOIDECTOMY      HERNIA REPAIR      INSERT / REPLACE / REMOVE PACEMAKER      REPLACEMENT TOTAL KNEE Left      BRIAN-STOPPA INCISIONAL HERNIA REPAIR Left 6/28/2023    Procedure: BRIAN-STOPPA INCISIONAL HERNIA REPAIR left lumbar hernia repair with mesh;  Surgeon: Govind Celeste MD;  Location:  [facility] OR;  Service: General;  Laterality: Left;    SENTINEL NODE BIOPSY Left 12/20/2016    Procedure: SENTINEL NODE BIOPSY;  Surgeon: Guanaco Zepeda MD;  Location:  [facility] OR;  Service:     SKIN CANCER EXCISION      THORACOSCOPY Right 04/27/2021    Procedure: THORACOSCOPIC TOTAL DECORTICATION;  Surgeon: Rico Jacobs MD;  Location:  [facility] OR;  Service: Cardiothoracic;  Laterality: Right;    THYROID SURGERY      THYROIDECTOMY       Objective      Current Outpatient Medications   Medication Sig Dispense Refill    atorvastatin (LIPITOR) 80 MG tablet Take 1 tablet by mouth Every Night.      budesonide-formoterol (SYMBICORT) 160-4.5 MCG/ACT inhaler Inhale 2 puffs 2 (Two) Times a Day. 10.2 g 5    Calcium Carbonate 1500 (600 Ca) MG tablet Take 1 tablet by mouth Daily.      Cholecalciferol 125 MCG (5000 UT) tablet Take 1 tablet by mouth Daily.      clopidogrel (PLAVIX) 75 MG tablet TAKE 1 TABLET BY MOUTH DAILY 90 tablet 3    Cyanocobalamin (B-12) 1000 MCG sublingual tablet 1 tablet Daily.      escitalopram (LEXAPRO) 20 MG tablet Take 1 tablet by mouth Daily. 90 tablet 1    ferrous sulfate 325 (65 FE) MG tablet Take 1 tablet by mouth Every Morning.      fluticasone (FLONASE) 50 MCG/ACT nasal spray 2 sprays into the nostril(s) as directed by provider Daily. 16 g 5    furosemide (LASIX) 40 MG tablet Take 1 tablet by mouth Daily As Needed (for any increase in 2 lbs in a daqy or 5 lbs in a week or increassing swelling or congestion). (Patient taking differently: Take 1 tablet by mouth Daily.)      ibuprofen (ADVIL,MOTRIN) 800 MG tablet Take 1 tablet by mouth Every 8 (Eight) Hours As Needed. for pain      isosorbide mononitrate (IMDUR) 30 MG 24 hr tablet TAKE 1 TABLET BY MOUTH EVERY MORNING 90 tablet 3    metoprolol tartrate (LOPRESSOR) 25 MG  "tablet TAKE 1 TABLET BY MOUTH DAILY 90 tablet 3    NITROSTAT 0.4 MG SL tablet Place 1 tablet under the tongue Every 5 (Five) Minutes As Needed for Chest Pain. Take no more than 3 doses in 15 minutes. 100 tablet 2    omega-3 acid ethyl esters (LOVAZA) 1 g capsule TAKE 1 CAPSULE BY MOUTH DAILY 90 capsule 3    omeprazole (priLOSEC) 20 MG capsule TAKE 1 CAPSULE BY MOUTH DAILY 90 capsule 3    Potassium Gluconate 2.5 MEQ tablet Take 1 tablet by mouth Daily.      spironolactone (ALDACTONE) 25 MG tablet TAKE 1 TABLET BY MOUTH DAILY 90 tablet 3    tamsulosin (FLOMAX) 0.4 MG capsule 24 hr capsule Take 1 capsule by mouth Daily.      topiramate (TOPAMAX) 50 MG tablet Take 1 tablet by mouth 2 (Two) Times a Day.      Zinc Sulfate (ZINC-220 PO) Take 1 tablet by mouth Daily.       No current facility-administered medications for this visit.     Vital Signs  Ht 172.7 cm (68\")   Wt 97.5 kg (215 lb)   BMI 32.69 kg/m²   Physical Exam  Vitals and nursing note reviewed.   Constitutional:       General: He is not in acute distress.     Appearance: Normal appearance. He is well-developed and well-groomed. He is obese. He is not ill-appearing, toxic-appearing or diaphoretic.      Comments: BMI 32.69   HENT:      Head: Normocephalic and atraumatic.      Right Ear: Hearing normal.      Left Ear: Hearing normal.   Eyes:      Extraocular Movements: EOM normal.      Conjunctiva/sclera: Conjunctivae normal.      Pupils: Pupils are equal, round, and reactive to light.   Neck:      Trachea: Trachea normal.   Cardiovascular:      Rate and Rhythm: Normal rate and regular rhythm.   Pulmonary:      Effort: Pulmonary effort is normal. No tachypnea, bradypnea, accessory muscle usage or respiratory distress.   Abdominal:      Palpations: Abdomen is soft.   Musculoskeletal:      Cervical back: Full passive range of motion without pain and neck supple.   Skin:     General: Skin is warm and dry.   Neurological:      Mental Status: He is alert and oriented " to person, place, and time.      GCS: GCS eye subscore is 4. GCS verbal subscore is 5. GCS motor subscore is 6.      Deep Tendon Reflexes:      Reflex Scores:       Tricep reflexes are 3+ on the right side and 3+ on the left side.       Bicep reflexes are 3+ on the right side and 3+ on the left side.       Brachioradialis reflexes are 3+ on the right side and 3+ on the left side.       Patellar reflexes are 3+ on the right side and 0 on the left side.       Achilles reflexes are 0 on the right side and 0 on the left side.  Psychiatric:         Speech: Speech normal.         Behavior: Behavior normal. Behavior is cooperative.       Neurologic Exam     Mental Status   Oriented to person, place, and time.   Attention: normal. Concentration: normal.   Speech: speech is normal   Level of consciousness: alert    Cranial Nerves     CN II   Visual fields full to confrontation.     CN III, IV, VI   Pupils are equal, round, and reactive to light.  Extraocular motions are normal.     CN V   Facial sensation intact.     CN VII   Facial expression full, symmetric.     CN VIII   CN VIII normal.     CN IX, X   CN IX normal.     CN XI   CN XI normal.     Motor Exam   Right arm tone: normal  Left arm tone: normal  Right leg tone: normal  Left leg tone: normal    Strength   Right deltoid: 5/5  Left deltoid: 5/5  Right biceps: 5/5  Left biceps: 5/5  Right triceps: 5/5  Left triceps: 5/5  Right wrist extension: 5/5  Left wrist extension: 5/5  Right iliopsoas: 5/5  Left iliopsoas: 5/5  Right quadriceps: 5/5  Left quadriceps: 5/5  Right anterior tibial: 5/5  Left anterior tibial: 5/5  Right gastroc: 5/5  Left gastroc: 5/5  Right EHL 5/5  Left EHL 5/5       Sensory Exam   Right arm light touch: normal  Left arm light touch: normal  Right leg light touch: normal  Left leg light touch: normal    Gait, Coordination, and Reflexes     Tremor   Resting tremor: absent  Intention tremor: absent  Action tremor: absent    Reflexes   Right  brachioradialis: 3+  Left brachioradialis: 3+  Right biceps: 3+  Left biceps: 3+  Right triceps: 3+  Left triceps: 3+  Right patellar: 3+  Left patellar: 0  Right achilles: 0  Left achilles: 0  Right Sargent: absent  Left Sargent: absent  Right ankle clonus: absent  Left ankle clonus: absent  Right pendular knee jerk: absent  Left pendular knee jerk: absent  Antalgic gait   (12 bullet pts)    Male  strength (pounds)  AGE Right Hand RH Norms Left Hand LH Norms   20-24  121±20.6  104±21.8   25-29  120±23.0  110±16.2   30-34  121±22.4  110±21.7   35-39  119±24  113±21.7   40-44  117±20.7  112±18.7   45-49  110±23.0  101±22.8   50-54  113±18.1  102±17   55-59  101±26.7  83±23.4   60-64  90±20.4  77±20.3   65-69  91±20.6  76.8±19.8   70-74  75±21.5  65±18.1   75+ *65 66±21.0 80 55±17.0   (ERNESTINE Zamora et al; Hand Dynometer: Effects of trials and sessions.  Perpetual and Motor Skills 61:195-8, 1985)  * = Dominant hand  > = Intervention     Results Review:   XR Spine Thoracic 2 View     Result Date: 2/18/2022  Impression: Known T6 vertebral body fracture as above. This report was finalized on 02/18/2022 18:44 by Dr. Becky York MD.     CT myelogram of the thoracic and lumbar spine are personally reviewed from 2018.  This shows no evidence of stenosis or spondylolisthesis       2/17/2022      3/5/2023.  Multilevel degenerative changes and age-indeterminate vertebral compression fracture      Assessment/Plan:   Carlo Velasco is a 78 y.o. male with a significant medical history of a left unilateral posterior fusion L3-5 (Dr. Connell 2009) for lumbar back and bilateral leg pain temporary resolved postoperatively, T6 compression fracture post 9 foot fall from ladder December 2021, C5-6 ACDF, CAD currently on Plavix, non-MRI compatible pacemaker, CHF, hypertension, hyperlipidemia, claustrophobia, depression, GERD, polio, former smoker, and obesity.   He presents with a known problem of midthoracic back pain, as well as a  complaint of neck pain, bilateral upper extremity numbness and tingling, and a progressive decline in gait and balance resulting in recurrent falls. ALY: 40.  Physical exam findings of mild upper extremity hyperreflexia and a subtle antalgic gait.  Their imaging shown multilevel degenerative changes and age-indeterminate vertebral body compression fractures to the upper mid thoracic spine.    Recommendations:  Cervicalgia  Bilateral upper extremity numbness and tingling  Hyperreflexia  Unsteady gait and recurrent falls  History of C5-6 ACDF  For further evaluation we will obtain x-rays of cervical spine complete with flexion-extension to assess for areas of instability.  Given Mr. Velasco complaint of neck pain, upper extremity numbness and tingling, and a decline in gait and balance resulting in recurrent falls in conjunction with his physical exam findings to include upper extremity hyperreflexia we will send him for a CT myelogram of the cervical spine to rule out cervical stenosis and need for surgical intervention.  Risk and benefits of the procedure were discussed and reviewed with patient.    Mr. Velasco knows to discontinue Plavix 7 days prior to the exam.  I would like him return for reassessment with Dr. Tineo after all studies been completed to discuss need for surgical intervention.  Mr. Velasco may contact the neurosurgical clinic to return sooner for any new or additional concerns and agrees with this plan of care.    Mid upper thoracic back pain  Concern for age-indeterminate thoracic compression fracture(s)   Recent x-ray of the thoracic spine obtained at [facility] showed concern for a T6 compression fracture, however Mr. Velasco has a known T6 compression fracture as a result of a 9 foot fall from a ladder in December 2021.  Giving his pacemaker is not MRI compatible, we will send him for a nuclear medicine bone scan to rule out acute spinal fractures throughout the mid thoracic spine.  No bracing at  this time.  Further recommendations may follow advanced imaging.    Class 1 obesity (BMI 30.0-34.9) due to excessive calories with serious comorbidities BMI of 32.0-32.9 in adult  Body mass index is 32.69 kg/m². Information on the DASH diet provided in the AVS.  We will continue to provided diet and exercise information with the goal of weight loss at each scheduled appointment.     [facility] Fall Risk Assessment was completed, and patient is at HIGH risk for falls. Assessment completed on:3/11/2024  Fall risk information provided in AVS    Diagnoses and all orders for this visit:    1. Cervicalgia (Primary)  -     XR Spine Cervical Complete With Flex Ext; Future  -     CT Cervical Spine With Intrathecal Contrast; Future  -     Obtain Informed Consent; Standing  -     IR Myelogram Cervical Spine; Future    2. Numbness and tingling of both upper extremities  -     CT Cervical Spine With Intrathecal Contrast; Future  -     IR Myelogram Cervical Spine; Future    3. Hyperreflexia  -     CT Cervical Spine With Intrathecal Contrast; Future  -     IR Myelogram Cervical Spine; Future    4. Recurrent falls  -     CT Cervical Spine With Intrathecal Contrast; Future  -     IR Myelogram Cervical Spine; Future    5. History of fusion of cervical spine  -     CT Cervical Spine With Intrathecal Contrast; Future  -     IR Myelogram Cervical Spine; Future    6. Thoracic back pain, unspecified back pain laterality, unspecified chronicity  -     NM bone scan whole body; Future      Return for FOLLOW UP WITH DR. PATTERSON NEXT AVAILABLE WITH CT MYELOGRAM AND NUCLEAR MEDICINE SCAN.    Thank you, for allowing me to continue to participate in the care of this patient.    Sincerely,  ANALI Caruso

## 2024-03-13 ENCOUNTER — TELEPHONE (OUTPATIENT)
Dept: NEUROSURGERY | Facility: CLINIC | Age: 79
End: 2024-03-13

## 2024-03-13 NOTE — TELEPHONE ENCOUNTER
[facility] received a fax that requires your attention. The document has been indexed to the patient’s chart for your review.      Reason for sending: DEXA RESULTS    Documents Description: DEXA SCAN_[facility] RADIOLOGY_ 03/08/24    Name of Sender: TANA MITTAL MD & ODILON COURTNEY    Date Indexed: 03/13/24    Notes (if needed): DEXA SCAN @[facility] RADIOLOGY 03/08/24 RESULTS

## 2024-03-18 NOTE — INTERVAL H&P NOTE
Update History & Physical    The patient's History and Physical was reviewed with the patient and I examined the patient. There was  NO CHANGE:[id number]}. The surgical site was confirmed by the patient and me. Plan: The risks, benefits, expected outcome, and alternative to the recommended procedure have been discussed with the patient. Patient understands and wants to proceed with the procedure.      Electronically signed by Cari Koch MD on 9/1/2020 at 8:09 AM [Normal Growth] : growth [None] : No known medical problems [Normal Development] : development [No Elimination Concerns] : elimination [No Feeding Concerns] : feeding [No Skin Concerns] : skin [Normal Sleep Pattern] : sleep [School] : school [Development and Mental Health] : development and mental health [Nutrition and Physical Activity] : nutrition and physical activity [Oral Health] : oral health [Safety] : safety [No Medications] : ~He/She~ is not on any medications [Patient] : patient [Full Activity without restrictions including Physical Education & Athletics] : Full Activity without restrictions including Physical Education & Athletics [] : The components of the vaccine(s) to be administered today are listed in the plan of care. The disease(s) for which the vaccine(s) are intended to prevent and the risks have been discussed with the caretaker.  The risks are also included in the appropriate vaccination information statements which have been provided to the patient's caregiver.  The caregiver has given consent to vaccinate. [FreeTextEntry1] : Vaccines up to date Provided counseling on the diseases to be vaccinated against as well as the risks/benefits of providing and withholding recommended vaccines to be given today to SISI .All questions were answered and the parent verbalized understanding.  Hearing wnl  Vision wnl   CBC sent to lab  UA in office  TB risk assessment completed- no risk for TB. PPD not required  Molluscum discussed as skin infection caused by a pox virus. Passed by human to human transmission. Extremely common. The incubation period is one to six months and can last 6-18+ months. Usually not treated as treatment is often painful and irritating. Should avoid scratching lesions as this will cause autoinoculation.  Discussed safety/feeding/sleep as appropriate for age.  Time allowed for questions and all answered with understanding.

## 2024-03-28 ENCOUNTER — HOSPITAL ENCOUNTER (OUTPATIENT)
Dept: NUCLEAR MEDICINE | Facility: HOSPITAL | Age: 79
Discharge: HOME | End: 2024-03-28
Payer: MEDICARE

## 2024-03-28 DIAGNOSIS — M54.6 THORACIC BACK PAIN, UNSPECIFIED BACK PAIN LATERALITY, UNSPECIFIED CHRONICITY: ICD-10-CM

## 2024-03-28 PROCEDURE — 78306 BONE IMAGING WHOLE BODY: CPT

## 2024-03-28 PROCEDURE — 0 TECHNETIUM MEDRONATE KIT: Performed by: NURSE PRACTITIONER

## 2024-03-28 PROCEDURE — A9503 TC99M MEDRONATE: HCPCS | Performed by: NURSE PRACTITIONER

## 2024-03-28 RX ORDER — TC 99M MEDRONATE 20 MG/10ML
20.6 INJECTION, POWDER, LYOPHILIZED, FOR SOLUTION INTRAVENOUS
Status: COMPLETED | OUTPATIENT
Start: 2024-03-28 | End: 2024-03-28

## 2024-03-28 RX ADMIN — TECHNETIUM TC 99M MEDRONATE 20.6 MILLICURIE: 25 INJECTION, POWDER, FOR SOLUTION INTRAVENOUS at 09:55

## 2024-04-15 NOTE — TELEPHONE ENCOUNTER
Received refill request for patient's loratidine, called and spoke to patient's wife to see if he did indeed need a refill because it had been discontinued by Dr. Buckley and was not active on his med list. She said I could shred it and they would go over whether he should take it or not at his next visit with Dr. Buckley.

## 2024-04-17 ENCOUNTER — HOSPITAL ENCOUNTER (OUTPATIENT)
Dept: GENERAL RADIOLOGY | Facility: HOSPITAL | Age: 79
Discharge: HOME | End: 2024-04-17
Payer: MEDICARE

## 2024-04-17 ENCOUNTER — HOSPITAL ENCOUNTER (OUTPATIENT)
Dept: CT IMAGING | Facility: HOSPITAL | Age: 79
Discharge: HOME | End: 2024-04-17
Payer: MEDICARE

## 2024-04-17 VITALS
WEIGHT: 220.68 LBS | DIASTOLIC BLOOD PRESSURE: 80 MMHG | SYSTOLIC BLOOD PRESSURE: 139 MMHG | OXYGEN SATURATION: 96 % | HEART RATE: 61 BPM | HEIGHT: 69 IN | TEMPERATURE: 96.8 F | RESPIRATION RATE: 16 BRPM | BODY MASS INDEX: 32.69 KG/M2

## 2024-04-17 DIAGNOSIS — M54.2 CERVICALGIA: ICD-10-CM

## 2024-04-17 DIAGNOSIS — Z98.1 HISTORY OF FUSION OF CERVICAL SPINE: ICD-10-CM

## 2024-04-17 DIAGNOSIS — R29.2 HYPERREFLEXIA: ICD-10-CM

## 2024-04-17 DIAGNOSIS — R20.2 NUMBNESS AND TINGLING OF BOTH UPPER EXTREMITIES: ICD-10-CM

## 2024-04-17 DIAGNOSIS — R20.0 NUMBNESS AND TINGLING OF BOTH UPPER EXTREMITIES: ICD-10-CM

## 2024-04-17 DIAGNOSIS — R29.6 RECURRENT FALLS: ICD-10-CM

## 2024-04-17 LAB
APTT PPP: 30.9 SECONDS (ref 24.5–36)
BASOPHILS # BLD AUTO: 0.05 10*3/MM3 (ref 0–0.2)
BASOPHILS NFR BLD AUTO: 0.7 % (ref 0–1.5)
DEPRECATED RDW RBC AUTO: 48.2 FL (ref 37–54)
EOSINOPHIL # BLD AUTO: 0.22 10*3/MM3 (ref 0–0.4)
EOSINOPHIL NFR BLD AUTO: 3 % (ref 0.3–6.2)
ERYTHROCYTE [DISTWIDTH] IN BLOOD BY AUTOMATED COUNT: 14.6 % (ref 12.3–15.4)
HCT VFR BLD AUTO: 40.7 % (ref 37.5–51)
HGB BLD-MCNC: 13.1 G/DL (ref 13–17.7)
IMM GRANULOCYTES # BLD AUTO: 0.07 10*3/MM3 (ref 0–0.05)
IMM GRANULOCYTES NFR BLD AUTO: 1 % (ref 0–0.5)
INR PPP: 0.92 (ref 0.91–1.09)
LYMPHOCYTES # BLD AUTO: 1.89 10*3/MM3 (ref 0.7–3.1)
LYMPHOCYTES NFR BLD AUTO: 26.1 % (ref 19.6–45.3)
MCH RBC QN AUTO: 28.9 PG (ref 26.6–33)
MCHC RBC AUTO-ENTMCNC: 32.2 G/DL (ref 31.5–35.7)
MCV RBC AUTO: 89.8 FL (ref 79–97)
MONOCYTES # BLD AUTO: 0.57 10*3/MM3 (ref 0.1–0.9)
MONOCYTES NFR BLD AUTO: 7.9 % (ref 5–12)
NEUTROPHILS NFR BLD AUTO: 4.44 10*3/MM3 (ref 1.7–7)
NEUTROPHILS NFR BLD AUTO: 61.3 % (ref 42.7–76)
NRBC BLD AUTO-RTO: 0 /100 WBC (ref 0–0.2)
PLATELET # BLD AUTO: 186 10*3/MM3 (ref 140–450)
PMV BLD AUTO: 8.5 FL (ref 6–12)
PROTHROMBIN TIME: 12.8 SECONDS (ref 11.8–14.8)
RBC # BLD AUTO: 4.53 10*6/MM3 (ref 4.14–5.8)
WBC NRBC COR # BLD AUTO: 7.24 10*3/MM3 (ref 3.4–10.8)

## 2024-04-17 PROCEDURE — 62302 MYELOGRAPHY LUMBAR INJECTION: CPT

## 2024-04-17 PROCEDURE — 85610 PROTHROMBIN TIME: CPT | Performed by: RADIOLOGY

## 2024-04-17 PROCEDURE — 72126 CT NECK SPINE W/DYE: CPT

## 2024-04-17 PROCEDURE — 85025 COMPLETE CBC W/AUTO DIFF WBC: CPT | Performed by: RADIOLOGY

## 2024-04-17 PROCEDURE — 62284 INJECTION FOR MYELOGRAM: CPT

## 2024-04-17 PROCEDURE — 77003 FLUOROGUIDE FOR SPINE INJECT: CPT

## 2024-04-17 PROCEDURE — 85730 THROMBOPLASTIN TIME PARTIAL: CPT | Performed by: RADIOLOGY

## 2024-04-17 PROCEDURE — 25510000001 IOPAMIDOL 61 % SOLUTION: Performed by: NURSE PRACTITIONER

## 2024-04-17 PROCEDURE — 72240 MYELOGRAPHY NECK SPINE: CPT

## 2024-04-17 RX ORDER — LIDOCAINE HYDROCHLORIDE 10 MG/ML
5 INJECTION, SOLUTION INFILTRATION; PERINEURAL ONCE
Status: DISCONTINUED | OUTPATIENT
Start: 2024-04-17 | End: 2024-04-18 | Stop reason: HOSPADM

## 2024-04-17 RX ORDER — IOPAMIDOL 612 MG/ML
15 INJECTION, SOLUTION INTRATHECAL
Status: COMPLETED | OUTPATIENT
Start: 2024-04-17 | End: 2024-04-17

## 2024-04-17 RX ADMIN — IOPAMIDOL 15 ML: 612 INJECTION, SOLUTION INTRATHECAL at 14:02

## 2024-04-22 ENCOUNTER — DOCUMENTATION (OUTPATIENT)
Dept: NEUROSURGERY | Facility: CLINIC | Age: 79
End: 2024-04-22
Payer: MEDICARE

## 2024-04-29 RX ORDER — MECLIZINE HCL 12.5 MG/1
12.5 TABLET ORAL
COMMUNITY
Start: 2024-04-01

## 2024-05-01 ENCOUNTER — TRANSCRIBE ORDERS (OUTPATIENT)
Dept: ADMINISTRATIVE | Facility: HOSPITAL | Age: 79
End: 2024-05-01
Payer: MEDICARE

## 2024-05-01 DIAGNOSIS — E53.8 DEFICIENCY OF OTHER SPECIFIED B GROUP VITAMINS: ICD-10-CM

## 2024-05-01 DIAGNOSIS — F03.90 DEMENTIA, UNSPECIFIED DEMENTIA SEVERITY, UNSPECIFIED DEMENTIA TYPE, UNSPECIFIED WHETHER BEHAVIORAL, PSYCHOTIC, OR MOOD DISTURBANCE OR ANXIETY: ICD-10-CM

## 2024-05-01 DIAGNOSIS — E11.59 TYPE 2 DIABETES MELLITUS WITH OTHER CIRCULATORY COMPLICATION, UNSPECIFIED WHETHER LONG TERM INSULIN USE: ICD-10-CM

## 2024-05-01 DIAGNOSIS — R97.20 ELEVATED PROSTATE SPECIFIC ANTIGEN (PSA): ICD-10-CM

## 2024-05-01 DIAGNOSIS — N40.0 BENIGN PROSTATIC HYPERPLASIA WITHOUT LOWER URINARY TRACT SYMPTOMS: Primary | ICD-10-CM

## 2024-05-01 DIAGNOSIS — E55.9 VITAMIN D DEFICIENCY: ICD-10-CM

## 2024-05-01 DIAGNOSIS — E78.5 HYPERLIPIDEMIA, UNSPECIFIED HYPERLIPIDEMIA TYPE: ICD-10-CM

## 2024-05-09 ENCOUNTER — OFFICE VISIT (OUTPATIENT)
Dept: PULMONOLOGY | Facility: CLINIC | Age: 79
End: 2024-05-09
Payer: MEDICARE

## 2024-05-09 VITALS
HEART RATE: 83 BPM | OXYGEN SATURATION: 98 % | WEIGHT: 222.2 LBS | HEIGHT: 69 IN | SYSTOLIC BLOOD PRESSURE: 124 MMHG | BODY MASS INDEX: 32.91 KG/M2 | DIASTOLIC BLOOD PRESSURE: 80 MMHG

## 2024-05-09 DIAGNOSIS — Z87.891 FORMER SMOKER, STOPPED SMOKING MANY YEARS AGO: ICD-10-CM

## 2024-05-09 DIAGNOSIS — Z87.09 HISTORY OF PLEURAL EFFUSION: ICD-10-CM

## 2024-05-09 DIAGNOSIS — J44.9 CHRONIC OBSTRUCTIVE PULMONARY DISEASE, UNSPECIFIED COPD TYPE: Primary | ICD-10-CM

## 2024-05-09 DIAGNOSIS — R91.8 LUNG NODULES: ICD-10-CM

## 2024-05-09 DIAGNOSIS — J30.89 NON-SEASONAL ALLERGIC RHINITIS, UNSPECIFIED TRIGGER: ICD-10-CM

## 2024-05-09 DIAGNOSIS — J98.4 RESTRICTIVE LUNG DISEASE: ICD-10-CM

## 2024-05-09 DIAGNOSIS — E66.01 CLASS 2 SEVERE OBESITY DUE TO EXCESS CALORIES WITH SERIOUS COMORBIDITY AND BODY MASS INDEX (BMI) OF 39.0 TO 39.9 IN ADULT: ICD-10-CM

## 2024-05-09 DIAGNOSIS — G47.33 OSA (OBSTRUCTIVE SLEEP APNEA): ICD-10-CM

## 2024-05-09 DIAGNOSIS — Z87.01 HISTORY OF PNEUMONIA: ICD-10-CM

## 2024-05-09 DIAGNOSIS — J86.9 EMPYEMA OF PLEURA: ICD-10-CM

## 2024-05-09 PROCEDURE — 3079F DIAST BP 80-89 MM HG: CPT | Performed by: INTERNAL MEDICINE

## 2024-05-09 PROCEDURE — 99214 OFFICE O/P EST MOD 30 MIN: CPT | Performed by: INTERNAL MEDICINE

## 2024-05-09 PROCEDURE — 3074F SYST BP LT 130 MM HG: CPT | Performed by: INTERNAL MEDICINE

## 2024-05-09 RX ORDER — LORATADINE 10 MG/1
10 TABLET ORAL DAILY
Qty: 90 TABLET | Refills: 3 | Status: SHIPPED | OUTPATIENT
Start: 2024-05-09

## 2024-05-09 RX ORDER — FLUTICASONE PROPIONATE 50 MCG
2 SPRAY, SUSPENSION (ML) NASAL DAILY
Qty: 16 G | Refills: 5 | Status: SHIPPED | OUTPATIENT
Start: 2024-05-09

## 2024-05-09 RX ORDER — BUDESONIDE AND FORMOTEROL FUMARATE DIHYDRATE 160; 4.5 UG/1; UG/1
2 AEROSOL RESPIRATORY (INHALATION) 2 TIMES DAILY
Qty: 10.2 G | Refills: 5 | Status: SHIPPED | OUTPATIENT
Start: 2024-05-09

## 2024-05-09 RX ORDER — ALBUTEROL SULFATE 90 UG/1
2 AEROSOL, METERED RESPIRATORY (INHALATION) EVERY 4 HOURS PRN
Qty: 6.7 G | Refills: 5 | Status: SHIPPED | OUTPATIENT
Start: 2024-05-09

## 2024-05-20 ENCOUNTER — OFFICE VISIT (OUTPATIENT)
Dept: CARDIOLOGY | Facility: CLINIC | Age: 79
End: 2024-05-20
Payer: MEDICARE

## 2024-05-20 VITALS
WEIGHT: 221 LBS | BODY MASS INDEX: 32.73 KG/M2 | HEART RATE: 60 BPM | OXYGEN SATURATION: 98 % | DIASTOLIC BLOOD PRESSURE: 72 MMHG | SYSTOLIC BLOOD PRESSURE: 111 MMHG | HEIGHT: 69 IN | RESPIRATION RATE: 18 BRPM

## 2024-05-20 DIAGNOSIS — E78.5 HYPERLIPIDEMIA LDL GOAL <70: Primary | ICD-10-CM

## 2024-05-20 DIAGNOSIS — I10 PRIMARY HYPERTENSION: ICD-10-CM

## 2024-05-20 DIAGNOSIS — Z95.0 PRESENCE OF CARDIAC PACEMAKER: ICD-10-CM

## 2024-05-20 DIAGNOSIS — I50.32 CHRONIC DIASTOLIC CONGESTIVE HEART FAILURE: ICD-10-CM

## 2024-05-20 DIAGNOSIS — I25.810 CORONARY ARTERY DISEASE INVOLVING AUTOLOGOUS ARTERY CORONARY BYPASS GRAFT WITHOUT ANGINA PECTORIS: ICD-10-CM

## 2024-05-20 RX ORDER — NITROGLYCERIN 0.4 MG/1
0.4 TABLET SUBLINGUAL
Qty: 25 TABLET | Refills: 1 | Status: SHIPPED | OUTPATIENT
Start: 2024-05-20

## 2024-05-20 NOTE — PROGRESS NOTES
Carlo Velasco  [id number]  1945  78 y.o.  male    Referring Provider: Cruz Chaudhari MD    Reason for Follow-up Visit:      Here for routine follow up   coronary artery disease Coronary artery bypass grafting , stented coronary artery   sick sinus syndrome s/p pacemaker   Cardiac workup test results as below: echo, stress test     Subjective    Overall feels the same   No new events or complaints since last visit   Overall the patient feels no major change from baseline symptoms   Similar symptoms as during last visit       Awaiting labs by primary   Mild chronic exertional shortness of breath on exertion relieved with rest  No significant cough or wheezing    No palpitations  No associated chest pain  No significant pedal edema    No fever or chills  No significant expectoration    No hemoptysis  No presyncope or syncope    Tolerating current medications well with no untoward side effects   Compliant with prescribed medication regimen. Tries to adhere to cardiac diet.   Good exercise tolerance     Device check as below        History of present illness:  Carlo Velasco is a 78 y.o. yo male with history of coronary artery disease Coronary artery bypass grafting , stented coronary artery sick sinus syndrome s/p pacemaker who presents today for   Chief Complaint   Patient presents with    Chronic diastolic congestive heart failure     7 month follow up   .    History  Past Medical History:   Diagnosis Date    Anxiety     Arthritis     Cancer     melanoma    Chronic diastolic congestive heart failure 04/15/2022    Claustrophobia     Colon polyp     COPD (chronic obstructive pulmonary disease)     Coronary artery disease involving autologous artery coronary bypass graft without angina pectoris 09/22/2016    Depression     Disease of thyroid gland     partial thyroidectomy on right    GERD (gastroesophageal reflux disease)     Hearing loss     Heart attack     History of transfusion     Hypertension     Kidney  stone     history of     Low back pain     Mild intermittent asthma without complication 08/24/2021    Open angle with borderline findings, low risk, bilateral 08/23/2021    Pain management     STATES JUST HAD INJECTION IN BACK ON 06/20/2023    Presence of cardiac pacemaker 04/15/2022    Primary hypertension 09/22/2016    Pulmonary hypertension 04/15/2022    S/P CABG x 2 01/04/2019    LIMA to LAD and SVG to RCA 8/8/12    Sick sinus syndrome 07/23/2020    Stented coronary artery 01/04/2019    2.5 x 18 mm Xience Alpine drug-eluting stent to the mid LAD 7/8/2015, 2.5 x 18 mm Xience drug-eluting stent to the proximal OM1 10/21/2016 and 2.5 x 12 mm Synergy drug-eluting stent to OM1 for severe in-stent restenosis 4/28/2020    Tobacco abuse, in remission     Uses hearing aid     BILATERAL   ,   Past Surgical History:   Procedure Laterality Date    BACK SURGERY      CERVICAL FUSION AND LUMBAR DISC REPLACEMENT    BRONCHOSCOPY N/A 04/27/2021    Procedure: BRONCHOSCOPY;  Surgeon: Rico Jacobs MD;  Location: [facility] OR;  Service: Cardiothoracic;  Laterality: N/A;    CARDIAC CATHETERIZATION  07/2015    SHAHRZAD mid LAD, Dr. Toribio    CARDIAC CATHETERIZATION N/A 10/21/2016    Procedure: Left Heart Cath;  Surgeon: Darian Toribio MD;  Location:  [facility] CATH INVASIVE LOCATION;  Service:     CARDIAC CATHETERIZATION Left 04/05/2017    Procedure: Cardiac Catheterization/Vascular Study;  Surgeon: Darian Toribio MD;  Location:  [facility] CATH INVASIVE [facility];  Service:     CARDIAC CATHETERIZATION N/A 04/05/2017    Procedure: Left Heart Cath;  Surgeon: Darian Toribio MD;  Location:  [facility] CATH INVASIVE [facility];  Service:     CARDIAC CATHETERIZATION N/A 04/05/2017    Procedure: Coronary angiography;  Surgeon: Darian Toribio MD;  Location:  [facility] CATH INVASIVE [facility];  Service:     CARDIAC CATHETERIZATION N/A 04/05/2017    Procedure: Left ventriculography;  Surgeon: Darian Toribio MD;  Location:  [facility]ATH [facility];  Service:     CARDIAC  CATHETERIZATION  04/05/2017    Procedure: Saphenous Vein Graft;  Surgeon: Darian Toribio MD;  Location:  [facility] CATH [facility];  Service:     CARDIAC CATHETERIZATION Left 02/27/2018    Procedure: Cardiac Catheterization/Vascular Study SHAHRZAD OK PRN;  Surgeon: Darian Toribio MD;  Location:  [facility];  Service:     CARDIAC CATHETERIZATION  02/27/2018    Procedure: Functional Flow [place];  Surgeon: Darian Toribio MD;  Location:  [facility];  Service:     CARDIAC CATHETERIZATION N/A 02/27/2018    Procedure: Left ventriculography;  Surgeon: Darian Toribio MD;  Location:  [facility];  Service:     CARDIAC CATHETERIZATION Left 04/28/2020    Procedure: Cardiac Catheterization/Vascular Study SHAHRZAD OK PRN;  Surgeon: Darian Toribio MD;  Location:  [facility] C[facility];  Service: Cardiology;  Laterality: Left;    CARDIAC ELECTROPHYSIOLOGY PROCEDURE N/A 01/11/2018    Procedure: PPM generator change - dual;  Surgeon: Darian Toribio MD;  Location: [facility] CATH INVASIVE LOCATION;  Service:     CARDIAC SURGERY      open heart surgery x2    COLONOSCOPY  09/05/2007    colon polyps    COLONOSCOPY N/A 05/29/2018    Procedure: COLONOSCOPY WITH ANESTHESIA;  Surgeon: Juan F Tapia MD;  Location: [facility] ENDOSCOPY;  Service: Gastroenterology    COLONOSCOPY N/A 10/21/2021    Procedure: COLONOSCOPY WITH ANESTHESIA;  Surgeon: Juan F Tapia MD;  Location: [facility] ENDOSCOPY;  Service: Gastroenterology;  Laterality: N/A;  pre op: hx polyps  post op;polyp,   PCP: Rhys Rosen MD    CORONARY ANGIOPLASTY WITH STENT PLACEMENT  07/2015    SHAHRZAD mid Dr. Malu LO     CORONARY ARTERY BYPASS GRAFT  2012 AND 2014    2 vessel     ENDOSCOPY  11/08/2010    ENDOSCOPY N/A 05/29/2018    Procedure: ESOPHAGOGASTRODUODENOSCOPY WITH ANESTHESIA;  Surgeon: Juan F Tapia MD;  Location: [facility] ENDOSCOPY;  Service: Gastroenterology    HEAD/NECK LESION/CYST EXCISION Left 12/20/2016    Procedure:  EXCISION MALIGNANT MELANOMA WITH SENTINEL NODE BIOPSY, INJECTION AND SCAN PRE-OP, LEFT EAR;  Surgeon: Guanaco Zepeda MD;  Location:  [facility] OR;  Service:     HEMORRHOIDECTOMY      HERNIA REPAIR      INSERT / REPLACE / REMOVE PACEMAKER      REPLACEMENT TOTAL KNEE Left     BRIAN-STOPPA INCISIONAL HERNIA REPAIR Left 2023    Procedure: BRIAN-STOPPA INCISIONAL HERNIA REPAIR left lumbar hernia repair with mesh;  Surgeon: Govind Celeste MD;  Location:  [facility] OR;  Service: General;  Laterality: Left;    SENTINEL NODE BIOPSY Left 2016    Procedure: SENTINEL NODE BIOPSY;  Surgeon: Guanaco Zepeda MD;  Location:  [facility] OR;  Service:     SKIN CANCER EXCISION      THORACOSCOPY Right 2021    Procedure: THORACOSCOPIC TOTAL DECORTICATION;  Surgeon: Rico Jacobs MD;  Location:  [facility] OR;  Service: Cardiothoracic;  Laterality: Right;    THYROID SURGERY      THYROIDECTOMY     ,   Family History   Problem Relation Age of Onset    Heart failure Mother     Stroke Mother     Dementia Mother     Coronary artery disease Father     Colon polyps Father     COPD Brother     Colon cancer Neg Hx    ,   Social History     Tobacco Use    Smoking status: Former     Current packs/day: 0.00     Average packs/day: 3.0 packs/day for 10.0 years (30.0 ttl pk-yrs)     Types: Cigarettes     Start date:      Quit date:      Years since quittin.4     Passive exposure: Never    Smokeless tobacco: Never   Vaping Use    Vaping status: Never Used   Substance Use Topics    Alcohol use: Yes     Comment: rare    Drug use: No   ,     Medications  Current Outpatient Medications   Medication Sig Dispense Refill    albuterol sulfate  (90 Base) MCG/ACT inhaler Inhale 2 puffs Every 4 (Four) Hours As Needed for Wheezing. 6.7 g 5    atorvastatin (LIPITOR) 80 MG tablet Take 1 tablet by mouth Every Night.      budesonide-formoterol (SYMBICORT) 160-4.5 MCG/ACT inhaler Inhale 2 puffs 2 (Two) Times a Day. 10.2 g 5    Calcium  Carbonate 1500 (600 Ca) MG tablet Take 1 tablet by mouth Daily.      Cholecalciferol 125 MCG (5000 UT) tablet Take 1 tablet by mouth Daily.      clopidogrel (PLAVIX) 75 MG tablet TAKE 1 TABLET BY MOUTH DAILY 90 tablet 3    escitalopram (LEXAPRO) 20 MG tablet Take 1 tablet by mouth Daily. 90 tablet 1    ferrous sulfate 325 (65 FE) MG tablet Take 1 tablet by mouth Every Morning.      fluticasone (FLONASE) 50 MCG/ACT nasal spray 2 sprays into the nostril(s) as directed by provider Daily. 16 g 5    furosemide (LASIX) 40 MG tablet Take 1 tablet by mouth Daily As Needed (for any increase in 2 lbs in a daqy or 5 lbs in a week or increassing swelling or congestion). (Patient taking differently: Take 1 tablet by mouth Daily.)      ibuprofen (ADVIL,MOTRIN) 800 MG tablet Take 1 tablet by mouth Every 8 (Eight) Hours As Needed. for pain      isosorbide mononitrate (IMDUR) 30 MG 24 hr tablet TAKE 1 TABLET BY MOUTH EVERY MORNING 90 tablet 3    loratadine (CLARITIN) 10 MG tablet Take 1 tablet by mouth Daily. 90 tablet 3    meclizine (ANTIVERT) 12.5 MG tablet Take 1 tablet by mouth.      metoprolol tartrate (LOPRESSOR) 25 MG tablet TAKE 1 TABLET BY MOUTH DAILY 90 tablet 3    NITROSTAT 0.4 MG SL tablet Place 1 tablet under the tongue Every 5 (Five) Minutes As Needed for Chest Pain. Take no more than 3 doses in 15 minutes. 100 tablet 2    omega-3 acid ethyl esters (LOVAZA) 1 g capsule TAKE 1 CAPSULE BY MOUTH DAILY 90 capsule 3    omeprazole (priLOSEC) 20 MG capsule TAKE 1 CAPSULE BY MOUTH DAILY 90 capsule 3    Potassium Gluconate 2.5 MEQ tablet Take 1 tablet by mouth Daily.      spironolactone (ALDACTONE) 25 MG tablet TAKE 1 TABLET BY MOUTH DAILY 90 tablet 3    tamsulosin (FLOMAX) 0.4 MG capsule 24 hr capsule Take 1 capsule by mouth Daily.      topiramate (TOPAMAX) 50 MG tablet Take 1 tablet by mouth 2 (Two) Times a Day.      Zinc Sulfate (ZINC-220 PO) Take 1 tablet by mouth Daily.      Cyanocobalamin (B-12) 1000 MCG sublingual tablet  "1 tablet Daily. (Patient not taking: Reported on 2024)       No current facility-administered medications for this visit.       Allergies:  Meperidine and Statins    Review of Systems  Review of Systems   Constitutional: Positive for malaise/fatigue.   HENT: Negative.     Eyes: Negative.    Cardiovascular:  Positive for dyspnea on exertion and leg swelling. Negative for chest pain, claudication, cyanosis, irregular heartbeat, near-syncope, orthopnea, palpitations, paroxysmal nocturnal dyspnea and syncope.   Respiratory:  Positive for cough and shortness of breath.    Endocrine: Negative.    Hematologic/Lymphatic: Negative.    Skin: Negative.    Musculoskeletal:  Positive for arthritis and back pain.   Gastrointestinal:  Negative for anorexia.   Genitourinary: Negative.    Neurological:  Negative for weakness.   Psychiatric/Behavioral: Negative.          Objective     Physical Exam:  /72   Pulse 60   Resp 18   Ht 174 cm (68.5\")   Wt 100 kg (221 lb)   SpO2 98%   BMI 33.11 kg/m²   Physical Exam   Constitutional: He appears well-developed.   HENT:   Head: Normocephalic.   Neck: Normal carotid pulses and no JVD present. No tracheal tenderness present. Carotid bruit is not present. No tracheal deviation present.   Cardiovascular: Regular rhythm and normal pulses.   Murmur heard.  Systolic murmur is present with a grade of 2/6.  Pulmonary/Chest: Effort normal. No stridor.   Abdominal: Soft. He exhibits no distension. There is no abdominal tenderness.   Musculoskeletal:      Right lower le+ Pitting Edema present.      Left lower le+ Pitting Edema present.   Neurological: He is alert. No cranial nerve deficit or sensory deficit.   Skin: Skin is warm.   Psychiatric: His speech is normal and behavior is normal.       Results Review:      Carlo Velasco  Regadenoson Stress Test with Myocardial Perfusion SPECT (Multi Study)  Order# [id number]  Reading physician: Darian Toribio MD Ordering physician: Malu" MD Darian Study date: 23     Patient Information    Patient Name   Carlo Velasco MRN   [id number] Legal Sex   Male  (Age)   1945 (77 y.o.)     Interpretation Summary         Left ventricular ejection fraction is normal (Calculated EF = 50%).    Myocardial perfusion imaging indicates a normal myocardial perfusion study with no evidence of ischemia.    Impressions are consistent with a low risk study.    Diaphragmatic attenuation artifact is present.    Physiological apical thinning noted             IMPRESSION:  1. No pulmonary emboli.  2. No thoracic aortic aneurysm or dissection. Prior mediastinal surgery  with cardiomegaly. Left subclavian cardiac pacer device.  3. New right middle lobe consolidation most consistent with pneumonia.  Probable reactive right hilar and subcarinal lymph nodes. Follow up  chest x-ray recommended to document resolution.  This report was finalized on 2021 20:54 by Dr. Cassie Mckeon MD.      Conclusion of cardiac catheterization    2020     Left ventricle ejection fraction 45%  Moderately elevated left ventricular end-diastolic pressure of 19 mmHg  Atretic left intramammary artery graft  Patent stents in the left anterior descending coronary artery  60% stenosis chronic of diagonal branch unchanged from prior cardiac catheterization  95% in-stent restenosis of first obtuse marginal branch treated with PTCA and then implantation of 2.5 x 12 mm Synergy stent with 0% residual stenosis  70% stenosis of distal right coronary artery  Widely patent saphenous venous graft to the distal right coronary artery        ____________________________________________________________________________________________________________________________________________     Plan after cardiac catheterization        Dual antiplatelet therapy minimum of 1 year  Continue on other medication including statin and beta-blockers  Aspirin for the rest of his life  Add ACE inhibitor  therapy  Check echocardiogram  Intensive risk factor modifications for both primary and secondary prevention if applicable  Hydration   Observation         Carlo Velasco   Regadenoson Stress Test With Myocardial Perfusion SPECT (Multi Study)   Order# [id number]   Reading physician: Darian Toribio MD Ordering physician: Darian Toribio MD Study date: 20   Patient Information     Patient Name  Carlo Velasco MRN  [id number] Sex  Male  (Age)  1945 (74 y.o.)   Interpretation Summary        Left ventricular ejection fraction is normal (Calculated EF = 55%).  Myocardial perfusion imaging indicates a normal myocardial perfusion study with no evidence of ischemia.  Impressions are consistent with a low risk study.                Results for orders placed during the hospital encounter of 23    Adult Transthoracic Echo Complete w/ Color, Spectral and Contrast if necessary per protocol    Interpretation Summary    Left ventricular systolic function is normal. Left ventricular ejection fraction appears to be 56 - 60%.    Left ventricular diastolic function was normal.    Abnormal global longitudinal LV strain (GLS) = -11.6%.    Left atrial volume is mildly increased.    Estimated right ventricular systolic pressure from tricuspid regurgitation is normal (<35 mmHg).         ECG 12 Lead    Date/Time: 2024 10:20 AM  Performed by: Darian Toribio MD    Authorized by: Darian Toribio MD  Comparison: compared with previous ECG from 10/9/2023  Comparison to previous ECG: Ventricular rate changed from 66  to 60  beats per minute   Atrial pacing replaced sinus rhythm     Rhythm: paced  Rhythm comments: atrial pacemaker  Rate: normal  ST Segments: ST segments normal  T Waves: T waves normal  QRS axis: normal    Clinical impression: abnormal EKG        Cardiac cath    LVEF of 50%.   occluded the mid right coronary artery  Patent saphenous venous graft to the distal right coronary artery  Atretic left intramammary  artery graft.  Patent stent in the mid left anterior descending coronary artery  Patent stent to the obtuse marginal branch  Patent stent in diagonal branch.   2/18    Conclusion     Patent stents noted in the obtuse marginal branch and the left anterior descending coronary artery.  Atretic left internal mammary artery graft.  Patent saphenous venous graft to the right coronary artery  Significant right coronary artery stenosis proximal to the touchdown site of the saphenous vein graft to the right coronary artery  60% stenosis of diagonal branch with a normal fractional flow reserve of 0.85  Left ventricle ejection fraction of 50%.     LVEDP    16   mm Hg           Plan     Intensive risk factor modifications for both primary and secondary prevention if applicable  Home today if stable  Hydration   Observation  Keep follow-up appointment    Diagnoses and all orders for this visit:    1. Hyperlipidemia LDL goal <70 (Primary)    2. Coronary artery disease involving autologous artery coronary bypass graft without angina pectoris    3. Chronic diastolic congestive heart failure    4. Primary hypertension    5. Presence of cardiac pacemaker            Plan      Keep LDL below 70 mg/dl. Monitor liver and renal functions.   Monitor CBC, CMP, TSH (as indicated) and Lipid Panel by primary     Overall doing well no new cardiovascular symptoms and therefore no additional cardiac testing is required prior to next visit  If any interim issues arise will call me for further evaluation.     Check BP and heart rates twice daily initially till blood pressures and heart rates under good control and then at least 3x / week,   If blood pressures continue to be well-controlled then can check week a month  at home and bring a recording for review next visit  If BP >130/85 or < 100/60 persistently over 3 reading 30 mins apart or if heart rates persistently above 100 bpm or less than 55 bpm call sooner for evaluation and advise     S/L  NTG PRN for chest pain, call me or go to ER if has to use S/L nitroglycerin   Requested Prescriptions     Signed Prescriptions Disp Refills    Nitrostat 0.4 MG SL tablet 25 tablet 1     Sig: Place 1 tablet under the tongue Every 5 (Five) Minutes As Needed for Chest Pain. Take no more than 3 doses in 15 minutes.        Weigh yourself frequently, at least weekly, preferably daily, call me if more than 2 pounds a day or 5 pounds a week weight gain.  Flexible diuretic dosing    Keep LDL below 70 mg/dl. Monitor liver and renal functions.   Monitor CBC, CMP, TSH (as indicated) and Lipid Panel by primary      Monitor for any signs of bleeding including red or dark stools as well as easy bruisabilty. Fall precautions.   Monitor cardiac rhythm device function regularly per established schedule or PRN    Bring copies or have primary fax to our office labs and other tests prior to next visit              Return in about 6 months (around 11/20/2024).

## 2024-06-06 ENCOUNTER — TELEPHONE (OUTPATIENT)
Dept: NEUROSURGERY | Facility: CLINIC | Age: 79
End: 2024-06-06
Payer: MEDICARE

## 2024-06-06 NOTE — TELEPHONE ENCOUNTER
----- Message from Arturo Barron sent at 6/5/2024  1:54 PM CDT -----  Dr. Tineo has reviewed history, physical exam, and all imaging and/or exams.  No neurosurgical intervention is warranted at this time.  We recommend you continue conservative management with physical therapy exercises and if applicable nonsteroidal anti-inflammatories.  If not currently enrolled, we would be more than happy to place a referral to pain management for evaluation and consideration for treating your discomfort with oral medications and/or injections.  If desired, you may keep scheduled appointment with Dr. Tineo to review imaging and results, otherwise we will cancel your appointment.  Please feel free to call or return for any new or additional concerns.    Thank you for allowing us to provide care,    ANALI Caruso  ----- Message -----  From: Girish Tineo MD  Sent: 5/24/2024  11:34 AM CDT  To: ANALI Caruso    CT myelogram of the cervical spine reviewed.  No evidence of cord compression or spondylolisthesis in the cervical spine.

## 2024-07-22 ENCOUNTER — LAB (OUTPATIENT)
Dept: LAB | Facility: HOSPITAL | Age: 79
End: 2024-07-22
Payer: MEDICARE

## 2024-07-22 DIAGNOSIS — E11.59 TYPE 2 DIABETES MELLITUS WITH OTHER CIRCULATORY COMPLICATION, UNSPECIFIED WHETHER LONG TERM INSULIN USE: ICD-10-CM

## 2024-07-22 DIAGNOSIS — R97.20 ELEVATED PROSTATE SPECIFIC ANTIGEN (PSA): ICD-10-CM

## 2024-07-22 DIAGNOSIS — E53.8 DEFICIENCY OF OTHER SPECIFIED B GROUP VITAMINS: ICD-10-CM

## 2024-07-22 DIAGNOSIS — F03.90 DEMENTIA, UNSPECIFIED DEMENTIA SEVERITY, UNSPECIFIED DEMENTIA TYPE, UNSPECIFIED WHETHER BEHAVIORAL, PSYCHOTIC, OR MOOD DISTURBANCE OR ANXIETY: ICD-10-CM

## 2024-07-22 DIAGNOSIS — E78.5 HYPERLIPIDEMIA, UNSPECIFIED HYPERLIPIDEMIA TYPE: ICD-10-CM

## 2024-07-22 DIAGNOSIS — E55.9 VITAMIN D DEFICIENCY: ICD-10-CM

## 2024-07-22 DIAGNOSIS — N40.0 BENIGN PROSTATIC HYPERPLASIA WITHOUT LOWER URINARY TRACT SYMPTOMS: ICD-10-CM

## 2024-07-22 LAB
25(OH)D3 SERPL-MCNC: 49 NG/ML (ref 30–100)
ALBUMIN SERPL-MCNC: 3.8 G/DL (ref 3.5–5)
ALBUMIN UR-MCNC: <1.2 MG/DL
ALBUMIN/GLOB SERPL: 1.4 G/DL (ref 1.1–2.5)
ALP SERPL-CCNC: 64 U/L (ref 24–120)
ALT SERPL W P-5'-P-CCNC: 22 U/L (ref 0–50)
ANION GAP SERPL CALCULATED.3IONS-SCNC: 5 MMOL/L (ref 4–13)
AST SERPL-CCNC: 26 U/L (ref 7–45)
BILIRUB SERPL-MCNC: 0.5 MG/DL (ref 0.1–1)
BUN SERPL-MCNC: 18 MG/DL (ref 5–21)
BUN/CREAT SERPL: 22.2
CALCIUM SPEC-SCNC: 9.2 MG/DL (ref 8.6–10.5)
CHLORIDE SERPL-SCNC: 105 MMOL/L (ref 98–110)
CHOLEST SERPL-MCNC: 220 MG/DL (ref 130–200)
CO2 SERPL-SCNC: 29 MMOL/L (ref 24–31)
CREAT SERPL-MCNC: 0.81 MG/DL (ref 0.5–1.4)
CREAT UR-MCNC: 121.9 MG/DL
EGFRCR SERPLBLD CKD-EPI 2021: 91.4 ML/MIN/1.73
FOLATE SERPL-MCNC: 9.72 NG/ML (ref 4.78–24.2)
GLOBULIN UR ELPH-MCNC: 2.8 GM/DL
GLUCOSE SERPL-MCNC: 100 MG/DL (ref 70–100)
HBA1C MFR BLD: 5.7 % (ref 4.8–5.9)
HDLC SERPL-MCNC: 51 MG/DL
LDLC SERPL CALC-MCNC: 149 MG/DL (ref 0–99)
LDLC/HDLC SERPL: 2.88 {RATIO}
MICROALBUMIN/CREAT UR: NORMAL MG/G{CREAT}
POTASSIUM SERPL-SCNC: 3.9 MMOL/L (ref 3.5–5.3)
PROT SERPL-MCNC: 6.6 G/DL (ref 6.3–8.7)
PSA SERPL-MCNC: 2 NG/ML (ref 0–4)
SODIUM SERPL-SCNC: 139 MMOL/L (ref 135–145)
T4 FREE SERPL-MCNC: 0.82 NG/DL (ref 0.92–1.68)
TRIGL SERPL-MCNC: 110 MG/DL (ref 0–149)
TSH SERPL DL<=0.05 MIU/L-ACNC: 3.56 UIU/ML (ref 0.27–4.2)
VIT B12 BLD-MCNC: 344 PG/ML (ref 211–946)
VLDLC SERPL-MCNC: 20 MG/DL (ref 5–40)

## 2024-07-22 PROCEDURE — 82043 UR ALBUMIN QUANTITATIVE: CPT

## 2024-07-22 PROCEDURE — 80061 LIPID PANEL: CPT

## 2024-07-22 PROCEDURE — 82607 VITAMIN B-12: CPT

## 2024-07-22 PROCEDURE — 82746 ASSAY OF FOLIC ACID SERUM: CPT

## 2024-07-22 PROCEDURE — 84439 ASSAY OF FREE THYROXINE: CPT

## 2024-07-22 PROCEDURE — 83036 HEMOGLOBIN GLYCOSYLATED A1C: CPT | Performed by: FAMILY MEDICINE

## 2024-07-22 PROCEDURE — 36415 COLL VENOUS BLD VENIPUNCTURE: CPT

## 2024-07-22 PROCEDURE — 80053 COMPREHEN METABOLIC PANEL: CPT | Performed by: FAMILY MEDICINE

## 2024-07-22 PROCEDURE — 82570 ASSAY OF URINE CREATININE: CPT

## 2024-07-22 PROCEDURE — 82306 VITAMIN D 25 HYDROXY: CPT

## 2024-07-22 PROCEDURE — 84153 ASSAY OF PSA TOTAL: CPT

## 2024-07-22 PROCEDURE — 84443 ASSAY THYROID STIM HORMONE: CPT

## 2024-08-06 ENCOUNTER — TELEPHONE (OUTPATIENT)
Dept: CARDIOLOGY | Facility: CLINIC | Age: 79
End: 2024-08-06
Payer: MEDICARE

## 2024-08-06 NOTE — TELEPHONE ENCOUNTER
PT NEEDS DENTAL EXTRACTIONS ASAP.    PLEASE ADVISE REGARDING PLAVIX & IF ANY PRE- MEDICATIONS ARE NEEDED    FORM TO BE SIGNED

## 2024-08-14 NOTE — PLAN OF CARE
Problem: Adult Inpatient Plan of Care  Goal: Plan of Care Review  Outcome: Ongoing, Progressing  Flowsheets (Taken 3/24/2021 0316)  Progress: no change  Plan of Care Reviewed With: patient  Outcome Summary: Pt complained of pain x1, see MAR. No complaints of nausea. IV abx. Voiding. Tele. VSS.      NOTES FOR VISIT: We will attempt to retrieve lab and pathology records from [facility] regarding Bullous Pemphigoid. Pending results, we would like to start Rituximab infusions in the near future.             FOLLOW-UP: No follow-ups on file.    Ethel was seen today for office visit.    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     Chief Complaint   Patient presents with   • Office Visit     New patient       HISTORY: Ethel is here today for:  - Bullous pemphigoid including oral proven by biopsy by Dr Vance in 05/2023. She is currently treating with Cellcept 1000 mg bid, Doxycycline 100 mg daily, Dupixent every other week and Protopic ointment. Ethel states the lesions start to improve but worsen.   - Wounds on both lower legs that she gets dressed by wound care twice weekly.     PAST DERMATOLOGY-SPECIFIC HISTORY:  Bullous pemphigoid proven by biopsy on 05/2023    ACTIVE DERMATOLOGY CONDITIONS AND PRESCRIPTIONS:  Cellcept 1000 bid  Doxycycline 100 daily  Dupixent started 07/2023  Protopic ointment     PAST MEDICAL HISTORY, PAST SURGICAL HISTORY, SOCIAL HISTORY, AND FAMILY HISTORY WERE REVIEWED.    PHYSICAL EXAMINATION: CONSTITUTIONAL:  Ethel is a 76 year old female who is well developed, well nourished, in no acute distress.  There were no vitals taken for this visit.. NEUROLOGIC AND PSYCHIATRIC: She has a normal mood and affect. SKIN: Complete examination, including palpation and careful visual examination of the area of interest revealed no other significant abnormality or eruption. I noted:    - She has a 12 x 5, 2 and 1 cm ulcerations on the right lower leg. The left leg was wrapped and not examined.   -There were no oral ulcerations visible.    On 8/14/2024, ERNIE Caldwell,  scribed the services personally performed by Dr. Obdulio Elaine MD      The  documentation recorded by the scribe accurately and completely reflects the service(s) I personally performed and the decisions made by me.

## 2024-08-20 ENCOUNTER — HOSPITAL ENCOUNTER (OUTPATIENT)
Dept: PAIN MANAGEMENT | Age: 79
Discharge: HOME | End: 2024-08-20
Payer: MEDICARE

## 2024-08-20 VITALS
SYSTOLIC BLOOD PRESSURE: 134 MMHG | OXYGEN SATURATION: 98 % | RESPIRATION RATE: 18 BRPM | HEART RATE: 63 BPM | DIASTOLIC BLOOD PRESSURE: 79 MMHG | TEMPERATURE: 96.8 F

## 2024-08-20 DIAGNOSIS — R52 PAIN MANAGEMENT: ICD-10-CM

## 2024-08-20 PROCEDURE — 2500000003 HC RX 250 WO HCPCS

## 2024-08-20 PROCEDURE — 2580000003 HC RX 258

## 2024-08-20 PROCEDURE — 6360000002 HC RX W HCPCS

## 2024-08-20 PROCEDURE — G0260 INJ FOR SACROILIAC JT ANESTH: HCPCS

## 2024-08-20 RX ORDER — SODIUM CHLORIDE 9 MG/ML
3 INJECTION, SOLUTION INTRAMUSCULAR; INTRAVENOUS; SUBCUTANEOUS ONCE
Status: DISCONTINUED | OUTPATIENT
Start: 2024-08-20 | End: 2024-08-22 | Stop reason: HOSPADM

## 2024-08-20 RX ORDER — LIDOCAINE HYDROCHLORIDE 10 MG/ML
7 INJECTION, SOLUTION EPIDURAL; INFILTRATION; INTRACAUDAL; PERINEURAL ONCE
Status: DISCONTINUED | OUTPATIENT
Start: 2024-08-20 | End: 2024-08-22 | Stop reason: HOSPADM

## 2024-08-20 RX ORDER — BUPIVACAINE HYDROCHLORIDE 5 MG/ML
2 INJECTION, SOLUTION EPIDURAL; INTRACAUDAL ONCE
Status: DISCONTINUED | OUTPATIENT
Start: 2024-08-20 | End: 2024-08-22 | Stop reason: HOSPADM

## 2024-08-20 RX ORDER — METHYLPREDNISOLONE ACETATE 80 MG/ML
80 INJECTION, SUSPENSION INTRA-ARTICULAR; INTRALESIONAL; INTRAMUSCULAR; SOFT TISSUE ONCE
Status: DISCONTINUED | OUTPATIENT
Start: 2024-08-20 | End: 2024-08-22 | Stop reason: HOSPADM

## 2024-08-20 ASSESSMENT — PAIN DESCRIPTION - DESCRIPTORS: DESCRIPTORS: ACHING;SHARP

## 2024-08-20 ASSESSMENT — PAIN DESCRIPTION - ORIENTATION: ORIENTATION: LOWER;RIGHT

## 2024-08-20 ASSESSMENT — PAIN DESCRIPTION - LOCATION: LOCATION: BACK;SACRUM

## 2024-08-20 ASSESSMENT — PAIN DESCRIPTION - PAIN TYPE: TYPE: CHRONIC PAIN

## 2024-08-20 ASSESSMENT — PAIN DESCRIPTION - FREQUENCY: FREQUENCY: INTERMITTENT

## 2024-08-20 ASSESSMENT — PAIN SCALES - GENERAL: PAINLEVEL_OUTOF10: 3

## 2024-08-20 NOTE — INTERVAL H&P NOTE
Update History & Physical    The patient's History and Physical  was reviewed with the patient and I examined the patient. There was no change. The surgical site was confirmed by the patient and me.     Plan: The risks, benefits, expected outcome, and alternative to the recommended procedure have been discussed with the patient. Patient understands and wants to proceed with the procedure.     Electronically signed by Rafita Arredondo MD on 8/20/2024 at 8:39 AM

## 2024-08-20 NOTE — PROGRESS NOTES
Procedure:  Level of Consciousness: [x]Alert [x]Oriented []Disoriented []Lethargic  Anxiety Level: [x]Calm []Anxious []Depressed []Other  Skin: [x]Warm [x]Dry []Cool []Moist []Intact []Other  Cardiovascular: [x]Palpitations: []Never [x]Occasionally []Frequently  Chest Pain: [x]No []Yes  Respiratory:  [x]Unlabored []Labored []Cough ([] Productive []Unproductive)  HCG Required: [x]No []Yes   Results: []Negative []Positive  Knowledge Level:        [x]Patient/Other verbalized understanding of pre-procedure instructions.        [x]Assessment of post-op care needs (transportation, responsible caregiver)        [x]Able to discuss health care problems and how to deal with it.  Factors that Affect Teaching:        Language Barrier: [x]No []Yes - why:        Hearing Loss:        []No [x]Yes            Corrective Device:  [x]Yes []No        Vision Loss:           []No [x]Yes            Corrective Device:  [x]Yes []No        Memory Loss:       [x]No []Yes            []Short Term []Long Term  Motivational Level:  [x]Asks Questions                  []Extremely Anxious       [x]Seems Interested               []Seems Uninterested                  [x]Denies need for Education  Risk for Injury:  [x]Patient oriented to person, place and time  []History of frequent falls/loss of balance  Nutritional:  []Change in appetite   []Weight Gain   []Weight Loss  Functional:  []Requires assistance with ADL's

## 2024-10-22 RX ORDER — METOPROLOL TARTRATE 25 MG/1
25 TABLET, FILM COATED ORAL DAILY
Qty: 90 TABLET | Refills: 3 | Status: SHIPPED | OUTPATIENT
Start: 2024-10-22

## 2024-11-05 RX ORDER — CLOPIDOGREL BISULFATE 75 MG/1
75 TABLET ORAL DAILY
Qty: 90 TABLET | Refills: 3 | Status: SHIPPED | OUTPATIENT
Start: 2024-11-05

## 2024-11-07 ENCOUNTER — LAB (OUTPATIENT)
Dept: LAB | Facility: HOSPITAL | Age: 79
End: 2024-11-07
Payer: MEDICARE

## 2024-11-07 ENCOUNTER — TRANSCRIBE ORDERS (OUTPATIENT)
Dept: ADMINISTRATIVE | Facility: HOSPITAL | Age: 79
End: 2024-11-07
Payer: MEDICARE

## 2024-11-07 DIAGNOSIS — N40.0 ENLARGED PROSTATE: ICD-10-CM

## 2024-11-07 DIAGNOSIS — I10 ESSENTIAL HYPERTENSION, MALIGNANT: ICD-10-CM

## 2024-11-07 DIAGNOSIS — R73.9 BLOOD GLUCOSE ELEVATED: ICD-10-CM

## 2024-11-07 DIAGNOSIS — E53.8 BIOTIN-(PROPIONYL-COA-CARBOXYLASE) LIGASE DEFICIENCY: ICD-10-CM

## 2024-11-07 DIAGNOSIS — I10 ESSENTIAL HYPERTENSION, MALIGNANT: Primary | ICD-10-CM

## 2024-11-07 DIAGNOSIS — E78.5 HYPERLIPIDEMIA, UNSPECIFIED HYPERLIPIDEMIA TYPE: ICD-10-CM

## 2024-11-07 LAB
ALBUMIN SERPL-MCNC: 3.9 G/DL (ref 3.5–5)
ALBUMIN/GLOB SERPL: 1.2 G/DL (ref 1.1–2.5)
ALP SERPL-CCNC: 68 U/L (ref 24–120)
ALT SERPL W P-5'-P-CCNC: 18 U/L (ref 0–50)
ANION GAP SERPL CALCULATED.3IONS-SCNC: 5 MMOL/L (ref 4–13)
AST SERPL-CCNC: 24 U/L (ref 7–45)
AUTO MIXED CELLS #: 1 10*3/MM3 (ref 0.1–2.6)
AUTO MIXED CELLS %: 11.1 % (ref 0.1–24)
BILIRUB SERPL-MCNC: 0.5 MG/DL (ref 0.1–1)
BUN SERPL-MCNC: 15 MG/DL (ref 5–21)
BUN/CREAT SERPL: 16.7
CALCIUM SPEC-SCNC: 9.2 MG/DL (ref 8.6–10.5)
CHLORIDE SERPL-SCNC: 100 MMOL/L (ref 98–110)
CHOLEST SERPL-MCNC: 219 MG/DL (ref 130–200)
CO2 SERPL-SCNC: 32 MMOL/L (ref 24–31)
CREAT SERPL-MCNC: 0.9 MG/DL (ref 0.5–1.4)
EGFRCR SERPLBLD CKD-EPI 2021: 88 ML/MIN/1.73
ERYTHROCYTE [DISTWIDTH] IN BLOOD BY AUTOMATED COUNT: 14 % (ref 12.3–15.4)
FOLATE SERPL-MCNC: 10.5 NG/ML (ref 4.78–24.2)
GLOBULIN UR ELPH-MCNC: 3.2 GM/DL
GLUCOSE SERPL-MCNC: 101 MG/DL (ref 70–100)
HBA1C MFR BLD: 5.7 % (ref 4.8–5.9)
HCT VFR BLD AUTO: 43.9 % (ref 37.5–51)
HDLC SERPL-MCNC: 55 MG/DL
HGB BLD-MCNC: 14.2 G/DL (ref 13–17.7)
LDLC SERPL CALC-MCNC: 143 MG/DL (ref 0–99)
LDLC/HDLC SERPL: 2.56 {RATIO}
LYMPHOCYTES # BLD AUTO: 2.4 10*3/MM3 (ref 0.7–3.1)
LYMPHOCYTES NFR BLD AUTO: 27.1 % (ref 19.6–45.3)
MCH RBC QN AUTO: 29.6 PG (ref 26.6–33)
MCHC RBC AUTO-ENTMCNC: 32.3 G/DL (ref 31.5–35.7)
MCV RBC AUTO: 91.5 FL (ref 79–97)
NEUTROPHILS NFR BLD AUTO: 5.4 10*3/MM3 (ref 1.7–7)
NEUTROPHILS NFR BLD AUTO: 61.8 % (ref 42.7–76)
PLATELET # BLD AUTO: 237 10*3/MM3 (ref 140–450)
PMV BLD AUTO: 8.1 FL (ref 6–12)
POTASSIUM SERPL-SCNC: 4.1 MMOL/L (ref 3.5–5.3)
PROT SERPL-MCNC: 7.1 G/DL (ref 6.3–8.7)
PSA SERPL-MCNC: 2.12 NG/ML (ref 0–4)
RBC # BLD AUTO: 4.8 10*6/MM3 (ref 4.14–5.8)
SODIUM SERPL-SCNC: 137 MMOL/L (ref 135–145)
T4 FREE SERPL-MCNC: 0.88 NG/DL (ref 0.92–1.68)
TRIGL SERPL-MCNC: 117 MG/DL (ref 0–149)
TSH SERPL DL<=0.05 MIU/L-ACNC: 2.9 UIU/ML (ref 0.27–4.2)
VIT B12 BLD-MCNC: 731 PG/ML (ref 211–946)
VLDLC SERPL-MCNC: 21 MG/DL (ref 5–40)
WBC NRBC COR # BLD AUTO: 8.8 10*3/MM3 (ref 3.4–10.8)

## 2024-11-07 PROCEDURE — 80053 COMPREHEN METABOLIC PANEL: CPT

## 2024-11-07 PROCEDURE — 83036 HEMOGLOBIN GLYCOSYLATED A1C: CPT

## 2024-11-07 PROCEDURE — 82607 VITAMIN B-12: CPT

## 2024-11-07 PROCEDURE — 84443 ASSAY THYROID STIM HORMONE: CPT

## 2024-11-07 PROCEDURE — 84439 ASSAY OF FREE THYROXINE: CPT

## 2024-11-07 PROCEDURE — 36415 COLL VENOUS BLD VENIPUNCTURE: CPT

## 2024-11-07 PROCEDURE — 80061 LIPID PANEL: CPT

## 2024-11-07 PROCEDURE — 85025 COMPLETE CBC W/AUTO DIFF WBC: CPT

## 2024-11-07 PROCEDURE — 82746 ASSAY OF FOLIC ACID SERUM: CPT

## 2024-11-07 PROCEDURE — 84153 ASSAY OF PSA TOTAL: CPT

## 2024-11-19 ENCOUNTER — TELEPHONE (OUTPATIENT)
Dept: VASCULAR SURGERY | Facility: CLINIC | Age: 79
End: 2024-11-19
Payer: MEDICARE

## 2024-11-19 ENCOUNTER — HOSPITAL ENCOUNTER (OUTPATIENT)
Dept: CT IMAGING | Facility: HOSPITAL | Age: 79
Discharge: HOME | End: 2024-11-19
Admitting: INTERNAL MEDICINE
Payer: MEDICARE

## 2024-11-19 DIAGNOSIS — J44.9 CHRONIC OBSTRUCTIVE PULMONARY DISEASE, UNSPECIFIED COPD TYPE: ICD-10-CM

## 2024-11-19 PROCEDURE — 71250 CT THORAX DX C-: CPT

## 2024-11-20 ENCOUNTER — OFFICE VISIT (OUTPATIENT)
Dept: PULMONOLOGY | Facility: CLINIC | Age: 79
End: 2024-11-20
Payer: MEDICARE

## 2024-11-20 ENCOUNTER — HOSPITAL ENCOUNTER (OUTPATIENT)
Dept: ULTRASOUND IMAGING | Facility: HOSPITAL | Age: 79
Discharge: HOME | End: 2024-11-20
Payer: MEDICARE

## 2024-11-20 ENCOUNTER — OFFICE VISIT (OUTPATIENT)
Dept: VASCULAR SURGERY | Facility: CLINIC | Age: 79
End: 2024-11-20
Payer: MEDICARE

## 2024-11-20 VITALS
SYSTOLIC BLOOD PRESSURE: 122 MMHG | WEIGHT: 240 LBS | HEART RATE: 68 BPM | DIASTOLIC BLOOD PRESSURE: 74 MMHG | BODY MASS INDEX: 35.55 KG/M2 | HEIGHT: 69 IN | OXYGEN SATURATION: 94 %

## 2024-11-20 VITALS
HEART RATE: 64 BPM | BODY MASS INDEX: 33.18 KG/M2 | DIASTOLIC BLOOD PRESSURE: 80 MMHG | HEIGHT: 69 IN | OXYGEN SATURATION: 98 % | WEIGHT: 224 LBS | SYSTOLIC BLOOD PRESSURE: 132 MMHG

## 2024-11-20 DIAGNOSIS — I65.23 BILATERAL CAROTID ARTERY STENOSIS: Primary | ICD-10-CM

## 2024-11-20 DIAGNOSIS — I73.9 PAD (PERIPHERAL ARTERY DISEASE): ICD-10-CM

## 2024-11-20 DIAGNOSIS — I10 PRIMARY HYPERTENSION: ICD-10-CM

## 2024-11-20 DIAGNOSIS — I27.20 PULMONARY HYPERTENSION: ICD-10-CM

## 2024-11-20 DIAGNOSIS — G47.33 OSA (OBSTRUCTIVE SLEEP APNEA): ICD-10-CM

## 2024-11-20 DIAGNOSIS — Z93.8 S/P THORACOSTOMY TUBE PLACEMENT: ICD-10-CM

## 2024-11-20 DIAGNOSIS — J98.4 RESTRICTIVE LUNG DISEASE: ICD-10-CM

## 2024-11-20 DIAGNOSIS — J44.9 CHRONIC OBSTRUCTIVE PULMONARY DISEASE, UNSPECIFIED COPD TYPE: Primary | ICD-10-CM

## 2024-11-20 DIAGNOSIS — R91.8 LUNG NODULES: ICD-10-CM

## 2024-11-20 DIAGNOSIS — F02.A4 MILD DEMENTIA ASSOCIATED WITH OTHER UNDERLYING DISEASE, WITH ANXIETY: ICD-10-CM

## 2024-11-20 DIAGNOSIS — Z87.01 HISTORY OF PNEUMONIA: ICD-10-CM

## 2024-11-20 DIAGNOSIS — J86.9 EMPYEMA OF PLEURA: ICD-10-CM

## 2024-11-20 DIAGNOSIS — Z87.891 FORMER SMOKER, STOPPED SMOKING MANY YEARS AGO: ICD-10-CM

## 2024-11-20 DIAGNOSIS — J30.89 NON-SEASONAL ALLERGIC RHINITIS, UNSPECIFIED TRIGGER: ICD-10-CM

## 2024-11-20 DIAGNOSIS — E78.5 HYPERLIPIDEMIA LDL GOAL <70: ICD-10-CM

## 2024-11-20 DIAGNOSIS — I65.23 BILATERAL CAROTID ARTERY STENOSIS: ICD-10-CM

## 2024-11-20 DIAGNOSIS — J45.20 MILD INTERMITTENT ASTHMA WITHOUT COMPLICATION: ICD-10-CM

## 2024-11-20 PROCEDURE — 93923 UPR/LXTR ART STDY 3+ LVLS: CPT

## 2024-11-20 PROCEDURE — 93880 EXTRACRANIAL BILAT STUDY: CPT

## 2024-11-20 PROCEDURE — 3078F DIAST BP <80 MM HG: CPT | Performed by: INTERNAL MEDICINE

## 2024-11-20 PROCEDURE — 99214 OFFICE O/P EST MOD 30 MIN: CPT | Performed by: NURSE PRACTITIONER

## 2024-11-20 PROCEDURE — 1160F RVW MEDS BY RX/DR IN RCRD: CPT | Performed by: NURSE PRACTITIONER

## 2024-11-20 PROCEDURE — 3075F SYST BP GE 130 - 139MM HG: CPT | Performed by: NURSE PRACTITIONER

## 2024-11-20 PROCEDURE — 1159F MED LIST DOCD IN RCRD: CPT | Performed by: NURSE PRACTITIONER

## 2024-11-20 PROCEDURE — 99214 OFFICE O/P EST MOD 30 MIN: CPT | Performed by: INTERNAL MEDICINE

## 2024-11-20 PROCEDURE — 3074F SYST BP LT 130 MM HG: CPT | Performed by: INTERNAL MEDICINE

## 2024-11-20 PROCEDURE — 3079F DIAST BP 80-89 MM HG: CPT | Performed by: NURSE PRACTITIONER

## 2024-11-20 NOTE — PROGRESS NOTES
"11/20/2024       Cruz Chaudhari MD  [address] [facility] [place] [address]      Carlo Velasco  1945    Chief Complaint   Patient presents with    Bilateral carotid artery stenosi     Patient is here for one year follow up with testing due to Bilateral carotid artery stenosis. Patient has no compliants or concerns at this time.       Dear Cruz Chaudhari MD     HPI  I had the pleasure of seeing your patient Carlo Velasco in the office today.  As you recall, Carlo Velasco is a 79 y.o.  male who you are currently following for routine health maintenance.  We are following for carotid occlusive disease.  Currently he is doing well and denies any strokelike symptoms.  He does have a history of chronic back pain and previous back surgeries.  He is maintained on Plavix and Lipitor.  He did have noninvasive testing performed today, which I did review in office.        Review of Systems   HENT: Negative.     Eyes: Negative.    Respiratory: Negative.     Cardiovascular:  Positive for leg swelling.   Gastrointestinal: Negative.    Endocrine: Negative.    Genitourinary: Negative.    Musculoskeletal:  Positive for arthralgias and back pain.   Skin: Negative.    Allergic/Immunologic: Negative.    Neurological:  Positive for weakness.   Hematological: Negative.    Psychiatric/Behavioral: Negative.     All other systems reviewed and are negative.    /80   Pulse 64   Ht 174 cm (68.5\")   Wt 102 kg (224 lb)   SpO2 98%   BMI 33.56 kg/m²   Physical Exam  Vitals and nursing note reviewed.   Constitutional:       Appearance: Normal appearance. He is well-developed. He is obese.   HENT:      Head: Normocephalic and atraumatic.   Eyes:      General: No scleral icterus.     Pupils: Pupils are equal, round, and reactive to light.   Neck:      Thyroid: No thyromegaly.   Cardiovascular:      Rate and Rhythm: Normal rate and regular rhythm.      Heart sounds: Normal heart sounds.   Pulmonary:      Effort: Pulmonary effort is " normal.      Breath sounds: Normal breath sounds.   Abdominal:      General: Bowel sounds are normal.      Palpations: Abdomen is soft.   Musculoskeletal:         General: Swelling present. Normal range of motion.      Cervical back: Normal range of motion and neck supple.      Comments: Back pain   Skin:     General: Skin is warm and dry.   Neurological:      Mental Status: He is alert and oriented to person, place, and time.   Psychiatric:         Mood and Affect: Mood normal.         Behavior: Behavior normal.         Thought Content: Thought content normal.         Judgment: Judgment normal.            US Carotid Bilateral    Result Date: 11/20/2024  Narrative: History: Carotid occlusive disease      Impression: Impression: 1. There is less than 50% stenosis of the right internal carotid artery. 2. There is less than 50% stenosis of the left internal carotid artery. 3. Antegrade flow is demonstrated in bilateral vertebral arteries.  Comments: Bilateral carotid vertebral arterial duplex scan was performed.  Grayscale imaging shows intimal thickening and calcified elements at the carotid bifurcation. The right internal carotid artery peak systolic velocity is 91.1 cm/sec. The end-diastolic velocity is 29.9 cm/sec. The right ICA/CCA ratio is approximately 0.9. These findings correlate with less than 50% stenosis of the right internal carotid artery.  Grayscale imaging shows intimal thickening and calcified elements at the carotid bifurcation. The left internal carotid artery peak systolic velocity is 104.5 cm/sec. The end-diastolic velocity is 38.3 cm/sec. The left ICA/CCA ratio is approximately 1.2. These findings correlate with less than 50% stenosis of the left internal carotid artery.  Antegrade flow is demonstrated in bilateral vertebral arteries.   This report was signed and finalized on 11/20/2024 3:00 PM by Dr. Shelton Renteria MD.      US Ankle / Brachial Indices Extremity Complete    Result Date:  11/20/2024  Narrative:  History: PAD  Comments: Bilateral lower extremity arterial with multi-level pulse volume recordings and segmental pressures were performed at rest and stress.  The right ankle/brachial index is 1.18. The waveforms are triphasic without dampening. These findings are consistent with no significant arterial insufficiency of the right lower extremity at rest.  The left ankle/brachial index is 1.16. The waveforms are triphasic without dampening. These findings are consistent with no significant arterial insufficiency of the left lower extremity at rest.      Impression: Impression: 1. No significant arterial insufficiency of the right lower extremity at rest. 2. No significant arterial insufficiency of the left lower extremity at rest.    This report was signed and finalized on 11/20/2024 2:53 PM by Dr. Shelton Renteria MD.      CT Chest Without Contrast Diagnostic    Result Date: 11/19/2024  Narrative: CT CHEST WO CONTRAST DIAGNOSTIC- 11/19/2024 1:51 PM  HISTORY: COPD; J44.9-Chronic obstructive pulmonary disease, unspecified  COMPARISON: 11/2/2023.  TOTAL DOSE LENGTH PRODUCT: 488.01 mGy.cm. Automated exposure control was also utilized to decrease patient radiation dose.  TECHNIQUE: Axial images of the chest are performed without IV contrast.  FINDINGS: Left subclavian cardiac pacer device is in place. Median sternotomy wires findings favoring coronary bypass. Dense diffuse coronary artery calcifications and/or stents. Stable cardiomegaly. No pericardial or pleural effusion. Calcified mediastinal and left hilar lymph nodes. No pathologic intrathoracic or axillary lymphadenopathy. Chronic elevation right hemidiaphragm.  Limited images of the visible upper abdomen demonstrate no acute appearing pathology. Second portion duodenal diverticula. Calcified granuloma seen within the liver and the spleen. Colonic diverticuli of the splenic flexure.  Few small calcified granuloma. Stable 4 mm right upper  lobe pulmonary nodule with central calcification consistent with granuloma. No suspicious pulmonary nodule. No consolidation. Mild linear right basilar scarring. No pneumothorax. No endobronchial lesion.  Postoperative changes lower cervical spine. Old healed right-sided rib fractures. Chronic upper thoracic compression deformities.      Impression: Stable granuloma of the lung parenchyma with no suspicious pulmonary nodule. Chronic elevation right hemidiaphragm with right basilar scarring. Left subclavian cardiac pacer device. Similar cardiomegaly with evidence of prior mediastinal surgery and coronary bypass. Similar/chronic compression deformities of the upper thoracic vertebra.  This report was signed and finalized on 11/19/2024 4:57 PM by Dr. Cassie Mckeon MD.          Patient Active Problem List   Diagnosis    Coronary artery disease involving autologous artery coronary bypass graft without angina pectoris    Primary hypertension    Anxiety    Colon polyps    Hyperlipidemia LDL goal <70    Incisional hernia, without obstruction or gangrene    BRBPR (bright red blood per rectum)    Esophageal dysphagia    Prostatism    Nocturia    Orthostasis    Chronic midline low back pain without sciatica    History of adenomatous polyp of colon    Acute pain of right knee    Morbidly obese    Pain of right heel    S/P CABG x 2    Stented coronary artery    Flank pain    Skin lesion    Pleuritic chest pain    Sick sinus syndrome    Coagulopathy    Pneumonia of right lung due to infectious organism    Recurrent pleural effusion on right    Cough    SOB (shortness of breath)    Former smoker, stopped smoking many years ago    Non-seasonal allergic rhinitis    ERNESTO (obstructive sleep apnea)    Restrictive lung disease    Class 2 severe obesity due to excess calories with serious comorbidity and body mass index (BMI) of 39.0 to 39.9 in adult    Empyema of pleura    Pneumonia    Aspirin-like platelet function defect    S/P  thoracostomy tube placement    Mild intermittent asthma without complication    Family hx colonic polyps    History of pneumonia    Pleurisy    History of fall    UTI (urinary tract infection) -cystitis/prostatitis    Sepsis    Compression fracture of T6 vertebra    Dehydration    History of fall    COPD (chronic obstructive pulmonary disease)    Lung nodules    Chronic diastolic congestive heart failure    Presence of cardiac pacemaker    LVH (left ventricular hypertrophy)    Pulmonary hypertension    Acute sinusitis    Age-related cataract    Benign prostatic hyperplasia    Carotid artery stenosis    Cervical spondylosis without myelopathy    Chronic pain    Closed fracture of greater tuberosity of humerus    Closed fracture of rib    Compression fracture of thoracic vertebra    Contusion of knee    Degeneration of lumbar intervertebral disc    Dementia    Dermatochalasis    Diabetes mellitus    Difficult or painful urination    Disorder of sacroiliac joint    Dry eye syndrome    Hiatal hernia    History of smoking    Homonymous hemianopia    Hypothyroidism    Iron deficiency    Iron deficiency anemia    Kidney stones    Klippel-Feil sequence    Knee joint effusion    Lumbosacral radiculitis    Lumbosacral spondylosis without myelopathy    Open angle with borderline findings, low risk, bilateral    Osteoarthritis of right knee    Post poliomyelitis syndrome    Recurrent major depression    Screening for depression    Spondylolisthesis, acquired    Vaccine refused by patient    Vertigo    Cobalamin deficiency    Vitamin D deficiency    Lumbar hernia    History of pleural effusion        Diagnosis Plan   1. Bilateral carotid artery stenosis  US Carotid Bilateral      2. PAD (peripheral artery disease)  US Ankle / Brachial Indices Extremity Complete      3. Primary hypertension        4. Hyperlipidemia LDL goal <70                Plan: After thoroughly evaluating Carlo Velasco, I believe the best course of action is  to remain conservative from vascular surgery standpoint.  Currently he is doing well and denies any strokelike symptoms or significant changes to his lower extremities.  I did review his testing which shows less than 50% carotid stenosis bilaterally and ABIs showed no arterial insufficiency to his lower extremities.  Will see him back in 1 year with repeat noninvasive testing for continued surveillance, including carotid duplex and ABIs.  I did discuss vascular risk factors as they pertain to the progression of vascular disease including controlling his hypertension and hyperlipidemia.  His blood pressure stable on his current medications.  He should continue his Plavix 75 mg daily and Lipitor 80 mg daily in addition to his other medications.  Body mass index is 33.56 kg/m². Class 2 Severe Obesity (BMI >=35 and <=39.9). Obesity-related health conditions include the following: obstructive sleep apnea, hypertension, coronary heart disease, dyslipidemias, GERD, and peripheral vascular disease. Obesity is unchanged. BMI is is above average; BMI management plan is completed. We discussed portion control and increasing exercise. This was all discussed in full with complete understanding.  Thank you for allowing me to participate in the care of your patient.  Please do not hesitate to call with any questions or concerns.  We will keep you aware of any further encounters with Carlo Velasco.        Sincerely yours,         ANALI Ferguson Jeffrey L, MD

## 2024-11-20 NOTE — LETTER
December 7, 2024     Cruz Chaudhari MD  [address] [facility] [place] [address]    Patient: Carlo Velasco   YOB: 1945   Date of Visit: 11/20/2024     Dear Cruz Chaudhari MD:       Thank you for referring Carlo Velasco to me for evaluation. Below are the relevant portions of my assessment and plan of care.    If you have questions, please do not hesitate to call me. I look forward to following Carlo along with you.         Sincerely,        ANALI Ferguson        CC: No Recipients    Ambreen Mcnamara APRN  12/07/24 1628  Sign when Signing Visit  11/20/2024       Cruz Chaudhari MD  [address] [facility] [address]      Carlo Velasco  1945    Chief Complaint   Patient presents with   • Bilateral carotid artery stenosi     Patient is here for one year follow up with testing due to Bilateral carotid artery stenosis. Patient has no compliants or concerns at this time.       Dear Cruz Chaudhari MD     HPI  I had the pleasure of seeing your patient Carlo Vleasco in the office today.  As you recall, Carlo Velasco is a 79 y.o.  male who you are currently following for routine health maintenance.  We are following for carotid occlusive disease.  Currently he is doing well and denies any strokelike symptoms.  He does have a history of chronic back pain and previous back surgeries.  He is maintained on Plavix and Lipitor.  He did have noninvasive testing performed today, which I did review in office.        Review of Systems   HENT: Negative.     Eyes: Negative.    Respiratory: Negative.     Cardiovascular:  Positive for leg swelling.   Gastrointestinal: Negative.    Endocrine: Negative.    Genitourinary: Negative.    Musculoskeletal:  Positive for arthralgias and back pain.   Skin: Negative.    Allergic/Immunologic: Negative.    Neurological:  Positive for weakness.   Hematological: Negative.    Psychiatric/Behavioral: Negative.     All other systems reviewed and are negative.    BP  "132/80   Pulse 64   Ht 174 cm (68.5\")   Wt 102 kg (224 lb)   SpO2 98%   BMI 33.56 kg/m²   Physical Exam  Vitals and nursing note reviewed.   Constitutional:       Appearance: Normal appearance. He is well-developed. He is obese.   HENT:      Head: Normocephalic and atraumatic.   Eyes:      General: No scleral icterus.     Pupils: Pupils are equal, round, and reactive to light.   Neck:      Thyroid: No thyromegaly.   Cardiovascular:      Rate and Rhythm: Normal rate and regular rhythm.      Heart sounds: Normal heart sounds.   Pulmonary:      Effort: Pulmonary effort is normal.      Breath sounds: Normal breath sounds.   Abdominal:      General: Bowel sounds are normal.      Palpations: Abdomen is soft.   Musculoskeletal:         General: Swelling present. Normal range of motion.      Cervical back: Normal range of motion and neck supple.      Comments: Back pain   Skin:     General: Skin is warm and dry.   Neurological:      Mental Status: He is alert and oriented to person, place, and time.   Psychiatric:         Mood and Affect: Mood normal.         Behavior: Behavior normal.         Thought Content: Thought content normal.         Judgment: Judgment normal.            US Carotid Bilateral    Result Date: 11/20/2024  Narrative: History: Carotid occlusive disease      Impression: Impression: 1. There is less than 50% stenosis of the right internal carotid artery. 2. There is less than 50% stenosis of the left internal carotid artery. 3. Antegrade flow is demonstrated in bilateral vertebral arteries.  Comments: Bilateral carotid vertebral arterial duplex scan was performed.  Grayscale imaging shows intimal thickening and calcified elements at the carotid bifurcation. The right internal carotid artery peak systolic velocity is 91.1 cm/sec. The end-diastolic velocity is 29.9 cm/sec. The right ICA/CCA ratio is approximately 0.9. These findings correlate with less than 50% stenosis of the right internal carotid " artery.  Grayscale imaging shows intimal thickening and calcified elements at the carotid bifurcation. The left internal carotid artery peak systolic velocity is 104.5 cm/sec. The end-diastolic velocity is 38.3 cm/sec. The left ICA/CCA ratio is approximately 1.2. These findings correlate with less than 50% stenosis of the left internal carotid artery.  Antegrade flow is demonstrated in bilateral vertebral arteries.   This report was signed and finalized on 11/20/2024 3:00 PM by Dr. Shelton Renteria MD.      US Ankle / Brachial Indices Extremity Complete    Result Date: 11/20/2024  Narrative:  History: PAD  Comments: Bilateral lower extremity arterial with multi-level pulse volume recordings and segmental pressures were performed at rest and stress.  The right ankle/brachial index is 1.18. The waveforms are triphasic without dampening. These findings are consistent with no significant arterial insufficiency of the right lower extremity at rest.  The left ankle/brachial index is 1.16. The waveforms are triphasic without dampening. These findings are consistent with no significant arterial insufficiency of the left lower extremity at rest.      Impression: Impression: 1. No significant arterial insufficiency of the right lower extremity at rest. 2. No significant arterial insufficiency of the left lower extremity at rest.    This report was signed and finalized on 11/20/2024 2:53 PM by Dr. Shelton Renteria MD.      CT Chest Without Contrast Diagnostic    Result Date: 11/19/2024  Narrative: CT CHEST WO CONTRAST DIAGNOSTIC- 11/19/2024 1:51 PM  HISTORY: COPD; J44.9-Chronic obstructive pulmonary disease, unspecified  COMPARISON: 11/2/2023.  TOTAL DOSE LENGTH PRODUCT: 488.01 mGy.cm. Automated exposure control was also utilized to decrease patient radiation dose.  TECHNIQUE: Axial images of the chest are performed without IV contrast.  FINDINGS: Left subclavian cardiac pacer device is in place. Median sternotomy wires  findings favoring coronary bypass. Dense diffuse coronary artery calcifications and/or stents. Stable cardiomegaly. No pericardial or pleural effusion. Calcified mediastinal and left hilar lymph nodes. No pathologic intrathoracic or axillary lymphadenopathy. Chronic elevation right hemidiaphragm.  Limited images of the visible upper abdomen demonstrate no acute appearing pathology. Second portion duodenal diverticula. Calcified granuloma seen within the liver and the spleen. Colonic diverticuli of the splenic flexure.  Few small calcified granuloma. Stable 4 mm right upper lobe pulmonary nodule with central calcification consistent with granuloma. No suspicious pulmonary nodule. No consolidation. Mild linear right basilar scarring. No pneumothorax. No endobronchial lesion.  Postoperative changes lower cervical spine. Old healed right-sided rib fractures. Chronic upper thoracic compression deformities.      Impression: Stable granuloma of the lung parenchyma with no suspicious pulmonary nodule. Chronic elevation right hemidiaphragm with right basilar scarring. Left subclavian cardiac pacer device. Similar cardiomegaly with evidence of prior mediastinal surgery and coronary bypass. Similar/chronic compression deformities of the upper thoracic vertebra.  This report was signed and finalized on 11/19/2024 4:57 PM by Dr. Cassie Mckeon MD.          Patient Active Problem List   Diagnosis   • Coronary artery disease involving autologous artery coronary bypass graft without angina pectoris   • Primary hypertension   • Anxiety   • Colon polyps   • Hyperlipidemia LDL goal <70   • Incisional hernia, without obstruction or gangrene   • BRBPR (bright red blood per rectum)   • Esophageal dysphagia   • Prostatism   • Nocturia   • Orthostasis   • Chronic midline low back pain without sciatica   • History of adenomatous polyp of colon   • Acute pain of right knee   • Morbidly obese   • Pain of right heel   • S/P CABG x 2   •  Stented coronary artery   • Flank pain   • Skin lesion   • Pleuritic chest pain   • Sick sinus syndrome   • Coagulopathy   • Pneumonia of right lung due to infectious organism   • Recurrent pleural effusion on right   • Cough   • SOB (shortness of breath)   • Former smoker, stopped smoking many years ago   • Non-seasonal allergic rhinitis   • ERNESTO (obstructive sleep apnea)   • Restrictive lung disease   • Class 2 severe obesity due to excess calories with serious comorbidity and body mass index (BMI) of 39.0 to 39.9 in adult   • Empyema of pleura   • Pneumonia   • Aspirin-like platelet function defect   • S/P thoracostomy tube placement   • Mild intermittent asthma without complication   • Family hx colonic polyps   • History of pneumonia   • Pleurisy   • History of fall   • UTI (urinary tract infection) -cystitis/prostatitis   • Sepsis   • Compression fracture of T6 vertebra   • Dehydration   • History of fall   • COPD (chronic obstructive pulmonary disease)   • Lung nodules   • Chronic diastolic congestive heart failure   • Presence of cardiac pacemaker   • LVH (left ventricular hypertrophy)   • Pulmonary hypertension   • Acute sinusitis   • Age-related cataract   • Benign prostatic hyperplasia   • Carotid artery stenosis   • Cervical spondylosis without myelopathy   • Chronic pain   • Closed fracture of greater tuberosity of humerus   • Closed fracture of rib   • Compression fracture of thoracic vertebra   • Contusion of knee   • Degeneration of lumbar intervertebral disc   • Dementia   • Dermatochalasis   • Diabetes mellitus   • Difficult or painful urination   • Disorder of sacroiliac joint   • Dry eye syndrome   • Hiatal hernia   • History of smoking   • Homonymous hemianopia   • Hypothyroidism   • Iron deficiency   • Iron deficiency anemia   • Kidney stones   • Klippel-Feil sequence   • Knee joint effusion   • Lumbosacral radiculitis   • Lumbosacral spondylosis without myelopathy   • Open angle with borderline  findings, low risk, bilateral   • Osteoarthritis of right knee   • Post poliomyelitis syndrome   • Recurrent major depression   • Screening for depression   • Spondylolisthesis, acquired   • Vaccine refused by patient   • Vertigo   • Cobalamin deficiency   • Vitamin D deficiency   • Lumbar hernia   • History of pleural effusion        Diagnosis Plan   1. Bilateral carotid artery stenosis  US Carotid Bilateral      2. PAD (peripheral artery disease)  US Ankle / Brachial Indices Extremity Complete      3. Primary hypertension        4. Hyperlipidemia LDL goal <70                Plan: After thoroughly evaluating Carlo Velasco, I believe the best course of action is to remain conservative from vascular surgery standpoint.  Currently he is doing well and denies any strokelike symptoms or significant changes to his lower extremities.  I did review his testing which shows less than 50% carotid stenosis bilaterally and ABIs showed no arterial insufficiency to his lower extremities.  Will see him back in 1 year with repeat noninvasive testing for continued surveillance, including carotid duplex and ABIs.  I did discuss vascular risk factors as they pertain to the progression of vascular disease including controlling his hypertension and hyperlipidemia.  His blood pressure stable on his current medications.  He should continue his Plavix 75 mg daily and Lipitor 80 mg daily in addition to his other medications.  Body mass index is 33.56 kg/m². Class 2 Severe Obesity (BMI >=35 and <=39.9). Obesity-related health conditions include the following: obstructive sleep apnea, hypertension, coronary heart disease, dyslipidemias, GERD, and peripheral vascular disease. Obesity is unchanged. BMI is is above average; BMI management plan is completed. We discussed portion control and increasing exercise. This was all discussed in full with complete understanding.  Thank you for allowing me to participate in the care of your patient.   Please do not hesitate to call with any questions or concerns.  We will keep you aware of any further encounters with Carlo Velasco.        Sincerely yours,         Ambreen Mcnamara, Cruz Morris MD

## 2024-11-20 NOTE — PROGRESS NOTES
[facility] OUTPATIENT PROGRESS NOTE    Patient: Carlo Velasco  : 1945  Age: 79 y.o.  Date of Service: 2024    REASON FOR CLINIC VISIT:  Chief Complaint   Patient presents with    COPD       Subjective:    History of Present Illness:  Carlo Velasco is a 79 y.o. male who presents to the office today to be seen for    Diagnosis Plan   1. Chronic obstructive pulmonary disease, unspecified COPD type        2. Former smoker, stopped smoking many years ago        3. ERNESTO (obstructive sleep apnea)        4. Mild intermittent asthma without complication        5. Empyema of pleura        6. Pulmonary hypertension        7. S/P thoracostomy tube placement        8. Restrictive lung disease        9. Lung nodules        10. History of pneumonia        11. Non-seasonal allergic rhinitis, unspecified trigger        12. Mild dementia associated with other underlying disease, with anxiety        .  Other problems per record.    History:    Patient is a very pleasant elderly  gentleman who was seen in the pulmonary clinic for follow-up visit.  He attended the clinic with his wife.    Patient is a former smoker and quit smoking many years ago.  He has history of chronic obstructive pulmonary disease and he also has obstructive sleep apnea but he does not use any inhalers.  He has Symbicort and an albuterol rescue inhaler at home which he is not using.  He is unable to tolerate the CPAP and does not use any CPAP.  He was seen by me few years ago for empyema and needed cardiothoracic intervention by Dr. Jacobs and had a decortication done and he also needed chest tube placement which later was removed.  He is no longer following up with Dr. Jacobs..  He has no history of lung nodules pulmonary hypertension and restrictive lung disease noted in the PFT he also has allergic rhinitis.  He has mild dementia and has some memory problems    He had no recent hospitalizations and ER visit and urgent  care visit today with new complaint and he is up-to-date on his vaccinations.  He is doing well and is currently stable and at his baseline.  He gained some weight since his last visit  He had a recent CT scan of the chest done which showed stable findings and no other acute changes.     PFT done today:  Not done today      Results for orders placed in visit on 04/21/21    Pulmonary Function Test    [facility] - Pulmonary Function Test    [address] [facility]  [address]  [phone]    Patient : Carlo Velasco  MRN : [id number]  CSN : [id number]  Pulmonologist : Cruz Atkinson MD  Date : 6/7/2021    ______________________________________________________________________    Interpretation :  1.  Spirometry is consistent with a moderate restrictive ventilatory defect with coexisting mild small airways disease.  2.  There is improvement in spirometry postbronchodilator particularly in small airways function which is now normal but a moderate restrictive ventilatory defect still appears to be present.  3.  Lung volumes confirm a moderate restrictive ventilatory defect.  4.  There is a moderate diffusion impairment which when corrected for alveolar volume is normalized.  5.  When current studies are compared to studies from July 13 of 2017, the patient's current prebronchodilator FVC and FEV1 have dropped significantly compared to previous prebronchodilator studies.  His postbronchodilator FVC and FEV1 have also dropped significantly compared to previous postbronchodilator studies.  There has also been a drop in his total lung capacity compared to previous.  When corrected for alveolar volume there has also been a decrease in his diffusion capacity compared to previous although it remains within normal limits.      Cruz Atkinson MD      Results for orders placed during the hospital encounter of 07/13/17    Full Pulmonary Function Test With Bronchodilator    [facility]  [facility] - Pulmonary Function Test    [address] [place], [place] [address].  [place]  [place]  [address]  [phone]    Patient : Carlo Velasco  MRN : [id number]  [facility] : [id number]  Pulmonologist : Cruz Atkinson MD  Date : 2017    ______________________________________________________________________    Interpretation :  1.  Spirometry is consistent with a mild restrictive ventilatory defect with coexisting mild decrease in the patient's FEF 75%,  2.  There is some improvement in the patient's FEF 75% postbronchodilator but a mild restrictive ventilatory defect still appears to be present.  3.  Lung volumes reveal a low normal total lung capacity with a decrease in vital capacity and expiratory reserve volume, consistent with a borderline restrictive ventilatory defect.  4.  Diffusion capacity is within normal limits.      Cruz Atkinson MD         Bronchodilator therapy: Symbicort and Albuterol rescue inhaler    Smoking Status:   Social History     Tobacco Use   Smoking Status Former    Current packs/day: 0.00    Average packs/day: 3.0 packs/day for 10.0 years (30.0 ttl pk-yrs)    Types: Cigarettes    Start date:     Quit date:     Years since quittin.9    Passive exposure: Never   Smokeless Tobacco Never     Pulm Rehab: no  Sleep: yes    Support System: lives with their spouse    Code Status:   There are no questions and answers to display.        Review of Systems:  A complete review of systems is performed and all other systems were reviewed and negative as note above in the HPI.  Review of Systems   Constitutional:  Positive for unexpected weight gain. Negative for fatigue.   HENT:  Positive for congestion, postnasal drip and sinus pressure.    Eyes: Negative.    Respiratory:  Positive for shortness of breath. Negative for cough and chest tightness.    Cardiovascular: Negative.    Gastrointestinal: Negative.    Endocrine: Negative.    Genitourinary: Negative.    Musculoskeletal:  Positive for  arthralgias and back pain.   Skin: Negative.    Allergic/Immunologic: Positive for environmental allergies.   Neurological: Negative.    Hematological: Negative.    Psychiatric/Behavioral:  Positive for decreased concentration.         He has mild dementia       CAT/ACT Score:  Not done today    Medications:  Outpatient Encounter Medications as of 11/20/2024   Medication Sig Dispense Refill    albuterol sulfate  (90 Base) MCG/ACT inhaler Inhale 2 puffs Every 4 (Four) Hours As Needed for Wheezing. 6.7 g 5    atorvastatin (LIPITOR) 80 MG tablet Take 1 tablet by mouth Every Night.      budesonide-formoterol (SYMBICORT) 160-4.5 MCG/ACT inhaler Inhale 2 puffs 2 (Two) Times a Day. 10.2 g 5    Calcium Carbonate 1500 (600 Ca) MG tablet Take 1 tablet by mouth Daily.      Cholecalciferol 125 MCG (5000 UT) tablet Take 1 tablet by mouth Daily.      clopidogrel (PLAVIX) 75 MG tablet TAKE 1 TABLET BY MOUTH DAILY 90 tablet 3    Cyanocobalamin (B-12) 1000 MCG sublingual tablet 1 tablet Daily.      escitalopram (LEXAPRO) 20 MG tablet Take 1 tablet by mouth Daily. 90 tablet 1    ferrous sulfate 325 (65 FE) MG tablet Take 1 tablet by mouth Every Morning.      fluticasone (FLONASE) 50 MCG/ACT nasal spray 2 sprays into the nostril(s) as directed by provider Daily. 16 g 5    furosemide (LASIX) 40 MG tablet Take 1 tablet by mouth Daily As Needed (for any increase in 2 lbs in a daqy or 5 lbs in a week or increassing swelling or congestion). (Patient taking differently: Take 1 tablet by mouth Daily.)      ibuprofen (ADVIL,MOTRIN) 800 MG tablet Take 1 tablet by mouth Every 8 (Eight) Hours As Needed. for pain      isosorbide mononitrate (IMDUR) 30 MG 24 hr tablet TAKE 1 TABLET BY MOUTH EVERY MORNING 90 tablet 3    loratadine (CLARITIN) 10 MG tablet Take 1 tablet by mouth Daily. 90 tablet 3    meclizine (ANTIVERT) 12.5 MG tablet Take 1 tablet by mouth.      metoprolol tartrate (LOPRESSOR) 25 MG tablet TAKE 1 TABLET BY MOUTH DAILY 90  "tablet 3    Nitrostat 0.4 MG SL tablet Place 1 tablet under the tongue Every 5 (Five) Minutes As Needed for Chest Pain. Take no more than 3 doses in 15 minutes. 25 tablet 1    omega-3 acid ethyl esters (LOVAZA) 1 g capsule TAKE 1 CAPSULE BY MOUTH DAILY 90 capsule 3    omeprazole (priLOSEC) 20 MG capsule TAKE 1 CAPSULE BY MOUTH DAILY 90 capsule 3    Potassium Gluconate 2.5 MEQ tablet Take 1 tablet by mouth Daily.      spironolactone (ALDACTONE) 25 MG tablet TAKE 1 TABLET BY MOUTH DAILY 90 tablet 3    tamsulosin (FLOMAX) 0.4 MG capsule 24 hr capsule Take 1 capsule by mouth Daily.      topiramate (TOPAMAX) 50 MG tablet Take 1 tablet by mouth 2 (Two) Times a Day.      Zinc Sulfate (ZINC-220 PO) Take 1 tablet by mouth Daily.       No facility-administered encounter medications on file as of 11/20/2024.       Allergies:  Allergies   Allergen Reactions    Meperidine Other (See Comments) and Unknown - High Severity     HEART STOPS    Statins Myalgia     Causes leg cramps       Immunizations:  Immunization History   Administered Date(s) Administered    ABRYSVO (RSV, 60+ or pregnant women 32-36 wks) 10/07/2023    FLUAD TRI 65YR+ 10/10/2019, 11/07/2024    Fluad Quad 65+ 10/10/2019, 10/25/2022    Fluzone High-Dose 65+YRS 10/11/2018, 10/01/2022    Fluzone High-Dose 65+yrs 10/07/2023    Influenza, Unspecified 10/11/2023    Pneumococcal Conjugate 20-Valent (PCV20) 11/09/2023    TD Preservative Free (Tenivac) 08/17/2018    Td (TDVAX) 10/07/2023    Tdap 09/17/2015, 12/06/2021    influenza Split 10/22/2016       Objective:    Vitals:  /74   Pulse 68   Ht 174 cm (68.5\")   Wt 109 kg (240 lb)   SpO2 94% Comment: RA  BMI 35.96 kg/m²     Physical Exam:  General: Patient is a 79 y.o. pleasant elderly  male. Looks stated age. Appears to be in no acute distress.  Eyes: EOMI. PERRLA. Vision intact. No scleral icterus.  Ear, Nose, Mouth and Throat: Hearing is grossly intact. No Leukoplakia, pharyngitis, stomatitis or " thrush. Swollen nasal mucosa with post nasal drop.  Neck: Range of motion of neck normal. No thyromegaly or masses. Mallampati Class 3  Respiratory: Clear to auscultation bilaterally. No use of accessory muscles. Decreased breath sounds.  Cardiovascular: Normal heart sounds. Regularly regular rhythm without murmur.  Gastrointestinal: Non tender, non distended, soft. Bowel sounds positive in all four quadrants. No organomegaly.  Skin: No obvious rashes, lesions, ulcers or large amount of bruising. No edema.   Neurological: No new motor deficits. Cranial nerves appear intact.  Psychiatric: Patient is alert and oriented to person, place and time.    Chest Imaging:        Study Result    Narrative & Impression   CT CHEST WO CONTRAST DIAGNOSTIC- 11/19/2024 1:51 PM     HISTORY: COPD; J44.9-Chronic obstructive pulmonary disease, unspecified      COMPARISON: 11/2/2023.     TOTAL DOSE LENGTH PRODUCT: 488.01 mGy.cm. Automated exposure control was  also utilized to decrease patient radiation dose.     TECHNIQUE: Axial images of the chest are performed without IV contrast.     FINDINGS: Left subclavian cardiac pacer device is in place. Median  sternotomy wires findings favoring coronary bypass. Dense diffuse  coronary artery calcifications and/or stents. Stable cardiomegaly. No  pericardial or pleural effusion. Calcified mediastinal and left hilar  lymph nodes. No pathologic intrathoracic or axillary lymphadenopathy.  Chronic elevation right hemidiaphragm.     Limited images of the visible upper abdomen demonstrate no acute  appearing pathology. Second portion duodenal diverticula. Calcified  granuloma seen within the liver and the spleen. Colonic diverticuli of  the splenic flexure.     Few small calcified granuloma. Stable 4 mm right upper lobe pulmonary  nodule with central calcification consistent with granuloma. No  suspicious pulmonary nodule. No consolidation. Mild linear right basilar  scarring. No pneumothorax. No  endobronchial lesion.     Postoperative changes lower cervical spine. Old healed right-sided rib  fractures. Chronic upper thoracic compression deformities.     IMPRESSION:  Stable granuloma of the lung parenchyma with no suspicious pulmonary  nodule. Chronic elevation right hemidiaphragm with right basilar  scarring. Left subclavian cardiac pacer device. Similar cardiomegaly  with evidence of prior mediastinal surgery and coronary bypass.  Similar/chronic compression deformities of the upper thoracic vertebra.     This report was signed and finalized on 11/19/2024 4:57 PM by Dr. Cassie Mckeon MD.        Electronically signed by Cassie Mckeon MD on 11/19/24 at 1657 CST     Assessment:  1. Chronic obstructive pulmonary disease, unspecified COPD type    2. Former smoker, stopped smoking many years ago    3. ERNESTO (obstructive sleep apnea)    4. Mild intermittent asthma without complication    5. Empyema of pleura    6. Pulmonary hypertension    7. S/P thoracostomy tube placement    8. Restrictive lung disease    9. Lung nodules    10. History of pneumonia    11. Non-seasonal allergic rhinitis, unspecified trigger    12. 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  Patient has a recent CT scan of the chest done which showed stable removal of the lung parenchyma with no suspicious nodules.  Chronic elevated right hemidiaphragm basilar scarring noted.  Patient has cardiomegaly with primary spinal surgery noted and coronary artery bypass grafting noted.  Explicit results to the patient and showed him the films.  2.  Patient is on Symbicort and will continue it and we will keep using albuterol rescue nurse as needed.  He has obstructive sleep apnea but he will not use any CPAP.  He gained some weight weight loss and healthy lifestyle was discussed with the patient  3.  He has nasal allergies and will continue doing fluticasone nasal spray and loratadine  as needed but not using it regularly.  He is up-to-date on his vaccinations.  Continue follow-up with the primary care provider  4.  He will return to pulmonary clinic for follow-up visit in 6 months time or earlier if needed.  No further CT scan is needed before the next visit and we may repeat the CT scan in a year time.    Follow up:  6 months    Time Spent:  25 minutes    I appreciate the opportunity of participating in this patient's care. I would like to thank the PCP for the referral.  Please feel free to contact me with any other questions.    Katelyn Buckley MD   Pulmonologist/Intensivist     Electronically signed by: Katelyn Buckley MD, 11/20/2024 15:43 CST

## 2024-12-03 ENCOUNTER — HOSPITAL ENCOUNTER (OUTPATIENT)
Dept: PAIN MANAGEMENT | Age: 79
Discharge: HOME | End: 2024-12-03
Payer: MEDICARE

## 2024-12-03 VITALS
HEART RATE: 64 BPM | OXYGEN SATURATION: 97 % | TEMPERATURE: 97.4 F | RESPIRATION RATE: 18 BRPM | DIASTOLIC BLOOD PRESSURE: 80 MMHG | SYSTOLIC BLOOD PRESSURE: 141 MMHG

## 2024-12-03 DIAGNOSIS — R52 PAIN MANAGEMENT: ICD-10-CM

## 2024-12-03 PROCEDURE — 2580000003 HC RX 258

## 2024-12-03 PROCEDURE — G0260 INJ FOR SACROILIAC JT ANESTH: HCPCS

## 2024-12-03 PROCEDURE — 6360000002 HC RX W HCPCS

## 2024-12-03 RX ORDER — TRIAMCINOLONE ACETONIDE 40 MG/ML
80 INJECTION, SUSPENSION INTRA-ARTICULAR; INTRAMUSCULAR ONCE
Status: DISCONTINUED | OUTPATIENT
Start: 2024-12-03 | End: 2024-12-05 | Stop reason: HOSPADM

## 2024-12-03 RX ORDER — SODIUM CHLORIDE 9 MG/ML
3 INJECTION, SOLUTION INTRAMUSCULAR; INTRAVENOUS; SUBCUTANEOUS ONCE
Status: DISCONTINUED | OUTPATIENT
Start: 2024-12-03 | End: 2024-12-05 | Stop reason: HOSPADM

## 2024-12-03 RX ORDER — BUPIVACAINE HYDROCHLORIDE 5 MG/ML
2 INJECTION, SOLUTION EPIDURAL; INTRACAUDAL ONCE
Status: DISCONTINUED | OUTPATIENT
Start: 2024-12-03 | End: 2024-12-05 | Stop reason: HOSPADM

## 2024-12-03 RX ORDER — LIDOCAINE HYDROCHLORIDE 10 MG/ML
7 INJECTION, SOLUTION EPIDURAL; INFILTRATION; INTRACAUDAL; PERINEURAL ONCE
Status: DISCONTINUED | OUTPATIENT
Start: 2024-12-03 | End: 2024-12-05 | Stop reason: HOSPADM

## 2024-12-03 ASSESSMENT — PAIN SCALES - GENERAL: PAINLEVEL_OUTOF10: 4

## 2024-12-03 NOTE — INTERVAL H&P NOTE
Update History & Physical    The patient's History and Physical  was reviewed with the patient and I examined the patient. There was no change. The surgical site was confirmed by the patient and me.     Plan: The risks, benefits, expected outcome, and alternative to the recommended procedure have been discussed with the patient. Patient understands and wants to proceed with the procedure.     Electronically signed by Rafita Arredondo MD on 12/3/2024 at 8:16 AM

## 2024-12-03 NOTE — PROGRESS NOTES
Procedure:  Level of Consciousness: [x]Alert [x]Oriented []Disoriented []Lethargic  Anxiety Level: [x]Calm []Anxious []Depressed []Other  Skin: [x]Warm [x]Dry []Cool []Moist []Intact []Other  Cardiovascular: [x]Palpitations: []Never [x]Occasionally []Frequently  Chest Pain: [x]No []Yes  Respiratory:  [x]Unlabored []Labored []Cough ([] Productive []Unproductive)  HCG Required: [x]No []Yes   Results: []Negative []Positive  Knowledge Level:        [x]Patient/Other verbalized understanding of pre-procedure instructions.        [x]Assessment of post-op care needs (transportation, responsible caregiver)        [x]Able to discuss health care problems and how to deal with it.  Factors that Affect Teaching:        Language Barrier: []No []Yes - why:        Hearing Loss:        []No [x]Yes            Corrective Device:  [x]Yes []No        Vision Loss:           []No [x]Yes            Corrective Device:  [x]Yes []No        Memory Loss:       [x]No []Yes            []Short Term []Long Term  Motivational Level:  [x]Asks Questions                  []Extremely Anxious       [x]Seems Interested               []Seems Uninterested                  []Denies need for Education  Risk for Injury:  [x]Patient oriented to person, place and time  []History of frequent falls/loss of balance  Nutritional:  []Change in appetite   []Weight Gain   []Weight Loss  Functional:  []Requires assistance with ADL's

## 2025-01-07 ENCOUNTER — OFFICE VISIT (OUTPATIENT)
Dept: CARDIOLOGY | Facility: CLINIC | Age: 80
End: 2025-01-07
Payer: MEDICARE

## 2025-01-07 VITALS
HEIGHT: 68 IN | WEIGHT: 238 LBS | SYSTOLIC BLOOD PRESSURE: 115 MMHG | DIASTOLIC BLOOD PRESSURE: 76 MMHG | BODY MASS INDEX: 36.07 KG/M2 | HEART RATE: 71 BPM

## 2025-01-07 DIAGNOSIS — R07.9 CHEST PAIN IN ADULT: ICD-10-CM

## 2025-01-07 DIAGNOSIS — I95.1 ORTHOSTASIS: ICD-10-CM

## 2025-01-07 DIAGNOSIS — I51.7 LVH (LEFT VENTRICULAR HYPERTROPHY): ICD-10-CM

## 2025-01-07 DIAGNOSIS — I25.810 CORONARY ARTERY DISEASE INVOLVING AUTOLOGOUS ARTERY CORONARY BYPASS GRAFT WITHOUT ANGINA PECTORIS: ICD-10-CM

## 2025-01-07 DIAGNOSIS — Z95.1 S/P CABG X 2: ICD-10-CM

## 2025-01-07 DIAGNOSIS — Z95.5 STENTED CORONARY ARTERY: Primary | ICD-10-CM

## 2025-01-07 DIAGNOSIS — I50.32 CHRONIC DIASTOLIC CONGESTIVE HEART FAILURE: ICD-10-CM

## 2025-01-07 DIAGNOSIS — I49.5 SICK SINUS SYNDROME: ICD-10-CM

## 2025-01-07 RX ORDER — LATANOPROST 50 UG/ML
SOLUTION/ DROPS OPHTHALMIC
COMMUNITY
Start: 2024-12-27

## 2025-01-07 RX ORDER — NITROGLYCERIN 0.4 MG/1
TABLET SUBLINGUAL
Qty: 25 TABLET | Refills: 3 | Status: SHIPPED | OUTPATIENT
Start: 2025-01-07

## 2025-01-07 RX ORDER — BACLOFEN 10 MG/1
1 TABLET ORAL EVERY 12 HOURS SCHEDULED
COMMUNITY
Start: 2024-12-17

## 2025-01-07 RX ORDER — ROSUVASTATIN CALCIUM 20 MG/1
20 TABLET, COATED ORAL DAILY
Qty: 90 TABLET | Refills: 3 | Status: SHIPPED | OUTPATIENT
Start: 2025-01-07

## 2025-01-07 NOTE — PROGRESS NOTES
Carlo Velasco  [id number]  1945  79 y.o.  male    Referring Provider: Cruz Chaudhari MD    Reason for Follow-up Visit:      Here for routine follow up   coronary artery disease Coronary artery bypass grafting , stented coronary artery   sick sinus syndrome s/p pacemaker   Cardiac workup test results as below: echo, stress test     Subjective     Chest pain both with exertion as well as at rest.  Feels episodes of chest pain occur no more often with exertion  Moderate substernal,   Pressure like   Chest pain non pleuritic  Chest pain non positional and non gustatory   Lasts less than 5 minutes  Sometimes wakes up with this  He reports being under a lot of stress lately as grandson accidentally shot himself    Started more than 6 weeks ago  Occurs once or twice a week on the average but can be variable in frequency  No associated diaphoresis    No associated nausea  No radiation    Relieved with rest or spontaneously  Not positional    No change with intake of food or antacids  No change with breathing  Mild to moderate associated dyspnea    Similar chest pain episodes in the past   S/L NTG helps subside the chest pain within 5 mins of taking      Lipids as below         Device check as below          History of present illness:  Carlo Velasco is a 79 y.o. yo male with history of coronary artery disease Coronary artery bypass grafting , stented coronary artery sick sinus syndrome s/p pacemaker who presents today for   Chief Complaint   Patient presents with    Coronary Artery Disease     6 mo f/u   .    History  Past Medical History:   Diagnosis Date    Anxiety     Arthritis     Cancer     melanoma    Chronic diastolic congestive heart failure 04/15/2022    Claustrophobia     Colon polyp     COPD (chronic obstructive pulmonary disease)     Coronary artery disease involving autologous artery coronary bypass graft without angina pectoris 09/22/2016    Depression     Disease of thyroid gland     partial  thyroidectomy on right    GERD (gastroesophageal reflux disease)     Hearing loss     Heart attack     History of transfusion     Hypertension     Kidney stone     history of     Low back pain     Mild intermittent asthma without complication 08/24/2021    Open angle with borderline findings, low risk, bilateral 08/23/2021    Pain management     STATES JUST HAD INJECTION IN BACK ON 06/20/2023    Presence of cardiac pacemaker 04/15/2022    Primary hypertension 09/22/2016    Pulmonary hypertension 04/15/2022    S/P CABG x 2 01/04/2019    LIMA to LAD and SVG to RCA 8/8/12    Sick sinus syndrome 07/23/2020    Stented coronary artery 01/04/2019    2.5 x 18 mm Xience Alpine drug-eluting stent to the mid LAD 7/8/2015, 2.5 x 18 mm Xience drug-eluting stent to the proximal OM1 10/21/2016 and 2.5 x 12 mm Synergy drug-eluting stent to OM1 for severe in-stent restenosis 4/28/2020    Tobacco abuse, in remission     Uses hearing aid     BILATERAL   ,   Past Surgical History:   Procedure Laterality Date    BACK SURGERY      CERVICAL FUSION AND LUMBAR DISC REPLACEMENT    BRONCHOSCOPY N/A 04/27/2021    Procedure: BRONCHOSCOPY;  Surgeon: Rico Jacobs MD;  Location:  [facility] OR;  Service: Cardiothoracic;  Laterality: N/A;    CARDIAC CATHETERIZATION  07/2015    SHAHRZAD mid LAD, Dr. Toribio    CARDIAC CATHETERIZATION N/A 10/21/2016    Procedure: Left Heart Cath;  Surgeon: Darian Toribio MD;  Location:  [facility];  Service:     CARDIAC CATHETERIZATION Left 04/05/2017    Procedure: Cardiac Catheterization/Vascular Study;  Surgeon: Darian Toribio MD;  Location:  [facility];  Service:     CARDIAC CATHETERIZATION N/A 04/05/2017    Procedure: Left Heart Cath;  Surgeon: Darian Toribio MD;  Location:  [facility];  Service:     CARDIAC CATHETERIZATION N/A 04/05/2017    Procedure: Coronary angiography;  Surgeon: Darian Toribio MD;  Location:  [facility];  Service:     CARDIAC  CATHETERIZATION N/A 04/05/2017    Procedure: Left ventriculography;  Surgeon: Darian Toribio MD;  Location:  [facility];  Service:     CARDIAC CATHETERIZATION  04/05/2017    Procedure: Saphenous Vein Graft;  Surgeon: Darian Toribio MD;  Location:  [facility];  Service:     CARDIAC CATHETERIZATION Left 02/27/2018    Procedure: Cardiac Catheterization/Vascular Study SHAHRZAD OK PRN;  Surgeon: Darian Toribio MD;  Location:  [facility];  Service:     CARDIAC CATHETERIZATION  02/27/2018    Procedure: Functional Flow [place];  Surgeon: Darian Toribio MD;  Location:  [facility];  Service:     CARDIAC CATHETERIZATION N/A 02/27/2018    Procedure: Left ventriculography;  Surgeon: Darian Toribio MD;  Location:  [facility];  Service:     CARDIAC CATHETERIZATION Left 04/28/2020    Procedure: Cardiac Catheterization/Vascular Study SHAHRZAD OK PRN;  Surgeon: Darian Toribio MD;  Location:  [facility];  Service: Cardiology;  Laterality: Left;    CARDIAC ELECTROPHYSIOLOGY PROCEDURE N/A 01/11/2018    Procedure: PPM generator change - dual;  Surgeon: Darian Toribio MD;  Location:  [facility];  Service:     CARDIAC SURGERY      open heart surgery x2    COLONOSCOPY  09/05/2007    colon polyps    COLONOSCOPY N/A 05/29/2018    Procedure: COLONOSCOPY WITH ANESTHESIA;  Surgeon: Juan F Tapia MD;  Location: [facility] ENDOSCOPY;  Service: Gastroenterology    COLONOSCOPY N/A 10/21/2021    Procedure: COLONOSCOPY WITH ANESTHESIA;  Surgeon: Juan F Tapia MD;  Location: [facility] ENDOSCOPY;  Service: Gastroenterology;  Laterality: N/A;  pre op: hx polyps  post op;polyp,   PCP: Rhys Rosen MD    CORONARY ANGIOPLASTY WITH STENT PLACEMENT  07/2015    SHAHRZAD mid LAD, Dr. Toribio     CORONARY ARTERY BYPASS GRAFT  2012 AND 2014    2 vessel     ENDOSCOPY  11/08/2010    ENDOSCOPY N/A 05/29/2018    Procedure: ESOPHAGOGASTRODUODENOSCOPY WITH  ANESTHESIA;  Surgeon: Juan F Tapia MD;  Location: [facility] ENDOSCOPY;  Service: Gastroenterology    HEAD/NECK LESION/CYST EXCISION Left 2016    Procedure: EXCISION MALIGNANT MELANOMA WITH SENTINEL NODE BIOPSY, INJECTION AND SCAN PRE-OP, LEFT EAR;  Surgeon: Guanaco Zepeda MD;  Location: [facility] OR;  Service:     HEMORRHOIDECTOMY      HERNIA REPAIR      INSERT / REPLACE / REMOVE PACEMAKER      REPLACEMENT TOTAL KNEE Left     BRIAN-STOPPA INCISIONAL HERNIA REPAIR Left 2023    Procedure: BRIAN-STOPPA INCISIONAL HERNIA REPAIR left lumbar hernia repair with mesh;  Surgeon: Govind Celeste MD;  Location:  [facility] OR;  Service: General;  Laterality: Left;    SENTINEL NODE BIOPSY Left 2016    Procedure: SENTINEL NODE BIOPSY;  Surgeon: Guanaco Zepeda MD;  Location: [facility] OR;  Service:     SKIN CANCER EXCISION      THORACOSCOPY Right 2021    Procedure: THORACOSCOPIC TOTAL DECORTICATION;  Surgeon: Rico Jacobs MD;  Location: [facility] OR;  Service: Cardiothoracic;  Laterality: Right;    THYROID SURGERY      THYROIDECTOMY     ,   Family History   Problem Relation Age of Onset    Heart failure Mother     Stroke Mother     Dementia Mother     Coronary artery disease Father     Colon polyps Father     COPD Brother     Colon cancer Neg Hx    ,   Social History     Tobacco Use    Smoking status: Former     Current packs/day: 0.00     Average packs/day: 3.0 packs/day for 10.0 years (30.0 ttl pk-yrs)     Types: Cigarettes     Start date:      Quit date:      Years since quittin.0     Passive exposure: Never    Smokeless tobacco: Never   Vaping Use    Vaping status: Never Used   Substance Use Topics    Alcohol use: Yes     Comment: rare    Drug use: No   ,     Medications  Current Outpatient Medications   Medication Sig Dispense Refill    albuterol sulfate  (90 Base) MCG/ACT inhaler Inhale 2 puffs Every 4 (Four) Hours As Needed for Wheezing. 6.7 g 5    baclofen (LIORESAL) 10 MG tablet  Take 1 tablet by mouth Every 12 (Twelve) Hours.      budesonide-formoterol (SYMBICORT) 160-4.5 MCG/ACT inhaler Inhale 2 puffs 2 (Two) Times a Day. 10.2 g 5    Calcium Carbonate 1500 (600 Ca) MG tablet Take 1 tablet by mouth Daily.      Cholecalciferol 125 MCG (5000 UT) tablet Take 1 tablet by mouth Daily.      clopidogrel (PLAVIX) 75 MG tablet TAKE 1 TABLET BY MOUTH DAILY 90 tablet 3    Cyanocobalamin (B-12) 1000 MCG sublingual tablet 1 tablet Daily.      escitalopram (LEXAPRO) 20 MG tablet Take 1 tablet by mouth Daily. 90 tablet 1    ferrous sulfate 325 (65 FE) MG tablet Take 1 tablet by mouth Every Morning.      fluticasone (FLONASE) 50 MCG/ACT nasal spray 2 sprays into the nostril(s) as directed by provider Daily. 16 g 5    furosemide (LASIX) 40 MG tablet Take 1 tablet by mouth Daily As Needed (for any increase in 2 lbs in a daqy or 5 lbs in a week or increassing swelling or congestion). (Patient taking differently: Take 1 tablet by mouth Daily.)      ibuprofen (ADVIL,MOTRIN) 800 MG tablet Take 1 tablet by mouth Every 8 (Eight) Hours As Needed. for pain      isosorbide mononitrate (IMDUR) 30 MG 24 hr tablet TAKE 1 TABLET BY MOUTH EVERY MORNING 90 tablet 3    latanoprost (XALATAN) 0.005 % ophthalmic solution instill 1 drop in both eyes every night at bedtime      loratadine (CLARITIN) 10 MG tablet Take 1 tablet by mouth Daily. 90 tablet 3    meclizine (ANTIVERT) 12.5 MG tablet Take 1 tablet by mouth.      metoprolol tartrate (LOPRESSOR) 25 MG tablet TAKE 1 TABLET BY MOUTH DAILY 90 tablet 3    omega-3 acid ethyl esters (LOVAZA) 1 g capsule TAKE 1 CAPSULE BY MOUTH DAILY 90 capsule 3    omeprazole (priLOSEC) 20 MG capsule TAKE 1 CAPSULE BY MOUTH DAILY 90 capsule 3    Potassium Gluconate 2.5 MEQ tablet Take 1 tablet by mouth Daily.      spironolactone (ALDACTONE) 25 MG tablet TAKE 1 TABLET BY MOUTH DAILY 90 tablet 3    tamsulosin (FLOMAX) 0.4 MG capsule 24 hr capsule Take 1 capsule by mouth Daily.      Zinc Sulfate  "(ZINC-220 PO) Take 1 tablet by mouth Daily.      nitroglycerin (NITROSTAT) 0.4 MG SL tablet 1 under the tongue as needed for angina, may repeat q5mins for up three doses 25 tablet 3    rosuvastatin (CRESTOR) 20 MG tablet Take 1 tablet by mouth Daily. 90 tablet 3    topiramate (TOPAMAX) 50 MG tablet Take 1 tablet by mouth 2 (Two) Times a Day. (Patient not taking: Reported on 2025)       No current facility-administered medications for this visit.       Allergies:  Meperidine and Statins    Review of Systems  Review of Systems   Constitutional: Positive for malaise/fatigue.   HENT: Negative.     Eyes: Negative.    Cardiovascular:  Positive for chest pain, dyspnea on exertion and leg swelling. Negative for claudication, cyanosis, irregular heartbeat, near-syncope, orthopnea, palpitations, paroxysmal nocturnal dyspnea and syncope.   Respiratory:  Positive for cough and shortness of breath.    Endocrine: Negative.    Hematologic/Lymphatic: Negative.    Skin: Negative.    Musculoskeletal:  Positive for arthritis and back pain.   Gastrointestinal:  Negative for anorexia.   Genitourinary: Negative.    Neurological:  Negative for weakness.   Psychiatric/Behavioral: Negative.          Objective     Physical Exam:  /76   Pulse 71   Ht 172.7 cm (68\")   Wt 108 kg (238 lb)   BMI 36.19 kg/m²   Physical Exam   Constitutional: He appears well-developed.   HENT:   Head: Normocephalic.   Neck: Normal carotid pulses and no JVD present. No tracheal tenderness present. Carotid bruit is not present. No tracheal deviation present.   Cardiovascular: Regular rhythm and normal pulses.   Murmur heard.  Systolic murmur is present with a grade of 2/6.  Pulmonary/Chest: Effort normal. No stridor.   Abdominal: Soft. He exhibits no distension. There is no abdominal tenderness.   Musculoskeletal:      Right lower le+ Pitting Edema present.      Left lower le+ Pitting Edema present.   Neurological: He is alert. No cranial nerve " deficit or sensory deficit.   Skin: Skin is warm.   Psychiatric: His speech is normal and behavior is normal.       Results Review:      Carlo Velasco  Regadenoson Stress Test with Myocardial Perfusion SPECT (Multi Study)  Order# [id number]  Reading physician: Darian Toribio MD Ordering physician: Darian Toribio MD Study date: 23     Patient Information    Patient Name   Carlo Velasco MRN   [id number] Legal Sex   Male  (Age)   1945 (77 y.o.)     Interpretation Summary         Left ventricular ejection fraction is normal (Calculated EF = 50%).    Myocardial perfusion imaging indicates a normal myocardial perfusion study with no evidence of ischemia.    Impressions are consistent with a low risk study.    Diaphragmatic attenuation artifact is present.    Physiological apical thinning noted             IMPRESSION:  1. No pulmonary emboli.  2. No thoracic aortic aneurysm or dissection. Prior mediastinal surgery  with cardiomegaly. Left subclavian cardiac pacer device.  3. New right middle lobe consolidation most consistent with pneumonia.  Probable reactive right hilar and subcarinal lymph nodes. Follow up  chest x-ray recommended to document resolution.  This report was finalized on 2021 20:54 by Dr. Cassie cMkeon MD.      Conclusion of cardiac catheterization    2020     Left ventricle ejection fraction 45%  Moderately elevated left ventricular end-diastolic pressure of 19 mmHg  Atretic left intramammary artery graft  Patent stents in the left anterior descending coronary artery  60% stenosis chronic of diagonal branch unchanged from prior cardiac catheterization  95% in-stent restenosis of first obtuse marginal branch treated with PTCA and then implantation of 2.5 x 12 mm Synergy stent with 0% residual stenosis  70% stenosis of distal right coronary artery  Widely patent saphenous venous graft to the distal right coronary artery         ____________________________________________________________________________________________________________________________________________     Plan after cardiac catheterization        Dual antiplatelet therapy minimum of 1 year  Continue on other medication including statin and beta-blockers  Aspirin for the rest of his life  Add ACE inhibitor therapy  Check echocardiogram  Intensive risk factor modifications for both primary and secondary prevention if applicable  Hydration   Observation         Carlo Velasco   Regadenoson Stress Test With Myocardial Perfusion SPECT (Multi Study)   Order# [id number]   Reading physician: Darian Toribio MD Ordering physician: Darian Toribio MD Study date: 20   Patient Information     Patient Name  Carlo Velasco MRN  [id number] Sex  Male  (Age)  1945 (74 y.o.)   Interpretation Summary        Left ventricular ejection fraction is normal (Calculated EF = 55%).  Myocardial perfusion imaging indicates a normal myocardial perfusion study with no evidence of ischemia.  Impressions are consistent with a low risk study.                Results for orders placed during the hospital encounter of 23    Adult Transthoracic Echo Complete w/ Color, Spectral and Contrast if necessary per protocol    Interpretation Summary    Left ventricular systolic function is normal. Left ventricular ejection fraction appears to be 56 - 60%.    Left ventricular diastolic function was normal.    Abnormal global longitudinal LV strain (GLS) = -11.6%.    Left atrial volume is mildly increased.    Estimated right ventricular systolic pressure from tricuspid regurgitation is normal (<35 mmHg).         ECG 12 Lead    Date/Time: 2025 9:49 AM  Performed by: Darian Toribio MD    Authorized by: Darian Toribio MD  Comparison: compared with previous ECG from 2024  Comparison to previous ECG: Ventricular rate changed from 60  to 69  beats per minute      Rhythm: sinus rhythm  Rate:  normal  Conduction: conduction normal  ST Segments: ST segments normal  T Waves: T waves normal  QRS axis: normal  Other: no other findings    Clinical impression: normal ECG        Cardiac cath  4/17  LVEF of 50%.   occluded the mid right coronary artery  Patent saphenous venous graft to the distal right coronary artery  Atretic left intramammary artery graft.  Patent stent in the mid left anterior descending coronary artery  Patent stent to the obtuse marginal branch  Patent stent in diagonal branch.   2/18    Conclusion     Patent stents noted in the obtuse marginal branch and the left anterior descending coronary artery.  Atretic left internal mammary artery graft.  Patent saphenous venous graft to the right coronary artery  Significant right coronary artery stenosis proximal to the touchdown site of the saphenous vein graft to the right coronary artery  60% stenosis of diagonal branch with a normal fractional flow reserve of 0.85  Left ventricle ejection fraction of 50%.     LVEDP    16   mm Hg           Plan     Intensive risk factor modifications for both primary and secondary prevention if applicable  Home today if stable  Hydration   Observation  Keep follow-up appointment    Diagnoses and all orders for this visit:    1. Stented coronary artery (Primary)    2. Sick sinus syndrome    3. Orthostasis    4. Coronary artery disease involving autologous artery coronary bypass graft without angina pectoris    5. Chronic diastolic congestive heart failure    6. LVH (left ventricular hypertrophy)    7. S/P CABG x 2    8. Chest pain in adult  -     Stress Test With Myocardial Perfusion (1 Day); Future    Other orders  -     ECG 12 Lead  -     nitroglycerin (NITROSTAT) 0.4 MG SL tablet; 1 under the tongue as needed for angina, may repeat q5mins for up three doses  Dispense: 25 tablet; Refill: 3  -     rosuvastatin (CRESTOR) 20 MG tablet; Take 1 tablet by mouth Daily.  Dispense: 90 tablet; Refill: 3             Plan    Patient expressed understanding  Encouraged and answered all questions   Discussed with the patient and all questioned fully answered. He will call me if any problems arise.   Discussed results of prior testing with patient and wide   as well as electrocardiogram available for review    Orders Placed This Encounter   Procedures    Stress Test With Myocardial Perfusion (1 Day)     Standing Status:   Future     Standing Expiration Date:   1/7/2026     Order Specific Question:   What stress agent will be used?     Answer:   Regadenoson (Lexiscan)     Order Specific Question:   Difficulty walking criteria?     Answer:   Musculoskeletal (hips, knees, feet, back, amputee)     Order Specific Question:   Reason for exam?     Answer:   Chest Pain     Order Specific Question:   Reason for exam?     Answer:   Known Coronary Artery Disease     Order Specific Question:   Release to patient     Answer:   Routine Release [[id number]]    ECG 12 Lead     This order was created via procedure documentation     Order Specific Question:   Release to patient     Answer:   Routine Release [[id number]]        Keep LDL below 70 mg/dl. Monitor liver and renal functions.   Monitor CBC, CMP, TSH (as indicated) and Lipid Panel by primary     Overall doing well no new cardiovascular symptoms and therefore no additional cardiac testing is required prior to next visit  If any interim issues arise will call me for further evaluation.     Check BP and heart rates twice daily initially till blood pressures and heart rates under good control and then at least 3x / week,   If blood pressures continue to be well-controlled then can check week a month  at home and bring a recording for review next visit  If BP >130/85 or < 100/60 persistently over 3 reading 30 mins apart or if heart rates persistently above 100 bpm or less than 55 bpm call sooner for evaluation and advise     S/L NTG PRN for chest pain, call me or go to ER if has to use S/L  nitroglycerin   LDL as above   Switch to Crestor 20 mg daily   May need injectable lipid lowering agents in future if LDL not at goal   Requested Prescriptions     Signed Prescriptions Disp Refills    nitroglycerin (NITROSTAT) 0.4 MG SL tablet 25 tablet 3     Si under the tongue as needed for angina, may repeat q5mins for up three doses    rosuvastatin (CRESTOR) 20 MG tablet 90 tablet 3     Sig: Take 1 tablet by mouth Daily.      Weigh yourself frequently, at least weekly, preferably daily, call me if more than 2 pounds a day or 5 pounds a week weight gain.  Flexible diuretic dosing    Keep LDL below 70 mg/dl. Monitor liver and renal functions.   Monitor CBC, CMP, TSH (as indicated) and Lipid Panel by primary      Monitor for any signs of bleeding including red or dark stools as well as easy bruisabilty. Fall precautions.   Monitor cardiac rhythm device function regularly per established schedule or PRN    Bring copies or have primary fax to our office labs and other tests prior to next visit              Return in about 3 weeks (around 2025).

## 2025-01-10 ENCOUNTER — HOSPITAL ENCOUNTER (OUTPATIENT)
Dept: CARDIOLOGY | Facility: HOSPITAL | Age: 80
Discharge: HOME | End: 2025-01-10
Payer: MEDICARE

## 2025-01-10 VITALS — WEIGHT: 238.1 LBS | HEIGHT: 68 IN | BODY MASS INDEX: 36.09 KG/M2

## 2025-01-10 DIAGNOSIS — R07.9 CHEST PAIN IN ADULT: ICD-10-CM

## 2025-01-10 PROCEDURE — 34310000005 TECHNETIUM TETROFOSMIN KIT: Performed by: INTERNAL MEDICINE

## 2025-01-10 PROCEDURE — A9502 TC99M TETROFOSMIN: HCPCS | Performed by: INTERNAL MEDICINE

## 2025-01-10 PROCEDURE — 25010000002 REGADENOSON 0.4 MG/5ML SOLUTION: Performed by: INTERNAL MEDICINE

## 2025-01-10 PROCEDURE — 78452 HT MUSCLE IMAGE SPECT MULT: CPT

## 2025-01-10 PROCEDURE — 93017 CV STRESS TEST TRACING ONLY: CPT

## 2025-01-10 RX ORDER — REGADENOSON 0.08 MG/ML
0.4 INJECTION, SOLUTION INTRAVENOUS ONCE
Status: COMPLETED | OUTPATIENT
Start: 2025-01-10 | End: 2025-01-10

## 2025-01-10 RX ORDER — SPIRONOLACTONE 25 MG/1
25 TABLET ORAL DAILY
Qty: 30 TABLET | Refills: 0 | Status: SHIPPED | OUTPATIENT
Start: 2025-01-10

## 2025-01-10 RX ADMIN — TETROFOSMIN 1 DOSE: 1.38 INJECTION, POWDER, LYOPHILIZED, FOR SOLUTION INTRAVENOUS at 07:10

## 2025-01-10 RX ADMIN — TETROFOSMIN 1 DOSE: 1.38 INJECTION, POWDER, LYOPHILIZED, FOR SOLUTION INTRAVENOUS at 08:20

## 2025-01-10 RX ADMIN — REGADENOSON 0.4 MG: 0.08 INJECTION, SOLUTION INTRAVENOUS at 08:20

## 2025-01-12 LAB
BH CV REST NUCLEAR ISOTOPE DOSE: 11.6 MCI
BH CV STRESS BP STAGE 1: NORMAL
BH CV STRESS COMMENTS STAGE 1: NORMAL
BH CV STRESS DOSE REGADENOSON STAGE 1: 0.4
BH CV STRESS DURATION MIN STAGE 1: 0
BH CV STRESS DURATION SEC STAGE 1: 10
BH CV STRESS HR STAGE 1: 84
BH CV STRESS NUCLEAR ISOTOPE DOSE: 36 MCI
BH CV STRESS PROTOCOL 1: NORMAL
BH CV STRESS RECOVERY BP: NORMAL MMHG
BH CV STRESS RECOVERY HR: 78 BPM
BH CV STRESS STAGE 1: 1
MAXIMAL PREDICTED HEART RATE: 143 BPM
PERCENT MAX PREDICTED HR: 58.74 %
SPECT HRT GATED+EF W RNC IV: 54 %
STRESS BASELINE BP: NORMAL MMHG
STRESS BASELINE HR: 69 BPM
STRESS PERCENT HR: 69 %
STRESS POST EXERCISE DUR SEC: 10 SEC
STRESS POST PEAK BP: NORMAL MMHG
STRESS POST PEAK HR: 84 BPM
STRESS TARGET HR: 122 BPM

## 2025-01-23 ENCOUNTER — TELEPHONE (OUTPATIENT)
Dept: CARDIOLOGY | Facility: CLINIC | Age: 80
End: 2025-01-23
Payer: MEDICARE

## 2025-01-23 NOTE — TELEPHONE ENCOUNTER
Patient called and left us a message to call back.    I called back with no answer so I left a message

## 2025-02-05 RX ORDER — SPIRONOLACTONE 25 MG/1
25 TABLET ORAL DAILY
Qty: 30 TABLET | Refills: 11 | Status: SHIPPED | OUTPATIENT
Start: 2025-02-05

## 2025-02-06 ENCOUNTER — APPOINTMENT (OUTPATIENT)
Dept: LAB | Facility: HOSPITAL | Age: 80
End: 2025-02-06
Payer: MEDICARE

## 2025-02-06 ENCOUNTER — TRANSCRIBE ORDERS (OUTPATIENT)
Dept: ADMINISTRATIVE | Facility: HOSPITAL | Age: 80
End: 2025-02-06
Payer: MEDICARE

## 2025-02-06 ENCOUNTER — LAB (OUTPATIENT)
Dept: LAB | Facility: HOSPITAL | Age: 80
End: 2025-02-06
Payer: MEDICARE

## 2025-02-06 ENCOUNTER — HOSPITAL ENCOUNTER (OUTPATIENT)
Dept: GENERAL RADIOLOGY | Facility: HOSPITAL | Age: 80
Discharge: HOME | End: 2025-02-06
Payer: MEDICARE

## 2025-02-06 DIAGNOSIS — R05.9 COUGH, UNSPECIFIED TYPE: Primary | ICD-10-CM

## 2025-02-06 DIAGNOSIS — J18.9 PNEUMONIA DUE TO INFECTIOUS ORGANISM, UNSPECIFIED LATERALITY, UNSPECIFIED PART OF LUNG: Primary | ICD-10-CM

## 2025-02-06 DIAGNOSIS — R05.9 COUGH, UNSPECIFIED TYPE: ICD-10-CM

## 2025-02-06 DIAGNOSIS — J18.9 PNEUMONIA DUE TO INFECTIOUS ORGANISM, UNSPECIFIED LATERALITY, UNSPECIFIED PART OF LUNG: ICD-10-CM

## 2025-02-06 LAB
B PARAPERT DNA SPEC QL NAA+PROBE: NOT DETECTED
B PERT DNA SPEC QL NAA+PROBE: NOT DETECTED
C PNEUM DNA NPH QL NAA+NON-PROBE: NOT DETECTED
FLUAV H1 2009 PAND RNA NPH QL NAA+PROBE: DETECTED
FLUBV RNA ISLT QL NAA+PROBE: NOT DETECTED
HADV DNA SPEC NAA+PROBE: NOT DETECTED
HCOV 229E RNA SPEC QL NAA+PROBE: NOT DETECTED
HCOV HKU1 RNA SPEC QL NAA+PROBE: NOT DETECTED
HCOV NL63 RNA SPEC QL NAA+PROBE: NOT DETECTED
HCOV OC43 RNA SPEC QL NAA+PROBE: NOT DETECTED
HMPV RNA NPH QL NAA+NON-PROBE: NOT DETECTED
HPIV1 RNA ISLT QL NAA+PROBE: NOT DETECTED
HPIV2 RNA SPEC QL NAA+PROBE: NOT DETECTED
HPIV3 RNA NPH QL NAA+PROBE: NOT DETECTED
HPIV4 P GENE NPH QL NAA+PROBE: NOT DETECTED
M PNEUMO IGG SER IA-ACNC: NOT DETECTED
RHINOVIRUS RNA SPEC NAA+PROBE: NOT DETECTED
RSV RNA NPH QL NAA+NON-PROBE: NOT DETECTED
SARS-COV-2 RNA RESP QL NAA+PROBE: NOT DETECTED

## 2025-02-06 PROCEDURE — 71046 X-RAY EXAM CHEST 2 VIEWS: CPT

## 2025-02-06 PROCEDURE — 0202U NFCT DS 22 TRGT SARS-COV-2: CPT

## 2025-02-14 ENCOUNTER — TRANSCRIBE ORDERS (OUTPATIENT)
Dept: ADMINISTRATIVE | Facility: HOSPITAL | Age: 80
End: 2025-02-14
Payer: MEDICARE

## 2025-02-14 DIAGNOSIS — R13.10 DYSPHAGIA, UNSPECIFIED TYPE: Primary | ICD-10-CM

## 2025-02-17 ENCOUNTER — OFFICE VISIT (OUTPATIENT)
Dept: CARDIOLOGY | Facility: CLINIC | Age: 80
End: 2025-02-17
Payer: MEDICARE

## 2025-02-17 VITALS
WEIGHT: 243 LBS | BODY MASS INDEX: 36.83 KG/M2 | DIASTOLIC BLOOD PRESSURE: 72 MMHG | HEART RATE: 94 BPM | HEIGHT: 68 IN | SYSTOLIC BLOOD PRESSURE: 111 MMHG

## 2025-02-17 DIAGNOSIS — E78.5 HYPERLIPIDEMIA LDL GOAL <70: ICD-10-CM

## 2025-02-17 DIAGNOSIS — I10 PRIMARY HYPERTENSION: ICD-10-CM

## 2025-02-17 DIAGNOSIS — I27.20 PULMONARY HYPERTENSION: ICD-10-CM

## 2025-02-17 DIAGNOSIS — I51.7 LVH (LEFT VENTRICULAR HYPERTROPHY): ICD-10-CM

## 2025-02-17 DIAGNOSIS — Z95.5 STENTED CORONARY ARTERY: Primary | ICD-10-CM

## 2025-02-17 DIAGNOSIS — I47.29 NSVT (NONSUSTAINED VENTRICULAR TACHYCARDIA): ICD-10-CM

## 2025-02-17 DIAGNOSIS — R07.9 CHEST PAIN IN ADULT: ICD-10-CM

## 2025-02-17 DIAGNOSIS — Z93.8 S/P THORACOSTOMY TUBE PLACEMENT: ICD-10-CM

## 2025-02-17 DIAGNOSIS — I49.5 SICK SINUS SYNDROME: ICD-10-CM

## 2025-02-17 PROCEDURE — 3074F SYST BP LT 130 MM HG: CPT | Performed by: INTERNAL MEDICINE

## 2025-02-17 PROCEDURE — 99214 OFFICE O/P EST MOD 30 MIN: CPT | Performed by: INTERNAL MEDICINE

## 2025-02-17 PROCEDURE — 1160F RVW MEDS BY RX/DR IN RCRD: CPT | Performed by: INTERNAL MEDICINE

## 2025-02-17 PROCEDURE — 1159F MED LIST DOCD IN RCRD: CPT | Performed by: INTERNAL MEDICINE

## 2025-02-17 PROCEDURE — 3078F DIAST BP <80 MM HG: CPT | Performed by: INTERNAL MEDICINE

## 2025-02-17 RX ORDER — ISOSORBIDE MONONITRATE 60 MG/1
60 TABLET, EXTENDED RELEASE ORAL EVERY MORNING
Qty: 90 TABLET | Refills: 3 | Status: SHIPPED | OUTPATIENT
Start: 2025-02-17

## 2025-02-17 NOTE — PROGRESS NOTES
Carlo Velasco  [id number]  1945  79 y.o.  male    Referring Provider: Cruz Chaudhari MD    Reason for Follow-up Visit:      Here for routine follow up   coronary artery disease Coronary artery bypass grafting , stented coronary artery   sick sinus syndrome s/p pacemaker   Cardiac workup test results as below: myocardial perfusion scan      Subjective     Chest pain at random and not necessarily related to exertion  Can lasts for mins to hours   myocardial perfusion scan  normal   Chest pain both with exertion as well as at rest.  Feels episodes of chest pain occur no more often with exertion  Moderate substernal,   Pressure like   Chest pain non pleuritic  Chest pain non positional and non gustatory   Sometimes wakes up with this  He reports being under a lot of stress lately as grandson accidentally shot himself  Started more than 10 weeks ago  Occurs once or twice a week on the average but can be variable in frequency  No associated diaphoresis    No associated nausea  No radiation  Relieved  spontaneously  Not positional    No change with intake of food or antacids  No change with breathing  Mild to moderate associated dyspnea    Similar chest pain episodes in the past   S/L NTG helps subside the chest pain within 5 mins of taking      Lipids as below         Device check as below          History of present illness:  Carlo Velasco is a 79 y.o. yo male with history of coronary artery disease Coronary artery bypass grafting , stented coronary artery sick sinus syndrome s/p pacemaker who presents today for   Chief Complaint   Patient presents with    Chronic diastolic congestive heart failure     3 WEEK RESULTS - patient stated that he also has been having trouble with his memory    .    History  Past Medical History:   Diagnosis Date    Anxiety     Arthritis     Cancer     melanoma    Chronic diastolic congestive heart failure 04/15/2022    Claustrophobia     Colon polyp     COPD (chronic obstructive  pulmonary disease)     Coronary artery disease involving autologous artery coronary bypass graft without angina pectoris 09/22/2016    Depression     Disease of thyroid gland     partial thyroidectomy on right    GERD (gastroesophageal reflux disease)     Hearing loss     Heart attack     History of transfusion     Hypertension     Kidney stone     history of     Low back pain     Mild intermittent asthma without complication 08/24/2021    Open angle with borderline findings, low risk, bilateral 08/23/2021    Pain management     STATES JUST HAD INJECTION IN BACK ON 06/20/2023    Presence of cardiac pacemaker 04/15/2022    Primary hypertension 09/22/2016    Pulmonary hypertension 04/15/2022    S/P CABG x 2 01/04/2019    LIMA to LAD and SVG to RCA 8/8/12    Sick sinus syndrome 07/23/2020    Stented coronary artery 01/04/2019    2.5 x 18 mm Xience Alpine drug-eluting stent to the mid LAD 7/8/2015, 2.5 x 18 mm Xience drug-eluting stent to the proximal OM1 10/21/2016 and 2.5 x 12 mm Synergy drug-eluting stent to OM1 for severe in-stent restenosis 4/28/2020    Tobacco abuse, in remission     Uses hearing aid     BILATERAL   ,   Past Surgical History:   Procedure Laterality Date    BACK SURGERY      CERVICAL FUSION AND LUMBAR DISC REPLACEMENT    BRONCHOSCOPY N/A 04/27/2021    Procedure: BRONCHOSCOPY;  Surgeon: Rico Jacobs MD;  Location: [facility] OR;  Service: Cardiothoracic;  Laterality: N/A;    CARDIAC CATHETERIZATION  07/2015    SHAHRZAD mid LAD, Dr. Toribio    CARDIAC CATHETERIZATION N/A 10/21/2016    Procedure: Left Heart Cath;  Surgeon: Darian Toribio MD;  Location:  [facility] CATH INVASIVE LOC[facility];  Service:     CARDIAC CATHETERIZATION Left 04/05/2017    Procedure: Cardiac Catheterization/Vascular Study;  Surgeon: Darian Toribio MD;  Location:  [facility];  Service:     CARDIAC CATHETERIZATION N/A 04/05/2017    Procedure: Left Heart Cath;  Surgeon: Darian Toribio MD;  Location:  [facility];   Service:     CARDIAC CATHETERIZATION N/A 04/05/2017    Procedure: Coronary angiography;  Surgeon: Darian Toribio MD;  Location:  [facility];  Service:     CARDIAC CATHETERIZATION N/A 04/05/2017    Procedure: Left ventriculography;  Surgeon: Darian Toribio MD;  Location:  [facility];  Service:     CARDIAC CATHETERIZATION  04/05/2017    Procedure: Saphenous Vein Graft;  Surgeon: Darian Toribio MD;  Location:  [facility];  Service:     CARDIAC CATHETERIZATION Left 02/27/2018    Procedure: Cardiac Catheterization/Vascular Study SHAHRZAD OK PRN;  Surgeon: Darian Toribio MD;  Location:  [facility];  Service:     CARDIAC CATHETERIZATION  02/27/2018    Procedure: Functional Flow [place];  Surgeon: Darian Toribio MD;  Location:  [facility];  Service:     CARDIAC CATHETERIZATION N/A 02/27/2018    Procedure: Left ventriculography;  Surgeon: Darian Toribio MD;  Location:  [facility];  Service:     CARDIAC CATHETERIZATION Left 04/28/2020    Procedure: Cardiac Catheterization/Vascular Study SHAHRZAD OK PRN;  Surgeon: Darian Toribio MD;  Location:  [facility];  Service: Cardiology;  Laterality: Left;    CARDIAC ELECTROPHYSIOLOGY PROCEDURE N/A 01/11/2018    Procedure: PPM generator change - dual;  Surgeon: Darian Toribio MD;  Location:  [facility];  Service:     CARDIAC SURGERY      open heart surgery x2    COLONOSCOPY  09/05/2007    colon polyps    COLONOSCOPY N/A 05/29/2018    Procedure: COLONOSCOPY WITH ANESTHESIA;  Surgeon: Juan F Tapia MD;  Location: [facility] ENDOSCOPY;  Service: Gastroenterology    COLONOSCOPY N/A 10/21/2021    Procedure: COLONOSCOPY WITH ANESTHESIA;  Surgeon: Juan F Tapia MD;  Location: [facility] ENDOSCOPY;  Service: Gastroenterology;  Laterality: N/A;  pre op: hx polyps  post op;polyp,   PCP: Rhys Rosen MD    CORONARY ANGIOPLASTY WITH STENT PLACEMENT  07/2015    SHAHRZAD mid  Dr. Malu LO     CORONARY ARTERY BYPASS GRAFT   AND     2 vessel     ENDOSCOPY  2010    ENDOSCOPY N/A 2018    Procedure: ESOPHAGOGASTRODUODENOSCOPY WITH ANESTHESIA;  Surgeon: Juan F Tapia MD;  Location: [facility] ENDOSCOPY;  Service: Gastroenterology    HEAD/NECK LESION/CYST EXCISION Left 2016    Procedure: EXCISION MALIGNANT MELANOMA WITH SENTINEL NODE BIOPSY, INJECTION AND SCAN PRE-OP, LEFT EAR;  Surgeon: Guanaco Zepeda MD;  Location: [facility] OR;  Service:     HEMORRHOIDECTOMY      HERNIA REPAIR      INSERT / REPLACE / REMOVE PACEMAKER      REPLACEMENT TOTAL KNEE Left     BRIAN-STOPPA INCISIONAL HERNIA REPAIR Left 2023    Procedure: BRIAN-STOPPA INCISIONAL HERNIA REPAIR left lumbar hernia repair with mesh;  Surgeon: Govind Celeste MD;  Location:  [facility] OR;  Service: General;  Laterality: Left;    SENTINEL NODE BIOPSY Left 2016    Procedure: SENTINEL NODE BIOPSY;  Surgeon: Guanaco Zepeda MD;  Location:  [facility] OR;  Service:     SKIN CANCER EXCISION      THORACOSCOPY Right 2021    Procedure: THORACOSCOPIC TOTAL DECORTICATION;  Surgeon: Rico Jacobs MD;  Location:  [facility] OR;  Service: Cardiothoracic;  Laterality: Right;    THYROID SURGERY      THYROIDECTOMY     ,   Family History   Problem Relation Age of Onset    Heart failure Mother     Stroke Mother     Dementia Mother     Coronary artery disease Father     Colon polyps Father     COPD Brother     Colon cancer Neg Hx    ,   Social History     Tobacco Use    Smoking status: Former     Current packs/day: 0.00     Average packs/day: 3.0 packs/day for 10.0 years (30.0 ttl pk-yrs)     Types: Cigarettes     Start date:      Quit date:      Years since quittin.1     Passive exposure: Never    Smokeless tobacco: Never   Vaping Use    Vaping status: Never Used   Substance Use Topics    Alcohol use: Yes     Comment: rare    Drug use: No   ,     Medications  Current Outpatient Medications   Medication Sig  Dispense Refill    albuterol sulfate  (90 Base) MCG/ACT inhaler Inhale 2 puffs Every 4 (Four) Hours As Needed for Wheezing. 6.7 g 5    baclofen (LIORESAL) 10 MG tablet Take 1 tablet by mouth Every 12 (Twelve) Hours.      budesonide-formoterol (SYMBICORT) 160-4.5 MCG/ACT inhaler Inhale 2 puffs 2 (Two) Times a Day. 10.2 g 5    Calcium Carbonate 1500 (600 Ca) MG tablet Take 1 tablet by mouth Daily.      Cholecalciferol 125 MCG (5000 UT) tablet Take 1 tablet by mouth Daily.      clopidogrel (PLAVIX) 75 MG tablet TAKE 1 TABLET BY MOUTH DAILY 90 tablet 3    Cyanocobalamin (B-12) 1000 MCG sublingual tablet 1 tablet Daily.      escitalopram (LEXAPRO) 20 MG tablet Take 1 tablet by mouth Daily. 90 tablet 1    ferrous sulfate 325 (65 FE) MG tablet Take 1 tablet by mouth Every Morning.      fluticasone (FLONASE) 50 MCG/ACT nasal spray 2 sprays into the nostril(s) as directed by provider Daily. 16 g 5    furosemide (LASIX) 40 MG tablet Take 1 tablet by mouth Daily As Needed (for any increase in 2 lbs in a daqy or 5 lbs in a week or increassing swelling or congestion). (Patient taking differently: Take 1 tablet by mouth Daily.)      ibuprofen (ADVIL,MOTRIN) 800 MG tablet Take 1 tablet by mouth Every 8 (Eight) Hours As Needed. for pain      latanoprost (XALATAN) 0.005 % ophthalmic solution instill 1 drop in both eyes every night at bedtime      loratadine (CLARITIN) 10 MG tablet Take 1 tablet by mouth Daily. 90 tablet 3    meclizine (ANTIVERT) 12.5 MG tablet Take 1 tablet by mouth.      metoprolol tartrate (LOPRESSOR) 25 MG tablet TAKE 1 TABLET BY MOUTH DAILY 90 tablet 3    nitroglycerin (NITROSTAT) 0.4 MG SL tablet 1 under the tongue as needed for angina, may repeat q5mins for up three doses 25 tablet 3    omega-3 acid ethyl esters (LOVAZA) 1 g capsule TAKE 1 CAPSULE BY MOUTH DAILY 90 capsule 3    omeprazole (priLOSEC) 20 MG capsule TAKE 1 CAPSULE BY MOUTH DAILY 90 capsule 3    Potassium Gluconate 2.5 MEQ tablet Take 1  "tablet by mouth Daily.      rosuvastatin (CRESTOR) 20 MG tablet Take 1 tablet by mouth Daily. 90 tablet 3    spironolactone (ALDACTONE) 25 MG tablet TAKE 1 TABLET BY MOUTH DAILY 30 tablet 11    tamsulosin (FLOMAX) 0.4 MG capsule 24 hr capsule Take 1 capsule by mouth Daily.      topiramate (TOPAMAX) 50 MG tablet Take 1 tablet by mouth 2 (Two) Times a Day.      Zinc Sulfate (ZINC-220 PO) Take 1 tablet by mouth Daily.      isosorbide mononitrate (IMDUR) 60 MG 24 hr tablet Take 1 tablet by mouth Every Morning. 90 tablet 3     No current facility-administered medications for this visit.       Allergies:  Meperidine and Statins    Review of Systems  Review of Systems   Constitutional: Positive for malaise/fatigue.   HENT: Negative.     Eyes: Negative.    Cardiovascular:  Positive for chest pain, dyspnea on exertion and leg swelling. Negative for claudication, cyanosis, irregular heartbeat, near-syncope, orthopnea, palpitations, paroxysmal nocturnal dyspnea and syncope.   Respiratory:  Positive for cough and shortness of breath.    Endocrine: Negative.    Hematologic/Lymphatic: Negative.    Skin: Negative.    Musculoskeletal:  Positive for arthritis and back pain.   Gastrointestinal:  Negative for anorexia.   Genitourinary: Negative.    Neurological:  Negative for weakness.   Psychiatric/Behavioral: Negative.          Objective     Physical Exam:  /72   Pulse 94   Ht 172.7 cm (68\")   Wt 110 kg (243 lb)   BMI 36.95 kg/m²   Physical Exam   Constitutional: He appears well-developed.   HENT:   Head: Normocephalic.   Neck: Normal carotid pulses and no JVD present. No tracheal tenderness present. Carotid bruit is not present. No tracheal deviation present.   Cardiovascular: Regular rhythm and normal pulses.   Murmur heard.  Systolic murmur is present with a grade of 2/6.  Pulmonary/Chest: Effort normal. No stridor.   Abdominal: Soft. He exhibits no distension. There is no abdominal tenderness.   Musculoskeletal:      " Right lower le+ Pitting Edema present.      Left lower le+ Pitting Edema present.   Neurological: He is alert. No cranial nerve deficit or sensory deficit.   Skin: Skin is warm.   Psychiatric: His speech is normal and behavior is normal.       Results Review:    Regadenoson Stress Test with Myocardial Perfusion SPECT (Multi Study)    Accession Number: [id number]   Date of Study: 1/10/25   Ordering Provider: Darian Toribio MD   Clinical Indications: Chest Pain, Known Coronary Artery Disease        Reading Physicians  Performing Staff   ECG [place], SPECT [place]: Darian Toribio MD    Tech: Clarence Newsome [facility]   Support Staff: Shruthi Ibarra RN         [facility] PACS Images     Show images for Stress Test With Myocardial Perfusion (1 Day)   Interpretation Summary         Myocardial perfusion imaging indicates a normal myocardial perfusion study with no evidence of ischemia. Impressions are consistent with a low risk study.    Left ventricular ejection fraction is normal (Calculated EF = 54%).    Diaphragmatic attenuation artifact is present.     Physiological apical thinning noted       __________________________________________________________________   Rest and stress as well as attenuation corrected and uncorrected images where visualized including manipulation of the images as required including separate visualization as required using the [facility] software  In addition reprocessing of images performed personally if not satisfied with earlier processing by nuclear medicine tech  Current interpretation is based on a sum total of the above.      ____________________________________________________________________     Carlo Velasco  Regadenoson Stress Test with Myocardial Perfusion SPECT (Multi Study)  Order# [id number]  Reading physician: Darian Toribio MD Ordering physician: Darian Toribio MD Study date: 23     Patient Information    Patient Name   Carlo Velasco MRN   [id number] Legal Sex    Male  (Age)   1945 (77 y.o.)     Interpretation Summary         Left ventricular ejection fraction is normal (Calculated EF = 50%).    Myocardial perfusion imaging indicates a normal myocardial perfusion study with no evidence of ischemia.    Impressions are consistent with a low risk study.    Diaphragmatic attenuation artifact is present.    Physiological apical thinning noted             IMPRESSION:  1. No pulmonary emboli.  2. No thoracic aortic aneurysm or dissection. Prior mediastinal surgery  with cardiomegaly. Left subclavian cardiac pacer device.  3. New right middle lobe consolidation most consistent with pneumonia.  Probable reactive right hilar and subcarinal lymph nodes. Follow up  chest x-ray recommended to document resolution.  This report was finalized on 2021 20:54 by Dr. Cassie Mckeon MD.      Conclusion of cardiac catheterization    2020     Left ventricle ejection fraction 45%  Moderately elevated left ventricular end-diastolic pressure of 19 mmHg  Atretic left intramammary artery graft  Patent stents in the left anterior descending coronary artery  60% stenosis chronic of diagonal branch unchanged from prior cardiac catheterization  95% in-stent restenosis of first obtuse marginal branch treated with PTCA and then implantation of 2.5 x 12 mm Synergy stent with 0% residual stenosis  70% stenosis of distal right coronary artery  Widely patent saphenous venous graft to the distal right coronary artery        ____________________________________________________________________________________________________________________________________________     Plan after cardiac catheterization        Dual antiplatelet therapy minimum of 1 year  Continue on other medication including statin and beta-blockers  Aspirin for the rest of his life  Add ACE inhibitor therapy  Check echocardiogram  Intensive risk factor modifications for both primary and secondary prevention if  applicable  Hydration   Observation         Carlo Velasco   Regadenoson Stress Test With Myocardial Perfusion SPECT (Multi Study)   Order# [id number]   Reading physician: Darian Toribio MD Ordering physician: Darian Toribio MD Study date: 20   Patient Information     Patient Name  Carlo Velasco MRN  [id number] Sex  Male  (Age)  1945 (74 y.o.)   Interpretation Summary        Left ventricular ejection fraction is normal (Calculated EF = 55%).  Myocardial perfusion imaging indicates a normal myocardial perfusion study with no evidence of ischemia.  Impressions are consistent with a low risk study.                Results for orders placed during the hospital encounter of 23    Adult Transthoracic Echo Complete w/ Color, Spectral and Contrast if necessary per protocol    Interpretation Summary    Left ventricular systolic function is normal. Left ventricular ejection fraction appears to be 56 - 60%.    Left ventricular diastolic function was normal.    Abnormal global longitudinal LV strain (GLS) = -11.6%.    Left atrial volume is mildly increased.    Estimated right ventricular systolic pressure from tricuspid regurgitation is normal (<35 mmHg).       Procedures  Cardiac cath    LVEF of 50%.   occluded the mid right coronary artery  Patent saphenous venous graft to the distal right coronary artery  Atretic left intramammary artery graft.  Patent stent in the mid left anterior descending coronary artery  Patent stent to the obtuse marginal branch  Patent stent in diagonal branch.       Conclusion     Patent stents noted in the obtuse marginal branch and the left anterior descending coronary artery.  Atretic left internal mammary artery graft.  Patent saphenous venous graft to the right coronary artery  Significant right coronary artery stenosis proximal to the touchdown site of the saphenous vein graft to the right coronary artery  60% stenosis of diagonal branch with a normal fractional flow  reserve of 0.85  Left ventricle ejection fraction of 50%.     LVEDP    16   mm Hg           Plan     Intensive risk factor modifications for both primary and secondary prevention if applicable  Home today if stable  Hydration   Observation  Keep follow-up appointment    Diagnoses and all orders for this visit:    1. Stented coronary artery (Primary)    2. Sick sinus syndrome    3. Pulmonary hypertension    4. Primary hypertension    5. LVH (left ventricular hypertrophy)    6. Hyperlipidemia LDL goal <70    7. S/P thoracostomy tube placement    8. Chest pain in adult  -     Stress Test With Pet Myocardial Perfusion; Future    9. NSVT (nonsustained ventricular tachycardia)  -     Ambulatory Referral to Cardiac Electrophysiology    Other orders  -     isosorbide mononitrate (IMDUR) 60 MG 24 hr tablet; Take 1 tablet by mouth Every Morning.  Dispense: 90 tablet; Refill: 3            Plan    Patient expressed understanding  Encouraged and answered all questions   Discussed with the patient and all questioned fully answered. He will call me if any problems arise.   Discussed results of prior testing with patient : echo and myocardial perfusion scan    as well as device check     Possible balanced ischemia   Will order cardiac PET scan to assess for perfusion and CFR     Orders Placed This Encounter   Procedures    Ambulatory Referral to Cardiac Electrophysiology     Referral Priority:   Routine     Referral Type:   Consultation     Referral Reason:   Specialty Services Required     Requested Specialty:   Cardiac Electrophysiology     Number of Visits Requested:   1    Stress Test With Pet Myocardial Perfusion     Standing Status:   Future     Standing Expiration Date:   2/17/2026     Order Specific Question:   Reason for exam?     Answer:   Chest Pain     Order Specific Question:   Reason for exam?     Answer:   Angina     Order Specific Question:   Reason for exam?     Answer:   Known Coronary Artery Disease     Order  "Specific Question:   Release to patient     Answer:   Routine Release [[id number]]      Continue beta blocker therapy     Check BP and heart rates twice daily initially till blood pressures and heart rates under good control and then at least 3x / week,   If blood pressures continue to be well-controlled then can check week a month  at home and bring a recording for review next visit  If BP >130/85 or < 100/60 persistently over 3 reading 30 mins apart or if heart rates persistently above 100 bpm or less than 55 bpm call sooner for evaluation and advise     S/L NTG PRN for chest pain, call me or go to ER if has to use S/L nitroglycerin   LDL as above   Switched to Crestor 20 mg daily and was told by primary that lipids \" good\"  Bring copies or have primary fax to our office labs and other tests prior to next visit   May need injectable lipid lowering agents in future if LDL not at goal     Weigh yourself frequently, at least weekly, preferably daily, call me if more than 2 pounds a day or 5 pounds a week weight gain.  Flexible diuretic dosing    Keep LDL below 55 mg/dl. Monitor liver and renal functions.   Monitor CBC, CMP, TSH (as indicated) and Lipid Panel by primary      Monitor for any signs of bleeding including red or dark stools as well as easy bruisabilty. Fall precautions.   Monitor cardiac rhythm device function regularly per established schedule or PRN    Bring copies or have primary fax to our office labs and other tests prior to next visit     Increase Imdur   Requested Prescriptions     Signed Prescriptions Disp Refills    isosorbide mononitrate (IMDUR) 60 MG 24 hr tablet 90 tablet 3     Sig: Take 1 tablet by mouth Every Morning.             Return in about 2 months (around 4/17/2025).                "

## 2025-03-04 ENCOUNTER — HOSPITAL ENCOUNTER (OUTPATIENT)
Dept: GENERAL RADIOLOGY | Facility: HOSPITAL | Age: 80
Discharge: HOME | End: 2025-03-04
Admitting: NURSE PRACTITIONER
Payer: MEDICARE

## 2025-03-04 DIAGNOSIS — R13.12 OROPHARYNGEAL DYSPHAGIA: ICD-10-CM

## 2025-03-04 PROCEDURE — 92611 MOTION FLUOROSCOPY/SWALLOW: CPT | Performed by: SPEECH-LANGUAGE PATHOLOGIST

## 2025-03-04 PROCEDURE — A9270 NON-COVERED ITEM OR SERVICE: HCPCS | Performed by: NURSE PRACTITIONER

## 2025-03-04 PROCEDURE — 63710000001 BARIUM SULFATE 700 MG TABLET: Performed by: NURSE PRACTITIONER

## 2025-03-04 PROCEDURE — 63710000001 BARIUM SULFATE 40 % RECONSTITUTED SUSPENSION: Performed by: NURSE PRACTITIONER

## 2025-03-04 PROCEDURE — 63710000001 BARIUM SULFATE 40 % SUSPENSION: Performed by: NURSE PRACTITIONER

## 2025-03-04 PROCEDURE — 74230 X-RAY XM SWLNG FUNCJ C+: CPT

## 2025-03-04 PROCEDURE — 63710000001 BARIUM SULFATE 40 % PASTE: Performed by: NURSE PRACTITIONER

## 2025-03-04 RX ADMIN — BARIUM SULFATE 50 ML: 400 SUSPENSION ORAL at 09:32

## 2025-03-04 RX ADMIN — BARIUM SULFATE 50 ML: 0.81 POWDER, FOR SUSPENSION ORAL at 09:32

## 2025-03-04 RX ADMIN — BARIUM SULFATE 700 MG: 700 TABLET ORAL at 09:33

## 2025-03-04 RX ADMIN — BARIUM SULFATE 25 ML: 400 PASTE ORAL at 09:31

## 2025-03-04 RX ADMIN — BARIUM SULFATE 25 ML: 400 SUSPENSION ORAL at 09:32

## 2025-03-04 NOTE — MBS/VFSS/FEES
Speech Language Pathology   MBS FEES / Discharge Summary  [facility]       Patient Name: Carlo Velasco  : 1945  MRN: [id number]    Today's Date: 3/4/2025      Visit Date: 2025   SPEECH-LANGUAGE PATHOLOGY EVALUTION - MBS/VFSS  Subjective: The patient was seen on this date for a VFSS(Videofluoroscopic Swallowing Study).  Patient was alert and cooperative.    Significant history: Patient reports foods feel hung up and he has trouble swallowing his pills. He has history of COPD, CAD, parotid thyroidectomy R, GERD, CABG, ACDF C5-6. Reports prior esophageal stretching years ago.   Objective: Risks/benefits were reviewed with the patient, and consent was obtained. Oral motor examination results: WFL. The study was completed with SLP and Radiologist present. The patient was seen in lateral view(s). Textures given included thin liquid, nectar thick liquid, honey thick liquid, puree consistency, mechanical soft consistency, and regular consistency.  Assessment:  Observations:   Trial 1: Honey Thick - Spoon  Functional oral acceptance and control. Slight loss to the base of tonuge but did not impair the safety of the swallow.  Swallow completed with no penetration or aspiration. No significant oral or pharyngeal residue post swallow.      Trial 2: Nectar Thick - Straw  Functional oral acceptance and slightly reduced control. Premature loss over the tip of the epiglottis with undercoating of the epiglottis vs trace penetration before the swallow. No aspiration noted. No significant oral or pharyngeal residue post swallow.      Trial 3: Thin Liquid - Straw  Functional oral acceptance and slightly reduced control. Premature loss over the tip of the epiglottis. No penetration or aspiration observed. Minimal oral and vallecular residue. Clears partially with extra spontaneous swallow.     Esophageal screen with no noted concerns.      Trial 4: Pudding Thick Liquid - Spoon  Functional oral acceptance and control.  Slight loss to the base of tonuge but did not impair the safety of the swallow.  Swallow completed with no penetration or aspiration. No significant oral or pharyngeal residue post swallow.      Trial 5: Soft Solids  Functional oral acceptance and control. Functional mastication of the soft solid. Slight loss to the base of tonuge but did not impair the safety of the swallow.  Swallow completed with no penetration or aspiration. No significant oral or pharyngeal residue post swallow.      Trial 6: Regular Solids  Functional oral acceptance and control. Functional mastication of the regular solid. Slight loss to the base of tonuge but did not impair the safety of the swallow.  Swallow completed with no penetration or aspiration. No significant oral or pharyngeal residue post swallow.      Trial 7: Thin Liquid - Straw with consecutive sips  Functional oral acceptance and slightly reduced control. Premature loss over the tip of the epiglottis. No penetration or aspiration observed. Minimal oral and vallecular residue. Clears partially with extra spontaneous swallow.     Esophageal screen with no noted concerns.      Trial 8: Barium Tablet - Water  Functional oral acceptance and slightly reduced control. Premature loss over the tip of the epiglottis. Aspiration before the swallow is noted of the water/thin barium.     Esophageal screen with momentary retention in the mid esophagus but quickly moves through.      Trial 9: Esophageal screen  Barium tablet clears through the esophagus and into the stomach.     Delayed cough is observed.     Across trails main concern is mistiming and reduced oral control with liquids.     SLP Findings: Patient presents with minimal oropharyngeal dysphagia.   Recommendations: Diet Textures: regular and thin liquids. Medications should be taken whole with puree.   Recommended Strategies: upright for PO, small bites and sips, and alternate liquids and solids. Oral care 2x a day.  Other  Recommended Evaluations: Repeat VFSS with any increased concerns.     Dysphagia therapy is not recommended; can be managed with compensations at this time.     Flor Mills, MS CCC-SLP 3/4/2025 10:45 CST    Visit Dx:     ICD-10-CM ICD-9-CM   1. Oropharyngeal dysphagia  R13.12 787.22       Patient Active Problem List   Diagnosis    Coronary artery disease involving autologous artery coronary bypass graft without angina pectoris    Primary hypertension    Anxiety    Colon polyps    Hyperlipidemia LDL goal <70    Incisional hernia, without obstruction or gangrene    BRBPR (bright red blood per rectum)    Esophageal dysphagia    Prostatism    Nocturia    Orthostasis    Chronic midline low back pain without sciatica    History of adenomatous polyp of colon    Acute pain of right knee    Morbidly obese    Pain of right heel    S/P CABG x 2    Stented coronary artery    Flank pain    Skin lesion    Pleuritic chest pain    Sick sinus syndrome    Coagulopathy    Pneumonia of right lung due to infectious organism    Recurrent pleural effusion on right    Cough    SOB (shortness of breath)    Former smoker, stopped smoking many years ago    Non-seasonal allergic rhinitis    ERNESTO (obstructive sleep apnea)    Restrictive lung disease    Class 2 severe obesity due to excess calories with serious comorbidity and body mass index (BMI) of 39.0 to 39.9 in adult    Empyema of pleura    Pneumonia    Aspirin-like platelet function defect    S/P thoracostomy tube placement    Mild intermittent asthma without complication    Family hx colonic polyps    History of pneumonia    Pleurisy    History of fall    UTI (urinary tract infection) -cystitis/prostatitis    Sepsis    Compression fracture of T6 vertebra    Dehydration    History of fall    COPD (chronic obstructive pulmonary disease)    Lung nodules    Chronic diastolic congestive heart failure    Presence of cardiac pacemaker    LVH (left ventricular hypertrophy)    Pulmonary  hypertension    Acute sinusitis    Age-related cataract    Benign prostatic hyperplasia    Carotid artery stenosis    Cervical spondylosis without myelopathy    Chronic pain    Closed fracture of greater tuberosity of humerus    Closed fracture of rib    Compression fracture of thoracic vertebra    Contusion of knee    Degeneration of lumbar intervertebral disc    Dementia    Dermatochalasis    Diabetes mellitus    Difficult or painful urination    Disorder of sacroiliac joint    Dry eye syndrome    Hiatal hernia    History of smoking    Homonymous hemianopia    Hypothyroidism    Iron deficiency    Iron deficiency anemia    Kidney stones    Klippel-Feil sequence    Knee joint effusion    Lumbosacral radiculitis    Lumbosacral spondylosis without myelopathy    Open angle with borderline findings, low risk, bilateral    Osteoarthritis of right knee    Post poliomyelitis syndrome    Recurrent major depression    Screening for depression    Spondylolisthesis, acquired    Vaccine refused by patient    Vertigo    Cobalamin deficiency    Vitamin D deficiency    Lumbar hernia    History of pleural effusion        Past Medical History:   Diagnosis Date    Anxiety     Arthritis     Cancer     melanoma    Chronic diastolic congestive heart failure 04/15/2022    Claustrophobia     Colon polyp     COPD (chronic obstructive pulmonary disease)     Coronary artery disease involving autologous artery coronary bypass graft without angina pectoris 09/22/2016    Depression     Disease of thyroid gland     partial thyroidectomy on right    GERD (gastroesophageal reflux disease)     Hearing loss     Heart attack     History of transfusion     Hypertension     Kidney stone     history of     Low back pain     Mild intermittent asthma without complication 08/24/2021    Open angle with borderline findings, low risk, bilateral 08/23/2021    Pain management     STATES JUST HAD INJECTION IN BACK ON 06/20/2023    Presence of cardiac pacemaker  04/15/2022    Primary hypertension 09/22/2016    Pulmonary hypertension 04/15/2022    S/P CABG x 2 01/04/2019    LIMA to LAD and SVG to RCA 8/8/12    Sick sinus syndrome 07/23/2020    Stented coronary artery 01/04/2019    2.5 x 18 mm Xience Alpine drug-eluting stent to the mid LAD 7/8/2015, 2.5 x 18 mm Xience drug-eluting stent to the proximal OM1 10/21/2016 and 2.5 x 12 mm Synergy drug-eluting stent to OM1 for severe in-stent restenosis 4/28/2020    Tobacco abuse, in remission     Uses hearing aid     BILATERAL        Past Surgical History:   Procedure Laterality Date    BACK SURGERY      CERVICAL FUSION AND LUMBAR DISC REPLACEMENT    BRONCHOSCOPY N/A 04/27/2021    Procedure: BRONCHOSCOPY;  Surgeon: Rico Jacobs MD;  Location: [facility] OR;  Service: Cardiothoracic;  Laterality: N/A;    CARDIAC CATHETERIZATION  07/2015    SHAHRZAD mid LAD, Dr. Toribio    CARDIAC CATHETERIZATION N/A 10/21/2016    Procedure: Left Heart Cath;  Surgeon: Darian Toribio MD;  Location:  [facility] CATH INVASIVE LOCATION;  Service:     CARDIAC CATHETERIZATION Left 04/05/2017    Procedure: Cardiac Catheterization/Vascular Study;  Surgeon: Darian Toribio MD;  Location:  [facility] CATH INVASIVE [facility];  Service:     CARDIAC CATHETERIZATION N/A 04/05/2017    Procedure: Left Heart Cath;  Surgeon: Darian Toribio MD;  Location:  [facility] CATH INVASIVE [facility];  Service:     CARDIAC CATHETERIZATION N/A 04/05/2017    Procedure: Coronary angiography;  Surgeon: Darian Toribio MD;  Location:  [facility] CATH INVASIVE [facility];  Service:     CARDIAC CATHETERIZATION N/A 04/05/2017    Procedure: Left ventriculography;  Surgeon: Darian Toribio MD;  Location:  [facility] CATH INVASIVE [facility];  Service:     CARDIAC CATHETERIZATION  04/05/2017    Procedure: Saphenous Vein Graft;  Surgeon: Darian Toribio MD;  Location:  [facility] CATH [facility]VASIVE [facility];  Service:     CARDIAC CATHETERIZATION Left 02/27/2018    Procedure: Cardiac Catheterization/Vascular Study SHAHRZAD OK PRN;  Surgeon: Darian Toribio MD;   Location:  [facility]ATH [facility]VAS[facility];  Service:     CARDIAC CATHETERIZATION  02/27/2018    Procedure: Functional Flow Venetie;  Surgeon: Darian Toribio MD;  Location: [facility] CATH INVASIVE LOCATION;  Service:     CARDIAC CATHETERIZATION N/A 02/27/2018    Procedure: Left ventriculography;  Surgeon: Darian Toribio MD;  Location:  [facility] CATH [facility]VAS[facility];  Service:     CARDIAC CATHETERIZATION Left 04/28/2020    Procedure: Cardiac Catheterization/Vascular Study SHAHRZAD OK PRN;  Surgeon: Darian Toribio MD;  Location:  [facility] CATH INVAS[facility];  Service: Cardiology;  Laterality: Left;    CARDIAC ELECTROPHYSIOLOGY PROCEDURE N/A 01/11/2018    Procedure: PPM generator change - dual;  Surgeon: Darian Toribio MD;  Location: [facility] CATH INVASIVE LOCATION;  Service:     CARDIAC SURGERY      open heart surgery x2    COLONOSCOPY  09/05/2007    colon polyps    COLONOSCOPY N/A 05/29/2018    Procedure: COLONOSCOPY WITH ANESTHESIA;  Surgeon: Juan F Tapia MD;  Location: [facility] ENDOSCOPY;  Service: Gastroenterology    COLONOSCOPY N/A 10/21/2021    Procedure: COLONOSCOPY WITH ANESTHESIA;  Surgeon: Juan F Tapia MD;  Location: [facility] ENDOSCOPY;  Service: Gastroenterology;  Laterality: N/A;  pre op: hx polyps  post op;polyp,   PCP: Rhys Rosen MD    CORONARY ANGIOPLASTY WITH STENT PLACEMENT  07/2015    SHAHRZAD mid LAD, Dr. Toribio     CORONARY ARTERY BYPASS GRAFT  2012 AND 2014    2 vessel     ENDOSCOPY  11/08/2010    ENDOSCOPY N/A 05/29/2018    Procedure: ESOPHAGOGASTRODUODENOSCOPY WITH ANESTHESIA;  Surgeon: Juan F Tapia MD;  Location: [facility] ENDOSCOPY;  Service: Gastroenterology    HEAD/NECK LESION/CYST EXCISION Left 12/20/2016    Procedure: EXCISION MALIGNANT MELANOMA WITH SENTINEL NODE BIOPSY, INJECTION AND SCAN PRE-OP, LEFT EAR;  Surgeon: Guanaco Zepeda MD;  Location: [facility] OR;  Service:     HEMORRHOIDECTOMY      HERNIA REPAIR      INSERT / REPLACE / REMOVE PACEMAKER      REPLACEMENT TOTAL KNEE Left      BRIAN-STOPPA INCISIONAL HERNIA REPAIR Left 6/28/2023    Procedure: BRIAN-STOPPA INCISIONAL HERNIA REPAIR left lumbar hernia repair with mesh;  Surgeon: Govind Celeste MD;  Location:  [facility] OR;  Service: General;  Laterality: Left;    SENTINEL NODE BIOPSY Left 12/20/2016    Procedure: SENTINEL NODE BIOPSY;  Surgeon: Guanaco Zepeda MD;  Location:  [facility] OR;  Service:     SKIN CANCER EXCISION      THORACOSCOPY Right 04/27/2021    Procedure: THORACOSCOPIC TOTAL DECORTICATION;  Surgeon: Rico Jacobs MD;  Location:  [facility] OR;  Service: Cardiothoracic;  Laterality: Right;    THYROID SURGERY      THYROIDECTOMY                        SLP Adult Swallow Evaluation       Row Name 03/04/25 0918       Rehab Evaluation    Document Type discharge evaluation/summary  -MG    Subjective Information no complaints  -MG    Patient Observations alert;cooperative;agree to therapy  -MG    Patient Effort good  -MG    Symptoms Noted During/After Treatment none  -MG       General Information    Patient Profile Reviewed yes  -MG    Pertinent History Of Current Problem Patient reports foods feel hung up and he has trouble swallowing his pills. He has history of COPD, CAD, parotid thyroidectomy R, GERD, CABG, ACDF C5-6. Reports prior esophageal stretching years ago.  -MG    Current Method of Nutrition regular textures;thin liquids  -MG    Precautions/Limitations, Vision WFL;for purposes of eval  -MG    Precautions/Limitations, Hearing WFL;for purposes of eval  -MG    Prior Level of Function-Communication WFL  -MG    Prior Level of Function-Swallowing no diet consistency restrictions  -MG    Plans/Goals Discussed with patient;agreed upon  -MG    Barriers to Rehab none identified  -MG    Patient's Goals for Discharge eat/drink without coughing/choking  -MG       Pain    Additional Documentation Pain Scale: FACES Pre/Post-Treatment (Group)  -MG       Pain Scale: FACES Pre/Post-Treatment    Pain: FACES Scale, Pretreatment 0-->no hurt  -MG     Posttreatment Pain Rating 0-->no hurt  -MG       Oral Motor Structure and Function    Dentition Assessment upper dentures/partial in place  -MG    Secretion Management WNL/WFL  -MG    Mucosal Quality moist, healthy  -MG    Volitional Swallow WFL  -MG    Volitional Cough WFL  -MG       Oral Musculature and Cranial Nerve Assessment    Oral Motor General Assessment WFL  -MG       Respiratory    Respiratory Status WFL;room air  -MG       MBS/VFSS    Utensils Used spoon;straw  -MG       MBS/VFSS Interpretation    Oral Prep Phase WFL  -MG    Oral Transit Phase WFL  -MG    Oral Residue WFL  -MG    VFSS Summary See note  -MG       Initiation of Pharyngeal Swallow    Initiation of Pharyngeal Swallow WFL  -MG    Pharyngeal Phase impaired pharyngeal phase of swallowing  -MG    Aspiration Before the Swallow thin liquids  -MG       Esophageal Phase    Esophageal Phase no impairments;see radiology report for further details  -MG       SLP Evaluation Clinical Impression    SLP Swallowing Diagnosis mild;oral dysphagia;pharyngeal dysphagia  -MG    Functional Impact risk of aspiration/pneumonia  -MG    Rehab Potential/Prognosis, Swallowing good, to achieve stated therapy goals  -MG    Swallow Criteria for Skilled Therapeutic Interventions Met no problems identified which require skilled intervention  -MG       Recommendations    Therapy Frequency (Swallow) evaluation only  -MG    Predicted Duration Therapy Intervention (Days) until discharge  -MG    SLP Diet Recommendation regular textures;thin liquids  -MG    Recommended Diagnostics VFSS (MBS)  with any increased concerns  -MG    Recommended Precautions and Strategies upright posture during/after eating;small bites of food and sips of liquid;alternate between small bites of food and sips of liquid;general aspiration precautions  -MG    Oral Care Recommendations Oral Care BID/PRN;Toothbrush  -MG    SLP Rec. for Method of Medication Administration meds whole;with puree;as tolerated   -MG    Monitor for Signs of Aspiration yes;notify SLP if any concerns  -MG    Anticipated Discharge Disposition (SLP) No further SLP services warranted  -MG              User Key  (r) = Recorded By, (t) = Taken By, (c) = Cosigned By      Initials Name Provider Type    Flor Elizabeth MS CCC-SLP Speech and Language Pathologist                                    OP SLP Education       Row Name 03/04/25 1041       Education    Barriers to Learning No barriers identified  -MG    Education Provided Described results of evaluation;Patient expressed understanding of evaluation  -MG    Assessed Learning needs;Learning motivation;Learning preferences;Learning readiness  -MG    Learning Motivation Strong  -MG    Learning Method Explanation  -MG    Teaching Response Verbalized understanding  -MG    Education Comments Results and recommendations reviewed directly following the evaluation.  -MG              User Key  (r) = Recorded By, (t) = Taken By, (c) = Cosigned By      Initials Name Effective Dates    MG Flor Mills MS CCC-SLP 07/11/23 -                                          Time Calculation:   Untimed Charges  SLP Eval/Re-eval : ST Motion Fluoro Eval Swallow - 20596  28918-IP Motion Fluoro Eval Swallow Minutes: 68  Total Minutes  Untimed Charges Total Minutes: 68   Total Minutes: 68    Therapy Charges for Today       Code Description Service Date Service Provider Modifiers Qty    [id number] HC ST MOTION FLUORO EVAL SWALLOW 5 3/4/2025 Flor Mills MS CCC-SLP GN 1                    Flor Mills MS CCC-SLP  3/4/2025

## 2025-03-11 ENCOUNTER — PREP FOR SURGERY (OUTPATIENT)
Dept: OTHER | Facility: HOSPITAL | Age: 80
End: 2025-03-11
Payer: MEDICARE

## 2025-03-11 ENCOUNTER — HOSPITAL ENCOUNTER (OUTPATIENT)
Dept: GENERAL RADIOLOGY | Facility: HOSPITAL | Age: 80
Discharge: HOME | End: 2025-03-11
Payer: MEDICARE

## 2025-03-11 ENCOUNTER — LAB (OUTPATIENT)
Dept: LAB | Facility: HOSPITAL | Age: 80
End: 2025-03-11
Payer: MEDICARE

## 2025-03-11 ENCOUNTER — TRANSCRIBE ORDERS (OUTPATIENT)
Dept: ADMINISTRATIVE | Facility: HOSPITAL | Age: 80
End: 2025-03-11
Payer: MEDICARE

## 2025-03-11 DIAGNOSIS — N20.0 CALCULUS OF KIDNEY: ICD-10-CM

## 2025-03-11 DIAGNOSIS — R10.9 ABDOMINAL PAIN, UNSPECIFIED ABDOMINAL LOCATION: Primary | ICD-10-CM

## 2025-03-11 DIAGNOSIS — I25.118 CORONARY ARTERY DISEASE OF NATIVE ARTERY OF NATIVE HEART WITH STABLE ANGINA PECTORIS: Primary | ICD-10-CM

## 2025-03-11 DIAGNOSIS — R10.9 ABDOMINAL PAIN, UNSPECIFIED ABDOMINAL LOCATION: ICD-10-CM

## 2025-03-11 LAB
BILIRUB UR QL STRIP: NEGATIVE
CLARITY UR: CLEAR
COLOR UR: YELLOW
GLUCOSE UR STRIP-MCNC: NEGATIVE MG/DL
HGB UR QL STRIP.AUTO: NEGATIVE
KETONES UR QL STRIP: NEGATIVE
LEUKOCYTE ESTERASE UR QL STRIP.AUTO: NEGATIVE
NITRITE UR QL STRIP: NEGATIVE
PH UR STRIP.AUTO: 6 [PH] (ref 5–8)
PROT UR QL STRIP: NEGATIVE
SP GR UR STRIP: >=1.03 (ref 1–1.03)
UROBILINOGEN UR QL STRIP: NORMAL

## 2025-03-11 PROCEDURE — 74018 RADEX ABDOMEN 1 VIEW: CPT

## 2025-03-11 PROCEDURE — 81003 URINALYSIS AUTO W/O SCOPE: CPT

## 2025-03-11 RX ORDER — SODIUM CHLORIDE 0.9 % (FLUSH) 0.9 %
10 SYRINGE (ML) INJECTION EVERY 12 HOURS SCHEDULED
OUTPATIENT
Start: 2025-03-11

## 2025-03-11 RX ORDER — SODIUM CHLORIDE 0.9 % (FLUSH) 0.9 %
10 SYRINGE (ML) INJECTION AS NEEDED
OUTPATIENT
Start: 2025-03-11

## 2025-03-11 RX ORDER — SODIUM CHLORIDE 9 MG/ML
40 INJECTION, SOLUTION INTRAVENOUS AS NEEDED
OUTPATIENT
Start: 2025-03-11

## 2025-03-24 ENCOUNTER — HOSPITAL ENCOUNTER (OUTPATIENT)
Facility: HOSPITAL | Age: 80
Discharge: HOME | End: 2025-03-24
Payer: MEDICARE

## 2025-03-24 VITALS
WEIGHT: 238 LBS | DIASTOLIC BLOOD PRESSURE: 65 MMHG | BODY MASS INDEX: 36.07 KG/M2 | HEART RATE: 80 BPM | SYSTOLIC BLOOD PRESSURE: 111 MMHG | HEIGHT: 68 IN

## 2025-03-24 DIAGNOSIS — I25.118 CORONARY ARTERY DISEASE OF NATIVE ARTERY OF NATIVE HEART WITH STABLE ANGINA PECTORIS: ICD-10-CM

## 2025-03-24 LAB
BH CV NUCLEAR PRIOR STUDY: 3
BH CV REST NUCLEAR ISOTOPE DOSE: 24.9 MCI
BH CV STRESS BP STAGE 1: NORMAL
BH CV STRESS COMMENTS STAGE 1: NORMAL
BH CV STRESS DOSE REGADENOSON STAGE 1: 0.4
BH CV STRESS DURATION MIN STAGE 1: 0
BH CV STRESS DURATION SEC STAGE 1: 10
BH CV STRESS HR STAGE 1: 90
BH CV STRESS NUCLEAR ISOTOPE DOSE: 24.9 MCI
BH CV STRESS PROTOCOL 1: NORMAL
BH CV STRESS RECOVERY BP: NORMAL MMHG
BH CV STRESS RECOVERY HR: 74 BPM
BH CV STRESS STAGE 1: 1
MAXIMAL PREDICTED HEART RATE: 143 BPM
PERCENT MAX PREDICTED HR: 62.94 %
SPECT HRT GATED+EF W RNC IV: 46 %
STRESS BASELINE BP: NORMAL MMHG
STRESS BASELINE HR: 80 BPM
STRESS PERCENT HR: 74 %
STRESS POST EXERCISE DUR MIN: 0 MIN
STRESS POST EXERCISE DUR SEC: 10 SEC
STRESS POST PEAK BP: NORMAL MMHG
STRESS POST PEAK HR: 90 BPM
STRESS TARGET HR: 122 BPM

## 2025-03-24 PROCEDURE — 93016 CV STRESS TEST SUPVJ ONLY: CPT | Performed by: INTERNAL MEDICINE

## 2025-03-24 PROCEDURE — 78430 MYOCRD IMG PET RST/STRS W/CT: CPT | Performed by: INTERNAL MEDICINE

## 2025-03-24 PROCEDURE — 25010000002 REGADENOSON 0.4 MG/5ML SOLUTION: Performed by: INTERNAL MEDICINE

## 2025-03-24 PROCEDURE — A9555 RB82 RUBIDIUM: HCPCS | Performed by: NURSE PRACTITIONER

## 2025-03-24 PROCEDURE — 78430 MYOCRD IMG PET RST/STRS W/CT: CPT

## 2025-03-24 PROCEDURE — 93018 CV STRESS TEST I&R ONLY: CPT | Performed by: INTERNAL MEDICINE

## 2025-03-24 PROCEDURE — 34310000005 RUBIDIUM CHLORIDE: Performed by: NURSE PRACTITIONER

## 2025-03-24 PROCEDURE — 93017 CV STRESS TEST TRACING ONLY: CPT

## 2025-03-24 RX ORDER — REGADENOSON 0.08 MG/ML
0.4 INJECTION, SOLUTION INTRAVENOUS ONCE
Status: COMPLETED | OUTPATIENT
Start: 2025-03-24 | End: 2025-03-24

## 2025-03-24 RX ADMIN — REGADENOSON 0.4 MG: 0.08 INJECTION, SOLUTION INTRAVENOUS at 09:14

## 2025-03-26 ENCOUNTER — TELEPHONE (OUTPATIENT)
Dept: UROLOGY | Facility: CLINIC | Age: 80
End: 2025-03-26

## 2025-03-26 DIAGNOSIS — N20.0 KIDNEY STONES: Primary | ICD-10-CM

## 2025-03-26 NOTE — TELEPHONE ENCOUNTER
Caller: Carlo Velasco    Relationship: Self    Best call back number: [phone]    What orders are you requesting (i.e. lab or imaging): RUDDY    In what timeframe would the patient need to come in: 4/3/2025    Where will you receive your lab/imaging services[facility]    Additional notes: N/A

## 2025-03-27 NOTE — PROGRESS NOTES
Subjective    Mr. Velasco is 79 y.o. male    Chief Complaint: Calculus of Kidney    History of Present Illness  Patient with previous history of kidney stones that we have not seen since June 2023 patient is not had any acute stone episodes although he has passed stones since last seen.  He is having some right lumbosacral pain which could be musculoskeletal but due to his stone history his PCP wanted him to be checked out.  He has not had any gross hematuria fever or chills denies any new or worsening voiding symptoms or complaints.  He KUB prior to his appointment today.    The following portions of the patient's history were reviewed and updated as appropriate: allergies, current medications, past family history, past medical history, past social history, past surgical history and problem list.    Review of Systems   Musculoskeletal:  Positive for back pain.         Current Outpatient Medications:     albuterol sulfate  (90 Base) MCG/ACT inhaler, Inhale 2 puffs Every 4 (Four) Hours As Needed for Wheezing., Disp: 6.7 g, Rfl: 5    baclofen (LIORESAL) 10 MG tablet, Take 1 tablet by mouth Every 12 (Twelve) Hours., Disp: , Rfl:     budesonide-formoterol (SYMBICORT) 160-4.5 MCG/ACT inhaler, Inhale 2 puffs 2 (Two) Times a Day., Disp: 10.2 g, Rfl: 5    Calcium Carbonate 1500 (600 Ca) MG tablet, Take 1 tablet by mouth Daily., Disp: , Rfl:     Cholecalciferol 125 MCG (5000 UT) tablet, Take 1 tablet by mouth Daily., Disp: , Rfl:     clopidogrel (PLAVIX) 75 MG tablet, TAKE 1 TABLET BY MOUTH DAILY, Disp: 90 tablet, Rfl: 3    Cyanocobalamin (B-12) 1000 MCG sublingual tablet, 1 tablet Daily., Disp: , Rfl:     doxycycline (VIBRAMYCIN) 100 MG capsule, TAKE 1 CAPSULE BY MOUTH TWICE DAILY WITH MEALS FOR 7 DAYS, Disp: , Rfl:     escitalopram (LEXAPRO) 20 MG tablet, Take 1 tablet by mouth Daily., Disp: 90 tablet, Rfl: 1    ferrous sulfate 325 (65 FE) MG tablet, Take 1 tablet by mouth Every Morning., Disp: , Rfl:     fluticasone  (FLONASE) 50 MCG/ACT nasal spray, 2 sprays into the nostril(s) as directed by provider Daily., Disp: 16 g, Rfl: 5    furosemide (LASIX) 40 MG tablet, Take 1 tablet by mouth Daily As Needed (for any increase in 2 lbs in a daqy or 5 lbs in a week or increassing swelling or congestion). (Patient taking differently: Take 1 tablet by mouth Daily.), Disp: , Rfl:     ibuprofen (ADVIL,MOTRIN) 800 MG tablet, Take 1 tablet by mouth Every 8 (Eight) Hours As Needed. for pain, Disp: , Rfl:     isosorbide mononitrate (IMDUR) 60 MG 24 hr tablet, Take 1 tablet by mouth Every Morning., Disp: 90 tablet, Rfl: 3    ketorolac (TORADOL) 10 MG tablet, , Disp: , Rfl:     latanoprost (XALATAN) 0.005 % ophthalmic solution, instill 1 drop in both eyes every night at bedtime, Disp: , Rfl:     loratadine (CLARITIN) 10 MG tablet, Take 1 tablet by mouth Daily., Disp: 90 tablet, Rfl: 3    meclizine (ANTIVERT) 12.5 MG tablet, Take 1 tablet by mouth., Disp: , Rfl:     metoprolol tartrate (LOPRESSOR) 25 MG tablet, TAKE 1 TABLET BY MOUTH DAILY, Disp: 90 tablet, Rfl: 3    nitroglycerin (NITROSTAT) 0.4 MG SL tablet, 1 under the tongue as needed for angina, may repeat q5mins for up three doses, Disp: 25 tablet, Rfl: 3    Omega-3 Fatty Acids (FISH OIL PO), Take  by mouth Daily., Disp: , Rfl:     omeprazole (priLOSEC) 20 MG capsule, TAKE 1 CAPSULE BY MOUTH DAILY, Disp: 90 capsule, Rfl: 3    Potassium Gluconate 2.5 MEQ tablet, Take 1 tablet by mouth Daily., Disp: , Rfl:     tamsulosin (FLOMAX) 0.4 MG capsule 24 hr capsule, Take 1 capsule by mouth Daily., Disp: , Rfl:     Zinc Sulfate (ZINC-220 PO), Take 1 tablet by mouth Daily., Disp: , Rfl:     rosuvastatin (CRESTOR) 20 MG tablet, Take 1 tablet by mouth Daily. (Patient not taking: Reported on 4/2/2025), Disp: 90 tablet, Rfl: 3    Past Medical History:   Diagnosis Date    Anxiety     Arthritis     Broken rib     Cancer     melanoma    Chronic diastolic congestive heart failure 04/15/2022    Claustrophobia      Colon polyp     COPD (chronic obstructive pulmonary disease)     Coronary artery disease involving autologous artery coronary bypass graft without angina pectoris 09/22/2016    Depression     Disease of thyroid gland     partial thyroidectomy on right    GERD (gastroesophageal reflux disease)     Hearing loss     Heart attack     History of transfusion     Hypertension     Kidney stone     history of     Low back pain     Mild intermittent asthma without complication 08/24/2021    Open angle with borderline findings, low risk, bilateral 08/23/2021    Pain management     STATES JUST HAD INJECTION IN BACK ON 06/20/2023    Presence of cardiac pacemaker 04/15/2022    Primary hypertension 09/22/2016    Pulmonary hypertension 04/15/2022    S/P CABG x 2 01/04/2019    LIMA to LAD and SVG to RCA 8/8/12    Sick sinus syndrome 07/23/2020    Stented coronary artery 01/04/2019    2.5 x 18 mm Xience Alpine drug-eluting stent to the mid LAD 7/8/2015, 2.5 x 18 mm Xience drug-eluting stent to the proximal OM1 10/21/2016 and 2.5 x 12 mm Synergy drug-eluting stent to OM1 for severe in-stent restenosis 4/28/2020    Tobacco abuse, in remission     Uses hearing aid     BILATERAL       Past Surgical History:   Procedure Laterality Date    BACK SURGERY      CERVICAL FUSION AND LUMBAR DISC REPLACEMENT    BRONCHOSCOPY N/A 04/27/2021    Procedure: BRONCHOSCOPY;  Surgeon: Rico Jacobs MD;  Location: [facility] OR;  Service: Cardiothoracic;  Laterality: N/A;    CARDIAC CATHETERIZATION  07/2015    SHAHRZAD mid LAD, Dr. Toribio    CARDIAC CATHETERIZATION N/A 10/21/2016    Procedure: Left Heart Cath;  Surgeon: Darian Toribio MD;  Location:  [facility]ATH [facility]ASIVE [facility];  Service:     CARDIAC CATHETERIZATION Left 04/05/2017    Procedure: Cardiac Catheterization/Vascular Study;  Surgeon: Darian Toribio MD;  Location:  [facility];  Service:     CARDIAC CATHETERIZATION N/A 04/05/2017    Procedure: Left Heart Cath;  Surgeon: Darian Toribio MD;   Location:  [facility];  Service:     CARDIAC CATHETERIZATION N/A 04/05/2017    Procedure: Coronary angiography;  Surgeon: Darian Toribio MD;  Location:  [facility];  Service:     CARDIAC CATHETERIZATION N/A 04/05/2017    Procedure: Left ventriculography;  Surgeon: Darian Toribio MD;  Location:  [facility];  Service:     CARDIAC CATHETERIZATION  04/05/2017    Procedure: Saphenous Vein Graft;  Surgeon: Darian Toribio MD;  Location:  [facility];  Service:     CARDIAC CATHETERIZATION Left 02/27/2018    Procedure: Cardiac Catheterization/Vascular Study SHAHRZAD OK PRN;  Surgeon: Darian Toribio MD;  Location:  [facility];  Service:     CARDIAC CATHETERIZATION  02/27/2018    Procedure: Functional Flow [place];  Surgeon: Darian Toribio MD;  Location:  [facility];  Service:     CARDIAC CATHETERIZATION N/A 02/27/2018    Procedure: Left ventriculography;  Surgeon: Darian Toribio MD;  Location:  [facility];  Service:     CARDIAC CATHETERIZATION Left 04/28/2020    Procedure: Cardiac Catheterization/Vascular Study SHAHRZAD [place] PRN;  Surgeon: Darian Toribio MD;  Location:  [facility];  Service: Cardiology;  Laterality: Left;    CARDIAC ELECTROPHYSIOLOGY PROCEDURE N/A 01/11/2018    Procedure: PPM generator change - dual;  Surgeon: Darian Toribio MD;  Location:  [facility];  Service:     CARDIAC SURGERY      open heart surgery x2    COLONOSCOPY  09/05/2007    colon polyps    COLONOSCOPY N/A 05/29/2018    Procedure: COLONOSCOPY WITH ANESTHESIA;  Surgeon: Juan F Tapia MD;  Location: [facility] ENDOSCOPY;  Service: Gastroenterology    COLONOSCOPY N/A 10/21/2021    Procedure: COLONOSCOPY WITH ANESTHESIA;  Surgeon: Juan F Tapia MD;  Location: [facility] ENDOSCOPY;  Service: Gastroenterology;  Laterality: N/A;  pre op: hx polyps  post op;polyp,   PCP: Rhys Rosen MD    CORONARY ANGIOPLASTY  WITH STENT PLACEMENT  2015    SHAHRZAD mid LADDr. Toribio     CORONARY ARTERY BYPASS GRAFT   AND     2 vessel     ENDOSCOPY  2010    ENDOSCOPY N/A 2018    Procedure: ESOPHAGOGASTRODUODENOSCOPY WITH ANESTHESIA;  Surgeon: Juan F Tapia MD;  Location:  [facility]OS[facility];  Service: Gastroenterology    HEAD/NECK LESION/CYST EXCISION Left 2016    Procedure: EXCISION MALIGNANT MELANOMA WITH SENTINEL NODE BIOPSY, INJECTION AND SCAN PRE-OP, LEFT EAR;  Surgeon: Guanaco Zepeda MD;  Location:  [facility] OR;  Service:     HEMORRHOIDECTOMY      HERNIA REPAIR      INSERT / REPLACE / REMOVE PACEMAKER      REPLACEMENT TOTAL KNEE Left     BRIAN-STOPPA INCISIONAL HERNIA REPAIR Left 2023    Procedure: BRIAN-STOPPA INCISIONAL HERNIA REPAIR left lumbar hernia repair with mesh;  Surgeon: Govind Celeste MD;  Location:  [facility] OR;  Service: General;  Laterality: Left;    SENTINEL NODE BIOPSY Left 2016    Procedure: SENTINEL NODE BIOPSY;  Surgeon: Guanaco Zepeda MD;  Location:  [facility] OR;  Service:     SKIN CANCER EXCISION      THORACOSCOPY Right 2021    Procedure: THORACOSCOPIC TOTAL DECORTICATION;  Surgeon: Rico Jacobs MD;  Location:  [facility] OR;  Service: Cardiothoracic;  Laterality: Right;    THYROID SURGERY      THYROIDECTOMY         Social History     Socioeconomic History    Marital status:    Tobacco Use    Smoking status: Former     Current packs/day: 0.00     Average packs/day: 3.0 packs/day for 10.0 years (30.0 ttl pk-yrs)     Types: Cigarettes     Start date:      Quit date:      Years since quittin.2     Passive exposure: Never    Smokeless tobacco: Never   Vaping Use    Vaping status: Never Used   Substance and Sexual Activity    Alcohol use: Yes     Comment: rare    Drug use: No    Sexual activity: Defer       Family History   Problem Relation Age of Onset    Heart failure Mother     Stroke Mother     Dementia Mother     Coronary artery disease Father     Colon  "polyps Father     COPD Brother     Colon cancer Neg Hx        Objective    Temp 97.2 °F (36.2 °C) (Infrared)   Ht 170.2 cm (67\")   Wt 111 kg (243 lb 12.8 oz)   BMI 38.18 kg/m²     Physical Exam  Vitals reviewed.   Constitutional:       General: He is not in acute distress.     Appearance: Normal appearance. He is not toxic-appearing.   HENT:      Head: Normocephalic.   Pulmonary:      Effort: Pulmonary effort is normal.   Neurological:      Mental Status: He is alert.   Psychiatric:         Mood and Affect: Mood normal.         Behavior: Behavior normal.             Results for orders placed or performed in visit on 04/02/25   POC Urinalysis Dipstick, Multipro    Collection Time: 04/02/25 11:06 AM    Specimen: Urine   Result Value Ref Range    Color Yellow Yellow, Straw, Dark Yellow, Eleanor    Clarity, UA Clear Clear    Glucose, UA Negative Negative mg/dL    Bilirubin Negative Negative    Ketones, UA Negative Negative    Specific Gravity  1.020 1.005 - 1.030    Blood, UA Negative Negative    pH, Urine 6.5 5.0 - 8.0    Protein, POC Trace (A) Negative mg/dL    Urobilinogen, UA 0.2 E.U./dL Normal, 0.2 E.U./dL    Nitrite, UA Negative Negative    Leukocytes Negative Negative   IPSS Questionnaire (AUA-7):  Incomplete emptying  Over the past month, how often have you had a sensation of not emptying your bladder completely after you finished urinating?: Not at all (04/02/25 1057)  Frequency  Over the past month, how often have you had to urinate again less than two hours after you finishied urinating ?: Not at all (04/02/25 1057)  Intermittency  Over the past month, how often have you found you stopped and started again several time when you urinated ?: Less than 1 time in 5 (04/02/25 1057)  Urgency  Over the last month, how often have you found it difficult  have you found it difficult to postpone urination ?: Less than half the time (04/02/25 1057)  Weak Stream  Over the past month, how often have you had a weak urinary " stream ?: Less than half the time (04/02/25 1057)  Straining  Over the past month, how often have you had to push or strain to begin urination ?: Not at all (04/02/25 1057)  Nocturia  Over the past month, how many times did you most typically get up to urinate from the time you went to bed until the time you got up in the morning ?: 3 times (04/02/25 1057)  Quality of life due to urinary symptoms  If you were to spend the rest of your life with your urinary condition the way it is now, how would feel about that?: Mostly satisfied (04/02/25 1057)    Scores  Total IPSS Score: (!) 8 (04/02/25 1057)  Total Score = Symptomatic Level: Moderately symptomatic: 8-19 (04/02/25 1057)     KUB independent review    A KUB is available for me to review today.  The image is inspected for a bowel gas pattern and the general bone structure of the spine and pelvis. The kidneys are then inspected closely.  Renal outline is noted if identifiable. The kidney, collecting system, and anticipated path of the ureter are examined for calcifications including those in the true pelvis.  This film reveals:    On the right there are multiple renal stones. 6 x 4 mm stone in the mid kidney that was seen previously.  Faint 3 mm stone also in the upper part of the right kidney.     On the left there questionable calcification in the left kidney.    Assessment and Plan    Diagnoses and all orders for this visit:    1. Bilateral kidney stones (Primary)  -     POC Urinalysis Dipstick, Multipro  -     XR Abdomen KUB; Future      Can explain his back pain from a urologic standpoint it appears to be more musculoskeletal there is no obvious stone in the course of either ureter his urine is clear.  He has infrequent stone episodes.    Follow-up 1 year with KUB or sooner as needed

## 2025-04-01 ENCOUNTER — TELEPHONE (OUTPATIENT)
Dept: UROLOGY | Facility: CLINIC | Age: 80
End: 2025-04-01
Payer: MEDICARE

## 2025-04-01 ENCOUNTER — OFFICE VISIT (OUTPATIENT)
Dept: GASTROENTEROLOGY | Facility: CLINIC | Age: 80
End: 2025-04-01
Payer: MEDICARE

## 2025-04-01 ENCOUNTER — HOSPITAL ENCOUNTER (OUTPATIENT)
Dept: PAIN MANAGEMENT | Age: 80
Discharge: HOME | End: 2025-04-01
Payer: MEDICARE

## 2025-04-01 VITALS
HEIGHT: 67 IN | OXYGEN SATURATION: 97 % | WEIGHT: 245 LBS | TEMPERATURE: 96.9 F | HEART RATE: 66 BPM | DIASTOLIC BLOOD PRESSURE: 66 MMHG | SYSTOLIC BLOOD PRESSURE: 118 MMHG | BODY MASS INDEX: 38.45 KG/M2

## 2025-04-01 VITALS
TEMPERATURE: 96.9 F | OXYGEN SATURATION: 99 % | RESPIRATION RATE: 18 BRPM | DIASTOLIC BLOOD PRESSURE: 74 MMHG | HEART RATE: 68 BPM | SYSTOLIC BLOOD PRESSURE: 143 MMHG

## 2025-04-01 DIAGNOSIS — R52 PAIN MANAGEMENT: ICD-10-CM

## 2025-04-01 DIAGNOSIS — R13.14 PHARYNGOESOPHAGEAL DYSPHAGIA: Primary | ICD-10-CM

## 2025-04-01 PROCEDURE — 1160F RVW MEDS BY RX/DR IN RCRD: CPT | Performed by: NURSE PRACTITIONER

## 2025-04-01 PROCEDURE — 3078F DIAST BP <80 MM HG: CPT | Performed by: NURSE PRACTITIONER

## 2025-04-01 PROCEDURE — 2580000003 HC RX 258

## 2025-04-01 PROCEDURE — 3074F SYST BP LT 130 MM HG: CPT | Performed by: NURSE PRACTITIONER

## 2025-04-01 PROCEDURE — 1159F MED LIST DOCD IN RCRD: CPT | Performed by: NURSE PRACTITIONER

## 2025-04-01 PROCEDURE — 99214 OFFICE O/P EST MOD 30 MIN: CPT | Performed by: NURSE PRACTITIONER

## 2025-04-01 PROCEDURE — G0260 INJ FOR SACROILIAC JT ANESTH: HCPCS

## 2025-04-01 PROCEDURE — 6360000002 HC RX W HCPCS

## 2025-04-01 RX ORDER — LIDOCAINE HYDROCHLORIDE 10 MG/ML
7 INJECTION, SOLUTION EPIDURAL; INFILTRATION; INTRACAUDAL; PERINEURAL ONCE
Status: DISCONTINUED | OUTPATIENT
Start: 2025-04-01 | End: 2025-04-03 | Stop reason: HOSPADM

## 2025-04-01 RX ORDER — TRIAMCINOLONE ACETONIDE 40 MG/ML
80 INJECTION, SUSPENSION INTRA-ARTICULAR; INTRAMUSCULAR ONCE
Status: DISCONTINUED | OUTPATIENT
Start: 2025-04-01 | End: 2025-04-03 | Stop reason: HOSPADM

## 2025-04-01 RX ORDER — SODIUM CHLORIDE 9 MG/ML
3 INJECTION, SOLUTION INTRAMUSCULAR; INTRAVENOUS; SUBCUTANEOUS ONCE
Status: DISCONTINUED | OUTPATIENT
Start: 2025-04-01 | End: 2025-04-03 | Stop reason: HOSPADM

## 2025-04-01 RX ORDER — BUPIVACAINE HYDROCHLORIDE 5 MG/ML
2 INJECTION, SOLUTION EPIDURAL; INTRACAUDAL; PERINEURAL ONCE
Status: DISCONTINUED | OUTPATIENT
Start: 2025-04-01 | End: 2025-04-03 | Stop reason: HOSPADM

## 2025-04-01 ASSESSMENT — PAIN - FUNCTIONAL ASSESSMENT: PAIN_FUNCTIONAL_ASSESSMENT: 0-10

## 2025-04-01 NOTE — INTERVAL H&P NOTE
Update History & Physical    The patient's History and Physical  was reviewed with the patient and I examined the patient. There was no change. The surgical site was confirmed by the patient and me.     Plan: The risks, benefits, expected outcome, and alternative to the recommended procedure have been discussed with the patient. Patient understands and wants to proceed with the procedure.     Electronically signed by Rafita Arredondo MD on 4/1/2025 at 11:21 AM

## 2025-04-01 NOTE — PROGRESS NOTES
[facility] GASTROENTEROLOGY - OFFICE NOTE    4/1/2025    Carlo Velasco   1945    Primary Physician: Cruz Chaudhari MD    Referring Provider: Mojgan De La Torre APRN     Chief Complaint   Patient presents with    Difficulty Swallowing         HISTORY OF PRESENT ILLNESS:     Carlo Velasco is a 79 y.o. male presents  with dysphagia.       Dysphagia: Patient complains of difficulty swallowing. This started 2 years.  The patient points to the neck where this occurs. This is noted with solids and liquids ( mostly solids). Also noted with pills.  Takes omeprazole daily. No reflux symptoms.       He is on plavix. Last stent was 4-5 years ago. Dr. Toribio.       FL Video Swallow With Speech Single Contrast (03/04/2025 09:35)   UPPER GI ENDOSCOPY (05/29/2018 08:49)     Past Medical History:   Diagnosis Date    Anxiety     Arthritis     Broken rib     Cancer     melanoma    Chronic diastolic congestive heart failure 04/15/2022    Claustrophobia     Colon polyp     COPD (chronic obstructive pulmonary disease)     Coronary artery disease involving autologous artery coronary bypass graft without angina pectoris 09/22/2016    Depression     Disease of thyroid gland     partial thyroidectomy on right    GERD (gastroesophageal reflux disease)     Hearing loss     Heart attack     History of transfusion     Hypertension     Kidney stone     history of     Low back pain     Mild intermittent asthma without complication 08/24/2021    Open angle with borderline findings, low risk, bilateral 08/23/2021    Pain management     STATES JUST HAD INJECTION IN BACK ON 06/20/2023    Presence of cardiac pacemaker 04/15/2022    Primary hypertension 09/22/2016    Pulmonary hypertension 04/15/2022    S/P CABG x 2 01/04/2019    LIMA to LAD and SVG to RCA 8/8/12    Sick sinus syndrome 07/23/2020    Stented coronary artery 01/04/2019    2.5 x 18 mm Xience Alpine drug-eluting stent to the mid LAD 7/8/2015, 2.5 x 18 mm Xience drug-eluting stent  to the proximal OM1 10/21/2016 and 2.5 x 12 mm Synergy drug-eluting stent to OM1 for severe in-stent restenosis 4/28/2020    Tobacco abuse, in remission     Uses hearing aid     BILATERAL       Past Surgical History:   Procedure Laterality Date    BACK SURGERY      CERVICAL FUSION AND LUMBAR DISC REPLACEMENT    BRONCHOSCOPY N/A 04/27/2021    Procedure: BRONCHOSCOPY;  Surgeon: Rico Jacobs MD;  Location: [facility] OR;  Service: Cardiothoracic;  Laterality: N/A;    CARDIAC CATHETERIZATION  07/2015    SHAHRZAD mid LAD, Dr. Toribio    CARDIAC CATHETERIZATION N/A 10/21/2016    Procedure: Left Heart Cath;  Surgeon: Darian Toribio MD;  Location:  [facility] CATH INVASIVE LOCATION;  Service:     CARDIAC CATHETERIZATION Left 04/05/2017    Procedure: Cardiac Catheterization/Vascular Study;  Surgeon: Darian Toribio MD;  Location:  [facility] CATH INVASIVE LOCATION;  Service:     CARDIAC CATHETERIZATION N/A 04/05/2017    Procedure: Left Heart Cath;  Surgeon: Darian Toribio MD;  Location:  [facility] CATH INVASIVE LOC[facility];  Service:     CARDIAC CATHETERIZATION N/A 04/05/2017    Procedure: Coronary angiography;  Surgeon: Darian Toribio MD;  Location:  [facility] CATH INVASIVE LOC[facility];  Service:     CARDIAC CATHETERIZATION N/A 04/05/2017    Procedure: Left ventriculography;  Surgeon: Darian Toribio MD;  Location:  [facility] CATH INVASIVE LOC[facility];  Service:     CARDIAC CATHETERIZATION  04/05/2017    Procedure: Saphenous Vein Graft;  Surgeon: Darian Toribio MD;  Location:  [facility] CATH INVASIVE LOCATION;  Service:     CARDIAC CATHETERIZATION Left 02/27/2018    Procedure: Cardiac Catheterization/Vascular Study SHAHRZAD OK PRN;  Surgeon: Darian Toribio MD;  Location:  [facility] CATH INVASIVE LOCATION;  Service:     CARDIAC CATHETERIZATION  02/27/2018    Procedure: Functional Flow [place];  Surgeon: Darian Toribio MD;  Location:  [facility] CATH INVASIVE LOC[facility];  Service:     CARDIAC CATHETERIZATION N/A 02/27/2018    Procedure: Left ventriculography;  Surgeon: Darian Toribio MD;  Location:   [facility] CATH INVASIVE LOC[facility];  Service:     CARDIAC CATHETERIZATION Left 04/28/2020    Procedure: Cardiac Catheterization/Vascular Study SHAHRZAD OK PRN;  Surgeon: Darian Toribio MD;  Location: [facility] CATH INVASIVE LOCATION;  Service: Cardiology;  Laterality: Left;    CARDIAC ELECTROPHYSIOLOGY PROCEDURE N/A 01/11/2018    Procedure: PPM generator change - dual;  Surgeon: Darian Toribio MD;  Location: [facility] CATH INVASIVE LOCATION;  Service:     CARDIAC SURGERY      open heart surgery x2    COLONOSCOPY  09/05/2007    colon polyps    COLONOSCOPY N/A 05/29/2018    Procedure: COLONOSCOPY WITH ANESTHESIA;  Surgeon: Juan F Tapia MD;  Location: [facility] ENDOSCOPY;  Service: Gastroenterology    COLONOSCOPY N/A 10/21/2021    Procedure: COLONOSCOPY WITH ANESTHESIA;  Surgeon: Juan F Tapia MD;  Location: [facility] ENDOSCOPY;  Service: Gastroenterology;  Laterality: N/A;  pre op: hx polyps  post op;polyp,   PCP: Rhys Rosen MD    CORONARY ANGIOPLASTY WITH STENT PLACEMENT  07/2015    SHAHRZAD mid LAD, Dr. Toribio     CORONARY ARTERY BYPASS GRAFT  2012 AND 2014    2 vessel     ENDOSCOPY  11/08/2010    ENDOSCOPY N/A 05/29/2018    Procedure: ESOPHAGOGASTRODUODENOSCOPY WITH ANESTHESIA;  Surgeon: Juan F Tapia MD;  Location: [facility] ENDOSCOPY;  Service: Gastroenterology    HEAD/NECK LESION/CYST EXCISION Left 12/20/2016    Procedure: EXCISION MALIGNANT MELANOMA WITH SENTINEL NODE BIOPSY, INJECTION AND SCAN PRE-OP, LEFT EAR;  Surgeon: Guanaco Zepeda MD;  Location: [facility] OR;  Service:     HEMORRHOIDECTOMY      HERNIA REPAIR      INSERT / REPLACE / REMOVE PACEMAKER      REPLACEMENT TOTAL KNEE Left     BRIAN-STOPPA INCISIONAL HERNIA REPAIR Left 6/28/2023    Procedure: BRIAN-STOPPA INCISIONAL HERNIA REPAIR left lumbar hernia repair with mesh;  Surgeon: Govind Celeste MD;  Location: [facility] OR;  Service: General;  Laterality: Left;    SENTINEL NODE BIOPSY Left 12/20/2016    Procedure: SENTINEL NODE BIOPSY;  Surgeon: Guanaco Zepeda MD;   Location:  [facility] OR;  Service:     SKIN CANCER EXCISION      THORACOSCOPY Right 04/27/2021    Procedure: THORACOSCOPIC TOTAL DECORTICATION;  Surgeon: Rico Jacobs MD;  Location:  [facility] OR;  Service: Cardiothoracic;  Laterality: Right;    THYROID SURGERY      THYROIDECTOMY         Outpatient Medications Marked as Taking for the 4/1/25 encounter (Office Visit) with Carley Velasco APRN   Medication Sig Dispense Refill    albuterol sulfate  (90 Base) MCG/ACT inhaler Inhale 2 puffs Every 4 (Four) Hours As Needed for Wheezing. 6.7 g 5    baclofen (LIORESAL) 10 MG tablet Take 1 tablet by mouth Every 12 (Twelve) Hours.      budesonide-formoterol (SYMBICORT) 160-4.5 MCG/ACT inhaler Inhale 2 puffs 2 (Two) Times a Day. 10.2 g 5    Calcium Carbonate 1500 (600 Ca) MG tablet Take 1 tablet by mouth Daily.      Cholecalciferol 125 MCG (5000 UT) tablet Take 1 tablet by mouth Daily.      clopidogrel (PLAVIX) 75 MG tablet TAKE 1 TABLET BY MOUTH DAILY 90 tablet 3    Cyanocobalamin (B-12) 1000 MCG sublingual tablet 1 tablet Daily.      escitalopram (LEXAPRO) 20 MG tablet Take 1 tablet by mouth Daily. 90 tablet 1    ferrous sulfate 325 (65 FE) MG tablet Take 1 tablet by mouth Every Morning.      fluticasone (FLONASE) 50 MCG/ACT nasal spray 2 sprays into the nostril(s) as directed by provider Daily. 16 g 5    furosemide (LASIX) 40 MG tablet Take 1 tablet by mouth Daily As Needed (for any increase in 2 lbs in a daqy or 5 lbs in a week or increassing swelling or congestion). (Patient taking differently: Take 1 tablet by mouth Daily.)      ibuprofen (ADVIL,MOTRIN) 800 MG tablet Take 1 tablet by mouth Every 8 (Eight) Hours As Needed. for pain      isosorbide mononitrate (IMDUR) 60 MG 24 hr tablet Take 1 tablet by mouth Every Morning. 90 tablet 3    latanoprost (XALATAN) 0.005 % ophthalmic solution instill 1 drop in both eyes every night at bedtime      loratadine (CLARITIN) 10 MG tablet Take 1 tablet by mouth Daily. 90 tablet 3     meclizine (ANTIVERT) 12.5 MG tablet Take 1 tablet by mouth.      metoprolol tartrate (LOPRESSOR) 25 MG tablet TAKE 1 TABLET BY MOUTH DAILY 90 tablet 3    nitroglycerin (NITROSTAT) 0.4 MG SL tablet 1 under the tongue as needed for angina, may repeat q5mins for up three doses 25 tablet 3    Omega-3 Fatty Acids (FISH OIL PO) Take  by mouth Daily.      omeprazole (priLOSEC) 20 MG capsule TAKE 1 CAPSULE BY MOUTH DAILY 90 capsule 3    Potassium Gluconate 2.5 MEQ tablet Take 1 tablet by mouth Daily.      tamsulosin (FLOMAX) 0.4 MG capsule 24 hr capsule Take 1 capsule by mouth Daily.      Zinc Sulfate (ZINC-220 PO) Take 1 tablet by mouth Daily.         Allergies   Allergen Reactions    Meperidine Other (See Comments) and Unknown - High Severity     HEART STOPS    Statins Myalgia     Causes leg cramps       Social History     Socioeconomic History    Marital status:    Tobacco Use    Smoking status: Former     Current packs/day: 0.00     Average packs/day: 3.0 packs/day for 10.0 years (30.0 ttl pk-yrs)     Types: Cigarettes     Start date:      Quit date:      Years since quittin.2     Passive exposure: Never    Smokeless tobacco: Never   Vaping Use    Vaping status: Never Used   Substance and Sexual Activity    Alcohol use: Yes     Comment: rare    Drug use: No    Sexual activity: Defer       Family History   Problem Relation Age of Onset    Heart failure Mother     Stroke Mother     Dementia Mother     Coronary artery disease Father     Colon polyps Father     COPD Brother     Colon cancer Neg Hx        Review of Systems   Constitutional:  Negative for chills, fever and unexpected weight change.   Respiratory:  Negative for shortness of breath.    Cardiovascular:  Negative for chest pain.   Gastrointestinal:  Negative for abdominal distention, abdominal pain, anal bleeding, blood in stool, constipation, diarrhea, nausea and vomiting.        Vitals:    25 1317   BP: 118/66   Pulse: 66   Temp:  "96.9 °F (36.1 °C)   SpO2: 97%   Weight: 111 kg (245 lb)   Height: 170.2 cm (67\")      Body mass index is 38.37 kg/m².    Physical Exam  Vitals reviewed.   Constitutional:       General: He is not in acute distress.  Cardiovascular:      Rate and Rhythm: Normal rate and regular rhythm.      Heart sounds: Normal heart sounds.   Pulmonary:      Effort: Pulmonary effort is normal.      Breath sounds: Normal breath sounds.   Abdominal:      General: Bowel sounds are normal. There is no distension.      Palpations: Abdomen is soft.      Tenderness: There is no abdominal tenderness.   Skin:     General: Skin is warm and dry.   Neurological:      Mental Status: He is alert.         Results for orders placed or performed during the hospital encounter of 03/24/25   Stress Test With PET Myocardial Perfusion    Collection Time: 03/24/25  9:38 AM   Result Value Ref Range    Nuclear Prior Study 3.0     BH CV REST NUCLEAR ISOTOPE DOSE 24.9 mCi    BH CV STRESS PROTOCOL 1 Pharmacologic     Stage 1 1.0     HR Stage 1 90     BP Stage 1 88/44     Duration Min Stage 1 0     Duration Sec Stage 1 10     Stress Dose Regadenoson Stage 1 0.40     Stress Comments Stage 1 10 sec bolus injection     Baseline HR 80 bpm    Baseline /65 mmHg    Peak HR 90 bpm    Peak BP 88/44 mmHg    Recovery HR 74 bpm    Recovery /53 mmHg    Target HR (85%) 122 bpm    Max. Pred. HR (100%) 143 bpm    Percent Max Pred HR 62.94 %    Exercise duration (min) 0 min    Exercise duration (sec) 10 sec    Percent Target HR 74 %    BH CV STRESS NUCLEAR ISOTOPE DOSE 24.9 mCi    Nuc Stress EF 46 %           ASSESSMENT AND PLAN    Assessment & Plan     Diagnoses and all orders for this visit:    1. Pharyngoesophageal dysphagia (Primary)           In regards to dysphagia, differential diagnosis discussed.  I recommend cut food in small pieces and chew well. I recommend take pills in apple sauce.  I recommend upper endoscopy for further evaluation and the patient is " agreeable.       However prior to scheduling I will send a letter to Dr. Toribio in regards to if the patient can safely hold plavix 7 days prior to procedure. I will contact patient once he has responded.  I have instructed the patient to contact our office if he has not heard from us within 2 weeks.        The patient was advised on the risks of stopping blood thinners for a procedure.  The risks discussed included the risk of developing myocardial infarction, CVA, embolus, clogging of the arteries or stents, etc.  We discussed the potential consequences of the risks discussed.  The benefits of stopping as well as the alternatives were discussed as well.   Patient is to hold their anticoagulation medication per the direction of their prescribing physician, Dr Toribio.    A letter will be sent to prescribing This is to prevent any risk or complication from bleeding intra and post procedure. If they develop bleeding post procedure they are to go the emergency department for further evaluation and treatment immediately.       Speech note reviewed.     * Surgery not found *  Risk, benefits, and alternatives of endoscopy were explained in full.  They understand that there is a risk of bleeding, perforation, and infection.  The risk of perforation goes up with esophageal dilation.  Other options to evaluate UGI complaints could involve barium swallow or UGI series, but these would be diagnostic tests only.  Patient was given time to ask questions.  I answered them to their satisfaction and they are agreeable to proceeding              No follow-ups on file.          There are no Patient Instructions on file for this visit.      ANALI Welch    EMR Dragon/transcription disclaimer:  Much of this encounter note is electronic transcription/translation of spoken language to printed text.  The electronic translation of spoken language may be erroneous, or at times, nonsensical words or phrases may be inadvertently  transcribed.  Although I have reviewed the note for such errors, some may still exist.

## 2025-04-01 NOTE — PROGRESS NOTES
Procedure:  Level of Consciousness: [x]Alert []Oriented []Disoriented []Lethargic  Anxiety Level: [x]Calm []Anxious []Depressed []Other  Skin: [x]Warm [x]Dry []Cool []Moist []Intact []Other  Cardiovascular: []Palpitations: [x]Never []Occasionally []Frequently  Chest Pain: [x]No []Yes  Respiratory:  [x]Unlabored []Labored []Cough ([] Productive []Unproductive)  HCG Required: []No []Yes   Results: []Negative []Positive  Knowledge Level:        [x]Patient/Other verbalized understanding of pre-procedure instructions.        [x]Assessment of post-op care needs (transportation, responsible caregiver)        [x]Able to discuss health care problems and how to deal with it.  Factors that Affect Teaching:        Language Barrier: [x]No []Yes - why:        Hearing Loss:        []No [x]Yes            Corrective Device:  [x]Yes []No        Vision Loss:           []No [x]Yes            Corrective Device:  [x]Yes []No        Memory Loss:       [x]No []Yes            []Short Term []Long Term  Motivational Level:  []Asks Questions                  []Extremely Anxious       []Seems Interested               []Seems Uninterested                  [x]Denies need for Education  Risk for Injury:  [x]Patient oriented to person, place and time  []History of frequent falls/loss of balance  Nutritional:  []Change in appetite   []Weight Gain   []Weight Loss  Functional:  []Requires assistance with ADL's

## 2025-04-02 ENCOUNTER — HOSPITAL ENCOUNTER (OUTPATIENT)
Dept: GENERAL RADIOLOGY | Facility: HOSPITAL | Age: 80
Discharge: HOME | End: 2025-04-02
Admitting: PHYSICIAN ASSISTANT
Payer: MEDICARE

## 2025-04-02 ENCOUNTER — OFFICE VISIT (OUTPATIENT)
Dept: UROLOGY | Facility: CLINIC | Age: 80
End: 2025-04-02
Payer: MEDICARE

## 2025-04-02 VITALS — WEIGHT: 243.8 LBS | HEIGHT: 67 IN | BODY MASS INDEX: 38.27 KG/M2 | TEMPERATURE: 97.2 F

## 2025-04-02 DIAGNOSIS — N20.0 KIDNEY STONES: ICD-10-CM

## 2025-04-02 DIAGNOSIS — N20.0 BILATERAL KIDNEY STONES: Primary | ICD-10-CM

## 2025-04-02 LAB
BILIRUB BLD-MCNC: NEGATIVE MG/DL
CLARITY, POC: CLEAR
COLOR UR: YELLOW
GLUCOSE UR STRIP-MCNC: NEGATIVE MG/DL
KETONES UR QL: NEGATIVE
LEUKOCYTE EST, POC: NEGATIVE
NITRITE UR-MCNC: NEGATIVE MG/ML
PH UR: 6.5 [PH] (ref 5–8)
PROT UR STRIP-MCNC: ABNORMAL MG/DL
RBC # UR STRIP: NEGATIVE /UL
SP GR UR: 1.02 (ref 1–1.03)
UROBILINOGEN UR QL: ABNORMAL

## 2025-04-02 PROCEDURE — 74018 RADEX ABDOMEN 1 VIEW: CPT

## 2025-04-02 RX ORDER — DOXYCYCLINE 100 MG/1
CAPSULE ORAL
COMMUNITY

## 2025-04-02 RX ORDER — KETOROLAC TROMETHAMINE 10 MG/1
TABLET, FILM COATED ORAL
COMMUNITY
Start: 2025-03-26

## 2025-04-17 ENCOUNTER — TELEPHONE (OUTPATIENT)
Dept: CARDIOLOGY | Facility: CLINIC | Age: 80
End: 2025-04-17
Payer: MEDICARE

## 2025-04-17 NOTE — TELEPHONE ENCOUNTER
PT TO UNDERGO AN EGD - GASTRO WOULD LIKE FOR PT TO HOLD PLAVIX 7 DAYS PRIOR TO PROCEDURE    PLEASE ADVISE    FORM TO BE SIGNED

## 2025-04-24 ENCOUNTER — TELEPHONE (OUTPATIENT)
Dept: CARDIOLOGY | Facility: CLINIC | Age: 80
End: 2025-04-24
Payer: MEDICARE

## 2025-04-24 NOTE — TELEPHONE ENCOUNTER
CALL PLACED TO PT/WIFE REGARDING CANCELED APPT.    APPT WILL BE NEEDED TO DISCUSS RECENT TESTING & CLEARANCE TO PROCEED WITH UPCOMING EGD.    PT/WIFE STATE THAT THEY ARE CURRENTLY OUT OF TOWN DUE TO FAMILY MATTERS & WILL CONTACT US WHEN THEY ARE BACK IN TOWN.

## 2025-05-02 RX ORDER — LORATADINE 10 MG/1
10 TABLET ORAL DAILY
Qty: 90 TABLET | Refills: 0 | Status: SHIPPED | OUTPATIENT
Start: 2025-05-02

## 2025-05-02 NOTE — TELEPHONE ENCOUNTER
Please send refill to [facility] [place], patient has upcoming appointment the end of may with Dr Buckley, patient has been on this medication and last office notes states Dr Buckley wants him to continue this, thank you   Requested Prescriptions     Pending Prescriptions Disp Refills    loratadine (CLARITIN) 10 MG tablet [Pharmacy Med Name: ALLERGY RELF (LORATADINE) 10MG TABS] 90 tablet 3     Sig: TAKE 1 TABLET BY MOUTH ONCE PER DAY      Last office visit with prescribing clinician: 11/20/2024   Last telemedicine visit with prescribing clinician: Visit date not found   Next office visit with prescribing clinician: 5/23/2025                         Would you like a call back once the refill request has been completed: [] Yes [] No    If the office needs to give you a call back, can they leave a voicemail: [] Yes [] No    Nadira Escamilla MA  05/02/25, 08:15 CDT

## 2025-05-08 ENCOUNTER — TRANSCRIBE ORDERS (OUTPATIENT)
Dept: ADMINISTRATIVE | Facility: HOSPITAL | Age: 80
End: 2025-05-08
Payer: MEDICARE

## 2025-05-08 DIAGNOSIS — E55.9 VITAMIN D DEFICIENCY, UNSPECIFIED: ICD-10-CM

## 2025-05-08 DIAGNOSIS — E78.2 MIXED HYPERLIPIDEMIA: ICD-10-CM

## 2025-05-08 DIAGNOSIS — R73.9 HYPERGLYCEMIA, UNSPECIFIED: ICD-10-CM

## 2025-05-08 DIAGNOSIS — N40.0 BENIGN PROSTATIC HYPERPLASIA WITHOUT LOWER URINARY TRACT SYMPTOMS: ICD-10-CM

## 2025-05-08 DIAGNOSIS — I10 ESSENTIAL (PRIMARY) HYPERTENSION: Primary | ICD-10-CM

## 2025-05-08 DIAGNOSIS — E53.8 DEFICIENCY OF OTHER SPECIFIED B GROUP VITAMINS: ICD-10-CM

## 2025-05-09 ENCOUNTER — OFFICE VISIT (OUTPATIENT)
Dept: CARDIOLOGY | Facility: CLINIC | Age: 80
End: 2025-05-09
Payer: MEDICARE

## 2025-05-09 ENCOUNTER — TRANSCRIBE ORDERS (OUTPATIENT)
Dept: ADMINISTRATIVE | Facility: HOSPITAL | Age: 80
End: 2025-05-09
Payer: MEDICARE

## 2025-05-09 ENCOUNTER — LAB (OUTPATIENT)
Dept: LAB | Facility: HOSPITAL | Age: 80
End: 2025-05-09
Payer: MEDICARE

## 2025-05-09 VITALS
BODY MASS INDEX: 38.92 KG/M2 | DIASTOLIC BLOOD PRESSURE: 82 MMHG | WEIGHT: 248 LBS | SYSTOLIC BLOOD PRESSURE: 145 MMHG | HEIGHT: 67 IN | HEART RATE: 64 BPM

## 2025-05-09 DIAGNOSIS — Z95.0 PRESENCE OF CARDIAC PACEMAKER: ICD-10-CM

## 2025-05-09 DIAGNOSIS — I10 PRIMARY HYPERTENSION: ICD-10-CM

## 2025-05-09 DIAGNOSIS — I65.29 STENOSIS OF CAROTID ARTERY, UNSPECIFIED LATERALITY: Primary | ICD-10-CM

## 2025-05-09 DIAGNOSIS — I10 ESSENTIAL (PRIMARY) HYPERTENSION: ICD-10-CM

## 2025-05-09 DIAGNOSIS — I50.32 CHRONIC DIASTOLIC CONGESTIVE HEART FAILURE: ICD-10-CM

## 2025-05-09 DIAGNOSIS — M48.061 SPINAL STENOSIS, LUMBAR REGION, WITHOUT NEUROGENIC CLAUDICATION: Primary | ICD-10-CM

## 2025-05-09 DIAGNOSIS — Z95.5 STENTED CORONARY ARTERY: ICD-10-CM

## 2025-05-09 DIAGNOSIS — I25.810 CORONARY ARTERY DISEASE INVOLVING AUTOLOGOUS ARTERY CORONARY BYPASS GRAFT WITHOUT ANGINA PECTORIS: ICD-10-CM

## 2025-05-09 DIAGNOSIS — E55.9 VITAMIN D DEFICIENCY, UNSPECIFIED: ICD-10-CM

## 2025-05-09 DIAGNOSIS — N40.0 BENIGN PROSTATIC HYPERPLASIA WITHOUT LOWER URINARY TRACT SYMPTOMS: ICD-10-CM

## 2025-05-09 DIAGNOSIS — I51.7 LVH (LEFT VENTRICULAR HYPERTROPHY): ICD-10-CM

## 2025-05-09 DIAGNOSIS — E78.5 HYPERLIPIDEMIA LDL GOAL <70: ICD-10-CM

## 2025-05-09 DIAGNOSIS — E78.2 MIXED HYPERLIPIDEMIA: ICD-10-CM

## 2025-05-09 DIAGNOSIS — R73.9 HYPERGLYCEMIA, UNSPECIFIED: ICD-10-CM

## 2025-05-09 DIAGNOSIS — E53.8 DEFICIENCY OF OTHER SPECIFIED B GROUP VITAMINS: ICD-10-CM

## 2025-05-09 LAB
25(OH)D3 SERPL-MCNC: 38.8 NG/ML (ref 30–100)
ALBUMIN SERPL-MCNC: 3.8 G/DL (ref 3.5–5)
ALBUMIN UR-MCNC: <1.2 MG/DL
ALBUMIN/GLOB SERPL: 1.4 G/DL (ref 1.1–2.5)
ALP SERPL-CCNC: 53 U/L (ref 24–120)
ALT SERPL W P-5'-P-CCNC: 22 U/L (ref 0–50)
ANION GAP SERPL CALCULATED.3IONS-SCNC: 4 MMOL/L (ref 4–13)
AST SERPL-CCNC: 25 U/L (ref 7–45)
AUTO MIXED CELLS #: 0.6 10*3/MM3 (ref 0.1–2.6)
AUTO MIXED CELLS %: 7.6 % (ref 0.1–24)
BILIRUB SERPL-MCNC: 0.5 MG/DL (ref 0.1–1)
BILIRUB UR QL STRIP: NEGATIVE
BUN SERPL-MCNC: 15 MG/DL (ref 5–21)
BUN/CREAT SERPL: 18.8
CALCIUM SPEC-SCNC: 9 MG/DL (ref 8.6–10.5)
CHLORIDE SERPL-SCNC: 105 MMOL/L (ref 98–110)
CHOLEST SERPL-MCNC: 168 MG/DL (ref 130–200)
CLARITY UR: CLEAR
CO2 SERPL-SCNC: 31 MMOL/L (ref 24–31)
COLOR UR: YELLOW
CREAT SERPL-MCNC: 0.8 MG/DL (ref 0.5–1.4)
CREAT UR-MCNC: 206.6 MG/DL
EGFRCR SERPLBLD CKD-EPI 2021: 91.2 ML/MIN/1.73
ERYTHROCYTE [DISTWIDTH] IN BLOOD BY AUTOMATED COUNT: 13.9 % (ref 12.3–15.4)
FOLATE SERPL-MCNC: 10.3 NG/ML (ref 4.78–24.2)
GLOBULIN UR ELPH-MCNC: 2.7 GM/DL
GLUCOSE SERPL-MCNC: 100 MG/DL (ref 65–99)
GLUCOSE UR STRIP-MCNC: NEGATIVE MG/DL
HBA1C MFR BLD: 5.9 % (ref 4.8–5.9)
HCT VFR BLD AUTO: 42.6 % (ref 37.5–51)
HDLC SERPL-MCNC: 59 MG/DL
HGB BLD-MCNC: 13.6 G/DL (ref 13–17.7)
HGB UR QL STRIP.AUTO: NEGATIVE
KETONES UR QL STRIP: NEGATIVE
LDLC SERPL CALC-MCNC: 90 MG/DL (ref 0–99)
LDLC/HDLC SERPL: 1.49 {RATIO}
LEUKOCYTE ESTERASE UR QL STRIP.AUTO: NEGATIVE
LYMPHOCYTES # BLD AUTO: 2.2 10*3/MM3 (ref 0.7–3.1)
LYMPHOCYTES NFR BLD AUTO: 27.4 % (ref 19.6–45.3)
MCH RBC QN AUTO: 29.7 PG (ref 26.6–33)
MCHC RBC AUTO-ENTMCNC: 31.9 G/DL (ref 31.5–35.7)
MCV RBC AUTO: 93 FL (ref 79–97)
MICROALBUMIN/CREAT UR: NORMAL MG/G{CREAT}
NEUTROPHILS NFR BLD AUTO: 5.2 10*3/MM3 (ref 1.7–7)
NEUTROPHILS NFR BLD AUTO: 65 % (ref 42.7–76)
NITRITE UR QL STRIP: NEGATIVE
PH UR STRIP.AUTO: 7 [PH] (ref 5–8)
PLATELET # BLD AUTO: 191 10*3/MM3 (ref 140–450)
PMV BLD AUTO: 7.8 FL (ref 6–12)
POTASSIUM SERPL-SCNC: 4.2 MMOL/L (ref 3.5–5.3)
PROT SERPL-MCNC: 6.5 G/DL (ref 6.3–8.7)
PROT UR QL STRIP: ABNORMAL
RBC # BLD AUTO: 4.58 10*6/MM3 (ref 4.14–5.8)
SODIUM SERPL-SCNC: 140 MMOL/L (ref 135–145)
SP GR UR STRIP: 1.02 (ref 1–1.03)
T4 FREE SERPL-MCNC: 0.9 NG/DL (ref 0.92–1.68)
TRIGL SERPL-MCNC: 104 MG/DL (ref 0–149)
TSH SERPL DL<=0.05 MIU/L-ACNC: 2.3 UIU/ML (ref 0.27–4.2)
UROBILINOGEN UR QL STRIP: ABNORMAL
VIT B12 BLD-MCNC: 504 PG/ML (ref 211–946)
VLDLC SERPL-MCNC: 19 MG/DL (ref 5–40)
WBC NRBC COR # BLD AUTO: 8 10*3/MM3 (ref 3.4–10.8)

## 2025-05-09 PROCEDURE — 80053 COMPREHEN METABOLIC PANEL: CPT

## 2025-05-09 PROCEDURE — 82570 ASSAY OF URINE CREATININE: CPT

## 2025-05-09 PROCEDURE — 82607 VITAMIN B-12: CPT

## 2025-05-09 PROCEDURE — 82306 VITAMIN D 25 HYDROXY: CPT

## 2025-05-09 PROCEDURE — 84154 ASSAY OF PSA FREE: CPT

## 2025-05-09 PROCEDURE — 82043 UR ALBUMIN QUANTITATIVE: CPT

## 2025-05-09 PROCEDURE — 80061 LIPID PANEL: CPT

## 2025-05-09 PROCEDURE — 83036 HEMOGLOBIN GLYCOSYLATED A1C: CPT

## 2025-05-09 PROCEDURE — 84439 ASSAY OF FREE THYROXINE: CPT

## 2025-05-09 PROCEDURE — 85025 COMPLETE CBC W/AUTO DIFF WBC: CPT

## 2025-05-09 PROCEDURE — 36415 COLL VENOUS BLD VENIPUNCTURE: CPT

## 2025-05-09 PROCEDURE — 84443 ASSAY THYROID STIM HORMONE: CPT

## 2025-05-09 PROCEDURE — 81003 URINALYSIS AUTO W/O SCOPE: CPT

## 2025-05-09 PROCEDURE — 84153 ASSAY OF PSA TOTAL: CPT

## 2025-05-09 PROCEDURE — 82746 ASSAY OF FOLIC ACID SERUM: CPT

## 2025-05-09 RX ORDER — TOPIRAMATE 50 MG/1
50 TABLET, FILM COATED ORAL 2 TIMES DAILY
COMMUNITY

## 2025-05-09 NOTE — PROGRESS NOTES
Carlo Velasco  [id number]  1945  79 y.o.  male    Referring Provider: Cruz Chaudhari MD    Reason for Follow-up Visit:      Here for routine follow up   coronary artery disease Coronary artery bypass grafting , stented coronary artery   sick sinus syndrome s/p pacemaker   Cardiac workup test results as below: myocardial perfusion scan      Subjective     Had cardiac PET   Plans for upper GI endoscopy for dysphagia   Chest pain at random and not necessarily related to exertion  Can lasts for mins to hours   myocardial perfusion scan  normal / PET abnormal   Chest pain both with exertion as well as at rest.  Feels episodes of chest pain occur no more often with exertion  Moderate substernal,   Pressure like   Chest pain non pleuritic  Chest pain non positional and non gustatory   Sometimes wakes up with this  He reports being under a lot of stress lately as grandson accidentally shot himself  Occurs once or twice a week on the average but can be variable in frequency  No associated diaphoresis  No associated nausea  No radiation  Relieved  spontaneously  Not positional  No change with intake of food or antacids  No change with breathing  Mild to moderate associated dyspnea    Similar chest pain episodes in the past   S/L NTG helps subside the chest pain within 5 mins of taking      Lipids as below         Device check as below          History of present illness:  Carlo Velasco is a 79 y.o. yo male with history of coronary artery disease Coronary artery bypass grafting , stented coronary artery sick sinus syndrome s/p pacemaker who presents today for   Chief Complaint   Patient presents with    Coronary Artery Disease     3 mo f/u - recent testing    Pre-op Exam     Upcoming EGD ([facility])   .    History  Past Medical History:   Diagnosis Date    Anxiety     Arthritis     Broken rib     Cancer     melanoma    Chronic diastolic congestive heart failure 04/15/2022    Claustrophobia     Colon polyp      COPD (chronic obstructive pulmonary disease)     Coronary artery disease involving autologous artery coronary bypass graft without angina pectoris 09/22/2016    Depression     Disease of thyroid gland     partial thyroidectomy on right    GERD (gastroesophageal reflux disease)     Hearing loss     Heart attack     History of transfusion     Hypertension     Kidney stone     history of     Low back pain     Mild intermittent asthma without complication 08/24/2021    Open angle with borderline findings, low risk, bilateral 08/23/2021    Pain management     STATES JUST HAD INJECTION IN BACK ON 06/20/2023    Presence of cardiac pacemaker 04/15/2022    Primary hypertension 09/22/2016    Pulmonary hypertension 04/15/2022    S/P CABG x 2 01/04/2019    LIMA to LAD and SVG to RCA 8/8/12    Sick sinus syndrome 07/23/2020    Stented coronary artery 01/04/2019    2.5 x 18 mm Xience Alpine drug-eluting stent to the mid LAD 7/8/2015, 2.5 x 18 mm Xience drug-eluting stent to the proximal OM1 10/21/2016 and 2.5 x 12 mm Synergy drug-eluting stent to OM1 for severe in-stent restenosis 4/28/2020    Tobacco abuse, in remission     Uses hearing aid     BILATERAL   ,   Past Surgical History:   Procedure Laterality Date    BACK SURGERY      CERVICAL FUSION AND LUMBAR DISC REPLACEMENT    BRONCHOSCOPY N/A 04/27/2021    Procedure: BRONCHOSCOPY;  Surgeon: Rico Jacobs MD;  Location: [facility] OR;  Service: Cardiothoracic;  Laterality: N/A;    CARDIAC CATHETERIZATION  07/2015    SHAHRZAD mid LAD, Dr. Toribio    CARDIAC CATHETERIZATION N/A 10/21/2016    Procedure: Left Heart Cath;  Surgeon: Darian Toribio MD;  Location:  [facility] CATH INVASIVE LOCATION;  Service:     CARDIAC CATHETERIZATION Left 04/05/2017    Procedure: Cardiac Catheterization/Vascular Study;  Surgeon: Darian Toribio MD;  Location:  [facility] CATH INVASIVE [facility];  Service:     CARDIAC CATHETERIZATION N/A 04/05/2017    Procedure: Left Heart Cath;  Surgeon: Darian Toribio MD;  Location:  [facility]  CATH INVASIVE LOCATION;  Service:     CARDIAC CATHETERIZATION N/A 04/05/2017    Procedure: Coronary angiography;  Surgeon: Darian Toribio MD;  Location:  [facility] CATH INVASIVE [facility];  Service:     CARDIAC CATHETERIZATION N/A 04/05/2017    Procedure: Left ventriculography;  Surgeon: Darian Toribio MD;  Location:  [facility] CATH INVASIVE [facility];  Service:     CARDIAC CATHETERIZATION  04/05/2017    Procedure: Saphenous Vein Graft;  Surgeon: Darian Toribio MD;  Location:  [facility] CATH INVASIVE [facility];  Service:     CARDIAC CATHETERIZATION Left 02/27/2018    Procedure: Cardiac Catheterization/Vascular Study SHAHRZAD OK PRN;  Surgeon: Darian Toribio MD;  Location:  [facility] CATH INVASIVE [facility];  Service:     CARDIAC CATHETERIZATION  02/27/2018    Procedure: Functional Flow Marion;  Surgeon: Darian Toribio MD;  Location:  [facility] CATH INVASIVE [facility];  Service:     CARDIAC CATHETERIZATION N/A 02/27/2018    Procedure: Left ventriculography;  Surgeon: Darian Toribio MD;  Location:  [facility] CATH INVASIVE [facility];  Service:     CARDIAC CATHETERIZATION Left 04/28/2020    Procedure: Cardiac Catheterization/Vascular Study SHAHRZAD OK PRN;  Surgeon: Darian Toribio MD;  Location:  [facility] CATH INVASIVE [facility];  Service: Cardiology;  Laterality: Left;    CARDIAC ELECTROPHYSIOLOGY PROCEDURE N/A 01/11/2018    Procedure: PPM generator change - dual;  Surgeon: Darian Toribio MD;  Location: [facility] CATH INVASIVE LOCATION;  Service:     CARDIAC SURGERY      open heart surgery x2    COLONOSCOPY  09/05/2007    colon polyps    COLONOSCOPY N/A 05/29/2018    Procedure: COLONOSCOPY WITH ANESTHESIA;  Surgeon: Juan F Tapia MD;  Location: [facility] ENDOSCOPY;  Service: Gastroenterology    COLONOSCOPY N/A 10/21/2021    Procedure: COLONOSCOPY WITH ANESTHESIA;  Surgeon: Juan F Tapia MD;  Location: [facility] ENDOSCOPY;  Service: Gastroenterology;  Laterality: N/A;  pre op: hx polyps  post op;polyp,   PCP: Rhys Rosen MD    CORONARY ANGIOPLASTY WITH STENT  PLACEMENT  2015    SHAHRZAD mid LAD, Dr. Toribio     CORONARY ARTERY BYPASS GRAFT   AND     2 vessel     ENDOSCOPY  2010    ENDOSCOPY N/A 2018    Procedure: ESOPHAGOGASTRODUODENOSCOPY WITH ANESTHESIA;  Surgeon: Juan F Tapia MD;  Location:  [facility] ENDOSCOPY;  Service: Gastroenterology    HEAD/NECK LESION/CYST EXCISION Left 2016    Procedure: EXCISION MALIGNANT MELANOMA WITH SENTINEL NODE BIOPSY, INJECTION AND SCAN PRE-OP, LEFT EAR;  Surgeon: Guanaco Zepeda MD;  Location:  [facility] OR;  Service:     HEMORRHOIDECTOMY      HERNIA REPAIR      INSERT / REPLACE / REMOVE PACEMAKER      REPLACEMENT TOTAL KNEE Left     BRIAN-STOPPA INCISIONAL HERNIA REPAIR Left 2023    Procedure: BRIAN-STOPPA INCISIONAL HERNIA REPAIR left lumbar hernia repair with mesh;  Surgeon: Govind Celeste MD;  Location:  [facility] OR;  Service: General;  Laterality: Left;    SENTINEL NODE BIOPSY Left 2016    Procedure: SENTINEL NODE BIOPSY;  Surgeon: Guanaco Zepeda MD;  Location:  [facility] OR;  Service:     SKIN CANCER EXCISION      THORACOSCOPY Right 2021    Procedure: THORACOSCOPIC TOTAL DECORTICATION;  Surgeon: Rico Jacobs MD;  Location:  [facility] OR;  Service: Cardiothoracic;  Laterality: Right;    THYROID SURGERY      THYROIDECTOMY     ,   Family History   Problem Relation Age of Onset    Heart failure Mother     Stroke Mother     Dementia Mother     Coronary artery disease Father     Colon polyps Father     COPD Brother     Colon cancer Neg Hx    ,   Social History     Tobacco Use    Smoking status: Former     Current packs/day: 0.00     Average packs/day: 3.0 packs/day for 10.0 years (30.0 ttl pk-yrs)     Types: Cigarettes     Start date:      Quit date:      Years since quittin.3     Passive exposure: Never    Smokeless tobacco: Never   Vaping Use    Vaping status: Never Used   Substance Use Topics    Alcohol use: Yes     Comment: rare    Drug use: No   ,     Medications  Current Outpatient  Medications   Medication Sig Dispense Refill    albuterol sulfate  (90 Base) MCG/ACT inhaler Inhale 2 puffs Every 4 (Four) Hours As Needed for Wheezing. 6.7 g 5    baclofen (LIORESAL) 10 MG tablet Take 1 tablet by mouth Every 12 (Twelve) Hours.      budesonide-formoterol (SYMBICORT) 160-4.5 MCG/ACT inhaler Inhale 2 puffs 2 (Two) Times a Day. 10.2 g 5    Calcium Carbonate 1500 (600 Ca) MG tablet Take 1 tablet by mouth Daily.      Cholecalciferol 125 MCG (5000 UT) tablet Take 1 tablet by mouth Daily.      clopidogrel (PLAVIX) 75 MG tablet TAKE 1 TABLET BY MOUTH DAILY 90 tablet 3    Cyanocobalamin (B-12) 1000 MCG sublingual tablet 1 tablet Daily.      escitalopram (LEXAPRO) 20 MG tablet Take 1 tablet by mouth Daily. 90 tablet 1    ferrous sulfate 325 (65 FE) MG tablet Take 1 tablet by mouth Every Morning.      fluticasone (FLONASE) 50 MCG/ACT nasal spray 2 sprays into the nostril(s) as directed by provider Daily. 16 g 5    furosemide (LASIX) 40 MG tablet Take 1 tablet by mouth Daily As Needed (for any increase in 2 lbs in a daqy or 5 lbs in a week or increassing swelling or congestion). (Patient taking differently: Take 1 tablet by mouth Daily.)      ibuprofen (ADVIL,MOTRIN) 800 MG tablet Take 1 tablet by mouth Every 8 (Eight) Hours As Needed. for pain      isosorbide mononitrate (IMDUR) 60 MG 24 hr tablet Take 1 tablet by mouth Every Morning. 90 tablet 3    ketorolac (TORADOL) 10 MG tablet       latanoprost (XALATAN) 0.005 % ophthalmic solution instill 1 drop in both eyes every night at bedtime      loratadine (CLARITIN) 10 MG tablet TAKE 1 TABLET BY MOUTH ONCE PER DAY 90 tablet 0    meclizine (ANTIVERT) 12.5 MG tablet Take 1 tablet by mouth.      metoprolol tartrate (LOPRESSOR) 25 MG tablet TAKE 1 TABLET BY MOUTH DAILY 90 tablet 3    nitroglycerin (NITROSTAT) 0.4 MG SL tablet 1 under the tongue as needed for angina, may repeat q5mins for up three doses 25 tablet 3    Omega-3 Fatty Acids (FISH OIL PO) Take  by  "mouth Daily.      omeprazole (priLOSEC) 20 MG capsule TAKE 1 CAPSULE BY MOUTH DAILY 90 capsule 3    Potassium Gluconate 2.5 MEQ tablet Take 1 tablet by mouth Daily.      tamsulosin (FLOMAX) 0.4 MG capsule 24 hr capsule Take 1 capsule by mouth Daily.      topiramate (TOPAMAX) 50 MG tablet Take 1 tablet by mouth 2 (Two) Times a Day.      Zinc Sulfate (ZINC-220 PO) Take 1 tablet by mouth Daily.      rosuvastatin (CRESTOR) 20 MG tablet Take 1 tablet by mouth Daily. (Patient not taking: Reported on 4/1/2025) 90 tablet 3     No current facility-administered medications for this visit.       Allergies:  Meperidine and Statins    Review of Systems  Review of Systems   Constitutional: Positive for malaise/fatigue.   HENT: Negative.     Eyes: Negative.    Cardiovascular:  Positive for chest pain, dyspnea on exertion and leg swelling. Negative for claudication, cyanosis, irregular heartbeat, near-syncope, orthopnea, palpitations, paroxysmal nocturnal dyspnea and syncope.   Respiratory:  Positive for cough and shortness of breath.    Endocrine: Negative.    Hematologic/Lymphatic: Negative.    Skin: Negative.    Musculoskeletal:  Positive for arthritis and back pain.   Gastrointestinal:  Negative for anorexia.   Genitourinary: Negative.    Neurological:  Negative for weakness.   Psychiatric/Behavioral: Negative.          Objective     Physical Exam:  /82   Pulse 64   Ht 170.2 cm (67\")   Wt 112 kg (248 lb)   BMI 38.84 kg/m²   Physical Exam   Constitutional: He appears well-developed.   HENT:   Head: Normocephalic.   Neck: Normal carotid pulses and no JVD present. No tracheal tenderness present. Carotid bruit is not present. No tracheal deviation present.   Cardiovascular: Regular rhythm and normal pulses.   Murmur heard.  Systolic murmur is present with a grade of 2/6.  Pulmonary/Chest: Effort normal. No stridor.   Abdominal: Soft. He exhibits no distension. There is no abdominal tenderness.   Musculoskeletal:      " Right lower le+ Pitting Edema present.      Left lower le+ Pitting Edema present.   Neurological: He is alert. No cranial nerve deficit or sensory deficit.   Skin: Skin is warm.   Psychiatric: His speech is normal and behavior is normal.       Results Review:    Regadenoson Stress Test with Myocardial Perfusion SPECT (Multi Study)    Accession Number: [id number]   Date of Study: 1/10/25   Ordering Provider: Darian Toribio MD   Clinical Indications: Chest Pain, Known Coronary Artery Disease        Reading Physicians  Performing Staff   [facility] [place], SPECT [place]: Darian Toribio MD    Tech: [facility]   Support Staff: Shruthi Ibarra RN         [facility] PACS Images     Show images for Stress Test With Myocardial Perfusion (1 Day)   Interpretation Summary         Myocardial perfusion imaging indicates a normal myocardial perfusion study with no evidence of ischemia. Impressions are consistent with a low risk study.    Left ventricular ejection fraction is normal (Calculated EF = 54%).    Diaphragmatic attenuation artifact is present.     Physiological apical thinning noted       __________________________________________________________________   Rest and stress as well as attenuation corrected and uncorrected images where visualized including manipulation of the images as required including separate visualization as required using the [employer] software  In addition reprocessing of images performed personally if not satisfied with earlier processing by nuclear medicine tech  Current interpretation is based on a sum total of the above.      ____________________________________________________________________     Carlo Velasco  Regadenoson Stress Test with Myocardial Perfusion SPECT (Multi Study)  Order# [id number]  Reading physician: Darian Toribio MD Ordering physician: Darian Toribio MD Study date: 23     Patient Information    Patient Name   Carlo Velasco MRN   [id number] Legal Sex    Male  (Age)   1945 (77 y.o.)     Interpretation Summary         Left ventricular ejection fraction is normal (Calculated EF = 50%).    Myocardial perfusion imaging indicates a normal myocardial perfusion study with no evidence of ischemia.    Impressions are consistent with a low risk study.    Diaphragmatic attenuation artifact is present.    Physiological apical thinning noted             IMPRESSION:  1. No pulmonary emboli.  2. No thoracic aortic aneurysm or dissection. Prior mediastinal surgery  with cardiomegaly. Left subclavian cardiac pacer device.  3. New right middle lobe consolidation most consistent with pneumonia.  Probable reactive right hilar and subcarinal lymph nodes. Follow up  chest x-ray recommended to document resolution.  This report was finalized on 2021 20:54 by Dr. Cassie Mckeon MD.      Conclusion of cardiac catheterization    2020     Left ventricle ejection fraction 45%  Moderately elevated left ventricular end-diastolic pressure of 19 mmHg  Atretic left intramammary artery graft  Patent stents in the left anterior descending coronary artery  60% stenosis chronic of diagonal branch unchanged from prior cardiac catheterization  95% in-stent restenosis of first obtuse marginal branch treated with PTCA and then implantation of 2.5 x 12 mm Synergy stent with 0% residual stenosis  70% stenosis of distal right coronary artery  Widely patent saphenous venous graft to the distal right coronary artery        ____________________________________________________________________________________________________________________________________________     Plan after cardiac catheterization        Dual antiplatelet therapy minimum of 1 year  Continue on other medication including statin and beta-blockers  Aspirin for the rest of his life  Add ACE inhibitor therapy  Check echocardiogram  Intensive risk factor modifications for both primary and secondary prevention if  applicable  Hydration   Observation         Carlo Velasco   Regadenoson Stress Test With Myocardial Perfusion SPECT (Multi Study)   Order# [id number]   Reading physician: Darian Toribio MD Ordering physician: Darian Toribio MD Study date: 20   Patient Information     Patient Name  Carlo Velasco MRN  [id number] Sex  Male  (Age)  1945 (74 y.o.)   Interpretation Summary        Left ventricular ejection fraction is normal (Calculated EF = 55%).  Myocardial perfusion imaging indicates a normal myocardial perfusion study with no evidence of ischemia.  Impressions are consistent with a low risk study.                Results for orders placed during the hospital encounter of 23    Adult Transthoracic Echo Complete w/ Color, Spectral and Contrast if necessary per protocol    Interpretation Summary    Left ventricular systolic function is normal. Left ventricular ejection fraction appears to be 56 - 60%.    Left ventricular diastolic function was normal.    Abnormal global longitudinal LV strain (GLS) = -11.6%.    Left atrial volume is mildly increased.    Estimated right ventricular systolic pressure from tricuspid regurgitation is normal (<35 mmHg).       Procedures  Cardiac cath    LVEF of 50%.   occluded the mid right coronary artery  Patent saphenous venous graft to the distal right coronary artery  Atretic left intramammary artery graft.  Patent stent in the mid left anterior descending coronary artery  Patent stent to the obtuse marginal branch  Patent stent in diagonal branch.       Conclusion     Patent stents noted in the obtuse marginal branch and the left anterior descending coronary artery.  Atretic left internal mammary artery graft.  Patent saphenous venous graft to the right coronary artery  Significant right coronary artery stenosis proximal to the touchdown site of the saphenous vein graft to the right coronary artery  60% stenosis of diagonal branch with a normal fractional flow  reserve of 0.85  Left ventricle ejection fraction of 50%.     LVEDP    16   mm Hg           Plan     Intensive risk factor modifications for both primary and secondary prevention if applicable  Home today if stable  Hydration   Observation  Keep follow-up appointment    Diagnoses and all orders for this visit:    1. Stenosis of carotid artery, unspecified laterality (Primary)    2. Chronic diastolic congestive heart failure    3. Coronary artery disease involving autologous artery coronary bypass graft without angina pectoris    4. Hyperlipidemia LDL goal <70    5. LVH (left ventricular hypertrophy)    6. Presence of cardiac pacemaker    7. Primary hypertension    8. Stented coronary artery            Plan    Patient expressed understanding  Encouraged and answered all questions   Discussed with the patient and all questioned fully answered. He will call me if any problems arise.   Discussed results of prior testing with patient : echo and cardiac PET myocardial perfusion scan    as well as device check     OK to proceed with upper GI endoscopy   Hold Plavix x 7 days  Take Aspirin 81 mg during that time   After upper GI endoscopy be seen in cardiology clinic with plans for cardiac cath   Continue beta blocker therapy     Check BP and heart rates twice daily initially till blood pressures and heart rates under good control and then at least 3x / week,   If blood pressures continue to be well-controlled then can check week a month  at home and bring a recording for review next visit  If BP >130/85 or < 100/60 persistently over 3 reading 30 mins apart or if heart rates persistently above 100 bpm or less than 55 bpm call sooner for evaluation and advise     S/L NTG PRN for chest pain, call me or go to ER if has to use S/L nitroglycerin     Increased Imdur last visit          Return in about 6 weeks (around 6/20/2025).

## 2025-05-10 LAB
PSA FREE MFR SERPL: 33.2 %
PSA FREE SERPL-MCNC: 0.73 NG/ML
PSA SERPL-MCNC: 2.2 NG/ML (ref 0–4)

## 2025-05-15 ENCOUNTER — TELEPHONE (OUTPATIENT)
Dept: GASTROENTEROLOGY | Facility: CLINIC | Age: 80
End: 2025-05-15

## 2025-05-15 ENCOUNTER — PREP FOR SURGERY (OUTPATIENT)
Dept: OTHER | Facility: HOSPITAL | Age: 80
End: 2025-05-15
Payer: MEDICARE

## 2025-05-15 DIAGNOSIS — R13.14 PHARYNGOESOPHAGEAL DYSPHAGIA: Primary | ICD-10-CM

## 2025-05-15 NOTE — TELEPHONE ENCOUNTER
Scheduled 5/30/25 @ 12:00 pm.   Instructions given via phone and mailed to patient.  Reminded him to stop plavix 7 days and take aspirin 81mg while off the plavix.

## 2025-05-15 NOTE — TELEPHONE ENCOUNTER
I spoke with him this morning. I saw him in office 4/2025 for dysphagia. We were waiting on Dr. Toribio to approve him to hold plavix x 7 days. He saw Dr. Toribio last week and he did approve to hold plavix x 7 days however will need to take aspirin 81 mg daily while off plavix. The patient verbalizes understanding.  He does not need office visit today because there has been no change.  Will schedule egd.       Zora, please call him this morning and schedule egd.

## 2025-05-20 ENCOUNTER — HOSPITAL ENCOUNTER (OUTPATIENT)
Dept: CT IMAGING | Facility: HOSPITAL | Age: 80
Discharge: HOME | End: 2025-05-20
Admitting: NURSE PRACTITIONER
Payer: MEDICARE

## 2025-05-20 DIAGNOSIS — M48.061 SPINAL STENOSIS, LUMBAR REGION, WITHOUT NEUROGENIC CLAUDICATION: ICD-10-CM

## 2025-05-20 PROCEDURE — 72131 CT LUMBAR SPINE W/O DYE: CPT

## 2025-05-23 ENCOUNTER — OFFICE VISIT (OUTPATIENT)
Dept: PULMONOLOGY | Facility: CLINIC | Age: 80
End: 2025-05-23
Payer: MEDICARE

## 2025-05-23 VITALS
SYSTOLIC BLOOD PRESSURE: 128 MMHG | OXYGEN SATURATION: 94 % | DIASTOLIC BLOOD PRESSURE: 70 MMHG | HEART RATE: 85 BPM | WEIGHT: 248.6 LBS | HEIGHT: 67 IN | BODY MASS INDEX: 39.02 KG/M2

## 2025-05-23 DIAGNOSIS — Z87.891 FORMER SMOKER, STOPPED SMOKING MANY YEARS AGO: ICD-10-CM

## 2025-05-23 DIAGNOSIS — Z93.8 S/P THORACOSTOMY TUBE PLACEMENT: ICD-10-CM

## 2025-05-23 DIAGNOSIS — G47.33 OSA (OBSTRUCTIVE SLEEP APNEA): ICD-10-CM

## 2025-05-23 DIAGNOSIS — J98.4 RESTRICTIVE LUNG DISEASE: ICD-10-CM

## 2025-05-23 DIAGNOSIS — R09.1 PLEURISY: ICD-10-CM

## 2025-05-23 DIAGNOSIS — J90 RECURRENT PLEURAL EFFUSION ON RIGHT: ICD-10-CM

## 2025-05-23 DIAGNOSIS — I27.20 PULMONARY HYPERTENSION: ICD-10-CM

## 2025-05-23 DIAGNOSIS — J45.20 MILD INTERMITTENT ASTHMA WITHOUT COMPLICATION: Primary | ICD-10-CM

## 2025-05-23 DIAGNOSIS — J86.9 EMPYEMA OF PLEURA: ICD-10-CM

## 2025-05-23 DIAGNOSIS — J30.89 NON-SEASONAL ALLERGIC RHINITIS, UNSPECIFIED TRIGGER: ICD-10-CM

## 2025-05-23 NOTE — PROGRESS NOTES
[facility] OUTPATIENT PROGRESS NOTE    Patient: Carlo Velasco  : 1945  Age: 79 y.o.  Date of Service: May 23, 2025    REASON FOR CLINIC VISIT:  Chief Complaint   Patient presents with    Chronic obstructive pulmonary disease, unspecified COPD type       Subjective:    History of Present Illness:  Carlo Velasco is a 79 y.o. male who presents to the office today to be seen for    Diagnosis Plan   1. Mild intermittent asthma without complication        2. Pleurisy        3. Pulmonary hypertension        4. Recurrent pleural effusion on right        5. Restrictive lung disease        6. S/P thoracostomy tube placement        7. Empyema of pleura        8. Former smoker, stopped smoking many years ago        9. ERNESTO (obstructive sleep apnea)        10. Non-seasonal allergic rhinitis, unspecified trigger        .  Other problems per record.    History:    Patient is a very pleasant elderly  gentleman who was seen in the pulmonary clinic for follow-up visit.  He attended the pulmonary clinic with his wife.    Patient is a former smoker.  His last PFT showed he had moderate restrictive dysfunction and mild asthma or reactive airway disease.  He is currently using Symbicort and albuterol rescue inhaler.  He also had a history of pneumonia and right-sided empyema and needed chest tube placement at that time.  His recent imaging study showed he had a right-sided elevated hemidiaphragm and chronic atelectasis and volume loss on the right but no other acute findings noted.  He has morbid obesity and suspected to have sleep apnea but he does not use any CPAP and not on any oxygen.    He also has nasal allergy and uses allergy medicines from time to time but not regularly.  He has cardiac issues and had a coronary bypass grafting surgery done.  He is following up with Dr. Toribio on he was told he may have some more blockage in the heart and is waiting for further workup.  He is overall stable he did  not lose much weight in the last few years.  He had no recent hospital admissions and ER visit and urgent care denies a new complaint and overall doing very well and stable and at his baseline.  He told me he is up-to-date on his vaccinations.  No medication refill was needed.    PFT done today:  Not done today      Results for orders placed in visit on 04/21/21    Pulmonary Function Test    [facility] - Pulmonary Function Test    [address]  [address]  [phone]    Patient : Carlo Velasco  MRN : [id number]  CSN : [id number]  Pulmonologist : Cruz Atkinson MD  Date : 6/7/2021    ______________________________________________________________________    Interpretation :  1.  Spirometry is consistent with a moderate restrictive ventilatory defect with coexisting mild small airways disease.  2.  There is improvement in spirometry postbronchodilator particularly in small airways function which is now normal but a moderate restrictive ventilatory defect still appears to be present.  3.  Lung volumes confirm a moderate restrictive ventilatory defect.  4.  There is a moderate diffusion impairment which when corrected for alveolar volume is normalized.  5.  When current studies are compared to studies from July 13 of 2017, the patient's current prebronchodilator FVC and FEV1 have dropped significantly compared to previous prebronchodilator studies.  His postbronchodilator FVC and FEV1 have also dropped significantly compared to previous postbronchodilator studies.  There has also been a drop in his total lung capacity compared to previous.  When corrected for alveolar volume there has also been a decrease in his diffusion capacity compared to previous although it remains within normal limits.      Cruz Atkinson MD      Results for orders placed during the hospital encounter of 07/13/17    Full Pulmonary Function Test With Bronchodilator    [facility]  [place] - Pulmonary Function Test    [facility]  [place]  [place]  [address]  [phone]    Patient : Carlo Velasco  MRN : [id number]  [facility] : [id number]  Pulmonologist : Cruz Atkinson MD  Date : 2017    ______________________________________________________________________    Interpretation :  1.  Spirometry is consistent with a mild restrictive ventilatory defect with coexisting mild decrease in the patient's FEF 75%,  2.  There is some improvement in the patient's FEF 75% postbronchodilator but a mild restrictive ventilatory defect still appears to be present.  3.  Lung volumes reveal a low normal total lung capacity with a decrease in vital capacity and expiratory reserve volume, consistent with a borderline restrictive ventilatory defect.  4.  Diffusion capacity is within normal limits.      Cruz Atkinson MD         Bronchodilator therapy: Symbicort and Albuterol rescue inhaler    Smoking Status:   Social History     Tobacco Use   Smoking Status Former    Current packs/day: 0.00    Average packs/day: 3.0 packs/day for 10.0 years (30.0 ttl pk-yrs)    Types: Cigarettes    Start date:     Quit date:     Years since quittin.4    Passive exposure: Past   Smokeless Tobacco Never     Pulm Rehab: no  Sleep: yes, not busing CPAP or oxygen    Support System: lives with their spouse    Code Status:   There are no questions and answers to display.        Review of Systems:  A complete review of systems is performed and all other systems were reviewed and negative as note above in the HPI.  Review of Systems   Constitutional:  Positive for fatigue.   HENT:  Positive for congestion, postnasal drip and sinus pressure.    Eyes: Negative.    Respiratory:  Positive for chest tightness and shortness of breath.    Cardiovascular: Negative.    Gastrointestinal: Negative.    Endocrine: Negative.    Genitourinary: Negative.    Musculoskeletal:  Positive for arthralgias and back pain.   Skin: Negative.     Allergic/Immunologic: Positive for environmental allergies.   Neurological: Negative.    Hematological: Negative.    Psychiatric/Behavioral: Negative.         CAT/ACT Score:  Not done today    Medications:  Outpatient Encounter Medications as of 5/23/2025   Medication Sig Dispense Refill    albuterol sulfate  (90 Base) MCG/ACT inhaler Inhale 2 puffs Every 4 (Four) Hours As Needed for Wheezing. 6.7 g 5    baclofen (LIORESAL) 10 MG tablet Take 1 tablet by mouth Every 12 (Twelve) Hours.      budesonide-formoterol (SYMBICORT) 160-4.5 MCG/ACT inhaler Inhale 2 puffs 2 (Two) Times a Day. 10.2 g 5    Calcium Carbonate 1500 (600 Ca) MG tablet Take 1 tablet by mouth Daily.      Cholecalciferol 125 MCG (5000 UT) tablet Take 1 tablet by mouth Daily.      clopidogrel (PLAVIX) 75 MG tablet TAKE 1 TABLET BY MOUTH DAILY 90 tablet 3    Cyanocobalamin (B-12) 1000 MCG sublingual tablet 1 tablet Daily.      escitalopram (LEXAPRO) 20 MG tablet Take 1 tablet by mouth Daily. 90 tablet 1    ferrous sulfate 325 (65 FE) MG tablet Take 1 tablet by mouth Every Morning.      fluticasone (FLONASE) 50 MCG/ACT nasal spray 2 sprays into the nostril(s) as directed by provider Daily. 16 g 5    furosemide (LASIX) 40 MG tablet Take 1 tablet by mouth Daily As Needed (for any increase in 2 lbs in a daqy or 5 lbs in a week or increassing swelling or congestion). (Patient taking differently: Take 1 tablet by mouth Daily.)      ibuprofen (ADVIL,MOTRIN) 800 MG tablet Take 1 tablet by mouth Every 8 (Eight) Hours As Needed. for pain      isosorbide mononitrate (IMDUR) 60 MG 24 hr tablet Take 1 tablet by mouth Every Morning. 90 tablet 3    ketorolac (TORADOL) 10 MG tablet       latanoprost (XALATAN) 0.005 % ophthalmic solution instill 1 drop in both eyes every night at bedtime      loratadine (CLARITIN) 10 MG tablet TAKE 1 TABLET BY MOUTH ONCE PER DAY 90 tablet 0    meclizine (ANTIVERT) 12.5 MG tablet Take 1 tablet by mouth.      metoprolol tartrate  "(LOPRESSOR) 25 MG tablet TAKE 1 TABLET BY MOUTH DAILY 90 tablet 3    nitroglycerin (NITROSTAT) 0.4 MG SL tablet 1 under the tongue as needed for angina, may repeat q5mins for up three doses 25 tablet 3    Omega-3 Fatty Acids (FISH OIL PO) Take  by mouth Daily.      omeprazole (priLOSEC) 20 MG capsule TAKE 1 CAPSULE BY MOUTH DAILY 90 capsule 3    Potassium Gluconate 2.5 MEQ tablet Take 1 tablet by mouth Daily.      tamsulosin (FLOMAX) 0.4 MG capsule 24 hr capsule Take 1 capsule by mouth Daily.      topiramate (TOPAMAX) 50 MG tablet Take 1 tablet by mouth 2 (Two) Times a Day.      Zinc Sulfate (ZINC-220 PO) Take 1 tablet by mouth Daily.      rosuvastatin (CRESTOR) 20 MG tablet Take 1 tablet by mouth Daily. (Patient not taking: Reported on 5/23/2025) 90 tablet 3     No facility-administered encounter medications on file as of 5/23/2025.       Allergies:  Allergies   Allergen Reactions    Meperidine Other (See Comments) and Unknown - High Severity     HEART STOPS    Statins Myalgia     Causes leg cramps       Immunizations:  Immunization History   Administered Date(s) Administered    ABRYSVO (RSV, 60+ or pregnant women 32-36 wks) 10/07/2023    FLUAD TRI 65YR+ 10/10/2019, 11/07/2024    Fluad Quad 65+ 10/10/2019, 10/25/2022    Fluzone High-Dose 65+YRS 10/11/2018, 10/01/2022    Fluzone High-Dose 65+yrs 10/07/2023    Influenza, Unspecified 10/11/2023    Pneumococcal Conjugate 20-Valent (PCV20) 11/09/2023    TD Preservative Free (Tenivac) 08/17/2018    Td (TDVAX) 10/07/2023    Tdap 09/17/2015, 12/06/2021    influenza Split 10/22/2016       Objective:    Vitals:  /70   Pulse 85   Ht 170.2 cm (67\")   Wt 113 kg (248 lb 9.6 oz)   SpO2 94% Comment: RA  BMI 38.94 kg/m²     Physical Exam:  General: Patient is a 79 y.o. pleasant elderly  male. Looks stated age. Appears to be in no acute distress.  Eyes: EOMI. PERRLA. Vision intact. No scleral icterus.  Ear, Nose, Mouth and Throat: Hearing is grossly intact. No " Leukoplakia, pharyngitis, stomatitis or thrush. Swollen nasal mucosa with post nasal drop.  Neck: Range of motion of neck normal. No thyromegaly or masses. Mallampati Class 3  Respiratory: Clear to auscultation bilaterally. No use of accessory muscles. Decreased breath sounds.  Cardiovascular: Normal heart sounds. Regularly regular rhythm without murmur.  Gastrointestinal: Non tender, non distended, soft. Bowel sounds positive in all four quadrants. No organomegaly.  Skin: No obvious rashes, lesions, ulcers or large amount of bruising. No edema.   Neurological: No new motor deficits. Cranial nerves appear intact.  Psychiatric: Patient is alert and oriented to person, place and time.    Chest Imaging:    Study Result    Narrative & Impression   CT CHEST WO CONTRAST DIAGNOSTIC- 11/19/2024 1:51 PM     HISTORY: COPD; J44.9-Chronic obstructive pulmonary disease, unspecified      COMPARISON: 11/2/2023.     TOTAL DOSE LENGTH PRODUCT: 488.01 mGy.cm. Automated exposure control was  also utilized to decrease patient radiation dose.     TECHNIQUE: Axial images of the chest are performed without IV contrast.     FINDINGS: Left subclavian cardiac pacer device is in place. Median  sternotomy wires findings favoring coronary bypass. Dense diffuse  coronary artery calcifications and/or stents. Stable cardiomegaly. No  pericardial or pleural effusion. Calcified mediastinal and left hilar  lymph nodes. No pathologic intrathoracic or axillary lymphadenopathy.  Chronic elevation right hemidiaphragm.     Limited images of the visible upper abdomen demonstrate no acute  appearing pathology. Second portion duodenal diverticula. Calcified  granuloma seen within the liver and the spleen. Colonic diverticuli of  the splenic flexure.     Few small calcified granuloma. Stable 4 mm right upper lobe pulmonary  nodule with central calcification consistent with granuloma. No  suspicious pulmonary nodule. No consolidation. Mild linear right  basilar  scarring. No pneumothorax. No endobronchial lesion.     Postoperative changes lower cervical spine. Old healed right-sided rib  fractures. Chronic upper thoracic compression deformities.     IMPRESSION:  Stable granuloma of the lung parenchyma with no suspicious pulmonary  nodule. Chronic elevation right hemidiaphragm with right basilar  scarring. Left subclavian cardiac pacer device. Similar cardiomegaly  with evidence of prior mediastinal surgery and coronary bypass.  Similar/chronic compression deformities of the upper thoracic vertebra.     This report was signed and finalized on 11/19/2024 4:57 PM by Dr. Cassie Mckeon MD.       Assessment:  1. Mild intermittent asthma without complication    2. Pleurisy    3. Pulmonary hypertension    4. Recurrent pleural effusion on right    5. Restrictive lung disease    6. S/P thoracostomy tube placement    7. Empyema of pleura    8. Former smoker, stopped smoking many years ago    9. ERNESTO (obstructive sleep apnea)    10. Non-seasonal allergic rhinitis, unspecified trigger        Plan/Recommendations:    I reviewed the CT scan of the chest done in November 2024 and it showed stable findings and a chest x-ray ordered in 6 months time..  He will also need a PFT in 6 months time which has not been done for last few years  For his underlying reactive airway disease and restrictive lung disease he will continue using Symbicort and albuterol rescue as needed.  No prescription refill was needed  He has nasal allergy and he continues on fluticasone nasal spray and loratadine as before.  He will continue follow-up with cardiology for his cardiac issues.  Weight loss and lifestyle discussed with the patient.  Patient had a negative workup for sleep apnea in the past suspected to have sleep apnea due to his body habitus and symptoms.  He did not want to do another sleep study  He is up-to-date on his vaccinations.  Continue follow-up with the primary care provider and return  to pulmonary clinic for follow-up visit in 6 months time or earlier if needed.    Follow up:  6 Months    Time Spent:  30 minutes    I appreciate the opportunity of participating in this patient's care. I would like to thank the PCP for the referral.  Please feel free to contact me with any other questions.    Katelyn Buckley MD   Pulmonologist/Intensivist     Electronically signed by: Katelyn Buckley MD, 5/23/2025 11:46 CDT

## 2025-05-30 ENCOUNTER — ANESTHESIA EVENT (OUTPATIENT)
Dept: GASTROENTEROLOGY | Facility: HOSPITAL | Age: 80
End: 2025-05-30
Payer: MEDICARE

## 2025-05-30 ENCOUNTER — ANESTHESIA (OUTPATIENT)
Dept: GASTROENTEROLOGY | Facility: HOSPITAL | Age: 80
End: 2025-05-30
Payer: MEDICARE

## 2025-05-30 ENCOUNTER — HOSPITAL ENCOUNTER (OUTPATIENT)
Facility: HOSPITAL | Age: 80
Setting detail: HOSPITAL OUTPATIENT SURGERY
Discharge: HOME | End: 2025-05-30
Attending: INTERNAL MEDICINE | Admitting: INTERNAL MEDICINE
Payer: MEDICARE

## 2025-05-30 VITALS
BODY MASS INDEX: 38.92 KG/M2 | HEIGHT: 67 IN | RESPIRATION RATE: 16 BRPM | TEMPERATURE: 97.1 F | DIASTOLIC BLOOD PRESSURE: 76 MMHG | OXYGEN SATURATION: 92 % | HEART RATE: 76 BPM | WEIGHT: 248 LBS | SYSTOLIC BLOOD PRESSURE: 155 MMHG

## 2025-05-30 LAB — GLUCOSE BLDC GLUCOMTR-MCNC: 84 MG/DL (ref 70–130)

## 2025-05-30 PROCEDURE — 82948 REAGENT STRIP/BLOOD GLUCOSE: CPT

## 2025-05-30 PROCEDURE — 25810000003 SODIUM CHLORIDE 0.9 % SOLUTION: Performed by: ANESTHESIOLOGY

## 2025-05-30 PROCEDURE — 43248 EGD GUIDE WIRE INSERTION: CPT | Performed by: INTERNAL MEDICINE

## 2025-05-30 PROCEDURE — 25010000002 LIDOCAINE PF 2% 2 % SOLUTION: Performed by: NURSE ANESTHETIST, CERTIFIED REGISTERED

## 2025-05-30 PROCEDURE — 25010000002 PROPOFOL 10 MG/ML EMULSION: Performed by: NURSE ANESTHETIST, CERTIFIED REGISTERED

## 2025-05-30 RX ORDER — SODIUM CHLORIDE 9 MG/ML
40 INJECTION, SOLUTION INTRAVENOUS AS NEEDED
Status: DISCONTINUED | OUTPATIENT
Start: 2025-05-30 | End: 2025-05-30 | Stop reason: HOSPADM

## 2025-05-30 RX ORDER — SODIUM CHLORIDE 0.9 % (FLUSH) 0.9 %
10 SYRINGE (ML) INJECTION EVERY 12 HOURS SCHEDULED
Status: DISCONTINUED | OUTPATIENT
Start: 2025-05-30 | End: 2025-05-30 | Stop reason: HOSPADM

## 2025-05-30 RX ORDER — SODIUM CHLORIDE 9 MG/ML
500 INJECTION, SOLUTION INTRAVENOUS ONCE
Status: COMPLETED | OUTPATIENT
Start: 2025-05-30 | End: 2025-05-30

## 2025-05-30 RX ORDER — ONDANSETRON 2 MG/ML
4 INJECTION INTRAMUSCULAR; INTRAVENOUS ONCE AS NEEDED
Status: DISCONTINUED | OUTPATIENT
Start: 2025-05-30 | End: 2025-05-30 | Stop reason: HOSPADM

## 2025-05-30 RX ORDER — ASPIRIN 81 MG/1
162 TABLET, CHEWABLE ORAL ONCE
Status: COMPLETED | OUTPATIENT
Start: 2025-05-30 | End: 2025-05-30

## 2025-05-30 RX ORDER — SODIUM CHLORIDE 0.9 % (FLUSH) 0.9 %
10 SYRINGE (ML) INJECTION AS NEEDED
Status: DISCONTINUED | OUTPATIENT
Start: 2025-05-30 | End: 2025-05-30 | Stop reason: HOSPADM

## 2025-05-30 RX ORDER — PROPOFOL 10 MG/ML
VIAL (ML) INTRAVENOUS AS NEEDED
Status: DISCONTINUED | OUTPATIENT
Start: 2025-05-30 | End: 2025-05-30 | Stop reason: SURG

## 2025-05-30 RX ORDER — LIDOCAINE HYDROCHLORIDE 20 MG/ML
INJECTION, SOLUTION EPIDURAL; INFILTRATION; INTRACAUDAL; PERINEURAL AS NEEDED
Status: DISCONTINUED | OUTPATIENT
Start: 2025-05-30 | End: 2025-05-30 | Stop reason: SURG

## 2025-05-30 RX ADMIN — ASPIRIN 162 MG: 81 TABLET, CHEWABLE ORAL at 11:56

## 2025-05-30 RX ADMIN — LIDOCAINE HYDROCHLORIDE 100 MG: 20 INJECTION, SOLUTION EPIDURAL; INFILTRATION; INTRACAUDAL; PERINEURAL at 12:48

## 2025-05-30 RX ADMIN — PROPOFOL 100 MG: 10 INJECTION, EMULSION INTRAVENOUS at 12:51

## 2025-05-30 RX ADMIN — SODIUM CHLORIDE 500 ML: 9 INJECTION, SOLUTION INTRAVENOUS at 12:26

## 2025-05-30 RX ADMIN — PROPOFOL 100 MG: 10 INJECTION, EMULSION INTRAVENOUS at 12:48

## 2025-05-30 NOTE — ANESTHESIA POSTPROCEDURE EVALUATION
"Patient: Carlo Velasco    Procedure Summary       Date: 05/30/25 Room / Location: [facility] ENDOSCOPY 4 / [facility] ENDOSCOPY    Anesthesia Start: 1237 Anesthesia Stop: 1259    Procedure: ESOPHAGOGASTRODUODENOSCOPY WITH ANESTHESIA Diagnosis:       Pharyngoesophageal dysphagia      (Pharyngoesophageal dysphagia [R13.14])    Surgeons: Juan F Tapia MD Provider: Johnny Avalos CRNA    Anesthesia Type: MAC ASA Status: 3            Anesthesia Type: MAC    Vitals  Vitals Value Taken Time   /83 05/30/25 13:21   Temp     Pulse 69 05/30/25 13:25   Resp 16 05/30/25 12:56   SpO2 96 % 05/30/25 13:25   Vitals shown include unfiled device data.        Post Anesthesia Care and Evaluation    Patient location during evaluation: PACU  Patient participation: complete - patient participated  Level of consciousness: awake and alert  Pain management: adequate    Airway patency: patent  Anesthetic complications: No anesthetic complications    Cardiovascular status: acceptable  Respiratory status: acceptable  Hydration status: acceptable    Comments: Blood pressure 155/76, pulse 76, temperature 97.1 °F (36.2 °C), temperature source Temporal, resp. rate 16, height 170.2 cm (67\"), weight 112 kg (248 lb), SpO2 92%.    Pt discharged from PACU based on john score >8    "

## 2025-05-30 NOTE — H&P
[facility] Gastroenterology  Pre Procedure History & Physical    Chief Complaint:   Dysphagia    Subjective     HPI:   The patient complained of dysphagia    Past Medical History:   Past Medical History:   Diagnosis Date    Anxiety     Arthritis     Broken rib     Cancer     melanoma    Chronic diastolic congestive heart failure 04/15/2022    Claustrophobia     Colon polyp     COPD (chronic obstructive pulmonary disease)     Coronary artery disease involving autologous artery coronary bypass graft without angina pectoris 09/22/2016    Depression     Disease of thyroid gland     partial thyroidectomy on right    GERD (gastroesophageal reflux disease)     Hearing loss     Heart attack     History of transfusion     Hypertension     Kidney stone     history of     Low back pain     Mild intermittent asthma without complication 08/24/2021    Open angle with borderline findings, low risk, bilateral 08/23/2021    Pain management     STATES JUST HAD INJECTION IN BACK ON 06/20/2023    Presence of cardiac pacemaker 04/15/2022    Primary hypertension 09/22/2016    Pulmonary hypertension 04/15/2022    S/P CABG x 2 01/04/2019    LIMA to LAD and SVG to RCA 8/8/12    Sick sinus syndrome 07/23/2020    Stented coronary artery 01/04/2019    2.5 x 18 mm Xience Alpine drug-eluting stent to the mid LAD 7/8/2015, 2.5 x 18 mm Xience drug-eluting stent to the proximal OM1 10/21/2016 and 2.5 x 12 mm Synergy drug-eluting stent to OM1 for severe in-stent restenosis 4/28/2020    Tobacco abuse, in remission     Uses hearing aid     BILATERAL       Past Surgical History:  Past Surgical History:   Procedure Laterality Date    BACK SURGERY      CERVICAL FUSION AND LUMBAR DISC REPLACEMENT    BRONCHOSCOPY N/A 04/27/2021    Procedure: BRONCHOSCOPY;  Surgeon: Rico Jacobs MD;  Location: [facility];  Service: Cardiothoracic;  Laterality: N/A;    CARDIAC CATHETERIZATION  07/2015    SHAHRZAD mid LAD, Dr. Toribio    CARDIAC CATHETERIZATION N/A 10/21/2016     Procedure: Left Heart Cath;  Surgeon: Darian Toribio MD;  Location:  [facility];  Service:     CARDIAC CATHETERIZATION Left 04/05/2017    Procedure: Cardiac Catheterization/Vascular Study;  Surgeon: Darian Toribio MD;  Location:  [facility];  Service:     CARDIAC CATHETERIZATION N/A 04/05/2017    Procedure: Left Heart Cath;  Surgeon: Darian Toribio MD;  Location:  [facility];  Service:     CARDIAC CATHETERIZATION N/A 04/05/2017    Procedure: Coronary angiography;  Surgeon: Darian Toribio MD;  Location:  [facility];  Service:     CARDIAC CATHETERIZATION N/A 04/05/2017    Procedure: Left ventriculography;  Surgeon: Darian Toribio MD;  Location:  [facility];  Service:     CARDIAC CATHETERIZATION  04/05/2017    Procedure: Saphenous Vein Graft;  Surgeon: Darian Toribio MD;  Location:  [facility];  Service:     CARDIAC CATHETERIZATION Left 02/27/2018    Procedure: Cardiac Catheterization/Vascular Study SHAHRZAD OK PRN;  Surgeon: Darian Toribio MD;  Location:  [facility];  Service:     CARDIAC CATHETERIZATION  02/27/2018    Procedure: Functional Flow [place];  Surgeon: Darian Toribio MD;  Location:  [facility];  Service:     CARDIAC CATHETERIZATION N/A 02/27/2018    Procedure: Left ventriculography;  Surgeon: Darian Toribio MD;  Location:  [facility];  Service:     CARDIAC CATHETERIZATION Left 04/28/2020    Procedure: Cardiac Catheterization/Vascular Study SHAHRZAD OK PRN;  Surgeon: Darian Toribio MD;  Location:  [facility];  Service: Cardiology;  Laterality: Left;    CARDIAC ELECTROPHYSIOLOGY PROCEDURE N/A 01/11/2018    Procedure: PPM generator change - dual;  Surgeon: Darian Toribio MD;  Location:  [facility];  Service:     CARDIAC SURGERY      open heart surgery x2    COLONOSCOPY  09/05/2007    colon polyps    COLONOSCOPY N/A 05/29/2018    Procedure:  COLONOSCOPY WITH ANESTHESIA;  Surgeon: Juan F Tapia MD;  Location: [facility] ENDOSCOPY;  Service: Gastroenterology    COLONOSCOPY N/A 10/21/2021    Procedure: COLONOSCOPY WITH ANESTHESIA;  Surgeon: Juan F Tapia MD;  Location: [facility] ENDOSCOPY;  Service: Gastroenterology;  Laterality: N/A;  pre op: hx polyps  post op;polyp,   PCP: Rhys Rosen MD    CORONARY ANGIOPLASTY WITH STENT PLACEMENT  07/2015    SHAHRZAD mid LAD, Dr. Toribio     CORONARY ARTERY BYPASS GRAFT  2012 AND 2014    2 vessel     ENDOSCOPY  11/08/2010    ENDOSCOPY N/A 05/29/2018    Procedure: ESOPHAGOGASTRODUODENOSCOPY WITH ANESTHESIA;  Surgeon: Juan F Tapia MD;  Location: [facility] ENDOSCOPY;  Service: Gastroenterology    HEAD/NECK LESION/CYST EXCISION Left 12/20/2016    Procedure: EXCISION MALIGNANT MELANOMA WITH SENTINEL NODE BIOPSY, INJECTION AND SCAN PRE-OP, LEFT EAR;  Surgeon: Guanaco Zepeda MD;  Location: [facility] OR;  Service:     HEMORRHOIDECTOMY      HERNIA REPAIR      INSERT / REPLACE / REMOVE PACEMAKER      REPLACEMENT TOTAL KNEE Left     BRIAN-STOPPA INCISIONAL HERNIA REPAIR Left 6/28/2023    Procedure: BRIAN-STOPPA INCISIONAL HERNIA REPAIR left lumbar hernia repair with mesh;  Surgeon: Govind Celeste MD;  Location: [facility] OR;  Service: General;  Laterality: Left;    SENTINEL NODE BIOPSY Left 12/20/2016    Procedure: SENTINEL NODE BIOPSY;  Surgeon: Guanaco Zepeda MD;  Location: [facility] OR;  Service:     SKIN CANCER EXCISION      THORACOSCOPY Right 04/27/2021    Procedure: THORACOSCOPIC TOTAL DECORTICATION;  Surgeon: Rico Jacobs MD;  Location: [facility] OR;  Service: Cardiothoracic;  Laterality: Right;    THYROID SURGERY      THYROIDECTOMY         Family History:  Family History   Problem Relation Age of Onset    Heart failure Mother     Stroke Mother     Dementia Mother     Coronary artery disease Father     Colon polyps Father     COPD Brother     Colon cancer Neg Hx        Social History:   reports that he quit smoking  about 48 years ago. His smoking use included cigarettes. He started smoking about 58 years ago. He has a 30 pack-year smoking history. He has been exposed to tobacco smoke. He has never used smokeless tobacco. He reports current alcohol use. He reports that he does not use drugs.    Medications:   Prior to Admission medications    Medication Sig Start Date End Date Taking? Authorizing Provider   albuterol sulfate  (90 Base) MCG/ACT inhaler Inhale 2 puffs Every 4 (Four) Hours As Needed for Wheezing. 5/9/24  Yes Katelyn Buckley MD   baclofen (LIORESAL) 10 MG tablet Take 1 tablet by mouth Every 12 (Twelve) Hours. 12/17/24  Yes Loyda Arzola MD   budesonide-formoterol (SYMBICORT) 160-4.5 MCG/ACT inhaler Inhale 2 puffs 2 (Two) Times a Day. 5/9/24  Yes Katelyn Buckley MD   Calcium Carbonate 1500 (600 Ca) MG tablet Take 1 tablet by mouth Daily.   Yes Loyda Arzola MD   Cholecalciferol 125 MCG (5000 UT) tablet Take 1 tablet by mouth Daily.   Yes Loyda Arzola MD   Cyanocobalamin (B-12) 1000 MCG sublingual tablet 1 tablet Daily. 6/2/22  Yes Loyda Arzola MD   escitalopram (LEXAPRO) 20 MG tablet Take 1 tablet by mouth Daily. 2/20/22  Yes Juan Bee MD   ferrous sulfate 325 (65 FE) MG tablet Take 1 tablet by mouth Every Morning.   Yes Loyda Arzola MD   fluticasone (FLONASE) 50 MCG/ACT nasal spray 2 sprays into the nostril(s) as directed by provider Daily. 5/9/24  Yes Katelyn Buckley MD   furosemide (LASIX) 40 MG tablet Take 1 tablet by mouth Daily As Needed (for any increase in 2 lbs in a daqy or 5 lbs in a week or increassing swelling or congestion).  Patient taking differently: Take 1 tablet by mouth Daily. 2/20/22  Yes Juan Bee MD   isosorbide mononitrate (IMDUR) 60 MG 24 hr tablet Take 1 tablet by mouth Every Morning. 2/17/25  Yes Darian Toribio MD   latanoprost (XALATAN) 0.005 % ophthalmic solution instill 1 drop in both eyes every night  "at bedtime 12/27/24  Yes Loyda Arzola MD   loratadine (CLARITIN) 10 MG tablet TAKE 1 TABLET BY MOUTH ONCE PER DAY 5/2/25  Yes Dianne Guzmán APRN   metoprolol tartrate (LOPRESSOR) 25 MG tablet TAKE 1 TABLET BY MOUTH DAILY 10/22/24  Yes Julita Quezada APRN   Omega-3 Fatty Acids (FISH OIL PO) Take  by mouth Daily.   Yes Loyda Arzola MD   Potassium Gluconate 2.5 MEQ tablet Take 1 tablet by mouth Daily.   Yes Loyda Arzola MD   tamsulosin (FLOMAX) 0.4 MG capsule 24 hr capsule Take 1 capsule by mouth Daily. 5/10/22  Yes Loyda Arzola MD   topiramate (TOPAMAX) 50 MG tablet Take 1 tablet by mouth 2 (Two) Times a Day.   Yes Loyda Arzola MD   Zinc Sulfate (ZINC-220 PO) Take 1 tablet by mouth Daily.   Yes Loyda Arzola MD   clopidogrel (PLAVIX) 75 MG tablet TAKE 1 TABLET BY MOUTH DAILY 11/5/24   Darian Toribio MD   ibuprofen (ADVIL,MOTRIN) 800 MG tablet Take 1 tablet by mouth Every 8 (Eight) Hours As Needed. for pain 10/24/23   Loyda Arzola MD   ketorolac (TORADOL) 10 MG tablet  3/26/25   Loyda Arzola MD   meclizine (ANTIVERT) 12.5 MG tablet Take 1 tablet by mouth. 4/1/24   Loyda Arzola MD   nitroglycerin (NITROSTAT) 0.4 MG SL tablet 1 under the tongue as needed for angina, may repeat q5mins for up three doses 1/7/25   Darian Toribio MD   omeprazole (priLOSEC) 20 MG capsule TAKE 1 CAPSULE BY MOUTH DAILY 11/14/22   Rhys Rosen MD   rosuvastatin (CRESTOR) 20 MG tablet Take 1 tablet by mouth Daily.  Patient not taking: Reported on 5/23/2025 1/7/25 5/30/25  Darian Toribio MD       Allergies:  Meperidine and Statins    ROS:    General: Weight stable  Resp: No SOA  Cardiovascular: No CP    Objective     Blood pressure 133/92, pulse 76, temperature 97.1 °F (36.2 °C), temperature source Temporal, resp. rate 20, height 170.2 cm (67\"), weight 112 kg (248 lb), SpO2 97%.    Physical Exam   Constitutional: Pt is oriented to person, place, and in no " distress.   Cardiovascular: Normal rate, regular rhythm.    Pulmonary/Chest: Effort normal. No respiratory distress.   Abdominal: Non-distended  Psychiatric: Mood, memory, affect and judgment appear normal.     Assessment & Plan     Diagnosis:  Dysphagia    Anticipated Surgical Procedure:  Endoscopy    The risks, benefits, and alternatives of this procedure have been discussed with the patient or the responsible party- the patient understands and agrees to proceed.      EMR Dragon/transcription disclaimer:  Much of this encounter note is electronic transcription/translation of spoken language to printed text.  The electronic translation of spoken language may be erroneous, or at times, nonsensical words or phrases may be inadvertently transcribed.  Although I have reviewed the note for such errors, some may still exist.

## 2025-05-30 NOTE — ANESTHESIA PREPROCEDURE EVALUATION
Anesthesia Evaluation     Patient summary reviewed   no history of anesthetic complications:   NPO Solid Status: > 8 hours             Airway   Mallampati: II  Dental    (+) upper dentures    Pulmonary    (+) COPD,  Cardiovascular   Exercise tolerance: good (4-7 METS)    (+) pacemaker ([employer]) pacemaker, hypertension, CAD, CABG (2013), cardiac stents (2016) , angina (CHRONIC STABLE OVER YEARS), CHF Systolic <55%, hyperlipidemia      Neuro/Psych- negative ROS  GI/Hepatic/Renal/Endo    (+) obesity, GERD    Musculoskeletal     Abdominal    Substance History      OB/GYN          Other                      Anesthesia Plan    ASA 3     MAC     (Pt cleared for procedure by cards and to stop plavix.  Will give asa)  intravenous induction     Anesthetic plan, risks, benefits, and alternatives have been provided, discussed and informed consent has been obtained with: patient.    CODE STATUS:

## 2025-06-02 ENCOUNTER — TELEPHONE (OUTPATIENT)
Dept: CARDIOLOGY | Facility: CLINIC | Age: 80
End: 2025-06-02

## 2025-06-02 NOTE — TELEPHONE ENCOUNTER
Patient and spouse requesting heart cath prior to office visit 6/27/25. Patient has already had upper GI.

## 2025-06-02 NOTE — TELEPHONE ENCOUNTER
Caller: Amaya Velasco    Relationship: Emergency Contact    Best call back number: [phone]     What orders are you requesting (i.e. lab or imaging): CATH    In what timeframe would the patient need to come in: ASAP    Additional notes: PATIENT HAD A STRESS TEST PERFORMED. HE WAS TOLD A CATH NEEDED TO BE PERFORMED BY STAFF AT THE PULMONOLOGIST'S OFFICE AND THE DOCTOR WHO PERFORMED THE UPPER GI. THE PATIENT DID NOT EVER RECEIVE THE STRESS TEST RESULTS FROM THE CARDIOLOGY OFFICE DESPITE THE STRESS TEST BEING PERFORMED ON 03.24.25. THE PATIENT IS NOW DEPENDING ON NITRO QUITE A BIT. THE PATIENT WANTS TO PROCEED WITH THE CATH ASAP BEFORE AN EMERGENY SITUATION ARISES.

## 2025-06-03 ENCOUNTER — HOSPITAL ENCOUNTER (OUTPATIENT)
Dept: PAIN MANAGEMENT | Age: 80
Discharge: HOME | End: 2025-06-03
Payer: MEDICARE

## 2025-06-03 VITALS
OXYGEN SATURATION: 96 % | DIASTOLIC BLOOD PRESSURE: 66 MMHG | RESPIRATION RATE: 16 BRPM | SYSTOLIC BLOOD PRESSURE: 126 MMHG | HEART RATE: 61 BPM | TEMPERATURE: 96.9 F

## 2025-06-03 DIAGNOSIS — R52 PAIN MANAGEMENT: ICD-10-CM

## 2025-06-03 PROCEDURE — 62323 NJX INTERLAMINAR LMBR/SAC: CPT

## 2025-06-03 PROCEDURE — 6360000002 HC RX W HCPCS

## 2025-06-03 PROCEDURE — 2580000003 HC RX 258

## 2025-06-03 RX ORDER — LIDOCAINE HYDROCHLORIDE 10 MG/ML
5 INJECTION, SOLUTION EPIDURAL; INFILTRATION; INTRACAUDAL; PERINEURAL ONCE
Status: DISCONTINUED | OUTPATIENT
Start: 2025-06-03 | End: 2025-06-05 | Stop reason: HOSPADM

## 2025-06-03 RX ORDER — TRIAMCINOLONE ACETONIDE 40 MG/ML
80 INJECTION, SUSPENSION INTRA-ARTICULAR; INTRAMUSCULAR ONCE
Status: DISCONTINUED | OUTPATIENT
Start: 2025-06-03 | End: 2025-06-05 | Stop reason: HOSPADM

## 2025-06-03 RX ORDER — SODIUM CHLORIDE 9 MG/ML
5 INJECTION, SOLUTION INTRAMUSCULAR; INTRAVENOUS; SUBCUTANEOUS ONCE
Status: DISCONTINUED | OUTPATIENT
Start: 2025-06-03 | End: 2025-06-05 | Stop reason: HOSPADM

## 2025-06-03 NOTE — INTERVAL H&P NOTE
Update History & Physical    The patient's History and Physical  was reviewed with the patient and I examined the patient. There was no change. The surgical site was confirmed by the patient and me.     Plan: The risks, benefits, expected outcome, and alternative to the recommended procedure have been discussed with the patient. Patient understands and wants to proceed with the procedure.     Electronically signed by Rafita Arredondo MD on 6/3/2025 at 3:06 PM

## 2025-06-04 ENCOUNTER — OFFICE VISIT (OUTPATIENT)
Dept: CARDIOLOGY | Facility: CLINIC | Age: 80
End: 2025-06-04
Payer: MEDICARE

## 2025-06-04 VITALS
SYSTOLIC BLOOD PRESSURE: 136 MMHG | DIASTOLIC BLOOD PRESSURE: 76 MMHG | WEIGHT: 249 LBS | HEART RATE: 69 BPM | HEIGHT: 68 IN | BODY MASS INDEX: 37.74 KG/M2

## 2025-06-04 DIAGNOSIS — I25.728 CORONARY ARTERY DISEASE OF AUTOLOGOUS BYPASS GRAFT WITH STABLE ANGINA PECTORIS: ICD-10-CM

## 2025-06-04 DIAGNOSIS — Z95.5 STENTED CORONARY ARTERY: ICD-10-CM

## 2025-06-04 DIAGNOSIS — E78.5 HYPERLIPIDEMIA LDL GOAL <70: Primary | ICD-10-CM

## 2025-06-04 DIAGNOSIS — I51.7 LVH (LEFT VENTRICULAR HYPERTROPHY): ICD-10-CM

## 2025-06-04 DIAGNOSIS — Z95.1 S/P CABG X 2: ICD-10-CM

## 2025-06-04 DIAGNOSIS — R07.9 CHEST PAIN IN ADULT: ICD-10-CM

## 2025-06-04 PROCEDURE — 3075F SYST BP GE 130 - 139MM HG: CPT | Performed by: INTERNAL MEDICINE

## 2025-06-04 PROCEDURE — 1160F RVW MEDS BY RX/DR IN RCRD: CPT | Performed by: INTERNAL MEDICINE

## 2025-06-04 PROCEDURE — 99214 OFFICE O/P EST MOD 30 MIN: CPT | Performed by: INTERNAL MEDICINE

## 2025-06-04 PROCEDURE — 1159F MED LIST DOCD IN RCRD: CPT | Performed by: INTERNAL MEDICINE

## 2025-06-04 PROCEDURE — 3078F DIAST BP <80 MM HG: CPT | Performed by: INTERNAL MEDICINE

## 2025-06-04 RX ORDER — SODIUM CHLORIDE 9 MG/ML
40 INJECTION, SOLUTION INTRAVENOUS AS NEEDED
Status: CANCELLED | OUTPATIENT
Start: 2025-06-04

## 2025-06-04 RX ORDER — ISOSORBIDE MONONITRATE 120 MG/1
120 TABLET, EXTENDED RELEASE ORAL EVERY MORNING
Qty: 90 TABLET | Refills: 3 | Status: SHIPPED | OUTPATIENT
Start: 2025-06-04

## 2025-06-04 RX ORDER — SODIUM CHLORIDE 0.9 % (FLUSH) 0.9 %
10 SYRINGE (ML) INJECTION AS NEEDED
Status: CANCELLED | OUTPATIENT
Start: 2025-06-04

## 2025-06-04 RX ORDER — SODIUM CHLORIDE 0.9 % (FLUSH) 0.9 %
3 SYRINGE (ML) INJECTION EVERY 12 HOURS SCHEDULED
Status: CANCELLED | OUTPATIENT
Start: 2025-06-04

## 2025-06-04 NOTE — H&P (VIEW-ONLY)
Carlo Velasco  [id number]  1945  79 y.o.  male    Referring Provider: Cruz Chaudhari MD    Reason for Follow-up Visit:      Here for routine follow up   coronary artery disease Coronary artery bypass grafting , stented coronary artery   sick sinus syndrome s/p pacemaker   Cardiac workup test results as below: myocardial perfusion scan    Had spine injection yesterday Dr Holt     Subjective     Chest pain both with exertion as well as at rest.  Feels episodes of chest pain occur more often with exertion  Moderate substernal,   Pressure like   Chest pain non pleuritic  Chest pain non positional and non gustatory   Lasts less than 5 minutes  S/L NTG PRN for chest pain, call me or go to ER if has to use S/L nitroglycerin   Started more than 5 months ago  Occurs once or twice a week on the average but can be variable in frequency  No associated diaphoresis  No associated nausea  No radiation  Relieved with rest or spontaneously  Not positional  No change with intake of food or antacids  No change with breathing  Mild to moderate associated dyspnea    No similar chest pain episodes in the past     History of present illness:  Carlo Velasco is a 79 y.o. yo male with history of coronary artery disease Coronary artery bypass grafting , stented coronary artery sick sinus syndrome s/p pacemaker who presents today for   Chief Complaint   Patient presents with    Congestive Heart Failure     6 week follow up - to talk about a heart cath    .    History  Past Medical History:   Diagnosis Date    Anxiety     Arthritis     Broken rib     Cancer     melanoma    Chronic diastolic congestive heart failure 04/15/2022    Claustrophobia     Colon polyp     COPD (chronic obstructive pulmonary disease)     Coronary artery disease involving autologous artery coronary bypass graft without angina pectoris 09/22/2016    Depression     Disease of thyroid gland     partial thyroidectomy on right    GERD (gastroesophageal reflux  disease)     Hearing loss     Heart attack     History of transfusion     Hypertension     Kidney stone     history of     Low back pain     Mild intermittent asthma without complication 08/24/2021    Open angle with borderline findings, low risk, bilateral 08/23/2021    Pain management     STATES JUST HAD INJECTION IN BACK ON 06/20/2023    Presence of cardiac pacemaker 04/15/2022    Primary hypertension 09/22/2016    Pulmonary hypertension 04/15/2022    S/P CABG x 2 01/04/2019    LIMA to LAD and SVG to RCA 8/8/12    Sick sinus syndrome 07/23/2020    Stented coronary artery 01/04/2019    2.5 x 18 mm Xience Alpine drug-eluting stent to the mid LAD 7/8/2015, 2.5 x 18 mm Xience drug-eluting stent to the proximal OM1 10/21/2016 and 2.5 x 12 mm Synergy drug-eluting stent to OM1 for severe in-stent restenosis 4/28/2020    Tobacco abuse, in remission     Uses hearing aid     BILATERAL   ,   Past Surgical History:   Procedure Laterality Date    BACK SURGERY      CERVICAL FUSION AND LUMBAR DISC REPLACEMENT    BRONCHOSCOPY N/A 04/27/2021    Procedure: BRONCHOSCOPY;  Surgeon: Rico Jacobs MD;  Location:  [facility] OR;  Service: Cardiothoracic;  Laterality: N/A;    CARDIAC CATHETERIZATION  07/2015    SHAHRZAD mid LAD, Dr. Toribio    CARDIAC CATHETERIZATION N/A 10/21/2016    Procedure: Left Heart Cath;  Surgeon: Darian Toribio MD;  Location:  [facility] CATH INVASIVE LOCATION;  Service:     CARDIAC CATHETERIZATION Left 04/05/2017    Procedure: Cardiac Catheterization/Vascular Study;  Surgeon: Darian Toribio MD;  Location:  [facility];  Service:     CARDIAC CATHETERIZATION N/A 04/05/2017    Procedure: Left Heart Cath;  Surgeon: Darian Toribio MD;  Location:  [facility];  Service:     CARDIAC CATHETERIZATION N/A 04/05/2017    Procedure: Coronary angiography;  Surgeon: Darian Toribio MD;  Location:  [facility];  Service:     CARDIAC CATHETERIZATION N/A 04/05/2017    Procedure: Left  ventriculography;  Surgeon: Darian Toribio MD;  Location:  [facility];  Service:     CARDIAC CATHETERIZATION  04/05/2017    Procedure: Saphenous Vein Graft;  Surgeon: Darian Toribio MD;  Location:  [facility];  Service:     CARDIAC CATHETERIZATION Left 02/27/2018    Procedure: Cardiac Catheterization/Vascular Study SHAHRZAD OK PRN;  Surgeon: Darian Toribio MD;  Location:  [facility];  Service:     CARDIAC CATHETERIZATION  02/27/2018    Procedure: Functional Flow Peggs;  Surgeon: Darian Toribio MD;  Location:  [facility];  Service:     CARDIAC CATHETERIZATION N/A 02/27/2018    Procedure: Left ventriculography;  Surgeon: Darian Toribio MD;  Location: [facility] CATH INVASIVE LOCATION;  Service:     CARDIAC CATHETERIZATION Left 04/28/2020    Procedure: Cardiac Catheterization/Vascular Study SHAHRZAD OK PRN;  Surgeon: Darian Toribio MD;  Location: [facility] CATH INVASIVE LOCATION;  Service: Cardiology;  Laterality: Left;    CARDIAC ELECTROPHYSIOLOGY PROCEDURE N/A 01/11/2018    Procedure: PPM generator change - dual;  Surgeon: Darian Toribio MD;  Location: [facility] CATH INVASIVE LOCATION;  Service:     CARDIAC SURGERY      open heart surgery x2    COLONOSCOPY  09/05/2007    colon polyps    COLONOSCOPY N/A 05/29/2018    Procedure: COLONOSCOPY WITH ANESTHESIA;  Surgeon: Juan F Tapia MD;  Location: [facility] ENDOSCOPY;  Service: Gastroenterology    COLONOSCOPY N/A 10/21/2021    Procedure: COLONOSCOPY WITH ANESTHESIA;  Surgeon: Juan F Tapia MD;  Location: [facility] ENDOSCOPY;  Service: Gastroenterology;  Laterality: N/A;  pre op: hx polyps  post op;polyp,   PCP: Rhys Rosen MD    CORONARY ANGIOPLASTY WITH STENT PLACEMENT  07/2015    SHAHRZAD mid LAD, Dr. Toribio     CORONARY ARTERY BYPASS GRAFT  2012 AND 2014    2 vessel     ENDOSCOPY  11/08/2010    ENDOSCOPY N/A 05/29/2018    Procedure: ESOPHAGOGASTRODUODENOSCOPY WITH ANESTHESIA;  Surgeon: Juan F Tpaia MD;  Location:   [facility];  Service: Gastroenterology    ENDOSCOPY N/A 2025    Procedure: ESOPHAGOGASTRODUODENOSCOPY WITH ANESTHESIA;  Surgeon: Juan F Tapia MD;  Location:  [facility];  Service: Gastroenterology;  Laterality: N/A;  pre: dysphagia  post: esophagus dilated    HEAD/NECK LESION/CYST EXCISION Left 2016    Procedure: EXCISION MALIGNANT MELANOMA WITH SENTINEL NODE BIOPSY, INJECTION AND SCAN PRE-OP, LEFT EAR;  Surgeon: Guanaco Zepeda MD;  Location:  [facility] OR;  Service:     HEMORRHOIDECTOMY      HERNIA REPAIR      INSERT / REPLACE / REMOVE PACEMAKER      REPLACEMENT TOTAL KNEE Left     BRIAN-STOPPA INCISIONAL HERNIA REPAIR Left 2023    Procedure: BRIAN-STOPPA INCISIONAL HERNIA REPAIR left lumbar hernia repair with mesh;  Surgeon: Govind Celeste MD;  Location:  [facility] OR;  Service: General;  Laterality: Left;    SENTINEL NODE BIOPSY Left 2016    Procedure: SENTINEL NODE BIOPSY;  Surgeon: Guanaco Zepeda MD;  Location: [facility] OR;  Service:     SKIN CANCER EXCISION      THORACOSCOPY Right 2021    Procedure: THORACOSCOPIC TOTAL DECORTICATION;  Surgeon: Rico Jacobs MD;  Location:  [facility] OR;  Service: Cardiothoracic;  Laterality: Right;    THYROID SURGERY      THYROIDECTOMY     ,   Family History   Problem Relation Age of Onset    Heart failure Mother     Stroke Mother     Dementia Mother     Coronary artery disease Father     Colon polyps Father     COPD Brother     Colon cancer Neg Hx    ,   Social History     Tobacco Use    Smoking status: Former     Current packs/day: 0.00     Average packs/day: 3.0 packs/day for 10.0 years (30.0 ttl pk-yrs)     Types: Cigarettes     Start date:      Quit date:      Years since quittin.4     Passive exposure: Past    Smokeless tobacco: Never   Vaping Use    Vaping status: Never Used   Substance Use Topics    Alcohol use: Yes     Comment: rare    Drug use: No   ,     Medications  Current Outpatient Medications   Medication Sig  Dispense Refill    albuterol sulfate  (90 Base) MCG/ACT inhaler Inhale 2 puffs Every 4 (Four) Hours As Needed for Wheezing. 6.7 g 5    baclofen (LIORESAL) 10 MG tablet Take 1 tablet by mouth Every 12 (Twelve) Hours.      budesonide-formoterol (SYMBICORT) 160-4.5 MCG/ACT inhaler Inhale 2 puffs 2 (Two) Times a Day. 10.2 g 5    Calcium Carbonate 1500 (600 Ca) MG tablet Take 1 tablet by mouth Daily.      Cholecalciferol 125 MCG (5000 UT) tablet Take 1 tablet by mouth Daily.      clopidogrel (PLAVIX) 75 MG tablet TAKE 1 TABLET BY MOUTH DAILY 90 tablet 3    Cyanocobalamin (B-12) 1000 MCG sublingual tablet 1 tablet Daily.      escitalopram (LEXAPRO) 20 MG tablet Take 1 tablet by mouth Daily. 90 tablet 1    ferrous sulfate 325 (65 FE) MG tablet Take 1 tablet by mouth Every Morning.      fluticasone (FLONASE) 50 MCG/ACT nasal spray 2 sprays into the nostril(s) as directed by provider Daily. 16 g 5    furosemide (LASIX) 40 MG tablet Take 1 tablet by mouth Daily As Needed (for any increase in 2 lbs in a daqy or 5 lbs in a week or increassing swelling or congestion). (Patient taking differently: Take 1 tablet by mouth Daily.)      ibuprofen (ADVIL,MOTRIN) 800 MG tablet Take 1 tablet by mouth Every 8 (Eight) Hours As Needed. for pain      isosorbide mononitrate (IMDUR) 120 MG 24 hr tablet Take 1 tablet by mouth Every Morning. 90 tablet 3    ketorolac (TORADOL) 10 MG tablet       latanoprost (XALATAN) 0.005 % ophthalmic solution instill 1 drop in both eyes every night at bedtime      loratadine (CLARITIN) 10 MG tablet TAKE 1 TABLET BY MOUTH ONCE PER DAY 90 tablet 0    meclizine (ANTIVERT) 12.5 MG tablet Take 1 tablet by mouth.      metoprolol tartrate (LOPRESSOR) 25 MG tablet TAKE 1 TABLET BY MOUTH DAILY 90 tablet 3    nitroglycerin (NITROSTAT) 0.4 MG SL tablet 1 under the tongue as needed for angina, may repeat q5mins for up three doses 25 tablet 3    Omega-3 Fatty Acids (FISH OIL PO) Take  by mouth Daily.      omeprazole  "(priLOSEC) 20 MG capsule TAKE 1 CAPSULE BY MOUTH DAILY 90 capsule 3    Potassium Gluconate 2.5 MEQ tablet Take 1 tablet by mouth Daily.      tamsulosin (FLOMAX) 0.4 MG capsule 24 hr capsule Take 1 capsule by mouth Daily.      topiramate (TOPAMAX) 50 MG tablet Take 1 tablet by mouth 2 (Two) Times a Day.      Zinc Sulfate (ZINC-220 PO) Take 1 tablet by mouth Daily.       No current facility-administered medications for this visit.       Allergies:  Meperidine and Statins    Review of Systems  Review of Systems   Constitutional: Positive for malaise/fatigue.   HENT: Negative.     Eyes: Negative.    Cardiovascular:  Positive for chest pain, dyspnea on exertion and leg swelling. Negative for claudication, cyanosis, irregular heartbeat, near-syncope, orthopnea, palpitations, paroxysmal nocturnal dyspnea and syncope.   Respiratory:  Positive for cough and shortness of breath.    Endocrine: Negative.    Hematologic/Lymphatic: Negative.    Skin: Negative.    Musculoskeletal:  Positive for arthritis and back pain.   Gastrointestinal:  Negative for anorexia.   Genitourinary: Negative.    Neurological:  Negative for weakness.   Psychiatric/Behavioral: Negative.          Objective     Physical Exam:  /76   Pulse 69   Ht 172.7 cm (68\")   Wt 113 kg (249 lb)   BMI 37.86 kg/m²   Physical Exam   Constitutional: He appears well-developed.   HENT:   Head: Normocephalic.   Neck: Normal carotid pulses and no JVD present. No tracheal tenderness present. Carotid bruit is not present. No tracheal deviation present.   Cardiovascular: Regular rhythm and normal pulses.   Murmur heard.  Systolic murmur is present with a grade of 2/6.  Pulmonary/Chest: Effort normal. No stridor.   Abdominal: Soft. He exhibits no distension. There is no abdominal tenderness.   Musculoskeletal:      Right lower le+ Pitting Edema present.      Left lower le+ Pitting Edema present.   Neurological: He is alert. No cranial nerve deficit or sensory " deficit.   Skin: Skin is warm.   Psychiatric: His speech is normal and behavior is normal.       Results Review:    Regadenoson Stress Test with Myocardial Perfusion SPECT (Multi Study)    Accession Number: [id number]   Date of Study: 1/10/25   Ordering Provider: Darian Toribio MD   Clinical Indications: Chest Pain, Known Coronary Artery Disease        Reading Physicians  Performing Staff   ECG Purdys, SPECT Purdys: Darian Toribio MD    Tech: Clarence Newsome [facility]   Support Staff: Shruthi Ibarra RN         [facility] PACS Images     Show images for Stress Test With Myocardial Perfusion (1 Day)   Interpretation Summary         Myocardial perfusion imaging indicates a normal myocardial perfusion study with no evidence of ischemia. Impressions are consistent with a low risk study.    Left ventricular ejection fraction is normal (Calculated EF = 54%).    Diaphragmatic attenuation artifact is present.     Physiological apical thinning noted       __________________________________________________________________   Rest and stress as well as attenuation corrected and uncorrected images where visualized including manipulation of the images as required including separate visualization as required using the YOLLEGE software  In addition reprocessing of images performed personally if not satisfied with earlier processing by nuclear medicine tech  Current interpretation is based on a sum total of the above.      ____________________________________________________________________     Carlo Velasco  Regadenoson Stress Test with Myocardial Perfusion SPECT (Multi Study)  Order# [id number]  Reading physician: Darian Toribio MD Ordering physician: Darian Toribio MD Study date: 23     Patient Information    Patient Name   Carlo Velasco MRN   [id number] Legal Sex   Male  (Age)   1945 (77 y.o.)     Interpretation Summary         Left ventricular ejection fraction is normal (Calculated EF = 50%).    Myocardial  perfusion imaging indicates a normal myocardial perfusion study with no evidence of ischemia.    Impressions are consistent with a low risk study.    Diaphragmatic attenuation artifact is present.    Physiological apical thinning noted             IMPRESSION:  1. No pulmonary emboli.  2. No thoracic aortic aneurysm or dissection. Prior mediastinal surgery  with cardiomegaly. Left subclavian cardiac pacer device.  3. New right middle lobe consolidation most consistent with pneumonia.  Probable reactive right hilar and subcarinal lymph nodes. Follow up  chest x-ray recommended to document resolution.  This report was finalized on 03/20/2021 20:54 by Dr. Cassie Mckeon MD.      Conclusion of cardiac catheterization    4/28/2020     Left ventricle ejection fraction 45%  Moderately elevated left ventricular end-diastolic pressure of 19 mmHg  Atretic left intramammary artery graft  Patent stents in the left anterior descending coronary artery  60% stenosis chronic of diagonal branch unchanged from prior cardiac catheterization  95% in-stent restenosis of first obtuse marginal branch treated with PTCA and then implantation of 2.5 x 12 mm Synergy stent with 0% residual stenosis  70% stenosis of distal right coronary artery  Widely patent saphenous venous graft to the distal right coronary artery        ____________________________________________________________________________________________________________________________________________     Plan after cardiac catheterization        Dual antiplatelet therapy minimum of 1 year  Continue on other medication including statin and beta-blockers  Aspirin for the rest of his life  Add ACE inhibitor therapy  Check echocardiogram  Intensive risk factor modifications for both primary and secondary prevention if applicable  Hydration   Observation         Carlo Velasco   Regadenoson Stress Test With Myocardial Perfusion SPECT (Multi Study)   Order# [id number]   Reading physician:  Darian Toribio MD Ordering physician: Darian Toribio MD Study date: 20   Patient Information     Patient Name  Carlo Velasco MRN  [id number] Sex  Male  (Age)  1945 (74 y.o.)   Interpretation Summary        Left ventricular ejection fraction is normal (Calculated EF = 55%).  Myocardial perfusion imaging indicates a normal myocardial perfusion study with no evidence of ischemia.  Impressions are consistent with a low risk study.                Results for orders placed during the hospital encounter of 23    Adult Transthoracic Echo Complete w/ Color, Spectral and Contrast if necessary per protocol    Interpretation Summary    Left ventricular systolic function is normal. Left ventricular ejection fraction appears to be 56 - 60%.    Left ventricular diastolic function was normal.    Abnormal global longitudinal LV strain (GLS) = -11.6%.    Left atrial volume is mildly increased.    Estimated right ventricular systolic pressure from tricuspid regurgitation is normal (<35 mmHg).       Procedures  Cardiac cath    LVEF of 50%.   occluded the mid right coronary artery  Patent saphenous venous graft to the distal right coronary artery  Atretic left intramammary artery graft.  Patent stent in the mid left anterior descending coronary artery  Patent stent to the obtuse marginal branch  Patent stent in diagonal branch.       Conclusion     Patent stents noted in the obtuse marginal branch and the left anterior descending coronary artery.  Atretic left internal mammary artery graft.  Patent saphenous venous graft to the right coronary artery  Significant right coronary artery stenosis proximal to the touchdown site of the saphenous vein graft to the right coronary artery  60% stenosis of diagonal branch with a normal fractional flow reserve of 0.85  Left ventricle ejection fraction of 50%.     LVEDP    16   mm Hg           Plan     Intensive risk factor modifications for both primary and secondary  prevention if applicable  Home today if stable  Hydration   Observation  Keep follow-up appointment    Diagnoses and all orders for this visit:    1. Hyperlipidemia LDL goal <70 (Primary)    2. Coronary artery disease of autologous bypass graft with stable angina pectoris    3. LVH (left ventricular hypertrophy)    4. Stented coronary artery    5. S/P CABG x 2  -     Case Request Cath Lab: Cardiac Catheterization/Vascular Study; Standing  -     CBC and Differential; Future  -     Comprehensive metabolic panel; Future  -     Protime-INR; Future  -     sodium chloride 0.9 % flush 3 mL  -     sodium chloride 0.9 % flush 10 mL  -     sodium chloride 0.9 % infusion 40 mL  -     sodium chloride 0.9 % bolus 200 mL  -     Case Request Cath Lab: Cardiac Catheterization/Vascular Study    6. Chest pain in adult  -     Case Request Cath Lab: Cardiac Catheterization/Vascular Study; Standing  -     CBC and Differential; Future  -     Comprehensive metabolic panel; Future  -     Protime-INR; Future  -     sodium chloride 0.9 % flush 3 mL  -     sodium chloride 0.9 % flush 10 mL  -     sodium chloride 0.9 % infusion 40 mL  -     sodium chloride 0.9 % bolus 200 mL  -     Case Request Cath Lab: Cardiac Catheterization/Vascular Study    Other orders  -     Clip bilateral groin.; Standing  -     Instruct Patient To Stop Apixaban, Rivaroxaban, Edoxaban, Dibigatran, Vorapaxar, Atopaxar 48 Hours Before Scheduled Cardiac Procedure; Future  -     Verify On Day of Procedure: No Apixaban, Rivaroxaban, Edoxaban, Dabigatran, Vorapaxar, Atopaxar Administration 48 Hours Before Cardiac Procedure; Standing  -     Instruct Patient To Stop Metformin (including any combination product containing Metformin) 48 Hours Before Scheduled Cardiac Procedure; Future  -     Verify On Day of Procedure: No Metformin (including any combination product containing Metformin) 48 Hours Before Scheduled Cardiac Procedure; Standing  -     CBC & Differential; Standing  -      Comprehensive Metabolic Panel; Standing  -     Protime-INR; Standing  -     No Solid Food or Milk for 6 Hours Prior to Scheduled Arrival Time for Cardiac Catheterization  -     Nothing by Mouth for 2 Hours prior to Scheduled Arrival Time  -     Clear Liquids Allowed and Encouraged up to 2 hours Prior to Scheduled Arrival Time - Including at Least 28 Ounces Within 3-Hour Window Prior to Scheduled Arrival Time  -     Insert Peripheral IV; Standing  -     Saline Lock & Maintain IV Access; Standing  -     isosorbide mononitrate (IMDUR) 120 MG 24 hr tablet; Take 1 tablet by mouth Every Morning.  Dispense: 90 tablet; Refill: 3            Plan    Patient expressed understanding  Encouraged and answered all questions   Discussed with the patient and all questioned fully answered. He will call me if any problems arise.   Discussed results of prior testing with patient : echo and cardiac PET myocardial perfusion scan    as well as device check     Orders Placed This Encounter   Procedures    Comprehensive metabolic panel     Standing Status:   Future     Expected Date:   6/9/2025     Expiration Date:   6/4/2026     Release to patient:   Routine Release [[id number]]    Protime-INR     Standing Status:   Future     Expected Date:   6/9/2025     Expiration Date:   6/4/2026     Release to patient:   Routine Release [[id number]]    Instruct Patient To Stop Apixaban, Rivaroxaban, Edoxaban, Dibigatran, Vorapaxar, Atopaxar 48 Hours Before Scheduled Cardiac Procedure     Instruct Patient To Stop Apixaban, Rivaroxaban, Edoxaban, Dibigatran, Vorapaxar, Atopaxar 48 Hours Before Scheduled Cardiac Procedure     Standing Status:   Future     Expected Date:   6/4/2025     Expiration Date:   6/4/2026    Instruct Patient To Stop Metformin (including any combination product containing Metformin) 48 Hours Before Scheduled Cardiac Procedure     Standing Status:   Future     Expected Date:   6/4/2025     Expiration Date:   6/4/2026    No Solid  Food or Milk for 6 Hours Prior to Scheduled Arrival Time for Cardiac Catheterization    Nothing by Mouth for 2 Hours prior to Scheduled Arrival Time    Clear Liquids Allowed and Encouraged up to 2 hours Prior to Scheduled Arrival Time - Including at Least 28 Ounces Within 3-Hour Window Prior to Scheduled Arrival Time    CBC and Differential     Standing Status:   Future     Expected Date:   6/9/2025     Expiration Date:   6/4/2026     Manual Differential:   No     Release to patient:   Routine Release [[id number]]      Recommend cardiac catheterization, selective coronary angiography, left ventriculography and percutaneous coronary intervention with application of arteriotomy hemostatic closure device.    I discussed cardiac catheterization, the procedure, risks (including bleeding, infection, vascular damage [including minor oozing, bruising, bleeding, and up to and including but not limited to the need for vascular surgery, emergency cardiothoracic surgery, contrast reaction, renal failure, respiratory failure, heart attack, stroke, arrhythmia and even death), benefits, and alternatives and the patient has voiced understanding and is willing to proceed.    Adequate pre-hydration and post cardiac catheterization hydration.  Premedications as required and indicated for cardiac catheterization.    No contraindication to drug eluting stent placement if required  Further recommendations pending results of cardiac catheterization       S/L NTG PRN for chest pain, call me or go to ER if has to use S/L nitroglycerin     Escalate Imdur   Requested Prescriptions     Signed Prescriptions Disp Refills    isosorbide mononitrate (IMDUR) 120 MG 24 hr tablet 90 tablet 3     Sig: Take 1 tablet by mouth Every Morning.      Check BP and heart rates twice daily initially till blood pressures and heart rates under good control and then at least 3x / week,   If blood pressures continue to be well-controlled then can check week a month   at home and bring a recording for review next visit  If BP >130/85 or < 100/60 persistently over 3 reading 30 mins apart or if heart rates persistently above 100 bpm or less than 55 bpm call sooner for evaluation and advise          Return in about 6 weeks (around 7/16/2025).

## 2025-06-04 NOTE — PROGRESS NOTES
Carlo Velasco  [id number]  1945  79 y.o.  male    Referring Provider: Cruz Chaudhari MD    Reason for Follow-up Visit:      Here for routine follow up   coronary artery disease Coronary artery bypass grafting , stented coronary artery   sick sinus syndrome s/p pacemaker   Cardiac workup test results as below: myocardial perfusion scan    Had spine injection yesterday Dr Holt     Subjective     Chest pain both with exertion as well as at rest.  Feels episodes of chest pain occur more often with exertion  Moderate substernal,   Pressure like   Chest pain non pleuritic  Chest pain non positional and non gustatory   Lasts less than 5 minutes  S/L NTG PRN for chest pain, call me or go to ER if has to use S/L nitroglycerin   Started more than 5 months ago  Occurs once or twice a week on the average but can be variable in frequency  No associated diaphoresis  No associated nausea  No radiation  Relieved with rest or spontaneously  Not positional  No change with intake of food or antacids  No change with breathing  Mild to moderate associated dyspnea    No similar chest pain episodes in the past     History of present illness:  Carlo Velasco is a 79 y.o. yo male with history of coronary artery disease Coronary artery bypass grafting , stented coronary artery sick sinus syndrome s/p pacemaker who presents today for   Chief Complaint   Patient presents with    Congestive Heart Failure     6 week follow up - to talk about a heart cath    .    History  Past Medical History:   Diagnosis Date    Anxiety     Arthritis     Broken rib     Cancer     melanoma    Chronic diastolic congestive heart failure 04/15/2022    Claustrophobia     Colon polyp     COPD (chronic obstructive pulmonary disease)     Coronary artery disease involving autologous artery coronary bypass graft without angina pectoris 09/22/2016    Depression     Disease of thyroid gland     partial thyroidectomy on right    GERD (gastroesophageal reflux  disease)     Hearing loss     Heart attack     History of transfusion     Hypertension     Kidney stone     history of     Low back pain     Mild intermittent asthma without complication 08/24/2021    Open angle with borderline findings, low risk, bilateral 08/23/2021    Pain management     STATES JUST HAD INJECTION IN BACK ON 06/20/2023    Presence of cardiac pacemaker 04/15/2022    Primary hypertension 09/22/2016    Pulmonary hypertension 04/15/2022    S/P CABG x 2 01/04/2019    LIMA to LAD and SVG to RCA 8/8/12    Sick sinus syndrome 07/23/2020    Stented coronary artery 01/04/2019    2.5 x 18 mm Xience Alpine drug-eluting stent to the mid LAD 7/8/2015, 2.5 x 18 mm Xience drug-eluting stent to the proximal OM1 10/21/2016 and 2.5 x 12 mm Synergy drug-eluting stent to OM1 for severe in-stent restenosis 4/28/2020    Tobacco abuse, in remission     Uses hearing aid     BILATERAL   ,   Past Surgical History:   Procedure Laterality Date    BACK SURGERY      CERVICAL FUSION AND LUMBAR DISC REPLACEMENT    BRONCHOSCOPY N/A 04/27/2021    Procedure: BRONCHOSCOPY;  Surgeon: Rico Jacobs MD;  Location:  [facility] OR;  Service: Cardiothoracic;  Laterality: N/A;    CARDIAC CATHETERIZATION  07/2015    SHAHRZAD mid LAD, Dr. Toribio    CARDIAC CATHETERIZATION N/A 10/21/2016    Procedure: Left Heart Cath;  Surgeon: Darian Toribio MD;  Location:  [facility] CATH INVASIVE LOCATION;  Service:     CARDIAC CATHETERIZATION Left 04/05/2017    Procedure: Cardiac Catheterization/Vascular Study;  Surgeon: Darian Toribio MD;  Location:  [facility] CATH INVASIVE LOC[facility];  Service:     CARDIAC CATHETERIZATION N/A 04/05/2017    Procedure: Left Heart Cath;  Surgeon: Darian Toribio MD;  Location:  [facility] CATH INVASIVE [facility];  Service:     CARDIAC CATHETERIZATION N/A 04/05/2017    Procedure: Coronary angiography;  Surgeon: Darian Toribio MD;  Location:  [facility] CATH INVASIVE LOC[facility];  Service:     CARDIAC CATHETERIZATION N/A 04/05/2017    Procedure: Left  ventriculography;  Surgeon: Darian Toribio MD;  Location:  [facility] CATH INVASIVE [facility];  Service:     CARDIAC CATHETERIZATION  04/05/2017    Procedure: Saphenous Vein Graft;  Surgeon: Darian Toribio MD;  Location:  [facility] CATH INVASIVE [facility];  Service:     CARDIAC CATHETERIZATION Left 02/27/2018    Procedure: Cardiac Catheterization/Vascular Study SHAHRZAD OK PRN;  Surgeon: Darian Toribio MD;  Location:  [facility] CATH INVASIVE LOC[facility];  Service:     CARDIAC CATHETERIZATION  02/27/2018    Procedure: Functional Flow [place];  Surgeon: Darian Toribio MD;  Location:  [facility] CATH INVASIVE [facility];  Service:     CARDIAC CATHETERIZATION N/A 02/27/2018    Procedure: Left ventriculography;  Surgeon: Darian Toribio MD;  Location: [facility] CATH INVASIVE LOCATION;  Service:     CARDIAC CATHETERIZATION Left 04/28/2020    Procedure: Cardiac Catheterization/Vascular Study SHAHRZAD OK PRN;  Surgeon: Darian Toribio MD;  Location: [facility] CATH INVASIVE LOCATION;  Service: Cardiology;  Laterality: Left;    CARDIAC ELECTROPHYSIOLOGY PROCEDURE N/A 01/11/2018    Procedure: PPM generator change - dual;  Surgeon: Darian Toribio MD;  Location: [facility] CATH INVASIVE LOCATION;  Service:     CARDIAC SURGERY      open heart surgery x2    COLONOSCOPY  09/05/2007    colon polyps    COLONOSCOPY N/A 05/29/2018    Procedure: COLONOSCOPY WITH ANESTHESIA;  Surgeon: Juan F Tapia MD;  Location: [facility] ENDOSCOPY;  Service: Gastroenterology    COLONOSCOPY N/A 10/21/2021    Procedure: COLONOSCOPY WITH ANESTHESIA;  Surgeon: Juan F Tapia MD;  Location: [facility] ENDOSCOPY;  Service: Gastroenterology;  Laterality: N/A;  pre op: hx polyps  post op;polyp,   PCP: Rhys Rosen MD    CORONARY ANGIOPLASTY WITH STENT PLACEMENT  07/2015    SHAHRZAD mid LAD, Dr. Toribio     CORONARY ARTERY BYPASS GRAFT  2012 AND 2014    2 vessel     ENDOSCOPY  11/08/2010    ENDOSCOPY N/A 05/29/2018    Procedure: ESOPHAGOGASTRODUODENOSCOPY WITH ANESTHESIA;  Surgeon: Juan F Tapia MD;  Location:   [facility] ENDOSCOPY;  Service: Gastroenterology    ENDOSCOPY N/A 2025    Procedure: ESOPHAGOGASTRODUODENOSCOPY WITH ANESTHESIA;  Surgeon: Juan F Tapia MD;  Location:  [facility] ENDOSCOPY;  Service: Gastroenterology;  Laterality: N/A;  pre: dysphagia  post: esophagus dilated    HEAD/NECK LESION/CYST EXCISION Left 2016    Procedure: EXCISION MALIGNANT MELANOMA WITH SENTINEL NODE BIOPSY, INJECTION AND SCAN PRE-OP, LEFT EAR;  Surgeon: Guanaco Zepeda MD;  Location:  [facility] OR;  Service:     HEMORRHOIDECTOMY      HERNIA REPAIR      INSERT / REPLACE / REMOVE PACEMAKER      REPLACEMENT TOTAL KNEE Left     BRIAN-STOPPA INCISIONAL HERNIA REPAIR Left 2023    Procedure: BRIAN-STOPPA INCISIONAL HERNIA REPAIR left lumbar hernia repair with mesh;  Surgeon: Govind Celeste MD;  Location:  [facility] OR;  Service: General;  Laterality: Left;    SENTINEL NODE BIOPSY Left 2016    Procedure: SENTINEL NODE BIOPSY;  Surgeon: Guanaco Zepeda MD;  Location: [facility] OR;  Service:     SKIN CANCER EXCISION      THORACOSCOPY Right 2021    Procedure: THORACOSCOPIC TOTAL DECORTICATION;  Surgeon: Rico Jacobs MD;  Location:  [facility] OR;  Service: Cardiothoracic;  Laterality: Right;    THYROID SURGERY      THYROIDECTOMY     ,   Family History   Problem Relation Age of Onset    Heart failure Mother     Stroke Mother     Dementia Mother     Coronary artery disease Father     Colon polyps Father     COPD Brother     Colon cancer Neg Hx    ,   Social History     Tobacco Use    Smoking status: Former     Current packs/day: 0.00     Average packs/day: 3.0 packs/day for 10.0 years (30.0 ttl pk-yrs)     Types: Cigarettes     Start date:      Quit date:      Years since quittin.4     Passive exposure: Past    Smokeless tobacco: Never   Vaping Use    Vaping status: Never Used   Substance Use Topics    Alcohol use: Yes     Comment: rare    Drug use: No   ,     Medications  Current Outpatient Medications   Medication Sig  Dispense Refill    albuterol sulfate  (90 Base) MCG/ACT inhaler Inhale 2 puffs Every 4 (Four) Hours As Needed for Wheezing. 6.7 g 5    baclofen (LIORESAL) 10 MG tablet Take 1 tablet by mouth Every 12 (Twelve) Hours.      budesonide-formoterol (SYMBICORT) 160-4.5 MCG/ACT inhaler Inhale 2 puffs 2 (Two) Times a Day. 10.2 g 5    Calcium Carbonate 1500 (600 Ca) MG tablet Take 1 tablet by mouth Daily.      Cholecalciferol 125 MCG (5000 UT) tablet Take 1 tablet by mouth Daily.      clopidogrel (PLAVIX) 75 MG tablet TAKE 1 TABLET BY MOUTH DAILY 90 tablet 3    Cyanocobalamin (B-12) 1000 MCG sublingual tablet 1 tablet Daily.      escitalopram (LEXAPRO) 20 MG tablet Take 1 tablet by mouth Daily. 90 tablet 1    ferrous sulfate 325 (65 FE) MG tablet Take 1 tablet by mouth Every Morning.      fluticasone (FLONASE) 50 MCG/ACT nasal spray 2 sprays into the nostril(s) as directed by provider Daily. 16 g 5    furosemide (LASIX) 40 MG tablet Take 1 tablet by mouth Daily As Needed (for any increase in 2 lbs in a daqy or 5 lbs in a week or increassing swelling or congestion). (Patient taking differently: Take 1 tablet by mouth Daily.)      ibuprofen (ADVIL,MOTRIN) 800 MG tablet Take 1 tablet by mouth Every 8 (Eight) Hours As Needed. for pain      isosorbide mononitrate (IMDUR) 120 MG 24 hr tablet Take 1 tablet by mouth Every Morning. 90 tablet 3    ketorolac (TORADOL) 10 MG tablet       latanoprost (XALATAN) 0.005 % ophthalmic solution instill 1 drop in both eyes every night at bedtime      loratadine (CLARITIN) 10 MG tablet TAKE 1 TABLET BY MOUTH ONCE PER DAY 90 tablet 0    meclizine (ANTIVERT) 12.5 MG tablet Take 1 tablet by mouth.      metoprolol tartrate (LOPRESSOR) 25 MG tablet TAKE 1 TABLET BY MOUTH DAILY 90 tablet 3    nitroglycerin (NITROSTAT) 0.4 MG SL tablet 1 under the tongue as needed for angina, may repeat q5mins for up three doses 25 tablet 3    Omega-3 Fatty Acids (FISH OIL PO) Take  by mouth Daily.      omeprazole  "(priLOSEC) 20 MG capsule TAKE 1 CAPSULE BY MOUTH DAILY 90 capsule 3    Potassium Gluconate 2.5 MEQ tablet Take 1 tablet by mouth Daily.      tamsulosin (FLOMAX) 0.4 MG capsule 24 hr capsule Take 1 capsule by mouth Daily.      topiramate (TOPAMAX) 50 MG tablet Take 1 tablet by mouth 2 (Two) Times a Day.      Zinc Sulfate (ZINC-220 PO) Take 1 tablet by mouth Daily.       No current facility-administered medications for this visit.       Allergies:  Meperidine and Statins    Review of Systems  Review of Systems   Constitutional: Positive for malaise/fatigue.   HENT: Negative.     Eyes: Negative.    Cardiovascular:  Positive for chest pain, dyspnea on exertion and leg swelling. Negative for claudication, cyanosis, irregular heartbeat, near-syncope, orthopnea, palpitations, paroxysmal nocturnal dyspnea and syncope.   Respiratory:  Positive for cough and shortness of breath.    Endocrine: Negative.    Hematologic/Lymphatic: Negative.    Skin: Negative.    Musculoskeletal:  Positive for arthritis and back pain.   Gastrointestinal:  Negative for anorexia.   Genitourinary: Negative.    Neurological:  Negative for weakness.   Psychiatric/Behavioral: Negative.          Objective     Physical Exam:  /76   Pulse 69   Ht 172.7 cm (68\")   Wt 113 kg (249 lb)   BMI 37.86 kg/m²   Physical Exam   Constitutional: He appears well-developed.   HENT:   Head: Normocephalic.   Neck: Normal carotid pulses and no JVD present. No tracheal tenderness present. Carotid bruit is not present. No tracheal deviation present.   Cardiovascular: Regular rhythm and normal pulses.   Murmur heard.  Systolic murmur is present with a grade of 2/6.  Pulmonary/Chest: Effort normal. No stridor.   Abdominal: Soft. He exhibits no distension. There is no abdominal tenderness.   Musculoskeletal:      Right lower le+ Pitting Edema present.      Left lower le+ Pitting Edema present.   Neurological: He is alert. No cranial nerve deficit or sensory " deficit.   Skin: Skin is warm.   Psychiatric: His speech is normal and behavior is normal.       Results Review:    Regadenoson Stress Test with Myocardial Perfusion SPECT (Multi Study)    Accession Number: [id number]   Date of Study: 1/10/25   Ordering Provider: Darian Toribio MD   Clinical Indications: Chest Pain, Known Coronary Artery Disease        Reading Physicians  Performing Staff   ECG [place], SPECT [place]: Darian Toribio MD    Tech: Clarence Newsome [facility]   Support Staff: Shruthi Ibarra RN         [facility] PACS Images     Show images for Stress Test With Myocardial Perfusion (1 Day)   Interpretation Summary         Myocardial perfusion imaging indicates a normal myocardial perfusion study with no evidence of ischemia. Impressions are consistent with a low risk study.    Left ventricular ejection fraction is normal (Calculated EF = 54%).    Diaphragmatic attenuation artifact is present.     Physiological apical thinning noted       __________________________________________________________________   Rest and stress as well as attenuation corrected and uncorrected images where visualized including manipulation of the images as required including separate visualization as required using the Easy Metrics software  In addition reprocessing of images performed personally if not satisfied with earlier processing by nuclear medicine tech  Current interpretation is based on a sum total of the above.      ____________________________________________________________________     Carlo Velasco  Regadenoson Stress Test with Myocardial Perfusion SPECT (Multi Study)  Order# [id number]  Reading physician: Darian Toribio MD Ordering physician: Darian Toribio MD Study date: 23     Patient Information    Patient Name   Carlo Velasco MRN   [id number] Legal Sex   Male  (Age)   1945 (77 y.o.)     Interpretation Summary         Left ventricular ejection fraction is normal (Calculated EF = 50%).    Myocardial  perfusion imaging indicates a normal myocardial perfusion study with no evidence of ischemia.    Impressions are consistent with a low risk study.    Diaphragmatic attenuation artifact is present.    Physiological apical thinning noted             IMPRESSION:  1. No pulmonary emboli.  2. No thoracic aortic aneurysm or dissection. Prior mediastinal surgery  with cardiomegaly. Left subclavian cardiac pacer device.  3. New right middle lobe consolidation most consistent with pneumonia.  Probable reactive right hilar and subcarinal lymph nodes. Follow up  chest x-ray recommended to document resolution.  This report was finalized on 03/20/2021 20:54 by Dr. Cassie Mckeon MD.      Conclusion of cardiac catheterization    4/28/2020     Left ventricle ejection fraction 45%  Moderately elevated left ventricular end-diastolic pressure of 19 mmHg  Atretic left intramammary artery graft  Patent stents in the left anterior descending coronary artery  60% stenosis chronic of diagonal branch unchanged from prior cardiac catheterization  95% in-stent restenosis of first obtuse marginal branch treated with PTCA and then implantation of 2.5 x 12 mm Synergy stent with 0% residual stenosis  70% stenosis of distal right coronary artery  Widely patent saphenous venous graft to the distal right coronary artery        ____________________________________________________________________________________________________________________________________________     Plan after cardiac catheterization        Dual antiplatelet therapy minimum of 1 year  Continue on other medication including statin and beta-blockers  Aspirin for the rest of his life  Add ACE inhibitor therapy  Check echocardiogram  Intensive risk factor modifications for both primary and secondary prevention if applicable  Hydration   Observation         Carlo Velasco   Regadenoson Stress Test With Myocardial Perfusion SPECT (Multi Study)   Order# [id number]   Reading physician:  Darian Toribio MD Ordering physician: Darian Toribio MD Study date: 20   Patient Information     Patient Name  Carlo Velasco MRN  [id number] Sex  Male  (Age)  1945 (74 y.o.)   Interpretation Summary        Left ventricular ejection fraction is normal (Calculated EF = 55%).  Myocardial perfusion imaging indicates a normal myocardial perfusion study with no evidence of ischemia.  Impressions are consistent with a low risk study.                Results for orders placed during the hospital encounter of 23    Adult Transthoracic Echo Complete w/ Color, Spectral and Contrast if necessary per protocol    Interpretation Summary    Left ventricular systolic function is normal. Left ventricular ejection fraction appears to be 56 - 60%.    Left ventricular diastolic function was normal.    Abnormal global longitudinal LV strain (GLS) = -11.6%.    Left atrial volume is mildly increased.    Estimated right ventricular systolic pressure from tricuspid regurgitation is normal (<35 mmHg).       Procedures  Cardiac cath    LVEF of 50%.   occluded the mid right coronary artery  Patent saphenous venous graft to the distal right coronary artery  Atretic left intramammary artery graft.  Patent stent in the mid left anterior descending coronary artery  Patent stent to the obtuse marginal branch  Patent stent in diagonal branch.       Conclusion     Patent stents noted in the obtuse marginal branch and the left anterior descending coronary artery.  Atretic left internal mammary artery graft.  Patent saphenous venous graft to the right coronary artery  Significant right coronary artery stenosis proximal to the touchdown site of the saphenous vein graft to the right coronary artery  60% stenosis of diagonal branch with a normal fractional flow reserve of 0.85  Left ventricle ejection fraction of 50%.     LVEDP    16   mm Hg           Plan     Intensive risk factor modifications for both primary and secondary  prevention if applicable  Home today if stable  Hydration   Observation  Keep follow-up appointment    Diagnoses and all orders for this visit:    1. Hyperlipidemia LDL goal <70 (Primary)    2. Coronary artery disease of autologous bypass graft with stable angina pectoris    3. LVH (left ventricular hypertrophy)    4. Stented coronary artery    5. S/P CABG x 2  -     Case Request Cath Lab: Cardiac Catheterization/Vascular Study; Standing  -     CBC and Differential; Future  -     Comprehensive metabolic panel; Future  -     Protime-INR; Future  -     sodium chloride 0.9 % flush 3 mL  -     sodium chloride 0.9 % flush 10 mL  -     sodium chloride 0.9 % infusion 40 mL  -     sodium chloride 0.9 % bolus 200 mL  -     Case Request Cath Lab: Cardiac Catheterization/Vascular Study    6. Chest pain in adult  -     Case Request Cath Lab: Cardiac Catheterization/Vascular Study; Standing  -     CBC and Differential; Future  -     Comprehensive metabolic panel; Future  -     Protime-INR; Future  -     sodium chloride 0.9 % flush 3 mL  -     sodium chloride 0.9 % flush 10 mL  -     sodium chloride 0.9 % infusion 40 mL  -     sodium chloride 0.9 % bolus 200 mL  -     Case Request Cath Lab: Cardiac Catheterization/Vascular Study    Other orders  -     Clip bilateral groin.; Standing  -     Instruct Patient To Stop Apixaban, Rivaroxaban, Edoxaban, Dibigatran, Vorapaxar, Atopaxar 48 Hours Before Scheduled Cardiac Procedure; Future  -     Verify On Day of Procedure: No Apixaban, Rivaroxaban, Edoxaban, Dabigatran, Vorapaxar, Atopaxar Administration 48 Hours Before Cardiac Procedure; Standing  -     Instruct Patient To Stop Metformin (including any combination product containing Metformin) 48 Hours Before Scheduled Cardiac Procedure; Future  -     Verify On Day of Procedure: No Metformin (including any combination product containing Metformin) 48 Hours Before Scheduled Cardiac Procedure; Standing  -     CBC & Differential; Standing  -      Comprehensive Metabolic Panel; Standing  -     Protime-INR; Standing  -     No Solid Food or Milk for 6 Hours Prior to Scheduled Arrival Time for Cardiac Catheterization  -     Nothing by Mouth for 2 Hours prior to Scheduled Arrival Time  -     Clear Liquids Allowed and Encouraged up to 2 hours Prior to Scheduled Arrival Time - Including at Least 28 Ounces Within 3-Hour Window Prior to Scheduled Arrival Time  -     Insert Peripheral IV; Standing  -     Saline Lock & Maintain IV Access; Standing  -     isosorbide mononitrate (IMDUR) 120 MG 24 hr tablet; Take 1 tablet by mouth Every Morning.  Dispense: 90 tablet; Refill: 3            Plan    Patient expressed understanding  Encouraged and answered all questions   Discussed with the patient and all questioned fully answered. He will call me if any problems arise.   Discussed results of prior testing with patient : echo and cardiac PET myocardial perfusion scan    as well as device check     Orders Placed This Encounter   Procedures    Comprehensive metabolic panel     Standing Status:   Future     Expected Date:   6/9/2025     Expiration Date:   6/4/2026     Release to patient:   Routine Release [[id number]]    Protime-INR     Standing Status:   Future     Expected Date:   6/9/2025     Expiration Date:   6/4/2026     Release to patient:   Routine Release [[id number]]    Instruct Patient To Stop Apixaban, Rivaroxaban, Edoxaban, Dibigatran, Vorapaxar, Atopaxar 48 Hours Before Scheduled Cardiac Procedure     Instruct Patient To Stop Apixaban, Rivaroxaban, Edoxaban, Dibigatran, Vorapaxar, Atopaxar 48 Hours Before Scheduled Cardiac Procedure     Standing Status:   Future     Expected Date:   6/4/2025     Expiration Date:   6/4/2026    Instruct Patient To Stop Metformin (including any combination product containing Metformin) 48 Hours Before Scheduled Cardiac Procedure     Standing Status:   Future     Expected Date:   6/4/2025     Expiration Date:   6/4/2026    No Solid  Food or Milk for 6 Hours Prior to Scheduled Arrival Time for Cardiac Catheterization    Nothing by Mouth for 2 Hours prior to Scheduled Arrival Time    Clear Liquids Allowed and Encouraged up to 2 hours Prior to Scheduled Arrival Time - Including at Least 28 [place] Within 3-Hour Window Prior to Scheduled Arrival Time    CBC and Differential     Standing Status:   Future     Expected Date:   6/9/2025     Expiration Date:   6/4/2026     Manual Differential:   No     Release to patient:   Routine Release [[id number]]      Recommend cardiac catheterization, selective coronary angiography, left ventriculography and percutaneous coronary intervention with application of arteriotomy hemostatic closure device.    I discussed cardiac catheterization, the procedure, risks (including bleeding, infection, vascular damage [including minor oozing, bruising, bleeding, and up to and including but not limited to the need for vascular surgery, emergency cardiothoracic surgery, contrast reaction, renal failure, respiratory failure, heart attack, stroke, arrhythmia and even death), benefits, and alternatives and the patient has voiced understanding and is willing to proceed.    Adequate pre-hydration and post cardiac catheterization hydration.  Premedications as required and indicated for cardiac catheterization.    No contraindication to drug eluting stent placement if required  Further recommendations pending results of cardiac catheterization       S/L NTG PRN for chest pain, call me or go to ER if has to use S/L nitroglycerin     Escalate Imdur   Requested Prescriptions     Signed Prescriptions Disp Refills    isosorbide mononitrate (IMDUR) 120 MG 24 hr tablet 90 tablet 3     Sig: Take 1 tablet by mouth Every Morning.      Check BP and heart rates twice daily initially till blood pressures and heart rates under good control and then at least 3x / week,   If blood pressures continue to be well-controlled then can check week a month   at home and bring a recording for review next visit  If BP >130/85 or < 100/60 persistently over 3 reading 30 mins apart or if heart rates persistently above 100 bpm or less than 55 bpm call sooner for evaluation and advise          Return in about 6 weeks (around 7/16/2025).

## 2025-06-09 ENCOUNTER — HOSPITAL ENCOUNTER (OUTPATIENT)
Facility: HOSPITAL | Age: 80
Setting detail: HOSPITAL OUTPATIENT SURGERY
Discharge: HOME | End: 2025-06-09
Attending: INTERNAL MEDICINE | Admitting: INTERNAL MEDICINE
Payer: MEDICARE

## 2025-06-09 VITALS
HEIGHT: 68 IN | OXYGEN SATURATION: 96 % | WEIGHT: 247 LBS | HEART RATE: 61 BPM | RESPIRATION RATE: 15 BRPM | BODY MASS INDEX: 37.44 KG/M2 | SYSTOLIC BLOOD PRESSURE: 140 MMHG | DIASTOLIC BLOOD PRESSURE: 75 MMHG

## 2025-06-09 DIAGNOSIS — Z95.1 S/P CABG X 2: ICD-10-CM

## 2025-06-09 DIAGNOSIS — R07.9 CHEST PAIN IN ADULT: ICD-10-CM

## 2025-06-09 LAB
ALBUMIN SERPL-MCNC: 3.7 G/DL (ref 3.5–5.2)
ALBUMIN/GLOB SERPL: 1.4 G/DL
ALP SERPL-CCNC: 59 U/L (ref 39–117)
ALT SERPL W P-5'-P-CCNC: 16 U/L (ref 1–41)
ANION GAP SERPL CALCULATED.3IONS-SCNC: 9 MMOL/L (ref 5–15)
AST SERPL-CCNC: 16 U/L (ref 1–40)
BASOPHILS # BLD AUTO: 0.08 10*3/MM3 (ref 0–0.2)
BASOPHILS NFR BLD AUTO: 0.8 % (ref 0–1.5)
BILIRUB SERPL-MCNC: 0.6 MG/DL (ref 0–1.2)
BUN SERPL-MCNC: 16.6 MG/DL (ref 8–23)
BUN/CREAT SERPL: 22.1 (ref 7–25)
CALCIUM SPEC-SCNC: 9 MG/DL (ref 8.6–10.5)
CHLORIDE SERPL-SCNC: 103 MMOL/L (ref 98–107)
CO2 SERPL-SCNC: 28 MMOL/L (ref 22–29)
CREAT SERPL-MCNC: 0.75 MG/DL (ref 0.76–1.27)
DEPRECATED RDW RBC AUTO: 47 FL (ref 37–54)
EGFRCR SERPLBLD CKD-EPI 2021: 92.9 ML/MIN/1.73
EOSINOPHIL # BLD AUTO: 0.16 10*3/MM3 (ref 0–0.4)
EOSINOPHIL NFR BLD AUTO: 1.5 % (ref 0.3–6.2)
ERYTHROCYTE [DISTWIDTH] IN BLOOD BY AUTOMATED COUNT: 14 % (ref 12.3–15.4)
GLOBULIN UR ELPH-MCNC: 2.6 GM/DL
GLUCOSE SERPL-MCNC: 97 MG/DL (ref 65–99)
HCT VFR BLD AUTO: 42.9 % (ref 37.5–51)
HGB BLD-MCNC: 13.8 G/DL (ref 13–17.7)
IMM GRANULOCYTES # BLD AUTO: 0.24 10*3/MM3 (ref 0–0.05)
IMM GRANULOCYTES NFR BLD AUTO: 2.3 % (ref 0–0.5)
INR PPP: 0.96 (ref 0.91–1.09)
LYMPHOCYTES # BLD AUTO: 2.06 10*3/MM3 (ref 0.7–3.1)
LYMPHOCYTES NFR BLD AUTO: 19.7 % (ref 19.6–45.3)
MCH RBC QN AUTO: 29.2 PG (ref 26.6–33)
MCHC RBC AUTO-ENTMCNC: 32.2 G/DL (ref 31.5–35.7)
MCV RBC AUTO: 90.9 FL (ref 79–97)
MONOCYTES # BLD AUTO: 0.8 10*3/MM3 (ref 0.1–0.9)
MONOCYTES NFR BLD AUTO: 7.7 % (ref 5–12)
NEUTROPHILS NFR BLD AUTO: 68 % (ref 42.7–76)
NEUTROPHILS NFR BLD AUTO: 7.11 10*3/MM3 (ref 1.7–7)
NRBC BLD AUTO-RTO: 0 /100 WBC (ref 0–0.2)
PLATELET # BLD AUTO: 204 10*3/MM3 (ref 140–450)
PMV BLD AUTO: 8.7 FL (ref 6–12)
POTASSIUM SERPL-SCNC: 3.9 MMOL/L (ref 3.5–5.2)
PROT SERPL-MCNC: 6.3 G/DL (ref 6–8.5)
PROTHROMBIN TIME: 13.2 SECONDS (ref 11.8–14.8)
RBC # BLD AUTO: 4.72 10*6/MM3 (ref 4.14–5.8)
SODIUM SERPL-SCNC: 140 MMOL/L (ref 136–145)
WBC NRBC COR # BLD AUTO: 10.45 10*3/MM3 (ref 3.4–10.8)

## 2025-06-09 PROCEDURE — 25010000002 MIDAZOLAM HCL (PF) 5 MG/5ML SOLUTION: Performed by: INTERNAL MEDICINE

## 2025-06-09 PROCEDURE — C1769 GUIDE WIRE: HCPCS | Performed by: INTERNAL MEDICINE

## 2025-06-09 PROCEDURE — 93455 CORONARY ART/GRFT ANGIO S&I: CPT | Performed by: INTERNAL MEDICINE

## 2025-06-09 PROCEDURE — C1894 INTRO/SHEATH, NON-LASER: HCPCS | Performed by: INTERNAL MEDICINE

## 2025-06-09 PROCEDURE — 99152 MOD SED SAME PHYS/QHP 5/>YRS: CPT | Performed by: INTERNAL MEDICINE

## 2025-06-09 PROCEDURE — 93571 IV DOP VEL&/PRESS C FLO 1ST: CPT | Performed by: INTERNAL MEDICINE

## 2025-06-09 PROCEDURE — 99153 MOD SED SAME PHYS/QHP EA: CPT | Performed by: INTERNAL MEDICINE

## 2025-06-09 PROCEDURE — 85025 COMPLETE CBC W/AUTO DIFF WBC: CPT | Performed by: INTERNAL MEDICINE

## 2025-06-09 PROCEDURE — 80053 COMPREHEN METABOLIC PANEL: CPT | Performed by: INTERNAL MEDICINE

## 2025-06-09 PROCEDURE — 93572 IV DOP VEL&/PRESS C FLO EA: CPT | Performed by: INTERNAL MEDICINE

## 2025-06-09 PROCEDURE — C1760 CLOSURE DEV, VASC: HCPCS | Performed by: INTERNAL MEDICINE

## 2025-06-09 PROCEDURE — 25510000001 IOPAMIDOL PER 1 ML: Performed by: INTERNAL MEDICINE

## 2025-06-09 PROCEDURE — 25010000002 DIPHENHYDRAMINE PER 50 MG: Performed by: INTERNAL MEDICINE

## 2025-06-09 PROCEDURE — 85610 PROTHROMBIN TIME: CPT | Performed by: INTERNAL MEDICINE

## 2025-06-09 PROCEDURE — 25010000002 LIDOCAINE 2% SOLUTION: Performed by: INTERNAL MEDICINE

## 2025-06-09 PROCEDURE — 25010000002 FENTANYL CITRATE (PF) 50 MCG/ML SOLUTION: Performed by: INTERNAL MEDICINE

## 2025-06-09 PROCEDURE — C1887 CATHETER, GUIDING: HCPCS | Performed by: INTERNAL MEDICINE

## 2025-06-09 PROCEDURE — 25810000003 SODIUM CHLORIDE 0.9 % SOLUTION: Performed by: INTERNAL MEDICINE

## 2025-06-09 PROCEDURE — 93799 UNLISTED CV SVC/PROCEDURE: CPT | Performed by: INTERNAL MEDICINE

## 2025-06-09 RX ORDER — SODIUM CHLORIDE 9 MG/ML
40 INJECTION, SOLUTION INTRAVENOUS AS NEEDED
Status: CANCELLED | OUTPATIENT
Start: 2025-06-09

## 2025-06-09 RX ORDER — IOPAMIDOL 755 MG/ML
INJECTION, SOLUTION INTRAVASCULAR
Status: DISCONTINUED | OUTPATIENT
Start: 2025-06-09 | End: 2025-06-09 | Stop reason: HOSPADM

## 2025-06-09 RX ORDER — DIPHENHYDRAMINE HYDROCHLORIDE 50 MG/ML
INJECTION, SOLUTION INTRAMUSCULAR; INTRAVENOUS
Status: DISCONTINUED | OUTPATIENT
Start: 2025-06-09 | End: 2025-06-09 | Stop reason: HOSPADM

## 2025-06-09 RX ORDER — SODIUM CHLORIDE 0.9 % (FLUSH) 0.9 %
3 SYRINGE (ML) INJECTION EVERY 12 HOURS SCHEDULED
Status: DISCONTINUED | OUTPATIENT
Start: 2025-06-09 | End: 2025-06-09 | Stop reason: HOSPADM

## 2025-06-09 RX ORDER — SODIUM CHLORIDE 0.9 % (FLUSH) 0.9 %
10 SYRINGE (ML) INJECTION EVERY 12 HOURS SCHEDULED
Status: CANCELLED | OUTPATIENT
Start: 2025-06-09

## 2025-06-09 RX ORDER — SODIUM CHLORIDE 9 MG/ML
40 INJECTION, SOLUTION INTRAVENOUS AS NEEDED
Status: DISCONTINUED | OUTPATIENT
Start: 2025-06-09 | End: 2025-06-09 | Stop reason: HOSPADM

## 2025-06-09 RX ORDER — SODIUM CHLORIDE 0.9 % (FLUSH) 0.9 %
10 SYRINGE (ML) INJECTION AS NEEDED
Status: DISCONTINUED | OUTPATIENT
Start: 2025-06-09 | End: 2025-06-09 | Stop reason: HOSPADM

## 2025-06-09 RX ORDER — MIDAZOLAM HYDROCHLORIDE 5 MG/5ML
INJECTION, SOLUTION INTRAMUSCULAR; INTRAVENOUS
Status: DISCONTINUED | OUTPATIENT
Start: 2025-06-09 | End: 2025-06-09 | Stop reason: HOSPADM

## 2025-06-09 RX ORDER — FENTANYL CITRATE 50 UG/ML
INJECTION, SOLUTION INTRAMUSCULAR; INTRAVENOUS
Status: DISCONTINUED | OUTPATIENT
Start: 2025-06-09 | End: 2025-06-09 | Stop reason: HOSPADM

## 2025-06-09 RX ORDER — SODIUM CHLORIDE 0.9 % (FLUSH) 0.9 %
10 SYRINGE (ML) INJECTION AS NEEDED
Status: CANCELLED | OUTPATIENT
Start: 2025-06-09

## 2025-06-09 RX ORDER — ASPIRIN 81 MG/1
324 TABLET, CHEWABLE ORAL ONCE
Status: COMPLETED | OUTPATIENT
Start: 2025-06-09 | End: 2025-06-09

## 2025-06-09 RX ORDER — SODIUM CHLORIDE 9 MG/ML
100 INJECTION, SOLUTION INTRAVENOUS CONTINUOUS
Status: CANCELLED | OUTPATIENT
Start: 2025-06-09 | End: 2025-06-09

## 2025-06-09 RX ORDER — ASPIRIN 81 MG/1
TABLET, CHEWABLE ORAL
Status: COMPLETED
Start: 2025-06-09 | End: 2025-06-09

## 2025-06-09 RX ORDER — CLOPIDOGREL BISULFATE 75 MG/1
TABLET ORAL
Status: COMPLETED
Start: 2025-06-09 | End: 2025-06-09

## 2025-06-09 RX ORDER — ACETAMINOPHEN 325 MG/1
650 TABLET ORAL EVERY 4 HOURS PRN
Status: CANCELLED | OUTPATIENT
Start: 2025-06-09

## 2025-06-09 RX ORDER — CLOPIDOGREL BISULFATE 75 MG/1
75 TABLET ORAL ONCE
Status: COMPLETED | OUTPATIENT
Start: 2025-06-09 | End: 2025-06-09

## 2025-06-09 RX ORDER — LIDOCAINE HYDROCHLORIDE 20 MG/ML
INJECTION, SOLUTION INFILTRATION; PERINEURAL
Status: DISCONTINUED | OUTPATIENT
Start: 2025-06-09 | End: 2025-06-09 | Stop reason: HOSPADM

## 2025-06-09 RX ADMIN — ASPIRIN 324 MG: 81 TABLET, CHEWABLE ORAL at 14:40

## 2025-06-09 RX ADMIN — SODIUM CHLORIDE 200 ML: 9 INJECTION, SOLUTION INTRAVENOUS at 14:15

## 2025-06-09 RX ADMIN — CLOPIDOGREL BISULFATE 75 MG: 75 TABLET ORAL at 14:40

## 2025-06-12 ENCOUNTER — TRANSCRIBE ORDERS (OUTPATIENT)
Dept: ADMINISTRATIVE | Facility: HOSPITAL | Age: 80
End: 2025-06-12
Payer: MEDICARE

## 2025-06-12 DIAGNOSIS — R10.9 ACUTE ABDOMINAL PAIN: Primary | ICD-10-CM

## 2025-06-25 ENCOUNTER — OFFICE VISIT (OUTPATIENT)
Dept: CARDIOLOGY CLINIC | Age: 80
End: 2025-06-25

## 2025-06-25 VITALS
SYSTOLIC BLOOD PRESSURE: 132 MMHG | DIASTOLIC BLOOD PRESSURE: 86 MMHG | BODY MASS INDEX: 37.59 KG/M2 | WEIGHT: 248 LBS | HEIGHT: 68 IN | HEART RATE: 75 BPM | OXYGEN SATURATION: 98 %

## 2025-06-25 DIAGNOSIS — Z95.1 HX OF CABG: Primary | ICD-10-CM

## 2025-06-25 DIAGNOSIS — E78.5 DYSLIPIDEMIA: ICD-10-CM

## 2025-06-25 DIAGNOSIS — I25.10 CORONARY ARTERY DISEASE INVOLVING NATIVE CORONARY ARTERY OF NATIVE HEART WITHOUT ANGINA PECTORIS: ICD-10-CM

## 2025-06-25 DIAGNOSIS — I10 ESSENTIAL HYPERTENSION: ICD-10-CM

## 2025-06-25 DIAGNOSIS — R06.02 SHORTNESS OF BREATH: ICD-10-CM

## 2025-06-25 RX ORDER — BACLOFEN 10 MG/1
10 TABLET ORAL 2 TIMES DAILY
COMMUNITY

## 2025-06-25 RX ORDER — ROSUVASTATIN CALCIUM 20 MG/1
20 TABLET, COATED ORAL DAILY
COMMUNITY

## 2025-06-25 RX ORDER — ISOSORBIDE MONONITRATE 120 MG/1
120 TABLET, EXTENDED RELEASE ORAL EVERY MORNING
COMMUNITY
Start: 2025-06-09 | End: 2025-06-25

## 2025-06-25 RX ORDER — SPIRONOLACTONE 25 MG/1
25 TABLET ORAL DAILY
COMMUNITY
Start: 2025-06-11

## 2025-06-25 RX ORDER — ISOSORBIDE MONONITRATE 60 MG/1
60 TABLET, EXTENDED RELEASE ORAL 2 TIMES DAILY
Qty: 180 TABLET | Refills: 3 | Status: SHIPPED | OUTPATIENT
Start: 2025-06-25

## 2025-06-25 RX ORDER — LATANOPROST 50 UG/ML
1 SOLUTION/ DROPS OPHTHALMIC DAILY
COMMUNITY

## 2025-06-25 RX ORDER — PANTOPRAZOLE SODIUM 40 MG/1
40 TABLET, DELAYED RELEASE ORAL DAILY
COMMUNITY
Start: 2025-06-12

## 2025-06-25 ASSESSMENT — ENCOUNTER SYMPTOMS
COUGH: 0
WHEEZING: 0
CHEST TIGHTNESS: 0
SHORTNESS OF BREATH: 1
SORE THROAT: 0

## 2025-06-25 NOTE — PATIENT INSTRUCTIONS
[place] at the [facility] [place] located on the first floor of [facility].   Enter through hospital main entrance and turn immediately to your left.    Date/Time:     Patient's contact number:  [phone] (home)     Echocardiogram -  No prep.      Takes approximately 30 min.    An echocardiogram uses sound waves to produce images of your heart. This commonly used test allows your doctor to see how your heart is beating and pumping blood. Your doctor can use the images from an echocardiogram to identify various abnormalities in the heart muscle and valves.    This test has 2 parts:   You will be asked to disrobe from the waist up and given a gown to wear. The technologist will then hook up an EKG monitor to you for the entire exam.   You will then have an ultrasound of your heart (echocardiogram) to assess the heart muscle, heart valves and heart function.     You may eat and take any medicines before the exam.     If you need to change your appointment, please call outpatient scheduling at [phone].

## 2025-06-25 NOTE — PROGRESS NOTES
[facility]  [address]  [address]  [place] [address]  [phone]      Chief Complaint / Reason for Being Seen: Establish cardiac care    1. Hx of CABG    2. Coronary artery disease involving native coronary artery of native heart without angina pectoris    3. Essential hypertension    4. Dyslipidemia    5. Shortness of breath      Patient with a history of CAD/CABG, sick sinus syndrome/pacemaker.  Hypertension, pulmonary hypertension,    S/P CABG x 2 01/04/2019   LIMA to LAD and SVG to RCA 8/8/12   Sick sinus syndrome 07/23/2020   Stented coronary artery 01/04/2019   2.5 x 18 mm Xience Alpine drug-eluting stent to the mid LAD 7/8/2015, 2.5 x 18 mm Xience drug-eluting stent to the proximal OM1 10/21/2016 and 2.5 x 12 mm Synergy drug-eluting stent to OM1 for severe in-stent restenosis 4/28/2020       Patient has been followed by [facility] cardiology.    He underwent cardiac catheterization on 6/9/2025 at [facility]  Nonobstructive coronary artery disease as described above  Patent saphenous venous graft to distal right coronary artery  Patent left internal mammary artery graft to the mid left anterior  descending coronary artery.    Patient presents to clinic today accompanied by wife.  He is wanting to transfer his cardiac care to [facility] cardiology.  He does have shortness of breath.  No orthopnea or PND.  Not sure if it is related to his lungs he states.  He still continues to have some chest discomfort and spite of recent cardiac catheterization this month showing nonobstructive CAD.        Subjective:    Old records have been obtained from the referring providers.  Those records have been reviewed and summarized.      Anjel Kaiser is a 79 y.o. male with the following history as recorded in [facility]:  Patient Active Problem List    Diagnosis Date Noted    SSS (sick sinus syndrome) ([facility]) 07/29/2012    CAD (coronary artery disease)     Balanitis 07/06/2022    Redundant prepuce 07/06/2022

## 2025-06-30 ENCOUNTER — TELEPHONE (OUTPATIENT)
Dept: CARDIOLOGY | Facility: CLINIC | Age: 80
End: 2025-06-30
Payer: MEDICARE

## 2025-06-30 NOTE — TELEPHONE ENCOUNTER
Chanda from [facility] returned call and stated that patient HAS been seen by their office on June 25 and has an echo scheduled, in addition to office follow up with their structural cardiologist.  Explained to Chanda that patient still has appointments scheduled at our clinic but transfer of remote monitoring was accepted per patient request.

## 2025-06-30 NOTE — TELEPHONE ENCOUNTER
"Transfer request received in [email or web address] to transfer the remote monitoring of patient's Abbott dual chamber pacemaker to [facility].   RN noted that patient has both a new patient appointment with Dr. Awan and follow up appointment with ANALI Ford, in July.  Spoke with patient who stated that he did want the monitoring of his pacemaker transferred to [facility].  When asked if patient plans to keep July appointments with [facility], patient stated \"I don't know\".   Patient asked if he has already seen a provider at [facility] and he states \"no\".  Explained to patient that if he continues to see providers here at [facility], his device will need to be monitored here as well.  Patient verbalized that he would like for us to transfer his remote monitoring for now.  Patient advised to contact us with the plan moving forward.  Understanding verbalized.  Transfer accepted per patient request.  Message left for Chanda Sharpe, device RN, at [facility], to discuss further.     "

## 2025-07-03 DIAGNOSIS — J44.9 CHRONIC OBSTRUCTIVE PULMONARY DISEASE, UNSPECIFIED COPD TYPE: ICD-10-CM

## 2025-07-03 RX ORDER — BUDESONIDE AND FORMOTEROL FUMARATE DIHYDRATE 160; 4.5 UG/1; UG/1
2 AEROSOL RESPIRATORY (INHALATION) 2 TIMES DAILY
Qty: 10.2 G | Refills: 11 | Status: SHIPPED | OUTPATIENT
Start: 2025-07-03

## 2025-07-03 NOTE — TELEPHONE ENCOUNTER
Rx Refill Note  Requested Prescriptions     Pending Prescriptions Disp Refills    budesonide-formoterol (SYMBICORT) 160-4.5 MCG/ACT inhaler 10.2 g      Sig: Inhale 2 puffs 2 (Two) Times a Day.      Last office visit with prescribing clinician: 5/23/2025   Last telemedicine visit with prescribing clinician: Visit date not found   Next office visit with prescribing clinician: 12/18/2025                         Would you like a call back once the refill request has been completed: [] Yes [] No    If the office needs to give you a call back, can they leave a voicemail: [] Yes [] No    Cecily Gill CMA  07/03/25, 16:59 CDT

## 2025-07-07 ENCOUNTER — HOSPITAL ENCOUNTER (OUTPATIENT)
Age: 80
Discharge: HOME | End: 2025-07-09
Payer: MEDICARE

## 2025-07-07 ENCOUNTER — HOSPITAL ENCOUNTER (OUTPATIENT)
Dept: CT IMAGING | Facility: HOSPITAL | Age: 80
Discharge: HOME | End: 2025-07-07
Admitting: NURSE PRACTITIONER
Payer: MEDICARE

## 2025-07-07 VITALS
WEIGHT: 248 LBS | HEIGHT: 68 IN | SYSTOLIC BLOOD PRESSURE: 132 MMHG | BODY MASS INDEX: 37.59 KG/M2 | DIASTOLIC BLOOD PRESSURE: 86 MMHG

## 2025-07-07 DIAGNOSIS — R10.9 ACUTE ABDOMINAL PAIN: ICD-10-CM

## 2025-07-07 DIAGNOSIS — R06.02 SHORTNESS OF BREATH: ICD-10-CM

## 2025-07-07 LAB
ECHO AO ASC DIAM: 2.8 CM
ECHO AO ASCENDING AORTA INDEX: 1.25 CM/M2
ECHO AO ROOT DIAM: 1.9 CM
ECHO AO ROOT INDEX: 0.85 CM/M2
ECHO AO SINUS VALSALVA DIAM: 3.1 CM
ECHO AO SINUS VALSALVA INDEX: 1.38 CM/M2
ECHO AO ST JNCT DIAM: 2.1 CM
ECHO AV AREA PEAK VELOCITY: 2.7 CM2
ECHO AV AREA VTI: 2.8 CM2
ECHO AV AREA/BSA PEAK VELOCITY: 1.2 CM2/M2
ECHO AV AREA/BSA VTI: 1.3 CM2/M2
ECHO AV MEAN GRADIENT: 2 MMHG
ECHO AV MEAN GRADIENT: 2 MMHG
ECHO AV MEAN VELOCITY: 0.7 M/S
ECHO AV PEAK GRADIENT: 4 MMHG
ECHO AV PEAK VELOCITY: 1.1 M/S
ECHO AV VELOCITY RATIO: 0.82
ECHO AV VTI: 23.7 CM
ECHO BSA: 2.32 M2
ECHO EST RA PRESSURE: 3 MMHG
ECHO IVC PROX: 1.6 CM
ECHO LA AREA 2C: 20.1 CM2
ECHO LA AREA 4C: 19.6 CM2
ECHO LA DIAMETER INDEX: 1.52 CM/M2
ECHO LA DIAMETER: 3.4 CM
ECHO LA MAJOR AXIS: 5.1 CM
ECHO LA MINOR AXIS: 5.2 CM
ECHO LA TO AORTIC ROOT RATIO: 1.79
ECHO LA VOL BP: 63 ML (ref 18–58)
ECHO LA VOL MOD A2C: 63 ML (ref 18–58)
ECHO LA VOL MOD A4C: 61 ML (ref 18–58)
ECHO LA VOL/BSA BIPLANE: 28 ML/M2 (ref 16–34)
ECHO LA VOLUME INDEX MOD A2C: 28 ML/M2 (ref 16–34)
ECHO LA VOLUME INDEX MOD A4C: 27 ML/M2 (ref 16–34)
ECHO LV E' LATERAL VELOCITY: 9.46 CM/S
ECHO LV E' SEPTAL VELOCITY: 5.44 CM/S
ECHO LV EDV A2C: 121 ML
ECHO LV EDV A4C: 126 ML
ECHO LV EDV INDEX A4C: 56 ML/M2
ECHO LV EDV NDEX A2C: 54 ML/M2
ECHO LV EF PHYSICIAN: 60 %
ECHO LV EJECTION FRACTION A2C: 68 %
ECHO LV EJECTION FRACTION A4C: 64 %
ECHO LV EJECTION FRACTION BIPLANE: 64 % (ref 55–100)
ECHO LV ESV A2C: 39 ML
ECHO LV ESV A4C: 46 ML
ECHO LV ESV INDEX A2C: 17 ML/M2
ECHO LV ESV INDEX A4C: 21 ML/M2
ECHO LV FRACTIONAL SHORTENING: 31 % (ref 28–44)
ECHO LV INTERNAL DIMENSION DIASTOLE INDEX: 1.74 CM/M2
ECHO LV INTERNAL DIMENSION DIASTOLIC: 3.9 CM (ref 4.2–5.9)
ECHO LV INTERNAL DIMENSION SYSTOLIC INDEX: 1.21 CM/M2
ECHO LV INTERNAL DIMENSION SYSTOLIC: 2.7 CM
ECHO LV IVSD: 1 CM (ref 0.6–1)
ECHO LV MASS 2D: 131 G (ref 88–224)
ECHO LV MASS INDEX 2D: 58.5 G/M2 (ref 49–115)
ECHO LV POSTERIOR WALL DIASTOLIC: 1.1 CM (ref 0.6–1)
ECHO LV RELATIVE WALL THICKNESS RATIO: 0.56
ECHO LVOT AREA: 3.1 CM2
ECHO LVOT AV VTI INDEX: 0.9
ECHO LVOT DIAM: 2 CM
ECHO LVOT MEAN GRADIENT: 2 MMHG
ECHO LVOT MEAN GRADIENT: 2 MMHG
ECHO LVOT PEAK GRADIENT: 3 MMHG
ECHO LVOT PEAK VELOCITY: 0.9 M/S
ECHO LVOT STROKE VOLUME INDEX: 29.9 ML/M2
ECHO LVOT SV: 66.9 ML
ECHO LVOT VTI: 21.3 CM
ECHO MV A VELOCITY: 0.75 M/S
ECHO MV AREA VTI: 2.6 CM2
ECHO MV E DECELERATION TIME (DT): 200 MS
ECHO MV E VELOCITY: 0.48 M/S
ECHO MV E/A RATIO: 0.64
ECHO MV E/E' LATERAL: 5.07
ECHO MV E/E' RATIO (AVERAGED): 6.95
ECHO MV E/E' SEPTAL: 8.82
ECHO MV LVOT VTI INDEX: 1.21
ECHO MV MAX VELOCITY: 0.8 M/S
ECHO MV MEAN GRADIENT: 1 MMHG
ECHO MV MEAN VELOCITY: 0.5 M/S
ECHO MV PEAK GRADIENT: 3 MMHG
ECHO MV VTI: 25.7 CM
ECHO RA AREA 4C: 10.2 CM2
ECHO RA END SYSTOLIC VOLUME APICAL 4 CHAMBER INDEX BSA: 8 ML/M2
ECHO RA VOLUME: 18 ML
ECHO RIGHT VENTRICULAR SYSTOLIC PRESSURE (RVSP): 22 MMHG
ECHO RV BASAL DIMENSION: 3.6 CM
ECHO RV INTERNAL DIMENSION: 3.5 CM
ECHO RV LONGITUDINAL DIMENSION: 6.6 CM
ECHO RV MID DIMENSION: 3.1 CM
ECHO RV TAPSE: 1.4 CM (ref 1.7–?)
ECHO TV REGURGITANT MAX VELOCITY: 2.17 M/S
ECHO TV REGURGITANT PEAK GRADIENT: 19 MMHG

## 2025-07-07 PROCEDURE — 6360000004 HC RX CONTRAST MEDICATION: Performed by: NURSE PRACTITIONER

## 2025-07-07 PROCEDURE — 74176 CT ABD & PELVIS W/O CONTRAST: CPT

## 2025-07-07 PROCEDURE — C8929 TTE W OR WO FOL WCON,DOPPLER: HCPCS

## 2025-07-07 PROCEDURE — 93306 TTE W/DOPPLER COMPLETE: CPT | Performed by: INTERNAL MEDICINE

## 2025-07-07 RX ADMIN — SULFUR HEXAFLUORIDE 2 ML: 60.7; .19; .19 INJECTION, POWDER, LYOPHILIZED, FOR SUSPENSION INTRAVENOUS; INTRAVESICAL at 10:17

## 2025-07-08 ENCOUNTER — HOSPITAL ENCOUNTER (OUTPATIENT)
Dept: PAIN MANAGEMENT | Age: 80
Discharge: HOME | End: 2025-07-08
Payer: MEDICARE

## 2025-07-08 ENCOUNTER — RESULTS FOLLOW-UP (OUTPATIENT)
Dept: CARDIOLOGY CLINIC | Age: 80
End: 2025-07-08

## 2025-07-08 VITALS
DIASTOLIC BLOOD PRESSURE: 79 MMHG | TEMPERATURE: 97.2 F | OXYGEN SATURATION: 98 % | RESPIRATION RATE: 16 BRPM | SYSTOLIC BLOOD PRESSURE: 122 MMHG | HEART RATE: 74 BPM

## 2025-07-08 DIAGNOSIS — R52 PAIN MANAGEMENT: ICD-10-CM

## 2025-07-08 PROCEDURE — 2500000003 HC RX 250 WO HCPCS

## 2025-07-08 PROCEDURE — G0260 INJ FOR SACROILIAC JT ANESTH: HCPCS

## 2025-07-08 PROCEDURE — 6360000002 HC RX W HCPCS

## 2025-07-08 RX ORDER — LIDOCAINE HYDROCHLORIDE 10 MG/ML
7 INJECTION, SOLUTION EPIDURAL; INFILTRATION; INTRACAUDAL; PERINEURAL ONCE
Status: DISCONTINUED | OUTPATIENT
Start: 2025-07-08 | End: 2025-07-10 | Stop reason: HOSPADM

## 2025-07-08 RX ORDER — BUPIVACAINE HYDROCHLORIDE 5 MG/ML
2 INJECTION, SOLUTION EPIDURAL; INTRACAUDAL; PERINEURAL ONCE
Status: DISCONTINUED | OUTPATIENT
Start: 2025-07-08 | End: 2025-07-10 | Stop reason: HOSPADM

## 2025-07-08 RX ORDER — TRIAMCINOLONE ACETONIDE 40 MG/ML
80 INJECTION, SUSPENSION INTRA-ARTICULAR; INTRAMUSCULAR ONCE
Status: DISCONTINUED | OUTPATIENT
Start: 2025-07-08 | End: 2025-07-10 | Stop reason: HOSPADM

## 2025-07-08 RX ORDER — SODIUM CHLORIDE 9 MG/ML
3 INJECTION, SOLUTION INTRAMUSCULAR; INTRAVENOUS; SUBCUTANEOUS ONCE
Status: DISCONTINUED | OUTPATIENT
Start: 2025-07-08 | End: 2025-07-10 | Stop reason: HOSPADM

## 2025-07-08 ASSESSMENT — PAIN SCALES - GENERAL: PAINLEVEL_OUTOF10: 2

## 2025-07-08 NOTE — INTERVAL H&P NOTE
Update History & Physical    The patient's History and Physical  was reviewed with the patient and I examined the patient. There was no change. The surgical site was confirmed by the patient and me.     Plan: The risks, benefits, expected outcome, and alternative to the recommended procedure have been discussed with the patient. Patient understands and wants to proceed with the procedure.     Electronically signed by Rafita Arredondo MD on 7/8/2025 at 11:18 AM

## 2025-07-08 NOTE — PROGRESS NOTES
Procedure:  Level of Consciousness: [x]Alert [x]Oriented []Disoriented []Lethargic  Anxiety Level: [x]Calm []Anxious []Depressed []Other  Skin: [x]Warm [x]Dry []Cool []Moist []Intact []Other  Cardiovascular: []Palpitations: []Never [x]Occasionally []Frequently  Chest Pain: [x]No []Yes  Respiratory:  [x]Unlabored []Labored []Cough ([] Productive []Unproductive)  HCG Required: [x]No []Yes   Results: []Negative []Positive  Knowledge Level:        [x]Patient/Other verbalized understanding of pre-procedure instructions.        [x]Assessment of post-op care needs (transportation, responsible caregiver)        [x]Able to discuss health care problems and how to deal with it.  Factors that Affect Teaching:        Language Barrier: [x]No []Yes - why:        Hearing Loss:        [x]No [x]Yes            Corrective Device:  [x]Yes []No        Vision Loss:           []No [x]Yes            Corrective Device:  [x]Yes []No        Memory Loss:       [x]No []Yes            []Short Term []Long Term  Motivational Level:  [x]Asks Questions                  []Extremely Anxious       [x]Seems Interested               []Seems Uninterested                  []Denies need for Education  Risk for Injury:  [x]Patient oriented to person, place and time  []History of frequent falls/loss of balance  Nutritional:  []Change in appetite   []Weight Gain   []Weight Loss  Functional:  []Requires assistance with ADL's

## 2025-07-14 NOTE — TELEPHONE ENCOUNTER
Patient called and wife answered, I asked her if her  would like to keep appt with Dr Awan on 7/25/25, and any future appts with Dr Toribio? She states that we can cancel any appts with both providers and they plan to continue to see providers at [facility].

## 2025-07-25 ENCOUNTER — TRANSCRIBE ORDERS (OUTPATIENT)
Dept: ADMINISTRATIVE | Facility: HOSPITAL | Age: 80
End: 2025-07-25
Payer: MEDICARE

## 2025-07-25 ENCOUNTER — HOSPITAL ENCOUNTER (OUTPATIENT)
Dept: ULTRASOUND IMAGING | Facility: HOSPITAL | Age: 80
Discharge: HOME | End: 2025-07-25
Payer: MEDICARE

## 2025-07-25 ENCOUNTER — LAB (OUTPATIENT)
Dept: LAB | Facility: HOSPITAL | Age: 80
End: 2025-07-25
Payer: MEDICARE

## 2025-07-25 DIAGNOSIS — M79.651 PAIN IN RIGHT THIGH: ICD-10-CM

## 2025-07-25 DIAGNOSIS — E55.9 VITAMIN D DEFICIENCY, UNSPECIFIED: ICD-10-CM

## 2025-07-25 DIAGNOSIS — I10 ESSENTIAL (PRIMARY) HYPERTENSION: ICD-10-CM

## 2025-07-25 DIAGNOSIS — E53.8 DEFICIENCY OF OTHER SPECIFIED B GROUP VITAMINS: ICD-10-CM

## 2025-07-25 DIAGNOSIS — R53.83 OTHER FATIGUE: Primary | ICD-10-CM

## 2025-07-25 DIAGNOSIS — D64.9 ANEMIA, UNSPECIFIED TYPE: ICD-10-CM

## 2025-07-25 DIAGNOSIS — R53.83 OTHER FATIGUE: ICD-10-CM

## 2025-07-25 LAB
25(OH)D3 SERPL-MCNC: 50.8 NG/ML (ref 30–100)
ALBUMIN SERPL-MCNC: 3.9 G/DL (ref 3.5–5)
ALBUMIN/GLOB SERPL: 1.3 G/DL (ref 1.1–2.5)
ALP SERPL-CCNC: 57 U/L (ref 24–120)
ALT SERPL W P-5'-P-CCNC: 31 U/L (ref 0–50)
ANION GAP SERPL CALCULATED.3IONS-SCNC: 8 MMOL/L (ref 4–13)
AST SERPL-CCNC: 24 U/L (ref 7–45)
AUTO MIXED CELLS #: 0.7 10*3/MM3 (ref 0.1–2.6)
AUTO MIXED CELLS %: 6.9 % (ref 0.1–24)
BILIRUB SERPL-MCNC: 1 MG/DL (ref 0.1–1)
BUN SERPL-MCNC: 17 MG/DL (ref 5–21)
BUN/CREAT SERPL: 21.3
CALCIUM SPEC-SCNC: 9.4 MG/DL (ref 8.6–10.5)
CHLORIDE SERPL-SCNC: 102 MMOL/L (ref 98–110)
CO2 SERPL-SCNC: 27 MMOL/L (ref 24–31)
CREAT SERPL-MCNC: 0.8 MG/DL (ref 0.5–1.4)
EGFRCR SERPLBLD CKD-EPI 2021: 91.2 ML/MIN/1.73
ERYTHROCYTE [DISTWIDTH] IN BLOOD BY AUTOMATED COUNT: 14.4 % (ref 12.3–15.4)
FOLATE SERPL-MCNC: 12.9 NG/ML (ref 4.78–24.2)
GLOBULIN UR ELPH-MCNC: 2.9 GM/DL
GLUCOSE SERPL-MCNC: 106 MG/DL (ref 65–99)
HCT VFR BLD AUTO: 42.5 % (ref 37.5–51)
HGB BLD-MCNC: 13.8 G/DL (ref 13–17.7)
IRON 24H UR-MRATE: 103 MCG/DL (ref 59–158)
IRON SATN MFR SERPL: 31 % (ref 20–50)
LYMPHOCYTES # BLD AUTO: 1.8 10*3/MM3 (ref 0.7–3.1)
LYMPHOCYTES NFR BLD AUTO: 18.4 % (ref 19.6–45.3)
MCH RBC QN AUTO: 29.6 PG (ref 26.6–33)
MCHC RBC AUTO-ENTMCNC: 32.5 G/DL (ref 31.5–35.7)
MCV RBC AUTO: 91.2 FL (ref 79–97)
NEUTROPHILS NFR BLD AUTO: 7.5 10*3/MM3 (ref 1.7–7)
NEUTROPHILS NFR BLD AUTO: 74.7 % (ref 42.7–76)
PLATELET # BLD AUTO: 212 10*3/MM3 (ref 140–450)
PMV BLD AUTO: 7.8 FL (ref 6–12)
POTASSIUM SERPL-SCNC: 4.2 MMOL/L (ref 3.5–5.3)
PROT SERPL-MCNC: 6.8 G/DL (ref 6.3–8.7)
RBC # BLD AUTO: 4.66 10*6/MM3 (ref 4.14–5.8)
SODIUM SERPL-SCNC: 137 MMOL/L (ref 135–145)
T4 FREE SERPL-MCNC: 1.02 NG/DL (ref 0.92–1.68)
TIBC SERPL-MCNC: 334 MCG/DL (ref 298–536)
TRANSFERRIN SERPL-MCNC: 224 MG/DL (ref 200–360)
TSH SERPL DL<=0.05 MIU/L-ACNC: 2.13 UIU/ML (ref 0.27–4.2)
VIT B12 BLD-MCNC: 713 PG/ML (ref 211–946)
WBC NRBC COR # BLD AUTO: 10 10*3/MM3 (ref 3.4–10.8)

## 2025-07-25 PROCEDURE — 93971 EXTREMITY STUDY: CPT

## 2025-07-25 PROCEDURE — 82306 VITAMIN D 25 HYDROXY: CPT

## 2025-07-25 PROCEDURE — 84466 ASSAY OF TRANSFERRIN: CPT

## 2025-07-25 PROCEDURE — 85025 COMPLETE CBC W/AUTO DIFF WBC: CPT

## 2025-07-25 PROCEDURE — 84402 ASSAY OF FREE TESTOSTERONE: CPT

## 2025-07-25 PROCEDURE — 82607 VITAMIN B-12: CPT

## 2025-07-25 PROCEDURE — 84443 ASSAY THYROID STIM HORMONE: CPT

## 2025-07-25 PROCEDURE — 82746 ASSAY OF FOLIC ACID SERUM: CPT

## 2025-07-25 PROCEDURE — 36415 COLL VENOUS BLD VENIPUNCTURE: CPT

## 2025-07-25 PROCEDURE — 80053 COMPREHEN METABOLIC PANEL: CPT

## 2025-07-25 PROCEDURE — 83540 ASSAY OF IRON: CPT

## 2025-07-25 PROCEDURE — 84403 ASSAY OF TOTAL TESTOSTERONE: CPT

## 2025-07-25 PROCEDURE — 84439 ASSAY OF FREE THYROXINE: CPT

## 2025-07-29 LAB
TESTOST FREE SERPL-MCNC: 0.9 PG/ML (ref 6.6–18.1)
TESTOST SERPL-MCNC: 94 NG/DL (ref 264–916)

## 2025-07-30 ENCOUNTER — OFFICE VISIT (OUTPATIENT)
Dept: CARDIOLOGY CLINIC | Age: 80
End: 2025-07-30

## 2025-07-30 VITALS
BODY MASS INDEX: 36.07 KG/M2 | HEIGHT: 68 IN | HEART RATE: 77 BPM | DIASTOLIC BLOOD PRESSURE: 70 MMHG | SYSTOLIC BLOOD PRESSURE: 108 MMHG | WEIGHT: 238 LBS

## 2025-07-30 DIAGNOSIS — I10 ESSENTIAL HYPERTENSION: ICD-10-CM

## 2025-07-30 DIAGNOSIS — Z95.1 HX OF CABG: ICD-10-CM

## 2025-07-30 DIAGNOSIS — R06.02 SHORTNESS OF BREATH: Primary | ICD-10-CM

## 2025-07-30 DIAGNOSIS — I25.10 CORONARY ARTERY DISEASE INVOLVING NATIVE CORONARY ARTERY OF NATIVE HEART WITHOUT ANGINA PECTORIS: ICD-10-CM

## 2025-07-30 RX ORDER — ESCITALOPRAM OXALATE 20 MG/1
20 TABLET ORAL DAILY
COMMUNITY

## 2025-07-30 RX ORDER — LORATADINE 10 MG/1
10 TABLET ORAL DAILY
COMMUNITY

## 2025-07-30 RX ORDER — BUDESONIDE AND FORMOTEROL FUMARATE DIHYDRATE 160; 4.5 UG/1; UG/1
2 AEROSOL RESPIRATORY (INHALATION) 2 TIMES DAILY
COMMUNITY
Start: 2025-07-10

## 2025-07-30 RX ORDER — ALBUTEROL SULFATE 90 UG/1
2 INHALANT RESPIRATORY (INHALATION) EVERY 6 HOURS PRN
COMMUNITY

## 2025-07-30 RX ORDER — ASPIRIN 81 MG/1
81 TABLET, CHEWABLE ORAL DAILY
COMMUNITY

## 2025-07-30 RX ORDER — ZINC SULFATE 50(220)MG
50 CAPSULE ORAL DAILY
COMMUNITY

## 2025-07-30 RX ORDER — EZETIMIBE 10 MG/1
10 TABLET ORAL DAILY
Qty: 90 TABLET | Refills: 3 | Status: SHIPPED | OUTPATIENT
Start: 2025-07-30

## 2025-07-30 RX ORDER — FLUTICASONE PROPIONATE 50 MCG
1 SPRAY, SUSPENSION (ML) NASAL DAILY PRN
COMMUNITY

## 2025-07-30 RX ORDER — MECLIZINE HCL 12.5 MG 12.5 MG/1
12.5 TABLET ORAL EVERY MORNING
COMMUNITY
Start: 2025-01-06

## 2025-08-02 NOTE — PROGRESS NOTES
[facility]  Cardiology Office Note  [address] [facility], [facility]  [address]  Phone: [phone]  Fax: [phone]                            Date:  8/1/2025  Patient: Anjel Kaiser  Age:  79 y.o., 1945    Referral: No ref. provider found      PROBLEM LIST:    Patient Active Problem List    Diagnosis Date Noted    SSS (sick sinus syndrome) ([facility]) 07/29/2012     Priority: High     Overview Note:     4/16/2008  Dual chamber PPM ([facility])      CAD (coronary artery disease)      Priority: High     Overview Note:     2001  Cath  \"mild CAD\"  6/23/2003  Cath  Mild CAD, normal LVFX  12/6/2005  Cath  Mild CAD, normal LVFX  11/8/2006  SE  negative for myocardial ischemia  4/4/2008  Cath  3.0x18 REBEKAH O1, 3.5x18 REBEKAH LAD, 2.5x8 balloon to D1, normal LVFX  4/14/2008  Cath  Patent stent in the LAD & OM1, 2.5x14 REBEKAH to mid RCA, normal LVFX  5/24/2008  SE  negative for myocardial ischemia  7/19/2008  SE  negative for myocardial ischemia  4/22/2010  Echo  Normal LVFX, RVSP 27 mmHg  7/20/2012  cardiolite  Incomplete inferior reverse redistribution, EF 60%, AUC indication 20, AUC score 7  8/2/12  Cath  80% LAD, 70% RCA, normal LVFX  8/8/2102  CABG x 2 LIMA-LAD, VG-RCA ([place])        Balanitis 07/06/2022     Priority: Medium    Redundant prepuce 07/06/2022     Priority: Medium    Postoperative pain 07/06/2022     Priority: Medium    Hx of CABG     Anxiety     Hiatal hernia     Benign prostatic hyperplasia      Overview Note:     Updating Deprecated Diagnoses      History of smoking     Kidney stones     Hyperlipidemia     Hypertension     Diabetes mellitus (HCC)     Arthritis     Obesity          PRESENTATION: Anjel Kaiser is a 79 y.o. year old male patient of Manny Allred MD who presents with   Chief Complaint   Patient presents with    New Patient       The patient presents with a history of heart troubles, including a previous heart attack, and ongoing chest pressure. They report

## 2025-08-05 RX ORDER — METOPROLOL TARTRATE 25 MG/1
25 TABLET, FILM COATED ORAL DAILY
OUTPATIENT
Start: 2025-08-05

## 2025-08-11 ENCOUNTER — TELEPHONE (OUTPATIENT)
Dept: CARDIOLOGY | Facility: CLINIC | Age: 80
End: 2025-08-11
Payer: MEDICARE

## 2025-08-25 DIAGNOSIS — I49.5 SSS (SICK SINUS SYNDROME) (HCC): ICD-10-CM

## 2025-08-25 DIAGNOSIS — Z95.0 PACEMAKER: Primary | ICD-10-CM

## 2025-09-03 ENCOUNTER — TELEPHONE (OUTPATIENT)
Age: 80
End: 2025-09-03

## 2025-09-04 ENCOUNTER — OFFICE VISIT (OUTPATIENT)
Age: 80
End: 2025-09-04

## 2025-09-04 VITALS — HEIGHT: 68 IN | WEIGHT: 243 LBS | BODY MASS INDEX: 36.83 KG/M2

## 2025-09-04 DIAGNOSIS — S61.317A LACERATION OF LEFT LITTLE FINGER WITHOUT FOREIGN BODY WITH DAMAGE TO NAIL, INITIAL ENCOUNTER: ICD-10-CM

## 2025-09-04 DIAGNOSIS — S61.215A LACERATION OF LEFT RING FINGER WITHOUT FOREIGN BODY WITHOUT DAMAGE TO NAIL, INITIAL ENCOUNTER: ICD-10-CM

## 2025-09-04 DIAGNOSIS — S67.10XA CRUSHING INJURY OF FINGER OF LEFT HAND: ICD-10-CM

## 2025-09-04 DIAGNOSIS — M79.642 PAIN IN LEFT HAND: Primary | ICD-10-CM

## 2025-09-04 RX ORDER — HYDROCODONE BITARTRATE AND ACETAMINOPHEN 5; 325 MG/1; MG/1
1 TABLET ORAL EVERY 8 HOURS PRN
Qty: 21 TABLET | Refills: 0 | Status: SHIPPED | OUTPATIENT
Start: 2025-09-04 | End: 2025-09-11

## (undated) DEVICE — CUFF,BP,DISP,1 TUBE,ADULT,HP: Brand: MEDLINE

## (undated) DEVICE — 6F .070 JL4 100CM: Brand: CORDIS

## (undated) DEVICE — MASK,OXYGEN,MED CONC,ADLT,7' TUB, UC: Brand: PENDING

## (undated) DEVICE — SKIN AFFIX SURG ADHESIVE 72/CS 0.55ML: Brand: MEDLINE

## (undated) DEVICE — SOLIDIFIER LIQUI LOC PLUS 2000CC

## (undated) DEVICE — GLOVE ORANGE PI 7 1/2   MSG9075

## (undated) DEVICE — MINOR CDS: Brand: MEDLINE INDUSTRIES, INC.

## (undated) DEVICE — SUTURE CHROMIC GUT SZ 2-0 L36IN ABSRB BRN SH L26MM 1/2 CIR G173H

## (undated) DEVICE — 3M™ IOBAN™ 2 ANTIMICROBIAL INCISE DRAPE 6650EZ: Brand: IOBAN™ 2

## (undated) DEVICE — GLV SURG BIOGEL LTX PF 7 1/2

## (undated) DEVICE — CONTAINER,SPECIMEN,OR STERILE,4OZ: Brand: MEDLINE

## (undated) DEVICE — TOOL INSRT GW MTL OR PLSTC

## (undated) DEVICE — PK TURNOVER RM ADV

## (undated) DEVICE — PAD MINOR UNIVERSAL: Brand: MEDLINE INDUSTRIES, INC.

## (undated) DEVICE — YANKAUER,BULB TIP WITH VENT: Brand: ARGYLE

## (undated) DEVICE — SUT SILK 2/0 FS BLK 18IN 685G

## (undated) DEVICE — SYRINGE MED 10ML TRNSLUC BRL PLUNG BLK MRK POLYPR CTRL

## (undated) DEVICE — NERVE BLOCK TRAY (FACET)-LF: Brand: MEDLINE INDUSTRIES, INC.

## (undated) DEVICE — MODEL AT P65, P/N 701554-001KIT CONTENTS: HAND CONTROLLER, 3-WAY HIGH-PRESSURE STOPCOCK WITH ROTATING END AND PREMIUM HIGH-PRESSURE TUBING: Brand: ANGIOTOUCH® KIT

## (undated) DEVICE — DISSCT SECTO 1PC 1P/U 5MMX35CM STRL

## (undated) DEVICE — GW STARTER FXD CORE J .035 3X150CM 3MM

## (undated) DEVICE — DEV TORQ GW HOT/PINK

## (undated) DEVICE — CANN CO2/O2 NASL A/

## (undated) DEVICE — MODEL BT2000 P/N 700287-012KIT CONTENTS: MANIFOLD WITH SALINE AND CONTRAST PORTS, SALINE TUBING WITH SPIKE AND HAND SYRINGE, TRANSDUCER: Brand: BT2000 AUTOMATED MANIFOLD KIT

## (undated) DEVICE — FRCP BX RADJAW4 NDL 2.8 240 STD OG

## (undated) DEVICE — NEEDLE SPNL 20 GAX6 IN

## (undated) DEVICE — PERCLOSE™ PROSTYLE™ SUTURE-MEDIATED CLOSURE AND REPAIR SYSTEM: Brand: PERCLOSE™ PROSTYLE™

## (undated) DEVICE — SUT VIC 2/0 UR6 27IN VCP602H

## (undated) DEVICE — BANDAGE,GAUZE,CONFORMING,3"X75",STRL,LF: Brand: MEDLINE

## (undated) DEVICE — GOLDVAC PUSH BUTTON ELECTROSURGICAL SMOKE EVACUATION HANDPIECE: Brand: GOLDVAC

## (undated) DEVICE — ELECTRD PAD DEFIB A/

## (undated) DEVICE — THE CHANNEL CLEANING BRUSH IS A NYLON FLEXI BRUSH ATTACHED TO A FLEXIBLE PLASTIC SHEATH DESIGNED TO SAFELY REMOVE DEBRIS FROM FLEXIBLE ENDOSCOPES.

## (undated) DEVICE — PTCH HEMOCON PRO 2X2IN

## (undated) DEVICE — PK CATH CARD 30

## (undated) DEVICE — FR5 INFINITI MULTIPAC: Brand: INFINITI

## (undated) DEVICE — COPILOT BLEEDBACK CONTROL VALVE: Brand: COPILOT

## (undated) DEVICE — SENSR O2 OXIMAX FNGR A/ 18IN NONSTR

## (undated) DEVICE — SOL IRR NACL 0.9PCT BT 1000ML

## (undated) DEVICE — 3M™ STERI-STRIP™ REINFORCED ADHESIVE SKIN CLOSURES, R1546, 1/4 IN X 4 IN (6 MM X 100 MM), 10 STRIPS/ENVELOPE: Brand: 3M™ STERI-STRIP™

## (undated) DEVICE — BANDAGE,GAUZE,BULKEE II,4.5"X4.1YD,STRL: Brand: MEDLINE

## (undated) DEVICE — SUT SILK 4/0 SUTUPAK TIES 24IN SA73H

## (undated) DEVICE — UTILITY MARKER W/MED LABELS: Brand: MEDLINE

## (undated) DEVICE — PAD, DEFIB, ADULT, RADIOTRANS, PHYSIO: Brand: MEDLINE

## (undated) DEVICE — HI-TORQUE POWERTURN FLEX GUIDE WIRE W/HYDROPHILIC COATING .014" STRAIGHT TIP 190 CM: Brand: HI-TORQUE POWERTURN

## (undated) DEVICE — SOL NACL INJ 0.9PCT 50ML

## (undated) DEVICE — GC 7F 078 JL 4 SH: Brand: VISTA BRITE TIP

## (undated) DEVICE — ARGYLE YANKAUER BULB TIP WITH VENT: Brand: ARGYLE

## (undated) DEVICE — CABL BIPOL W/ALLGTR CLIP/SM 12FT

## (undated) DEVICE — DEFENDO AIR WATER SUCTION AND BIOPSY VALVE KIT FOR  OLYMPUS: Brand: DEFENDO AIR/WATER/SUCTION AND BIOPSY VALVE

## (undated) DEVICE — WOUND RETRACTOR AND PROTECTOR: Brand: ALEXIS O WOUND PROTECTOR-RETRACTOR

## (undated) DEVICE — DRP SURG UTIL W/TPE 2/LAYR 15X26IN DISP

## (undated) DEVICE — MYNXGRIP 6F/7F: Brand: MYNXGRIP

## (undated) DEVICE — SNAR POLYP SENSATION MICRO OVL 13 240X40

## (undated) DEVICE — CANN NASL ETCO2 LO/FLO A/

## (undated) DEVICE — CVR HNDL LIGHT RIGID

## (undated) DEVICE — SUT PROLN 4/0 RB1 D/A 36IN 8557H

## (undated) DEVICE — SUT MNCRYL 4/0 PS2 27IN UD MCP426H

## (undated) DEVICE — PINNACLE INTRODUCER SHEATH: Brand: PINNACLE

## (undated) DEVICE — SPONGE,LAP,12"X12",XR,ST,5/PK,40PK/CS: Brand: MEDLINE

## (undated) DEVICE — SHEET,T,THYROID,STERILE: Brand: MEDLINE

## (undated) DEVICE — KT ANTI FOG W/FLD AND SPNG

## (undated) DEVICE — SOL IRR NACL 0.9PCT BO 1000ML

## (undated) DEVICE — SOLIDIFIER LIQ LIQUILOC/PLUS W/TREAT 2000CC

## (undated) DEVICE — ELECTRD BLD EZ CLN MOD 6.5IN

## (undated) DEVICE — PK CATH CARD 30 CA/4

## (undated) DEVICE — GLOVE SURG SZ 75 CRM LTX FREE POLYISOPRENE POLYMER BEAD ANTI

## (undated) DEVICE — SUT SILK 3/0 SUTUPAK TIES 24IN SA74H

## (undated) DEVICE — THE SINGLE USE ETRAP – POLYP TRAP IS USED FOR SUCTION RETRIEVAL OF ENDOSCOPICALLY REMOVED POLYPS.: Brand: ETRAP

## (undated) DEVICE — GW PRESSUREWIRE X WIRELESS FFR 175CM

## (undated) DEVICE — OASIS DRAIN, SINGLE, INLINE & ATS COMPATIBLE: Brand: OASIS

## (undated) DEVICE — COVER,MAYO STAND,STERILE: Brand: MEDLINE

## (undated) DEVICE — DRSNG TELFA PAD NONADH STR 1S 3X8IN

## (undated) DEVICE — CONMED SCOPE SAVER BITE BLOCK, 20X27 MM: Brand: SCOPE SAVER

## (undated) DEVICE — INFLATION DEVICE: Brand: ENCORE™ 26

## (undated) DEVICE — SKIN PREP TRAY W/CHG: Brand: MEDLINE INDUSTRIES, INC.

## (undated) DEVICE — TBG SMPL FLTR LINE NASL 02/C02 A/ BX/100

## (undated) DEVICE — 3M™ IOBAN™ 2 ANTIMICROBIAL INCISE DRAPE 6651EZ: Brand: IOBAN™ 2

## (undated) DEVICE — PK PM 30

## (undated) DEVICE — ENDOGATOR AUXILIARY WATER JET CONNECTOR: Brand: ENDOGATOR

## (undated) DEVICE — A2000 MULTI-USE SYRINGE KIT, P/N 701277-003KIT CONTENTS: 100ML CONTRAST RESERVOIR AND TUBING WITH CONTRAST SPIKE AND CLAMP: Brand: A2000 MULTI-USE SYRINGE KIT

## (undated) DEVICE — SUT SILK 0 SUTUPAK TIES 24IN SA76G

## (undated) DEVICE — BANDAGE COMPR W2INXL5YD TAN COT CARING SELF ADH COHESIVE

## (undated) DEVICE — Device: Brand: DEFENDO AIR/WATER/SUCTION AND BIOPSY VALVE

## (undated) DEVICE — TOWEL,OR,DSP,ST,BLUE,DLX,4/PK,20PK/CS: Brand: MEDLINE

## (undated) DEVICE — GLOVE SURG SZ 75 L12IN FNGR THK94MIL TRNSLUC YEL LTX

## (undated) DEVICE — GLOVE SURG SZ 6 CRM LTX FREE POLYISOPRENE POLYMER BEAD ANTI

## (undated) DEVICE — MINI TREK CORONARY DILATATION CATHETER 2.0 MM X 12 MM / RAPID-EXCHANGE: Brand: MINI TREK

## (undated) DEVICE — GW STARTER FXD/CORE PTFE/COAT J/TP/3MM .035IN 10X150CM

## (undated) DEVICE — PAD,NON-ADHERENT,3X8,STERILE,LF,1/PK: Brand: MEDLINE

## (undated) DEVICE — JELLY,LUBE,STERILE,FLIP TOP,TUBE,2-OZ: Brand: MEDLINE

## (undated) DEVICE — SOL NS 500ML

## (undated) DEVICE — KT NDL GUIDE STRL 18GA

## (undated) DEVICE — SUTURE PERMA-HAND SZ 2-0 L30IN NONABSORBABLE BLK L26MM SH K833H

## (undated) DEVICE — ANTIBACTERIAL UNDYED BRAIDED (POLYGLACTIN 910), SYNTHETIC ABSORBABLE SUTURE: Brand: COATED VICRYL

## (undated) DEVICE — BANDAGE COMPR W4INXL5YD TAN COT CARING SELF ADH COHESIVE

## (undated) DEVICE — SUT SILK 2/0 SUTUPAK TIES 24IN SA75H

## (undated) DEVICE — GLV SURG BIOGEL LTX PF 6 1/2

## (undated) DEVICE — SOLUTION IV IRRIG POUR BRL 0.9% SODIUM CHL 2F7124

## (undated) DEVICE — ST PRIM PUMP 20DRP 3VLV 127IN